# Patient Record
Sex: MALE | Race: WHITE | NOT HISPANIC OR LATINO | URBAN - METROPOLITAN AREA
[De-identification: names, ages, dates, MRNs, and addresses within clinical notes are randomized per-mention and may not be internally consistent; named-entity substitution may affect disease eponyms.]

---

## 2017-05-04 ENCOUNTER — INPATIENT (INPATIENT)
Facility: HOSPITAL | Age: 72
LOS: 6 days | Discharge: HOME | End: 2017-05-11
Attending: INTERNAL MEDICINE | Admitting: INTERNAL MEDICINE
Payer: MEDICARE

## 2017-05-04 VITALS
OXYGEN SATURATION: 91 % | DIASTOLIC BLOOD PRESSURE: 84 MMHG | HEART RATE: 139 BPM | RESPIRATION RATE: 39 BRPM | SYSTOLIC BLOOD PRESSURE: 188 MMHG

## 2017-05-04 DIAGNOSIS — I10 ESSENTIAL (PRIMARY) HYPERTENSION: ICD-10-CM

## 2017-05-04 DIAGNOSIS — J44.9 CHRONIC OBSTRUCTIVE PULMONARY DISEASE, UNSPECIFIED: ICD-10-CM

## 2017-05-04 DIAGNOSIS — Z98.89 OTHER SPECIFIED POSTPROCEDURAL STATES: Chronic | ICD-10-CM

## 2017-05-04 DIAGNOSIS — A41.9 SEPSIS, UNSPECIFIED ORGANISM: ICD-10-CM

## 2017-05-04 DIAGNOSIS — I48.91 UNSPECIFIED ATRIAL FIBRILLATION: ICD-10-CM

## 2017-05-04 DIAGNOSIS — Z29.9 ENCOUNTER FOR PROPHYLACTIC MEASURES, UNSPECIFIED: ICD-10-CM

## 2017-05-04 DIAGNOSIS — J96.01 ACUTE RESPIRATORY FAILURE WITH HYPOXIA: ICD-10-CM

## 2017-05-04 DIAGNOSIS — G40.909 EPILEPSY, UNSPECIFIED, NOT INTRACTABLE, WITHOUT STATUS EPILEPTICUS: ICD-10-CM

## 2017-05-04 DIAGNOSIS — Z95.4 PRESENCE OF OTHER HEART-VALVE REPLACEMENT: Chronic | ICD-10-CM

## 2017-05-04 LAB
ALBUMIN SERPL ELPH-MCNC: 4.6 G/DL — SIGNIFICANT CHANGE UP (ref 3.3–5)
ALP SERPL-CCNC: 92 U/L — SIGNIFICANT CHANGE UP (ref 40–120)
ALT FLD-CCNC: 21 U/L — SIGNIFICANT CHANGE UP (ref 4–41)
APPEARANCE UR: CLEAR — SIGNIFICANT CHANGE UP
APTT BLD: 28.9 SEC — SIGNIFICANT CHANGE UP (ref 27.5–37.4)
AST SERPL-CCNC: 37 U/L — SIGNIFICANT CHANGE UP (ref 4–40)
B PERT DNA SPEC QL NAA+PROBE: SIGNIFICANT CHANGE UP
BASE EXCESS BLDV CALC-SCNC: 0.9 MMOL/L — SIGNIFICANT CHANGE UP
BASE EXCESS BLDV CALC-SCNC: 7 MMOL/L — SIGNIFICANT CHANGE UP
BASOPHILS # BLD AUTO: 0.01 K/UL — SIGNIFICANT CHANGE UP (ref 0–0.2)
BASOPHILS NFR BLD AUTO: 0.1 % — SIGNIFICANT CHANGE UP (ref 0–2)
BILIRUB SERPL-MCNC: 0.7 MG/DL — SIGNIFICANT CHANGE UP (ref 0.2–1.2)
BILIRUB UR-MCNC: NEGATIVE — SIGNIFICANT CHANGE UP
BLOOD GAS VENOUS - CREATININE: 1.18 MG/DL — SIGNIFICANT CHANGE UP (ref 0.5–1.3)
BLOOD GAS VENOUS - CREATININE: 1.19 MG/DL — SIGNIFICANT CHANGE UP (ref 0.5–1.3)
BLOOD UR QL VISUAL: NEGATIVE — SIGNIFICANT CHANGE UP
BUN SERPL-MCNC: 33 MG/DL — HIGH (ref 7–23)
C PNEUM DNA SPEC QL NAA+PROBE: NOT DETECTED — SIGNIFICANT CHANGE UP
CALCIUM SERPL-MCNC: 9.9 MG/DL — SIGNIFICANT CHANGE UP (ref 8.4–10.5)
CHLORIDE BLDV-SCNC: 101 MMOL/L — SIGNIFICANT CHANGE UP (ref 96–108)
CHLORIDE BLDV-SCNC: 106 MMOL/L — SIGNIFICANT CHANGE UP (ref 96–108)
CHLORIDE SERPL-SCNC: 97 MMOL/L — LOW (ref 98–107)
CK MB BLD-MCNC: 1.27 NG/ML — SIGNIFICANT CHANGE UP (ref 1–6.6)
CK MB BLD-MCNC: 1.8 NG/ML — SIGNIFICANT CHANGE UP (ref 1–6.6)
CK SERPL-CCNC: 36 U/L — SIGNIFICANT CHANGE UP (ref 30–200)
CK SERPL-CCNC: 59 U/L — SIGNIFICANT CHANGE UP (ref 30–200)
CO2 SERPL-SCNC: 28 MMOL/L — SIGNIFICANT CHANGE UP (ref 22–31)
COLOR SPEC: YELLOW — SIGNIFICANT CHANGE UP
CREAT SERPL-MCNC: 1.2 MG/DL — SIGNIFICANT CHANGE UP (ref 0.5–1.3)
EOSINOPHIL # BLD AUTO: 0.06 K/UL — SIGNIFICANT CHANGE UP (ref 0–0.5)
EOSINOPHIL NFR BLD AUTO: 0.4 % — SIGNIFICANT CHANGE UP (ref 0–6)
FLUAV H1 2009 PAND RNA SPEC QL NAA+PROBE: NOT DETECTED — SIGNIFICANT CHANGE UP
FLUAV H1 RNA SPEC QL NAA+PROBE: NOT DETECTED — SIGNIFICANT CHANGE UP
FLUAV H3 RNA SPEC QL NAA+PROBE: NOT DETECTED — SIGNIFICANT CHANGE UP
FLUAV SUBTYP SPEC NAA+PROBE: SIGNIFICANT CHANGE UP
FLUBV RNA SPEC QL NAA+PROBE: NOT DETECTED — SIGNIFICANT CHANGE UP
GAS PNL BLDV: 137 MMOL/L — SIGNIFICANT CHANGE UP (ref 136–146)
GAS PNL BLDV: 141 MMOL/L — SIGNIFICANT CHANGE UP (ref 136–146)
GLUCOSE BLDV-MCNC: 106 — HIGH (ref 70–99)
GLUCOSE BLDV-MCNC: 137 — HIGH (ref 70–99)
GLUCOSE SERPL-MCNC: 136 MG/DL — HIGH (ref 70–99)
GLUCOSE UR-MCNC: NEGATIVE — SIGNIFICANT CHANGE UP
HADV DNA SPEC QL NAA+PROBE: NOT DETECTED — SIGNIFICANT CHANGE UP
HCO3 BLDV-SCNC: 25 MMOL/L — SIGNIFICANT CHANGE UP (ref 20–27)
HCO3 BLDV-SCNC: 28 MMOL/L — HIGH (ref 20–27)
HCOV 229E RNA SPEC QL NAA+PROBE: NOT DETECTED — SIGNIFICANT CHANGE UP
HCOV HKU1 RNA SPEC QL NAA+PROBE: NOT DETECTED — SIGNIFICANT CHANGE UP
HCOV NL63 RNA SPEC QL NAA+PROBE: NOT DETECTED — SIGNIFICANT CHANGE UP
HCOV OC43 RNA SPEC QL NAA+PROBE: NOT DETECTED — SIGNIFICANT CHANGE UP
HCT VFR BLD CALC: 43.9 % — SIGNIFICANT CHANGE UP (ref 39–50)
HCT VFR BLDV CALC: 37.1 % — LOW (ref 39–51)
HCT VFR BLDV CALC: 46.7 % — SIGNIFICANT CHANGE UP (ref 39–51)
HGB BLD-MCNC: 15 G/DL — SIGNIFICANT CHANGE UP (ref 13–17)
HGB BLDV-MCNC: 12 G/DL — LOW (ref 13–17)
HGB BLDV-MCNC: 15.2 G/DL — SIGNIFICANT CHANGE UP (ref 13–17)
HMPV RNA SPEC QL NAA+PROBE: NOT DETECTED — SIGNIFICANT CHANGE UP
HPIV1 RNA SPEC QL NAA+PROBE: NOT DETECTED — SIGNIFICANT CHANGE UP
HPIV2 RNA SPEC QL NAA+PROBE: NOT DETECTED — SIGNIFICANT CHANGE UP
HPIV3 RNA SPEC QL NAA+PROBE: NOT DETECTED — SIGNIFICANT CHANGE UP
HPIV4 RNA SPEC QL NAA+PROBE: NOT DETECTED — SIGNIFICANT CHANGE UP
HYALINE CASTS # UR AUTO: SIGNIFICANT CHANGE UP (ref 0–?)
IMM GRANULOCYTES NFR BLD AUTO: 0.4 % — SIGNIFICANT CHANGE UP (ref 0–1.5)
INR BLD: 1.51 — HIGH (ref 0.88–1.17)
KETONES UR-MCNC: NEGATIVE — SIGNIFICANT CHANGE UP
LACTATE BLDV-MCNC: 1.2 MMOL/L — SIGNIFICANT CHANGE UP (ref 0.5–2)
LACTATE BLDV-MCNC: 2 MMOL/L — SIGNIFICANT CHANGE UP (ref 0.5–2)
LEUKOCYTE ESTERASE UR-ACNC: NEGATIVE — SIGNIFICANT CHANGE UP
LYMPHOCYTES # BLD AUTO: 1.86 K/UL — SIGNIFICANT CHANGE UP (ref 1–3.3)
LYMPHOCYTES # BLD AUTO: 13.3 % — SIGNIFICANT CHANGE UP (ref 13–44)
M PNEUMO DNA SPEC QL NAA+PROBE: NOT DETECTED — SIGNIFICANT CHANGE UP
MCHC RBC-ENTMCNC: 32.8 PG — SIGNIFICANT CHANGE UP (ref 27–34)
MCHC RBC-ENTMCNC: 34.2 % — SIGNIFICANT CHANGE UP (ref 32–36)
MCV RBC AUTO: 95.9 FL — SIGNIFICANT CHANGE UP (ref 80–100)
MONOCYTES # BLD AUTO: 0.3 K/UL — SIGNIFICANT CHANGE UP (ref 0–0.9)
MONOCYTES NFR BLD AUTO: 2.2 % — SIGNIFICANT CHANGE UP (ref 2–14)
MUCOUS THREADS # UR AUTO: SIGNIFICANT CHANGE UP
NEUTROPHILS # BLD AUTO: 11.66 K/UL — HIGH (ref 1.8–7.4)
NEUTROPHILS NFR BLD AUTO: 83.6 % — HIGH (ref 43–77)
NITRITE UR-MCNC: NEGATIVE — SIGNIFICANT CHANGE UP
NON-SQ EPI CELLS # UR AUTO: <1 — SIGNIFICANT CHANGE UP
NT-PROBNP SERPL-SCNC: 399.4 PG/ML — SIGNIFICANT CHANGE UP
PCO2 BLDV: 43 MMHG — SIGNIFICANT CHANGE UP (ref 41–51)
PCO2 BLDV: 60 MMHG — HIGH (ref 41–51)
PH BLDV: 7.36 PH — SIGNIFICANT CHANGE UP (ref 7.32–7.43)
PH BLDV: 7.39 PH — SIGNIFICANT CHANGE UP (ref 7.32–7.43)
PH UR: 6 — SIGNIFICANT CHANGE UP (ref 4.6–8)
PLATELET # BLD AUTO: 171 K/UL — SIGNIFICANT CHANGE UP (ref 150–400)
PMV BLD: 10.6 FL — SIGNIFICANT CHANGE UP (ref 7–13)
PO2 BLDV: 25 MMHG — LOW (ref 35–40)
PO2 BLDV: 57 MMHG — HIGH (ref 35–40)
POTASSIUM BLDV-SCNC: 3.4 MMOL/L — SIGNIFICANT CHANGE UP (ref 3.4–4.5)
POTASSIUM BLDV-SCNC: 4.2 MMOL/L — SIGNIFICANT CHANGE UP (ref 3.4–4.5)
POTASSIUM SERPL-MCNC: 4.7 MMOL/L — SIGNIFICANT CHANGE UP (ref 3.5–5.3)
POTASSIUM SERPL-SCNC: 4.7 MMOL/L — SIGNIFICANT CHANGE UP (ref 3.5–5.3)
PROT SERPL-MCNC: 8 G/DL — SIGNIFICANT CHANGE UP (ref 6–8.3)
PROT UR-MCNC: 30 — SIGNIFICANT CHANGE UP
PROTHROM AB SERPL-ACNC: 17.1 SEC — HIGH (ref 9.8–13.1)
RBC # BLD: 4.58 M/UL — SIGNIFICANT CHANGE UP (ref 4.2–5.8)
RBC # FLD: 13.2 % — SIGNIFICANT CHANGE UP (ref 10.3–14.5)
RBC CASTS # UR COMP ASSIST: SIGNIFICANT CHANGE UP (ref 0–?)
RSV RNA SPEC QL NAA+PROBE: NOT DETECTED — SIGNIFICANT CHANGE UP
RV+EV RNA SPEC QL NAA+PROBE: NOT DETECTED — SIGNIFICANT CHANGE UP
SAO2 % BLDV: 35.2 % — LOW (ref 60–85)
SAO2 % BLDV: 88.2 % — HIGH (ref 60–85)
SODIUM SERPL-SCNC: 140 MMOL/L — SIGNIFICANT CHANGE UP (ref 135–145)
SP GR SPEC: 1.02 — SIGNIFICANT CHANGE UP (ref 1–1.03)
SQUAMOUS # UR AUTO: SIGNIFICANT CHANGE UP
TROPONIN T SERPL-MCNC: < 0.06 NG/ML — SIGNIFICANT CHANGE UP (ref 0–0.06)
TROPONIN T SERPL-MCNC: < 0.06 NG/ML — SIGNIFICANT CHANGE UP (ref 0–0.06)
UROBILINOGEN FLD QL: NORMAL E.U. — SIGNIFICANT CHANGE UP (ref 0.1–0.2)
WBC # BLD: 13.95 K/UL — HIGH (ref 3.8–10.5)
WBC # FLD AUTO: 13.95 K/UL — HIGH (ref 3.8–10.5)
WBC UR QL: SIGNIFICANT CHANGE UP (ref 0–?)

## 2017-05-04 PROCEDURE — 99291 CRITICAL CARE FIRST HOUR: CPT

## 2017-05-04 PROCEDURE — 71010: CPT | Mod: 26,76

## 2017-05-04 PROCEDURE — 71250 CT THORAX DX C-: CPT | Mod: 26

## 2017-05-04 RX ORDER — ALBUTEROL 90 UG/1
2.5 AEROSOL, METERED ORAL EVERY 6 HOURS
Qty: 0 | Refills: 0 | Status: DISCONTINUED | OUTPATIENT
Start: 2017-05-04 | End: 2017-05-11

## 2017-05-04 RX ORDER — CARBAMAZEPINE 200 MG
400 TABLET ORAL ONCE
Qty: 0 | Refills: 0 | Status: DISCONTINUED | OUTPATIENT
Start: 2017-05-04 | End: 2017-05-04

## 2017-05-04 RX ORDER — ONDANSETRON 8 MG/1
4 TABLET, FILM COATED ORAL ONCE
Qty: 0 | Refills: 0 | Status: COMPLETED | OUTPATIENT
Start: 2017-05-04 | End: 2017-05-04

## 2017-05-04 RX ORDER — DIGOXIN 250 MCG
0.25 TABLET ORAL ONCE
Qty: 0 | Refills: 0 | Status: COMPLETED | OUTPATIENT
Start: 2017-05-04 | End: 2017-05-04

## 2017-05-04 RX ORDER — IPRATROPIUM/ALBUTEROL SULFATE 18-103MCG
3 AEROSOL WITH ADAPTER (GRAM) INHALATION ONCE
Qty: 0 | Refills: 0 | Status: COMPLETED | OUTPATIENT
Start: 2017-05-04 | End: 2017-05-04

## 2017-05-04 RX ORDER — SODIUM CHLORIDE 9 MG/ML
500 INJECTION INTRAMUSCULAR; INTRAVENOUS; SUBCUTANEOUS ONCE
Qty: 0 | Refills: 0 | Status: COMPLETED | OUTPATIENT
Start: 2017-05-04 | End: 2017-05-04

## 2017-05-04 RX ORDER — ACETAMINOPHEN 500 MG
1000 TABLET ORAL ONCE
Qty: 0 | Refills: 0 | Status: COMPLETED | OUTPATIENT
Start: 2017-05-04 | End: 2017-05-04

## 2017-05-04 RX ORDER — WARFARIN SODIUM 2.5 MG/1
5 TABLET ORAL ONCE
Qty: 0 | Refills: 0 | Status: COMPLETED | OUTPATIENT
Start: 2017-05-04 | End: 2017-05-04

## 2017-05-04 RX ORDER — ACETAMINOPHEN 500 MG
650 TABLET ORAL ONCE
Qty: 0 | Refills: 0 | Status: DISCONTINUED | OUTPATIENT
Start: 2017-05-04 | End: 2017-05-04

## 2017-05-04 RX ORDER — LAMOTRIGINE 25 MG/1
100 TABLET, ORALLY DISINTEGRATING ORAL
Qty: 0 | Refills: 0 | Status: DISCONTINUED | OUTPATIENT
Start: 2017-05-04 | End: 2017-05-11

## 2017-05-04 RX ORDER — MIDODRINE HYDROCHLORIDE 2.5 MG/1
5 TABLET ORAL THREE TIMES A DAY
Qty: 0 | Refills: 0 | Status: DISCONTINUED | OUTPATIENT
Start: 2017-05-04 | End: 2017-05-06

## 2017-05-04 RX ORDER — SODIUM CHLORIDE 9 MG/ML
1000 INJECTION INTRAMUSCULAR; INTRAVENOUS; SUBCUTANEOUS
Qty: 0 | Refills: 0 | Status: DISCONTINUED | OUTPATIENT
Start: 2017-05-04 | End: 2017-05-05

## 2017-05-04 RX ORDER — DIGOXIN 250 MCG
0.4 TABLET ORAL ONCE
Qty: 0 | Refills: 0 | Status: COMPLETED | OUTPATIENT
Start: 2017-05-04 | End: 2017-05-04

## 2017-05-04 RX ORDER — LAMOTRIGINE 25 MG/1
100 TABLET, ORALLY DISINTEGRATING ORAL ONCE
Qty: 0 | Refills: 0 | Status: DISCONTINUED | OUTPATIENT
Start: 2017-05-04 | End: 2017-05-04

## 2017-05-04 RX ORDER — SODIUM CHLORIDE 9 MG/ML
1000 INJECTION INTRAMUSCULAR; INTRAVENOUS; SUBCUTANEOUS
Qty: 0 | Refills: 0 | Status: DISCONTINUED | OUTPATIENT
Start: 2017-05-04 | End: 2017-05-04

## 2017-05-04 RX ORDER — SODIUM CHLORIDE 9 MG/ML
1000 INJECTION INTRAMUSCULAR; INTRAVENOUS; SUBCUTANEOUS ONCE
Qty: 0 | Refills: 0 | Status: COMPLETED | OUTPATIENT
Start: 2017-05-04 | End: 2017-05-04

## 2017-05-04 RX ORDER — LAMOTRIGINE 25 MG/1
100 TABLET, ORALLY DISINTEGRATING ORAL
Qty: 0 | Refills: 0 | Status: DISCONTINUED | OUTPATIENT
Start: 2017-05-04 | End: 2017-05-04

## 2017-05-04 RX ORDER — CARBAMAZEPINE 200 MG
400 TABLET ORAL
Qty: 0 | Refills: 0 | Status: DISCONTINUED | OUTPATIENT
Start: 2017-05-04 | End: 2017-05-11

## 2017-05-04 RX ORDER — DILTIAZEM HCL 120 MG
7.5 CAPSULE, EXT RELEASE 24 HR ORAL
Qty: 125 | Refills: 0 | Status: DISCONTINUED | OUTPATIENT
Start: 2017-05-04 | End: 2017-05-06

## 2017-05-04 RX ORDER — TIOTROPIUM BROMIDE 18 UG/1
1 CAPSULE ORAL; RESPIRATORY (INHALATION) DAILY
Qty: 0 | Refills: 0 | Status: DISCONTINUED | OUTPATIENT
Start: 2017-05-04 | End: 2017-05-11

## 2017-05-04 RX ORDER — ACETAMINOPHEN 500 MG
650 TABLET ORAL EVERY 6 HOURS
Qty: 0 | Refills: 0 | Status: DISCONTINUED | OUTPATIENT
Start: 2017-05-04 | End: 2017-05-11

## 2017-05-04 RX ORDER — IPRATROPIUM/ALBUTEROL SULFATE 18-103MCG
3 AEROSOL WITH ADAPTER (GRAM) INHALATION EVERY 6 HOURS
Qty: 0 | Refills: 0 | Status: DISCONTINUED | OUTPATIENT
Start: 2017-05-04 | End: 2017-05-04

## 2017-05-04 RX ORDER — DIGOXIN 250 MCG
0.25 TABLET ORAL ONCE
Qty: 0 | Refills: 0 | Status: DISCONTINUED | OUTPATIENT
Start: 2017-05-04 | End: 2017-05-04

## 2017-05-04 RX ADMIN — SODIUM CHLORIDE 1000 MILLILITER(S): 9 INJECTION INTRAMUSCULAR; INTRAVENOUS; SUBCUTANEOUS at 08:33

## 2017-05-04 RX ADMIN — SODIUM CHLORIDE 75 MILLILITER(S): 9 INJECTION INTRAMUSCULAR; INTRAVENOUS; SUBCUTANEOUS at 13:54

## 2017-05-04 RX ADMIN — Medication 3 MILLILITER(S): at 01:38

## 2017-05-04 RX ADMIN — SODIUM CHLORIDE 75 MILLILITER(S): 9 INJECTION INTRAMUSCULAR; INTRAVENOUS; SUBCUTANEOUS at 07:28

## 2017-05-04 RX ADMIN — SODIUM CHLORIDE 500 MILLILITER(S): 9 INJECTION INTRAMUSCULAR; INTRAVENOUS; SUBCUTANEOUS at 05:12

## 2017-05-04 RX ADMIN — Medication 7.5 MG/HR: at 21:57

## 2017-05-04 RX ADMIN — Medication 3 MILLILITER(S): at 03:01

## 2017-05-04 RX ADMIN — Medication 400 MILLIGRAM(S): at 13:05

## 2017-05-04 RX ADMIN — LAMOTRIGINE 100 MILLIGRAM(S): 25 TABLET, ORALLY DISINTEGRATING ORAL at 13:06

## 2017-05-04 RX ADMIN — Medication 0.4 MILLIGRAM(S): at 03:19

## 2017-05-04 RX ADMIN — Medication 125 MILLIGRAM(S): at 01:54

## 2017-05-04 RX ADMIN — ONDANSETRON 4 MILLIGRAM(S): 8 TABLET, FILM COATED ORAL at 01:32

## 2017-05-04 RX ADMIN — LAMOTRIGINE 100 MILLIGRAM(S): 25 TABLET, ORALLY DISINTEGRATING ORAL at 18:59

## 2017-05-04 RX ADMIN — Medication 400 MILLIGRAM(S): at 09:36

## 2017-05-04 RX ADMIN — Medication 0.25 MILLIGRAM(S): at 09:36

## 2017-05-04 RX ADMIN — WARFARIN SODIUM 5 MILLIGRAM(S): 2.5 TABLET ORAL at 18:30

## 2017-05-04 RX ADMIN — Medication 400 MILLIGRAM(S): at 18:59

## 2017-05-04 RX ADMIN — Medication 1 MILLIGRAM(S): at 09:12

## 2017-05-04 RX ADMIN — SODIUM CHLORIDE 100 MILLILITER(S): 9 INJECTION INTRAMUSCULAR; INTRAVENOUS; SUBCUTANEOUS at 16:35

## 2017-05-04 NOTE — ED PROVIDER NOTE - MEDICAL DECISION MAKING DETAILS
71M with SOB. Ddx: COPD exacerbation, PNA, aspiration. Do not suspect CHF (no peripheral edema). Plan: ekg, cxr, labs, duoneb, bipap, cardizem for afib with RVR, MICU c/s, reassess.

## 2017-05-04 NOTE — H&P ADULT. - PROBLEM SELECTOR PLAN 5
--on 2L NCO2 at home PRN only   --will place on BiPAP if needed  --continue Spiriva and albuterol nebs

## 2017-05-04 NOTE — ED ADULT NURSE REASSESSMENT NOTE - NS ED NURSE REASSESS COMMENT FT1
Called into room by pts wife as per pt wife pt having seizure. When went in room pt eyes rolled back, tachycardic, tachypnenic, mild shakes to hand noted, breathing erratic. Medicated with 1mg IV, Pt currently post ictal, no vomiting, no biting of tongue, airway maintained. Will continue to monitor.
Pt more awake and alert now. Pt knows name and place. Pt place on NC 2L as per MICU  O2 sat 88% Dr. Ward notified he is aware and ok with 88%. Will continue to monitor.
Pt AAOx3, came to hospital with SOB, currently on venturi mask O2 sat 93%. Pt rapid afib on cardiac monitor . Lung sounds diffuse rhonchi heard over left  lung field. No lung sounds heard over right lung field r/t pneumectomy. Rectal temp of 100.3, will give Tylenol. Pt notable hypotensive pt receiving IV fluids. Skin clean, dry and intact. 20G right AC, 18G left forearm. Pt currently resting, will continue to monitor.

## 2017-05-04 NOTE — ED ADULT TRIAGE NOTE - CHIEF COMPLAINT QUOTE
Pt brought by [facility] as notification for respiratory distress/COPD exacerbation, labored resps in field, unable to tolerate CPap mask, on supplemental O2 NC. RRate 30s in field. PMHx CHF, COPD, Valve Replacement, Lobectomy. Pt brought direct to [facility], unable to get VS in triage d/t acuity of pt.

## 2017-05-04 NOTE — ED PROVIDER NOTE - CRITICAL CARE PROVIDED
documentation/additional history taking/consultation with other physicians/consult w/ pt's family directly relating to pts condition/direct patient care (not related to procedure)/interpretation of diagnostic studies

## 2017-05-04 NOTE — ED ADULT NURSE NOTE - PSH
H/O aortic valve replacement  2013  H/O inguinal hernia repair  1999  S/P pneumonectomy  right sided, ~ 2003

## 2017-05-04 NOTE — H&P ADULT. - PROBLEM SELECTOR PLAN 3
--with partial seizure noted in ER, likely also precipitated by hypoxia   --given Ativan with response  --continue home antiseizure medications Lamictal and Tegretol

## 2017-05-04 NOTE — ED PROVIDER NOTE - CARE PLAN
Principal Discharge DX:	Sepsis  Secondary Diagnosis:	Atrial fibrillation with RVR  Secondary Diagnosis:	Hypoxia

## 2017-05-04 NOTE — ED PROVIDER NOTE - RESPIRATORY, MLM
In resp distress, tachypneic, +suprasternal retractions, pursed lip breathing. No lung sounds on right. Diffuse crackles on L, no wheezing.

## 2017-05-04 NOTE — H&P ADULT. - FAMILY HISTORY
<<-----Click on this checkbox to enter Family History Family history of stroke, Mother     Family history of hypertension, Mother     Aunt  Still living? Unknown  Family history of cancer, Age at diagnosis: Age Unknown

## 2017-05-04 NOTE — ED ADULT NURSE NOTE - OBJECTIVE STATEMENT
Esteban RN: Pt received to [facility] as a notification for suspected COPD exacerbation. Pt is Latvian speaking, refusing  services and requesting wife to translate. Pt BIBA on 4L supplemental O2- EMS attempted initiating bipap in the field however pt was unable to tolerate mask. Per wife, pt has been coughing since after eating dinner. Around 11:30pm, pt started having difficulty breathing. Pt on 2L NC at home, Esteban RN: Pt received to [facility] as a notification for suspected COPD exacerbation. Pt is Albanian speaking, refusing  services and requesting wife to translate. Pt BIBA on 4L supplemental O2- EMS attempted initiating cpap in the field however pt was unable to tolerate mask. Per wife, pt has been coughing since after eating dinner. Around 11:30pm, pt started having difficulty breathing. Pt on 2L NC at home PRN. Pt currently coughing up white frothy sputum, respirations appear labored. Denies chest pain, dizziness, lightheadedness, n/v, fever, chills. PMH afib (on coumadin), COPD, HTN, HLD, lung CA with R sided lobectomy, aortic valve replacement. 20G IV placed in R AC and 18G IV placed in L forearm, BCX2 and labs sent and pt placed on bipap. Report given to THADDEUS Ronquillo in area.

## 2017-05-04 NOTE — H&P ADULT. - ASSESSMENT
70yo M with h/o lung cancer s/p R-sided pneumonectomy, RXT and chemo (last in 2005) in remission,, Afib on coumadin, HTN, bovine AVR, seizure disorder on pharmacotherapy presenting with wife at bedside for acute onset of SOB after apparent choking episode, likely aspiration.

## 2017-05-04 NOTE — H&P ADULT. - PROBLEM SELECTOR PLAN 1
--pt noted to be hypoxic in field requiring NCO2 as did not tolerate BiPAP, improvement in SpO2 only to 91% requiring BiPAP, now having been de-escalated to nasal cannula  --likely precipitated by aspiration event (aspiration pneumonitis vs PNA) less likely COPD exacerbation by acuity of symptom onset in setting of witnessed choking.  --CXR clear lungs, will obtain non-con CT chest to delineate pulmonary process   --s/p Levaquin x 1 in ER, will continue q24h as no concern for HCAP and therefore n indication for vanco  --supplemental O2 as needed, wean as tolerated  --IVF   --MICU re-eval PRN

## 2017-05-04 NOTE — H&P ADULT. - PROBLEM SELECTOR PLAN 2
--likely precipitated by hypoxia, complicated by acute hypotension in response to AV andres agents  --BPs improved, still tachy. S/p dig load so will monitor for now, if uncontrolled will consult cards for ? amiodarone   --continue dosing coumadin daily by INR, goal 2-3   --holding Toprol while attaining acute rate control and given hypotension, continuous maintenance NS with boluses PRN

## 2017-05-04 NOTE — ED PROVIDER NOTE - ATTENDING CONTRIBUTION TO CARE
I was physically present for the E/M service provided. I agree with above history, physical, and plan which I have reviewed and edited where appropriate. I was physically present for the key portions of the service provided.    71M h/o COPD (2L home O2), s/p right pneumonectomy for lung ca, AVR, Afib, on coumadin, HTN, seizure d/o p/w SOB.  -Tachypnea with hypoxia on presentation with fever and + leukocytosis  -CXR w/ left base pleural effusion ?PNA, RVP neg. UA neg.  -IV Abx for sepsis initiated  -Afib w/ RVR  to 170s with transient response to cardizem IV followed by hypotension, digoxin loaded and IVF bolus  -Transient BiPAP, weaned to VenturiMask 45%  -Evaluated by MICU and CCU in ED

## 2017-05-04 NOTE — ED PROVIDER NOTE - OBJECTIVE STATEMENT
71M h/o COPD, R lung excision for lung ca, AVR on coumadin, HTN, seizure d/o on tegretol/lamictal p/w resp distress. Pt is Slovak-speaking, hx translated by wife at bedside at pt's request. Pt 71M h/o COPD (2L home O2), R lung excision for lung ca, AVR, Afib, on coumadin, HTN, seizure d/o on tegretol/lamictal p/w resp distress. Pt is Urdu-speaking, hx translated by wife at bedside at pt's request. Pt with acute onset SOB since yesterday evening, wife thinks he may have choked on something at dinner, has been coughing since then. No fevers or vomiting. 71M h/o COPD (2L home O2), R lung excision for lung ca, AVR, Afib, on coumadin, HTN, seizure d/o on tegretol/lamictal p/w resp distress. Pt is Lithuanian-speaking, hx translated by wife at bedside at pt's request. Pt with acute onset SOB x2 hrs, wife thinks he may have choked on something at dinner, has been coughing since then. Was able to fall asleep but woke up at 11:30 with acute SOB, felt cold. No fevers, no pain, no vomiting.

## 2017-05-04 NOTE — ED PROVIDER NOTE - PROGRESS NOTE DETAILS
Pt on bipap, looks more comfortable, SaO2 100%. Cardizem 15mg given - HR decreased from 160 to 115. Hypotension SBP 70s. BiPAP removed (was 10/5) SBP improved to 88. IVF bolus and 2nd Duoneb ordered. Awaiting MICU evaluation. JESSIE. SBP 60. . Will load with digoxin. MICU updated. JESSIE. MICU resident at bedside. Pt asleep and NAD on venturi mask 45%. MAP 67 and -130 after 2L IVF bolus. Spoke with palliative care: Pt not a palliative care unit candidate due to still receiving active treatment for current Sx. Admitting team should place consult. CCU at bedside evaluating patient. 2nd Digoxin dose due at 9AM + More IVF. KF. Notified by nurse that pt is having partial seizure- per wife at bedside pt had increasing tachypnea, arm shaking c/w seizures at home. Administered ativan 1mg IV which stopped seizure activity. Pt did not receive AM seizure meds- typically takes Lamictal 100 BID, tegretol 400 BID. Pt now lethargic c/w typical post-ictal state. Will give PO medications when pt more alert.  CCU will not accept the patient. MICU notified and will evaluate the patient. NAEL Begum MD PGY1 MICU resident at bedside. Pt's PMD notified as well.

## 2017-05-04 NOTE — H&P ADULT. - HISTORY OF PRESENT ILLNESS
70yo M with h/o lung cancer s/p pneumonectomy, RXT and chemo (last in 2005) in remission,, Afib on coumadin, HTN, bovine AVR, seizure disorder on pharmacotherapy presenting with wife at bedside for acute onset of SOB. HPI obtained from patient's wife who translated as patient is Danish speaking only. Per wife, patient was eating dinner and when drinking water was noted to have the acute onset of a deep cough which made her believe he had choked. After a long coughing spell patient's symptom resolved and he was able to go to bed though approximately two hours into sleep he awoke suddenly with SOB, productive cough of yellow sputum and also reports of being "cold" and having rigors. Patient did not report subjective fever and otherwise has been healthy. Denies any recent illnesses including URI symptoms such as rhinorrhea sore throat, nasal congestion. No abdominal pain, N/V or dysuria. Patient's wife reports at home she checked his blood pressure which was normal and then called EMS as he continued to be short of breath.     En route via EMS, patient noted to have labored respirations in field, unable to tolerate CPAP mask, placed on supplemental O2 NC. RRate 30s in field. In ER SpO2 of 91%, tachypneic to 39, HR of 139, afebrile with normal BPs. Patient placed on bipap with O2 improved to 100%. Cardizem 15mg given as patient noted to be in Afib with RVR with improvement in HR from 160 to 115. MICU consulted and rejected patient. After cardizem patient's SBP 60. HR returned to RVR with rate of 160. Digoxin load given, also 2L normal saline bolus and improvement in HR to 100-113. Patient able to be weaned off BiPAP onto Venturi mask 45%. Not long after patient noted to have partial seizure- per wife at bedside pt had increasing tachypnea and arm shaking c/w seizures at home. Given ativan 1mg IV which stopped seizure activity. 72yo M with h/o lung cancer s/p R-sided pneumonectomy, RXT and chemo (last in 2005) in remission,, Afib on coumadin, HTN, bovine AVR, seizure disorder on pharmacotherapy presenting with wife at bedside for acute onset of SOB. HPI obtained from patient's wife who translated as patient is Lithuanian speaking only. Per wife, patient was eating dinner and when drinking water was noted to have the acute onset of a deep cough which made her believe he had choked. After a long coughing spell patient's symptom resolved and he was able to go to bed though approximately two hours into sleep he awoke suddenly with SOB, productive cough of yellow sputum and also reports of being "cold" and having rigors. Patient did not report subjective fever and otherwise has been healthy. Denies any recent illnesses including URI symptoms such as rhinorrhea sore throat, nasal congestion. No abdominal pain, N/V or dysuria. Patient's wife reports at home she checked his blood pressure which was normal and then called EMS as he continued to be short of breath.     En route via EMS, patient noted to have labored respirations in field, unable to tolerate CPAP mask, placed on supplemental O2 NC. RRate 30s in field. In ER SpO2 of 91%, tachypneic to 39, HR of 139, afebrile with normal BPs. Patient placed on bipap with O2 improved to 100%. Cardizem 15mg given as patient noted to be in Afib with RVR with improvement in HR from 160 to 115. MICU consulted and rejected patient. After cardizem patient's SBP 60. HR returned to RVR with rate of 160. Digoxin load given, also 2L normal saline bolus and improvement in HR to 100-113. Patient able to be weaned off BiPAP onto Venturi mask 45%. Not long after patient noted to have partial seizure- per wife at bedside pt had increasing tachypnea and arm shaking c/w seizures at home. Given ativan 1mg IV which stopped seizure activity. Labs notable only for a mild luekocytosis of 13, BMP wnl. RVP (-), CXR with clear lungs. UA (-).

## 2017-05-05 LAB
ANISOCYTOSIS BLD QL: SLIGHT — SIGNIFICANT CHANGE UP
APTT BLD: 36.5 SEC — SIGNIFICANT CHANGE UP (ref 27.5–37.4)
BACTERIA UR CULT: SIGNIFICANT CHANGE UP
BASOPHILS # BLD AUTO: 0.01 K/UL — SIGNIFICANT CHANGE UP (ref 0–0.2)
BASOPHILS NFR BLD AUTO: 0.1 % — SIGNIFICANT CHANGE UP (ref 0–2)
BASOPHILS NFR SPEC: 0 % — SIGNIFICANT CHANGE UP (ref 0–2)
BLASTS # FLD: 0 % — SIGNIFICANT CHANGE UP (ref 0–0)
BUN SERPL-MCNC: 26 MG/DL — HIGH (ref 7–23)
CALCIUM SERPL-MCNC: 8.1 MG/DL — LOW (ref 8.4–10.5)
CHLORIDE SERPL-SCNC: 106 MMOL/L — SIGNIFICANT CHANGE UP (ref 98–107)
CO2 SERPL-SCNC: 22 MMOL/L — SIGNIFICANT CHANGE UP (ref 22–31)
CREAT SERPL-MCNC: 0.87 MG/DL — SIGNIFICANT CHANGE UP (ref 0.5–1.3)
EOSINOPHIL # BLD AUTO: 0.01 K/UL — SIGNIFICANT CHANGE UP (ref 0–0.5)
EOSINOPHIL NFR BLD AUTO: 0.1 % — SIGNIFICANT CHANGE UP (ref 0–6)
EOSINOPHIL NFR FLD: 0 % — SIGNIFICANT CHANGE UP (ref 0–6)
GLUCOSE SERPL-MCNC: 99 MG/DL — SIGNIFICANT CHANGE UP (ref 70–99)
HCT VFR BLD CALC: 31.4 % — LOW (ref 39–50)
HGB BLD-MCNC: 10.3 G/DL — LOW (ref 13–17)
IMM GRANULOCYTES NFR BLD AUTO: 0.6 % — SIGNIFICANT CHANGE UP (ref 0–1.5)
INR BLD: 2.66 — HIGH (ref 0.88–1.17)
LYMPHOCYTES # BLD AUTO: 0.55 K/UL — LOW (ref 1–3.3)
LYMPHOCYTES # BLD AUTO: 3.7 % — LOW (ref 13–44)
LYMPHOCYTES NFR SPEC AUTO: 4.3 % — LOW (ref 13–44)
MACROCYTES BLD QL: SLIGHT — SIGNIFICANT CHANGE UP
MAGNESIUM SERPL-MCNC: 1.7 MG/DL — SIGNIFICANT CHANGE UP (ref 1.6–2.6)
MCHC RBC-ENTMCNC: 32.1 PG — SIGNIFICANT CHANGE UP (ref 27–34)
MCHC RBC-ENTMCNC: 32.8 % — SIGNIFICANT CHANGE UP (ref 32–36)
MCV RBC AUTO: 97.8 FL — SIGNIFICANT CHANGE UP (ref 80–100)
METAMYELOCYTES # FLD: 0 % — SIGNIFICANT CHANGE UP (ref 0–1)
MONOCYTES # BLD AUTO: 0.68 K/UL — SIGNIFICANT CHANGE UP (ref 0–0.9)
MONOCYTES NFR BLD AUTO: 4.6 % — SIGNIFICANT CHANGE UP (ref 2–14)
MONOCYTES NFR BLD: 3.5 % — SIGNIFICANT CHANGE UP (ref 2–9)
MYELOCYTES NFR BLD: 0 % — SIGNIFICANT CHANGE UP (ref 0–0)
NEUTROPHIL AB SER-ACNC: 87.8 % — HIGH (ref 43–77)
NEUTROPHILS # BLD AUTO: 13.56 K/UL — HIGH (ref 1.8–7.4)
NEUTROPHILS NFR BLD AUTO: 90.9 % — HIGH (ref 43–77)
NEUTS BAND # BLD: 4.3 % — SIGNIFICANT CHANGE UP (ref 0–6)
OTHER - HEMATOLOGY %: 0 — SIGNIFICANT CHANGE UP
OVALOCYTES BLD QL SMEAR: SLIGHT — SIGNIFICANT CHANGE UP
PHOSPHATE SERPL-MCNC: 1.6 MG/DL — LOW (ref 2.5–4.5)
PLATELET # BLD AUTO: 83 K/UL — LOW (ref 150–400)
PLATELET COUNT - ESTIMATE: SIGNIFICANT CHANGE UP
PMV BLD: 10 FL — SIGNIFICANT CHANGE UP (ref 7–13)
POIKILOCYTOSIS BLD QL AUTO: SLIGHT — SIGNIFICANT CHANGE UP
POTASSIUM SERPL-MCNC: 4 MMOL/L — SIGNIFICANT CHANGE UP (ref 3.5–5.3)
POTASSIUM SERPL-SCNC: 4 MMOL/L — SIGNIFICANT CHANGE UP (ref 3.5–5.3)
PROMYELOCYTES # FLD: 0 % — SIGNIFICANT CHANGE UP (ref 0–0)
PROTHROM AB SERPL-ACNC: 30.4 SEC — HIGH (ref 9.8–13.1)
RBC # BLD: 3.21 M/UL — LOW (ref 4.2–5.8)
RBC # FLD: 13.5 % — SIGNIFICANT CHANGE UP (ref 10.3–14.5)
SODIUM SERPL-SCNC: 143 MMOL/L — SIGNIFICANT CHANGE UP (ref 135–145)
SPECIMEN SOURCE: SIGNIFICANT CHANGE UP
VARIANT LYMPHS # BLD: 0 % — SIGNIFICANT CHANGE UP
WBC # BLD: 14.9 K/UL — HIGH (ref 3.8–10.5)
WBC # FLD AUTO: 14.9 K/UL — HIGH (ref 3.8–10.5)

## 2017-05-05 PROCEDURE — 71010: CPT | Mod: 26

## 2017-05-05 PROCEDURE — 99222 1ST HOSP IP/OBS MODERATE 55: CPT | Mod: GC

## 2017-05-05 RX ORDER — AMPICILLIN SODIUM AND SULBACTAM SODIUM 250; 125 MG/ML; MG/ML
3 INJECTION, POWDER, FOR SUSPENSION INTRAMUSCULAR; INTRAVENOUS ONCE
Qty: 0 | Refills: 0 | Status: COMPLETED | OUTPATIENT
Start: 2017-05-05 | End: 2017-05-05

## 2017-05-05 RX ORDER — POTASSIUM PHOSPHATE, MONOBASIC POTASSIUM PHOSPHATE, DIBASIC 236; 224 MG/ML; MG/ML
15 INJECTION, SOLUTION INTRAVENOUS ONCE
Qty: 0 | Refills: 0 | Status: COMPLETED | OUTPATIENT
Start: 2017-05-05 | End: 2017-05-05

## 2017-05-05 RX ORDER — AMPICILLIN SODIUM AND SULBACTAM SODIUM 250; 125 MG/ML; MG/ML
3 INJECTION, POWDER, FOR SUSPENSION INTRAMUSCULAR; INTRAVENOUS EVERY 6 HOURS
Qty: 0 | Refills: 0 | Status: DISCONTINUED | OUTPATIENT
Start: 2017-05-06 | End: 2017-05-11

## 2017-05-05 RX ORDER — SODIUM CHLORIDE 9 MG/ML
1000 INJECTION, SOLUTION INTRAVENOUS
Qty: 0 | Refills: 0 | Status: DISCONTINUED | OUTPATIENT
Start: 2017-05-05 | End: 2017-05-05

## 2017-05-05 RX ORDER — AMPICILLIN SODIUM AND SULBACTAM SODIUM 250; 125 MG/ML; MG/ML
INJECTION, POWDER, FOR SUSPENSION INTRAMUSCULAR; INTRAVENOUS
Qty: 0 | Refills: 0 | Status: DISCONTINUED | OUTPATIENT
Start: 2017-05-05 | End: 2017-05-11

## 2017-05-05 RX ADMIN — TIOTROPIUM BROMIDE 1 CAPSULE(S): 18 CAPSULE ORAL; RESPIRATORY (INHALATION) at 09:48

## 2017-05-05 RX ADMIN — Medication 400 MILLIGRAM(S): at 05:41

## 2017-05-05 RX ADMIN — ALBUTEROL 2.5 MILLIGRAM(S): 90 AEROSOL, METERED ORAL at 04:09

## 2017-05-05 RX ADMIN — LAMOTRIGINE 100 MILLIGRAM(S): 25 TABLET, ORALLY DISINTEGRATING ORAL at 05:40

## 2017-05-05 RX ADMIN — LAMOTRIGINE 100 MILLIGRAM(S): 25 TABLET, ORALLY DISINTEGRATING ORAL at 20:40

## 2017-05-05 RX ADMIN — Medication 400 MILLIGRAM(S): at 20:40

## 2017-05-05 RX ADMIN — SODIUM CHLORIDE 75 MILLILITER(S): 9 INJECTION, SOLUTION INTRAVENOUS at 13:02

## 2017-05-05 RX ADMIN — POTASSIUM PHOSPHATE, MONOBASIC POTASSIUM PHOSPHATE, DIBASIC 62.5 MILLIMOLE(S): 236; 224 INJECTION, SOLUTION INTRAVENOUS at 10:32

## 2017-05-05 RX ADMIN — Medication 0.5 MILLIGRAM(S): at 18:18

## 2017-05-05 RX ADMIN — ALBUTEROL 2.5 MILLIGRAM(S): 90 AEROSOL, METERED ORAL at 21:46

## 2017-05-05 RX ADMIN — AMPICILLIN SODIUM AND SULBACTAM SODIUM 200 GRAM(S): 250; 125 INJECTION, POWDER, FOR SUSPENSION INTRAMUSCULAR; INTRAVENOUS at 19:48

## 2017-05-05 RX ADMIN — Medication 100 MILLIGRAM(S): at 21:09

## 2017-05-05 RX ADMIN — Medication 7.5 MG/HR: at 08:02

## 2017-05-05 NOTE — SWALLOW BEDSIDE ASSESSMENT ADULT - ASR SWALLOW ASPIRATION MONITOR
cough/fever/oral hygiene/change of breathing pattern/position upright (90Y)/gurgly voice/throat clearing/pneumonia/upper respiratory infection

## 2017-05-05 NOTE — SWALLOW BEDSIDE ASSESSMENT ADULT - COMMENTS
The patient was seen this AM for initial assessment of swallow function, at which time the patient was alert and communicative. Patient currently receiving supplemental O2 via NC. The patient is Tanzanian speaking, therefore, interpretation services via  phone were utilized (ID - [id number]). Patient stated that he is exhibiting no difficulty swallowing, however, his breathing is giving him difficulties. Per charting, the patient is a 70 y/o M with h/o lung cancer s/p R-sided pneumonectomy, RXT and chemo (last in 2005) in remission, Afib on coumadin, HTN, bovine AVR, and seizure disorder. He presented to [facility] for acute onset of SOB after apparent choking episode and multifocal PNA on CT chest, likely aspiration. Discussed results and recommendations from this evaluation with the patient, RN, and Team 3.

## 2017-05-05 NOTE — SWALLOW BEDSIDE ASSESSMENT ADULT - SWALLOW EVAL: DIAGNOSIS
1. The patient demonstrated a mild oral dysphagia for puree textures marked by delayed bolus collection, transfer, and posterior transport. 2. The patient demonstrated a moderate oral dysphagia for honey thick, nectar thick, and thin liquid textures marked by delayed bolus collection and transfer with suspected posterior loss over the base of tongue. 3. The patient demonstrated a severe pharyngeal dysphagia for puree, honey thick, nectar thick, and thin liquid textures marked by delayed pharyngeal swallow trigger and reduced hyolaryngeal elevation upon digital palpation resulting in reflexive coughing suggesting airway penetration/aspiration.

## 2017-05-05 NOTE — SWALLOW BEDSIDE ASSESSMENT ADULT - PHARYNGEAL PHASE
Cough post oral intake/Delayed pharyngeal swallow/Decreased laryngeal elevation Delayed pharyngeal swallow/Cough post oral intake/Decreased laryngeal elevation

## 2017-05-05 NOTE — SWALLOW BEDSIDE ASSESSMENT ADULT - SWALLOW EVAL: RECOMMENDED DIET
Oral nutrition/hydration is contraindicated at this time. Consider short-term non-oral means of nutrition/hydration given the patient is an increased nutritional and aspiration risk at this time.

## 2017-05-06 LAB
APTT BLD: 46.7 SEC — HIGH (ref 27.5–37.4)
BASOPHILS # BLD AUTO: 0.01 K/UL — SIGNIFICANT CHANGE UP (ref 0–0.2)
BASOPHILS NFR BLD AUTO: 0.1 % — SIGNIFICANT CHANGE UP (ref 0–2)
BUN SERPL-MCNC: 18 MG/DL — SIGNIFICANT CHANGE UP (ref 7–23)
CALCIUM SERPL-MCNC: 8.9 MG/DL — SIGNIFICANT CHANGE UP (ref 8.4–10.5)
CHLORIDE SERPL-SCNC: 107 MMOL/L — SIGNIFICANT CHANGE UP (ref 98–107)
CO2 SERPL-SCNC: 24 MMOL/L — SIGNIFICANT CHANGE UP (ref 22–31)
CREAT SERPL-MCNC: 0.74 MG/DL — SIGNIFICANT CHANGE UP (ref 0.5–1.3)
EOSINOPHIL # BLD AUTO: 0.02 K/UL — SIGNIFICANT CHANGE UP (ref 0–0.5)
EOSINOPHIL NFR BLD AUTO: 0.1 % — SIGNIFICANT CHANGE UP (ref 0–6)
GLUCOSE SERPL-MCNC: 86 MG/DL — SIGNIFICANT CHANGE UP (ref 70–99)
HBA1C BLD-MCNC: 5.2 % — SIGNIFICANT CHANGE UP (ref 4–5.6)
HCT VFR BLD CALC: 33.9 % — LOW (ref 39–50)
HGB BLD-MCNC: 11.4 G/DL — LOW (ref 13–17)
IMM GRANULOCYTES NFR BLD AUTO: 0.4 % — SIGNIFICANT CHANGE UP (ref 0–1.5)
INR BLD: 3.37 — HIGH (ref 0.88–1.17)
LYMPHOCYTES # BLD AUTO: 0.66 K/UL — LOW (ref 1–3.3)
LYMPHOCYTES # BLD AUTO: 4.9 % — LOW (ref 13–44)
MAGNESIUM SERPL-MCNC: 1.9 MG/DL — SIGNIFICANT CHANGE UP (ref 1.6–2.6)
MCHC RBC-ENTMCNC: 32.5 PG — SIGNIFICANT CHANGE UP (ref 27–34)
MCHC RBC-ENTMCNC: 33.6 % — SIGNIFICANT CHANGE UP (ref 32–36)
MCV RBC AUTO: 96.6 FL — SIGNIFICANT CHANGE UP (ref 80–100)
MONOCYTES # BLD AUTO: 0.68 K/UL — SIGNIFICANT CHANGE UP (ref 0–0.9)
MONOCYTES NFR BLD AUTO: 5 % — SIGNIFICANT CHANGE UP (ref 2–14)
NEUTROPHILS # BLD AUTO: 12.11 K/UL — HIGH (ref 1.8–7.4)
NEUTROPHILS NFR BLD AUTO: 89.5 % — HIGH (ref 43–77)
PHOSPHATE SERPL-MCNC: 1.5 MG/DL — LOW (ref 2.5–4.5)
PLATELET # BLD AUTO: 90 K/UL — LOW (ref 150–400)
PMV BLD: 10.3 FL — SIGNIFICANT CHANGE UP (ref 7–13)
POTASSIUM SERPL-MCNC: 4 MMOL/L — SIGNIFICANT CHANGE UP (ref 3.5–5.3)
POTASSIUM SERPL-SCNC: 4 MMOL/L — SIGNIFICANT CHANGE UP (ref 3.5–5.3)
PROTHROM AB SERPL-ACNC: 38.7 SEC — HIGH (ref 9.8–13.1)
RBC # BLD: 3.51 M/UL — LOW (ref 4.2–5.8)
RBC # FLD: 13.7 % — SIGNIFICANT CHANGE UP (ref 10.3–14.5)
SODIUM SERPL-SCNC: 145 MMOL/L — SIGNIFICANT CHANGE UP (ref 135–145)
WBC # BLD: 13.53 K/UL — HIGH (ref 3.8–10.5)
WBC # FLD AUTO: 13.53 K/UL — HIGH (ref 3.8–10.5)

## 2017-05-06 PROCEDURE — 93306 TTE W/DOPPLER COMPLETE: CPT | Mod: 26

## 2017-05-06 PROCEDURE — 99233 SBSQ HOSP IP/OBS HIGH 50: CPT | Mod: GC

## 2017-05-06 PROCEDURE — 99232 SBSQ HOSP IP/OBS MODERATE 35: CPT

## 2017-05-06 RX ORDER — ONDANSETRON 8 MG/1
4 TABLET, FILM COATED ORAL ONCE
Qty: 0 | Refills: 0 | Status: COMPLETED | OUTPATIENT
Start: 2017-05-06 | End: 2017-05-06

## 2017-05-06 RX ORDER — POTASSIUM PHOSPHATE, MONOBASIC POTASSIUM PHOSPHATE, DIBASIC 236; 224 MG/ML; MG/ML
15 INJECTION, SOLUTION INTRAVENOUS ONCE
Qty: 0 | Refills: 0 | Status: COMPLETED | OUTPATIENT
Start: 2017-05-06 | End: 2017-05-06

## 2017-05-06 RX ORDER — METOPROLOL TARTRATE 50 MG
25 TABLET ORAL THREE TIMES A DAY
Qty: 0 | Refills: 0 | Status: DISCONTINUED | OUTPATIENT
Start: 2017-05-06 | End: 2017-05-11

## 2017-05-06 RX ORDER — AMLODIPINE BESYLATE 2.5 MG/1
10 TABLET ORAL DAILY
Qty: 0 | Refills: 0 | Status: DISCONTINUED | OUTPATIENT
Start: 2017-05-06 | End: 2017-05-11

## 2017-05-06 RX ADMIN — AMPICILLIN SODIUM AND SULBACTAM SODIUM 200 GRAM(S): 250; 125 INJECTION, POWDER, FOR SUSPENSION INTRAMUSCULAR; INTRAVENOUS at 23:39

## 2017-05-06 RX ADMIN — ONDANSETRON 4 MILLIGRAM(S): 8 TABLET, FILM COATED ORAL at 16:01

## 2017-05-06 RX ADMIN — Medication 25 MILLIGRAM(S): at 13:09

## 2017-05-06 RX ADMIN — Medication 25 MILLIGRAM(S): at 21:15

## 2017-05-06 RX ADMIN — AMPICILLIN SODIUM AND SULBACTAM SODIUM 200 GRAM(S): 250; 125 INJECTION, POWDER, FOR SUSPENSION INTRAMUSCULAR; INTRAVENOUS at 00:02

## 2017-05-06 RX ADMIN — AMLODIPINE BESYLATE 10 MILLIGRAM(S): 2.5 TABLET ORAL at 12:28

## 2017-05-06 RX ADMIN — Medication 400 MILLIGRAM(S): at 17:01

## 2017-05-06 RX ADMIN — Medication 7.5 MG/HR: at 00:03

## 2017-05-06 RX ADMIN — POTASSIUM PHOSPHATE, MONOBASIC POTASSIUM PHOSPHATE, DIBASIC 62.5 MILLIMOLE(S): 236; 224 INJECTION, SOLUTION INTRAVENOUS at 08:54

## 2017-05-06 RX ADMIN — ALBUTEROL 2.5 MILLIGRAM(S): 90 AEROSOL, METERED ORAL at 05:53

## 2017-05-06 RX ADMIN — LAMOTRIGINE 100 MILLIGRAM(S): 25 TABLET, ORALLY DISINTEGRATING ORAL at 17:01

## 2017-05-06 RX ADMIN — LAMOTRIGINE 100 MILLIGRAM(S): 25 TABLET, ORALLY DISINTEGRATING ORAL at 05:36

## 2017-05-06 RX ADMIN — TIOTROPIUM BROMIDE 1 CAPSULE(S): 18 CAPSULE ORAL; RESPIRATORY (INHALATION) at 11:08

## 2017-05-06 RX ADMIN — AMPICILLIN SODIUM AND SULBACTAM SODIUM 200 GRAM(S): 250; 125 INJECTION, POWDER, FOR SUSPENSION INTRAMUSCULAR; INTRAVENOUS at 11:08

## 2017-05-06 RX ADMIN — AMPICILLIN SODIUM AND SULBACTAM SODIUM 200 GRAM(S): 250; 125 INJECTION, POWDER, FOR SUSPENSION INTRAMUSCULAR; INTRAVENOUS at 17:01

## 2017-05-06 RX ADMIN — Medication 400 MILLIGRAM(S): at 05:36

## 2017-05-06 RX ADMIN — AMPICILLIN SODIUM AND SULBACTAM SODIUM 200 GRAM(S): 250; 125 INJECTION, POWDER, FOR SUSPENSION INTRAMUSCULAR; INTRAVENOUS at 06:03

## 2017-05-06 NOTE — PHYSICAL THERAPY INITIAL EVALUATION ADULT - GENERAL OBSERVATIONS, REHAB EVAL
pt seen supine in bed, using BIPAP machine, wife and daughter at bed side, pt offer no complaint of.

## 2017-05-07 LAB
APTT BLD: 48.3 SEC — HIGH (ref 27.5–37.4)
BASOPHILS # BLD AUTO: 0.01 K/UL — SIGNIFICANT CHANGE UP (ref 0–0.2)
BASOPHILS NFR BLD AUTO: 0.1 % — SIGNIFICANT CHANGE UP (ref 0–2)
BUN SERPL-MCNC: 14 MG/DL — SIGNIFICANT CHANGE UP (ref 7–23)
CALCIUM SERPL-MCNC: 9.1 MG/DL — SIGNIFICANT CHANGE UP (ref 8.4–10.5)
CHLORIDE SERPL-SCNC: 105 MMOL/L — SIGNIFICANT CHANGE UP (ref 98–107)
CO2 SERPL-SCNC: 26 MMOL/L — SIGNIFICANT CHANGE UP (ref 22–31)
CREAT SERPL-MCNC: 0.77 MG/DL — SIGNIFICANT CHANGE UP (ref 0.5–1.3)
EOSINOPHIL # BLD AUTO: 0.04 K/UL — SIGNIFICANT CHANGE UP (ref 0–0.5)
EOSINOPHIL NFR BLD AUTO: 0.3 % — SIGNIFICANT CHANGE UP (ref 0–6)
GLUCOSE SERPL-MCNC: 90 MG/DL — SIGNIFICANT CHANGE UP (ref 70–99)
HCT VFR BLD CALC: 36.5 % — LOW (ref 39–50)
HGB BLD-MCNC: 12.3 G/DL — LOW (ref 13–17)
IMM GRANULOCYTES NFR BLD AUTO: 0.4 % — SIGNIFICANT CHANGE UP (ref 0–1.5)
INR BLD: 4.9 — HIGH (ref 0.88–1.17)
LYMPHOCYTES # BLD AUTO: 0.63 K/UL — LOW (ref 1–3.3)
LYMPHOCYTES # BLD AUTO: 4.6 % — LOW (ref 13–44)
MAGNESIUM SERPL-MCNC: 2 MG/DL — SIGNIFICANT CHANGE UP (ref 1.6–2.6)
MCHC RBC-ENTMCNC: 32.7 PG — SIGNIFICANT CHANGE UP (ref 27–34)
MCHC RBC-ENTMCNC: 33.7 % — SIGNIFICANT CHANGE UP (ref 32–36)
MCV RBC AUTO: 97.1 FL — SIGNIFICANT CHANGE UP (ref 80–100)
MONOCYTES # BLD AUTO: 0.83 K/UL — SIGNIFICANT CHANGE UP (ref 0–0.9)
MONOCYTES NFR BLD AUTO: 6 % — SIGNIFICANT CHANGE UP (ref 2–14)
NEUTROPHILS # BLD AUTO: 12.26 K/UL — HIGH (ref 1.8–7.4)
NEUTROPHILS NFR BLD AUTO: 88.6 % — HIGH (ref 43–77)
PHOSPHATE SERPL-MCNC: 1.9 MG/DL — LOW (ref 2.5–4.5)
PLATELET # BLD AUTO: 110 K/UL — LOW (ref 150–400)
PMV BLD: 10.1 FL — SIGNIFICANT CHANGE UP (ref 7–13)
POTASSIUM SERPL-MCNC: 4 MMOL/L — SIGNIFICANT CHANGE UP (ref 3.5–5.3)
POTASSIUM SERPL-SCNC: 4 MMOL/L — SIGNIFICANT CHANGE UP (ref 3.5–5.3)
PROTHROM AB SERPL-ACNC: 56.7 SEC — HIGH (ref 9.8–13.1)
RBC # BLD: 3.76 M/UL — LOW (ref 4.2–5.8)
RBC # FLD: 13.6 % — SIGNIFICANT CHANGE UP (ref 10.3–14.5)
SODIUM SERPL-SCNC: 146 MMOL/L — HIGH (ref 135–145)
WBC # BLD: 13.83 K/UL — HIGH (ref 3.8–10.5)
WBC # FLD AUTO: 13.83 K/UL — HIGH (ref 3.8–10.5)

## 2017-05-07 PROCEDURE — 93010 ELECTROCARDIOGRAM REPORT: CPT

## 2017-05-07 PROCEDURE — 99232 SBSQ HOSP IP/OBS MODERATE 35: CPT | Mod: GC

## 2017-05-07 RX ADMIN — AMLODIPINE BESYLATE 10 MILLIGRAM(S): 2.5 TABLET ORAL at 05:13

## 2017-05-07 RX ADMIN — LAMOTRIGINE 100 MILLIGRAM(S): 25 TABLET, ORALLY DISINTEGRATING ORAL at 05:13

## 2017-05-07 RX ADMIN — AMPICILLIN SODIUM AND SULBACTAM SODIUM 200 GRAM(S): 250; 125 INJECTION, POWDER, FOR SUSPENSION INTRAMUSCULAR; INTRAVENOUS at 05:13

## 2017-05-07 RX ADMIN — LAMOTRIGINE 100 MILLIGRAM(S): 25 TABLET, ORALLY DISINTEGRATING ORAL at 17:01

## 2017-05-07 RX ADMIN — Medication 25 MILLIGRAM(S): at 21:37

## 2017-05-07 RX ADMIN — AMPICILLIN SODIUM AND SULBACTAM SODIUM 200 GRAM(S): 250; 125 INJECTION, POWDER, FOR SUSPENSION INTRAMUSCULAR; INTRAVENOUS at 23:47

## 2017-05-07 RX ADMIN — AMPICILLIN SODIUM AND SULBACTAM SODIUM 200 GRAM(S): 250; 125 INJECTION, POWDER, FOR SUSPENSION INTRAMUSCULAR; INTRAVENOUS at 11:41

## 2017-05-07 RX ADMIN — Medication 400 MILLIGRAM(S): at 17:01

## 2017-05-07 RX ADMIN — AMPICILLIN SODIUM AND SULBACTAM SODIUM 200 GRAM(S): 250; 125 INJECTION, POWDER, FOR SUSPENSION INTRAMUSCULAR; INTRAVENOUS at 17:01

## 2017-05-07 RX ADMIN — Medication 25 MILLIGRAM(S): at 13:15

## 2017-05-07 RX ADMIN — TIOTROPIUM BROMIDE 1 CAPSULE(S): 18 CAPSULE ORAL; RESPIRATORY (INHALATION) at 09:13

## 2017-05-07 RX ADMIN — Medication 400 MILLIGRAM(S): at 05:13

## 2017-05-07 RX ADMIN — Medication 25 MILLIGRAM(S): at 05:14

## 2017-05-08 LAB
APTT BLD: 56 SEC — HIGH (ref 27.5–37.4)
BASOPHILS # BLD AUTO: 0.02 K/UL — SIGNIFICANT CHANGE UP (ref 0–0.2)
BASOPHILS NFR BLD AUTO: 0.1 % — SIGNIFICANT CHANGE UP (ref 0–2)
BUN SERPL-MCNC: 17 MG/DL — SIGNIFICANT CHANGE UP (ref 7–23)
CALCIUM SERPL-MCNC: 9.5 MG/DL — SIGNIFICANT CHANGE UP (ref 8.4–10.5)
CHLORIDE SERPL-SCNC: 105 MMOL/L — SIGNIFICANT CHANGE UP (ref 98–107)
CO2 SERPL-SCNC: 25 MMOL/L — SIGNIFICANT CHANGE UP (ref 22–31)
CREAT SERPL-MCNC: 0.78 MG/DL — SIGNIFICANT CHANGE UP (ref 0.5–1.3)
EOSINOPHIL # BLD AUTO: 0 K/UL — SIGNIFICANT CHANGE UP (ref 0–0.5)
EOSINOPHIL NFR BLD AUTO: 0 % — SIGNIFICANT CHANGE UP (ref 0–6)
GLUCOSE SERPL-MCNC: 73 MG/DL — SIGNIFICANT CHANGE UP (ref 70–99)
HCT VFR BLD CALC: 39.5 % — SIGNIFICANT CHANGE UP (ref 39–50)
HGB BLD-MCNC: 13.2 G/DL — SIGNIFICANT CHANGE UP (ref 13–17)
IMM GRANULOCYTES NFR BLD AUTO: 0.6 % — SIGNIFICANT CHANGE UP (ref 0–1.5)
INR BLD: 8.1 — CRITICAL HIGH (ref 0.88–1.17)
LYMPHOCYTES # BLD AUTO: 0.66 K/UL — LOW (ref 1–3.3)
LYMPHOCYTES # BLD AUTO: 4.7 % — LOW (ref 13–44)
MAGNESIUM SERPL-MCNC: 2 MG/DL — SIGNIFICANT CHANGE UP (ref 1.6–2.6)
MCHC RBC-ENTMCNC: 32.4 PG — SIGNIFICANT CHANGE UP (ref 27–34)
MCHC RBC-ENTMCNC: 33.4 % — SIGNIFICANT CHANGE UP (ref 32–36)
MCV RBC AUTO: 96.8 FL — SIGNIFICANT CHANGE UP (ref 80–100)
MONOCYTES # BLD AUTO: 1.06 K/UL — HIGH (ref 0–0.9)
MONOCYTES NFR BLD AUTO: 7.6 % — SIGNIFICANT CHANGE UP (ref 2–14)
NEUTROPHILS # BLD AUTO: 12.19 K/UL — HIGH (ref 1.8–7.4)
NEUTROPHILS NFR BLD AUTO: 87 % — HIGH (ref 43–77)
PHOSPHATE SERPL-MCNC: 2.4 MG/DL — LOW (ref 2.5–4.5)
PLATELET # BLD AUTO: 115 K/UL — LOW (ref 150–400)
PMV BLD: 9.9 FL — SIGNIFICANT CHANGE UP (ref 7–13)
POTASSIUM SERPL-MCNC: 3.8 MMOL/L — SIGNIFICANT CHANGE UP (ref 3.5–5.3)
POTASSIUM SERPL-SCNC: 3.8 MMOL/L — SIGNIFICANT CHANGE UP (ref 3.5–5.3)
PROTHROM AB SERPL-ACNC: 94.7 SEC — HIGH (ref 9.8–13.1)
RBC # BLD: 4.08 M/UL — LOW (ref 4.2–5.8)
RBC # FLD: 13.5 % — SIGNIFICANT CHANGE UP (ref 10.3–14.5)
SODIUM SERPL-SCNC: 149 MMOL/L — HIGH (ref 135–145)
WBC # BLD: 14.02 K/UL — HIGH (ref 3.8–10.5)
WBC # FLD AUTO: 14.02 K/UL — HIGH (ref 3.8–10.5)

## 2017-05-08 PROCEDURE — 99233 SBSQ HOSP IP/OBS HIGH 50: CPT | Mod: GC

## 2017-05-08 PROCEDURE — 99232 SBSQ HOSP IP/OBS MODERATE 35: CPT

## 2017-05-08 PROCEDURE — 74230 X-RAY XM SWLNG FUNCJ C+: CPT | Mod: 26

## 2017-05-08 RX ORDER — PHYTONADIONE (VIT K1) 5 MG
2.5 TABLET ORAL ONCE
Qty: 0 | Refills: 0 | Status: COMPLETED | OUTPATIENT
Start: 2017-05-08 | End: 2017-05-08

## 2017-05-08 RX ORDER — HYDRALAZINE HCL 50 MG
10 TABLET ORAL THREE TIMES A DAY
Qty: 0 | Refills: 0 | Status: DISCONTINUED | OUTPATIENT
Start: 2017-05-08 | End: 2017-05-11

## 2017-05-08 RX ORDER — POTASSIUM PHOSPHATE, MONOBASIC POTASSIUM PHOSPHATE, DIBASIC 236; 224 MG/ML; MG/ML
15 INJECTION, SOLUTION INTRAVENOUS ONCE
Qty: 0 | Refills: 0 | Status: COMPLETED | OUTPATIENT
Start: 2017-05-08 | End: 2017-05-08

## 2017-05-08 RX ADMIN — Medication 400 MILLIGRAM(S): at 06:24

## 2017-05-08 RX ADMIN — Medication 25 MILLIGRAM(S): at 06:24

## 2017-05-08 RX ADMIN — AMPICILLIN SODIUM AND SULBACTAM SODIUM 200 GRAM(S): 250; 125 INJECTION, POWDER, FOR SUSPENSION INTRAMUSCULAR; INTRAVENOUS at 23:59

## 2017-05-08 RX ADMIN — AMLODIPINE BESYLATE 10 MILLIGRAM(S): 2.5 TABLET ORAL at 06:24

## 2017-05-08 RX ADMIN — Medication 2.5 MILLIGRAM(S): at 12:28

## 2017-05-08 RX ADMIN — TIOTROPIUM BROMIDE 1 CAPSULE(S): 18 CAPSULE ORAL; RESPIRATORY (INHALATION) at 12:21

## 2017-05-08 RX ADMIN — AMPICILLIN SODIUM AND SULBACTAM SODIUM 200 GRAM(S): 250; 125 INJECTION, POWDER, FOR SUSPENSION INTRAMUSCULAR; INTRAVENOUS at 17:54

## 2017-05-08 RX ADMIN — AMPICILLIN SODIUM AND SULBACTAM SODIUM 200 GRAM(S): 250; 125 INJECTION, POWDER, FOR SUSPENSION INTRAMUSCULAR; INTRAVENOUS at 12:56

## 2017-05-08 RX ADMIN — LAMOTRIGINE 100 MILLIGRAM(S): 25 TABLET, ORALLY DISINTEGRATING ORAL at 17:43

## 2017-05-08 RX ADMIN — AMPICILLIN SODIUM AND SULBACTAM SODIUM 200 GRAM(S): 250; 125 INJECTION, POWDER, FOR SUSPENSION INTRAMUSCULAR; INTRAVENOUS at 06:24

## 2017-05-08 RX ADMIN — Medication 25 MILLIGRAM(S): at 17:43

## 2017-05-08 RX ADMIN — Medication 25 MILLIGRAM(S): at 21:22

## 2017-05-08 RX ADMIN — Medication 400 MILLIGRAM(S): at 17:42

## 2017-05-08 RX ADMIN — LAMOTRIGINE 100 MILLIGRAM(S): 25 TABLET, ORALLY DISINTEGRATING ORAL at 06:24

## 2017-05-08 RX ADMIN — Medication 10 MILLIGRAM(S): at 17:54

## 2017-05-08 RX ADMIN — POTASSIUM PHOSPHATE, MONOBASIC POTASSIUM PHOSPHATE, DIBASIC 62.5 MILLIMOLE(S): 236; 224 INJECTION, SOLUTION INTRAVENOUS at 12:23

## 2017-05-08 RX ADMIN — Medication 10 MILLIGRAM(S): at 21:22

## 2017-05-08 NOTE — SWALLOW VFSS/MBS ASSESSMENT ADULT - RECOMMENDED FEEDING/EATING TECHNIQUES
small sips/bites/provide rest periods between swallows/check mouth frequently for oral residue/pocketing/position upright (90 degrees)/allow for swallow between intakes/oral hygiene/crush medication (when feasible)/hard swallow w/ each bite or sip/no straws/alternate food with liquid/maintain upright posture during/after eating for 30 mins

## 2017-05-08 NOTE — SWALLOW VFSS/MBS ASSESSMENT ADULT - COMMENTS
The patient was received in the radiology suite this AM, at which time he was alert and responsive to verbal stimulation. Patient currently receiving supplemental O2 via NC. The patient was accompanied by his wife who served as a reliable informant and , as the patient's primary language is Singaporean. The patient is known to this department as he was seen for a clinical bedside swallow evaluation on 5/5/17 (please see full report for details), at which time oral nutrition/hydration was contraindicated pending an MBS.     Per charting, the patient is a 70 y/o M with h/o lung cancer s/p R-sided pneumonectomy, RXT and chemo (last in 2005) in remission, Afib on coumadin, HTN, bovine AVR, and seizure disorder. He presented to [facility] for acute onset of SOB after apparent choking episode and multifocal PNA on CT chest, likely aspiration. Discussed results and recommendations from this evaluation with the patient, patient's wife, and Team 3.

## 2017-05-08 NOTE — SWALLOW VFSS/MBS ASSESSMENT ADULT - NS SWALLOW VFSS REC ASPIR MON
change of breathing pattern/position upright (90Y)/fever/pneumonia/oral hygiene/gurgly voice/upper respiratory infection/throat clearing/cough

## 2017-05-08 NOTE — SWALLOW VFSS/MBS ASSESSMENT ADULT - ROSENBEK'S PENETRATION ASPIRATION SCALE
(1) no aspiration, contrast does not enter airway (5) contrast contacts vocal cords, visible residue remains (penetration) (8) contrast passes glottis, visible subglottic residue remains, absent patient response (aspiration)

## 2017-05-08 NOTE — SWALLOW VFSS/MBS ASSESSMENT ADULT - RECOMMENDED CONSISTENCY
1. Dysphagia 2 (soft solids) with honey thick liquids, as tolerated  2. Small bites and small/single cup sips of honey thick liquids  3. Alternate soft solids with honey thick liquids to assist with pharyngeal clearance   4. Slow rate of consumption  5. Upright positioning during and ~30 min post meals  6. Strict aspiration precautions  7. Strict oral care vestibule resulting in silent aspiration. Absent cough response indicative of reduced laryngo-pharyngeal sensation. Compensatory strategies were unsuccessful in eliminating aspiration.     Recommendations:  1. Dysphagia 2 (soft solids) with honey thick liquids, as tolerated  2. Small bites and small/single cup sips of honey thick liquids  3. Alternate soft solids with honey thick liquids to assist with pharyngeal clearance   4. Slow rate of consumption  5. Upright positioning during and ~30 min post meals  6. Strict aspiration precautions  7. Strict oral care

## 2017-05-08 NOTE — SWALLOW VFSS/MBS ASSESSMENT ADULT - DIAGNOSTIC IMPRESSIONS
1. The patient demonstrated 1. The patient demonstrated a mild oral dysphagia for puree and honey thick liquid textures marked by delayed bolus collection, transfer, and posterior transport. Uncontrolled spillover to the hypopharynx noted for honey thick liquid textures.   2. The patient demonstrated a mild-moderate oral dysphagia for soft solid textures marked by prolonged oral manipulation resulting in delayed bolus collection, transfer, and AP transit time.   3. The patient demonstrated a moderate oral dysphagia for nectar thick and thin liquid textures marked by delayed bolus collection and transfer with uncontrolled spillover to the hypopharynx.   4. The patient demonstrated a mild-moderate pharyngeal dysphagia for puree, soft solid, and honey thick liquid textures marked by delayed swallow trigger with reduced base of tongue retraction, reduced epiglottic deflection, reduced hyolaryngeal elevation, and reduced pharyngeal contractility. Trace stasis noted on the base of tongue, mild stasis noted in the vallecula, and trace stasis noted along the aryepiglottic folds, on the posterior pharyngeal wall, and in the pyriforms. Observed stasis reduced with a liquid wash. No laryngeal penetration/aspiration.   5. The patient demonstrated a moderate-severe pharyngeal dysphagia for nectar thick liquid textures marked by delayed swallow trigger with reduced base of tongue retraction, reduced epiglottic deflection, reduced hyolaryngeal elevation, reduced pharyngeal contractility, and incomplete closure of the laryngeal vestibule resulting in silent laryngeal penetration without full retrieval. Absent patient response indicative of reduced laryngo-pharyngeal sensation. Compensatory strategies unsuccessful in providing airway protection.   6. The patient demonstrated a severe pharyngeal dysphagia for thin liquid textures marked by delayed swallow trigger with reduced base of tongue retraction, reduced epiglottic deflection, reduced hyolaryngeal elevation, and incomplete closure of the laryngeal

## 2017-05-08 NOTE — SWALLOW VFSS/MBS ASSESSMENT ADULT - ADDITIONAL RECOMMENDATIONS
This department to follow for swallowing therapy during this admission as schedule permits. Continued swallowing therapy upon discharge from [facility] is recommended (home care vs outpatient).

## 2017-05-08 NOTE — SWALLOW VFSS/MBS ASSESSMENT ADULT - UNSUCCESSFUL STRATEGIES TRIALED DURING PROCEDURE
productive volitional cough following clinician cue/hard swallow/chin tuck productive volitional cough following clinician cue/chin tuck/hard swallow

## 2017-05-08 NOTE — SWALLOW VFSS/MBS ASSESSMENT ADULT - ORAL PHASE
Residue in oral cavity/Delayed oral transit time/Reduced anterior - posterior transport Reduced anterior - posterior transport/Residue in oral cavity/Delayed oral transit time Delayed oral transit time/Uncontrolled bolus / spillover in hypopharynx/Reduced anterior - posterior transport Reduced anterior - posterior transport/Uncontrolled bolus / spillover in hypopharynx/Delayed oral transit time Uncontrolled bolus / spillover in hypopharynx/Delayed oral transit time/Reduced anterior - posterior transport

## 2017-05-09 LAB
APTT BLD: 29.7 SEC — SIGNIFICANT CHANGE UP (ref 27.5–37.4)
BACTERIA BLD CULT: SIGNIFICANT CHANGE UP
BACTERIA BLD CULT: SIGNIFICANT CHANGE UP
BASOPHILS # BLD AUTO: 0.01 K/UL — SIGNIFICANT CHANGE UP (ref 0–0.2)
BASOPHILS NFR BLD AUTO: 0.1 % — SIGNIFICANT CHANGE UP (ref 0–2)
BUN SERPL-MCNC: 16 MG/DL — SIGNIFICANT CHANGE UP (ref 7–23)
CALCIUM SERPL-MCNC: 9.8 MG/DL — SIGNIFICANT CHANGE UP (ref 8.4–10.5)
CHLORIDE SERPL-SCNC: 111 MMOL/L — HIGH (ref 98–107)
CO2 SERPL-SCNC: 27 MMOL/L — SIGNIFICANT CHANGE UP (ref 22–31)
CREAT SERPL-MCNC: 0.69 MG/DL — SIGNIFICANT CHANGE UP (ref 0.5–1.3)
EOSINOPHIL # BLD AUTO: 0.04 K/UL — SIGNIFICANT CHANGE UP (ref 0–0.5)
EOSINOPHIL NFR BLD AUTO: 0.3 % — SIGNIFICANT CHANGE UP (ref 0–6)
GLUCOSE SERPL-MCNC: 132 MG/DL — HIGH (ref 70–99)
HCT VFR BLD CALC: 37 % — LOW (ref 39–50)
HGB BLD-MCNC: 12.3 G/DL — LOW (ref 13–17)
IMM GRANULOCYTES NFR BLD AUTO: 0.8 % — SIGNIFICANT CHANGE UP (ref 0–1.5)
INR BLD: 1.41 — HIGH (ref 0.88–1.17)
LYMPHOCYTES # BLD AUTO: 0.46 K/UL — LOW (ref 1–3.3)
LYMPHOCYTES # BLD AUTO: 3.7 % — LOW (ref 13–44)
MAGNESIUM SERPL-MCNC: 2 MG/DL — SIGNIFICANT CHANGE UP (ref 1.6–2.6)
MCHC RBC-ENTMCNC: 32.1 PG — SIGNIFICANT CHANGE UP (ref 27–34)
MCHC RBC-ENTMCNC: 33.2 % — SIGNIFICANT CHANGE UP (ref 32–36)
MCV RBC AUTO: 96.6 FL — SIGNIFICANT CHANGE UP (ref 80–100)
MONOCYTES # BLD AUTO: 0.93 K/UL — HIGH (ref 0–0.9)
MONOCYTES NFR BLD AUTO: 7.5 % — SIGNIFICANT CHANGE UP (ref 2–14)
NEUTROPHILS # BLD AUTO: 10.83 K/UL — HIGH (ref 1.8–7.4)
NEUTROPHILS NFR BLD AUTO: 87.6 % — HIGH (ref 43–77)
PHOSPHATE SERPL-MCNC: 1.6 MG/DL — LOW (ref 2.5–4.5)
PLATELET # BLD AUTO: 121 K/UL — LOW (ref 150–400)
PMV BLD: 9.4 FL — SIGNIFICANT CHANGE UP (ref 7–13)
POTASSIUM SERPL-MCNC: 3.8 MMOL/L — SIGNIFICANT CHANGE UP (ref 3.5–5.3)
POTASSIUM SERPL-SCNC: 3.8 MMOL/L — SIGNIFICANT CHANGE UP (ref 3.5–5.3)
PROTHROM AB SERPL-ACNC: 15.9 SEC — HIGH (ref 9.8–13.1)
RBC # BLD: 3.83 M/UL — LOW (ref 4.2–5.8)
RBC # FLD: 13.4 % — SIGNIFICANT CHANGE UP (ref 10.3–14.5)
SODIUM SERPL-SCNC: 152 MMOL/L — HIGH (ref 135–145)
WBC # BLD: 12.37 K/UL — HIGH (ref 3.8–10.5)
WBC # FLD AUTO: 12.37 K/UL — HIGH (ref 3.8–10.5)

## 2017-05-09 PROCEDURE — 99232 SBSQ HOSP IP/OBS MODERATE 35: CPT

## 2017-05-09 PROCEDURE — 99233 SBSQ HOSP IP/OBS HIGH 50: CPT | Mod: GC

## 2017-05-09 RX ORDER — ONDANSETRON 8 MG/1
4 TABLET, FILM COATED ORAL ONCE
Qty: 0 | Refills: 0 | Status: DISCONTINUED | OUTPATIENT
Start: 2017-05-09 | End: 2017-05-11

## 2017-05-09 RX ORDER — POTASSIUM PHOSPHATE, MONOBASIC POTASSIUM PHOSPHATE, DIBASIC 236; 224 MG/ML; MG/ML
15 INJECTION, SOLUTION INTRAVENOUS ONCE
Qty: 0 | Refills: 0 | Status: COMPLETED | OUTPATIENT
Start: 2017-05-09 | End: 2017-05-09

## 2017-05-09 RX ORDER — SODIUM CHLORIDE 9 MG/ML
1000 INJECTION, SOLUTION INTRAVENOUS
Qty: 0 | Refills: 0 | Status: COMPLETED | OUTPATIENT
Start: 2017-05-09 | End: 2017-05-10

## 2017-05-09 RX ORDER — WARFARIN SODIUM 2.5 MG/1
2 TABLET ORAL ONCE
Qty: 0 | Refills: 0 | Status: COMPLETED | OUTPATIENT
Start: 2017-05-09 | End: 2017-05-09

## 2017-05-09 RX ADMIN — AMPICILLIN SODIUM AND SULBACTAM SODIUM 200 GRAM(S): 250; 125 INJECTION, POWDER, FOR SUSPENSION INTRAMUSCULAR; INTRAVENOUS at 12:35

## 2017-05-09 RX ADMIN — Medication 400 MILLIGRAM(S): at 18:21

## 2017-05-09 RX ADMIN — Medication 10 MILLIGRAM(S): at 22:04

## 2017-05-09 RX ADMIN — SODIUM CHLORIDE 75 MILLILITER(S): 9 INJECTION, SOLUTION INTRAVENOUS at 13:42

## 2017-05-09 RX ADMIN — Medication 25 MILLIGRAM(S): at 13:49

## 2017-05-09 RX ADMIN — LAMOTRIGINE 100 MILLIGRAM(S): 25 TABLET, ORALLY DISINTEGRATING ORAL at 05:26

## 2017-05-09 RX ADMIN — AMLODIPINE BESYLATE 10 MILLIGRAM(S): 2.5 TABLET ORAL at 05:25

## 2017-05-09 RX ADMIN — POTASSIUM PHOSPHATE, MONOBASIC POTASSIUM PHOSPHATE, DIBASIC 62.5 MILLIMOLE(S): 236; 224 INJECTION, SOLUTION INTRAVENOUS at 11:49

## 2017-05-09 RX ADMIN — Medication 25 MILLIGRAM(S): at 22:04

## 2017-05-09 RX ADMIN — Medication 10 MILLIGRAM(S): at 13:49

## 2017-05-09 RX ADMIN — Medication 400 MILLIGRAM(S): at 05:25

## 2017-05-09 RX ADMIN — AMPICILLIN SODIUM AND SULBACTAM SODIUM 200 GRAM(S): 250; 125 INJECTION, POWDER, FOR SUSPENSION INTRAMUSCULAR; INTRAVENOUS at 05:25

## 2017-05-09 RX ADMIN — AMPICILLIN SODIUM AND SULBACTAM SODIUM 200 GRAM(S): 250; 125 INJECTION, POWDER, FOR SUSPENSION INTRAMUSCULAR; INTRAVENOUS at 18:20

## 2017-05-09 RX ADMIN — LAMOTRIGINE 100 MILLIGRAM(S): 25 TABLET, ORALLY DISINTEGRATING ORAL at 18:21

## 2017-05-09 RX ADMIN — WARFARIN SODIUM 2 MILLIGRAM(S): 2.5 TABLET ORAL at 18:21

## 2017-05-09 RX ADMIN — TIOTROPIUM BROMIDE 1 CAPSULE(S): 18 CAPSULE ORAL; RESPIRATORY (INHALATION) at 11:49

## 2017-05-09 RX ADMIN — Medication 25 MILLIGRAM(S): at 05:26

## 2017-05-09 RX ADMIN — Medication 10 MILLIGRAM(S): at 05:25

## 2017-05-10 LAB
APTT BLD: 25.9 SEC — LOW (ref 27.5–37.4)
BUN SERPL-MCNC: 12 MG/DL — SIGNIFICANT CHANGE UP (ref 7–23)
CALCIUM SERPL-MCNC: 8.9 MG/DL — SIGNIFICANT CHANGE UP (ref 8.4–10.5)
CHLORIDE SERPL-SCNC: 107 MMOL/L — SIGNIFICANT CHANGE UP (ref 98–107)
CO2 SERPL-SCNC: 28 MMOL/L — SIGNIFICANT CHANGE UP (ref 22–31)
CREAT SERPL-MCNC: 0.6 MG/DL — SIGNIFICANT CHANGE UP (ref 0.5–1.3)
GLUCOSE SERPL-MCNC: 112 MG/DL — HIGH (ref 70–99)
HCT VFR BLD CALC: 34.3 % — LOW (ref 39–50)
HGB BLD-MCNC: 11.6 G/DL — LOW (ref 13–17)
INR BLD: 1.18 — HIGH (ref 0.88–1.17)
MAGNESIUM SERPL-MCNC: 1.8 MG/DL — SIGNIFICANT CHANGE UP (ref 1.6–2.6)
MCHC RBC-ENTMCNC: 32.6 PG — SIGNIFICANT CHANGE UP (ref 27–34)
MCHC RBC-ENTMCNC: 33.8 % — SIGNIFICANT CHANGE UP (ref 32–36)
MCV RBC AUTO: 96.3 FL — SIGNIFICANT CHANGE UP (ref 80–100)
PHOSPHATE SERPL-MCNC: 2.4 MG/DL — LOW (ref 2.5–4.5)
PLATELET # BLD AUTO: 112 K/UL — LOW (ref 150–400)
PMV BLD: 9.5 FL — SIGNIFICANT CHANGE UP (ref 7–13)
POTASSIUM SERPL-MCNC: 3.6 MMOL/L — SIGNIFICANT CHANGE UP (ref 3.5–5.3)
POTASSIUM SERPL-SCNC: 3.6 MMOL/L — SIGNIFICANT CHANGE UP (ref 3.5–5.3)
PROTHROM AB SERPL-ACNC: 13.3 SEC — HIGH (ref 9.8–13.1)
RBC # BLD: 3.56 M/UL — LOW (ref 4.2–5.8)
RBC # FLD: 13.2 % — SIGNIFICANT CHANGE UP (ref 10.3–14.5)
SODIUM SERPL-SCNC: 147 MMOL/L — HIGH (ref 135–145)
WBC # BLD: 9.77 K/UL — SIGNIFICANT CHANGE UP (ref 3.8–10.5)
WBC # FLD AUTO: 9.77 K/UL — SIGNIFICANT CHANGE UP (ref 3.8–10.5)

## 2017-05-10 PROCEDURE — 99232 SBSQ HOSP IP/OBS MODERATE 35: CPT

## 2017-05-10 PROCEDURE — 99233 SBSQ HOSP IP/OBS HIGH 50: CPT | Mod: GC

## 2017-05-10 RX ORDER — WARFARIN SODIUM 2.5 MG/1
5 TABLET ORAL ONCE
Qty: 0 | Refills: 0 | Status: COMPLETED | OUTPATIENT
Start: 2017-05-10 | End: 2017-05-10

## 2017-05-10 RX ORDER — POTASSIUM PHOSPHATE, MONOBASIC POTASSIUM PHOSPHATE, DIBASIC 236; 224 MG/ML; MG/ML
15 INJECTION, SOLUTION INTRAVENOUS ONCE
Qty: 0 | Refills: 0 | Status: COMPLETED | OUTPATIENT
Start: 2017-05-10 | End: 2017-05-10

## 2017-05-10 RX ORDER — ASPIRIN/CALCIUM CARB/MAGNESIUM 324 MG
81 TABLET ORAL DAILY
Qty: 0 | Refills: 0 | Status: DISCONTINUED | OUTPATIENT
Start: 2017-05-10 | End: 2017-05-11

## 2017-05-10 RX ADMIN — AMPICILLIN SODIUM AND SULBACTAM SODIUM 200 GRAM(S): 250; 125 INJECTION, POWDER, FOR SUSPENSION INTRAMUSCULAR; INTRAVENOUS at 01:02

## 2017-05-10 RX ADMIN — Medication 400 MILLIGRAM(S): at 07:17

## 2017-05-10 RX ADMIN — POTASSIUM PHOSPHATE, MONOBASIC POTASSIUM PHOSPHATE, DIBASIC 62.5 MILLIMOLE(S): 236; 224 INJECTION, SOLUTION INTRAVENOUS at 10:30

## 2017-05-10 RX ADMIN — WARFARIN SODIUM 5 MILLIGRAM(S): 2.5 TABLET ORAL at 17:46

## 2017-05-10 RX ADMIN — Medication 25 MILLIGRAM(S): at 14:39

## 2017-05-10 RX ADMIN — Medication 400 MILLIGRAM(S): at 17:46

## 2017-05-10 RX ADMIN — LAMOTRIGINE 100 MILLIGRAM(S): 25 TABLET, ORALLY DISINTEGRATING ORAL at 17:46

## 2017-05-10 RX ADMIN — Medication 25 MILLIGRAM(S): at 21:58

## 2017-05-10 RX ADMIN — Medication 81 MILLIGRAM(S): at 14:44

## 2017-05-10 RX ADMIN — AMPICILLIN SODIUM AND SULBACTAM SODIUM 200 GRAM(S): 250; 125 INJECTION, POWDER, FOR SUSPENSION INTRAMUSCULAR; INTRAVENOUS at 17:47

## 2017-05-10 RX ADMIN — LAMOTRIGINE 100 MILLIGRAM(S): 25 TABLET, ORALLY DISINTEGRATING ORAL at 07:17

## 2017-05-10 RX ADMIN — Medication 10 MILLIGRAM(S): at 14:39

## 2017-05-10 RX ADMIN — AMPICILLIN SODIUM AND SULBACTAM SODIUM 200 GRAM(S): 250; 125 INJECTION, POWDER, FOR SUSPENSION INTRAMUSCULAR; INTRAVENOUS at 07:18

## 2017-05-10 RX ADMIN — AMPICILLIN SODIUM AND SULBACTAM SODIUM 200 GRAM(S): 250; 125 INJECTION, POWDER, FOR SUSPENSION INTRAMUSCULAR; INTRAVENOUS at 23:23

## 2017-05-10 RX ADMIN — AMPICILLIN SODIUM AND SULBACTAM SODIUM 200 GRAM(S): 250; 125 INJECTION, POWDER, FOR SUSPENSION INTRAMUSCULAR; INTRAVENOUS at 11:52

## 2017-05-10 RX ADMIN — AMLODIPINE BESYLATE 10 MILLIGRAM(S): 2.5 TABLET ORAL at 07:17

## 2017-05-10 RX ADMIN — Medication 10 MILLIGRAM(S): at 21:58

## 2017-05-10 RX ADMIN — Medication 25 MILLIGRAM(S): at 07:18

## 2017-05-10 RX ADMIN — TIOTROPIUM BROMIDE 1 CAPSULE(S): 18 CAPSULE ORAL; RESPIRATORY (INHALATION) at 09:50

## 2017-05-10 RX ADMIN — Medication 10 MILLIGRAM(S): at 07:17

## 2017-05-10 RX ADMIN — SODIUM CHLORIDE 75 MILLILITER(S): 9 INJECTION, SOLUTION INTRAVENOUS at 11:36

## 2017-05-11 VITALS — OXYGEN SATURATION: 98 % | HEART RATE: 90 BPM

## 2017-05-11 LAB
APTT BLD: 28.8 SEC — SIGNIFICANT CHANGE UP (ref 27.5–37.4)
BASOPHILS # BLD AUTO: 0.01 K/UL — SIGNIFICANT CHANGE UP (ref 0–0.2)
BASOPHILS NFR BLD AUTO: 0.1 % — SIGNIFICANT CHANGE UP (ref 0–2)
BUN SERPL-MCNC: 12 MG/DL — SIGNIFICANT CHANGE UP (ref 7–23)
CALCIUM SERPL-MCNC: 8.4 MG/DL — SIGNIFICANT CHANGE UP (ref 8.4–10.5)
CHLORIDE SERPL-SCNC: 106 MMOL/L — SIGNIFICANT CHANGE UP (ref 98–107)
CO2 SERPL-SCNC: 28 MMOL/L — SIGNIFICANT CHANGE UP (ref 22–31)
CREAT SERPL-MCNC: 0.54 MG/DL — SIGNIFICANT CHANGE UP (ref 0.5–1.3)
EOSINOPHIL # BLD AUTO: 0.23 K/UL — SIGNIFICANT CHANGE UP (ref 0–0.5)
EOSINOPHIL NFR BLD AUTO: 2.7 % — SIGNIFICANT CHANGE UP (ref 0–6)
GLUCOSE SERPL-MCNC: 116 MG/DL — HIGH (ref 70–99)
HCT VFR BLD CALC: 34.3 % — LOW (ref 39–50)
HGB BLD-MCNC: 11.3 G/DL — LOW (ref 13–17)
IMM GRANULOCYTES NFR BLD AUTO: 0.5 % — SIGNIFICANT CHANGE UP (ref 0–1.5)
INR BLD: 1.43 — HIGH (ref 0.88–1.17)
LYMPHOCYTES # BLD AUTO: 0.84 K/UL — LOW (ref 1–3.3)
LYMPHOCYTES # BLD AUTO: 9.7 % — LOW (ref 13–44)
MAGNESIUM SERPL-MCNC: 1.9 MG/DL — SIGNIFICANT CHANGE UP (ref 1.6–2.6)
MCHC RBC-ENTMCNC: 31.7 PG — SIGNIFICANT CHANGE UP (ref 27–34)
MCHC RBC-ENTMCNC: 32.9 % — SIGNIFICANT CHANGE UP (ref 32–36)
MCV RBC AUTO: 96.1 FL — SIGNIFICANT CHANGE UP (ref 80–100)
MONOCYTES # BLD AUTO: 0.63 K/UL — SIGNIFICANT CHANGE UP (ref 0–0.9)
MONOCYTES NFR BLD AUTO: 7.3 % — SIGNIFICANT CHANGE UP (ref 2–14)
NEUTROPHILS # BLD AUTO: 6.91 K/UL — SIGNIFICANT CHANGE UP (ref 1.8–7.4)
NEUTROPHILS NFR BLD AUTO: 79.7 % — HIGH (ref 43–77)
PHOSPHATE SERPL-MCNC: 2.4 MG/DL — LOW (ref 2.5–4.5)
PLATELET # BLD AUTO: 123 K/UL — LOW (ref 150–400)
PMV BLD: 9.3 FL — SIGNIFICANT CHANGE UP (ref 7–13)
POTASSIUM SERPL-MCNC: 4.1 MMOL/L — SIGNIFICANT CHANGE UP (ref 3.5–5.3)
POTASSIUM SERPL-SCNC: 4.1 MMOL/L — SIGNIFICANT CHANGE UP (ref 3.5–5.3)
PROTHROM AB SERPL-ACNC: 16.1 SEC — HIGH (ref 9.8–13.1)
RBC # BLD: 3.57 M/UL — LOW (ref 4.2–5.8)
RBC # FLD: 13 % — SIGNIFICANT CHANGE UP (ref 10.3–14.5)
SODIUM SERPL-SCNC: 145 MMOL/L — SIGNIFICANT CHANGE UP (ref 135–145)
WBC # BLD: 8.66 K/UL — SIGNIFICANT CHANGE UP (ref 3.8–10.5)
WBC # FLD AUTO: 8.66 K/UL — SIGNIFICANT CHANGE UP (ref 3.8–10.5)

## 2017-05-11 PROCEDURE — 99239 HOSP IP/OBS DSCHRG MGMT >30: CPT

## 2017-05-11 RX ORDER — SENNA PLUS 8.6 MG/1
2 TABLET ORAL
Qty: 60 | Refills: 0 | OUTPATIENT
Start: 2017-05-11 | End: 2017-06-10

## 2017-05-11 RX ORDER — SENNA PLUS 8.6 MG/1
2 TABLET ORAL AT BEDTIME
Qty: 0 | Refills: 0 | Status: DISCONTINUED | OUTPATIENT
Start: 2017-05-11 | End: 2017-05-11

## 2017-05-11 RX ORDER — DOCUSATE SODIUM 100 MG
1 CAPSULE ORAL
Qty: 60 | Refills: 0 | OUTPATIENT
Start: 2017-05-11 | End: 2017-06-10

## 2017-05-11 RX ORDER — WARFARIN SODIUM 2.5 MG/1
4 TABLET ORAL ONCE
Qty: 0 | Refills: 0 | Status: DISCONTINUED | OUTPATIENT
Start: 2017-05-11 | End: 2017-05-11

## 2017-05-11 RX ORDER — HYDRALAZINE HCL 50 MG
1 TABLET ORAL
Qty: 90 | Refills: 0 | OUTPATIENT
Start: 2017-05-11 | End: 2017-06-10

## 2017-05-11 RX ORDER — DOCUSATE SODIUM 100 MG
100 CAPSULE ORAL
Qty: 0 | Refills: 0 | Status: DISCONTINUED | OUTPATIENT
Start: 2017-05-11 | End: 2017-05-11

## 2017-05-11 RX ORDER — POLYETHYLENE GLYCOL 3350 17 G/17G
17 POWDER, FOR SOLUTION ORAL ONCE
Qty: 0 | Refills: 0 | Status: COMPLETED | OUTPATIENT
Start: 2017-05-11 | End: 2017-05-11

## 2017-05-11 RX ORDER — ASPIRIN/CALCIUM CARB/MAGNESIUM 324 MG
1 TABLET ORAL
Qty: 0 | Refills: 0 | COMMUNITY
Start: 2017-05-11

## 2017-05-11 RX ADMIN — Medication 400 MILLIGRAM(S): at 06:20

## 2017-05-11 RX ADMIN — Medication 81 MILLIGRAM(S): at 11:22

## 2017-05-11 RX ADMIN — AMPICILLIN SODIUM AND SULBACTAM SODIUM 200 GRAM(S): 250; 125 INJECTION, POWDER, FOR SUSPENSION INTRAMUSCULAR; INTRAVENOUS at 11:22

## 2017-05-11 RX ADMIN — Medication 25 MILLIGRAM(S): at 06:19

## 2017-05-11 RX ADMIN — Medication 10 MILLIGRAM(S): at 06:19

## 2017-05-11 RX ADMIN — LAMOTRIGINE 100 MILLIGRAM(S): 25 TABLET, ORALLY DISINTEGRATING ORAL at 06:21

## 2017-05-11 RX ADMIN — Medication 10 MILLIGRAM(S): at 13:47

## 2017-05-11 RX ADMIN — AMLODIPINE BESYLATE 10 MILLIGRAM(S): 2.5 TABLET ORAL at 06:18

## 2017-05-11 RX ADMIN — TIOTROPIUM BROMIDE 1 CAPSULE(S): 18 CAPSULE ORAL; RESPIRATORY (INHALATION) at 11:22

## 2017-05-11 RX ADMIN — Medication 25 MILLIGRAM(S): at 13:47

## 2017-05-11 RX ADMIN — AMPICILLIN SODIUM AND SULBACTAM SODIUM 200 GRAM(S): 250; 125 INJECTION, POWDER, FOR SUSPENSION INTRAMUSCULAR; INTRAVENOUS at 06:19

## 2017-05-11 NOTE — DIETITIAN INITIAL EVALUATION ADULT. - ENERGY NEEDS
Current weight 149.9lbs Ht 71" BMI 20.9kg/m2  IBW: 172lbs (+/-10%) %IBW: 87%  No edema or pressure ulcers noted.

## 2017-05-11 NOTE — DISCHARGE NOTE ADULT - PLAN OF CARE
Resolution You presented to the hospital with respiratory failure as a result of aspiration pneumonia. You were treated with IV antibiotics and supplemental oxygen and improved. COPD You noted you have had constipation while hospitalized. Please take your home Miralax as prescribed and additionally take the Senna/Colace as prescribed as needed. No seizures You came to the hospital with a diagnosis of seizure disorder. You were continued on your home Carbamazepine and Lamotrigine, you did well and had no seizures. Please continue to take your medication as prescribed and follow-up with your primary care physician within 1 week of discharge. Prevent complications You came to the hospital with a diagnosis of COPD. You were continued on your home Spiriva and Albuterol, you did well. Please continue to take your home medication (ProAir, Spiriva, Symbicort) as prescribed and follow-up with your primary care physician within 1 week of discharge. Normal blood pressure You came to the hospital with a diagnosis of hypertension. You were continued on your home Norvasc and added Hydralazine, you did well. Additionally, we held your Lasix. Please continue to take Norvasc and Hydralazine as prescribed, stop taking Lasix and follow-up with your primary care physician and Dr. Olson within 1 week of discharge to manage your medication regiment. You came to the hospital with a diagnosis of Atrial fibriallation. Your course was complicated by rapid ventricular response. You were continued on your home Metoprolol and Coumadin while hospitalized. Your INR was supra-therapeutic at one point and your Coumadin was briefly held. Please continue to take your Toprol as prescribed and 4mg Coumadin daily. Please follow-up with Dr. Olson 72 hours after discharge to check your INR and dose your Coumadin accordingly.

## 2017-05-11 NOTE — DISCHARGE NOTE ADULT - CARE PROVIDER_API CALL
Rakesh Olson (MD), Cardiovascular Disease; Internal Medicine  [address] [place] [address]  Phone: [phone]  Fax: [phone]

## 2017-05-11 NOTE — DISCHARGE NOTE ADULT - ADDITIONAL INSTRUCTIONS
Please follow-up with Dr. Olson 72hrs after discharge to check your INR his number is [phone] located at [address] [place] [address].  Please return to the hospital for new/worsening symptoms.

## 2017-05-11 NOTE — DISCHARGE NOTE ADULT - MEDICATION SUMMARY - MEDICATIONS TO TAKE
I will START or STAY ON the medications listed below when I get home from the hospital:    aspirin 81 mg oral delayed release tablet  -- 1 tab(s) by mouth once a day while INR is subtherapeutic   -- Indication: For Need for prophylactic measure    Coumadin 4 mg oral tablet  -- 1 tab(s) by mouth once a day  -- Indication: For Atrial fibrillation    LaMICtal 100 mg oral tablet  -- 1 tab(s) by mouth 2 times a day  -- Indication: For Seizure disorder    TEGretol 200 mg oral tablet  -- 2 tab(s) by mouth 2 times a day  -- Indication: For Seizure disorder    Toprol-XL 50 mg oral tablet, extended release  -- 1 tab(s) by mouth once a day  -- Indication: For Atrial fibrillation    Spiriva 18 mcg inhalation capsule  -- 1 cap(s) inhaled once a day  -- Indication: For COPD    Symbicort 160 mcg-4.5 mcg/inh inhalation aerosol  -- 2 puff(s) inhaled 2 times a day  -- Indication: For COPD    ProAir HFA 90 mcg/inh inhalation aerosol  -- 2 puff(s) inhaled 4 times a day, As Needed  -- Indication: For COPD    Norvasc 10 mg oral tablet  -- 1 tab(s) by mouth once a day  -- Indication: For Hypertension    senna oral tablet  -- 2 tab(s) by mouth once a day (at bedtime), As Needed  -- Indication: For Constipation    docusate sodium 100 mg oral capsule  -- 1 cap(s) by mouth 2 times a day  -- Indication: For Constipation    hydrALAZINE 10 mg oral tablet  -- 1 tab(s) by mouth 3 times a day  -- Indication: For Hypertension

## 2017-05-11 NOTE — DISCHARGE NOTE ADULT - PATIENT PORTAL LINK FT
“You can access the Follow[facility] Patient Portal, offered by [facility], by registering with the following website: [email or web address]”

## 2017-05-11 NOTE — DISCHARGE NOTE ADULT - HOSPITAL COURSE
HPI:  72yo M with h/o lung cancer s/p R-sided pneumonectomy, RXT and chemo (last in 2005) in remission,, Afib on coumadin, HTN, bovine AVR, seizure disorder on pharmacotherapy presenting with wife at bedside for acute onset of SOB. HPI obtained from patient's wife who translated as patient is Welsh speaking only. Per wife, patient was eating dinner and when drinking water was noted to have the acute onset of a deep cough which made her believe he had choked. After a long coughing spell patient's symptom resolved and he was able to go to bed though approximately two hours into sleep he awoke suddenly with SOB, productive cough of yellow sputum and also reports of being "cold" and having rigors. Patient did not report subjective fever and otherwise has been healthy. Denies any recent illnesses including URI symptoms such as rhinorrhea sore throat, nasal congestion. No abdominal pain, N/V or dysuria. Patient's wife reports at home she checked his blood pressure which was normal and then called EMS as he continued to be short of breath.     En route via EMS, patient noted to have labored respirations in field, unable to tolerate CPAP mask, placed on supplemental O2 NC. RRate 30s in field. In ER SpO2 of 91%, tachypneic to 39, HR of 139, afebrile with normal BPs. Patient placed on bipap with O2 improved to 100%. Cardizem 15mg given as patient noted to be in Afib with RVR with improvement in HR from 160 to 115. MICU consulted and rejected patient. After cardizem patient's SBP 60. HR returned to RVR with rate of 160. Digoxin load given, also 2L normal saline bolus and improvement in HR to 100-113. Patient able to be weaned off BiPAP onto Venturi mask 45%. Not long after patient noted to have partial seizure- per wife at bedside pt had increasing tachypnea and arm shaking c/w seizures at home. Given ativan 1mg IV which stopped seizure activity. Labs notable only for a mild luekocytosis of 13, BMP wnl. RVP (-), CXR with clear lungs. UA (-). HPI:  72yo M with h/o lung cancer s/p R-sided pneumonectomy, RXT and chemo (last in 2005) in remission,, Afib on coumadin, HTN, bovine AVR, seizure disorder on pharmacotherapy presenting with wife at bedside for acute onset of SOB. HPI obtained from patient's wife who translated as patient is Kazakh speaking only. Per wife, patient was eating dinner and when drinking water was noted to have the acute onset of a deep cough which made her believe he had choked. After a long coughing spell patient's symptom resolved and he was able to go to bed though approximately two hours into sleep he awoke suddenly with SOB, productive cough of yellow sputum and also reports of being "cold" and having rigors. Patient did not report subjective fever and otherwise has been healthy. Denies any recent illnesses including URI symptoms such as rhinorrhea sore throat, nasal congestion. No abdominal pain, N/V or dysuria. Patient's wife reports at home she checked his blood pressure which was normal and then called EMS as he continued to be short of breath.     En route via EMS, patient noted to have labored respirations in field, unable to tolerate CPAP mask, placed on supplemental O2 NC. RRate 30s in field. In ER SpO2 of 91%, tachypneic to 39, HR of 139, afebrile with normal BPs. Patient placed on bipap with O2 improved to 100%. Cardizem 15mg given as patient noted to be in Afib with RVR with improvement in HR from 160 to 115. MICU consulted and rejected patient. After cardizem patient's SBP 60. HR returned to RVR with rate of 160. Digoxin load given, also 2L normal saline bolus and improvement in HR to 100-113. Patient able to be weaned off BiPAP onto Venturi mask 45%. Not long after patient noted to have partial seizure- per wife at bedside pt had increasing tachypnea and arm shaking c/w seizures at home. Given ativan 1mg IV which stopped seizure activity. Labs notable only for a mild luekocytosis of 13, BMP wnl. RVP (-), CXR with clear lungs. UA (-).    Hospital Course:  Patient was admitted to the hospital for hypoxic respiratory distress. He was continued on Levaquin for presumed aspiration pneumonia in additio to intravenous fluids and CT chest was obtained which showed evidence of multifocal pneumonia. Cardiology was consulted for assistance with control of atrial fibrillation with rapid ventricular rate and started patient on cardizen drip with as needed midodrine for low blood pressures, which he never required. Patient was made nothing by mouth and put on strict aspiration precautions while awaiting a speech/swallow evaluation. Antihypertensive medication were held owing to hypotension.    In regards to his respiratory and overall clinical status, patient improved initially with Levaquin but was ultimately changed to Unasyn per recommendation by infectious disease for better coverage of oral anaerobes. With ongoing antibiotic therapy and intravenous fluids with improvement in leukocytosis and respiratory distress. He was able to be gradually weaned down off supplemental nasal cannula oxygen though did at one point after admission require BiPAP for tachypnea, however chest X ray at that time failed to show any acute pathology including pulmonary edema and his symptoms improved without recurrent episodes thereafter. He wasm however, wontinued on He remained afebrile throughout his course and blood cultures were without growth.    His heart rate and blood pressure remained well controlled on cardizem drip and after several days patient was transitioned to Lopressor per cardiology recommendations. His coumadin was dosed daily based on INR. Coumadin did have to be held a few days into admission as it was found to be supratherapeutic, with a maximum INR of 8.1. Patient did not have any signs or symptoms of overt bleed at that time and hemoglobin wa stable, therefore he was given vitamin K and coumadin dosing was resumed when INR returned to therapeutic range.  5/7: Improved. No interventions done today.  5/8: Passed MBS today, started on dysphagia diet. INR supratherapeutic to 8.1, no overt bleeding, Hb stable, 2.5mg vitamin k given. c/w bipap at night  5/9: 2mg Coumadin given. Started D5W for hypernatremia, will f/u AM BMP. Stable, tolerating diet, Leukocytosis resolving  5/10: 5mg Coumadin dosed. c/w d5w until tomorrow, BMP stable with downtrending hypernatremia as free water deficit is repleted. Tolerating diet without incident HPI:  72yo M with h/o lung cancer s/p R-sided pneumonectomy, RXT and chemo (last in 2005) in remission,, Afib on coumadin, HTN, bovine AVR, seizure disorder on pharmacotherapy presenting with wife at bedside for acute onset of SOB. HPI obtained from patient's wife who translated as patient is Amharic speaking only. Per wife, patient was eating dinner and when drinking water was noted to have the acute onset of a deep cough which made her believe he had choked. After a long coughing spell patient's symptom resolved and he was able to go to bed though approximately two hours into sleep he awoke suddenly with SOB, productive cough of yellow sputum and also reports of being "cold" and having rigors. Patient did not report subjective fever and otherwise has been healthy. Denies any recent illnesses including URI symptoms such as rhinorrhea sore throat, nasal congestion. No abdominal pain, N/V or dysuria. Patient's wife reports at home she checked his blood pressure which was normal and then called EMS as he continued to be short of breath.     En route via EMS, patient noted to have labored respirations in field, unable to tolerate CPAP mask, placed on supplemental O2 NC. RRate 30s in field. In ER SpO2 of 91%, tachypneic to 39, HR of 139, afebrile with normal BPs. Patient placed on bipap with O2 improved to 100%. Cardizem 15mg given as patient noted to be in Afib with RVR with improvement in HR from 160 to 115. MICU consulted and rejected patient. After cardizem patient's SBP 60. HR returned to RVR with rate of 160. Digoxin load given, also 2L normal saline bolus and improvement in HR to 100-113. Patient able to be weaned off BiPAP onto Venturi mask 45%. Not long after patient noted to have partial seizure- per wife at bedside pt had increasing tachypnea and arm shaking c/w seizures at home. Given ativan 1mg IV which stopped seizure activity. Labs notable only for a mild luekocytosis of 13, BMP wnl. RVP (-), CXR with clear lungs. UA (-).    Hospital Course:  Patient was admitted to the hospital for hypoxic respiratory distress. He was continued on Levaquin for presumed aspiration pneumonia in additio to intravenous fluids and CT chest was obtained which showed evidence of multifocal pneumonia. Cardiology was consulted for assistance with control of atrial fibrillation with rapid ventricular rate and started patient on cardizen drip with as needed midodrine for low blood pressures, which he never required. Patient was made nothing by mouth and put on strict aspiration precautions while awaiting a speech/swallow evaluation. Antihypertensive medication were held owing to hypotension.    In regards to his respiratory and overall clinical status, patient improved initially with Levaquin but was ultimately changed to Unasyn per recommendation by infectious disease for better coverage of oral anaerobes. With ongoing antibiotic therapy and intravenous fluids with improvement in leukocytosis and respiratory distress. He was able to be gradually weaned down off supplemental nasal cannula oxygen though did at one point after admission require BiPAP for tachypnea, however chest X ray at that time failed to show any acute pathology including pulmonary edema and his symptoms improved without recurrent episodes thereafter. He wasm however, wontinued on He remained afebrile throughout his course and blood cultures were without growth.    His heart rate and blood pressure remained well controlled on cardizem drip and after several days patient was transitioned to Lopressor per cardiology recommendations. His coumadin was dosed daily based on INR. Coumadin did have to be held a few days into admission as it was found to be supratherapeutic, with a maximum INR of 8.1. Patient did not have any signs or symptoms of overt bleed at that time and hemoglobin was stable, therefore he was given vitamin K and coumadin dosing was resumed when INR returned to therapeutic range.    Patient failed bedside speech/swallow evaluation shortly after admission and so additional assessment as obtained via cinesophagram. Per performance on cinesophagram which did not show overt aspiration into the airway but some hypopharyngeal dysfunction he was started successfully on a dysphagia diet, which he tolerated without issue.     After completion of intravenous antibiotics and when clinically improved patient was discharged to ____ on ____ stable and in good condition. HPI:  72yo M with h/o lung cancer s/p R-sided pneumonectomy, RXT and chemo (last in 2005) in remission,, Afib on coumadin, HTN, bovine AVR, seizure disorder on pharmacotherapy presenting with wife at bedside for acute onset of SOB. HPI obtained from patient's wife who translated as patient is French speaking only. Per wife, patient was eating dinner and when drinking water was noted to have the acute onset of a deep cough which made her believe he had choked. After a long coughing spell patient's symptom resolved and he was able to go to bed though approximately two hours into sleep he awoke suddenly with SOB, productive cough of yellow sputum and also reports of being "cold" and having rigors. Patient did not report subjective fever and otherwise has been healthy. Denies any recent illnesses including URI symptoms such as rhinorrhea sore throat, nasal congestion. No abdominal pain, N/V or dysuria. Patient's wife reports at home she checked his blood pressure which was normal and then called EMS as he continued to be short of breath.     En route via EMS, patient noted to have labored respirations in field, unable to tolerate CPAP mask, placed on supplemental O2 NC. RRate 30s in field. In ER SpO2 of 91%, tachypneic to 39, HR of 139, afebrile with normal BPs. Patient placed on bipap with O2 improved to 100%. Cardizem 15mg given as patient noted to be in Afib with RVR with improvement in HR from 160 to 115. MICU consulted and rejected patient. After cardizem patient's SBP 60. HR returned to RVR with rate of 160. Digoxin load given, also 2L normal saline bolus and improvement in HR to 100-113. Patient able to be weaned off BiPAP onto Venturi mask 45%. Not long after patient noted to have partial seizure- per wife at bedside pt had increasing tachypnea and arm shaking c/w seizures at home. Given ativan 1mg IV which stopped seizure activity. Labs notable only for a mild luekocytosis of 13, BMP wnl. RVP (-), CXR with clear lungs. UA (-).    Hospital Course:  Patient was admitted to the hospital for hypoxic respiratory distress. He was continued on Levaquin for presumed aspiration pneumonia in addition to intravenous fluids and CT chest was obtained which showed evidence of multifocal pneumonia. Cardiology was consulted for assistance with control of atrial fibrillation with rapid ventricular rate and started patient on cardizen drip with as needed midodrine for low blood pressures, which he never required. Patient was made nothing by mouth and put on strict aspiration precautions while awaiting a speech/swallow evaluation. Antihypertensive medication were held owing to hypotension.    In regards to his respiratory and overall clinical status, patient improved initially with Levaquin but was ultimately changed to Unasyn per recommendation by infectious disease for better coverage of oral anaerobes. With ongoing antibiotic therapy and intravenous fluids with improvement in leukocytosis and respiratory distress. He was able to be gradually weaned down off supplemental nasal cannula oxygen though did at one point after admission require BiPAP for tachypnea, however chest x-ray at that time failed to show any acute pathology including pulmonary edema and his symptoms improved without recurrent episodes thereafter. He wasm however, wontinued on He remained afebrile throughout his course and blood cultures were without growth.    His heart rate and blood pressure remained well controlled on cardizem drip and after several days patient was transitioned to Lopressor per cardiology recommendations. His coumadin was dosed daily based on INR. Coumadin did have to be held a few days into admission as it was found to be supratherapeutic, with a maximum INR of 8.1. Patient did not have any signs or symptoms of overt bleed at that time and hemoglobin was stable, therefore he was given vitamin K and coumadin dosing was resumed when INR returned to therapeutic range.    Patient failed bedside speech/swallow evaluation shortly after admission and so additional assessment as obtained via cinesophagram. Per performance on cinesophagram which did not show overt aspiration into the airway but some hypopharyngeal dysfunction he was started successfully on a dysphagia diet, which he tolerated without issue.     After completion of intravenous antibiotics and when clinically improved patient was discharged to home stable and in good condition with instructions to follow-up with Dr. Olson in 72hrs to check his INR.

## 2017-05-11 NOTE — DIETITIAN INITIAL EVALUATION ADULT. - OTHER INFO
Pt seen for LOS day 7. 71y/oM with h/o lung cancer s/p right-sided pneumonectomy, chemotherapy. Met with pt and his wife by bedside. Pt currently on Dysphagia 2-Mechanical soft-Honey consistency fluid as per swallow VFSS/MBS recommendations. Pt tolerating diet well, good appetite and PO intake %. No nausea/vomiting/diarrhea/constipation or difficulty chewing and swallowing reported. Pt's wife also brining home-cooked meals. NKFA. Per wife, pt does not like PO supplement Ensure. Encouraged nutrient and protein dense food intake. Reviewed current diet recommendations. No questions or concerns voiced at this time.

## 2017-05-11 NOTE — DISCHARGE NOTE ADULT - CARE PLAN
Principal Discharge DX:	Acute respiratory failure with hypoxia  Goal:	Resolution  Instructions for follow-up, activity and diet:	You presented to the hospital with respiratory failure as a result of aspiration pneumonia. You were treated with IV antibiotics and supplemental oxygen and improved.  Secondary Diagnosis:	Atrial fibrillation  Goal:	Prevent complications  Instructions for follow-up, activity and diet:	You came to the hospital with a diagnosis of Atrial fibriallation. Your course was complicated by rapid ventricular response. You were continued on your home Metoprolol and Coumadin while hospitalized. Your INR was supra-therapeutic at one point and your Coumadin was briefly held. Please continue to take your Toprol as prescribed and 4mg Coumadin daily. Please follow-up with Dr. Olson 72 hours after discharge to check your INR and dose your Coumadin accordingly.  Secondary Diagnosis:	Hypertension  Goal:	Normal blood pressure  Instructions for follow-up, activity and diet:	You came to the hospital with a diagnosis of hypertension. You were continued on your home Norvasc and added Hydralazine, you did well. Additionally, we held your Lasix. Please continue to take Norvasc and Hydralazine as prescribed, stop taking Lasix and follow-up with your primary care physician and Dr. Olson within 1 week of discharge to manage your medication regiment.  Secondary Diagnosis:	COPD (chronic obstructive pulmonary disease)  Goal:	Prevent complications  Instructions for follow-up, activity and diet:	You came to the hospital with a diagnosis of COPD. You were continued on your home Spiriva and Albuterol, you did well. Please continue to take your home medication (ProAir, Spiriva, Symbicort) as prescribed and follow-up with your primary care physician within 1 week of discharge.  Secondary Diagnosis:	Seizure disorder  Goal:	No seizures  Instructions for follow-up, activity and diet:	You came to the hospital with a diagnosis of seizure disorder. You were continued on your home Carbamazepine and Lamotrigine, you did well and had no seizures. Please continue to take your medication as prescribed and follow-up with your primary care physician within 1 week of discharge.  Secondary Diagnosis:	Constipation  Goal:	COPD  Instructions for follow-up, activity and diet:	You noted you have had constipation while hospitalized. Please take your home Miralax as prescribed and additionally take the Senna/Colace as prescribed as needed.

## 2017-05-11 NOTE — DISCHARGE NOTE ADULT - SECONDARY DIAGNOSIS.
Atrial fibrillation Hypertension COPD (chronic obstructive pulmonary disease) Seizure disorder Constipation

## 2018-02-26 ENCOUNTER — LABORATORY RESULT (OUTPATIENT)
Age: 73
End: 2018-02-26

## 2018-02-26 ENCOUNTER — APPOINTMENT (OUTPATIENT)
Dept: PULMONOLOGY | Facility: CLINIC | Age: 73
End: 2018-02-26
Payer: MEDICARE

## 2018-02-26 VITALS
RESPIRATION RATE: 18 BRPM | OXYGEN SATURATION: 93 % | DIASTOLIC BLOOD PRESSURE: 70 MMHG | SYSTOLIC BLOOD PRESSURE: 110 MMHG | TEMPERATURE: 98.5 F | HEART RATE: 72 BPM

## 2018-02-26 DIAGNOSIS — E87.2 ACIDOSIS: ICD-10-CM

## 2018-02-26 DIAGNOSIS — Z99.81 DEPENDENCE ON SUPPLEMENTAL OXYGEN: ICD-10-CM

## 2018-02-26 DIAGNOSIS — Z85.118 PERSONAL HISTORY OF OTHER MALIGNANT NEOPLASM OF BRONCHUS AND LUNG: ICD-10-CM

## 2018-02-26 DIAGNOSIS — Z99.89 DEPENDENCE ON OTHER ENABLING MACHINES AND DEVICES: ICD-10-CM

## 2018-02-26 DIAGNOSIS — Z87.891 PERSONAL HISTORY OF NICOTINE DEPENDENCE: ICD-10-CM

## 2018-02-26 DIAGNOSIS — R26.81 UNSTEADINESS ON FEET: ICD-10-CM

## 2018-02-26 DIAGNOSIS — J44.9 CHRONIC OBSTRUCTIVE PULMONARY DISEASE, UNSPECIFIED: ICD-10-CM

## 2018-02-26 LAB
APTT BLD: 36.5 SEC
BASOPHILS # BLD AUTO: 0.02 K/UL
BASOPHILS NFR BLD AUTO: 0.2 %
EOSINOPHIL # BLD AUTO: 0.14 K/UL
EOSINOPHIL NFR BLD AUTO: 1.5 %
HCT VFR BLD CALC: 31.4 %
HGB BLD-MCNC: 10.2 G/DL
IMM GRANULOCYTES NFR BLD AUTO: 0.3 %
INR PPP: 1.72 RATIO
LYMPHOCYTES # BLD AUTO: 1.88 K/UL
LYMPHOCYTES NFR BLD AUTO: 19.6 %
MAN DIFF?: NORMAL
MCHC RBC-ENTMCNC: 32.2 PG
MCHC RBC-ENTMCNC: 32.5 GM/DL
MCV RBC AUTO: 99.1 FL
MONOCYTES # BLD AUTO: 0.57 K/UL
MONOCYTES NFR BLD AUTO: 5.9 %
NEUTROPHILS # BLD AUTO: 6.97 K/UL
NEUTROPHILS NFR BLD AUTO: 72.5 %
PLATELET # BLD AUTO: 231 K/UL
PT BLD: 19.6 SEC
RBC # BLD: 3.17 M/UL
RBC # FLD: 14.2 %
WBC # FLD AUTO: 9.61 K/UL

## 2018-02-26 PROCEDURE — 94726 PLETHYSMOGRAPHY LUNG VOLUMES: CPT

## 2018-02-26 PROCEDURE — 36415 COLL VENOUS BLD VENIPUNCTURE: CPT

## 2018-02-26 PROCEDURE — 94729 DIFFUSING CAPACITY: CPT

## 2018-02-26 PROCEDURE — ZZZZZ: CPT

## 2018-02-26 PROCEDURE — 94060 EVALUATION OF WHEEZING: CPT

## 2018-02-26 PROCEDURE — 99215 OFFICE O/P EST HI 40 MIN: CPT | Mod: 25

## 2018-02-27 LAB
25(OH)D3 SERPL-MCNC: 30.5 NG/ML
A1AT SERPL-MCNC: 224 MG/DL
ALBUMIN SERPL ELPH-MCNC: 4 G/DL
ALP BLD-CCNC: 92 U/L
ALT SERPL-CCNC: 10 U/L
ANION GAP SERPL CALC-SCNC: 13 MMOL/L
AST SERPL-CCNC: 18 U/L
B2 MICROGLOB SERPL-MCNC: 4.3 MG/L
BILIRUB SERPL-MCNC: 0.4 MG/DL
BUN SERPL-MCNC: 24 MG/DL
CALCIUM SERPL-MCNC: 10.1 MG/DL
CALCIUM SERPL-MCNC: 10.1 MG/DL
CARDIOLIPIN AB SER IA-ACNC: NEGATIVE
CCP AB SER IA-ACNC: <8 UNITS
CHLORIDE SERPL-SCNC: 101 MMOL/L
CK SERPL-CCNC: 24 U/L
CO2 SERPL-SCNC: 32 MMOL/L
CREAT SERPL-MCNC: 1.39 MG/DL
CRP SERPL-MCNC: 3.3 MG/DL
DEPRECATED KAPPA LC FREE/LAMBDA SER: 1.42 RATIO
ERYTHROCYTE [SEDIMENTATION RATE] IN BLOOD BY WESTERGREN METHOD: 44 MM/HR
FOLATE RBC-MCNC: 1165 NG/ML
GLUCOSE SERPL-MCNC: 99 MG/DL
HBA1C MFR BLD HPLC: 5.1 %
HCT VFR BLD CALC: 31 %
HCYS SERPL-MCNC: 20.9 UMOL/L
IGA SER QL IEP: 332 MG/DL
IGG SER QL IEP: 987 MG/DL
IGM SER QL IEP: 205 MG/DL
KAPPA LC CSF-MCNC: 3.12 MG/DL
KAPPA LC SERPL-MCNC: 4.44 MG/DL
LDH SERPL-CCNC: 167 U/L
MPO AB + PR3 PNL SER: NORMAL
NT-PROBNP SERPL-MCNC: 717 PG/ML
PARATHYROID HORMONE INTACT: 45 PG/ML
PHOSPHATE SERPL-MCNC: 3 MG/DL
POTASSIUM SERPL-SCNC: 4.7 MMOL/L
PROT SERPL-MCNC: 7.8 G/DL
RF+CCP IGG SER-IMP: NEGATIVE
RHEUMATOID FACT SER QL: 18 IU/ML
SODIUM SERPL-SCNC: 146 MMOL/L
T3 SERPL-MCNC: 64 NG/DL
T3FREE SERPL-MCNC: 1.75 PG/ML
T3RU NFR SERPL: 1.07 INDEX
T4 FREE SERPL-MCNC: 0.8 NG/DL
TSH SERPL-ACNC: 1.5 UIU/ML
URATE SERPL-MCNC: 9 MG/DL
VIT B12 SERPL-MCNC: 525 PG/ML

## 2018-02-28 PROBLEM — R76.12 POSITIVE QUANTIFERON-TB GOLD TEST: Status: ACTIVE | Noted: 2018-02-28

## 2018-02-28 LAB
ADJUSTED MITOGEN: >10 IU/ML
ADJUSTED TB AG: 0.42 IU/ML
ANA PAT FLD IF-IMP: ABNORMAL
ANACR T: ABNORMAL
COLLAGEN CTX SERPL-MCNC: 302 PG/ML
ENA SCL70 IGG SER IA-ACNC: <0.2 AL
ENA SS-A AB SER IA-ACNC: <0.2 AL
ENA SS-B AB SER IA-ACNC: <0.2 AL
M TB IFN-G BLD-IMP: POSITIVE
QUANTIFERON GOLD NIL: 0.05 IU/ML

## 2018-03-01 ENCOUNTER — OUTPATIENT (OUTPATIENT)
Dept: OUTPATIENT SERVICES | Facility: HOSPITAL | Age: 73
LOS: 1 days | End: 2018-03-01
Payer: MEDICAID

## 2018-03-01 DIAGNOSIS — Z98.89 OTHER SPECIFIED POSTPROCEDURAL STATES: Chronic | ICD-10-CM

## 2018-03-01 DIAGNOSIS — Z95.4 PRESENCE OF OTHER HEART-VALVE REPLACEMENT: Chronic | ICD-10-CM

## 2018-03-01 LAB — TOTAL IGE SMQN RAST: 6 KU/L

## 2018-03-03 LAB — T4 FREE SERPL DIALY-MCNC: 0.6 NG/DL

## 2018-03-04 ENCOUNTER — APPOINTMENT (OUTPATIENT)
Dept: CT IMAGING | Facility: IMAGING CENTER | Age: 73
End: 2018-03-04
Payer: MEDICARE

## 2018-03-04 ENCOUNTER — OUTPATIENT (OUTPATIENT)
Dept: OUTPATIENT SERVICES | Facility: HOSPITAL | Age: 73
LOS: 1 days | End: 2018-03-04
Payer: MEDICARE

## 2018-03-04 DIAGNOSIS — J44.9 CHRONIC OBSTRUCTIVE PULMONARY DISEASE, UNSPECIFIED: ICD-10-CM

## 2018-03-04 DIAGNOSIS — Z95.4 PRESENCE OF OTHER HEART-VALVE REPLACEMENT: Chronic | ICD-10-CM

## 2018-03-04 DIAGNOSIS — R76.12 NONSPECIFIC REACTION TO CELL MEDIATED IMMUNITY MEASUREMENT OF GAMMA INTERFERON ANTIGEN RESPONSE W/OUT ACTIVE TUBERCULOSIS: ICD-10-CM

## 2018-03-04 DIAGNOSIS — Z98.89 OTHER SPECIFIED POSTPROCEDURAL STATES: Chronic | ICD-10-CM

## 2018-03-04 PROCEDURE — 71250 CT THORAX DX C-: CPT

## 2018-03-04 PROCEDURE — 71250 CT THORAX DX C-: CPT | Mod: 26

## 2018-03-06 DIAGNOSIS — R91.8 OTHER NONSPECIFIC ABNORMAL FINDING OF LUNG FIELD: ICD-10-CM

## 2018-03-14 ENCOUNTER — INPATIENT (INPATIENT)
Facility: HOSPITAL | Age: 73
LOS: 6 days | Discharge: HOME HEALTH | End: 2018-03-21
Attending: INTERNAL MEDICINE | Admitting: HOSPITALIST
Payer: MEDICARE

## 2018-03-14 VITALS
OXYGEN SATURATION: 100 % | RESPIRATION RATE: 20 BRPM | TEMPERATURE: 98 F | SYSTOLIC BLOOD PRESSURE: 102 MMHG | HEART RATE: 93 BPM | DIASTOLIC BLOOD PRESSURE: 65 MMHG

## 2018-03-14 DIAGNOSIS — Z95.4 PRESENCE OF OTHER HEART-VALVE REPLACEMENT: Chronic | ICD-10-CM

## 2018-03-14 DIAGNOSIS — Z98.89 OTHER SPECIFIED POSTPROCEDURAL STATES: Chronic | ICD-10-CM

## 2018-03-14 LAB
ALBUMIN SERPL ELPH-MCNC: 3.8 G/DL — SIGNIFICANT CHANGE UP (ref 3.3–5)
ALP SERPL-CCNC: 89 U/L — SIGNIFICANT CHANGE UP (ref 40–120)
ALT FLD-CCNC: 16 U/L — SIGNIFICANT CHANGE UP (ref 4–41)
ANISOCYTOSIS BLD QL: SLIGHT — SIGNIFICANT CHANGE UP
APTT BLD: 51.4 SEC — HIGH (ref 27.5–37.4)
AST SERPL-CCNC: 16 U/L — SIGNIFICANT CHANGE UP (ref 4–40)
BASE EXCESS BLDV CALC-SCNC: 14.3 MMOL/L — SIGNIFICANT CHANGE UP
BASOPHILS # BLD AUTO: 0.01 K/UL — SIGNIFICANT CHANGE UP (ref 0–0.2)
BASOPHILS NFR BLD AUTO: 0.1 % — SIGNIFICANT CHANGE UP (ref 0–2)
BASOPHILS NFR SPEC: 0 % — SIGNIFICANT CHANGE UP (ref 0–2)
BILIRUB SERPL-MCNC: 0.5 MG/DL — SIGNIFICANT CHANGE UP (ref 0.2–1.2)
BLASTS # FLD: 0 % — SIGNIFICANT CHANGE UP (ref 0–0)
BLOOD GAS VENOUS - CREATININE: 1.23 MG/DL — SIGNIFICANT CHANGE UP (ref 0.5–1.3)
BUN SERPL-MCNC: 37 MG/DL — HIGH (ref 7–23)
CALCIUM SERPL-MCNC: 9 MG/DL — SIGNIFICANT CHANGE UP (ref 8.4–10.5)
CHLORIDE BLDV-SCNC: 103 MMOL/L — SIGNIFICANT CHANGE UP (ref 96–108)
CHLORIDE SERPL-SCNC: 102 MMOL/L — SIGNIFICANT CHANGE UP (ref 98–107)
CK MB BLD-MCNC: 1 NG/ML — SIGNIFICANT CHANGE UP (ref 1–6.6)
CK MB BLD-MCNC: SIGNIFICANT CHANGE UP (ref 0–2.5)
CK SERPL-CCNC: 13 U/L — LOW (ref 30–200)
CO2 SERPL-SCNC: 35 MMOL/L — HIGH (ref 22–31)
CREAT SERPL-MCNC: 1.32 MG/DL — HIGH (ref 0.5–1.3)
EOSINOPHIL # BLD AUTO: 0.03 K/UL — SIGNIFICANT CHANGE UP (ref 0–0.5)
EOSINOPHIL NFR BLD AUTO: 0.4 % — SIGNIFICANT CHANGE UP (ref 0–6)
EOSINOPHIL NFR FLD: 0 % — SIGNIFICANT CHANGE UP (ref 0–6)
GAS PNL BLDV: 146 MMOL/L — SIGNIFICANT CHANGE UP (ref 136–146)
GLUCOSE BLDV-MCNC: 101 — HIGH (ref 70–99)
GLUCOSE SERPL-MCNC: 101 MG/DL — HIGH (ref 70–99)
HCO3 BLDV-SCNC: 36 MMOL/L — HIGH (ref 20–27)
HCT VFR BLD CALC: 31.7 % — LOW (ref 39–50)
HCT VFR BLDV CALC: 31.9 % — LOW (ref 39–51)
HGB BLD-MCNC: 10.4 G/DL — LOW (ref 13–17)
HGB BLDV-MCNC: 10.3 G/DL — LOW (ref 13–17)
HYPOCHROMIA BLD QL: SLIGHT — SIGNIFICANT CHANGE UP
IMM GRANULOCYTES # BLD AUTO: 0.02 # — SIGNIFICANT CHANGE UP
IMM GRANULOCYTES NFR BLD AUTO: 0.3 % — SIGNIFICANT CHANGE UP (ref 0–1.5)
INR BLD: 5.01 — CRITICAL HIGH (ref 0.88–1.17)
LACTATE BLDV-MCNC: 1.1 MMOL/L — SIGNIFICANT CHANGE UP (ref 0.5–2)
LYMPHOCYTES # BLD AUTO: 1.11 K/UL — SIGNIFICANT CHANGE UP (ref 1–3.3)
LYMPHOCYTES # BLD AUTO: 16.1 % — SIGNIFICANT CHANGE UP (ref 13–44)
LYMPHOCYTES NFR SPEC AUTO: 2.8 % — LOW (ref 13–44)
MCHC RBC-ENTMCNC: 32 PG — SIGNIFICANT CHANGE UP (ref 27–34)
MCHC RBC-ENTMCNC: 32.8 % — SIGNIFICANT CHANGE UP (ref 32–36)
MCV RBC AUTO: 97.5 FL — SIGNIFICANT CHANGE UP (ref 80–100)
METAMYELOCYTES # FLD: 0 % — SIGNIFICANT CHANGE UP (ref 0–1)
MONOCYTES # BLD AUTO: 0.52 K/UL — SIGNIFICANT CHANGE UP (ref 0–0.9)
MONOCYTES NFR BLD AUTO: 7.5 % — SIGNIFICANT CHANGE UP (ref 2–14)
MONOCYTES NFR BLD: 2.8 % — SIGNIFICANT CHANGE UP (ref 2–9)
MYELOCYTES NFR BLD: 0 % — SIGNIFICANT CHANGE UP (ref 0–0)
NEUTROPHIL AB SER-ACNC: 87 % — HIGH (ref 43–77)
NEUTROPHILS # BLD AUTO: 5.21 K/UL — SIGNIFICANT CHANGE UP (ref 1.8–7.4)
NEUTROPHILS NFR BLD AUTO: 75.6 % — SIGNIFICANT CHANGE UP (ref 43–77)
NEUTS BAND # BLD: 1.8 % — SIGNIFICANT CHANGE UP (ref 0–6)
NRBC # FLD: 0 — SIGNIFICANT CHANGE UP
OTHER - HEMATOLOGY %: 0 — SIGNIFICANT CHANGE UP
PCO2 BLDV: 64 MMHG — HIGH (ref 41–51)
PH BLDV: 7.41 PH — SIGNIFICANT CHANGE UP (ref 7.32–7.43)
PLATELET # BLD AUTO: 101 K/UL — LOW (ref 150–400)
PLATELET COUNT - ESTIMATE: SIGNIFICANT CHANGE UP
PMV BLD: 10.3 FL — SIGNIFICANT CHANGE UP (ref 7–13)
PO2 BLDV: < 24 MMHG — LOW (ref 35–40)
POTASSIUM BLDV-SCNC: 3.5 MMOL/L — SIGNIFICANT CHANGE UP (ref 3.4–4.5)
POTASSIUM SERPL-MCNC: 3.7 MMOL/L — SIGNIFICANT CHANGE UP (ref 3.5–5.3)
POTASSIUM SERPL-SCNC: 3.7 MMOL/L — SIGNIFICANT CHANGE UP (ref 3.5–5.3)
PROMYELOCYTES # FLD: 0 % — SIGNIFICANT CHANGE UP (ref 0–0)
PROT SERPL-MCNC: 7.3 G/DL — SIGNIFICANT CHANGE UP (ref 6–8.3)
PROTHROM AB SERPL-ACNC: 59.5 SEC — HIGH (ref 9.8–13.1)
RBC # BLD: 3.25 M/UL — LOW (ref 4.2–5.8)
RBC # FLD: 13.3 % — SIGNIFICANT CHANGE UP (ref 10.3–14.5)
SAO2 % BLDV: 29.6 % — LOW (ref 60–85)
SMUDGE CELLS # BLD: PRESENT — SIGNIFICANT CHANGE UP
SODIUM SERPL-SCNC: 148 MMOL/L — HIGH (ref 135–145)
TROPONIN T SERPL-MCNC: < 0.06 NG/ML — SIGNIFICANT CHANGE UP (ref 0–0.06)
VARIANT LYMPHS # BLD: 5.6 % — SIGNIFICANT CHANGE UP
WBC # BLD: 6.9 K/UL — SIGNIFICANT CHANGE UP (ref 3.8–10.5)
WBC # FLD AUTO: 6.9 K/UL — SIGNIFICANT CHANGE UP (ref 3.8–10.5)

## 2018-03-14 PROCEDURE — 74177 CT ABD & PELVIS W/CONTRAST: CPT | Mod: 26

## 2018-03-14 PROCEDURE — 71045 X-RAY EXAM CHEST 1 VIEW: CPT | Mod: 26

## 2018-03-14 RX ORDER — IPRATROPIUM/ALBUTEROL SULFATE 18-103MCG
3 AEROSOL WITH ADAPTER (GRAM) INHALATION EVERY 6 HOURS
Qty: 0 | Refills: 0 | Status: DISCONTINUED | OUTPATIENT
Start: 2018-03-14 | End: 2018-03-15

## 2018-03-14 RX ORDER — SODIUM CHLORIDE 9 MG/ML
1000 INJECTION INTRAMUSCULAR; INTRAVENOUS; SUBCUTANEOUS ONCE
Qty: 0 | Refills: 0 | Status: COMPLETED | OUTPATIENT
Start: 2018-03-14 | End: 2018-03-14

## 2018-03-14 RX ORDER — MIDODRINE HYDROCHLORIDE 2.5 MG/1
10 TABLET ORAL ONCE
Qty: 0 | Refills: 0 | Status: COMPLETED | OUTPATIENT
Start: 2018-03-14 | End: 2018-03-14

## 2018-03-14 RX ORDER — ACETAMINOPHEN 500 MG
1000 TABLET ORAL ONCE
Qty: 0 | Refills: 0 | Status: COMPLETED | OUTPATIENT
Start: 2018-03-14 | End: 2018-03-14

## 2018-03-14 RX ORDER — SODIUM CHLORIDE 9 MG/ML
1000 INJECTION, SOLUTION INTRAVENOUS
Qty: 0 | Refills: 0 | Status: DISCONTINUED | OUTPATIENT
Start: 2018-03-14 | End: 2018-03-15

## 2018-03-14 RX ADMIN — Medication 3 MILLILITER(S): at 23:37

## 2018-03-14 RX ADMIN — Medication 1000 MILLIGRAM(S): at 20:24

## 2018-03-14 RX ADMIN — Medication 50 MILLIGRAM(S): at 17:46

## 2018-03-14 RX ADMIN — SODIUM CHLORIDE 1000 MILLILITER(S): 9 INJECTION INTRAMUSCULAR; INTRAVENOUS; SUBCUTANEOUS at 17:46

## 2018-03-14 RX ADMIN — SODIUM CHLORIDE 2000 MILLILITER(S): 9 INJECTION INTRAMUSCULAR; INTRAVENOUS; SUBCUTANEOUS at 19:35

## 2018-03-14 RX ADMIN — SODIUM CHLORIDE 100 MILLILITER(S): 9 INJECTION, SOLUTION INTRAVENOUS at 20:46

## 2018-03-14 RX ADMIN — SODIUM CHLORIDE 1000 MILLILITER(S): 9 INJECTION INTRAMUSCULAR; INTRAVENOUS; SUBCUTANEOUS at 23:00

## 2018-03-14 RX ADMIN — Medication 400 MILLIGRAM(S): at 17:46

## 2018-03-14 NOTE — ED PROVIDER NOTE - ATTENDING CONTRIBUTION TO CARE
JUNI Attending Note - Dr. William  72 Y M with hx of COPD on 2 L at home, Lung Ca s/p R Lung lobectomy with recent CT scan that was told he would need a PET scan, Afib on coumadin, who presents with decreasing appetite, increasing sputum production and O2 requirement, weakness for the last few days  PE: pt is alert and oriented, thin and pale. perrl, ent normal, membranes are moist, neck supple. no lymphadenopathy or thyroid enlargement, No JVD.  Chest left side fair air movement with expiratory wheezing right side percusses dully with decreased breath sounds, Heart- reg rhythm without murmur, rubs or gallops, radial pulses equal bilaterally.  Abd is soft, non-tender, Bowel sounds are active. no mass or organomegaly. : No CVA tenderness. Neuro:  Pt alert and oriented x 3. Perrl    Distal neurosensory is intact. Motor function is 5/5 strength bilaterally.  No focal deficits. Extremities:  No edema.  Skin: warm and dry.  Plan:   Labs, including CBC, CMP, U/A, renal evaluation with Creatinine and BUN, CXR, EKG, nebulizers with albuterol and ipratropium, steroids and antibiotics    Impression: COPD exacerbation

## 2018-03-14 NOTE — ED ADULT NURSE NOTE - OBJECTIVE STATEMENT
mt rn: pt received to room #23 with c/o failure to thrive and abd pain. pt had r lung removal d/2 cancer, has not had chemo/ radiation in 5 years. pt noted to appear weak and cachetic. currently denies pain, but states this morning he was experience abd pain and nausea, denies active vomiting. wife states pts has been coughing with phlegm production, took at home nebulizer. pt currently on a thickened liquid diet. d/2 aspirational pneumonia last year. pt aox3. Setswana speaking, wife at bedside translating. pt ambulates with cane and one assistance. used 2L O2 NC at home as needed. pt has not had BM since last Saturday. on HM, Afib noted. vitals as noted. pending MD thakur. report given to primary RN.

## 2018-03-14 NOTE — ED PROVIDER NOTE - MEDICAL DECISION MAKING DETAILS
72 Y M with pmhx COPD lung CA presenting with increasing sputum production and abdominal pain. DDx: COPD exacerbation, dehydration, constipation, viral illness, SBO. Will get lab work including lactate and coags. Doubt embolic cause of abdominal pain if INR therapeutic. Will get abdominal CT. Will give tylenol for pain and will tx COPD exacerbation, likely dispo admission.

## 2018-03-14 NOTE — ED PROVIDER NOTE - PMH
Atrial fibrillation    Atrial fibrillation    History of pneumonia  4/2013  Hyperlipidemia    Hypertension    Hypertension    Lung cancer  s/p R. lobectomy  Lung cancer    Seizure disorder    Seizure disorder

## 2018-03-14 NOTE — ED ADULT TRIAGE NOTE - CHIEF COMPLAINT QUOTE
hx of COPD right lung lobectomy lung CA c/o with nausea and lower abd pain unable to eat x 1 week, general weakness.

## 2018-03-14 NOTE — ED PROVIDER NOTE - OBJECTIVE STATEMENT
72 Y M with hx of COPD on 2 L at home, Lung Ca s/p R Lung lobectomy with recent CT scan that was told he would need a PET scan, Afib on coumadin, who presents with decreasing appetite, increasing sputum production and O2 requirement, weakness for the last few days. Patient's wife also notes that he has been constipated since saturday and has been complaining of increasing lower abdominal pain. The note that patient was tx for COPD exacerbation in Jan with 2 wks abx and 3 weeks steroids. He was initially improved after treatment but has been now having more dyspnea with activity and increased sputum production from baseline. Patient admits to nausea, weakness, fatigue and constipation. He denies fever, vomiting, CP, LOC. Spoke with patient and wife and they wish that he remain full code at this time.

## 2018-03-14 NOTE — ED ADULT NURSE REASSESSMENT NOTE - NS ED NURSE REASSESS COMMENT FT1
patient A&Ox3, appears fatigued and pale, hypotensive, MD aware, second L NS bolus initiated, will monitor closely

## 2018-03-14 NOTE — ED PROVIDER NOTE - PROGRESS NOTE DETAILS
Benjie Mann MD PGY4: Pt has now received NS x2L. Responsive to fluid, but once again hypotensive to sbp in the 80s. A third liter is ordered and MICU has been consulted. Awaiting evaluation and recommendations. Benjie Mann MD PGY4: Labs, imaging reviewed. BP stabilized after NS x3. Seen by MICU, recommended midodrine and medicine admission. Case d/w Dr. Hernandez, accepted admission. Verbal signout to mar.

## 2018-03-15 DIAGNOSIS — G40.909 EPILEPSY, UNSPECIFIED, NOT INTRACTABLE, WITHOUT STATUS EPILEPTICUS: ICD-10-CM

## 2018-03-15 DIAGNOSIS — J44.1 CHRONIC OBSTRUCTIVE PULMONARY DISEASE WITH (ACUTE) EXACERBATION: ICD-10-CM

## 2018-03-15 DIAGNOSIS — A41.9 SEPSIS, UNSPECIFIED ORGANISM: ICD-10-CM

## 2018-03-15 DIAGNOSIS — I10 ESSENTIAL (PRIMARY) HYPERTENSION: ICD-10-CM

## 2018-03-15 DIAGNOSIS — I48.91 UNSPECIFIED ATRIAL FIBRILLATION: ICD-10-CM

## 2018-03-15 DIAGNOSIS — J10.1 INFLUENZA DUE TO OTHER IDENTIFIED INFLUENZA VIRUS WITH OTHER RESPIRATORY MANIFESTATIONS: ICD-10-CM

## 2018-03-15 DIAGNOSIS — N17.9 ACUTE KIDNEY FAILURE, UNSPECIFIED: ICD-10-CM

## 2018-03-15 DIAGNOSIS — Z29.9 ENCOUNTER FOR PROPHYLACTIC MEASURES, UNSPECIFIED: ICD-10-CM

## 2018-03-15 LAB
APPEARANCE UR: CLEAR — SIGNIFICANT CHANGE UP
B PERT DNA SPEC QL NAA+PROBE: SIGNIFICANT CHANGE UP
BASOPHILS NFR SPEC: 0 % — SIGNIFICANT CHANGE UP (ref 0–2)
BILIRUB UR-MCNC: NEGATIVE — SIGNIFICANT CHANGE UP
BLASTS # FLD: 0 % — SIGNIFICANT CHANGE UP (ref 0–0)
BLOOD UR QL VISUAL: NEGATIVE — SIGNIFICANT CHANGE UP
BUN SERPL-MCNC: 28 MG/DL — HIGH (ref 7–23)
C PNEUM DNA SPEC QL NAA+PROBE: NOT DETECTED — SIGNIFICANT CHANGE UP
CALCIUM SERPL-MCNC: 7.7 MG/DL — LOW (ref 8.4–10.5)
CHLORIDE SERPL-SCNC: 111 MMOL/L — HIGH (ref 98–107)
CO2 SERPL-SCNC: 29 MMOL/L — SIGNIFICANT CHANGE UP (ref 22–31)
COLOR SPEC: YELLOW — SIGNIFICANT CHANGE UP
CREAT SERPL-MCNC: 0.96 MG/DL — SIGNIFICANT CHANGE UP (ref 0.5–1.3)
EOSINOPHIL NFR FLD: 0.9 % — SIGNIFICANT CHANGE UP (ref 0–6)
FLUAV H1 2009 PAND RNA SPEC QL NAA+PROBE: NOT DETECTED — SIGNIFICANT CHANGE UP
FLUAV H1 RNA SPEC QL NAA+PROBE: NOT DETECTED — SIGNIFICANT CHANGE UP
FLUAV H3 RNA SPEC QL NAA+PROBE: NOT DETECTED — SIGNIFICANT CHANGE UP
FLUAV SUBTYP SPEC NAA+PROBE: SIGNIFICANT CHANGE UP
FLUBV RNA SPEC QL NAA+PROBE: POSITIVE — HIGH
GIANT PLATELETS BLD QL SMEAR: PRESENT — SIGNIFICANT CHANGE UP
GLUCOSE SERPL-MCNC: 139 MG/DL — HIGH (ref 70–99)
GLUCOSE UR-MCNC: NEGATIVE — SIGNIFICANT CHANGE UP
HADV DNA SPEC QL NAA+PROBE: NOT DETECTED — SIGNIFICANT CHANGE UP
HCOV 229E RNA SPEC QL NAA+PROBE: NOT DETECTED — SIGNIFICANT CHANGE UP
HCOV HKU1 RNA SPEC QL NAA+PROBE: NOT DETECTED — SIGNIFICANT CHANGE UP
HCOV NL63 RNA SPEC QL NAA+PROBE: NOT DETECTED — SIGNIFICANT CHANGE UP
HCOV OC43 RNA SPEC QL NAA+PROBE: NOT DETECTED — SIGNIFICANT CHANGE UP
HCT VFR BLD CALC: 27.7 % — LOW (ref 39–50)
HGB BLD-MCNC: 9.1 G/DL — LOW (ref 13–17)
HMPV RNA SPEC QL NAA+PROBE: NOT DETECTED — SIGNIFICANT CHANGE UP
HPIV1 RNA SPEC QL NAA+PROBE: NOT DETECTED — SIGNIFICANT CHANGE UP
HPIV2 RNA SPEC QL NAA+PROBE: NOT DETECTED — SIGNIFICANT CHANGE UP
HPIV3 RNA SPEC QL NAA+PROBE: NOT DETECTED — SIGNIFICANT CHANGE UP
HPIV4 RNA SPEC QL NAA+PROBE: NOT DETECTED — SIGNIFICANT CHANGE UP
HYALINE CASTS # UR AUTO: SIGNIFICANT CHANGE UP (ref 0–?)
INR BLD: 6.02 — CRITICAL HIGH (ref 0.88–1.17)
KETONES UR-MCNC: NEGATIVE — SIGNIFICANT CHANGE UP
LEUKOCYTE ESTERASE UR-ACNC: NEGATIVE — SIGNIFICANT CHANGE UP
LYMPHOCYTES NFR SPEC AUTO: 3.8 % — LOW (ref 13–44)
M PNEUMO DNA SPEC QL NAA+PROBE: NOT DETECTED — SIGNIFICANT CHANGE UP
MACROCYTES BLD QL: SIGNIFICANT CHANGE UP
MAGNESIUM SERPL-MCNC: 2.2 MG/DL — SIGNIFICANT CHANGE UP (ref 1.6–2.6)
MCHC RBC-ENTMCNC: 32.3 PG — SIGNIFICANT CHANGE UP (ref 27–34)
MCHC RBC-ENTMCNC: 32.9 % — SIGNIFICANT CHANGE UP (ref 32–36)
MCV RBC AUTO: 98.2 FL — SIGNIFICANT CHANGE UP (ref 80–100)
METAMYELOCYTES # FLD: 0 % — SIGNIFICANT CHANGE UP (ref 0–1)
MICROCYTES BLD QL: SLIGHT — SIGNIFICANT CHANGE UP
MONOCYTES NFR BLD: 4.8 % — SIGNIFICANT CHANGE UP (ref 2–9)
MUCOUS THREADS # UR AUTO: SIGNIFICANT CHANGE UP
MYELOCYTES NFR BLD: 0.9 % — HIGH (ref 0–0)
NEUTROPHIL AB SER-ACNC: 84.8 % — HIGH (ref 43–77)
NEUTS BAND # BLD: 0.9 % — SIGNIFICANT CHANGE UP (ref 0–6)
NITRITE UR-MCNC: NEGATIVE — SIGNIFICANT CHANGE UP
NRBC # FLD: 0 — SIGNIFICANT CHANGE UP
OTHER - HEMATOLOGY %: 0 — SIGNIFICANT CHANGE UP
OVALOCYTES BLD QL SMEAR: SLIGHT — SIGNIFICANT CHANGE UP
PH UR: 6.5 — SIGNIFICANT CHANGE UP (ref 4.6–8)
PHOSPHATE SERPL-MCNC: 2.7 MG/DL — SIGNIFICANT CHANGE UP (ref 2.5–4.5)
PLATELET # BLD AUTO: 80 K/UL — LOW (ref 150–400)
PLATELET COUNT - ESTIMATE: SIGNIFICANT CHANGE UP
PMV BLD: 10.9 FL — SIGNIFICANT CHANGE UP (ref 7–13)
POTASSIUM SERPL-MCNC: 3.8 MMOL/L — SIGNIFICANT CHANGE UP (ref 3.5–5.3)
POTASSIUM SERPL-SCNC: 3.8 MMOL/L — SIGNIFICANT CHANGE UP (ref 3.5–5.3)
PROMYELOCYTES # FLD: 0 % — SIGNIFICANT CHANGE UP (ref 0–0)
PROT UR-MCNC: 20 MG/DL — SIGNIFICANT CHANGE UP
PROTHROM AB SERPL-ACNC: 71.8 SEC — HIGH (ref 9.8–13.1)
RBC # BLD: 2.82 M/UL — LOW (ref 4.2–5.8)
RBC # FLD: 13.4 % — SIGNIFICANT CHANGE UP (ref 10.3–14.5)
RBC CASTS # UR COMP ASSIST: SIGNIFICANT CHANGE UP (ref 0–?)
RSV RNA SPEC QL NAA+PROBE: NOT DETECTED — SIGNIFICANT CHANGE UP
RV+EV RNA SPEC QL NAA+PROBE: NOT DETECTED — SIGNIFICANT CHANGE UP
SODIUM SERPL-SCNC: 148 MMOL/L — HIGH (ref 135–145)
SP GR SPEC: 1.03 — SIGNIFICANT CHANGE UP (ref 1–1.04)
SPECIMEN SOURCE: SIGNIFICANT CHANGE UP
SPECIMEN SOURCE: SIGNIFICANT CHANGE UP
UROBILINOGEN FLD QL: NORMAL MG/DL — SIGNIFICANT CHANGE UP
VARIANT LYMPHS # BLD: 2.9 % — SIGNIFICANT CHANGE UP
WBC # BLD: 3.12 K/UL — LOW (ref 3.8–10.5)
WBC # FLD AUTO: 3.12 K/UL — LOW (ref 3.8–10.5)
WBC UR QL: SIGNIFICANT CHANGE UP (ref 0–?)

## 2018-03-15 PROCEDURE — 12345: CPT | Mod: GC,NC

## 2018-03-15 PROCEDURE — 99223 1ST HOSP IP/OBS HIGH 75: CPT

## 2018-03-15 RX ORDER — WARFARIN SODIUM 2.5 MG/1
1 TABLET ORAL
Qty: 0 | Refills: 0 | COMMUNITY

## 2018-03-15 RX ORDER — CARBAMAZEPINE 200 MG
400 TABLET ORAL
Qty: 0 | Refills: 0 | Status: DISCONTINUED | OUTPATIENT
Start: 2018-03-15 | End: 2018-03-21

## 2018-03-15 RX ORDER — LAMOTRIGINE 25 MG/1
100 TABLET, ORALLY DISINTEGRATING ORAL
Qty: 0 | Refills: 0 | Status: DISCONTINUED | OUTPATIENT
Start: 2018-03-15 | End: 2018-03-21

## 2018-03-15 RX ORDER — IPRATROPIUM/ALBUTEROL SULFATE 18-103MCG
3 AEROSOL WITH ADAPTER (GRAM) INHALATION EVERY 6 HOURS
Qty: 0 | Refills: 0 | Status: DISCONTINUED | OUTPATIENT
Start: 2018-03-15 | End: 2018-03-21

## 2018-03-15 RX ORDER — SODIUM CHLORIDE 9 MG/ML
1000 INJECTION, SOLUTION INTRAVENOUS
Qty: 0 | Refills: 0 | Status: DISCONTINUED | OUTPATIENT
Start: 2018-03-15 | End: 2018-03-16

## 2018-03-15 RX ADMIN — LAMOTRIGINE 100 MILLIGRAM(S): 25 TABLET, ORALLY DISINTEGRATING ORAL at 06:59

## 2018-03-15 RX ADMIN — Medication 400 MILLIGRAM(S): at 17:44

## 2018-03-15 RX ADMIN — Medication 400 MILLIGRAM(S): at 06:59

## 2018-03-15 RX ADMIN — Medication 40 MILLIGRAM(S): at 06:59

## 2018-03-15 RX ADMIN — SODIUM CHLORIDE 75 MILLILITER(S): 9 INJECTION, SOLUTION INTRAVENOUS at 06:29

## 2018-03-15 RX ADMIN — Medication 30 MILLIGRAM(S): at 06:43

## 2018-03-15 RX ADMIN — Medication 3 MILLILITER(S): at 09:03

## 2018-03-15 RX ADMIN — MIDODRINE HYDROCHLORIDE 10 MILLIGRAM(S): 2.5 TABLET ORAL at 00:13

## 2018-03-15 RX ADMIN — Medication 3 MILLILITER(S): at 16:23

## 2018-03-15 RX ADMIN — Medication 3 MILLILITER(S): at 08:30

## 2018-03-15 RX ADMIN — LAMOTRIGINE 100 MILLIGRAM(S): 25 TABLET, ORALLY DISINTEGRATING ORAL at 17:44

## 2018-03-15 RX ADMIN — Medication 3 MILLILITER(S): at 22:09

## 2018-03-15 RX ADMIN — SODIUM CHLORIDE 75 MILLILITER(S): 9 INJECTION, SOLUTION INTRAVENOUS at 19:52

## 2018-03-15 RX ADMIN — Medication 3 MILLILITER(S): at 04:23

## 2018-03-15 RX ADMIN — Medication 75 MILLIGRAM(S): at 17:44

## 2018-03-15 NOTE — PROGRESS NOTE ADULT - PROBLEM SELECTOR PLAN 1
hypotensive in ER 2/2 sepsis responsive to fluids and midodrine  RVP positive influenza B  saturating well on baseline nasal cannula  no new infiltrate on CT  continue tamiflu and maintenance fluids

## 2018-03-15 NOTE — H&P ADULT - HISTORY OF PRESENT ILLNESS
73 yo M with hx of COPD on 2 L at home, Lung Ca s/p R Lung lobectomy in 2003 s/p chemo/radiation, AVR, Afib on coumadin, seizure d/o, HTN who presents with increased sputum production and cough. Most of history provided by wife as per pt preference. Wife reports over the past week, pt with progressive weakness requiring assistance with all ADLs in the setting of poor appetite, worsening cough and sputum production. Also requiring 3L NC when he is normally on 2L. Wife note pt had similar episode at the end of Jan and was found to have PNA. She states that he was on 3 weeks of prednisone as well a course of abx. She notes he was better for a while but has since experienced worsening SOB and sputum production. Also with lower quadrant pain with nausea but no vomiting. He denies any fevers, chills, CP.   In the ED, Vital Signs Last 24 Hrs  T(C): 36.9 (15 Mar 2018 00:14), Max: 36.9 (15 Mar 2018 00:14)  T(F): 98.4 (15 Mar 2018 00:14), Max: 98.4 (15 Mar 2018 00:14)  HR: 66 (15 Mar 2018 00:14) (66 - 93)  BP: 103/49 (15 Mar 2018 00:14) (79/52 - 132/50)  BP(mean): --  RR: 18 (15 Mar 2018 00:14) (17 - 24)  SpO2: 100% (15 Mar 2018 00:14) (100% - 100%)  Pt was noted to be hypoensive to 70s systolic. He was bolused 3L of NS, treated with midodrine 10 mg and started on maintenance fluids. MICU was consulted but deemed pt not to be a candidate. Pt was started on tamfilu, levaquin, prednisone and duonebs.

## 2018-03-15 NOTE — PHYSICAL THERAPY INITIAL EVALUATION ADULT - PLANNED THERAPY INTERVENTIONS, PT EVAL
strengthening/transfer training/balance training/bed mobility training/stairs training/gait training

## 2018-03-15 NOTE — CONSULT NOTE ADULT - ASSESSMENT
73 yo M with hx of COPD on 2 L at home, Lung Ca s/p R Lung lobectomy with recent CT scan that was told he would need a PET scan, Afib on coumadin, who presents with decreasing appetite, increasing sputum production and O2 requirement, weakness for the last few days likely representing COPD exacerbation    Neuro: AOx3, no issues, mentating well    Resp: Would continue steroids for COPD exacerbation, c/w levaquin, duonebs q4h, supplemental o2 (currently stable on 2-3L NC).    CV: Likely 2/2 dehydrations s/p 3L NS, Currently HD stable with SBPs >100, would give midodrine 10 q8h if needed to maintain SBP >90.     Renal: Dehydration s/p 3L NS, continue to monitor BMP and electrolytes    GI: C/w thickened fluids as per home diet      Bebeto Menard - PGY3  [phone] / [phone] 71 yo M with hx of COPD on 2 L at home, Lung Ca s/p R Lung lobectomy with recent CT scan that was told he would need a PET scan, Afib on coumadin, who presents with decreasing appetite, increasing sputum production and O2 requirement, weakness for the last few days likely representing COPD exacerbation    Neuro: AOx3, no issues, mentating well    Resp: Would continue steroids for COPD exacerbation, c/w levaquin, duonebs q4h, supplemental o2 (currently stable on 2-3L NC).    CV: Likely 2/2 dehydrations s/p 3L NS, Currently HD stable with SBPs >100, would give midodrine 10 q8h if needed to maintain SBP >90.     Renal: Dehydration s/p 3L NS, continue to monitor BMP and electrolytes    GI: C/w thickened fluids as per home diet    Heme: Supratherapeutic INR with no evidence of acute bleed, hold coumadin till INR <3 then restart    	  Bebeto Menard - PGY3  [phone] / [phone]

## 2018-03-15 NOTE — H&P ADULT - NSHPPHYSICALEXAM_GEN_ALL_CORE
GENERAL APPEARANCE: Well developed, cachectic in NAD on NC  HEENT:  PERRL, EOMI. External auditory canals normal, hearing grossly intact.  NECK: Neck supple, non-tender without lymphadenopathy, masses or thyromegaly.  CARDIAC: Normal S1 and S2. No S3, S4 or murmurs. Rhythm is regular.  LUNGS: Rhonchi heard over left upper lobe. Absent breath sounds over right lung field.   ABDOMEN: Positive bowel sounds. Soft, nondistended, nontender. No guarding or rebound.   MUSCULOSKELETAL: ROM intact spine and extremities. No joint erythema or tenderness.   BACK: normal gait and posture, no spinal deformity, symmetry of spinal muscles, without tenderness, decreased range of motion.  EXTREMITIES: No significant deformity or joint abnormality. No edema. Peripheral pulses intact. No varicosities.  NEUROLOGICAL: CN II-XII intact. Strength and sensation symmetric and intact throughout.   SKIN: Skin normal color, texture and turgor with no lesions or eruptions.  PSYCHIATRIC: AOx3.Normal affect and behavior.

## 2018-03-15 NOTE — H&P ADULT - FAMILY HISTORY
Family history of stroke, Mother     Family history of hypertension, Mother     Aunt  Still living? Unknown  Family history of cancer, Age at diagnosis: Age Unknown

## 2018-03-15 NOTE — H&P ADULT - ASSESSMENT
71 yo M with hx of COPD on 2 L at home, Lung Ca s/p R Lung lobectomy in 2003 s/p chemo/radiation, AVR, Afib on coumadin, seizure d/o, HTN who presents with increased sputum production and cough a/w sepsis likely 2/2 to influenza infection c/b COPD exacerbation

## 2018-03-15 NOTE — PROGRESS NOTE ADULT - SUBJECTIVE AND OBJECTIVE BOX
Patient is a 72y old  Male who presents with a chief complaint of increased sputum production, cough (15 Mar 2018 03:51)      SUBJECTIVE / OVERNIGHT EVENTS:  admitted with flu and COPD exacerbation, still in ER this morning, feeling better    MEDICATIONS  (STANDING):  ALBUTerol/ipratropium for Nebulization 3 milliLiter(s) Nebulizer every 6 hours  ALBUTerol/ipratropium for Nebulization 3 milliLiter(s) Nebulizer every 6 hours  carBAMazepine 400 milliGRAM(s) Oral two times a day  lamoTRIgine 100 milliGRAM(s) Oral two times a day  oseltamivir 75 milliGRAM(s) Oral two times a day  predniSONE   Tablet 40 milliGRAM(s) Oral daily  sodium chloride 0.45%. 1000 milliLiter(s) (75 mL/Hr) IV Continuous <Continuous>    MEDICATIONS  (PRN):      T(C): 37.1 (03-15-18 @ 08:30), Max: 37.1 (03-15-18 @ 08:30)  HR: 74 (03-15-18 @ 10:45) (66 - 93)  BP: 132/70 (03-15-18 @ 10:45) (79/52 - 132/70)  RR: 16 (03-15-18 @ 10:45) (16 - 24)  SpO2: 100% (03-15-18 @ 10:45) (100% - 100%)  CAPILLARY BLOOD GLUCOSE        I&O's Summary      PHYSICAL EXAM  GENERAL APPEARANCE: Well developed, cachectic in NAD on NC  HEENT:  PERRL, EOMI. External auditory canals normal, hearing grossly intact.  NECK: Neck supple, non-tender without lymphadenopathy, masses or thyromegaly.  CARDIAC: Normal S1 and S2. No S3, S4. soft systolic murmur. Rhythm is regular.  LUNGS: Rhonchi heard over left upper lobe. Absent breath sounds over right lung field.   ABDOMEN: Positive bowel sounds. Soft, nondistended, nontender. No guarding or rebound.   MUSCULOSKELETAL: ROM intact spine and extremities. No joint erythema or tenderness.   BACK: normal gait and posture, no spinal deformity, symmetry of spinal muscles, without tenderness, decreased range of motion.  EXTREMITIES: No significant deformity or joint abnormality. No edema. Peripheral pulses intact. No varicosities.  NEUROLOGICAL: CN II-XII intact. Strength and sensation symmetric and intact throughout.   SKIN: Skin normal color, texture and turgor with no lesions or eruptions.  PSYCHIATRIC: AOx3.Normal affect and behavior.    LABS:                        9.1    3.12  )-----------( 80       ( 15 Mar 2018 06:15 )             27.7     03-15    148<H>  |  111<H>  |  28<H>  ----------------------------<  139<H>  3.8   |  29  |  0.96    Ca    7.7<L>      15 Mar 2018 06:15  Phos  2.7     03-15  Mg     2.2     03-15    TPro  7.3  /  Alb  3.8  /  TBili  0.5  /  DBili  x   /  AST  16  /  ALT  16  /  AlkPhos  89  03-14    PT/INR - ( 15 Mar 2018 06:15 )   PT: 71.8 SEC;   INR: 6.02          PTT - ( 14 Mar 2018 16:33 )  PTT:51.4 SEC  CARDIAC MARKERS ( 14 Mar 2018 16:33 )  x     / < 0.06 ng/mL / 13 u/L / 1.00 ng/mL / x              RADIOLOGY & ADDITIONAL TESTS:    Imaging Personally Reviewed:  Consultant(s) Notes Reviewed:    Care Discussed with Consultants/Other Providers: Patient is a 72y old  Male who presents with a chief complaint of increased sputum production, cough (15 Mar 2018 03:51)      SUBJECTIVE / OVERNIGHT EVENTS:  admitted with flu and COPD exacerbation, still in ER this morning, feeling better    MEDICATIONS  (STANDING):  ALBUTerol/ipratropium for Nebulization 3 milliLiter(s) Nebulizer every 6 hours  ALBUTerol/ipratropium for Nebulization 3 milliLiter(s) Nebulizer every 6 hours  carBAMazepine 400 milliGRAM(s) Oral two times a day  lamoTRIgine 100 milliGRAM(s) Oral two times a day  oseltamivir 75 milliGRAM(s) Oral two times a day  predniSONE   Tablet 40 milliGRAM(s) Oral daily  sodium chloride 0.45%. 1000 milliLiter(s) (75 mL/Hr) IV Continuous <Continuous>    MEDICATIONS  (PRN):      T(C): 37.1 (03-15-18 @ 08:30), Max: 37.1 (03-15-18 @ 08:30)  HR: 74 (03-15-18 @ 10:45) (66 - 93)  BP: 132/70 (03-15-18 @ 10:45) (79/52 - 132/70)  RR: 16 (03-15-18 @ 10:45) (16 - 24)  SpO2: 100% (03-15-18 @ 10:45) (100% - 100%)  CAPILLARY BLOOD GLUCOSE        I&O's Summary      PHYSICAL EXAM  GENERAL APPEARANCE: Well developed, cachectic in NAD on NC  HEENT:  PERRL, EOMI. External auditory canals normal, hearing grossly intact.  NECK: Neck supple, non-tender without lymphadenopathy, masses or thyromegaly.  CARDIAC: Normal S1 and S2. No S3, S4. soft systolic murmur. Rhythm is regular.  LUNGS: Rhonchi heard over left upper lobe. Absent breath sounds over right lung field.   ABDOMEN: Positive bowel sounds. Soft, nondistended, nontender. No guarding or rebound.   MUSCULOSKELETAL: ROM intact spine and extremities. No joint erythema or tenderness.   BACK: normal gait and posture, no spinal deformity, symmetry of spinal muscles, without tenderness, decreased range of motion.  EXTREMITIES: No significant deformity or joint abnormality. No edema. Peripheral pulses intact. No varicosities.  NEUROLOGICAL: CN II-XII intact. Strength and sensation symmetric and intact throughout.   SKIN: Skin normal color, texture and turgor with no lesions or eruptions.  PSYCHIATRIC: Normal affect and behavior.    LABS:                        9.1    3.12  )-----------( 80       ( 15 Mar 2018 06:15 )             27.7     03-15    148<H>  |  111<H>  |  28<H>  ----------------------------<  139<H>  3.8   |  29  |  0.96    Ca    7.7<L>      15 Mar 2018 06:15  Phos  2.7     03-15  Mg     2.2     03-15    TPro  7.3  /  Alb  3.8  /  TBili  0.5  /  DBili  x   /  AST  16  /  ALT  16  /  AlkPhos  89  03-14    PT/INR - ( 15 Mar 2018 06:15 )   PT: 71.8 SEC;   INR: 6.02          PTT - ( 14 Mar 2018 16:33 )  PTT:51.4 SEC  CARDIAC MARKERS ( 14 Mar 2018 16:33 )  x     / < 0.06 ng/mL / 13 u/L / 1.00 ng/mL / x              RADIOLOGY & ADDITIONAL TESTS:    Imaging Personally Reviewed:  Consultant(s) Notes Reviewed:    Care Discussed with Consultants/Other Providers:

## 2018-03-15 NOTE — H&P ADULT - NSHPSOCIALHISTORY_GEN_ALL_CORE
Former smoker 2ppd for 40 years. Social drinker. Denies drug use. Lives with wife. Before this week, was able to take care of most of his needs. Now requires assitance with walking,   Walks with a cane. On a thickened liquid diet

## 2018-03-15 NOTE — H&P ADULT - PROBLEM SELECTOR PLAN 1
-in the setting of RVP positive influenza c/b septic shock. No reported fevers. CT a/p consistent with nodules unchanged from imaging done 2 weeks ago, neoplastic vs infectious. Continue with tamiflu, Levaquin for concomitant copd exacerbation  -s/p 3 L NS and midodrine with improvement in MAPs. Continue with maintenance fluids  -Follow up blood cultures

## 2018-03-15 NOTE — PROGRESS NOTE ADULT - ATTENDING COMMENTS
Pt was seen and examed at bed side with resident.  73 yo M PMH COPD on 2 L at home, Lung Ca (s/p R Lung lobectomy, s/p chemo/radiation), AVR, Afib on coumadin, seizure d/o, HTN was admitted for productive cough, and failure to thrive. Pt was found to be hypotensive concerned about possible sepsis due to influenza infection. Pt BP improved with IVF, on Tamiflu. Also started Levaquin, oral steroid for COPD exacerbation. Will consult Pulm. Serotherapeutic INR, possible multiple etiologies including malnutrition and sepsis, questionable coagulopathy due to malignancy... no signs of active bleeding, hold coumadin and trending down INR.  Wife was updated above findings and plan of care.

## 2018-03-15 NOTE — H&P ADULT - PROBLEM SELECTOR PLAN 4
-holding nadolol in the setting of sepsis. INR elevated at 5 likely 2/2 to malnutrition. Will hold coumadin tonight. Follow up repeat INR  -monitor VS

## 2018-03-15 NOTE — H&P ADULT - NSHPREVIEWOFSYSTEMS_GEN_ALL_CORE
CONSTITUTIONAL:  +generalized weakness +weight loss No fever, chills  HEENT:  Eyes:  No visual loss, blurred vision, double vision or yellow sclerae. Ears, Nose, Throat:  +runny nose, congestion No hearing loss, sneezing,  sore throat.  SKIN:  No rash or itching.  CARDIOVASCULAR:  No chest pain, chest pressure or chest discomfort. No palpitations or edema.  RESPIRATORY:  +sob, cough productive of yellow sputum.  GASTROINTESTINAL: +anorexia +nausea but no vomiting +abdominal pain No diarrhea. No blood.  GENITOURINARY:  Denies hematuria, dysuria.   NEUROLOGICAL:  No headache, dizziness, syncope, paralysis, ataxia, numbness or tingling in the extremities. No change in bowel or bladder control.  MUSCULOSKELETAL:  No muscle, back pain, joint pain or stiffness.  HEMATOLOGIC:  No anemia, bleeding or bruising.  LYMPHATICS:  No enlarged nodes. No history of splenectomy.  PSYCHIATRIC:  No history of depression or anxiety.  ENDOCRINOLOGIC:  No reports of sweating, cold or heat intolerance. No polyuria or polydipsia.  ALLERGIES:  No history of asthma, hives, eczema or rhinitis.

## 2018-03-15 NOTE — H&P ADULT - PROBLEM SELECTOR PLAN 3
-likely prerenal, hemodynamically mediated in the setting of sepsis and decreased PO intake  -continue with IVF  -continue to monitor electolytes, scr  -avoid nephrotoxins -likely prerenal, hemodynamically mediated in the setting of sepsis and decreased PO intake  -continue with IVF,  given hypernatremia started on 1/2NS@75. Monitor Na, change to NS if sodium wnl  -continue to monitor electolytes, scr  -avoid nephrotoxins

## 2018-03-15 NOTE — H&P ADULT - PMH
Atrial fibrillation    History of pneumonia  4/2013  Hyperlipidemia    Hypertension    Lung cancer  s/p R. lobectomy  Seizure disorder

## 2018-03-15 NOTE — CONSULT NOTE ADULT - SUBJECTIVE AND OBJECTIVE BOX
71 yo M with hx of COPD on 2 L at home, Lung Ca s/p R Lung lobectomy with recent CT scan that was told he would need a PET scan, Afib on coumadin, who presents with decreasing appetite, increasing sputum production and O2 requirement, weakness for the last few days. Wife notes poor PO intake for the past week without fluids or solids. She notes that he uses thickened liquids at home.  Wife note pt had similar episode at the end of Jan and was found to have PNA. She states that he was on 3 weeks of prednisone as well a course of abx. She notes he was better for a while but has since experienced worsening SOB and sputum production. Pt endorses nausea, weakness, fatigue and constipation. He denies any fevers, chills, CP, abd pain.       PAST MEDICAL & SURGICAL HISTORY:  Atrial fibrillation  Hypertension  History of pneumonia: 4/2013  Lung cancer: s/p R. lobectomy  Seizure disorder  Atrial fibrillation  Hypertension  Hyperlipidemia  Seizure disorder  Lung cancer  H/O inguinal hernia repair: 1999  H/O aortic valve replacement: 2013  S/P cataract surgery: bilaterally  Aortic valve replaced: 5/2013   bovine AoValve  S/P pneumonectomy: right sided, ~ 2003      FAMILY HISTORY:  Family history of cancer (Aunt): Maternal aunt  Family history of hypertension: Mother  Family history of stroke: Mother      SOCIAL HISTORY:  Smoking: Former smoker  Substance Use: None  EtOH Use: None  Marital Status:       Allergies  No Known Allergies      HOME MEDICATIONS:  · 	furosemide 20 mg oral tablet: Last Dose Taken:  , 1 tab(s) orally once a day  · 	Coumadin 4 mg oral tablet: Last Dose Taken:  , 1 tab(s) orally once a day  · 	Spiriva 18 mcg inhalation capsule: 1 cap(s) inhaled once a day  · 	Symbicort 160 mcg-4.5 mcg/inh inhalation aerosol: 2 puff(s) inhaled 2 times a day  · 	ProAir HFA 90 mcg/inh inhalation aerosol: Last Dose Taken:  , 2 puff(s) inhaled 4 times a day, As Needed  · 	Toprol-XL 50 mg oral tablet, extended release: 1 tab(s) orally once a day  · 	Norvasc 10 mg oral tablet: Last Dose Taken:  , 1 tab(s) orally once a day  · 	LaMICtal 100 mg oral tablet: Last Dose Taken:  , 1 tab(s) orally 2 times a day          · 	TEGretol 200 mg oral tablet: 2 tab(s) orally 2 times a day      REVIEW OF SYSTEMS:  General:  No fever, fatigue, chills or weight change  Eyes:  No changes in vision  ENT:  No sore throat, rhinorrhea, epistaxis,   CV:  No chest pain or palpitations   Resp:  + SOB, cough, tachypnea and wheezing  GI: No abd pain, no nausea, no vomiting, no diarrhea or constipation  :  No dysuria  Muscle:  No muscle pain  Neuro:  No weakness, no paresthesia,   Heme:  No petechiae, ecchymosis, easy bruisability  Skin:  No rash, no edema      OBJECTIVE:  Vital Signs Last 24 Hrs  T(C): 36.9 (15 Mar 2018 00:14), Max: 36.9 (15 Mar 2018 00:14)  T(F): 98.4 (15 Mar 2018 00:14), Max: 98.4 (15 Mar 2018 00:14)  HR: 66 (15 Mar 2018 00:14) (66 - 93)  BP: 103/49 (15 Mar 2018 00:14) (79/52 - 132/50)  BP(mean): --  RR: 18 (15 Mar 2018 00:14) (17 - 24)  SpO2: 100% (15 Mar 2018 00:14) (100% - 100%)      PHYSICAL EXAM:  Constitutional: NAD  Eyes: PERRL, clear conjunctiva  Respiratory: Coarse breath sounds with diffuse wheezing  Cardiovascular: RRR, no murmurs  Gastrointestinal: Soft NT/ND nl bowel sounds  Extremities: No pedal edema  Vascular: normal radial pulses  Neurological: AOx3  Skin: Warm and dry  Psychiatric: Normal affect    LINES: \[facility]\""    HOSPITAL MEDICATIONS:  MEDICATIONS  (STANDING):  ALBUTerol/ipratropium for Nebulization 3 milliLiter(s) Nebulizer every 6 hours  dextrose 5% + sodium chloride 0.9%. 1000 milliLiter(s) (100 mL/Hr) IV Continuous <Continuous>        LABS:                        10.4   6.90  )-----------( 101      ( 14 Mar 2018 16:33 )             31.7     Hgb Trend: 10.4<--  03-14    148<H>  |  102  |  37<H>  ----------------------------<  101<H>  3.7   |  35<H>  |  1.32<H>    Ca    9.0      14 Mar 2018 16:33    TPro  7.3  /  Alb  3.8  /  TBili  0.5  /  DBili  x   /  AST  16  /  ALT  16  /  AlkPhos  89  03-14    Creatinine Trend: 1.32<--  PT/INR - ( 14 Mar 2018 16:33 )   PT: 59.5 SEC;   INR: 5.01          PTT - ( 14 Mar 2018 16:33 )  PTT:51.4 SEC      Venous Blood Gas:  03-14 @ 16:33  7.41/64/< 24/36/29.6  VBG Lactate: 1.1      MICROBIOLOGY:     RADIOLOGY:  [X] Reviewed and interpreted by me    < from: CT Abdomen and Pelvis w/ Oral Cont and w/ IV Cont (03.14.18 @ 20:35) >  IMPRESSION: No bowel obstruction.    Redemonstration of left lower lobe nodular opacities with a 2 cm   irregular opacity which may be infectious or neoplastic in etiology.   Follow-up examination is recommended in 3-4 weeks to differentiate.    < end of copied text >    < from: Xray Chest 1 View AP/PA (03.14.18 @ 17:21) >  unchanged opacification of the right   hemithorax with pleural calcifications. patchy opacities in left lower   lung.    < end of copied text >

## 2018-03-15 NOTE — H&P ADULT - ATTENDING COMMENTS
73 y/o M with lung ca s/p R partial lung resection, COPD, AF p/w dyspnea and productive cough. Found to be positive for influenza.  Hypotensive in ED improved after fluids and midodrine.  Evaluated by MICU.  Presently has some cough, but dyspnea is improving.  No dyspnea.   On exam: Pt in NAD  Lungs with wheezing and rhonchi diffusely  CXR film reviewed: L lung without discreet opacity, R hemithorax opacification    Severe sepsis 2/2 influenza  - continue Tamiflu  - nebs  - IVF resuscitation

## 2018-03-15 NOTE — H&P ADULT - NSHPLABSRESULTS_GEN_ALL_CORE
(03-14 @ 16:33)                      10.4  6.90 )-----------( 101                 31.7    Neutrophils = 5.21 (75.6%)  Lymphocytes = 1.11 (16.1%)  Eosinophils = 0.03 (0.4%)  Basophils = 0.01 (0.1%)  Monocytes = 0.52 (7.5%)  Bands = 1.8%    03-14    148<H>  |  102  |  37<H>  ----------------------------<  101<H>  3.7   |  35<H>  |  1.32<H>    Ca    9.0      14 Mar 2018 16:33    TPro  7.3  /  Alb  3.8  /  TBili  0.5  /  DBili  x   /  AST  16  /  ALT  16  /  AlkPhos  89  03-14    ( 14 Mar 2018 16:33 )   PT: 59.5 SEC;   INR: 5.01 ;       PTT:51.4 SEC  CARDIAC MARKERS ( 14 Mar 2018 16:33 )  Trop < 0.06 ng/mL / CK 13 u/L<L> / CKMB 1.00 ng/mL       RVP: Rapid Respiratory Viral Panel (03.14.18 @ 23:30)    Influenza A (RapRVP): NOT DETECTED (any subtype)    Influenza AH1 2009 (RapRVP): NOT DETECTED    Influenza AH1 (RapRVP): NOT DETECTED    Influenza AH3 (RapRVP): NOT DETECTED    Influenza B (RapRVP): POSITIVE    Parainfluenza 1 (RapRVP): NOT DETECTED    Parainfluenza 2 (RapRVP): NOT DETECTED    Parainfluenza 3 (RapRVP): NOT DETECTED    Parainfluenza 4 (RapRVP): NOT DETECTED      Venous Blood Gas:  03-14 @ 16:33  7.41/64/< 24/36/29.6  VBG Lactate: 1.1      Labs reviewed. No leukocytosis. Hgb 10.4. Thrombocytopenia.   INR: 5.01  Na 148 BUN 37 Cr 1.32  CE negative  RVP influenza B+    < from: CT Abdomen and Pelvis w/ Oral Cont and w/ IV Cont (03.14.18 @ 20:35) >    No bowel obstruction.    Redemonstration of left lower lobe nodular opacities with a 2 cm   irregular opacity which may be infectious or neoplastic in etiology.   Follow-up examination is recommended in 3-4 weeks to differentiate.    < end of copied text >

## 2018-03-15 NOTE — H&P ADULT - PROBLEM SELECTOR PLAN 2
-increased sputum production, cough and oxygen requirements  -continue with levaquin, steroids and duonebs   -continue with supplemental oxygen  -chest PT, chest vest

## 2018-03-15 NOTE — H&P ADULT - PSH
Aortic valve replaced  5/2013   bovine AoValve  H/O aortic valve replacement  2013  H/O inguinal hernia repair  1999  S/P cataract surgery  bilaterally  S/P pneumonectomy  right sided, ~ 2003

## 2018-03-16 ENCOUNTER — TRANSCRIPTION ENCOUNTER (OUTPATIENT)
Age: 73
End: 2018-03-16

## 2018-03-16 DIAGNOSIS — R69 ILLNESS, UNSPECIFIED: ICD-10-CM

## 2018-03-16 DIAGNOSIS — E46 UNSPECIFIED PROTEIN-CALORIE MALNUTRITION: ICD-10-CM

## 2018-03-16 LAB
BACTERIA UR CULT: SIGNIFICANT CHANGE UP
BUN SERPL-MCNC: 17 MG/DL — SIGNIFICANT CHANGE UP (ref 7–23)
CALCIUM SERPL-MCNC: 7.6 MG/DL — LOW (ref 8.4–10.5)
CHLORIDE SERPL-SCNC: 106 MMOL/L — SIGNIFICANT CHANGE UP (ref 98–107)
CO2 SERPL-SCNC: 29 MMOL/L — SIGNIFICANT CHANGE UP (ref 22–31)
CREAT SERPL-MCNC: 0.9 MG/DL — SIGNIFICANT CHANGE UP (ref 0.5–1.3)
GLUCOSE SERPL-MCNC: 141 MG/DL — HIGH (ref 70–99)
HCT VFR BLD CALC: 25.9 % — LOW (ref 39–50)
HGB BLD-MCNC: 8.6 G/DL — LOW (ref 13–17)
INR BLD: 2.79 — HIGH (ref 0.88–1.17)
MAGNESIUM SERPL-MCNC: 1.9 MG/DL — SIGNIFICANT CHANGE UP (ref 1.6–2.6)
MCHC RBC-ENTMCNC: 32.6 PG — SIGNIFICANT CHANGE UP (ref 27–34)
MCHC RBC-ENTMCNC: 33.2 % — SIGNIFICANT CHANGE UP (ref 32–36)
MCV RBC AUTO: 98.1 FL — SIGNIFICANT CHANGE UP (ref 80–100)
NRBC # FLD: 0 — SIGNIFICANT CHANGE UP
PHOSPHATE SERPL-MCNC: 1.2 MG/DL — LOW (ref 2.5–4.5)
PLATELET # BLD AUTO: 81 K/UL — LOW (ref 150–400)
PMV BLD: 10.5 FL — SIGNIFICANT CHANGE UP (ref 7–13)
POTASSIUM SERPL-MCNC: 3.4 MMOL/L — LOW (ref 3.5–5.3)
POTASSIUM SERPL-SCNC: 3.4 MMOL/L — LOW (ref 3.5–5.3)
PROTHROM AB SERPL-ACNC: 32.8 SEC — HIGH (ref 9.8–13.1)
RBC # BLD: 2.64 M/UL — LOW (ref 4.2–5.8)
RBC # FLD: 13.2 % — SIGNIFICANT CHANGE UP (ref 10.3–14.5)
SODIUM SERPL-SCNC: 144 MMOL/L — SIGNIFICANT CHANGE UP (ref 135–145)
SPECIMEN SOURCE: SIGNIFICANT CHANGE UP
WBC # BLD: 6.47 K/UL — SIGNIFICANT CHANGE UP (ref 3.8–10.5)
WBC # FLD AUTO: 6.47 K/UL — SIGNIFICANT CHANGE UP (ref 3.8–10.5)

## 2018-03-16 PROCEDURE — 99233 SBSQ HOSP IP/OBS HIGH 50: CPT | Mod: GC

## 2018-03-16 RX ORDER — POTASSIUM PHOSPHATE, MONOBASIC POTASSIUM PHOSPHATE, DIBASIC 236; 224 MG/ML; MG/ML
15 INJECTION, SOLUTION INTRAVENOUS ONCE
Qty: 0 | Refills: 0 | Status: COMPLETED | OUTPATIENT
Start: 2018-03-16 | End: 2018-03-16

## 2018-03-16 RX ORDER — WARFARIN SODIUM 2.5 MG/1
2 TABLET ORAL ONCE
Qty: 0 | Refills: 0 | Status: COMPLETED | OUTPATIENT
Start: 2018-03-16 | End: 2018-03-16

## 2018-03-16 RX ORDER — DOCUSATE SODIUM 100 MG
100 CAPSULE ORAL
Qty: 0 | Refills: 0 | Status: DISCONTINUED | OUTPATIENT
Start: 2018-03-16 | End: 2018-03-21

## 2018-03-16 RX ORDER — POTASSIUM CHLORIDE 20 MEQ
40 PACKET (EA) ORAL ONCE
Qty: 0 | Refills: 0 | Status: COMPLETED | OUTPATIENT
Start: 2018-03-16 | End: 2018-03-16

## 2018-03-16 RX ORDER — POLYETHYLENE GLYCOL 3350 17 G/17G
17 POWDER, FOR SOLUTION ORAL DAILY
Qty: 0 | Refills: 0 | Status: DISCONTINUED | OUTPATIENT
Start: 2018-03-16 | End: 2018-03-21

## 2018-03-16 RX ADMIN — POTASSIUM PHOSPHATE, MONOBASIC POTASSIUM PHOSPHATE, DIBASIC 62.5 MILLIMOLE(S): 236; 224 INJECTION, SOLUTION INTRAVENOUS at 13:48

## 2018-03-16 RX ADMIN — Medication 100 MILLIGRAM(S): at 17:29

## 2018-03-16 RX ADMIN — Medication 3 MILLILITER(S): at 10:07

## 2018-03-16 RX ADMIN — Medication 40 MILLIEQUIVALENT(S): at 13:48

## 2018-03-16 RX ADMIN — Medication 40 MILLIGRAM(S): at 06:38

## 2018-03-16 RX ADMIN — Medication 3 MILLILITER(S): at 04:36

## 2018-03-16 RX ADMIN — Medication 400 MILLIGRAM(S): at 17:26

## 2018-03-16 RX ADMIN — POLYETHYLENE GLYCOL 3350 17 GRAM(S): 17 POWDER, FOR SOLUTION ORAL at 09:50

## 2018-03-16 RX ADMIN — LAMOTRIGINE 100 MILLIGRAM(S): 25 TABLET, ORALLY DISINTEGRATING ORAL at 06:38

## 2018-03-16 RX ADMIN — Medication 3 MILLILITER(S): at 15:23

## 2018-03-16 RX ADMIN — SODIUM CHLORIDE 75 MILLILITER(S): 9 INJECTION, SOLUTION INTRAVENOUS at 06:43

## 2018-03-16 RX ADMIN — Medication 400 MILLIGRAM(S): at 06:37

## 2018-03-16 RX ADMIN — LAMOTRIGINE 100 MILLIGRAM(S): 25 TABLET, ORALLY DISINTEGRATING ORAL at 17:26

## 2018-03-16 RX ADMIN — Medication 3 MILLILITER(S): at 22:47

## 2018-03-16 RX ADMIN — WARFARIN SODIUM 2 MILLIGRAM(S): 2.5 TABLET ORAL at 17:26

## 2018-03-16 RX ADMIN — Medication 75 MILLIGRAM(S): at 06:38

## 2018-03-16 RX ADMIN — Medication 75 MILLIGRAM(S): at 17:26

## 2018-03-16 NOTE — DISCHARGE NOTE ADULT - HOSPITAL COURSE
73 yo M with hx of COPD on 2 L at home, Lung Ca s/p R Lung lobectomy in 2003 s/p chemo/radiation, AVR, Afib on coumadin, seizure d/o, HTN who presents with increased sputum production and cough. Most of history provided by wife as per pt preference. Wife reports over the past week, pt with progressive weakness requiring assistance with all ADLs in the setting of poor appetite, worsening cough and sputum production. Also requiring 3L NC when he is normally on 2L. Wife note pt had similar episode at the end of Jan and was found to have PNA. She states that he was on 3 weeks of prednisone as well a course of abx. She notes he was better for a while but has since experienced worsening SOB and sputum production. Also with lower quadrant pain with nausea but no vomiting. He denies any fevers, chills, CP. CT scan excluded acute intraabdominal pathology. Pt was noted to be hypoensive to 70s systolic. He was bolused 3L of NS, treated with midodrine 10 mg and started on maintenance fluids. MICU was consulted but deemed pt not to be a candidate. Pt was started on tamfilu, levaquin, prednisone and duonebs. Pt improved on this regimen. Pulmonolgy was consulted. 71 yo M with hx of COPD on 2 L at home, Lung Ca s/p R Lung lobectomy in 2003 s/p chemo/radiation, AVR, Afib on coumadin, seizure d/o, HTN who presents with increased sputum production and cough. Most of history provided by wife as per pt preference. Wife reports over the past week, pt with progressive weakness requiring assistance with all ADLs in the setting of poor appetite, worsening cough and sputum production. Also requiring 3L NC when he is normally on 2L. Wife note pt had similar episode at the end of Jan and was found to have PNA. She states that he was on 3 weeks of prednisone as well a course of abx. She notes he was better for a while but has since experienced worsening SOB and sputum production. Also with lower quadrant pain with nausea but no vomiting. He denies any fevers, chills, CP. CT scan excluded acute intraabdominal pathology. Pt was noted to be hypoensive to 70s systolic. He was bolused 3L of NS, treated with midodrine 10 mg and started on maintenance fluids. MICU was consulted but deemed pt not to be a candidate. Pt was started on tamfilu, levaquin, prednisone and duonebs. Pt improved on this regimen. Pulmonolgy was consulted.  Pt was then tx to MICU on 3/17 for increased WOB, requiring BIPAP. Pt was able to be weaned off. INR was supratherapeutic during MICU day, coumadin held a couple days.  Weaned off BIPAP to NC, and BIPAP prn.  Pt was tx to [facility] where he continued to do well, tolerated NC. Cine done---------  Cardiology followed.  Optimized for discharge.  PT recs rehab - pt and family refuse at this time, opt to go home with home PT.    dispo: to home with home PT

## 2018-03-16 NOTE — CONSULT NOTE ADULT - SUBJECTIVE AND OBJECTIVE BOX
CHIEF COMPLAINT:    HPI:    PAST MEDICAL & SURGICAL HISTORY:  Atrial fibrillation  History of pneumonia: 2013  Lung cancer: s/p R. lobectomy  Hypertension  Hyperlipidemia  Seizure disorder  H/O inguinal hernia repair:   H/O aortic valve replacement:   S/P cataract surgery: bilaterally  Aortic valve replaced: 2013   bovine AoValve  S/P pneumonectomy: right sided, ~       FAMILY HISTORY:  Family history of cancer (Aunt): Maternal aunt  Family history of hypertension: Mother  Family history of stroke: Mother      SOCIAL HISTORY:  Smoking: [ ] Never Smoked [ ] Former Smoker (__ packs x ___ years) [ ] Current Smoker  (__ packs x ___ years)  Substance Use: [ ] Never Used [ ] Used ____  EtOH Use:  Marital Status: [ ] Single [ ]  [ ]  [ ]   Sexual History:   Occupation:  Recent Travel:  Country of Birth:  Advance Directives:    Allergies    No Known Allergies    Intolerances        HOME MEDICATIONS:    REVIEW OF SYSTEMS:  Constitutional: [ ] negative [ ] fevers [ ] chills [ ] weight loss [ ] weight gain  HEENT: [ ] negative [ ] dry eyes [ ] eye irritation [ ] postnasal drip [ ] nasal congestion  CV: [ ] negative  [ ] chest pain [ ] orthopnea [ ] palpitations [ ] murmur  Resp: [ ] negative [ ] cough [ ] shortness of breath [ ] dyspnea [ ] wheezing [ ] sputum [ ] hemoptysis  GI: [ ] negative [ ] nausea [ ] vomiting [ ] diarrhea [ ] constipation [ ] abd pain [ ] dysphagia   : [ ] negative [ ] dysuria [ ] nocturia [ ] hematuria [ ] increased urinary frequency  Musculoskeletal: [ ] negative [ ] back pain [ ] myalgias [ ] arthralgias [ ] fracture  Skin: [ ] negative [ ] rash [ ] itch  Neurological: [ ] negative [ ] headache [ ] dizziness [ ] syncope [ ] weakness [ ] numbness  Psychiatric: [ ] negative [ ] anxiety [ ] depression  Endocrine: [ ] negative [ ] diabetes [ ] thyroid problem  Hematologic/Lymphatic: [ ] negative [ ] anemia [ ] bleeding problem  Allergic/Immunologic: [ ] negative [ ] itchy eyes [ ] nasal discharge [ ] hives [ ] angioedema  [ ] All other systems negative  [ ] Unable to assess ROS because ________    OBJECTIVE:  ICU Vital Signs Last 24 Hrs  T(C): 36.3 (16 Mar 2018 15:00), Max: 36.4 (16 Mar 2018 06:32)  T(F): 97.3 (16 Mar 2018 15:00), Max: 97.5 (16 Mar 2018 06:32)  HR: 72 (16 Mar 2018 15:24) (67 - 86)  BP: 110/55 (16 Mar 2018 15:00) (105/55 - 110/55)  BP(mean): --  ABP: --  ABP(mean): --  RR: 18 (16 Mar 2018 15:00) (7 - 18)  SpO2: 97% (16 Mar 2018 15:24) (96% - 100%)        03-15 @ 07:01  -  -16 @ 07:00  --------------------------------------------------------  IN: 1150 mL / OUT: 700 mL / NET: 450 mL      CAPILLARY BLOOD GLUCOSE          PHYSICAL EXAM:  General:   HEENT:   Lymph Nodes:  Neck:   Respiratory:   Cardiovascular:   Abdomen:   Extremities:   Skin:   Neurological:  Psychiatry:    HOSPITAL MEDICATIONS:    levoFLOXacin IVPB 750 milliGRAM(s) IV Intermittent every 24 hours  oseltamivir 75 milliGRAM(s) Oral two times a day      predniSONE   Tablet 40 milliGRAM(s) Oral daily    ALBUTerol/ipratropium for Nebulization 3 milliLiter(s) Nebulizer every 6 hours    carBAMazepine 400 milliGRAM(s) Oral two times a day  lamoTRIgine 100 milliGRAM(s) Oral two times a day    docusate sodium 100 milliGRAM(s) Oral two times a day  polyethylene glycol 3350 17 Gram(s) Oral daily                  LABS:                        8.6    6.47  )-----------( 81       ( 16 Mar 2018 11:30 )             25.9     Hgb Trend: 8.6<--, 9.1<--, 10.4<--  16    144  |  106  |  17  ----------------------------<  141<H>  3.4<L>   |  29  |  0.90    Ca    7.6<L>      16 Mar 2018 11:30  Phos  1.2     03-16  Mg     1.9     03-16      Creatinine Trend: 0.90<--, 0.96<--, 1.32<--  PT/INR - ( 16 Mar 2018 12:44 )   PT: 32.8 SEC;   INR: 2.79            Urinalysis Basic - ( 15 Mar 2018 13:33 )    Color: YELLOW / Appearance: CLEAR / S.029 / pH: 6.5  Gluc: NEGATIVE / Ketone: NEGATIVE  / Bili: NEGATIVE / Urobili: NORMAL mg/dL   Blood: NEGATIVE / Protein: 20 mg/dL / Nitrite: NEGATIVE   Leuk Esterase: NEGATIVE / RBC: 0-2 / WBC 0-2   Sq Epi: x / Non Sq Epi: x / Bacteria: x            MICROBIOLOGY:     RADIOLOGY:  [ ] Reviewed and interpreted by me    PULMONARY FUNCTION TESTS:    EKG: CHIEF COMPLAINT:    HPI: 72M with HTN, HLD, bovine AVR, Afib on anticoagulation, Seizure disorder, COPD on 2L oxygen at home,  R pneumonectomy in  for lung cancer and recent PNA in 2018 presented with 1 week of productive cough, malaise, poor appetite, nausea. Patient also reports weight loss due to poor PO intake. His oxygen requirements increased to 3L before patient came to hospital. On arrival he was initially hypotensive but corrected with fluid resuscitation and one dose of Midodrine. He was diagnosed with Influenza B. Patient is now feeling better with less coughing, improved appetite since admission.  He is Tamiflu and Antibiotics.    PAST MEDICAL & SURGICAL HISTORY:  Atrial fibrillation  History of pneumonia: 2013  Lung cancer: s/p R. lobectomy  Hypertension  Hyperlipidemia  Seizure disorder  H/O inguinal hernia repair:   H/O aortic valve replacement:   S/P cataract surgery: bilaterally  Aortic valve replaced: 2013   bovine AoValve  S/P pneumonectomy: right sided, ~       FAMILY HISTORY:  Family history of cancer (Aunt): Maternal aunt  Family history of hypertension: Mother  Family history of stroke: Mother      SOCIAL HISTORY:  Smoking: [ ] Never Smoked [x ] Former Smoker (2 packs x 40years) [ ] Current Smoker  (__ packs x ___ years)  Substance Use: [ ] Never Used [ ] Used ____  EtOH Use:  Marital Status: [ ] Single [ x]  [ ]  [ ]   Sexual History:   Occupation:  Recent Travel:  Country of Birth:  Advance Directives:    Allergies    No Known Allergies    Intolerances        HOME MEDICATIONS:    REVIEW OF SYSTEMS:  Constitutional: [ ] negative [ ] fevers [ ] chills [x ] weight loss [ ] weight gain  HEENT: [ ] negative [ ] dry eyes [ ] eye irritation [ ] postnasal drip [ ] nasal congestion  CV: [ ] negative  [ ] chest pain [ ] orthopnea [ ] palpitations [ ] murmur  Resp: [ ] negative [x ] cough [x ] shortness of breath [ ] dyspnea [ ] wheezing [ ] sputum [ ] hemoptysis  GI: [ ] negative [x ] nausea [ ] vomiting [ ] diarrhea [ ] constipation [ ] abd pain [ ] dysphagia   : [ ] negative [ ] dysuria [ ] nocturia [ ] hematuria [ ] increased urinary frequency  Musculoskeletal: [ ] negative [ ] back pain [ ] myalgias [ ] arthralgias [ ] fracture  Skin: [x ] negative [ ] rash [ ] itch  Neurological: [ ] negative [ ] headache [ ] dizziness [ ] syncope [x ] weakness [ ] numbness  Psychiatric: [ ] negative [ ] anxiety [ ] depression  Endocrine: [ ] negative [ ] diabetes [ ] thyroid problem  Hematologic/Lymphatic: [ ] negative [ ] anemia [ ] bleeding problem  Allergic/Immunologic: [ ] negative [ ] itchy eyes [ ] nasal discharge [ ] hives [ ] angioedema  [ ] All other systems negative  [ ] Unable to assess ROS because ________    OBJECTIVE:  T(C): 36.3 (16 Mar 2018 15:00), Max: 36.4 (16 Mar 2018 06:32)  T(F): 97.3 (16 Mar 2018 15:00), Max: 97.5 (16 Mar 2018 06:32)  HR: 72 (16 Mar 2018 15:24) (67 - 86)  BP: 110/55 (16 Mar 2018 15:00) (105/55 - 110/55)  RR: 18 (16 Mar 2018 15:00) (7 - 18)  SpO2: 97% (16 Mar 2018 15:24) (96% - 100%)        -15 @ 07:01  -  03-16 @ 07:00  --------------------------------------------------------  IN: 1150 mL / OUT: 700 mL / NET: 450 mL      CAPILLARY BLOOD GLUCOSE          PHYSICAL EXAM:  General: NAD. Occasional cough with some rales.  HEENT: KAMAR. Cachectic appearing man.  Lymph Nodes: No palpable cervical or axillary LAD  Neck: No JVD  Respiratory: B/l Ronchi. No wheezing  Cardiovascular: RRR  Abdomen: S/NT/ND  Extremities: -C/C/E  Skin: No rash  Neurological: Non-focal.  Psychiatry:    HOSPITAL MEDICATIONS:    levoFLOXacin IVPB 750 milliGRAM(s) IV Intermittent every 24 hours  oseltamivir 75 milliGRAM(s) Oral two times a day      predniSONE   Tablet 40 milliGRAM(s) Oral daily    ALBUTerol/ipratropium for Nebulization 3 milliLiter(s) Nebulizer every 6 hours    carBAMazepine 400 milliGRAM(s) Oral two times a day  lamoTRIgine 100 milliGRAM(s) Oral two times a day    docusate sodium 100 milliGRAM(s) Oral two times a day  polyethylene glycol 3350 17 Gram(s) Oral daily                  LABS:                        8.6    6.47  )-----------( 81       ( 16 Mar 2018 11:30 )             25.9     Hgb Trend: 8.6<--, 9.1<--, 10.4<--  03-16    144  |  106  |  17  ----------------------------<  141<H>  3.4<L>   |  29  |  0.90    Ca    7.6<L>      16 Mar 2018 11:30  Phos  1.2     -  Mg     1.9     16      Creatinine Trend: 0.90<--, 0.96<--, 1.32<--  PT/INR - ( 16 Mar 2018 12:44 )   PT: 32.8 SEC;   INR: 2.79            Urinalysis Basic - ( 15 Mar 2018 13:33 )    Color: YELLOW / Appearance: CLEAR / S.029 / pH: 6.5  Gluc: NEGATIVE / Ketone: NEGATIVE  / Bili: NEGATIVE / Urobili: NORMAL mg/dL   Blood: NEGATIVE / Protein: 20 mg/dL / Nitrite: NEGATIVE   Leuk Esterase: NEGATIVE / RBC: 0-2 / WBC 0-2   Sq Epi: x / Non Sq Epi: x / Bacteria: x            MICROBIOLOGY:     RADIOLOGY:  [ x] Reviewed and interpreted by me  CT chest 3/4/18: LUNGS AND LARGE AIRWAYS: Patent central airways.  Patient is status post   right pneumonectomy. Mild emphysema of the left lung is again noted.   There are multiple impacted bronchi within the left lower lobe. Scattered   subcentimeter calcified granuloma present. There is an irregular opacity   in the left lower lobe measuring 2.6 x 1.9 cm (series 2 image 78) that   may be infectious or neoplastic in etiology.  PLEURA: Unchanged fluid with peripheral calcification within the right   pneumonectomy space. No left pleural effusion.    CT a/p this admission.    LOWER CHEST: Status post median sternotomy and right pneumonectomy.   Coronary artery calcifications. Status post aortic valve replacement.   Small right pleural effusion. Redemonstration of left lower lobe nodular   opacities with a 2 cm irregular opacity which may be infectious or   neoplastic in etiology.     PULMONARY FUNCTION TESTS:    EKG:

## 2018-03-16 NOTE — DISCHARGE NOTE ADULT - CARE PROVIDERS DIRECT ADDRESSES
,[email or web address],DirectAddress_Unknown,DirectAddress_Unknown

## 2018-03-16 NOTE — CHART NOTE - NSCHARTNOTEFT_GEN_A_CORE
NUTRITION SERVICES     Upon Nutritional Assessment by the Registered Dietitian your patient was determined to meet criteria/ has evidence of the following diagnosis/diagnoses:  [ ] Mild Protein Calorie Malnutrition   [x ] Moderate Protein Calorie Malnutrition   [ ] Severe Protein Calorie Malnutrition   [ ] Unspecified Protein Calorie Malnutrition   [ ] Underweight / BMI <19/ significant weight loss  [ ] Morbid Obesity / BMI >40    Findings as based on:  •  Comprehensive nutrition assessment and consultation   •  Food acceptance and intake status from observations by staff and RD.    Treatment:  The following diet has been recommended: Recommend 1 can po 3 x daily with current diet.

## 2018-03-16 NOTE — DISCHARGE NOTE ADULT - PATIENT PORTAL LINK FT
You can access the [facility] Patient Portal, offered by [facility], by registering with the following website: [email or web address]

## 2018-03-16 NOTE — PROGRESS NOTE ADULT - PROBLEM SELECTOR PLAN 2
-increased sputum production, cough and oxygen requirements  -continue with levaquin, steroids and duonebs   -continue with supplemental oxygen  -breathing improved since admission

## 2018-03-16 NOTE — CONSULT NOTE ADULT - ASSESSMENT
72M with COPD s/p R Pneumonectomy for Lung CA in 2003 here with COPD exacerbation 2/2 Influenza B with finding of LLL lung nodule in the setting of weight loss.    - Duoneb could be lengthened to q 8 ATC for the next 24 hours and assess for prn use.  - Patient should finish out course of Tamiflu and Prednisone   - Consider switching Levaquin to PO by tomorrow for total 5 day course.  - Once patient no longer requiring Duonebs ATC, may continue Spiriva inpatient in place of Incruse Ellipta (his home med)  - Could start Symbicort 160mg inpatient in place of his Breo Ellipta  - No lung nodule biopsy planned while patient recovering from Influenza.  - Supplemental oxygen as needed.  - He will need a repeat CT chest w/o contrast in 4 weeks to assess whether LLL nodule changes. Due to his weight loss and prior cancer history and his pneumonectomy status, he will need to be assessed for Bronch and lung nodule biopsy in the future.  - Outpatient follow up at [address] [place] [address]. [phone]. Will send email to office for an appointment.

## 2018-03-16 NOTE — PROGRESS NOTE ADULT - ASSESSMENT
73 yo M with hx of COPD on 2 L at home, Lung Ca s/p R Lung lobectomy in 2003 s/p chemo/radiation, AVR, Afib on coumadin, seizure d/o, HTN who presents with increased sputum production and cough a/w sepsis likely 2/2 to influenza infection c/b COPD exacerbation

## 2018-03-16 NOTE — DIETITIAN INITIAL EVALUATION ADULT. - PROBLEM SELECTOR PLAN 3
-likely prerenal, hemodynamically mediated in the setting of sepsis and decreased PO intake  -continue with IVF,  given hypernatremia started on 1/2NS@75. Monitor Na, change to NS if sodium wnl  -continue to monitor electolytes, scr  -avoid nephrotoxins

## 2018-03-16 NOTE — PROGRESS NOTE ADULT - SUBJECTIVE AND OBJECTIVE BOX
Patient is a 72y old  Male who presents with a chief complaint of increased sputum production, cough (15 Mar 2018 03:51)      SUBJECTIVE / OVERNIGHT EVENTS:  stable overnight  some SOB this morning  also constipated - bowel regimen ordered    MEDICATIONS  (STANDING):  ALBUTerol/ipratropium for Nebulization 3 milliLiter(s) Nebulizer every 6 hours  carBAMazepine 400 milliGRAM(s) Oral two times a day  docusate sodium 100 milliGRAM(s) Oral two times a day  lamoTRIgine 100 milliGRAM(s) Oral two times a day  levoFLOXacin IVPB 750 milliGRAM(s) IV Intermittent every 24 hours  oseltamivir 75 milliGRAM(s) Oral two times a day  polyethylene glycol 3350 17 Gram(s) Oral daily  predniSONE   Tablet 40 milliGRAM(s) Oral daily  sodium chloride 0.45%. 1000 milliLiter(s) (75 mL/Hr) IV Continuous <Continuous>    MEDICATIONS  (PRN):      T(C): 36.4 (18 @ 06:32), Max: 37.1 (03-15-18 @ 12:06)  HR: 67 (18 @ 06:32) (60 - 86)  BP: 105/55 (18 @ 06:32) (105/55 - 132/70)  RR: 7 (18 @ 06:32) (7 - 18)  SpO2: 100% (18 @ 06:32) (92% - 100%)  CAPILLARY BLOOD GLUCOSE        I&O's Summary    15 Mar 2018 07:01  -  16 Mar 2018 07:00  --------------------------------------------------------  IN: 1150 mL / OUT: 700 mL / NET: 450 mL        PHYSICAL EXAM  GENERAL APPEARANCE: Well developed, cachectic in NAD on NC  HEENT:  PERRL, EOMI. External auditory canals normal, hearing grossly intact.  NECK: Neck supple, non-tender without lymphadenopathy, masses or thyromegaly.  CARDIAC: Normal S1 and S2. No S3, S4. soft systolic murmur. Rhythm is regular.  LUNGS: Rhonchi heard over left upper lobe. Absent breath sounds over right lung field.   ABDOMEN: Positive bowel sounds. Soft, nondistended, nontender. No guarding or rebound.   MUSCULOSKELETAL: ROM intact spine and extremities. No joint erythema or tenderness.   BACK: normal gait and posture, no spinal deformity, symmetry of spinal muscles, without tenderness, decreased range of motion.  EXTREMITIES: No significant deformity or joint abnormality. No edema. Peripheral pulses intact. No varicosities.  NEUROLOGICAL: CN II-XII intact. Strength and sensation symmetric and intact throughout.   SKIN: Skin normal color, texture and turgor with no lesions or eruptions.  PSYCHIATRIC: Normal affect and behavior.    LABS:                        9.1    3.12  )-----------( 80       ( 15 Mar 2018 06:15 )             27.7     03-15    148<H>  |  111<H>  |  28<H>  ----------------------------<  139<H>  3.8   |  29  |  0.96    Ca    7.7<L>      15 Mar 2018 06:15  Phos  2.7     03-15  Mg     2.2     03-15    TPro  7.3  /  Alb  3.8  /  TBili  0.5  /  DBili  x   /  AST  16  /  ALT  16  /  AlkPhos  89  03-14    PT/INR - ( 15 Mar 2018 06:15 )   PT: 71.8 SEC;   INR: 6.02          PTT - ( 14 Mar 2018 16:33 )  PTT:51.4 SEC  CARDIAC MARKERS ( 14 Mar 2018 16:33 )  x     / < 0.06 ng/mL / 13 u/L / 1.00 ng/mL / x          Urinalysis Basic - ( 15 Mar 2018 13:33 )    Color: YELLOW / Appearance: CLEAR / S.029 / pH: 6.5  Gluc: NEGATIVE / Ketone: NEGATIVE  / Bili: NEGATIVE / Urobili: NORMAL mg/dL   Blood: NEGATIVE / Protein: 20 mg/dL / Nitrite: NEGATIVE   Leuk Esterase: NEGATIVE / RBC: 0-2 / WBC 0-2   Sq Epi: x / Non Sq Epi: x / Bacteria: x        RADIOLOGY & ADDITIONAL TESTS:    Imaging Personally Reviewed:  Consultant(s) Notes Reviewed:    Care Discussed with Consultants/Other Providers:

## 2018-03-16 NOTE — DISCHARGE NOTE ADULT - MEDICATION SUMMARY - MEDICATIONS TO TAKE
I will START or STAY ON the medications listed below when I get home from the hospital:    Coumadin 2 mg oral tablet  -- 1 tab(s) by mouth once a day  -- Indication: For Atrial fibrillation    LaMICtal 100 mg oral tablet  -- 1 tab(s) by mouth 2 times a day  -- Indication: For Seizure disorder    TEGretol 200 mg oral tablet  -- 2 tab(s) by mouth 2 times a day  -- Indication: For Seizure disorder    Breo Ellipta 100 mcg-25 mcg/inh inhalation powder  -- 1 puff(s) inhaled once a day  -- Indication: For Chronic obstructive pulmonary disease with acute exacerbation    Incruse Ellipta 62.5 mcg/inh inhalation powder  -- 1 puff(s) inhaled once a day  -- Indication: For Chronic obstructive pulmonary disease with acute exacerbation    ProAir HFA 90 mcg/inh inhalation aerosol  -- 2 puff(s) inhaled 4 times a day, As Needed  -- Indication: For Chronic obstructive pulmonary disease with acute exacerbation    Lasix 40 mg oral tablet  -- 1 tab(s) by mouth once a day  -- Indication: For Hypertension    senna oral tablet  -- 2 tab(s) by mouth once a day (at bedtime)  -- Indication: For Constipation    docusate sodium 100 mg oral capsule  -- 1 cap(s) by mouth 2 times a day  -- Indication: For Constipation

## 2018-03-16 NOTE — PROGRESS NOTE ADULT - PROBLEM SELECTOR PLAN 1
hypotensive in ER 2/2 sepsis responsive to fluids and midodrine  RVP positive influenza B  saturating well on baseline nasal cannula  no new infiltrate on CT  continue tamiflu

## 2018-03-16 NOTE — PROGRESS NOTE ADULT - ATTENDING COMMENTS
Pt was seen and examed at bed side with resident.  73 yo M PMH COPD on 2 L at home, Lung Ca (s/p R Lung lobectomy, s/p chemo/radiation), AVR, Afib on coumadin, seizure d/o, HTN was admitted for productive cough, and failure to thrive. Pt was found to be hypotensive concerned about possible sepsis due to influenza infection. Pt BP improved with IVF, on Tamiflu. Also started Levaquin to complete 7 days total, oral steroid for COPD exacerbation. Pulm consult appreciated, will optimizing COPD regimen. Serotherapeutic INR, possible multiple etiologies including malnutrition and sepsis, questionable coagulopathy due to malignancy... no signs of active bleeding, hold coumadin and trending down INR improving today, will restart low dose.   Wife was updated above findings and plan of care. Out patient PET pending as per Pulm.

## 2018-03-16 NOTE — DISCHARGE NOTE ADULT - CARE PLAN
Principal Discharge DX:	Influenza B  Goal:	fully recover  Assessment and plan of treatment:	you were treated with tamiflu and supportive care. you will continue to recover as your immune system combats the flu.  Secondary Diagnosis:	COPD exacerbation  Goal:	treat and follow up  Assessment and plan of treatment:	take your medications as prescribed and follow up with your pulmonologist  Secondary Diagnosis:	Lung cancer  Goal:	follow up  Assessment and plan of treatment:	there is a nodule in your left lung that could be lung cancer, you have existing follow up and plans for a PET scan with your Pulmonologist Principal Discharge DX:	Acute respiratory failure with hypoxia and hypercapnia  Goal:	management  Assessment and plan of treatment:	Due to the flu and pneumonia.  Completed antibiotics.  Now improved.  Continue with supplemental oxygen and BIPAP as needed.  Secondary Diagnosis:	COPD exacerbation  Goal:	management  Assessment and plan of treatment:	Please continue all medications as directed.  Supplemental oxygen during the day as needed.  BIPAP at night and as needed.  Please follow up with your pulmonologist (Dr. Lubin) within 2 weeks of discharge  Secondary Diagnosis:	Lung cancer  Goal:	follow up  Assessment and plan of treatment:	There is a nodule in your left lung that could be lung cancer, you have existing follow up and plans for a PET scan with your Pulmonologist.  Your PET scan is scheduled for Saturday.  Secondary Diagnosis:	Atrial fibrillation  Assessment and plan of treatment:	Your INR on 3/21 was 2.20  Continue with 2mg coumadin as directed.  Please follow up with Dr. Olson within 1-2 weeks of discharge.  Secondary Diagnosis:	Influenza B  Assessment and plan of treatment:	You were treated with tamiflu and completed.  Symptomatic care.  Secondary Diagnosis:	Seizure disorder  Assessment and plan of treatment:	Continue all medications.  Secondary Diagnosis:	Hypertension  Assessment and plan of treatment:	Continue lasix.  Norvasc on hold due to low-normal blood pressure.  Please follow up with Dr. Olson within 1-2 weeks for blood pressure check. Principal Discharge DX:	Acute respiratory failure with hypoxia and hypercapnia  Goal:	management  Assessment and plan of treatment:	Due to the flu and pneumonia.  Completed antibiotics.  Now improved.  Continue with supplemental oxygen and BIPAP as needed.  You will need a repeat CT scan of the chest in about 4-6 weeks to reevaluate and show resolving pneumonia.  Secondary Diagnosis:	COPD exacerbation  Goal:	management  Assessment and plan of treatment:	Please continue all medications as directed.  Supplemental oxygen during the day as needed.  BIPAP at night and as needed.  Please follow up with your pulmonologist (Dr. Lubin) within 2 weeks of discharge  Secondary Diagnosis:	Lung cancer  Goal:	follow up  Assessment and plan of treatment:	There is a nodule in your left lung that could be lung cancer, you have existing follow up and plans for a PET scan with your Pulmonologist.  Your PET scan is scheduled for Saturday.  Secondary Diagnosis:	Atrial fibrillation  Assessment and plan of treatment:	Your INR on 3/21 was 2.20  Continue with 2mg coumadin as directed.  Please follow up with Dr. Oslon within 1-2 weeks of discharge.  Secondary Diagnosis:	Influenza B  Assessment and plan of treatment:	You were treated with tamiflu and completed.  Symptomatic care.  Secondary Diagnosis:	Seizure disorder  Assessment and plan of treatment:	Continue all medications.  Secondary Diagnosis:	Hypertension  Assessment and plan of treatment:	Continue lasix.  Norvasc on hold due to low-normal blood pressure.  Please follow up with Dr. Olson within 1-2 weeks for blood pressure check.

## 2018-03-16 NOTE — DISCHARGE NOTE ADULT - MEDICATION SUMMARY - MEDICATIONS TO STOP TAKING
I will STOP taking the medications listed below when I get home from the hospital:    Norvasc 10 mg oral tablet  -- 1 tab(s) by mouth once a day    nadolol 40 mg oral tablet  -- 1 tab(s) by mouth once a day

## 2018-03-16 NOTE — DIETITIAN INITIAL EVALUATION ADULT. - OTHER INFO
Nutrition consult ordered for Nutrition assessment, 71 y/o male admitted with the DX of COPD, HTN, HLD, reports  fair po, with weight loss of 10 # in 1 year, Labs reviewed,  Low phosphorous  level noted , Recommend Glucerna 1 can po 2 x daily with current diet. Food options discussed, Food preferences obtained RD to implement. Based on Nutrition assessment ,pt meets the criteria for moderate mal- nutrition.

## 2018-03-16 NOTE — DISCHARGE NOTE ADULT - PLAN OF CARE
fully recover you were treated with tamiflu and supportive care. you will continue to recover as your immune system combats the flu. treat and follow up take your medications as prescribed and follow up with your pulmonologist follow up there is a nodule in your left lung that could be lung cancer, you have existing follow up and plans for a PET scan with your Pulmonologist Continue lasix.  Norvasc on hold due to low-normal blood pressure.  Please follow up with Dr. Olson within 1-2 weeks for blood pressure check. management Due to the flu and pneumonia.  Completed antibiotics.  Now improved.  Continue with supplemental oxygen and BIPAP as needed. Please continue all medications as directed.  Supplemental oxygen during the day as needed.  BIPAP at night and as needed.  Please follow up with your pulmonologist (Dr. Lubin) within 2 weeks of discharge There is a nodule in your left lung that could be lung cancer, you have existing follow up and plans for a PET scan with your Pulmonologist.  Your PET scan is scheduled for Saturday. Your INR on 3/21 was 2.20  Continue with 2mg coumadin as directed.  Please follow up with Dr. Olson within 1-2 weeks of discharge. You were treated with tamiflu and completed.  Symptomatic care. Continue all medications. Due to the flu and pneumonia.  Completed antibiotics.  Now improved.  Continue with supplemental oxygen and BIPAP as needed.  You will need a repeat CT scan of the chest in about 4-6 weeks to reevaluate and show resolving pneumonia.

## 2018-03-16 NOTE — DISCHARGE NOTE ADULT - ADDITIONAL INSTRUCTIONS
Follow up with your Pulmonologist Dr Brasher Address: [address] [place] [address] [address], [place] [address]  Phone: [phone] Follow up with Dr Brasher for pulmonary rehab  Address: [address] [place] [address] [address], [place] [address]  Phone: [phone]

## 2018-03-16 NOTE — DISCHARGE NOTE ADULT - CARE PROVIDER_API CALL
Raffy Brasher), Critical Care Medicine; Internal Medicine; Pulmonary Disease  [address] [facility] [address]  [address]  [place] [address]  Phone: [phone]  Fax: [phone] Raffy Brasher), Critical Care Medicine; Internal Medicine; Pulmonary Disease  [address] [facility]  [address]  [place] [address]  Phone: [phone]  Fax: [phone]    Rakesh Olson), Cardiovascular Disease; Internal Medicine  [address] [facility] [address]  [place] [address]  Phone: [phone]  Fax: [phone]    River Lubin), Critical Care Medicine; Internal Medicine; Pulmonary Disease  [address] [place] [id number]  Phone: [phone]  Fax: [phone]

## 2018-03-17 DIAGNOSIS — J96.01 ACUTE RESPIRATORY FAILURE WITH HYPOXIA: ICD-10-CM

## 2018-03-17 LAB
APTT BLD: 41.1 SEC — HIGH (ref 27.5–37.4)
BASE EXCESS BLDA CALC-SCNC: 4 MMOL/L — SIGNIFICANT CHANGE UP
BASE EXCESS BLDA CALC-SCNC: 7.6 MMOL/L — SIGNIFICANT CHANGE UP
BUN SERPL-MCNC: 14 MG/DL — SIGNIFICANT CHANGE UP (ref 7–23)
CALCIUM SERPL-MCNC: 8 MG/DL — LOW (ref 8.4–10.5)
CHLORIDE SERPL-SCNC: 110 MMOL/L — HIGH (ref 98–107)
CO2 SERPL-SCNC: 29 MMOL/L — SIGNIFICANT CHANGE UP (ref 22–31)
CREAT SERPL-MCNC: 0.89 MG/DL — SIGNIFICANT CHANGE UP (ref 0.5–1.3)
GLUCOSE BLDA-MCNC: 113 MG/DL — HIGH (ref 70–99)
GLUCOSE BLDA-MCNC: 141 MG/DL — HIGH (ref 70–99)
GLUCOSE SERPL-MCNC: 101 MG/DL — HIGH (ref 70–99)
HCO3 BLDA-SCNC: 26 MMOL/L — SIGNIFICANT CHANGE UP (ref 22–26)
HCO3 BLDA-SCNC: 30 MMOL/L — HIGH (ref 22–26)
HCT VFR BLD CALC: 27.2 % — LOW (ref 39–50)
HCT VFR BLDA CALC: 30.2 % — LOW (ref 39–51)
HCT VFR BLDA CALC: 33.8 % — LOW (ref 39–51)
HGB BLD-MCNC: 9.4 G/DL — LOW (ref 13–17)
HGB BLDA-MCNC: 11 G/DL — LOW (ref 13–17)
HGB BLDA-MCNC: 9.7 G/DL — LOW (ref 13–17)
INR BLD: 2.76 — HIGH (ref 0.88–1.17)
MAGNESIUM SERPL-MCNC: 2 MG/DL — SIGNIFICANT CHANGE UP (ref 1.6–2.6)
MCHC RBC-ENTMCNC: 33.3 PG — SIGNIFICANT CHANGE UP (ref 27–34)
MCHC RBC-ENTMCNC: 34.6 % — SIGNIFICANT CHANGE UP (ref 32–36)
MCV RBC AUTO: 96.5 FL — SIGNIFICANT CHANGE UP (ref 80–100)
NRBC # FLD: 0 — SIGNIFICANT CHANGE UP
NT-PROBNP SERPL-SCNC: 2056 PG/ML — SIGNIFICANT CHANGE UP
PCO2 BLDA: 56 MMHG — HIGH (ref 35–48)
PCO2 BLDA: 75 MMHG — CRITICAL HIGH (ref 35–48)
PH BLDA: 7.24 PH — LOW (ref 7.35–7.45)
PH BLDA: 7.39 PH — SIGNIFICANT CHANGE UP (ref 7.35–7.45)
PHOSPHATE SERPL-MCNC: 1.8 MG/DL — LOW (ref 2.5–4.5)
PLATELET # BLD AUTO: 90 K/UL — LOW (ref 150–400)
PMV BLD: 10.7 FL — SIGNIFICANT CHANGE UP (ref 7–13)
PO2 BLDA: 33 MMHG — CRITICAL LOW (ref 83–108)
PO2 BLDA: 44 MMHG — CRITICAL LOW (ref 83–108)
POTASSIUM BLDA-SCNC: 3.5 MMOL/L — SIGNIFICANT CHANGE UP (ref 3.4–4.5)
POTASSIUM BLDA-SCNC: 3.6 MMOL/L — SIGNIFICANT CHANGE UP (ref 3.4–4.5)
POTASSIUM SERPL-MCNC: 3.5 MMOL/L — SIGNIFICANT CHANGE UP (ref 3.5–5.3)
POTASSIUM SERPL-SCNC: 3.5 MMOL/L — SIGNIFICANT CHANGE UP (ref 3.5–5.3)
PROTHROM AB SERPL-ACNC: 32.4 SEC — HIGH (ref 9.8–13.1)
RBC # BLD: 2.82 M/UL — LOW (ref 4.2–5.8)
RBC # FLD: 13.2 % — SIGNIFICANT CHANGE UP (ref 10.3–14.5)
SAO2 % BLDA: 57.3 % — LOW (ref 95–99)
SAO2 % BLDA: 68.8 % — LOW (ref 95–99)
SODIUM BLDA-SCNC: 140 MMOL/L — SIGNIFICANT CHANGE UP (ref 136–146)
SODIUM BLDA-SCNC: 143 MMOL/L — SIGNIFICANT CHANGE UP (ref 136–146)
SODIUM SERPL-SCNC: 149 MMOL/L — HIGH (ref 135–145)
WBC # BLD: 5.21 K/UL — SIGNIFICANT CHANGE UP (ref 3.8–10.5)
WBC # FLD AUTO: 5.21 K/UL — SIGNIFICANT CHANGE UP (ref 3.8–10.5)

## 2018-03-17 PROCEDURE — 71045 X-RAY EXAM CHEST 1 VIEW: CPT | Mod: 26

## 2018-03-17 RX ORDER — BUDESONIDE AND FORMOTEROL FUMARATE DIHYDRATE 160; 4.5 UG/1; UG/1
2 AEROSOL RESPIRATORY (INHALATION)
Qty: 0 | Refills: 0 | Status: DISCONTINUED | OUTPATIENT
Start: 2018-03-17 | End: 2018-03-21

## 2018-03-17 RX ORDER — IPRATROPIUM/ALBUTEROL SULFATE 18-103MCG
3 AEROSOL WITH ADAPTER (GRAM) INHALATION ONCE
Qty: 0 | Refills: 0 | Status: COMPLETED | OUTPATIENT
Start: 2018-03-17 | End: 2018-03-17

## 2018-03-17 RX ORDER — CHLORHEXIDINE GLUCONATE 213 G/1000ML
1 SOLUTION TOPICAL
Qty: 0 | Refills: 0 | Status: DISCONTINUED | OUTPATIENT
Start: 2018-03-17 | End: 2018-03-21

## 2018-03-17 RX ORDER — WARFARIN SODIUM 2.5 MG/1
2 TABLET ORAL ONCE
Qty: 0 | Refills: 0 | Status: COMPLETED | OUTPATIENT
Start: 2018-03-17 | End: 2018-03-17

## 2018-03-17 RX ADMIN — WARFARIN SODIUM 2 MILLIGRAM(S): 2.5 TABLET ORAL at 17:52

## 2018-03-17 RX ADMIN — Medication 3 MILLILITER(S): at 07:44

## 2018-03-17 RX ADMIN — CHLORHEXIDINE GLUCONATE 1 APPLICATION(S): 213 SOLUTION TOPICAL at 12:32

## 2018-03-17 RX ADMIN — Medication 400 MILLIGRAM(S): at 06:32

## 2018-03-17 RX ADMIN — Medication 40 MILLIGRAM(S): at 06:32

## 2018-03-17 RX ADMIN — BUDESONIDE AND FORMOTEROL FUMARATE DIHYDRATE 2 PUFF(S): 160; 4.5 AEROSOL RESPIRATORY (INHALATION) at 22:18

## 2018-03-17 RX ADMIN — Medication 3 MILLILITER(S): at 22:07

## 2018-03-17 RX ADMIN — Medication 75 MILLIGRAM(S): at 06:32

## 2018-03-17 RX ADMIN — LAMOTRIGINE 100 MILLIGRAM(S): 25 TABLET, ORALLY DISINTEGRATING ORAL at 17:47

## 2018-03-17 RX ADMIN — Medication 3 MILLILITER(S): at 04:24

## 2018-03-17 RX ADMIN — Medication 3 MILLILITER(S): at 09:06

## 2018-03-17 RX ADMIN — Medication 100 MILLIGRAM(S): at 06:32

## 2018-03-17 RX ADMIN — Medication 75 MILLIGRAM(S): at 17:48

## 2018-03-17 RX ADMIN — Medication 400 MILLIGRAM(S): at 17:47

## 2018-03-17 RX ADMIN — Medication 3 MILLILITER(S): at 15:47

## 2018-03-17 RX ADMIN — Medication 100 MILLIGRAM(S): at 17:48

## 2018-03-17 RX ADMIN — Medication 63.75 MILLIMOLE(S): at 16:31

## 2018-03-17 RX ADMIN — LAMOTRIGINE 100 MILLIGRAM(S): 25 TABLET, ORALLY DISINTEGRATING ORAL at 06:32

## 2018-03-17 NOTE — CHART NOTE - NSCHARTNOTEFT_GEN_A_CORE
MICU Transfer Note    Transfer from: MICU    Transfer to: ( x ) Medicine    (  ) Telemetry     (   ) RCU        (    ) Palliative         (   ) Stroke Unit          (   ) __________________    Accepting Physician:   Signout given to:     72M with HTN, HLD, bovine AVR, Afib on Coumadin, Seizure disorder, JOJO (noncomplaint with CPAP), COPD on 2L oxygen at home s/p intubation 2013, R pneumonectomy in 2003 for lung cancer and recent PNA in 1/2018 who presented on 3/15/18 with COPD exacerbation x1 week 2/2 influenza B.  Patient on arrival was hypotensive to SBP 70s requiring aggressive fluid resuscitation and midodrine 10 x1 with improvement and so was admitted to the floor.  Patient was started on abx and Tamiflu and was seen yesterday by Pulmonology with patient demonstrating progressive improvement.  However, overnight, patient with hypoxia into mid-90s that improved with uptitration of supplemental O2.  This morning, patient was found to be hypoxic to the 60s, tachypneic, and lethargic.  Patient was initiated on BiPAP at 14/5/70% with progressive improvement.    MICU COURSE:  Patient accepted to the MICU for close monitoring and a few hours after transfer, patient was able to be de-escalated to NC, satting 100%.  Patient was also initiated on Symbicort in lieu of home Breo.  As such, patient was stable to be transferred back to the floors.    Prior to transfer, patient's [employer] company ([phone]) was called for home CPAP settings with message left for on call RT.    ASSESSMENT & PLAN:   #. Neuro  - no longer with acute metabolic encephalopathy 2/2 hypercarbia and hypoxia  - seizure d/o: c/w home carbamezapine    #. CV  - Afib: initially supratherapeutic INR in setting of poor PO intake but now at goal and will continue to monitor INR daily and dose Coumadin  - HTN: hold home amlodipine in setting of severe sepsis    #. Pulm  COPD exacerbation 2/2 influenza c/b acute hypoxic respiratory failure requiring BiPAP  - c/w BiPAP QHS and PRN  - c/w Levaquin d3/7 and Tamiflu d3/5  - c/w prednisone 40 QD  - start Symbicort in place of home Breo  - monitor SpO2 closely    #. GI  - can restart diet now that pt no longer on BiPAP    #. Renal  - CELINE likely 2/2 prerenal 2/2 severe sepsis and poor PO intake in setting of influenza: baseline Cr ~0.6, monitor BUN and Cr, avoid nephrotoxins, and renally dose medications  - VBG initially acidotic with pH 7.24 and pCO2 75; repeat after 1h BiPAP improved to pH 7.38 and pCO2 56.  However, pO2 decreased from 44 to 33.    #. ID  - COPD exacerbation: c/w Levaquin d3/7, Tamiflu d3/5    #. Heme  - acute thrombocytopenia on this admission in setting of severe sepsis and influenza; c/w monitoring and consider further w/u if no improvement    #. Endo  - no acute issues    #. DVT ppx  - therapeutic INR on Coumadin    FOR FOLLOW UP:  COPD exacerbation  - BiPAP QHS and PRN  - per Pulmonology recs: complete Levaquin (>7d course), Tamiflu, and prednisone course  - f/u with DME company ([phone]) regarding home CPAP settings  - outpatient f/u with Dr Sameera BERGER  - d/t severe sepsis  - monitor BUN/Cr  Thrombocytopenia  - monitor plts in setting of new, acute thrombocytopenia  LLL nodule  - repeat CT chest in 4-6 weeks and f/u with Dr Sameera Ruvalcaba MD  Internal Medicine, PGY-3  (P) [id number] MICU Transfer Note    Transfer from: MICU    Transfer to: ( x ) Medicine    (  ) Telemetry     (   ) RCU        (    ) Palliative         (   ) Stroke Unit          (   ) __________________    Accepting Physician:   Signout given to:     72M with HTN, HLD, bovine AVR, Afib on Coumadin, Seizure disorder, JOJO (noncomplaint with CPAP), COPD on 2L oxygen at home s/p intubation 2013, R pneumonectomy in 2003 for lung cancer and recent PNA in 1/2018 who presented on 3/15/18 with COPD exacerbation x1 week 2/2 influenza B.  Patient on arrival was hypotensive to SBP 70s requiring aggressive fluid resuscitation and midodrine 10 x1 with improvement and so was admitted to the floor.  Patient was started on abx and Tamiflu and was seen yesterday by Pulmonology with patient demonstrating progressive improvement.  However, overnight, patient with hypoxia into mid-90s that improved with uptitration of supplemental O2.  This morning, patient was found to be hypoxic to the 60s, tachypneic, and lethargic.  Patient was initiated on BiPAP at 14/5/70% with progressive improvement.    MICU COURSE:  Patient accepted to the MICU for close monitoring and a few hours after transfer, patient was able to be de-escalated to NC, satting 100%.  Patient was also initiated on Symbicort in lieu of home Breo.  As such, patient was stable to be transferred back to the floors.    Prior to transfer, patient's [employer] company ([phone]) was called for home CPAP settings with message left for on call RT.    ASSESSMENT & PLAN:   #. Neuro  - no longer with acute metabolic encephalopathy 2/2 hypercarbia and hypoxia  - seizure d/o: c/w home carbamezapine    #. CV  - Afib: initially supratherapeutic INR in setting of poor PO intake but now at goal and will continue to monitor INR daily and dose Coumadin  - HTN: hold home amlodipine in setting of severe sepsis    #. Pulm  COPD exacerbation 2/2 influenza c/b acute hypoxic respiratory failure requiring BiPAP  - c/w BiPAP QHS and PRN  - c/w Levaquin d3/7 and Tamiflu d3/5  - c/w prednisone 40 QD  - start Symbicort in place of home Breo  - monitor SpO2 closely    #. GI  - can restart diet now that pt no longer on BiPAP    #. Renal  - CELINE likely 2/2 prerenal 2/2 severe sepsis and poor PO intake in setting of influenza: baseline Cr ~0.6, monitor BUN and Cr, avoid nephrotoxins, and renally dose medications  - VBG initially acidotic with pH 7.24 and pCO2 75; repeat after 1h BiPAP improved to pH 7.38 and pCO2 56.  However, pO2 decreased from 44 to 33.    #. ID  - COPD exacerbation: c/w Levaquin d3/7, Tamiflu d3/5    #. Heme  - acute thrombocytopenia on this admission in setting of severe sepsis and influenza; c/w monitoring and consider further w/u if no improvement    #. Endo  - no acute issues    #. DVT ppx  - therapeutic INR on Coumadin    FOR FOLLOW UP:  COPD exacerbation  - BiPAP QHS and PRN  - per Pulmonology recs: complete Levaquin (>7d course), Tamiflu, and prednisone course  - f/u with DME company ([phone]) regarding home CPAP settings  - outpatient f/u with Dr Brasher  CELINE  - d/t severe sepsis  - monitor BUN/Cr  Thrombocytopenia  - monitor plts in setting of new, acute thrombocytopenia  LLL nodule  - repeat CT chest in 4-6 weeks and f/u with Dr Brasher MICU Transfer Note    Transfer from: MICU    Date: 3/19/18    Transfer to: (  ) Medicine    (  ) Telemetry     (  x ) RCU        (    ) Palliative         (   ) Stroke Unit          (   ) __________________    Accepting Physician:   Signout given to:     72M with HTN, HLD, bovine AVR, Afib on Coumadin, Seizure disorder, JOJO (noncompliant with CPAP), COPD on 2L oxygen at home s/p intubation 2013, R pneumonectomy in 2003 for lung cancer and recent PNA in 1/2018 who presented on 3/15/18 with COPD exacerbation x1 week 2/2 influenza B.  Patient on arrival was hypotensive to SBP 70s requiring aggressive fluid resuscitation and midodrine 10 x1 with improvement and so was admitted to the floor.  Patient was started on abx and Tamiflu and was seen yesterday by Pulmonology with patient demonstrating progressive improvement.  However, overnight, patient with hypoxia into mid-90s that improved with uptitration of supplemental O2.  This morning, patient was found to be hypoxic to the 60s, tachypneic, and lethargic.  Patient was initiated on BiPAP at 14/5/70% with progressive improvement.    MICU COURSE:  Patient accepted to the MICU for close monitoring and a few hours after transfer, patient was able to be de-escalated to NC, satting 100%.  Patient was also initiated on Symbicort in lieu of home Breo.  As such, patient was stable to be transferred back to the floors.    Prior to transfer, patient's [employer] company ([phone]) was called for home CPAP settings with message left for on call RT.    ASSESSMENT & PLAN:   #. Neuro  - no longer with acute metabolic encephalopathy 2/2 hypercarbia and hypoxia  - seizure d/o: c/w home carbamezapine    #. CV  - Afib: initially supratherapeutic INR in setting of poor PO intake but now at goal and will continue to monitor INR daily and dose Coumadin  - HTN: hold home amlodipine in setting of severe sepsis    #. Pulm  COPD exacerbation 2/2 influenza c/b acute hypoxic respiratory failure requiring BiPAP  - c/w BiPAP QHS and PRN  - c/w Levaquin d3/7 and Tamiflu d3/5  - c/w prednisone 40 QD  - start Symbicort in place of home Breo  - monitor SpO2 closely    #. GI  - can restart diet now that pt no longer on BiPAP    #. Renal  - CELINE likely 2/2 prerenal 2/2 severe sepsis and poor PO intake in setting of influenza: baseline Cr ~0.6, monitor BUN and Cr, avoid nephrotoxins, and renally dose medications  - VBG initially acidotic with pH 7.24 and pCO2 75; repeat after 1h BiPAP improved to pH 7.38 and pCO2 56.  However, pO2 decreased from 44 to 33.    #. ID  - COPD exacerbation: c/w Levaquin d3/7, Tamiflu d3/5    #. Heme  - acute thrombocytopenia on this admission in setting of severe sepsis and influenza; c/w monitoring and consider further w/u if no improvement    #. Endo  - no acute issues    #. DVT ppx  - therapeutic INR on Coumadin    FOR FOLLOW UP:  COPD exacerbation  - BiPAP QHS and PRN  - per Pulmonology recs: complete Levaquin (>7d course), Tamiflu, and prednisone course  - f/u with DME company ([phone]) regarding home CPAP settings  - outpatient f/u with Dr Sameera BERGER  - d/t severe sepsis  - monitor BUN/Cr  Thrombocytopenia  - monitor plts in setting of new, acute thrombocytopenia  LLL nodule  - repeat CT chest in 4-6 weeks and f/u with Dr Brasher MICU Transfer Note    Transfer from: MICU    Date: 3/19/18    Transfer to: (  ) Medicine    (  ) Telemetry     (  x ) RCU        (    ) Palliative         (   ) Stroke Unit          (   ) __________________    Accepting Physician: Dr. Lisker  Signout given to: Dr. Frazier    72M with HTN, HLD, bovine AVR, Afib on Coumadin, Seizure disorder, JOJO (noncompliant with CPAP), COPD on 2L oxygen at home s/p intubation 2013, R pneumonectomy in 2003 for lung cancer and recent PNA in 1/2018 who presented on 3/15/18 with COPD exacerbation x1 week 2/2 influenza B.  Patient on arrival was hypotensive to SBP 70s requiring aggressive fluid resuscitation and midodrine 10 x1 with improvement and so was admitted to the floor.  Patient was started on abx and Tamiflu and was seen yesterday by Pulmonology with patient demonstrating progressive improvement.  Patient with intermittent hypoxia with coughing, intermittently required bipap. Has responded well, improved to needing intermittent uptitration of supplemental O2 via nasal cannula.      MICU COURSE:  Patient accepted to the MICU for close monitoring and a few hours after transfer, patient was able to be de-escalated to NC, satting 100%, intermittently requiring BIPAP after desatting on NC s/p coughing fits.  Patient was also initiated on Symbicort in lieu of home Breo, home lasix dose started in the icu as well. Patient was stable to be transferred back to the floors.    Prior to transfer, patient's [employer] company ([phone]) was called for home CPAP settings with message left for on call RT. Dr. Olson consulted, pt on nadolol at home but will hold this in the setting of non-tachy and supratherapeutic INR.     ASSESSMENT & PLAN:   #. Neuro  - no longer with acute metabolic encephalopathy 2/2 hypercarbia and hypoxia  - seizure d/o: c/w home carbamezapine    #. CV  - Afib: initially supratherapeutic INR in setting of poor PO intake but now at goal and will continue to monitor INR daily and dose Coumadin. Hold nadolol  - HTN: Hold amlodipine for pressures <100 systolic  - Lasix 40mg ivp daily, at home dose is 40 mg po qd    #. Pulm  COPD exacerbation 2/2 influenza c/b acute hypoxic respiratory failure requiring BiPAP  - c/w BiPAP PRN; o/w 2-3L O2 NC  - c/w Levaquin d5/7 and Tamiflu d5/5  - start Symbicort in place of home Breo  - monitor SpO2 closely    #. GI  - Regular diet / dysphagia diet w/ thickened liquids  - Bowel regimen, PRN suppositories as needed    #. Renal  - CELINE likely 2/2 prerenal 2/2 severe sepsis and poor PO intake in setting of influenza: baseline Cr ~0.6, monitor BUN and Cr, avoid nephrotoxins, and renally dose medications  - VBG initially acidotic with pH 7.24 and pCO2 75; repeat after 1h BiPAP improved to pH 7.38 and pCO2 56.  However, pO2 decreased from 44 to 33.    #. ID  - COPD exacerbation: c/w Levaquin d3/7, Tamiflu d3/5    #. Heme  - acute thrombocytopenia on this admission in setting of severe sepsis and influenza; c/w monitoring and consider further w/u if no improvement    #. Endo  - no acute issues    #. DVT ppx  - therapeutic INR on Coumadin    FOR FOLLOW UP:  COPD exacerbation  - BiPAP QHS and PRN  - per Pulmonology recs: complete Levaquin (>7d course), Tamiflu, and prednisone course  - f/u with DME company ([phone]) regarding home CPAP settings  - outpatient f/u with Dr Sameera BERGER  - d/t severe sepsis  - monitor BUN/Cr  Thrombocytopenia  - monitor plts in setting of new, acute thrombocytopenia  LLL nodule  - repeat CT chest in 4-6 weeks and f/u with Dr Brasher MICU Transfer Note    Transfer from: MICU    Date: 3/19/18    Transfer to: (  ) Medicine    (  ) Telemetry     (  x ) RCU        (    ) Palliative         (   ) Stroke Unit          (   ) __________________    Accepting Physician: Dr. Lisker  Signout given to: Dr. Capone    72M with HTN, HLD, bovine AVR, Afib on Coumadin, Seizure disorder, OJJO (noncompliant with CPAP), COPD on 2L oxygen at home s/p intubation 2013, R pneumonectomy in 2003 for lung cancer and recent PNA in 1/2018 who presented on 3/15/18 with COPD exacerbation x1 week 2/2 influenza B.  Patient on arrival was hypotensive to SBP 70s requiring aggressive fluid resuscitation and midodrine 10 x1 with improvement and so was admitted to the floor.  Patient was started on abx and Tamiflu and was seen yesterday by Pulmonology with patient demonstrating progressive improvement.  Patient with intermittent hypoxia with coughing, intermittently required bipap. Has responded well, improved to needing intermittent uptitration of supplemental O2 via nasal cannula.      MICU COURSE:  Patient accepted to the MICU for close monitoring and a few hours after transfer, patient was able to be de-escalated to NC, satting 100%, intermittently requiring BIPAP after desatting on NC s/p coughing fits.  Patient was also initiated on Symbicort in lieu of home Breo, home lasix dose started in the icu as well. Patient was stable to be transferred back to the floors.    Prior to transfer, patient's [employer] company ([phone]) was called for home CPAP settings with message left for on call RT. Dr. Olson consulted, pt on nadolol at home but will hold this in the setting of non-tachy and supratherapeutic INR.     ASSESSMENT & PLAN:   #. Neuro  - no longer with acute metabolic encephalopathy 2/2 hypercarbia and hypoxia  - seizure d/o: c/w home carbamezapine    #. CV  - Afib: initially supratherapeutic INR in setting of poor PO intake but now at goal and will continue to monitor INR daily and dose Coumadin. Hold nadolol  - HTN: Hold amlodipine for pressures <100 systolic  - Lasix 40mg ivp daily, at home dose is 40 mg po qd    #. Pulm  COPD exacerbation 2/2 influenza c/b acute hypoxic respiratory failure requiring BiPAP  - c/w BiPAP PRN; o/w 2-3L O2 NC  - c/w Levaquin d5/7 and Tamiflu d5/5  - start Symbicort in place of home Breo  - monitor SpO2 closely    #. GI  - Regular diet / dysphagia diet w/ thickened liquids  - Bowel regimen, PRN suppositories as needed    #. Renal  - CELINE likely 2/2 prerenal 2/2 severe sepsis and poor PO intake in setting of influenza: baseline Cr ~0.6, monitor BUN and Cr, avoid nephrotoxins, and renally dose medications  - VBG initially acidotic with pH 7.24 and pCO2 75; repeat after 1h BiPAP improved to pH 7.38 and pCO2 56.  However, pO2 decreased from 44 to 33.    #. ID  - COPD exacerbation: c/w Levaquin d3/7, Tamiflu d3/5    #. Heme  - acute thrombocytopenia on this admission in setting of severe sepsis and influenza; c/w monitoring and consider further w/u if no improvement    #. Endo  - no acute issues    #. DVT ppx  - therapeutic INR on Coumadin    FOR FOLLOW UP:  COPD exacerbation  - BiPAP QHS and PRN  - per Pulmonology recs: complete Levaquin (>7d course), Tamiflu, and prednisone course  - f/u with DME company ([phone]) regarding home CPAP settings  - outpatient f/u with Dr Sameera BERGER  - d/t severe sepsis  - monitor BUN/Cr  Thrombocytopenia  - monitor plts in setting of new, acute thrombocytopenia  LLL nodule  - repeat CT chest in 4-6 weeks and f/u with Dr Brasher MICU Transfer Note    Transfer from: MICU    Date: 3/19/18    Transfer to: (  ) Medicine    (  ) Telemetry     (  x ) RCU        (    ) Palliative         (   ) Stroke Unit          (   ) __________________    Accepting Physician: Dr. Lisker  Signout given to: Dr. Capone    72M with HTN, HLD, bovine AVR, Afib on Coumadin, Seizure disorder, JOJO (noncompliant with CPAP), COPD on 2L oxygen at home s/p intubation 2013, R pneumonectomy in 2003 for lung cancer and recent PNA in 1/2018 who presented on 3/15/18 with COPD exacerbation x1 week 2/2 influenza B.  Patient on arrival was hypotensive to SBP 70s requiring aggressive fluid resuscitation and midodrine 10 x1 with improvement and so was admitted to the floor.  Patient was started on abx and Tamiflu and was seen yesterday by Pulmonology with patient demonstrating progressive improvement.  Patient with intermittent hypoxia with coughing, intermittently required bipap. Has responded well, improved to needing intermittent uptitration of supplemental O2 via nasal cannula.      MICU COURSE:  Patient accepted to the MICU for close monitoring and a few hours after transfer, patient was able to be de-escalated to NC, satting 100%, intermittently requiring BIPAP after desatting on NC s/p coughing fits.  Patient was also initiated on Symbicort in lieu of home Breo, home lasix dose started in the icu as well. Patient was stable to be transferred back to the floors.    Prior to transfer, patient's "[employer] company ([phone]) was called for home CPAP settings with message left for on call RT. Dr. Olson consulted, pt on nadolol at home but will hold this in the setting of non-tachy and supratherapeutic INR.     ASSESSMENT & PLAN:   #. Neuro  - no longer with acute metabolic encephalopathy 2/2 hypercarbia and hypoxia  - seizure d/o: c/w home carbamezapine    #. CV  - Afib: initially supratherapeutic INR in setting of poor PO intake but now at goal and will continue to monitor INR daily and dose Coumadin. Hold nadolol  - HTN: Hold amlodipine for pressures <100 systolic  - Lasix 40mg ivp daily, at home dose is 40 mg po qd    #. Pulm  COPD exacerbation 2/2 influenza c/b acute hypoxic respiratory failure requiring BiPAP  - c/w BiPAP PRN; o/w 2-3L O2 NC  - c/w Levaquin d5/7 and Tamiflu d5/5  - start Symbicort in place of home Breo  - monitor SpO2 closely    #. GI  - Regular diet / dysphagia diet w/ thickened liquids  - Bowel regimen, PRN suppositories as needed    #. Renal  - CELINE likely 2/2 prerenal 2/2 severe sepsis and poor PO intake in setting of influenza: baseline Cr ~0.6, monitor BUN and Cr, avoid nephrotoxins, and renally dose medications  - VBG initially acidotic with pH 7.24 and pCO2 75; repeat after 1h BiPAP improved to pH 7.38 and pCO2 56.  However, pO2 decreased from 44 to 33.    #. ID  - COPD exacerbation: c/w Levaquin d5/7, Tamiflu d5/5    #. Heme  - acute thrombocytopenia on this admission in setting of severe sepsis and influenza; c/w monitoring and consider further w/u if no improvement    #. Endo  - no acute issues    #. DVT ppx  - therapeutic INR on Coumadin    FOR FOLLOW UP:  COPD exacerbation  - BiPAP QHS and PRN  - per Pulmonology recs: complete Levaquin (>7d course), Tamiflu, and prednisone course  - f/u with DME company ([phone]) regarding home CPAP settings  - outpatient f/u with Dr Sameera BERGER  - d/t severe sepsis  - monitor BUN/Cr  Thrombocytopenia  - monitor plts in setting of new, acute thrombocytopenia  LLL nodule  - repeat CT chest in 4-6 weeks and f/u with Dr Brasher

## 2018-03-17 NOTE — CONSULT NOTE ADULT - ASSESSMENT
72M with HTN, HLD, bovine AVR, Afib on Coumadin, Seizure disorder, COPD on 2L oxygen at home, R pneumonectomy in 2003 for lung cancer and recent PNA in 1/2018 who presented on 3/15/18 with COPD exacerbation x1 week 2/2 influenza B c/b acute hypoxic respiratory failure.  Will accept to the MICU.    #. Neuro    #. CV  - Afib: initially supratherapeutic INR in setting of poor PO intake but now at goal and will continue to monitor INR daily and dose Coumadin    #. Pulm  - COPD exacerbation 2/2 influenza: c/w BiPAP and deescalate supplemental O2 as tolerated with serial ABGs    #. GI  - NPO for now while on BiPAP    #. Renal  - CELINE likely 2/2 prerenal 2/2 severe sepsis and poor PO intake in setting of influenza: baseline Cr ~0.6, monitor BUN and Cr, avoid nephrotoxins, and renally dose medications  - VBG initially acidotic with pH 7.24 and pCO2 75; repeat after 1h BiPAP improved to pH 7.38 and pCO2 56.  However, pO2 decreased from 44 to 33.    #. ID  - COPD exacerbation: c/w Levaquin d3/7, Tamiflu d3/5    #. Endo  -     #. DVT ppx  - therapeutic INR on Coumadin    #. Disposition  - will accept to MICU 72M with HTN, HLD, bovine AVR, Afib on Coumadin, Seizure disorder, COPD on 2L oxygen at home, R pneumonectomy in 2003 for lung cancer and recent PNA in 1/2018 who presented on 3/15/18 with COPD exacerbation x1 week 2/2 influenza B c/b acute hypoxic respiratory failure.  Will accept to the MICU.    #. Neuro  - responding appropriately to questions with nodding/shaking of head but unable to vocalize at this time d/t severe SOB  - seizure d/o: c/w home carbamezapine    #. CV  - Afib: initially supratherapeutic INR in setting of poor PO intake but now at goal and will continue to monitor INR daily and dose Coumadin  - HTN: hold home amlodipine in setting of severe sepsis    #. Pulm  COPD exacerbation 2/2 influenza c/b acute hypoxic respiratory failure requiring BiPAP  - c/w BiPAP and deescalate supplemental O2 as tolerated with serial ABGs  - c/w Levaquin d3/7 and Tamiflu d3/5  - c/w prednisone 40 QD  - monitor SpO2 closely    #. GI  - NPO for now while on BiPAP    #. Renal  - CELINE likely 2/2 prerenal 2/2 severe sepsis and poor PO intake in setting of influenza: baseline Cr ~0.6, monitor BUN and Cr, avoid nephrotoxins, and renally dose medications  - VBG initially acidotic with pH 7.24 and pCO2 75; repeat after 1h BiPAP improved to pH 7.38 and pCO2 56.  However, pO2 decreased from 44 to 33.    #. ID  - COPD exacerbation: c/w Levaquin d3/7, Tamiflu d3/5    #. Endo  - no acute issues    #. DVT ppx  - therapeutic INR on Coumadin    #. Disposition  - will accept to MICU 72M with HTN, HLD, bovine AVR, Afib on Coumadin, Seizure disorder, COPD on 2L oxygen at home, R pneumonectomy in 2003 for lung cancer and recent PNA in 1/2018 who presented on 3/15/18 with COPD exacerbation x1 week 2/2 influenza B c/b acute hypoxic respiratory failure.  Will accept to the MICU.    #. Neuro  - responding appropriately to questions with nodding/shaking of head but unable to vocalize at this time d/t severe SOB  - seizure d/o: c/w home carbamezapine    #. CV  - Afib: initially supratherapeutic INR in setting of poor PO intake but now at goal and will continue to monitor INR daily and dose Coumadin  - HTN: hold home amlodipine in setting of severe sepsis    #. Pulm  COPD exacerbation 2/2 influenza c/b acute hypoxic respiratory failure requiring BiPAP  - c/w BiPAP and deescalate supplemental O2 as tolerated with serial ABGs  - c/w Levaquin d3/7 and Tamiflu d3/5  - c/w prednisone 40 QD  - start Symbicort in place of home Breo  - monitor SpO2 closely    #. GI  - NPO for now while on BiPAP    #. Renal  - CELINE likely 2/2 prerenal 2/2 severe sepsis and poor PO intake in setting of influenza: baseline Cr ~0.6, monitor BUN and Cr, avoid nephrotoxins, and renally dose medications  - VBG initially acidotic with pH 7.24 and pCO2 75; repeat after 1h BiPAP improved to pH 7.38 and pCO2 56.  However, pO2 decreased from 44 to 33.    #. ID  - COPD exacerbation: c/w Levaquin d3/7, Tamiflu d3/5    #. Endo  - no acute issues    #. DVT ppx  - therapeutic INR on Coumadin    #. Disposition  - will accept to MICU

## 2018-03-17 NOTE — PROGRESS NOTE ADULT - PROBLEM SELECTOR PLAN 1
Patient with acute respiratory distress this morning with hypoxia and hypercapnia in the setting of COPD exacerbation and Influenza.    - Started on BiPAP.  - CXR unchanged from prior.  - ABG Pending.   - Appreciate MICU eval, transfer to MICU for closer monitoring.

## 2018-03-17 NOTE — PROGRESS NOTE ADULT - SUBJECTIVE AND OBJECTIVE BOX
Patient is a 72y old  Male who presents with a chief complaint of increased sputum production, cough (16 Mar 2018 16:43)      OVERNIGHT EVENTS: Patient tachypneic, hypoxic and in acute respiratory distress this morning. Placed on BiPAP with stat CXR, ABG and MICU Consult. Being transferred to MICU for closer monitoring.   SUBJECTIVE: Patient seen and examined at bedside. Improved on BiPAP. Complains of SOB. Denies CP, abdominal pain/N/V. Full Code. Family at bedside      MEDICATIONS  (STANDING):  ALBUTerol/ipratropium for Nebulization 3 milliLiter(s) Nebulizer every 6 hours  carBAMazepine 400 milliGRAM(s) Oral two times a day  docusate sodium 100 milliGRAM(s) Oral two times a day  lamoTRIgine 100 milliGRAM(s) Oral two times a day  levoFLOXacin IVPB 750 milliGRAM(s) IV Intermittent every 24 hours  oseltamivir 75 milliGRAM(s) Oral two times a day  polyethylene glycol 3350 17 Gram(s) Oral daily  predniSONE   Tablet 40 milliGRAM(s) Oral daily    MEDICATIONS  (PRN):      T(C): 36.7 (18 @ 08:10), Max: 36.7 (18 @ 06:27)  HR: 90 (18 @ 09:12) (68 - 136)  BP: 126/70 (18 @ 08:10) (110/55 - 138/71)  RR: 28 (18 @ 09:12) (18 - 28)  SpO2: 100% (18 @ 09:12) (93% - 100%)  CAPILLARY BLOOD GLUCOSE        I&O's Summary    16 Mar 2018 07:01  -  17 Mar 2018 07:00  --------------------------------------------------------  IN: 250 mL / OUT: 400 mL / NET: -150 mL        PHYSICAL EXAM  GENERAL: NAD, well-developed  HEAD:  Atraumatic, Normocephalic  EYES: EOMI, PERRLA, conjunctiva and sclera clear  CHEST/LUNG: Clear to auscultation bilaterally; No wheeze  HEART: +S1 +S2, Regular rate and rhythm  ABDOMEN: Soft, Nontender, Nondistended; Bowel sounds present  NEURO: No focal neurologic deficits, sensation and motor function grossly intact.   EXTREMITIES:  Pink and warm, Peripheral Pulses intact      LABS:                        9.4    5.21  )-----------( 90       ( 17 Mar 2018 05:30 )             27.2     03-17    149<H>  |  110<H>  |  14  ----------------------------<  101<H>  3.5   |  29  |  0.89    Ca    8.0<L>      17 Mar 2018 05:30  Phos  1.8     -  Mg     2.0     03-17      PT/INR - ( 17 Mar 2018 05:30 )   PT: 32.4 SEC;   INR: 2.76          PTT - ( 17 Mar 2018 05:30 )  PTT:41.1 SEC      Urinalysis Basic - ( 15 Mar 2018 13:33 )    Color: YELLOW / Appearance: CLEAR / S.029 / pH: 6.5  Gluc: NEGATIVE / Ketone: NEGATIVE  / Bili: NEGATIVE / Urobili: NORMAL mg/dL   Blood: NEGATIVE / Protein: 20 mg/dL / Nitrite: NEGATIVE   Leuk Esterase: NEGATIVE / RBC: 0-2 / WBC 0-2   Sq Epi: x / Non Sq Epi: x / Bacteria: x        RADIOLOGY & ADDITIONAL TESTS:    Imaging Personally Reviewed:  Consultant(s) Notes Reviewed:    Care Discussed with Consultants/Other Providers:      Marguerite Houser D.O.  Medicine Intern  pager [phone] Patient is a 72y old  Male who presents with a chief complaint of increased sputum production, cough (16 Mar 2018 16:43)      OVERNIGHT EVENTS: Patient tachypneic, hypoxic and in acute respiratory distress this morning. Placed on BiPAP with stat CXR, ABG and MICU Consult. Being transferred to MICU for closer monitoring.   SUBJECTIVE: Patient seen and examined at bedside. Improved on BiPAP. Complains of SOB. Denies CP, abdominal pain/N/V. Full Code. Family at bedside      MEDICATIONS  (STANDING):  ALBUTerol/ipratropium for Nebulization 3 milliLiter(s) Nebulizer every 6 hours  carBAMazepine 400 milliGRAM(s) Oral two times a day  docusate sodium 100 milliGRAM(s) Oral two times a day  lamoTRIgine 100 milliGRAM(s) Oral two times a day  levoFLOXacin IVPB 750 milliGRAM(s) IV Intermittent every 24 hours  oseltamivir 75 milliGRAM(s) Oral two times a day  polyethylene glycol 3350 17 Gram(s) Oral daily  predniSONE   Tablet 40 milliGRAM(s) Oral daily    MEDICATIONS  (PRN):      T(C): 36.7 (18 @ 08:10), Max: 36.7 (18 @ 06:27)  HR: 90 (18 @ 09:12) (68 - 136)  BP: 126/70 (18 @ 08:10) (110/55 - 138/71)  RR: 28 (18 @ 09:12) (18 - 28)  SpO2: 100% (18 @ 09:12) (93% - 100%)  CAPILLARY BLOOD GLUCOSE        I&O's Summary    16 Mar 2018 07:01  -  17 Mar 2018 07:00  --------------------------------------------------------  IN: 250 mL / OUT: 400 mL / NET: -150 mL        PHYSICAL EXAM  GENERAL: NAD, well-developed  HEAD:  Atraumatic, Normocephalic  EYES: EOMI, PERRLA, conjunctiva and sclera clear  CHEST/LUNG: L expiratory rhonchi, coarse breath sounds. Absent breath sounds on R 2/2 pneumonectomy.  HEART: +S1 +S2, Tachycardic, regular rhythm  ABDOMEN: Soft, Nontender, Nondistended; Bowel sounds present  NEURO: Awake, follows simple commands.   EXTREMITIES:  Pink and warm, Peripheral Pulses intact      LABS:                        9.4    5.21  )-----------( 90       ( 17 Mar 2018 05:30 )             27.2     03-17    149<H>  |  110<H>  |  14  ----------------------------<  101<H>  3.5   |  29  |  0.89    Ca    8.0<L>      17 Mar 2018 05:30  Phos  1.8     03-17  Mg     2.0     03-17      PT/INR - ( 17 Mar 2018 05:30 )   PT: 32.4 SEC;   INR: 2.76          PTT - ( 17 Mar 2018 05:30 )  PTT:41.1 SEC      Urinalysis Basic - ( 15 Mar 2018 13:33 )    Color: YELLOW / Appearance: CLEAR / S.029 / pH: 6.5  Gluc: NEGATIVE / Ketone: NEGATIVE  / Bili: NEGATIVE / Urobili: NORMAL mg/dL   Blood: NEGATIVE / Protein: 20 mg/dL / Nitrite: NEGATIVE   Leuk Esterase: NEGATIVE / RBC: 0-2 / WBC 0-2   Sq Epi: x / Non Sq Epi: x / Bacteria: x        RADIOLOGY & ADDITIONAL TESTS:    Imaging Personally Reviewed:  Consultant(s) Notes Reviewed:    Care Discussed with Consultants/Other Providers:

## 2018-03-17 NOTE — PROGRESS NOTE ADULT - PROBLEM SELECTOR PLAN 2
hypotensive in ER 2/2 sepsis responsive to fluids and midodrine  RVP positive influenza B  no new infiltrate on CT  continue tamiflu

## 2018-03-17 NOTE — CONSULT NOTE ADULT - SUBJECTIVE AND OBJECTIVE BOX
CHIEF COMPLAINT:    HPI:    PAST MEDICAL & SURGICAL HISTORY:  Atrial fibrillation  History of pneumonia: 2013  Lung cancer: s/p R. lobectomy  Hypertension  Hyperlipidemia  Seizure disorder  H/O inguinal hernia repair:   H/O aortic valve replacement:   S/P cataract surgery: bilaterally  Aortic valve replaced: 2013   bovine AoValve  S/P pneumonectomy: right sided, ~       FAMILY HISTORY:  Family history of cancer (Aunt): Maternal aunt  Family history of hypertension: Mother  Family history of stroke: Mother      SOCIAL HISTORY:  Smoking: [ ] Never Smoked [x] Former Smoker (__ packs x ___ years) [ ] Current Smoker  (__ packs x ___ years)  Substance Use: [x] Never Used [ ] Used ____  EtOH Use: Social  Marital Status: [ ] Single [x]  [ ]  [ ]   Sexual History:   Occupation:   Recent Travel:  Country of Birth: [facility]  Advance Directives: full code    Allergies  No Known Allergies    Intolerances    HOME MEDICATIONS:  · 	aspirin 81 mg oral delayed release tablet: 1 tab(s) orally once a day while INR is subtherapeutic   · 	senna oral tablet: 2 tab(s) orally once a day (at bedtime), As Needed  · 	docusate sodium 100 mg oral capsule: 1 cap(s) orally 2 times a day  · 	hydrALAZINE 10 mg oral tablet: 1 tab(s) orally 3 times a day  · 	Coumadin 4 mg oral tablet: 1 tab(s) orally once a day  · 	Spiriva 18 mcg inhalation capsule: 1 cap(s) inhaled once a day  · 	Symbicort 160 mcg-4.5 mcg/inh inhalation aerosol: 2 puff(s) inhaled 2 times a day  · 	ProAir HFA 90 mcg/inh inhalation aerosol: 2 puff(s) inhaled 4 times a day, As Needed  · 	Toprol-XL 50 mg oral tablet, extended release: 1 tab(s) orally once a day  · 	Norvasc 10 mg oral tablet: 1 tab(s) orally once a day  · 	LaMICtal 100 mg oral tablet: 1 tab(s) orally 2 times a day  · 	TEGretol 200 mg oral tablet: 2 tab(s) orally 2 times a day    REVIEW OF SYSTEMS:  [x] Unable to assess ROS because severely SOB    OBJECTIVE:  ICU Vital Signs Last 24 Hrs  T(C): 36.7 (17 Mar 2018 06:27), Max: 36.7 (17 Mar 2018 06:27)  T(F): 98.1 (17 Mar 2018 06:27), Max: 98.1 (17 Mar 2018 06:27)  HR: 136 (17 Mar 2018 07:48) (68 - 136)  BP: 138/71 (17 Mar 2018 06:27) (110/55 - 138/71)  BP(mean): --  ABP: --  ABP(mean): --  RR: 18 (17 Mar 2018 06:27) (18 - 18)  SpO2: 97% (17 Mar 2018 07:48) (93% - 99%)    03-16 @ 07:01  -  03-17 @ 07:00  --------------------------------------------------------  IN: 250 mL / OUT: 400 mL / NET: -150 mL    CAPILLARY BLOOD GLUCOSE      PHYSICAL EXAM:  General: moderate to severe respiratory distress, cachectic  HEENT: PERRL, limited by BiPAP  Lymph Nodes: no LAD  Neck: supple  Respiratory: unable to speak 2/2 SOB, tachypneic into high 30s, significant accessory muscle use, L expiratory rhonchi, absent breath sounds on R  Cardiovascular: tachycardic 129, NL S1 and S2, no m/r/g  Abdomen: soft, NT/ND, hyperactive BS  Extremities: no c/c/e  Skin: no rashes/lesions  Neurological: unable to assess  Psychiatry: unable to assess    LINES:   [facility] MEDICATIONS:  levoFLOXacin IVPB 750 milliGRAM(s) IV Intermittent every 24 hours  oseltamivir 75 milliGRAM(s) Oral two times a day  predniSONE   Tablet 40 milliGRAM(s) Oral daily  ALBUTerol/ipratropium for Nebulization 3 milliLiter(s) Nebulizer every 6 hours  carBAMazepine 400 milliGRAM(s) Oral two times a day  lamoTRIgine 100 milliGRAM(s) Oral two times a day  docusate sodium 100 milliGRAM(s) Oral two times a day  polyethylene glycol 3350 17 Gram(s) Oral daily    LABS:                        9.4    5.21  )-----------( 90       ( 17 Mar 2018 05:30 )             27.2     Hgb Trend: 9.4<--, 8.6<--, 9.1<--, 10.4<--  0317    149<H>  |  110<H>  |  14  ----------------------------<  101<H>  3.5   |  29  |  0.89    Ca    8.0<L>      17 Mar 2018 05:30  Phos  1.8       Mg     2.0           Creatinine Trend: 0.89<--, 0.90<--, 0.96<--, 1.32<--  PT/INR - ( 17 Mar 2018 05:30 )   PT: 32.4 SEC;   INR: 2.76     PTT - ( 17 Mar 2018 05:30 )  PTT:41.1 SEC  Urinalysis Basic - ( 15 Mar 2018 13:33 )    Color: YELLOW / Appearance: CLEAR / S.029 / pH: 6.5  Gluc: NEGATIVE / Ketone: NEGATIVE  / Bili: NEGATIVE / Urobili: NORMAL mg/dL   Blood: NEGATIVE / Protein: 20 mg/dL / Nitrite: NEGATIVE   Leuk Esterase: NEGATIVE / RBC: 0-2 / WBC 0-2   Sq Epi: x / Non Sq Epi: x / Bacteria: x    Arterial Blood Gas:   @ 07:45  7.24/75/44/26/68.8/4.0  ABG lactate: --    MICROBIOLOGY:   Rapid Respiratory Viral Panel (18 @ 23:30)    Adenovirus (RapRVP): NOT DETECTED    Influenza A (RapRVP): NOT DETECTED (any subtype)    Influenza AH1 2009 (RapRVP): NOT DETECTED    Influenza AH1 (RapRVP): NOT DETECTED    Influenza AH3 (RapRVP): NOT DETECTED    Influenza B (RapRVP): POSITIVE    Parainfluenza 1 (RapRVP): NOT DETECTED    Parainfluenza 2 (RapRVP): NOT DETECTED    Parainfluenza 3 (RapRVP): NOT DETECTED    Parainfluenza 4 (RapRVP): NOT DETECTED    Resp Syncytial Virus (RapRVP): NOT DETECTED    Bordetella pertussis (RapRVP): NOT DETECTED    Chlamydia pneumoniae (RapRVP): NOT DETECTED    Mycoplasma pneumoniae (RapRVP): NOT DETECTED This nucleic acid amplification assay was performed using  the [employer] FilmArray. The following pathogens are tested  for: Adenovirus, Coronavirus 229E, Coronavirus HKU1,  Coronavirus NL63, Coronavirus OC43, Human Metapneumovirus  (HMPV), Rhinovirus/Enterovirus, Influenza A H1, Influenza A  H1 2009 (Pandemic H1 2009), Influenza A H3, Influenza A (Flu  A) subtype not identified, Influenza B, Parainfluenza Virus  (types 1, 2, 3, 4), Respiratory Syncytial Virus (RSV),  Bordetella pertussis, Chlamydophila pneumoniae, and  Mycoplasma pneumoniae. A negative FilmArray result does not  always exclude the possibility of viral or bacterial  infection. Laboratory results should always be interpreted  in the context of clinical findings.    Entero/Rhinovirus (RapRVP): NOT DETECTED    HKU1 Coronavirus (RapRVP): NOT DETECTED    NL63 Coronavirus (RapRVP): NOT DETECTED    229E Coronavirus (RapRVP): NOT DETECTED    OC43 Coronavirus (RapRVP): NOT DETECTED    hMPV (RapRVP): NOT DETECTED    RADIOLOGY:  [x] Reviewed and interpreted by me: 3/17 CXR L increased pulmonary vascular congestion with possible lower opacity, s/p R pneumonectomy     EKG: CHIEF COMPLAINT: SOB    HPI:  72M with HTN, HLD, bovine AVR, Afib on Coumadin, Seizure disorder, COPD on 2L oxygen at home, R pneumonectomy in  for lung cancer and recent PNA in 2018 who presented on 3/15/18 with COPD exacerbation x1 week 2/ influenza B.  Patient on arrival was hypotensive to SBP 70s requiring aggressive fluid resuscitation and midodrine 10 x1 with improvement and so was admitted to the floor.  Patient was started on abx and Tamiflu and was seen yesterday by Pulmonology with patient demonstrating progressive improvement.  However, overnight, patient with hypoxia into mid-90s that improved with uptitration of supplemental O2.  This morning, patient was found to be hypoxic to the 60s, tachypneic, and lethargic.  Patient was initiated on BiPAP at 14/5/70% with progressive improvement.    PAST MEDICAL & SURGICAL HISTORY:  Atrial fibrillation  History of pneumonia: 2013  Lung cancer: s/p R. pneumonectomy  Hypertension  Hyperlipidemia  Seizure disorder  H/O inguinal hernia repair:   H/O aortic valve replacement:   S/P cataract surgery: bilaterally  Aortic valve replaced: 2013   bovine AoValve  S/P pneumonectomy: right sided, ~     FAMILY HISTORY:  Family history of cancer (Aunt): Maternal aunt  Family history of hypertension: Mother  Family history of stroke: Mother    SOCIAL HISTORY:  Smoking: [ ] Never Smoked [x] Former Smoker (2 packs x 40 years) [ ] Current Smoker  (__ packs x ___ years)  Substance Use: [x] Never Used [ ] Used ____  EtOH Use: Social  Marital Status: [ ] Single [x]  [ ]  [ ]   Sexual History:   Occupation:   Recent Travel:  Country of Birth: [facility] [place]  Advance Directives: full code    Allergies  No Known Allergies    Intolerances    HOME MEDICATIONS:  · 	aspirin 81 mg oral delayed release tablet: 1 tab(s) orally once a day while INR is subtherapeutic   · 	senna oral tablet: 2 tab(s) orally once a day (at bedtime), As Needed  · 	docusate sodium 100 mg oral capsule: 1 cap(s) orally 2 times a day  · 	hydrALAZINE 10 mg oral tablet: 1 tab(s) orally 3 times a day  · 	Coumadin 4 mg oral tablet: 1 tab(s) orally once a day  · 	Spiriva 18 mcg inhalation capsule: 1 cap(s) inhaled once a day  · 	Symbicort 160 mcg-4.5 mcg/inh inhalation aerosol: 2 puff(s) inhaled 2 times a day  · 	ProAir HFA 90 mcg/inh inhalation aerosol: 2 puff(s) inhaled 4 times a day, As Needed  · 	Toprol-XL 50 mg oral tablet, extended release: 1 tab(s) orally once a day  · 	Norvasc 10 mg oral tablet: 1 tab(s) orally once a day  · 	LaMICtal 100 mg oral tablet: 1 tab(s) orally 2 times a day  · 	TEGretol 200 mg oral tablet: 2 tab(s) orally 2 times a day    REVIEW OF SYSTEMS:  [x] Unable to assess ROS because severely SOB    OBJECTIVE:  ICU Vital Signs Last 24 Hrs  T(C): 36.7 (17 Mar 2018 06:27), Max: 36.7 (17 Mar 2018 06:27)  T(F): 98.1 (17 Mar 2018 06:27), Max: 98.1 (17 Mar 2018 06:27)  HR: 136 (17 Mar 2018 07:48) (68 - 136)  BP: 138/71 (17 Mar 2018 06:27) (110/55 - 138/71)  BP(mean): --  ABP: --  ABP(mean): --  RR: 18 (17 Mar 2018 06:27) (18 - 18)  SpO2: 97% (17 Mar 2018 07:48) (93% - 99%)    03-16 @ 07:01  -  03-17 @ 07:00  --------------------------------------------------------  IN: 250 mL / OUT: 400 mL / NET: -150 mL    CAPILLARY BLOOD GLUCOSE      PHYSICAL EXAM:  General: moderate to severe respiratory distress, cyanotic, cachectic  HEENT: PERRL, limited by BiPAP  Lymph Nodes: no LAD  Neck: supple  Respiratory: unable to speak 2/2 SOB, tachypneic into high 30s, significant accessory muscle use, L expiratory rhonchi, absent breath sounds on R  Cardiovascular: tachycardic 129, NL S1 and S2, no m/r/g  Abdomen: soft, NT/ND, hyperactive BS  Extremities: peripheral cyanosis, no clubbing or edema  Skin: no rashes/lesions  Neurological: unable to assess  Psychiatry: unable to assess    LINES:   [facility] MEDICATIONS:  levoFLOXacin IVPB 750 milliGRAM(s) IV Intermittent every 24 hours  oseltamivir 75 milliGRAM(s) Oral two times a day  predniSONE   Tablet 40 milliGRAM(s) Oral daily  ALBUTerol/ipratropium for Nebulization 3 milliLiter(s) Nebulizer every 6 hours  carBAMazepine 400 milliGRAM(s) Oral two times a day  lamoTRIgine 100 milliGRAM(s) Oral two times a day  docusate sodium 100 milliGRAM(s) Oral two times a day  polyethylene glycol 3350 17 Gram(s) Oral daily    LABS:                        9.4    5.21  )-----------( 90       ( 17 Mar 2018 05:30 )             27.2     Hgb Trend: 9.4<--, 8.6<--, 9.1<--, 10.4<--  17    149<H>  |  110<H>  |  14  ----------------------------<  101<H>  3.5   |  29  |  0.89    Ca    8.0<L>      17 Mar 2018 05:30  Phos  1.8       Mg     2.0     17      Creatinine Trend: 0.89<--, 0.90<--, 0.96<--, 1.32<--  PT/INR - ( 17 Mar 2018 05:30 )   PT: 32.4 SEC;   INR: 2.76     PTT - ( 17 Mar 2018 05:30 )  PTT:41.1 SEC  Urinalysis Basic - ( 15 Mar 2018 13:33 )    Color: YELLOW / Appearance: CLEAR / S.029 / pH: 6.5  Gluc: NEGATIVE / Ketone: NEGATIVE  / Bili: NEGATIVE / Urobili: NORMAL mg/dL   Blood: NEGATIVE / Protein: 20 mg/dL / Nitrite: NEGATIVE   Leuk Esterase: NEGATIVE / RBC: 0-2 / WBC 0-2   Sq Epi: x / Non Sq Epi: x / Bacteria: x    Arterial Blood Gas:   @ 07:45  7.24/75/44/26/68.8/4.0  ABG lactate: --    MICROBIOLOGY:   Rapid Respiratory Viral Panel (18 @ 23:30)    Adenovirus (RapRVP): NOT DETECTED    Influenza A (RapRVP): NOT DETECTED (any subtype)    Influenza AH1 2009 (RapRVP): NOT DETECTED    Influenza AH1 (RapRVP): NOT DETECTED    Influenza AH3 (RapRVP): NOT DETECTED    Influenza B (RapRVP): POSITIVE    Parainfluenza 1 (RapRVP): NOT DETECTED    Parainfluenza 2 (RapRVP): NOT DETECTED    Parainfluenza 3 (RapRVP): NOT DETECTED    Parainfluenza 4 (RapRVP): NOT DETECTED    Resp Syncytial Virus (RapRVP): NOT DETECTED    Bordetella pertussis (RapRVP): NOT DETECTED    Chlamydia pneumoniae (RapRVP): NOT DETECTED    Mycoplasma pneumoniae (RapRVP): NOT DETECTED This nucleic acid amplification assay was performed using  the [employer] FilmArray. The following pathogens are tested  for: Adenovirus, Coronavirus 229E, Coronavirus HKU1,  Coronavirus NL63, Coronavirus OC43, Human Metapneumovirus  (HMPV), Rhinovirus/Enterovirus, Influenza A H1, Influenza A  H1 2009 (Pandemic H1 2009), Influenza A H3, Influenza A (Flu  A) subtype not identified, Influenza B, Parainfluenza Virus  (types 1, 2, 3, 4), Respiratory Syncytial Virus (RSV),  Bordetella pertussis, Chlamydophila pneumoniae, and  Mycoplasma pneumoniae. A negative FilmArray result does not  always exclude the possibility of viral or bacterial  infection. Laboratory results should always be interpreted  in the context of clinical findings.    Entero/Rhinovirus (RapRVP): NOT DETECTED    HKU1 Coronavirus (RapRVP): NOT DETECTED    NL63 Coronavirus (RapRVP): NOT DETECTED    229E Coronavirus (RapRVP): NOT DETECTED    OC43 Coronavirus (RapRVP): NOT DETECTED    hMPV (RapRVP): NOT DETECTED    RADIOLOGY:  [x] Reviewed and interpreted by me: 3/17 CXR L increased pulmonary vascular congestion with possible lower opacity, s/p R pneumonectomy     EKG: CHIEF COMPLAINT: SOB    HPI:  72M with HTN, HLD, bovine AVR, Afib on Coumadin, Seizure disorder, COPD on 2L oxygen at home s/p intubation , R pneumonectomy in  for lung cancer and recent PNA in 2018 who presented on 3/15/18 with COPD exacerbation x1 week 2/ influenza B.  Patient on arrival was hypotensive to SBP 70s requiring aggressive fluid resuscitation and midodrine 10 x1 with improvement and so was admitted to the floor.  Patient was started on abx and Tamiflu and was seen yesterday by Pulmonology with patient demonstrating progressive improvement.  However, overnight, patient with hypoxia into mid-90s that improved with uptitration of supplemental O2.  This morning, patient was found to be hypoxic to the 60s, tachypneic, and lethargic.  Patient was initiated on BiPAP at 14/5/70% with progressive improvement.    PAST MEDICAL & SURGICAL HISTORY:  Atrial fibrillation  History of pneumonia: 2013  Lung cancer: s/p R. pneumonectomy  Hypertension  Hyperlipidemia  Seizure disorder  H/O inguinal hernia repair:   H/O aortic valve replacement:   S/P cataract surgery: bilaterally  Aortic valve replaced: 2013   bovine AoValve  S/P pneumonectomy: right sided, ~     FAMILY HISTORY:  Family history of cancer (Aunt): Maternal aunt  Family history of hypertension: Mother  Family history of stroke: Mother    SOCIAL HISTORY:  Smoking: [ ] Never Smoked [x] Former Smoker (2 packs x 40 years) [ ] Current Smoker  (__ packs x ___ years)  Substance Use: [x] Never Used [ ] Used ____  EtOH Use: Social  Marital Status: [ ] Single [x]  [ ]  [ ]   Sexual History:   Occupation:   Recent Travel:  Country of Birth: [facility]  Advance Directives: full code    Allergies  No Known Allergies    Intolerances    HOME MEDICATIONS:  · 	aspirin 81 mg oral delayed release tablet: 1 tab(s) orally once a day while INR is subtherapeutic   · 	senna oral tablet: 2 tab(s) orally once a day (at bedtime), As Needed  · 	docusate sodium 100 mg oral capsule: 1 cap(s) orally 2 times a day  · 	hydrALAZINE 10 mg oral tablet: 1 tab(s) orally 3 times a day  · 	Coumadin 4 mg oral tablet: 1 tab(s) orally once a day  · 	Spiriva 18 mcg inhalation capsule: 1 cap(s) inhaled once a day  · 	Symbicort 160 mcg-4.5 mcg/inh inhalation aerosol: 2 puff(s) inhaled 2 times a day  · 	ProAir HFA 90 mcg/inh inhalation aerosol: 2 puff(s) inhaled 4 times a day, As Needed  · 	Toprol-XL 50 mg oral tablet, extended release: 1 tab(s) orally once a day  · 	Norvasc 10 mg oral tablet: 1 tab(s) orally once a day  · 	LaMICtal 100 mg oral tablet: 1 tab(s) orally 2 times a day  · 	TEGretol 200 mg oral tablet: 2 tab(s) orally 2 times a day    REVIEW OF SYSTEMS:  [x] Unable to assess ROS because severely SOB    OBJECTIVE:  ICU Vital Signs Last 24 Hrs  T(C): 36.7 (17 Mar 2018 06:27), Max: 36.7 (17 Mar 2018 06:27)  T(F): 98.1 (17 Mar 2018 06:27), Max: 98.1 (17 Mar 2018 06:27)  HR: 136 (17 Mar 2018 07:48) (68 - 136)  BP: 138/71 (17 Mar 2018 06:27) (110/55 - 138/71)  BP(mean): --  ABP: --  ABP(mean): --  RR: 18 (17 Mar 2018 06:27) (18 - 18)  SpO2: 97% (17 Mar 2018 07:48) (93% - 99%)    03-16 @ 07:01  -  03-17 @ 07:00  --------------------------------------------------------  IN: 250 mL / OUT: 400 mL / NET: -150 mL    CAPILLARY BLOOD GLUCOSE      PHYSICAL EXAM:  General: moderate to severe respiratory distress, cyanotic, cachectic  HEENT: PERRL, limited by BiPAP  Lymph Nodes: no LAD  Neck: supple  Respiratory: unable to speak 2/2 SOB, tachypneic into high 30s, significant accessory muscle use, L expiratory rhonchi, absent breath sounds on R  Cardiovascular: tachycardic 129, NL S1 and S2, no m/r/g  Abdomen: soft, NT/ND, hyperactive BS  Extremities: peripheral cyanosis, no clubbing or edema  Skin: no rashes/lesions  Neurological: unable to assess  Psychiatry: unable to assess    LINES:   [facility]    HOSPITAL MEDICATIONS:  levoFLOXacin IVPB 750 milliGRAM(s) IV Intermittent every 24 hours  oseltamivir 75 milliGRAM(s) Oral two times a day  predniSONE   Tablet 40 milliGRAM(s) Oral daily  ALBUTerol/ipratropium for Nebulization 3 milliLiter(s) Nebulizer every 6 hours  carBAMazepine 400 milliGRAM(s) Oral two times a day  lamoTRIgine 100 milliGRAM(s) Oral two times a day  docusate sodium 100 milliGRAM(s) Oral two times a day  polyethylene glycol 3350 17 Gram(s) Oral daily    LABS:                        9.4    5.21  )-----------( 90       ( 17 Mar 2018 05:30 )             27.2     Hgb Trend: 9.4<--, 8.6<--, 9.1<--, 10.4<--  17    149<H>  |  110<H>  |  14  ----------------------------<  101<H>  3.5   |  29  |  0.89    Ca    8.0<L>      17 Mar 2018 05:30  Phos  1.8       Mg     2.0         Creatinine Trend: 0.89<--, 0.90<--, 0.96<--, 1.32<--  PT/INR - ( 17 Mar 2018 05:30 )   PT: 32.4 SEC;   INR: 2.76     PTT - ( 17 Mar 2018 05:30 )  PTT:41.1 SEC  Urinalysis Basic - ( 15 Mar 2018 13:33 )    Color: YELLOW / Appearance: CLEAR / S.029 / pH: 6.5  Gluc: NEGATIVE / Ketone: NEGATIVE  / Bili: NEGATIVE / Urobili: NORMAL mg/dL   Blood: NEGATIVE / Protein: 20 mg/dL / Nitrite: NEGATIVE   Leuk Esterase: NEGATIVE / RBC: 0-2 / WBC 0-2   Sq Epi: x / Non Sq Epi: x / Bacteria: x    Arterial Blood Gas:   @ 07:45  7.24/75/44/26/68.8/4.0  ABG lactate: --    MICROBIOLOGY:   Rapid Respiratory Viral Panel (18 @ 23:30)    Adenovirus (RapRVP): NOT DETECTED    Influenza A (RapRVP): NOT DETECTED (any subtype)    Influenza AH1 2009 (RapRVP): NOT DETECTED    Influenza AH1 (RapRVP): NOT DETECTED    Influenza AH3 (RapRVP): NOT DETECTED    Influenza B (RapRVP): POSITIVE    Parainfluenza 1 (RapRVP): NOT DETECTED    Parainfluenza 2 (RapRVP): NOT DETECTED    Parainfluenza 3 (RapRVP): NOT DETECTED    Parainfluenza 4 (RapRVP): NOT DETECTED    Resp Syncytial Virus (RapRVP): NOT DETECTED    Bordetella pertussis (RapRVP): NOT DETECTED    Chlamydia pneumoniae (RapRVP): NOT DETECTED    Mycoplasma pneumoniae (RapRVP): NOT DETECTED This nucleic acid amplification assay was performed using  the [employer] FilmArray. The following pathogens are tested  for: Adenovirus, Coronavirus 229E, Coronavirus HKU1,  Coronavirus NL63, Coronavirus OC43, Human Metapneumovirus  (HMPV), Rhinovirus/Enterovirus, Influenza A H1, Influenza A  H1 2009 (Pandemic H1 2009), Influenza A H3, Influenza A (Flu  A) subtype not identified, Influenza B, Parainfluenza Virus  (types 1, 2, 3, 4), Respiratory Syncytial Virus (RSV),  Bordetella pertussis, Chlamydophila pneumoniae, and  Mycoplasma pneumoniae. A negative FilmArray result does not  always exclude the possibility of viral or bacterial  infection. Laboratory results should always be interpreted  in the context of clinical findings.    Entero/Rhinovirus (RapRVP): NOT DETECTED    HKU1 Coronavirus (RapRVP): NOT DETECTED    NL63 Coronavirus (RapRVP): NOT DETECTED    229E Coronavirus (RapRVP): NOT DETECTED    OC43 Coronavirus (RapRVP): NOT DETECTED    hMPV (RapRVP): NOT DETECTED    RADIOLOGY:  [x] Reviewed and interpreted by me: 3/17 CXR L increased pulmonary vascular congestion with possible lower opacity, s/p R pneumonectomy     EKG: CHIEF COMPLAINT: SOB    HPI:  72M with HTN, HLD, bovine AVR, Afib on Coumadin, Seizure disorder, COPD on 2L oxygen at home s/p intubation , R pneumonectomy in  for lung cancer and recent PNA in 2018 who presented on 3/15/18 with COPD exacerbation x1 week 2/ influenza B.  Patient on arrival was hypotensive to SBP 70s requiring aggressive fluid resuscitation and midodrine 10 x1 with improvement and so was admitted to the floor.  Patient was started on abx and Tamiflu and was seen yesterday by Pulmonology with patient demonstrating progressive improvement.  However, overnight, patient with hypoxia into mid-90s that improved with uptitration of supplemental O2.  This morning, patient was found to be hypoxic to the 60s, tachypneic, and lethargic.  Patient was initiated on BiPAP at 14/5/70% with progressive improvement.    PAST MEDICAL & SURGICAL HISTORY:  Atrial fibrillation  History of pneumonia: 2013  Lung cancer: s/p R. pneumonectomy  Hypertension  Hyperlipidemia  Seizure disorder  H/O inguinal hernia repair:   H/O aortic valve replacement:   S/P cataract surgery: bilaterally  Aortic valve replaced: 2013   bovine AoValve  S/P pneumonectomy: right sided, ~     FAMILY HISTORY:  Family history of cancer (Aunt): Maternal aunt  Family history of hypertension: Mother  Family history of stroke: Mother    SOCIAL HISTORY:  Smoking: [ ] Never Smoked [x] Former Smoker (2 packs x 40 years) [ ] Current Smoker  (__ packs x ___ years)  Substance Use: [x] Never Used [ ] Used ____  EtOH Use: Social  Marital Status: [ ] Single [x]  [ ]  [ ]   Sexual History:   Occupation:   Recent Travel:  Country of Birth: [facility]  Advance Directives: full code    Allergies  No Known Allergies    Intolerances    HOME MEDICATIONS:  · 	aspirin 81 mg oral delayed release tablet: 1 tab(s) orally once a day while INR is subtherapeutic   · 	senna oral tablet: 2 tab(s) orally once a day (at bedtime), As Needed  · 	docusate sodium 100 mg oral capsule: 1 cap(s) orally 2 times a day  · 	hydrALAZINE 10 mg oral tablet: 1 tab(s) orally 3 times a day  · 	Coumadin 4 mg oral tablet: 1 tab(s) orally once a day  · 	Spiriva 18 mcg inhalation capsule: 1 cap(s) inhaled once a day  · 	Symbicort 160 mcg-4.5 mcg/inh inhalation aerosol: 2 puff(s) inhaled 2 times a day  · 	ProAir HFA 90 mcg/inh inhalation aerosol: 2 puff(s) inhaled 4 times a day, As Needed  · 	Toprol-XL 50 mg oral tablet, extended release: 1 tab(s) orally once a day  · 	Norvasc 10 mg oral tablet: 1 tab(s) orally once a day  · 	LaMICtal 100 mg oral tablet: 1 tab(s) orally 2 times a day  · 	TEGretol 200 mg oral tablet: 2 tab(s) orally 2 times a day    REVIEW OF SYSTEMS:  [x] Unable to assess ROS because severely SOB    OBJECTIVE:  ICU Vital Signs Last 24 Hrs  T(C): 36.7 (17 Mar 2018 06:27), Max: 36.7 (17 Mar 2018 06:27)  T(F): 98.1 (17 Mar 2018 06:27), Max: 98.1 (17 Mar 2018 06:27)  HR: 136 (17 Mar 2018 07:48) (68 - 136)  BP: 138/71 (17 Mar 2018 06:27) (110/55 - 138/71)  BP(mean): --  ABP: --  ABP(mean): --  RR: 18 (17 Mar 2018 06:27) (18 - 18)  SpO2: 97% (17 Mar 2018 07:48) (93% - 99%)    03-16 @ 07:01  -  03-17 @ 07:00  --------------------------------------------------------  IN: 250 mL / OUT: 400 mL / NET: -150 mL    CAPILLARY BLOOD GLUCOSE      PHYSICAL EXAM:  General: moderate to severe respiratory distress, cyanotic, cachectic  HEENT: PERRL, limited by BiPAP  Lymph Nodes: no LAD  Neck: supple  Respiratory: unable to speak 2/2 SOB, tachypneic into high 30s, significant accessory muscle use, L expiratory rhonchi, absent breath sounds on R  Cardiovascular: tachycardic 129, NL S1 and S2, no m/r/g  Abdomen: soft, NT/ND, hyperactive BS  Extremities: peripheral cyanosis, no clubbing or edema  Skin: no rashes/lesions  Neurological: unable to assess  Psychiatry: unable to assess    LINES:   [facility]    HOSPITAL MEDICATIONS:  levoFLOXacin IVPB 750 milliGRAM(s) IV Intermittent every 24 hours  oseltamivir 75 milliGRAM(s) Oral two times a day  predniSONE   Tablet 40 milliGRAM(s) Oral daily  ALBUTerol/ipratropium for Nebulization 3 milliLiter(s) Nebulizer every 6 hours  carBAMazepine 400 milliGRAM(s) Oral two times a day  lamoTRIgine 100 milliGRAM(s) Oral two times a day  docusate sodium 100 milliGRAM(s) Oral two times a day  polyethylene glycol 3350 17 Gram(s) Oral daily    LABS:                        9.4    5.21  )-----------( 90       ( 17 Mar 2018 05:30 )             27.2     Hgb Trend: 9.4<--, 8.6<--, 9.1<--, 10.4<--  17    149<H>  |  110<H>  |  14  ----------------------------<  101<H>  3.5   |  29  |  0.89    Ca    8.0<L>      17 Mar 2018 05:30  Phos  1.8       Mg     2.0         Creatinine Trend: 0.89<--, 0.90<--, 0.96<--, 1.32<--  PT/INR - ( 17 Mar 2018 05:30 )   PT: 32.4 SEC;   INR: 2.76     PTT - ( 17 Mar 2018 05:30 )  PTT:41.1 SEC  Urinalysis Basic - ( 15 Mar 2018 13:33 )    Color: YELLOW / Appearance: CLEAR / S.029 / pH: 6.5  Gluc: NEGATIVE / Ketone: NEGATIVE  / Bili: NEGATIVE / Urobili: NORMAL mg/dL   Blood: NEGATIVE / Protein: 20 mg/dL / Nitrite: NEGATIVE   Leuk Esterase: NEGATIVE / RBC: 0-2 / WBC 0-2   Sq Epi: x / Non Sq Epi: x / Bacteria: x    Arterial Blood Gas:   @ 07:45  7.24/75/44/26/68.8/4.0  ABG lactate: --    MICROBIOLOGY:   Rapid Respiratory Viral Panel (18 @ 23:30)    Adenovirus (RapRVP): NOT DETECTED    Influenza A (RapRVP): NOT DETECTED (any subtype)    Influenza AH1 2009 (RapRVP): NOT DETECTED    Influenza AH1 (RapRVP): NOT DETECTED    Influenza AH3 (RapRVP): NOT DETECTED    Influenza B (RapRVP): POSITIVE    Parainfluenza 1 (RapRVP): NOT DETECTED    Parainfluenza 2 (RapRVP): NOT DETECTED    Parainfluenza 3 (RapRVP): NOT DETECTED    Parainfluenza 4 (RapRVP): NOT DETECTED    Resp Syncytial Virus (RapRVP): NOT DETECTED    Bordetella pertussis (RapRVP): NOT DETECTED    Chlamydia pneumoniae (RapRVP): NOT DETECTED    Mycoplasma pneumoniae (RapRVP): NOT DETECTED This nucleic acid amplification assay was performed using  the [employer] FilmArray. The following pathogens are tested  for: Adenovirus, Coronavirus 229E, Coronavirus HKU1,  Coronavirus NL63, Coronavirus OC43, Human Metapneumovirus  (HMPV), Rhinovirus/Enterovirus, Influenza A H1, Influenza A  H1 2009 (Pandemic H1 2009), Influenza A H3, Influenza A (Flu  A) subtype not identified, Influenza B, Parainfluenza Virus  (types 1, 2, 3, 4), Respiratory Syncytial Virus (RSV),  Bordetella pertussis, Chlamydophila pneumoniae, and  Mycoplasma pneumoniae. A negative FilmArray result does not  always exclude the possibility of viral or bacterial  infection. Laboratory results should always be interpreted  in the context of clinical findings.    Entero/Rhinovirus (RapRVP): NOT DETECTED    HKU1 Coronavirus (RapRVP): NOT DETECTED    NL63 Coronavirus (RapRVP): NOT DETECTED    229E Coronavirus (RapRVP): NOT DETECTED    OC43 Coronavirus (RapRVP): NOT DETECTED    hMPV (RapRVP): NOT DETECTED    RADIOLOGY:  [x] Reviewed and interpreted by me: 3/17 CXR L increased pulmonary vascular congestion, s/p R pneumonectomy     EKG:

## 2018-03-17 NOTE — PROGRESS NOTE ADULT - PROBLEM SELECTOR PLAN 9
nutrition consult appreciate, encourage oral intake.      Marguerite Houser D.O.  Medicine Intern  Pager [phone]

## 2018-03-17 NOTE — PROGRESS NOTE ADULT - ATTENDING COMMENTS
Informed by resident that Pt developed acute respiratory distress in am, BIPAP started and transfered to MICU for close monitor, and possible intubation if  indicated. MICU consult appreciate. Management as per MICU team. Unable to exam Pt before MICU transfer.

## 2018-03-17 NOTE — CHART NOTE - NSCHARTNOTEFT_GEN_A_CORE
called earlier about pt feeling SOB; pt saturation > 94% on 2 L NC; duonebs given earlier at 9 PM; pt improved afterwards    Around 12:40 AM, got a call about a PT Profile; pt is set to get PT/INR at 6 AM; instructed nurse that it was not necessary to draw the lab at this time as I was also not signed out any reason for a PT profile at this time.  Likely something related to the EMR, but no clinical indication.  Will hold off until 6 AM. called earlier about pt feeling SOB; pt saturation > 94% on 2 L NC; duonebs given earlier at 9 PM; pt improved afterwards    Around 12:40 AM, got a call about a PT Profile; pt is set to get PT/INR at 6 AM; instructed nurse that it was not necessary to draw the lab at this time as I was also not signed out any reason for a PT profile at this time.  Likely something related to the EMR, but no clinical indication.  Will hold off until 6 AM.    Around 1:10 AM, did a bed check on this pt to ensure that he was breathing well and was no longer dyspneic.  Pt sleeping comfortably.  Per my discussion with the night nurse who used a  phone prior to my arrival, he was no longer complaining of dyspnea.  Will continue to monitor - no need for further intervention at this time.

## 2018-03-17 NOTE — CONSULT NOTE ADULT - ATTENDING COMMENTS
Influenza B infection in patient with right pneumonectomy 15 years ago for lung ca.  Continue tamiflu, abx and nebs.  Would treat at least for 7 days with abx and then repeat CT chest in 4-6 weeks. If LLL lesion is non-resolved may need bx to evaluate for new primary ca, recurrence would be unlikely.
73 yo man with COPD on home O2, JOJO (unclear CPAP settings), right pneumonectomy 2/2 lung cancer admitted with respiratory distress 2/2 influenza. Had an acute episode of hypoxemia today, and found to have acute on chronic hypercarbia. Transferred to the MICU for NIV. Doing much better on BPAP 14/5 30% FiO2 with improved saturations and mentation. Continue with antibiotics, nebs, steroids, and initiate symbicort (uses Breo at home). Contact DME for CPAP settings (family does not know settings). Acute thrombocytopenia (normal plts on outpt labs at end of February 2018), with etiology unclear, continue to monitor; low suspicion for HIT. DVT ppx.    40 minutes critical time spent.

## 2018-03-17 NOTE — PROGRESS NOTE ADULT - PROBLEM SELECTOR PLAN 4
-likely prerenal, hemodynamically mediated in the setting of sepsis and decreased PO intake  -continue with IVF,  given hypernatremia started on 1/2NS@75. Monitor Na, change to NS if sodium wnl  -continue to monitor electolytes, scr  -avoid nephrotoxins  -improving

## 2018-03-18 LAB
ALBUMIN SERPL ELPH-MCNC: 3.3 G/DL — SIGNIFICANT CHANGE UP (ref 3.3–5)
ALP SERPL-CCNC: 70 U/L — SIGNIFICANT CHANGE UP (ref 40–120)
ALT FLD-CCNC: 12 U/L — SIGNIFICANT CHANGE UP (ref 4–41)
AST SERPL-CCNC: 14 U/L — SIGNIFICANT CHANGE UP (ref 4–40)
BILIRUB SERPL-MCNC: 0.6 MG/DL — SIGNIFICANT CHANGE UP (ref 0.2–1.2)
BUN SERPL-MCNC: 16 MG/DL — SIGNIFICANT CHANGE UP (ref 7–23)
CALCIUM SERPL-MCNC: 8.5 MG/DL — SIGNIFICANT CHANGE UP (ref 8.4–10.5)
CHLORIDE SERPL-SCNC: 106 MMOL/L — SIGNIFICANT CHANGE UP (ref 98–107)
CO2 SERPL-SCNC: 30 MMOL/L — SIGNIFICANT CHANGE UP (ref 22–31)
CREAT SERPL-MCNC: 0.9 MG/DL — SIGNIFICANT CHANGE UP (ref 0.5–1.3)
GLUCOSE SERPL-MCNC: 109 MG/DL — HIGH (ref 70–99)
HCT VFR BLD CALC: 29.9 % — LOW (ref 39–50)
HGB BLD-MCNC: 9.8 G/DL — LOW (ref 13–17)
INR BLD: 4.35 — HIGH (ref 0.88–1.17)
MAGNESIUM SERPL-MCNC: 2 MG/DL — SIGNIFICANT CHANGE UP (ref 1.6–2.6)
MCHC RBC-ENTMCNC: 31.2 PG — SIGNIFICANT CHANGE UP (ref 27–34)
MCHC RBC-ENTMCNC: 32.8 % — SIGNIFICANT CHANGE UP (ref 32–36)
MCV RBC AUTO: 95.2 FL — SIGNIFICANT CHANGE UP (ref 80–100)
NRBC # FLD: 0 — SIGNIFICANT CHANGE UP
PHOSPHATE SERPL-MCNC: 2.4 MG/DL — LOW (ref 2.5–4.5)
PLATELET # BLD AUTO: 119 K/UL — LOW (ref 150–400)
PMV BLD: 10.4 FL — SIGNIFICANT CHANGE UP (ref 7–13)
POTASSIUM SERPL-MCNC: 3.9 MMOL/L — SIGNIFICANT CHANGE UP (ref 3.5–5.3)
POTASSIUM SERPL-SCNC: 3.9 MMOL/L — SIGNIFICANT CHANGE UP (ref 3.5–5.3)
PROT SERPL-MCNC: 6.2 G/DL — SIGNIFICANT CHANGE UP (ref 6–8.3)
PROTHROM AB SERPL-ACNC: 49.8 SEC — HIGH (ref 9.8–13.1)
RBC # BLD: 3.14 M/UL — LOW (ref 4.2–5.8)
RBC # FLD: 13.2 % — SIGNIFICANT CHANGE UP (ref 10.3–14.5)
SODIUM SERPL-SCNC: 146 MMOL/L — HIGH (ref 135–145)
WBC # BLD: 8.57 K/UL — SIGNIFICANT CHANGE UP (ref 3.8–10.5)
WBC # FLD AUTO: 8.57 K/UL — SIGNIFICANT CHANGE UP (ref 3.8–10.5)

## 2018-03-18 RX ORDER — POTASSIUM PHOSPHATE, MONOBASIC POTASSIUM PHOSPHATE, DIBASIC 236; 224 MG/ML; MG/ML
15 INJECTION, SOLUTION INTRAVENOUS ONCE
Qty: 0 | Refills: 0 | Status: COMPLETED | OUTPATIENT
Start: 2018-03-18 | End: 2018-03-18

## 2018-03-18 RX ORDER — IPRATROPIUM/ALBUTEROL SULFATE 18-103MCG
3 AEROSOL WITH ADAPTER (GRAM) INHALATION ONCE
Qty: 0 | Refills: 0 | Status: COMPLETED | OUTPATIENT
Start: 2018-03-18 | End: 2018-03-18

## 2018-03-18 RX ORDER — SENNA PLUS 8.6 MG/1
2 TABLET ORAL AT BEDTIME
Qty: 0 | Refills: 0 | Status: DISCONTINUED | OUTPATIENT
Start: 2018-03-18 | End: 2018-03-21

## 2018-03-18 RX ORDER — LACTULOSE 10 G/15ML
20 SOLUTION ORAL ONCE
Qty: 0 | Refills: 0 | Status: COMPLETED | OUTPATIENT
Start: 2018-03-18 | End: 2018-03-18

## 2018-03-18 RX ADMIN — Medication 75 MILLIGRAM(S): at 17:48

## 2018-03-18 RX ADMIN — Medication 75 MILLIGRAM(S): at 05:07

## 2018-03-18 RX ADMIN — BUDESONIDE AND FORMOTEROL FUMARATE DIHYDRATE 2 PUFF(S): 160; 4.5 AEROSOL RESPIRATORY (INHALATION) at 10:22

## 2018-03-18 RX ADMIN — Medication 400 MILLIGRAM(S): at 17:48

## 2018-03-18 RX ADMIN — BUDESONIDE AND FORMOTEROL FUMARATE DIHYDRATE 2 PUFF(S): 160; 4.5 AEROSOL RESPIRATORY (INHALATION) at 22:21

## 2018-03-18 RX ADMIN — Medication 3 MILLILITER(S): at 10:16

## 2018-03-18 RX ADMIN — POTASSIUM PHOSPHATE, MONOBASIC POTASSIUM PHOSPHATE, DIBASIC 62.5 MILLIMOLE(S): 236; 224 INJECTION, SOLUTION INTRAVENOUS at 05:06

## 2018-03-18 RX ADMIN — Medication 100 MILLIGRAM(S): at 05:07

## 2018-03-18 RX ADMIN — Medication 3 MILLILITER(S): at 15:09

## 2018-03-18 RX ADMIN — SENNA PLUS 2 TABLET(S): 8.6 TABLET ORAL at 22:02

## 2018-03-18 RX ADMIN — CHLORHEXIDINE GLUCONATE 1 APPLICATION(S): 213 SOLUTION TOPICAL at 12:13

## 2018-03-18 RX ADMIN — Medication 3 MILLILITER(S): at 04:25

## 2018-03-18 RX ADMIN — LAMOTRIGINE 100 MILLIGRAM(S): 25 TABLET, ORALLY DISINTEGRATING ORAL at 05:07

## 2018-03-18 RX ADMIN — Medication 3 MILLILITER(S): at 22:15

## 2018-03-18 RX ADMIN — Medication 400 MILLIGRAM(S): at 05:07

## 2018-03-18 RX ADMIN — Medication 100 MILLIGRAM(S): at 17:48

## 2018-03-18 RX ADMIN — Medication 3 MILLILITER(S): at 12:10

## 2018-03-18 RX ADMIN — LAMOTRIGINE 100 MILLIGRAM(S): 25 TABLET, ORALLY DISINTEGRATING ORAL at 17:48

## 2018-03-18 RX ADMIN — POLYETHYLENE GLYCOL 3350 17 GRAM(S): 17 POWDER, FOR SOLUTION ORAL at 12:13

## 2018-03-18 RX ADMIN — Medication 40 MILLIGRAM(S): at 05:07

## 2018-03-18 RX ADMIN — LACTULOSE 20 GRAM(S): 10 SOLUTION ORAL at 12:13

## 2018-03-18 NOTE — PROGRESS NOTE ADULT - PROBLEM SELECTOR PLAN 6
-continue with Tegretol and Lamictal  -seizure precautions Plan: -continue with Tegretol and Lamictal  - Seizure precautions

## 2018-03-18 NOTE — PROGRESS NOTE ADULT - PROBLEM SELECTOR PLAN 9
nutrition consult appreciate, encourage oral intake.      Marguerite Houser D.O.  Medicine Intern  Pager [phone] nutrition consult appreciate, encourage oral intake.      [place] Grijalva  [id number]

## 2018-03-18 NOTE — PROGRESS NOTE ADULT - ASSESSMENT
73 yo M with hx of COPD on 2 L at home, Lung Ca s/p R Lung lobectomy in 2003 s/p chemo/radiation, AVR, Afib on coumadin, seizure d/o, HTN who presents with increased sputum production and cough a/w sepsis likely 2/2 to influenza infection c/b COPD exacerbation 71 yo M with hx of COPD on 2 L at home, Lung Ca s/p R Lung lobectomy in 2003 s/p chemo/radiation, AVR, Afib on coumadin, seizure d/o, HTN who presents with increased sputum production and cough a/w sepsis likely 2/2 to influenza infection c/b COPD exacerbation    Problem/Plan - 1:  ·  Problem: Acute respiratory failure with hypoxia and hypercapnia.  Plan: Patient with acute respiratory distress this morning with hypoxia and hypercapnia in the setting of COPD exacerbation and Influenza.    - Transitioned off BIPAP today to 5L O2 NC     Problem/Plan - 2:  ·  Problem: Influenza B.  Plan: hypotensive in ER 2/2 sepsis responsive to fluids and midodrine  RVP positive influenza B  no new infiltrate on CT  continue tamiflu.      Problem/Plan - 3:  ·  Problem: COPD exacerbation.  Plan: -increased sputum production, cough and oxygen requirements  -continue with levaquin, steroids and duonebs   -continue with supplemental oxygen.      Problem/Plan - 4:  ·  Problem: CELINE (acute kidney injury).  Plan: -likely prerenal, hemodynamically mediated in the setting of sepsis and decreased PO intake  -continue with IVF,  given hypernatremia started on 1/2NS@75. Monitor Na, change to NS if sodium wnl  -continue to monitor electolytes, scr  -avoid nephrotoxins  -improving.      Problem/Plan - 5:  ·  Problem: Atrial fibrillation.  Plan: -holding nadolol in the setting of sepsis. INR elevated at 5 likely 2/2 to malnutrition. Will hold coumadin tonight. Follow up repeat INR  -monitor VS.      Problem/Plan - 6:  Problem: Seizure disorder. Plan: -continue with Tegretol and Lamictal  -seizure precautions.     Problem/Plan - 7:  ·  Problem: Hypertension.  Plan: -holding nadolol and lasix in the setting of septic shock  -monitor blood pressure.      Problem/Plan - 8:  ·  Problem: Need for prophylactic measure.  Plan: -therapeutically anticoagulated on coumadin  PT eval.      Problem/Plan - 9:  ·  Problem: Protein calorie malnutrition.  Plan: nutrition consult appreciate, encourage oral intake. 73 yo M with hx of COPD on 2 L at home, Lung Ca s/p R Lung lobectomy in 2003 s/p chemo/radiation, AVR, Afib on coumadin, seizure d/o, HTN who presents with increased sputum production and cough a/w sepsis likely 2/2 to influenza infection c/b COPD exacerbation    Problem/Plan - 1:  ·  Problem: Acute respiratory failure with hypoxia and hypercapnia.  Plan: Patient with acute respiratory distress this morning with hypoxia and hypercapnia in the setting of COPD exacerbation and Influenza.   - Transitioned off BIPAP today to 5L O2 NC  - Continue use of duonebs for wheezing     Problem/Plan - 2:  ·  Problem: Influenza B.  Plan: hypotensive in ER 2/2 sepsis responsive to fluids and midodrine  RVP positive influenza B  no new infiltrate on CT  - Continue tamiflu.      Problem/Plan - 3:  ·  Problem: COPD exacerbation.  Plan: -increased sputum production, cough and oxygen requirements  -continue with levaquin, steroids  -continue with supplemental oxygen.      Problem/Plan - 4:  ·  Problem: CELINE (acute kidney injury).  Plan: -likely prerenal, hemodynamically mediated in the setting of sepsis and decreased PO intake  -continue with IVF,  given hypernatremia started on 1/2NS@75. Monitor Na, change to NS if sodium wnl  -continue to monitor electrolytes  -avoid nephrotoxins  -improving.      Problem/Plan - 5:  ·  Problem: Atrial fibrillation.  Plan: -holding nadolol in the setting of sepsis. INR elevated at 5 likely 2/2 to malnutrition. Will hold coumadin tonight. Follow up repeat INR  - Monitor VS     Problem/Plan - 6:  Problem: Seizure disorder. Plan: -continue with Tegretol and Lamictal  - Seizure precautions     Problem/Plan - 7:  ·  Problem: Hypertension  - Monitor blood pressure     Problem/Plan - 8:  ·  Problem: Need for prophylactic measure.  Plan: -therapeutically anticoagulated on coumadin   - PT eval.      Problem/Plan - 9:  ·  Problem: Protein calorie malnutrition.  Plan: nutrition consult appreciate, encourage oral intake.     Problem/Plan - 10:  ·  Problem: Dysphagia: Plan: Speech and swallow study ordered 3/18    Chaparro Grijalva  [id number] 73 yo M with hx of COPD on 2 L at home, Lung Ca s/p R Lung lobectomy in 2003 s/p chemo/radiation, AVR, Afib on coumadin, seizure d/o, HTN who presents with increased sputum production and cough a/w sepsis likely 2/2 to influenza infection c/b COPD exacerbation       #. Neuro  - no longer with acute metabolic encephalopathy 2/2 hypercarbia and hypoxia  - seizure d/o: c/w home carbamazepine and lamictal    #. CV  - Afib: initially supratherapeutic INR in setting of poor PO intake but now at goal and will continue to monitor INR daily and dose Coumadin  - HTN: hold home amlodipine d/t sepsis    #. Pulm  COPD exacerbation 2/2 influenza c/b acute hypoxic respiratory failure requiring BiPAP  - c/w BiPAP QHS and PRN  - c/w Levaquin d4/7 and Tamiflu d4/5  - c/w prednisone 40 QD  - Symbicort in place of home Breo  - monitor SpO2 closely    #. GI  - can restart diet based on speech and swallow study now that pt no longer on BiPAP    #. Renal  - CELINE likely 2/2 prerenal 2/2 severe sepsis and poor PO intake in setting of influenza: baseline Cr ~0.6, monitor BUN and Cr, avoid nephrotoxins, and renally dose medications    #. ID  - COPD exacerbation: c/w Levaquin d4/7, Tamiflu d4/5    #. Heme  - acute thrombocytopenia on this admission in setting of severe sepsis and influenza; c/w monitoring and consider further w/u if no improvement    #. Endo  - no acute issues    #. DVT ppx  - supratherapeutic INR on Coumadin, hold today    FOR FOLLOW UP:  COPD exacerbation  - BiPAP QHS and PRN  - per Pulmonology recs: complete Levaquin (>7d course), Tamiflu, and prednisone course  - f/u with DME company ([phone]) regarding home CPAP settings  - outpatient f/u with Dr Sameera BERGER  - d/t severe sepsis  - monitor BUN/Cr  Thrombocytopenia  - monitor plts in setting of new, acute thrombocytopenia  LLL nodule  - repeat CT chest in 4-6 weeks and f/u with Dr Brasher 73 yo M with hx of COPD on 2 L at home, Lung Ca s/p R Lung lobectomy in 2003 s/p chemo/radiation, AVR, Afib on coumadin, seizure d/o, HTN who presents with increased sputum production and cough a/w sepsis likely 2/2 to influenza infection c/b COPD exacerbation     #. Neuro  - no longer with acute metabolic encephalopathy 2/2 hypercarbia and hypoxia  - seizure d/o: c/w home carbamazepine and lamictal    #. CV  - Afib: initially supratherapeutic INR in setting of poor PO intake but now at goal and will continue to monitor INR daily and dose Coumadin  - HTN: hold home amlodipine d/t sepsis    #. Pulm  COPD exacerbation 2/2 influenza c/b acute hypoxic respiratory failure requiring BiPAP  - c/w BiPAP QHS and PRN  - c/w Levaquin d4/7 and Tamiflu d4/5  - c/w prednisone 40 QD  - Symbicort in place of home Breo  - monitor SpO2 closely    #. GI  - can restart diet based on speech and swallow study now that pt no longer on BiPAP    #. Renal  - CELINE likely 2/2 prerenal 2/2 severe sepsis and poor PO intake in setting of influenza: baseline Cr ~0.6, monitor BUN and Cr, avoid nephrotoxins, and renally dose medications    #. ID  - COPD exacerbation: c/w Levaquin d4/7, Tamiflu d4/5    #. Heme  - acute thrombocytopenia on this admission in setting of severe sepsis and influenza; c/w monitoring and consider further w/u if no improvement    #. Endo  - no acute issues    #. DVT ppx  - supratherapeutic INR on Coumadin, hold today    FOR FOLLOW UP:  COPD exacerbation  - BiPAP QHS and PRN  - per Pulmonology recs: complete Levaquin (>7d course), Tamiflu, and prednisone course  - f/u with DME company ([phone]) regarding home CPAP settings  - outpatient f/u with Dr Sameera BERGER  - d/t severe sepsis  - monitor BUN/Cr  Thrombocytopenia  - monitor plts in setting of new, acute thrombocytopenia  LLL nodule  - repeat CT chest in 4-6 weeks and f/u with Dr Brasher 71 yo M with hx of COPD on 2 L at home, Lung Ca s/p R Lung lobectomy in 2003 s/p chemo/radiation, AVR, Afib on coumadin, seizure d/o, HTN who presents with increased sputum production and cough a/w sepsis likely 2/2 to influenza infection c/b COPD exacerbation     #. Neuro  - no longer with acute metabolic encephalopathy 2/2 hypercarbia and hypoxia  - seizure d/o: c/w home carbamazepine and lamictal    #. CV  - Afib: initially supratherapeutic INR in setting of poor PO intake but now at goal and will continue to monitor INR daily and dose Coumadin  - HTN: hold home amlodipine d/t sepsis    #. Pulm  COPD exacerbation 2/2 influenza c/b acute hypoxic respiratory failure requiring BiPAP  - c/w BiPAP QHS and PRN  - c/w Levaquin d4/7 and Tamiflu d4/5  - c/w prednisone 40 QD  - Symbicort in place of home Breo  - monitor SpO2 closely    #. GI  - Can restart diet based on speech and swallow study now that pt no longer on BiPAP  - Bowel regimen; dulcolax    #. Renal  - CELINE likely 2/2 prerenal 2/2 severe sepsis and poor PO intake in setting of influenza: baseline Cr ~0.6, monitor BUN and Cr, avoid nephrotoxins, and renally dose medications    #. ID  - COPD exacerbation: c/w Levaquin d4/7, Tamiflu d4/5    #. Heme  - acute thrombocytopenia on this admission in setting of severe sepsis and influenza; c/w monitoring and consider further w/u if no improvement    #. Endo  - no acute issues    #. DVT ppx  - supratherapeutic INR on Coumadin, hold today    FOR FOLLOW UP:  COPD exacerbation  - BiPAP QHS and PRN  - per Pulmonology recs: complete Levaquin (>7d course), Tamiflu, and prednisone course  - f/u with DME company ([phone]) regarding home CPAP settings  - outpatient f/u with Dr Sameera BERGER  - d/t severe sepsis  - monitor BUN/Cr  Thrombocytopenia  - monitor plts in setting of new, acute thrombocytopenia  LLL nodule  - repeat CT chest in 4-6 weeks and f/u with Dr Brasher

## 2018-03-18 NOTE — PROGRESS NOTE ADULT - ATTENDING COMMENTS
73 yo man with COPD on home O2, JOJO (unclear CPAP settings), right pneumonectomy 2/2 lung cancer admitted with respiratory distress 2/2 influenza. Had an acute episode of hypoxemia today, and found to have acute on chronic hypercarbia. Transferred to the MICU for NIV. Off BPAP and back on baseline home O2.  Continue with antibiotics, nebs, steroids, and initiate symbicort (uses Breo at home). Contact DME for CPAP settings (family does not know settings). Acute thrombocytopenia improving (normal plts on outpt labs at end of February 2018), continue to monitor; low suspicion for HIT/angiopathy/TTP/ITP. DVT ppx.    30 minutes critical time spent. 73 yo man with COPD on home O2, JOJO (unclear CPAP settings), right pneumonectomy 2/2 lung cancer admitted with respiratory distress 2/2 influenza. Had an acute episode of hypoxemia today, and found to have acute on chronic hypercarbia. Transferred to the MICU for NIV. Off BPAP and back on baseline home O2.  Continue with antibiotics, nebs, steroids, and initiate symbicort (uses Breo at home). Contact DME for CPAP settings (family does not know settings). Acute thrombocytopenia improving (normal plts on outpt labs at end of February 2018), continue to monitor; low suspicion for HIT/angiopathy/TTP/ITP. DVT ppx. Floor eligible.     30 minutes critical time spent.

## 2018-03-18 NOTE — PROGRESS NOTE ADULT - SUBJECTIVE AND OBJECTIVE BOX
CHIEF COMPLAINT:    Interval Events:    REVIEW OF SYSTEMS:  Constitutional: [ ] negative [ ] fevers [ ] chills [ ] weight loss [ ] weight gain  HEENT: [ ] negative [ ] dry eyes [ ] eye irritation [ ] postnasal drip [ ] nasal congestion  CV: [ ] negative  [ ] chest pain [ ] orthopnea [ ] palpitations [ ] murmur  Resp: [ ] negative [ ] cough [ ] shortness of breath [ ] dyspnea [ ] wheezing [ ] sputum [ ] hemoptysis  GI: [ ] negative [ ] nausea [ ] vomiting [ ] diarrhea [ ] constipation [ ] abd pain [ ] dysphagia   : [ ] negative [ ] dysuria [ ] nocturia [ ] hematuria [ ] increased urinary frequency  Musculoskeletal: [ ] negative [ ] back pain [ ] myalgias [ ] arthralgias [ ] fracture  Skin: [ ] negative [ ] rash [ ] itch  Neurological: [ ] negative [ ] headache [ ] dizziness [ ] syncope [ ] weakness [ ] numbness  Psychiatric: [ ] negative [ ] anxiety [ ] depression  Endocrine: [ ] negative [ ] diabetes [ ] thyroid problem  Hematologic/Lymphatic: [ ] negative [ ] anemia [ ] bleeding problem  Allergic/Immunologic: [ ] negative [ ] itchy eyes [ ] nasal discharge [ ] hives [ ] angioedema  [ ] All other systems negative  [ ] Unable to assess ROS because ________    OBJECTIVE:  ICU Vital Signs Last 24 Hrs  T(C): 36.3 (18 Mar 2018 04:00), Max: 36.8 (17 Mar 2018 20:00)  T(F): 97.4 (18 Mar 2018 04:00), Max: 98.3 (17 Mar 2018 20:00)  HR: 68 (18 Mar 2018 07:04) (66 - 136)  BP: 154/71 (18 Mar 2018 06:00) (89/48 - 172/79)  BP(mean): 88 (18 Mar 2018 06:00) (57 - 137)  ABP: --  ABP(mean): --  RR: 29 (18 Mar 2018 06:00) (19 - 33)  SpO2: 100% (18 Mar 2018 07:04) (90% - 100%)        03-17 @ 07:01  -  03-18 @ 07:00  --------------------------------------------------------  IN: 400 mL / OUT: 700 mL / NET: -300 mL      CAPILLARY BLOOD GLUCOSE          PHYSICAL EXAM:  General:   HEENT:   Lymph Nodes:  Neck:   Respiratory:   Cardiovascular:   Abdomen:   Extremities:   Skin:   Neurological:  Psychiatry:    LINES:    HOSPITAL MEDICATIONS:  Standing Meds:  ALBUTerol/ipratropium for Nebulization 3 milliLiter(s) Nebulizer every 6 hours  buDESOnide 160 MICROgram(s)/formoterol 4.5 MICROgram(s) Inhaler 2 Puff(s) Inhalation two times a day  carBAMazepine 400 milliGRAM(s) Oral two times a day  chlorhexidine 4% Liquid 1 Application(s) Topical <User Schedule>  docusate sodium 100 milliGRAM(s) Oral two times a day  lamoTRIgine 100 milliGRAM(s) Oral two times a day  levoFLOXacin IVPB 750 milliGRAM(s) IV Intermittent every 24 hours  oseltamivir 75 milliGRAM(s) Oral two times a day  polyethylene glycol 3350 17 Gram(s) Oral daily  predniSONE   Tablet 40 milliGRAM(s) Oral daily      PRN Meds:      LABS:                        9.8    8.57  )-----------( 119      ( 18 Mar 2018 02:41 )             29.9     Hgb Trend: 9.8<--, 9.4<--, 8.6<--, 9.1<--, 10.4<--  03-18    146<H>  |  106  |  16  ----------------------------<  109<H>  3.9   |  30  |  0.90    Ca    8.5      18 Mar 2018 02:41  Phos  2.4     03-18  Mg     2.0     03-18    TPro  6.2  /  Alb  3.3  /  TBili  0.6  /  DBili  x   /  AST  14  /  ALT  12  /  AlkPhos  70  03-18    Creatinine Trend: 0.90<--, 0.89<--, 0.90<--, 0.96<--, 1.32<--  PT/INR - ( 18 Mar 2018 02:41 )   PT: 49.8 SEC;   INR: 4.35          PTT - ( 17 Mar 2018 05:30 )  PTT:41.1 SEC    Arterial Blood Gas:  03-17 @ 09:39  7.39/56/33/30/57.3/7.6  ABG lactate: --  Arterial Blood Gas:  03-17 @ 07:45  7.24/75/44/26/68.8/4.0  ABG lactate: --        MICROBIOLOGY:     Culture - Urine (collected 15 Mar 2018 13:54)  Source: URINE MIDSTREAM  Final Report (16 Mar 2018 16:20):    NO GROWTH AT 24 HOURS CHIEF COMPLAINT:    Interval Events: Ongoing cough with some phlegm, feels improved in SOB overnight. Able to tolerate O2NC today improved from BIPAP. Has not been able to pass a bowel movement.     REVIEW OF SYSTEMS:  Constitutional: [x] negative [ ] fevers [ ] chills [ ] weight loss [ ] weight gain  HEENT: [x] negative [ ] dry eyes [ ] eye irritation [ ] postnasal drip [ ] nasal congestion  CV: [x] negative  [ ] chest pain [ ] orthopnea [ ] palpitations [ ] murmur  Resp: [ ] negative [x] cough [x] shortness of breath [ ] dyspnea [ ] wheezing [ ] sputum [ ] hemoptysis  GI: [ ] negative [ ] nausea [ ] vomiting [ ] diarrhea [x] constipation [ ] abd pain [ ] dysphagia   : [ ] negative [ ] dysuria [ ] nocturia [ ] hematuria [ ] increased urinary frequency  Musculoskeletal: [ ] negative [ ] back pain [ ] myalgias [ ] arthralgias [ ] fracture  Skin: [ ] negative [ ] rash [ ] itch  Neurological: [ ] negative [ ] headache [ ] dizziness [ ] syncope [ ] weakness [ ] numbness  Psychiatric: [ ] negative [ ] anxiety [ ] depression  Endocrine: [ ] negative [ ] diabetes [ ] thyroid problem  Hematologic/Lymphatic: [ ] negative [ ] anemia [ ] bleeding problem  Allergic/Immunologic: [ ] negative [ ] itchy eyes [ ] nasal discharge [ ] hives [ ] angioedema  [x] All other systems negative  [ ] Unable to assess ROS because ________    OBJECTIVE:  ICU Vital Signs Last 24 Hrs  T(C): 36.3 (18 Mar 2018 04:00), Max: 36.8 (17 Mar 2018 20:00)  T(F): 97.4 (18 Mar 2018 04:00), Max: 98.3 (17 Mar 2018 20:00)  HR: 68 (18 Mar 2018 07:04) (66 - 136)  BP: 154/71 (18 Mar 2018 06:00) (89/48 - 172/79)  BP(mean): 88 (18 Mar 2018 06:00) (57 - 137)  ABP: --  ABP(mean): --  RR: 29 (18 Mar 2018 06:00) (19 - 33)  SpO2: 100% (18 Mar 2018 07:04) (90% - 100%)        03-17 @ 07:01  -  03-18 @ 07:00  --------------------------------------------------------  IN: 400 mL / OUT: 700 mL / NET: -300 mL      CAPILLARY BLOOD GLUCOSE    PHYSICAL EXAM:  GENERAL: NAD, well-developed, speaking in full sentences (Azeri with wife)  HEAD:  Atraumatic, Normocephalic  EYES: EOMI, PERRLA, conjunctiva and sclera clear  CHEST/LUNG: L breath sounds with mild rhonchi. Absent breath sounds on R 2/2 pneumonectomy.  HEART: Regular rate and rhythm  ABDOMEN: Soft, Nontender, Nondistended; Bowel sounds present  NEURO: Awake, follows simple commands, speaking some words of English in response occasionally, typically wife translating  EXTREMITIES:  Pink and warm, Peripheral Pulses intact    LINES:    HOSPITAL MEDICATIONS:  Standing Meds:  ALBUTerol/ipratropium for Nebulization 3 milliLiter(s) Nebulizer every 6 hours  buDESOnide 160 MICROgram(s)/formoterol 4.5 MICROgram(s) Inhaler 2 Puff(s) Inhalation two times a day  carBAMazepine 400 milliGRAM(s) Oral two times a day  chlorhexidine 4% Liquid 1 Application(s) Topical <User Schedule>  docusate sodium 100 milliGRAM(s) Oral two times a day  lamoTRIgine 100 milliGRAM(s) Oral two times a day  levoFLOXacin IVPB 750 milliGRAM(s) IV Intermittent every 24 hours  oseltamivir 75 milliGRAM(s) Oral two times a day  polyethylene glycol 3350 17 Gram(s) Oral daily  predniSONE   Tablet 40 milliGRAM(s) Oral daily    LABS:                        9.8    8.57  )-----------( 119      ( 18 Mar 2018 02:41 )             29.9     Hgb Trend: 9.8<--, 9.4<--, 8.6<--, 9.1<--, 10.4<--  03-18    146<H>  |  106  |  16  ----------------------------<  109<H>  3.9   |  30  |  0.90    Ca    8.5      18 Mar 2018 02:41  Phos  2.4     03-18  Mg     2.0     03-18    TPro  6.2  /  Alb  3.3  /  TBili  0.6  /  DBili  x   /  AST  14  /  ALT  12  /  AlkPhos  70  03-18    Creatinine Trend: 0.90<--, 0.89<--, 0.90<--, 0.96<--, 1.32<--  PT/INR - ( 18 Mar 2018 02:41 )   PT: 49.8 SEC;   INR: 4.35          PTT - ( 17 Mar 2018 05:30 )  PTT:41.1 SEC    Arterial Blood Gas:  03-17 @ 09:39  7.39/56/33/30/57.3/7.6  ABG lactate: --  Arterial Blood Gas:  03-17 @ 07:45  7.24/75/44/26/68.8/4.0  ABG lactate: --        MICROBIOLOGY:     Culture - Urine (collected 15 Mar 2018 13:54)  Source: URINE MIDSTREAM  Final Report (16 Mar 2018 16:20):    NO GROWTH AT 24 HOURS CHIEF COMPLAINT: SOB    Interval Events: Ongoing cough with some phlegm, feels improved in SOB overnight. Able to tolerate O2NC today improved from BIPAP. Has not been able to pass a bowel movement.     REVIEW OF SYSTEMS:  Constitutional: [x] negative [ ] fevers [ ] chills [ ] weight loss [ ] weight gain  HEENT: [x] negative [ ] dry eyes [ ] eye irritation [ ] postnasal drip [ ] nasal congestion  CV: [x] negative  [ ] chest pain [ ] orthopnea [ ] palpitations [ ] murmur  Resp: [ ] negative [x] cough [x] shortness of breath [ ] dyspnea [ ] wheezing [ ] sputum [ ] hemoptysis  GI: [ ] negative [ ] nausea [ ] vomiting [ ] diarrhea [x] constipation [ ] abd pain [ ] dysphagia   : [ ] negative [ ] dysuria [ ] nocturia [ ] hematuria [ ] increased urinary frequency  Musculoskeletal: [ ] negative [ ] back pain [ ] myalgias [ ] arthralgias [ ] fracture  Skin: [ ] negative [ ] rash [ ] itch  Neurological: [ ] negative [ ] headache [ ] dizziness [ ] syncope [ ] weakness [ ] numbness  Psychiatric: [ ] negative [ ] anxiety [ ] depression  Endocrine: [ ] negative [ ] diabetes [ ] thyroid problem  Hematologic/Lymphatic: [ ] negative [ ] anemia [ ] bleeding problem  Allergic/Immunologic: [ ] negative [ ] itchy eyes [ ] nasal discharge [ ] hives [ ] angioedema  [x] All other systems negative  [ ] Unable to assess ROS because ________    OBJECTIVE:  ICU Vital Signs Last 24 Hrs  T(C): 36.3 (18 Mar 2018 04:00), Max: 36.8 (17 Mar 2018 20:00)  T(F): 97.4 (18 Mar 2018 04:00), Max: 98.3 (17 Mar 2018 20:00)  HR: 68 (18 Mar 2018 07:04) (66 - 136)  BP: 154/71 (18 Mar 2018 06:00) (89/48 - 172/79)  BP(mean): 88 (18 Mar 2018 06:00) (57 - 137)  ABP: --  ABP(mean): --  RR: 29 (18 Mar 2018 06:00) (19 - 33)  SpO2: 100% (18 Mar 2018 07:04) (90% - 100%)        03-17 @ 07:01  -  03-18 @ 07:00  --------------------------------------------------------  IN: 400 mL / OUT: 700 mL / NET: -300 mL      CAPILLARY BLOOD GLUCOSE    PHYSICAL EXAM:  GENERAL: NAD, well-developed, speaking in full sentences (Faroese with wife)  HEAD:  Atraumatic, Normocephalic  EYES: EOMI, PERRLA, conjunctiva and sclera clear  CHEST/LUNG: L breath sounds with mild rhonchi. Absent breath sounds on R 2/2 pneumonectomy.  HEART: Regular rate and rhythm  ABDOMEN: Soft, Nontender, Nondistended; Bowel sounds present  NEURO: Awake, follows simple commands, speaking some words of English in response occasionally, typically wife translating  EXTREMITIES:  Pink and warm, Peripheral Pulses intact    LINES:    HOSPITAL MEDICATIONS:  Standing Meds:  ALBUTerol/ipratropium for Nebulization 3 milliLiter(s) Nebulizer every 6 hours  buDESOnide 160 MICROgram(s)/formoterol 4.5 MICROgram(s) Inhaler 2 Puff(s) Inhalation two times a day  carBAMazepine 400 milliGRAM(s) Oral two times a day  chlorhexidine 4% Liquid 1 Application(s) Topical <User Schedule>  docusate sodium 100 milliGRAM(s) Oral two times a day  lamoTRIgine 100 milliGRAM(s) Oral two times a day  levoFLOXacin IVPB 750 milliGRAM(s) IV Intermittent every 24 hours  oseltamivir 75 milliGRAM(s) Oral two times a day  polyethylene glycol 3350 17 Gram(s) Oral daily  predniSONE   Tablet 40 milliGRAM(s) Oral daily    LABS:                        9.8    8.57  )-----------( 119      ( 18 Mar 2018 02:41 )             29.9     Hgb Trend: 9.8<--, 9.4<--, 8.6<--, 9.1<--, 10.4<--  03-18    146<H>  |  106  |  16  ----------------------------<  109<H>  3.9   |  30  |  0.90    Ca    8.5      18 Mar 2018 02:41  Phos  2.4     03-18  Mg     2.0     03-18    TPro  6.2  /  Alb  3.3  /  TBili  0.6  /  DBili  x   /  AST  14  /  ALT  12  /  AlkPhos  70  03-18    Creatinine Trend: 0.90<--, 0.89<--, 0.90<--, 0.96<--, 1.32<--  PT/INR - ( 18 Mar 2018 02:41 )   PT: 49.8 SEC;   INR: 4.35          PTT - ( 17 Mar 2018 05:30 )  PTT:41.1 SEC    Arterial Blood Gas:  03-17 @ 09:39  7.39/56/33/30/57.3/7.6  ABG lactate: --  Arterial Blood Gas:  03-17 @ 07:45  7.24/75/44/26/68.8/4.0  ABG lactate: --        MICROBIOLOGY:     Culture - Urine (collected 15 Mar 2018 13:54)  Source: URINE MIDSTREAM  Final Report (16 Mar 2018 16:20):    NO GROWTH AT 24 HOURS

## 2018-03-18 NOTE — PROGRESS NOTE ADULT - PROBLEM SELECTOR PLAN 7
-holding nadolol and lasix in the setting of septic shock  -monitor blood pressure - Monitor blood pressure

## 2018-03-18 NOTE — PROGRESS NOTE ADULT - PROBLEM SELECTOR PLAN 4
-likely prerenal, hemodynamically mediated in the setting of sepsis and decreased PO intake  -continue with IVF,  given hypernatremia started on 1/2NS@75. Monitor Na, change to NS if sodium wnl  -continue to monitor electolytes, scr  -avoid nephrotoxins  -improving Plan: -increased sputum production, cough and oxygen requirements  -continue with levaquin, steroids  -continue with supplemental oxygen.

## 2018-03-18 NOTE — PROGRESS NOTE ADULT - PROBLEM SELECTOR PLAN 8
-therapeutically anticoagulated on coumadin  PT eval Plan: -therapeutically anticoagulated on coumadin  -INR high today, holding one dose recheck tomorrow

## 2018-03-18 NOTE — PROGRESS NOTE ADULT - PROBLEM SELECTOR PLAN 2
hypotensive in ER 2/2 sepsis responsive to fluids and midodrine  RVP positive influenza B  no new infiltrate on CT  continue tamiflu Plan: hypotensive in ER 2/2 sepsis responsive to fluids and midodrine  RVP positive influenza B  no new infiltrate on CT  - Continue tamiflu.

## 2018-03-18 NOTE — PROGRESS NOTE ADULT - PROBLEM SELECTOR PLAN 5
-holding nadolol in the setting of sepsis. INR elevated at 5 likely 2/2 to malnutrition. Will hold coumadin tonight. Follow up repeat INR  -monitor VS Plan: -holding nadolol in the setting of sepsis. INR elevated at 5 likely 2/2 to malnutrition. Will hold coumadin tonight. Follow up repeat INR  - Monitor VS

## 2018-03-18 NOTE — PROGRESS NOTE ADULT - PROBLEM SELECTOR PLAN 1
Patient with acute respiratory distress this morning with hypoxia and hypercapnia in the setting of COPD exacerbation and Influenza.    - Started on BiPAP.  - CXR unchanged from prior.  - ABG Pending.   - Appreciate MICU eval, transfer to MICU for closer monitoring. Plan: Patient with acute respiratory distress this morning with hypoxia and hypercapnia in the setting of COPD exacerbation and Influenza.   - Transitioned off BIPAP today to 5L O2 NC  - Continue use of duonebs for wheezing

## 2018-03-19 LAB
ALBUMIN SERPL ELPH-MCNC: 3.1 G/DL — LOW (ref 3.3–5)
ALP SERPL-CCNC: 75 U/L — SIGNIFICANT CHANGE UP (ref 40–120)
ALT FLD-CCNC: 25 U/L — SIGNIFICANT CHANGE UP (ref 4–41)
AST SERPL-CCNC: 23 U/L — SIGNIFICANT CHANGE UP (ref 4–40)
BACTERIA BLD CULT: SIGNIFICANT CHANGE UP
BACTERIA BLD CULT: SIGNIFICANT CHANGE UP
BASOPHILS # BLD AUTO: 0.02 K/UL — SIGNIFICANT CHANGE UP (ref 0–0.2)
BASOPHILS NFR BLD AUTO: 0.3 % — SIGNIFICANT CHANGE UP (ref 0–2)
BILIRUB SERPL-MCNC: 0.5 MG/DL — SIGNIFICANT CHANGE UP (ref 0.2–1.2)
BUN SERPL-MCNC: 16 MG/DL — SIGNIFICANT CHANGE UP (ref 7–23)
CALCIUM SERPL-MCNC: 8.4 MG/DL — SIGNIFICANT CHANGE UP (ref 8.4–10.5)
CHLORIDE SERPL-SCNC: 106 MMOL/L — SIGNIFICANT CHANGE UP (ref 98–107)
CO2 SERPL-SCNC: 29 MMOL/L — SIGNIFICANT CHANGE UP (ref 22–31)
CREAT SERPL-MCNC: 0.86 MG/DL — SIGNIFICANT CHANGE UP (ref 0.5–1.3)
EOSINOPHIL # BLD AUTO: 0.01 K/UL — SIGNIFICANT CHANGE UP (ref 0–0.5)
EOSINOPHIL NFR BLD AUTO: 0.1 % — SIGNIFICANT CHANGE UP (ref 0–6)
GLUCOSE SERPL-MCNC: 115 MG/DL — HIGH (ref 70–99)
HCT VFR BLD CALC: 31.1 % — LOW (ref 39–50)
HGB BLD-MCNC: 10.5 G/DL — LOW (ref 13–17)
IMM GRANULOCYTES # BLD AUTO: 0.11 # — SIGNIFICANT CHANGE UP
IMM GRANULOCYTES NFR BLD AUTO: 1.6 % — HIGH (ref 0–1.5)
LYMPHOCYTES # BLD AUTO: 0.93 K/UL — LOW (ref 1–3.3)
LYMPHOCYTES # BLD AUTO: 13.2 % — SIGNIFICANT CHANGE UP (ref 13–44)
MAGNESIUM SERPL-MCNC: 2.2 MG/DL — SIGNIFICANT CHANGE UP (ref 1.6–2.6)
MCHC RBC-ENTMCNC: 31.6 PG — SIGNIFICANT CHANGE UP (ref 27–34)
MCHC RBC-ENTMCNC: 33.8 % — SIGNIFICANT CHANGE UP (ref 32–36)
MCV RBC AUTO: 93.7 FL — SIGNIFICANT CHANGE UP (ref 80–100)
MONOCYTES # BLD AUTO: 0.58 K/UL — SIGNIFICANT CHANGE UP (ref 0–0.9)
MONOCYTES NFR BLD AUTO: 8.2 % — SIGNIFICANT CHANGE UP (ref 2–14)
NEUTROPHILS # BLD AUTO: 5.39 K/UL — SIGNIFICANT CHANGE UP (ref 1.8–7.4)
NEUTROPHILS NFR BLD AUTO: 76.6 % — SIGNIFICANT CHANGE UP (ref 43–77)
NRBC # FLD: 0 — SIGNIFICANT CHANGE UP
PHOSPHATE SERPL-MCNC: 2.4 MG/DL — LOW (ref 2.5–4.5)
PLATELET # BLD AUTO: 126 K/UL — LOW (ref 150–400)
PMV BLD: 10.3 FL — SIGNIFICANT CHANGE UP (ref 7–13)
POTASSIUM SERPL-MCNC: 4 MMOL/L — SIGNIFICANT CHANGE UP (ref 3.5–5.3)
POTASSIUM SERPL-SCNC: 4 MMOL/L — SIGNIFICANT CHANGE UP (ref 3.5–5.3)
PREALB SERPL-MCNC: 18 MG/DL — LOW (ref 20–40)
PROT SERPL-MCNC: 6 G/DL — SIGNIFICANT CHANGE UP (ref 6–8.3)
RBC # BLD: 3.32 M/UL — LOW (ref 4.2–5.8)
RBC # FLD: 13.2 % — SIGNIFICANT CHANGE UP (ref 10.3–14.5)
SODIUM SERPL-SCNC: 145 MMOL/L — SIGNIFICANT CHANGE UP (ref 135–145)
WBC # BLD: 7.04 K/UL — SIGNIFICANT CHANGE UP (ref 3.8–10.5)
WBC # FLD AUTO: 7.04 K/UL — SIGNIFICANT CHANGE UP (ref 3.8–10.5)

## 2018-03-19 PROCEDURE — 99291 CRITICAL CARE FIRST HOUR: CPT

## 2018-03-19 RX ORDER — AMLODIPINE BESYLATE 2.5 MG/1
10 TABLET ORAL DAILY
Qty: 0 | Refills: 0 | Status: DISCONTINUED | OUTPATIENT
Start: 2018-03-19 | End: 2018-03-21

## 2018-03-19 RX ORDER — POTASSIUM PHOSPHATE, MONOBASIC POTASSIUM PHOSPHATE, DIBASIC 236; 224 MG/ML; MG/ML
15 INJECTION, SOLUTION INTRAVENOUS ONCE
Qty: 0 | Refills: 0 | Status: COMPLETED | OUTPATIENT
Start: 2018-03-19 | End: 2018-03-19

## 2018-03-19 RX ORDER — FUROSEMIDE 40 MG
40 TABLET ORAL EVERY 12 HOURS
Qty: 0 | Refills: 0 | Status: DISCONTINUED | OUTPATIENT
Start: 2018-03-19 | End: 2018-03-21

## 2018-03-19 RX ADMIN — BUDESONIDE AND FORMOTEROL FUMARATE DIHYDRATE 2 PUFF(S): 160; 4.5 AEROSOL RESPIRATORY (INHALATION) at 09:53

## 2018-03-19 RX ADMIN — Medication 3 MILLILITER(S): at 15:59

## 2018-03-19 RX ADMIN — LAMOTRIGINE 100 MILLIGRAM(S): 25 TABLET, ORALLY DISINTEGRATING ORAL at 17:45

## 2018-03-19 RX ADMIN — Medication 100 MILLIGRAM(S): at 17:45

## 2018-03-19 RX ADMIN — Medication 400 MILLIGRAM(S): at 05:52

## 2018-03-19 RX ADMIN — CHLORHEXIDINE GLUCONATE 1 APPLICATION(S): 213 SOLUTION TOPICAL at 11:29

## 2018-03-19 RX ADMIN — POLYETHYLENE GLYCOL 3350 17 GRAM(S): 17 POWDER, FOR SOLUTION ORAL at 11:29

## 2018-03-19 RX ADMIN — Medication 10 MILLIGRAM(S): at 14:50

## 2018-03-19 RX ADMIN — Medication 40 MILLIGRAM(S): at 23:08

## 2018-03-19 RX ADMIN — LAMOTRIGINE 100 MILLIGRAM(S): 25 TABLET, ORALLY DISINTEGRATING ORAL at 05:52

## 2018-03-19 RX ADMIN — Medication 75 MILLIGRAM(S): at 17:45

## 2018-03-19 RX ADMIN — Medication 3 MILLILITER(S): at 04:22

## 2018-03-19 RX ADMIN — Medication 100 MILLIGRAM(S): at 05:52

## 2018-03-19 RX ADMIN — Medication 400 MILLIGRAM(S): at 17:45

## 2018-03-19 RX ADMIN — Medication 3 MILLILITER(S): at 09:53

## 2018-03-19 RX ADMIN — Medication 40 MILLIGRAM(S): at 05:51

## 2018-03-19 RX ADMIN — POTASSIUM PHOSPHATE, MONOBASIC POTASSIUM PHOSPHATE, DIBASIC 62.5 MILLIMOLE(S): 236; 224 INJECTION, SOLUTION INTRAVENOUS at 04:59

## 2018-03-19 RX ADMIN — Medication 40 MILLIGRAM(S): at 11:29

## 2018-03-19 RX ADMIN — BUDESONIDE AND FORMOTEROL FUMARATE DIHYDRATE 2 PUFF(S): 160; 4.5 AEROSOL RESPIRATORY (INHALATION) at 21:31

## 2018-03-19 RX ADMIN — AMLODIPINE BESYLATE 10 MILLIGRAM(S): 2.5 TABLET ORAL at 11:29

## 2018-03-19 RX ADMIN — Medication 3 MILLILITER(S): at 21:21

## 2018-03-19 RX ADMIN — SENNA PLUS 2 TABLET(S): 8.6 TABLET ORAL at 23:08

## 2018-03-19 RX ADMIN — Medication 75 MILLIGRAM(S): at 05:52

## 2018-03-19 NOTE — PROGRESS NOTE ADULT - ASSESSMENT
73 yo M with hx of COPD on 2 L at home, Lung Ca s/p R Lung lobectomy in 2003 s/p chemo/radiation, AVR, Afib on coumadin, seizure d/o, HTN who presents with increased sputum production and cough a/w sepsis likely 2/2 to influenza infection c/b COPD exacerbation     #. Neuro  - no longer with acute metabolic encephalopathy 2/2 hypercarbia and hypoxia  - seizure d/o: c/w home carbamazepine and lamictal    #. CV  - Afib: initially supratherapeutic INR in setting of poor PO intake but now at goal and will continue to monitor INR daily and dose Coumadin  - HTN: hold home amlodipine d/t sepsis    #. Pulm  COPD exacerbation 2/2 influenza c/b acute hypoxic respiratory failure requiring BiPAP  - c/w BiPAP QHS and PRN  - c/w Levaquin d4/7 and Tamiflu d4/5  - c/w prednisone 40 QD  - Symbicort in place of home Breo  - monitor SpO2 closely    #. GI  - Can restart diet based on speech and swallow study now that pt no longer on BiPAP  - Bowel regimen; dulcolax    #. Renal  - CELINE likely 2/2 prerenal 2/2 severe sepsis and poor PO intake in setting of influenza: baseline Cr ~0.6, monitor BUN and Cr, avoid nephrotoxins, and renally dose medications    #. ID  - COPD exacerbation: c/w Levaquin d4/7, Tamiflu d4/5    #. Heme  - acute thrombocytopenia on this admission in setting of severe sepsis and influenza; c/w monitoring and consider further w/u if no improvement    #. Endo  - no acute issues    #. DVT ppx  - supratherapeutic INR on Coumadin, hold today    FOR FOLLOW UP:  COPD exacerbation  - BiPAP QHS and PRN  - per Pulmonology recs: complete Levaquin (>7d course), Tamiflu, and prednisone course  - f/u with DME company ([phone]) regarding home CPAP settings  - outpatient f/u with Dr Sameera BERGER  - d/t severe sepsis  - monitor BUN/Cr  Thrombocytopenia  - monitor plts in setting of new, acute thrombocytopenia  LLL nodule  - repeat CT chest in 4-6 weeks and f/u with Dr Brasher 73 yo M with hx of COPD on 2 L at home, Lung Ca s/p R Lung lobectomy in 2003 s/p chemo/radiation, AVR, Afib on coumadin, seizure d/o, HTN who presents with increased sputum production and cough a/w sepsis likely 2/2 to influenza infection c/b COPD exacerbation     #. Neuro  - no longer with acute metabolic encephalopathy 2/2 hypercarbia and hypoxia  - seizure d/o: c/w home carbamazepine and lamictal    #. CV  - Afib: initially supratherapeutic INR in setting of poor PO intake but now at goal and will continue to monitor INR daily and dose Coumadin  - Continues to have elevated INR despite holding dose of coumadin, likely 2/2 new medications for Flu / antibiosis, will continue to trend and hold accordingly    #. Pulm  COPD exacerbation 2/2 influenza c/b acute hypoxic respiratory failure requiring BiPAP  - BiPAP prn for desaturations  - c/w Levaquin d5/7 and Tamiflu d5/5  - Symbicort and albuterol inhalers  - monitor SpO2 closely    #. GI  - Can restart diet based on speech and swallow study now that pt no longer on BiPAP  - Bowel regimen; dulcolax  - Consider addition of prn enema for severe constipation    #. Renal  - CELINE likely 2/2 prerenal 2/2 severe sepsis and poor PO intake in setting of influenza: baseline Cr ~0.6, monitor BUN and Cr, avoid nephrotoxins, and renally dose medications    #. ID  - COPD exacerbation: c/w Levaquin d5/7, Tamiflu d5/5    #. Heme  - acute thrombocytopenia on this admission in setting of severe sepsis and influenza; c/w monitoring and consider further w/u if no improvement    #. Endo  - no acute issues    #. DVT ppx  - supratherapeutic INR on Coumadin, hold today    FOR FOLLOW UP:  COPD exacerbation  - BiPAP QHS and PRN  - per Pulmonology recs: complete Levaquin (>7d course), Tamiflu, and prednisone course  - f/u with DME company ([phone]) regarding home CPAP settings  - outpatient f/u with Dr Sameera BERGER  - d/t severe sepsis  - monitor BUN/Cr  Thrombocytopenia  - monitor plts in setting of new, acute thrombocytopenia  LLL nodule  - repeat CT chest in 4-6 weeks and f/u with Dr Brasher

## 2018-03-19 NOTE — CONSULT NOTE ADULT - SUBJECTIVE AND OBJECTIVE BOX
CHIEF COMPLAINT:Patient is a 72y old  Male who presents with a chief complaint of increased sputum production, cough (16 Mar 2018 16:43)      HISTORY OF PRESENT ILLNESS:  This is a pleasant gentleman known to me with history as below presented with increased sputum production and cough a/w sepsis likely 2/2 to influenza infection c/b COPD exacerbation, now improving  no cp   no dizziness or syncope    PAST MEDICAL & SURGICAL HISTORY:  Atrial fibrillation  History of pneumonia: 4/2013  Lung cancer: s/p R. lobectomy  Hypertension  Hyperlipidemia  Seizure disorder  H/O inguinal hernia repair: 1999  H/O aortic valve replacement: 2013  S/P cataract surgery: bilaterally  Aortic valve replaced: 5/2013   bovine AoValve  S/P pneumonectomy: right sided, ~ 2003          MEDICATIONS:    levoFLOXacin IVPB 750 milliGRAM(s) IV Intermittent every 24 hours  oseltamivir 75 milliGRAM(s) Oral two times a day    ALBUTerol/ipratropium for Nebulization 3 milliLiter(s) Nebulizer every 6 hours  buDESOnide 160 MICROgram(s)/formoterol 4.5 MICROgram(s) Inhaler 2 Puff(s) Inhalation two times a day    carBAMazepine 400 milliGRAM(s) Oral two times a day  lamoTRIgine 100 milliGRAM(s) Oral two times a day    docusate sodium 100 milliGRAM(s) Oral two times a day  polyethylene glycol 3350 17 Gram(s) Oral daily  senna 2 Tablet(s) Oral at bedtime      chlorhexidine 4% Liquid 1 Application(s) Topical <User Schedule>      FAMILY HISTORY:  Family history of cancer (Aunt): Maternal aunt  Family history of hypertension: Mother  Family history of stroke: Mother      Non-contributory    SOCIAL HISTORY:    No tobacco, drugs or etoh    Allergies    No Known Allergies    Intolerances    	    REVIEW OF SYSTEMS:  as above  The rest of the 14 points ROS reviewed and except above they are unremarkable.        PHYSICAL EXAM:  T(C): 36.1 (03-19-18 @ 08:00), Max: 37.1 (03-18-18 @ 12:12)  HR: 70 (03-19-18 @ 08:17) (65 - 90)  BP: 137/61 (03-19-18 @ 08:00) (94/54 - 175/81)  RR: 21 (03-19-18 @ 08:00) (18 - 33)  SpO2: 99% (03-19-18 @ 08:17) (92% - 100%)  Wt(kg): --  I&O's Summary    18 Mar 2018 07:01  -  19 Mar 2018 07:00  --------------------------------------------------------  IN: 680 mL / OUT: 1400 mL / NET: -720 mL        JVP: Normal  Neck: supple  Lung: few crackles   CV: S1 S2 , Murmur:  Abd: soft  Ext: No edema  neuro: Awake / alert  Psych: flat affect  Skin: normal     LABS/DATA:    TELEMETRY: 	 afib   ECG:  	   	  CARDIAC MARKERS:                                  10.5   7.04  )-----------( 126      ( 19 Mar 2018 02:40 )             31.1     03-19    145  |  106  |  16  ----------------------------<  115<H>  4.0   |  29  |  0.86    Ca    8.4      19 Mar 2018 02:40  Phos  2.4     03-19  Mg     2.2     03-19    TPro  6.0  /  Alb  3.1<L>  /  TBili  0.5  /  DBili  x   /  AST  23  /  ALT  25  /  AlkPhos  75  03-19    proBNP: Serum Pro-Brain Natriuretic Peptide: 2056 pg/mL (03-17 @ 05:30)    Lipid Profile:   HgA1c:   TSH:

## 2018-03-19 NOTE — PROGRESS NOTE ADULT - SUBJECTIVE AND OBJECTIVE BOX
CHIEF COMPLAINT:    Interval Events:    REVIEW OF SYSTEMS:  Constitutional: [ ] negative [ ] fevers [ ] chills [ ] weight loss [ ] weight gain  HEENT: [ ] negative [ ] dry eyes [ ] eye irritation [ ] postnasal drip [ ] nasal congestion  CV: [ ] negative  [ ] chest pain [ ] orthopnea [ ] palpitations [ ] murmur  Resp: [ ] negative [ ] cough [ ] shortness of breath [ ] dyspnea [ ] wheezing [ ] sputum [ ] hemoptysis  GI: [ ] negative [ ] nausea [ ] vomiting [ ] diarrhea [ ] constipation [ ] abd pain [ ] dysphagia   : [ ] negative [ ] dysuria [ ] nocturia [ ] hematuria [ ] increased urinary frequency  Musculoskeletal: [ ] negative [ ] back pain [ ] myalgias [ ] arthralgias [ ] fracture  Skin: [ ] negative [ ] rash [ ] itch  Neurological: [ ] negative [ ] headache [ ] dizziness [ ] syncope [ ] weakness [ ] numbness  Psychiatric: [ ] negative [ ] anxiety [ ] depression  Endocrine: [ ] negative [ ] diabetes [ ] thyroid problem  Hematologic/Lymphatic: [ ] negative [ ] anemia [ ] bleeding problem  Allergic/Immunologic: [ ] negative [ ] itchy eyes [ ] nasal discharge [ ] hives [ ] angioedema  [ ] All other systems negative  [ ] Unable to assess ROS because ________    OBJECTIVE:  ICU Vital Signs Last 24 Hrs  T(C): 36.2 (19 Mar 2018 04:00), Max: 37.1 (18 Mar 2018 12:12)  T(F): 97.1 (19 Mar 2018 04:00), Max: 98.8 (18 Mar 2018 12:12)  HR: 75 (19 Mar 2018 06:00) (65 - 90)  BP: 152/78 (19 Mar 2018 06:00) (94/54 - 175/81)  BP(mean): 96 (19 Mar 2018 06:00) (64 - 106)  ABP: --  ABP(mean): --  RR: 27 (19 Mar 2018 06:00) (18 - 33)  SpO2: 98% (19 Mar 2018 06:00) (92% - 100%)        03-17 @ 07:01  -  03-18 @ 07:00  --------------------------------------------------------  IN: 400 mL / OUT: 700 mL / NET: -300 mL    03-18 @ 07:01  - 03-19 @ 06:46  --------------------------------------------------------  IN: 680 mL / OUT: 1400 mL / NET: -720 mL      CAPILLARY BLOOD GLUCOSE          PHYSICAL EXAM:  General:   HEENT:   Lymph Nodes:  Neck:   Respiratory:   Cardiovascular:   Abdomen:   Extremities:   Skin:   Neurological:  Psychiatry:    LINES:    HOSPITAL MEDICATIONS:  Standing Meds:  ALBUTerol/ipratropium for Nebulization 3 milliLiter(s) Nebulizer every 6 hours  buDESOnide 160 MICROgram(s)/formoterol 4.5 MICROgram(s) Inhaler 2 Puff(s) Inhalation two times a day  carBAMazepine 400 milliGRAM(s) Oral two times a day  chlorhexidine 4% Liquid 1 Application(s) Topical <User Schedule>  docusate sodium 100 milliGRAM(s) Oral two times a day  lamoTRIgine 100 milliGRAM(s) Oral two times a day  levoFLOXacin IVPB 750 milliGRAM(s) IV Intermittent every 24 hours  oseltamivir 75 milliGRAM(s) Oral two times a day  polyethylene glycol 3350 17 Gram(s) Oral daily  senna 2 Tablet(s) Oral at bedtime      PRN Meds:      LABS:                        10.5   7.04  )-----------( 126      ( 19 Mar 2018 02:40 )             31.1     Hgb Trend: 10.5<--, 9.8<--, 9.4<--, 8.6<--, 9.1<--  03-19    145  |  106  |  16  ----------------------------<  115<H>  4.0   |  29  |  0.86    Ca    8.4      19 Mar 2018 02:40  Phos  2.4     03-19  Mg     2.2     03-19    TPro  6.0  /  Alb  3.1<L>  /  TBili  0.5  /  DBili  x   /  AST  23  /  ALT  25  /  AlkPhos  75  03-19    Creatinine Trend: 0.86<--, 0.90<--, 0.89<--, 0.90<--, 0.96<--, 1.32<--  PT/INR - ( 18 Mar 2018 02:41 )   PT: 49.8 SEC;   INR: 4.35              Arterial Blood Gas:  03-17 @ 09:39  7.39/56/33/30/57.3/7.6  ABG lactate: --  Arterial Blood Gas:  03-17 @ 07:45  7.24/75/44/26/68.8/4.0  ABG lactate: --        MICROBIOLOGY:     RADIOLOGY:  [ ] Reviewed and interpreted by me    EKG: CHIEF COMPLAINT:    Interval Events:    REVIEW OF SYSTEMS:  Constitutional: [ ] negative [ ] fevers [ ] chills [ ] weight loss [ ] weight gain  HEENT: [ ] negative [ ] dry eyes [ ] eye irritation [ ] postnasal drip [ ] nasal congestion  CV: [ ] negative  [ ] chest pain [ ] orthopnea [ ] palpitations [ ] murmur  Resp: [ ] negative [ ] cough [ ] shortness of breath [ ] dyspnea [ ] wheezing [ ] sputum [ ] hemoptysis  GI: [ ] negative [ ] nausea [ ] vomiting [ ] diarrhea [ ] constipation [ ] abd pain [ ] dysphagia   : [ ] negative [ ] dysuria [ ] nocturia [ ] hematuria [ ] increased urinary frequency  Musculoskeletal: [ ] negative [ ] back pain [ ] myalgias [ ] arthralgias [ ] fracture  Skin: [ ] negative [ ] rash [ ] itch  Neurological: [ ] negative [ ] headache [ ] dizziness [ ] syncope [ ] weakness [ ] numbness  Psychiatric: [ ] negative [ ] anxiety [ ] depression  Endocrine: [ ] negative [ ] diabetes [ ] thyroid problem  Hematologic/Lymphatic: [ ] negative [ ] anemia [ ] bleeding problem  Allergic/Immunologic: [ ] negative [ ] itchy eyes [ ] nasal discharge [ ] hives [ ] angioedema  [ ] All other systems negative  [ ] Unable to assess ROS because ________    OBJECTIVE:  ICU Vital Signs Last 24 Hrs  T(C): 36.2 (19 Mar 2018 04:00), Max: 37.1 (18 Mar 2018 12:12)  T(F): 97.1 (19 Mar 2018 04:00), Max: 98.8 (18 Mar 2018 12:12)  HR: 75 (19 Mar 2018 06:00) (65 - 90)  BP: 152/78 (19 Mar 2018 06:00) (94/54 - 175/81)  BP(mean): 96 (19 Mar 2018 06:00) (64 - 106)  ABP: --  ABP(mean): --  RR: 27 (19 Mar 2018 06:00) (18 - 33)  SpO2: 98% (19 Mar 2018 06:00) (92% - 100%)        03-17 @ 07:01  -  03-18 @ 07:00  --------------------------------------------------------  IN: 400 mL / OUT: 700 mL / NET: -300 mL    03-18 @ 07:01  -  03-19 @ 06:46  --------------------------------------------------------  IN: 680 mL / OUT: 1400 mL / NET: -720 mL      CAPILLARY BLOOD GLUCOSE          PHYSICAL EXAM:  GENERAL: NAD, well-developed, speaking in full sentences ([place] with wife)  HEAD:  Atraumatic, Normocephalic  EYES: EOMI, PERRLA, conjunctiva and sclera clear  CHEST/LUNG: L breath sounds with mild rhonchi. Absent breath sounds on R 2/2 pneumonectomy.  HEART: Regular rate and rhythm  ABDOMEN: Soft, Nontender, Nondistended; Bowel sounds present  NEURO: Awake, follows simple commands, speaking some words of English in response occasionally, typically wife translating  EXTREMITIES:  Pink and warm, Peripheral Pulses intact    LINES:    HOSPITAL MEDICATIONS:  Standing Meds:  ALBUTerol/ipratropium for Nebulization 3 milliLiter(s) Nebulizer every 6 hours  buDESOnide 160 MICROgram(s)/formoterol 4.5 MICROgram(s) Inhaler 2 Puff(s) Inhalation two times a day  carBAMazepine 400 milliGRAM(s) Oral two times a day  chlorhexidine 4% Liquid 1 Application(s) Topical <User Schedule>  docusate sodium 100 milliGRAM(s) Oral two times a day  lamoTRIgine 100 milliGRAM(s) Oral two times a day  levoFLOXacin IVPB 750 milliGRAM(s) IV Intermittent every 24 hours  oseltamivir 75 milliGRAM(s) Oral two times a day  polyethylene glycol 3350 17 Gram(s) Oral daily  senna 2 Tablet(s) Oral at bedtime      PRN Meds:      LABS:                        10.5   7.04  )-----------( 126      ( 19 Mar 2018 02:40 )             31.1     Hgb Trend: 10.5<--, 9.8<--, 9.4<--, 8.6<--, 9.1<--  03-19    145  |  106  |  16  ----------------------------<  115<H>  4.0   |  29  |  0.86    Ca    8.4      19 Mar 2018 02:40  Phos  2.4     03-19  Mg     2.2     03-19    TPro  6.0  /  Alb  3.1<L>  /  TBili  0.5  /  DBili  x   /  AST  23  /  ALT  25  /  AlkPhos  75  03-19    Creatinine Trend: 0.86<--, 0.90<--, 0.89<--, 0.90<--, 0.96<--, 1.32<--  PT/INR - ( 18 Mar 2018 02:41 )   PT: 49.8 SEC;   INR: 4.35              Arterial Blood Gas:  03-17 @ 09:39  7.39/56/33/30/57.3/7.6  ABG lactate: --  Arterial Blood Gas:  03-17 @ 07:45  7.24/75/44/26/68.8/4.0  ABG lactate: --        MICROBIOLOGY:     RADIOLOGY:  [ ] Reviewed and interpreted by me    EKG: CHIEF COMPLAINT: SOB, cough    Interval Events: NAEO, this morning pt continued off BiPAP, on 3L O2 NC. However, ~10AM pt had a desaturation to mid 80s on the monitor and was re-started on BiPAP for ~10 minutes then wanted to eat, reverted to NC with O2 sat of 100%, no additional interventions were required.     REVIEW OF SYSTEMS:  Constitutional: [ ] negative [ ] fevers [ ] chills [x] weight loss [ ] weight gain  HEENT: [x] negative [ ] dry eyes [ ] eye irritation [ ] postnasal drip [ ] nasal congestion  CV: [x] negative  [ ] chest pain [ ] orthopnea [ ] palpitations [ ] murmur  Resp: [ ] negative [x] cough [x] shortness of breath [ ] dyspnea [ ] wheezing [ ] sputum [ ] hemoptysis  GI: [ ] negative [ ] nausea [ ] vomiting [ ] diarrhea [x] constipation [ ] abd pain [ ] dysphagia   : [x] negative [ ] dysuria [ ] nocturia [ ] hematuria [ ] increased urinary frequency  Musculoskeletal: [x] negative [ ] back pain [ ] myalgias [ ] arthralgias [ ] fracture  Skin: [ ] negative [ ] rash [ ] itch  Neurological: [ ] negative [ ] headache [ ] dizziness [ ] syncope [ ] weakness [ ] numbness  Psychiatric: [ ] negative [ ] anxiety [ ] depression  Endocrine: [ ] negative [ ] diabetes [ ] thyroid problem  Hematologic/Lymphatic: [ ] negative [ ] anemia [ ] bleeding problem  Allergic/Immunologic: [ ] negative [ ] itchy eyes [ ] nasal discharge [ ] hives [ ] angioedema  [x] All other systems negative  [ ] Unable to assess ROS because ________    OBJECTIVE:  ICU Vital Signs Last 24 Hrs  T(C): 36.2 (19 Mar 2018 04:00), Max: 37.1 (18 Mar 2018 12:12)  T(F): 97.1 (19 Mar 2018 04:00), Max: 98.8 (18 Mar 2018 12:12)  HR: 75 (19 Mar 2018 06:00) (65 - 90)  BP: 152/78 (19 Mar 2018 06:00) (94/54 - 175/81)  BP(mean): 96 (19 Mar 2018 06:00) (64 - 106)  ABP: --  ABP(mean): --  RR: 27 (19 Mar 2018 06:00) (18 - 33)  SpO2: 98% (19 Mar 2018 06:00) (92% - 100%)    03-17 @ 07:01 - 03-18 @ 07:00  --------------------------------------------------------  IN: 400 mL / OUT: 700 mL / NET: -300 mL    03-18 @ 07:01 - 03-19 @ 06:46  --------------------------------------------------------  IN: 680 mL / OUT: 1400 mL / NET: -720 mL    CAPILLARY BLOOD GLUCOSE    PHYSICAL EXAM:  GENERAL: NAD, well-developed, speaking in full sentences ([place], speaking with wife)  HEAD:  Atraumatic, Normocephalic  EYES: EOMI, PERRLA, conjunctiva and sclera clear  CHEST/LUNG: L breath sounds are CTA. Right breath sounds on R 2/2 pneumonectomy.  HEART: Regular rate and rhythm  ABDOMEN: Soft, nontender, nondistended; bowel sounds present  NEURO: Awake, speaking some words of English in response occasionally, typically wife translating. Cooperative to exam  EXTREMITIES: Pink and warm, Peripheral Pulses intact    LINES: N/A    HOSPITAL MEDICATIONS:  Standing Meds:  ALBUTerol/ipratropium for Nebulization 3 milliLiter(s) Nebulizer every 6 hours  buDESOnide 160 MICROgram(s)/formoterol 4.5 MICROgram(s) Inhaler 2 Puff(s) Inhalation two times a day  carBAMazepine 400 milliGRAM(s) Oral two times a day  chlorhexidine 4% Liquid 1 Application(s) Topical <User Schedule>  docusate sodium 100 milliGRAM(s) Oral two times a day  lamoTRIgine 100 milliGRAM(s) Oral two times a day  levoFLOXacin IVPB 750 milliGRAM(s) IV Intermittent every 24 hours  oseltamivir 75 milliGRAM(s) Oral two times a day  polyethylene glycol 3350 17 Gram(s) Oral daily  senna 2 Tablet(s) Oral at bedtime  lasix 40mg qd    PRN Meds:     LABS:                         10.5   7.04  )-----------( 126      ( 19 Mar 2018 02:40 )             31.1     Hgb Trend: 10.5<--, 9.8<--, 9.4<--, 8.6<--, 9.1<--  03-19    145  |  106  |  16  ----------------------------<  115<H>  4.0   |  29  |  0.86    Ca    8.4      19 Mar 2018 02:40  Phos  2.4     03-19  Mg     2.2     03-19    TPro  6.0  /  Alb  3.1<L>  /  TBili  0.5  /  DBili  x   /  AST  23  /  ALT  25  /  AlkPhos  75  03-19    Creatinine Trend: 0.86<--, 0.90<--, 0.89<--, 0.90<--, 0.96<--, 1.32<--  PT/INR - ( 18 Mar 2018 02:41 )   PT: 49.8 SEC;   INR: 4.35       Arterial Blood Gas:  03-17 @ 09:39  7.39/56/33/30/57.3/7.6  ABG lactate: --  Arterial Blood Gas:  03-17 @ 07:45  7.24/75/44/26/68.8/4.0  ABG lactate: --

## 2018-03-19 NOTE — CHART NOTE - NSCHARTNOTEFT_GEN_A_CORE
: Kiarra Chong, PGY3    INDICATION:  Cardiac/lung US exams performed for evaluation of acute hypoxic respiratory failure.    PROCEDURE:  [x] LIMITED ECHO  [x] LIMITED CHEST  [ ] LIMITED RETROPERITONEAL  [ ] LIMITED ABDOMINAL  [ ] LIMITED DVT  [ ] NEEDLE GUIDANCE VASCULAR  [ ] NEEDLE GUIDANCE THORACENTESIS  [ ] NEEDLE GUIDANCE PARACENTESIS  [ ] NEEDLE GUIDANCE PERICARDIOCENTESIS  [ ] OTHER    FINDINGS:  Cardiac: Unable to adequately visualize cardiac structure/functioning due to difficult cardiac windows.  Lungs/chest: A-line predominant pattern, bilaterally. Large, complex L-sided pleural effusion. Large, left lower lobe alveolar consolidation.       INTERPRETATION:  Large, complex L sided pleural effusion and associated alveolar consolidation likely chronic in etiology 2/2 ongoing, underlying disease processes.

## 2018-03-19 NOTE — SWALLOW BEDSIDE ASSESSMENT ADULT - COMMENTS
SLP attempted to see patient for swallowing evaluation.  SLP spoke with wife, who reported honey thickened liquids and soft solids at home are provided.  Wife reported patient had MBS last May 2017 in which it was recommended to give honey thickened liquids.  The patient is requesting thin liquids.  As per MBS 5/2017: There was silent penetration on nectar thickened liquids and silent aspiration for thin liquids.  While the SLP was reviewing the previous MBS outside fo the room, the patient requested to have BIPAP placed as he was having difficulty breathing on nasal cannula.  Nursing placed BIPAP on patient.  This department to reattempt as schedule permits once patient is off of BIPAP.

## 2018-03-19 NOTE — CONSULT NOTE ADULT - ASSESSMENT
afib  HR stable  cont to monitor  keep INR 2.5    copd exacerbation  on anbx  nebs  o2  fu with ICU / pulm    PNA  on abx  fu with pulm

## 2018-03-20 DIAGNOSIS — R13.10 DYSPHAGIA, UNSPECIFIED: ICD-10-CM

## 2018-03-20 DIAGNOSIS — K59.00 CONSTIPATION, UNSPECIFIED: ICD-10-CM

## 2018-03-20 LAB
INR BLD: 2.86 — HIGH (ref 0.88–1.17)
PROTHROM AB SERPL-ACNC: 32.4 SEC — HIGH (ref 9.8–13.1)

## 2018-03-20 PROCEDURE — 99233 SBSQ HOSP IP/OBS HIGH 50: CPT | Mod: GC

## 2018-03-20 RX ORDER — LACTULOSE 10 G/15ML
20 SOLUTION ORAL THREE TIMES A DAY
Qty: 0 | Refills: 0 | Status: DISCONTINUED | OUTPATIENT
Start: 2018-03-20 | End: 2018-03-21

## 2018-03-20 RX ORDER — WARFARIN SODIUM 2.5 MG/1
2 TABLET ORAL ONCE
Qty: 0 | Refills: 0 | Status: COMPLETED | OUTPATIENT
Start: 2018-03-20 | End: 2018-03-20

## 2018-03-20 RX ADMIN — Medication 100 MILLIGRAM(S): at 05:00

## 2018-03-20 RX ADMIN — LACTULOSE 20 GRAM(S): 10 SOLUTION ORAL at 11:39

## 2018-03-20 RX ADMIN — Medication 40 MILLIGRAM(S): at 05:00

## 2018-03-20 RX ADMIN — LAMOTRIGINE 100 MILLIGRAM(S): 25 TABLET, ORALLY DISINTEGRATING ORAL at 05:00

## 2018-03-20 RX ADMIN — SENNA PLUS 2 TABLET(S): 8.6 TABLET ORAL at 21:55

## 2018-03-20 RX ADMIN — Medication 400 MILLIGRAM(S): at 05:01

## 2018-03-20 RX ADMIN — Medication 3 MILLILITER(S): at 20:56

## 2018-03-20 RX ADMIN — Medication 1 ENEMA: at 14:57

## 2018-03-20 RX ADMIN — AMLODIPINE BESYLATE 10 MILLIGRAM(S): 2.5 TABLET ORAL at 05:00

## 2018-03-20 RX ADMIN — Medication 3 MILLILITER(S): at 15:22

## 2018-03-20 RX ADMIN — BUDESONIDE AND FORMOTEROL FUMARATE DIHYDRATE 2 PUFF(S): 160; 4.5 AEROSOL RESPIRATORY (INHALATION) at 10:59

## 2018-03-20 RX ADMIN — CHLORHEXIDINE GLUCONATE 1 APPLICATION(S): 213 SOLUTION TOPICAL at 11:39

## 2018-03-20 RX ADMIN — WARFARIN SODIUM 2 MILLIGRAM(S): 2.5 TABLET ORAL at 17:14

## 2018-03-20 RX ADMIN — Medication 40 MILLIGRAM(S): at 17:11

## 2018-03-20 RX ADMIN — LACTULOSE 20 GRAM(S): 10 SOLUTION ORAL at 21:55

## 2018-03-20 RX ADMIN — BUDESONIDE AND FORMOTEROL FUMARATE DIHYDRATE 2 PUFF(S): 160; 4.5 AEROSOL RESPIRATORY (INHALATION) at 20:55

## 2018-03-20 RX ADMIN — Medication 100 MILLIGRAM(S): at 17:12

## 2018-03-20 RX ADMIN — Medication 75 MILLIGRAM(S): at 05:00

## 2018-03-20 RX ADMIN — LAMOTRIGINE 100 MILLIGRAM(S): 25 TABLET, ORALLY DISINTEGRATING ORAL at 17:11

## 2018-03-20 RX ADMIN — Medication 400 MILLIGRAM(S): at 17:11

## 2018-03-20 RX ADMIN — POLYETHYLENE GLYCOL 3350 17 GRAM(S): 17 POWDER, FOR SOLUTION ORAL at 11:39

## 2018-03-20 RX ADMIN — Medication 3 MILLILITER(S): at 10:59

## 2018-03-20 NOTE — SWALLOW BEDSIDE ASSESSMENT ADULT - ADDITIONAL RECOMMENDATIONS
1. Patient would benefit from additional speech and swallowing therapy upon discharge.  Patient can call this department at [phone] for outpatient services.

## 2018-03-20 NOTE — PROGRESS NOTE ADULT - ASSESSMENT
afib  HR stable  cont to monitor  keep INR 2.5    copd exacerbation  on anbx  nebs  o2  fu with RCU / pulm    PNA  on abx  fu with pulm

## 2018-03-20 NOTE — PROVIDER CONTACT NOTE (OTHER) - BACKGROUND
Patient admitting diagnosis is COPD with acute exacerbation. History of afib, PNA, Lung Ca, HTN, etc.

## 2018-03-20 NOTE — PROGRESS NOTE ADULT - ATTENDING COMMENTS
Patient with severe COPD, pneumonectomy, admitted to ICU with hypercapnic respiratory failure requiring BIPAP. Now improved, will try to ambulate today, perhaps with supplemental Breathe vent.

## 2018-03-20 NOTE — PROGRESS NOTE ADULT - ASSESSMENT
71 yo M with hx of COPD on 2 L at home, Lung Ca s/p R Lung lobectomy in 2003 s/p chemo/radiation, AVR, Afib on coumadin, seizure d/o, HTN who presents with increased sputum production and cough a/w sepsis likely 2/2 to influenza infection c/b COPD exacerbation     #. Neuro  - no longer with acute metabolic encephalopathy 2/2 hypercarbia and hypoxia  - seizure d/o: c/w home carbamazepine and lamictal    #. CV  - Afib: initially supratherapeutic INR in setting of poor PO intake but now at goal and will continue to monitor INR daily and dose Coumadin  - Continues to have elevated INR despite holding dose of coumadin, likely 2/2 new medications for Flu / antibiosis, will continue to trend and hold accordingly    #. Pulm  COPD exacerbation 2/2 influenza c/b acute hypoxic respiratory failure requiring BiPAP  - BiPAP prn for desaturations  - c/w Levaquin d5/7 and Tamiflu d5/5  - Symbicort and albuterol inhalers  - monitor SpO2 closely    #. GI  - Can restart diet based on speech and swallow study now that pt no longer on BiPAP  - Bowel regimen; dulcolax  - Consider addition of prn enema for severe constipation    #. Renal  - CELINE likely 2/2 prerenal 2/2 severe sepsis and poor PO intake in setting of influenza: baseline Cr ~0.6, monitor BUN and Cr, avoid nephrotoxins, and renally dose medications    #. ID  - COPD exacerbation: c/w Levaquin d5/7, Tamiflu d5/5    #. Heme  - acute thrombocytopenia on this admission in setting of severe sepsis and influenza; c/w monitoring and consider further w/u if no improvement    #. Endo  - no acute issues    #. DVT ppx  - supratherapeutic INR on Coumadin, hold today    FOR FOLLOW UP:  COPD exacerbation  - BiPAP QHS and PRN  - per Pulmonology recs: complete Levaquin (>7d course), Tamiflu, and prednisone course  - f/u with DME company ([phone]) regarding home CPAP settings  - outpatient f/u with Dr Sameera BERGER  - d/t severe sepsis  - monitor BUN/Cr  Thrombocytopenia  - monitor plts in setting of new, acute thrombocytopenia  LLL nodule  - repeat CT chest in 4-6 weeks and f/u with Dr Brasher 71 yo M with hx of COPD on 2 L at home, Lung Ca s/p R Lung lobectomy in 2003 s/p chemo/radiation, AVR, Afib on coumadin, seizure d/o, HTN who presents with increased sputum production and cough a/w sepsis likely 2/2 to influenza infection c/b COPD exacerbation

## 2018-03-20 NOTE — PROGRESS NOTE ADULT - SUBJECTIVE AND OBJECTIVE BOX
CHIEF COMPLAINT: Patient is a 72y old  Male who presents with a chief complaint of increased sputum production, cough (16 Mar 2018 16:43)    Interval Events:      REVIEW OF SYSTEMS:  Constitutional:   Eyes:  ENT:  CV:  Resp:  GI:  :  MSK:  Integumentary:  Neurological:  Psychiatric:  Endocrine:  Hematologic/Lymphatic:  Allergic/Immunologic:  [ ] All other systems negative  [ ] Unable to assess ROS because ________      OBJECTIVE:  ICU Vital Signs Last 24 Hrs  T(C): 36.4 (20 Mar 2018 04:51), Max: 36.4 (20 Mar 2018 04:51)  T(F): 97.5 (20 Mar 2018 04:51), Max: 97.5 (20 Mar 2018 04:51)  HR: 76 (20 Mar 2018 07:12) (66 - 107)  BP: 134/67 (20 Mar 2018 04:51) (104/62 - 154/72)  BP(mean): 87 (19 Mar 2018 20:00) (73 - 91)  ABP: --  ABP(mean): --  RR: 16 (20 Mar 2018 04:51) (16 - 30)  SpO2: 97% (20 Mar 2018 07:12) (90% - 100%)      03-19 @ 07:01  -  03-20 @ 07:00  --------------------------------------------------------  IN: 390 mL / OUT: 2150 mL / NET: -1760 mL    HOSPITAL MEDICATIONS:  MEDICATIONS  (STANDING):  ALBUTerol/ipratropium for Nebulization 3 milliLiter(s) Nebulizer every 6 hours  amLODIPine   Tablet 10 milliGRAM(s) Oral daily  buDESOnide 160 MICROgram(s)/formoterol 4.5 MICROgram(s) Inhaler 2 Puff(s) Inhalation two times a day  carBAMazepine 400 milliGRAM(s) Oral two times a day  chlorhexidine 4% Liquid 1 Application(s) Topical <User Schedule>  docusate sodium 100 milliGRAM(s) Oral two times a day  furosemide    Tablet 40 milliGRAM(s) Oral every 12 hours  lamoTRIgine 100 milliGRAM(s) Oral two times a day  polyethylene glycol 3350 17 Gram(s) Oral daily  senna 2 Tablet(s) Oral at bedtime  warfarin 2 milliGRAM(s) Oral once    MEDICATIONS  (PRN):      LABS:                        10.5   7.04  )-----------( 126      ( 19 Mar 2018 02:40 )             31.1     03-19    145  |  106  |  16  ----------------------------<  115<H>  4.0   |  29  |  0.86    Ca    8.4      19 Mar 2018 02:40  Phos  2.4     03-19  Mg     2.2     03-19    TPro  6.0  /  Alb  3.1<L>  /  TBili  0.5  /  DBili  x   /  AST  23  /  ALT  25  /  AlkPhos  75  03-19    PT/INR - ( 20 Mar 2018 05:00 )   PT: 32.4 SEC;   INR: 2.86                    MICROBIOLOGY:     RADIOLOGY:  [ ] Reviewed and interpreted by me    PULMONARY FUNCTION TESTS:    EKG: CHIEF COMPLAINT: Patient is a 72y old  Male who presents with a chief complaint of increased sputum production, cough (16 Mar 2018 16:43)    Interval Events: none overnight      REVIEW OF SYSTEMS:  Constitutional: no complaints  CV: denies  Resp: denies  GI: denies  [x] All other systems negative  [ ] Unable to assess ROS because ________      OBJECTIVE:  ICU Vital Signs Last 24 Hrs  T(C): 36.4 (20 Mar 2018 04:51), Max: 36.4 (20 Mar 2018 04:51)  T(F): 97.5 (20 Mar 2018 04:51), Max: 97.5 (20 Mar 2018 04:51)  HR: 76 (20 Mar 2018 07:12) (66 - 107)  BP: 134/67 (20 Mar 2018 04:51) (104/62 - 154/72)  BP(mean): 87 (19 Mar 2018 20:00) (73 - 91)  ABP: --  ABP(mean): --  RR: 16 (20 Mar 2018 04:51) (16 - 30)  SpO2: 97% (20 Mar 2018 07:12) (90% - 100%)      03-19 @ 07:01  -  03-20 @ 07:00  --------------------------------------------------------  IN: 390 mL / OUT: 2150 mL / NET: -1760 mL    HOSPITAL MEDICATIONS:  MEDICATIONS  (STANDING):  ALBUTerol/ipratropium for Nebulization 3 milliLiter(s) Nebulizer every 6 hours  amLODIPine   Tablet 10 milliGRAM(s) Oral daily  buDESOnide 160 MICROgram(s)/formoterol 4.5 MICROgram(s) Inhaler 2 Puff(s) Inhalation two times a day  carBAMazepine 400 milliGRAM(s) Oral two times a day  chlorhexidine 4% Liquid 1 Application(s) Topical <User Schedule>  docusate sodium 100 milliGRAM(s) Oral two times a day  furosemide    Tablet 40 milliGRAM(s) Oral every 12 hours  lamoTRIgine 100 milliGRAM(s) Oral two times a day  polyethylene glycol 3350 17 Gram(s) Oral daily  senna 2 Tablet(s) Oral at bedtime  warfarin 2 milliGRAM(s) Oral once    MEDICATIONS  (PRN):      LABS:             PT/INR - ( 20 Mar 2018 05:00 )   PT: 32.4 SEC;   INR: 2.86

## 2018-03-20 NOTE — SWALLOW BEDSIDE ASSESSMENT ADULT - ASR SWALLOW ASPIRATION MONITOR
cough/change of breathing pattern/pneumonia/throat clearing/upper respiratory infection/fever/gurgly voice

## 2018-03-20 NOTE — PROGRESS NOTE ADULT - PROBLEM SELECTOR PLAN 6
- per pt and family, without BM x7 days  - s/p enema this am without improvement  - will start lactulose until BM  - monitor

## 2018-03-20 NOTE — SWALLOW BEDSIDE ASSESSMENT ADULT - SWALLOW EVAL: DIAGNOSIS
1.) Patient presents with adequate oral stage skills for solids and honey thickened liquids marked by adequate bolus acceptance, formation, transfer and clearance.  2.) Pharyngeal stage of swallow for solids, and honey thickened liquids marked by timely swallow trigger, adequate hyolaryngeal elevation and no overt s/s of penetration/aspiration.

## 2018-03-20 NOTE — PROVIDER CONTACT NOTE (OTHER) - SITUATION
Patient have not had a bowel movement in 7 days. Patient requested an enema. Patient is already on a bowel regimen with no results. Patient stated feels discomfort in abdomen area.

## 2018-03-20 NOTE — SWALLOW BEDSIDE ASSESSMENT ADULT - ASR SWALLOW RECOMMEND DIAG
MBS to rule out silent penetration/aspiration due history of aspiration pneumonia and silent penetration/aspiration/VFSS/MBS

## 2018-03-20 NOTE — PROGRESS NOTE ADULT - SUBJECTIVE AND OBJECTIVE BOX
Subjective: Patient seen and examined. No new events except as noted.     SUBJECTIVE/ROS:  No chest pain, dyspnea, palpitation, or dizziness.       MEDICATIONS:  MEDICATIONS  (STANDING):  ALBUTerol/ipratropium for Nebulization 3 milliLiter(s) Nebulizer every 6 hours  amLODIPine   Tablet 10 milliGRAM(s) Oral daily  buDESOnide 160 MICROgram(s)/formoterol 4.5 MICROgram(s) Inhaler 2 Puff(s) Inhalation two times a day  carBAMazepine 400 milliGRAM(s) Oral two times a day  chlorhexidine 4% Liquid 1 Application(s) Topical <User Schedule>  docusate sodium 100 milliGRAM(s) Oral two times a day  furosemide    Tablet 40 milliGRAM(s) Oral every 12 hours  lactulose Syrup 20 Gram(s) Oral three times a day  lamoTRIgine 100 milliGRAM(s) Oral two times a day  polyethylene glycol 3350 17 Gram(s) Oral daily  senna 2 Tablet(s) Oral at bedtime  warfarin 2 milliGRAM(s) Oral once      PHYSICAL EXAM:  T(C): 36.5 (03-20-18 @ 14:57), Max: 36.5 (03-20-18 @ 14:57)  HR: 70 (03-20-18 @ 15:22) (66 - 91)  BP: 98/57 (03-20-18 @ 14:57) (98/57 - 150/76)  RR: 20 (03-20-18 @ 14:57) (16 - 28)  SpO2: 95% (03-20-18 @ 15:22) (95% - 100%)  Wt(kg): --  I&O's Summary    19 Mar 2018 07:01  -  20 Mar 2018 07:00  --------------------------------------------------------  IN: 390 mL / OUT: 2150 mL / NET: -1760 mL          Appearance: Normal	  HEENT:   Normal oral mucosa, PERRL, EOMI	  Cardiovascular: Normal S1 S2,    Murmur:   Neck: JVP normal  Respiratory: Lungs clear to auscultation  Gastrointestinal:  Soft, Non-tender, + BS	  Skin: normal   Neuro: No gross deficits.   Psychiatry:  Mood & affect appropriate  Ext: No edema      LABS/DATA:    CARDIAC MARKERS:                                10.5   7.04  )-----------( 126      ( 19 Mar 2018 02:40 )             31.1     03-19    145  |  106  |  16  ----------------------------<  115<H>  4.0   |  29  |  0.86    Ca    8.4      19 Mar 2018 02:40  Phos  2.4     03-19  Mg     2.2     03-19    TPro  6.0  /  Alb  3.1<L>  /  TBili  0.5  /  DBili  x   /  AST  23  /  ALT  25  /  AlkPhos  75  03-19    proBNP:   Lipid Profile:   HgA1c:   TSH:     TELE:  EKG:

## 2018-03-20 NOTE — SWALLOW BEDSIDE ASSESSMENT ADULT - COMMENTS
As per MBS on 5/8/2017:  "The patient demonstrated a moderate-severe pharyngeal dysphagia for nectar thick liquid textures marked by delayed swallow trigger with reduced base of tongue retraction, reduced epiglottic deflection, reduced hyolaryngeal elevation, reduced pharyngeal contractility, and incomplete closure of the laryngeal vestibule resulting in silent laryngeal penetration without full retrieval. Absent patient response indicative of reduced laryngo-pharyngeal sensation. Compensatory strategies unsuccessful in providing airway protection.   The patient demonstrated a severe pharyngeal dysphagia for thin liquid textures marked by delayed swallow trigger with reduced base of tongue retraction, reduced epiglottic deflection, reduced hyolaryngeal elevation, and incomplete closure of the laryngeal vestibule resulting in silent aspiration. Absent cough response indicative of reduced laryngo-pharyngeal sensation. Compensatory strategies were unsuccessful in eliminating aspiration." As per MBS on 5/8/2017:  "The patient demonstrated a moderate-severe pharyngeal dysphagia for nectar thick liquid textures marked by delayed swallow trigger with reduced base of tongue retraction, reduced epiglottic deflection, reduced hyolaryngeal elevation, reduced pharyngeal contractility, and incomplete closure of the laryngeal vestibule resulting in silent laryngeal penetration without full retrieval. Absent patient response indicative of reduced laryngo-pharyngeal sensation. Compensatory strategies unsuccessful in providing airway protection.   The patient demonstrated a severe pharyngeal dysphagia for thin liquid textures marked by delayed swallow trigger with reduced base of tongue retraction, reduced epiglottic deflection, reduced hyolaryngeal elevation, and incomplete closure of the laryngeal vestibule resulting in silent aspiration. Absent cough response indicative of reduced laryngo-pharyngeal sensation. Compensatory strategies were unsuccessful in eliminating aspiration."    Patient seen upright in bed, droplet precautions maintained due to influenza.  The patient with a clear vocal quality and breathing was nonlabored.

## 2018-03-20 NOTE — PROGRESS NOTE ADULT - PROBLEM SELECTOR PLAN 2
- 2/2 flu and superimposed pneumonia  - antibiotics completed  - supplemental oxygen as needed  - monitor respiratory status - 2/2 flu and superimposed pneumonia  - antibiotics completed  - supplemental oxygen as needed  - monitor respiratory status  - will need repeat CT chest in 4-6 weeks

## 2018-03-21 VITALS
OXYGEN SATURATION: 97 % | RESPIRATION RATE: 18 BRPM | HEART RATE: 80 BPM | TEMPERATURE: 98 F | SYSTOLIC BLOOD PRESSURE: 92 MMHG | DIASTOLIC BLOOD PRESSURE: 55 MMHG

## 2018-03-21 LAB
INR BLD: 2.2 — HIGH (ref 0.88–1.17)
PROTHROM AB SERPL-ACNC: 25.7 SEC — HIGH (ref 9.8–13.1)

## 2018-03-21 PROCEDURE — 99233 SBSQ HOSP IP/OBS HIGH 50: CPT | Mod: GC

## 2018-03-21 PROCEDURE — 74230 X-RAY XM SWLNG FUNCJ C+: CPT | Mod: 26

## 2018-03-21 RX ORDER — FUROSEMIDE 40 MG
40 TABLET ORAL DAILY
Qty: 0 | Refills: 0 | Status: DISCONTINUED | OUTPATIENT
Start: 2018-03-22 | End: 2018-03-21

## 2018-03-21 RX ORDER — SENNA PLUS 8.6 MG/1
2 TABLET ORAL
Qty: 60 | Refills: 0 | OUTPATIENT
Start: 2018-03-21 | End: 2018-04-19

## 2018-03-21 RX ORDER — MIDODRINE HYDROCHLORIDE 2.5 MG/1
5 TABLET ORAL ONCE
Qty: 0 | Refills: 0 | Status: COMPLETED | OUTPATIENT
Start: 2018-03-21 | End: 2018-03-21

## 2018-03-21 RX ORDER — NADOLOL 80 MG/1
1 TABLET ORAL
Qty: 0 | Refills: 0 | COMMUNITY

## 2018-03-21 RX ORDER — WARFARIN SODIUM 2.5 MG/1
1 TABLET ORAL
Qty: 0 | Refills: 0 | COMMUNITY

## 2018-03-21 RX ORDER — DOCUSATE SODIUM 100 MG
1 CAPSULE ORAL
Qty: 60 | Refills: 0 | OUTPATIENT
Start: 2018-03-21 | End: 2018-04-19

## 2018-03-21 RX ORDER — WARFARIN SODIUM 2.5 MG/1
3 TABLET ORAL ONCE
Qty: 0 | Refills: 0 | Status: DISCONTINUED | OUTPATIENT
Start: 2018-03-21 | End: 2018-03-21

## 2018-03-21 RX ADMIN — Medication 400 MILLIGRAM(S): at 05:16

## 2018-03-21 RX ADMIN — BUDESONIDE AND FORMOTEROL FUMARATE DIHYDRATE 2 PUFF(S): 160; 4.5 AEROSOL RESPIRATORY (INHALATION) at 09:27

## 2018-03-21 RX ADMIN — Medication 3 MILLILITER(S): at 09:27

## 2018-03-21 RX ADMIN — AMLODIPINE BESYLATE 10 MILLIGRAM(S): 2.5 TABLET ORAL at 05:16

## 2018-03-21 RX ADMIN — LACTULOSE 20 GRAM(S): 10 SOLUTION ORAL at 05:15

## 2018-03-21 RX ADMIN — Medication 100 MILLIGRAM(S): at 05:16

## 2018-03-21 RX ADMIN — LAMOTRIGINE 100 MILLIGRAM(S): 25 TABLET, ORALLY DISINTEGRATING ORAL at 05:16

## 2018-03-21 RX ADMIN — MIDODRINE HYDROCHLORIDE 5 MILLIGRAM(S): 2.5 TABLET ORAL at 00:36

## 2018-03-21 RX ADMIN — Medication 40 MILLIGRAM(S): at 05:16

## 2018-03-21 RX ADMIN — CHLORHEXIDINE GLUCONATE 1 APPLICATION(S): 213 SOLUTION TOPICAL at 11:34

## 2018-03-21 RX ADMIN — Medication 3 MILLILITER(S): at 03:35

## 2018-03-21 NOTE — PROGRESS NOTE ADULT - PROBLEM SELECTOR PROBLEM 2
Influenza B
COPD exacerbation
COPD exacerbation
Influenza B
Acute respiratory failure with hypoxia and hypercapnia
Acute respiratory failure with hypoxia and hypercapnia

## 2018-03-21 NOTE — PROGRESS NOTE ADULT - PROBLEM SELECTOR PROBLEM 3
COPD exacerbation
CELINE (acute kidney injury)
CELINE (acute kidney injury)
COPD exacerbation

## 2018-03-21 NOTE — SWALLOW VFSS/MBS ASSESSMENT ADULT - RECOMMENDED CONSISTENCY
1.) Regular Consistency with Thin Liquids  2.) Feeding/Swallowing Guidelines: Sit upright, small bites, chew solids well, single cup sips of thin liquids; two swallows per bite; alternate with a liquid wash after every 2 bites to assist with clearance.   3.) Aspiration Precautions  4.) Maintain Good Oral Hygiene Care

## 2018-03-21 NOTE — PROGRESS NOTE ADULT - PROBLEM SELECTOR PROBLEM 5
Atrial fibrillation
Atrial fibrillation
Seizure disorder
Seizure disorder
Atrial fibrillation
Atrial fibrillation

## 2018-03-21 NOTE — PROGRESS NOTE ADULT - PROBLEM SELECTOR PROBLEM 8
Need for prophylactic measure
Need for prophylactic measure
Protein calorie malnutrition
Need for prophylactic measure
Need for prophylactic measure

## 2018-03-21 NOTE — PROGRESS NOTE ADULT - PROBLEM SELECTOR PROBLEM 4
CELINE (acute kidney injury)
Atrial fibrillation
Atrial fibrillation
CELINE (acute kidney injury)
Hypertension
Hypertension

## 2018-03-21 NOTE — PROGRESS NOTE ADULT - PROBLEM SELECTOR PLAN 2
- 2/2 flu and superimposed pneumonia  - antibiotics completed  - supplemental oxygen as needed  - monitor respiratory status  - will need repeat CT chest in 4-6 weeks

## 2018-03-21 NOTE — PROGRESS NOTE ADULT - NEUROLOGICAL DETAILS
alert and oriented x 3/responds to pain/responds to verbal commands
alert and oriented x 3/responds to pain/responds to verbal commands

## 2018-03-21 NOTE — CHART NOTE - NSCHARTNOTEFT_GEN_A_CORE
BP 83/56 Pt asymptomatic. Stat dose of midodrine 5mg po given.     - will continue to monitor bp overnight

## 2018-03-21 NOTE — PROGRESS NOTE ADULT - SUBJECTIVE AND OBJECTIVE BOX
CHIEF COMPLAINT: Patient is a 72y old  Male who presents with a chief complaint of increased sputum production, cough (16 Mar 2018 16:43)    Interval Events:      REVIEW OF SYSTEMS:  Constitutional:   Eyes:  ENT:  CV:  Resp:  GI:  :  MSK:  Integumentary:  Neurological:  Psychiatric:  Endocrine:  Hematologic/Lymphatic:  Allergic/Immunologic:  [ ] All other systems negative  [ ] Unable to assess ROS because ________      OBJECTIVE:  ICU Vital Signs Last 24 Hrs  T(C): 36.7 (21 Mar 2018 05:13), Max: 36.7 (21 Mar 2018 05:13)  T(F): 98.1 (21 Mar 2018 05:13), Max: 98.1 (21 Mar 2018 05:13)  HR: 66 (21 Mar 2018 07:16) (61 - 84)  BP: 103/54 (21 Mar 2018 05:13) (83/56 - 120/64)  BP(mean): --  ABP: --  ABP(mean): --  RR: 18 (21 Mar 2018 05:13) (18 - 20)  SpO2: 98% (21 Mar 2018 07:16) (95% - 100%)    HOSPITAL MEDICATIONS:  MEDICATIONS  (STANDING):  ALBUTerol/ipratropium for Nebulization 3 milliLiter(s) Nebulizer every 6 hours  buDESOnide 160 MICROgram(s)/formoterol 4.5 MICROgram(s) Inhaler 2 Puff(s) Inhalation two times a day  carBAMazepine 400 milliGRAM(s) Oral two times a day  chlorhexidine 4% Liquid 1 Application(s) Topical <User Schedule>  docusate sodium 100 milliGRAM(s) Oral two times a day  lamoTRIgine 100 milliGRAM(s) Oral two times a day  polyethylene glycol 3350 17 Gram(s) Oral daily  senna 2 Tablet(s) Oral at bedtime  warfarin 3 milliGRAM(s) Oral once    MEDICATIONS  (PRN):      LABS:          PT/INR - ( 21 Mar 2018 06:00 )   PT: 25.7 SEC;   INR: 2.20                    MICROBIOLOGY:     RADIOLOGY:  [ ] Reviewed and interpreted by me    PULMONARY FUNCTION TESTS:    EKG: CHIEF COMPLAINT: Patient is a 72y old  Male who presents with a chief complaint of increased sputum production, cough (16 Mar 2018 16:43)    Interval Events: hypotensive overnight w/o other symptoms - held/reduced BP meds      REVIEW OF SYSTEMS:  Constitutional: no complaints, denies dizziness  CV: denies  Resp: denies  GI: denies  [x] All other systems negative  [ ] Unable to assess ROS because ________      OBJECTIVE:  ICU Vital Signs Last 24 Hrs  T(C): 36.7 (21 Mar 2018 05:13), Max: 36.7 (21 Mar 2018 05:13)  T(F): 98.1 (21 Mar 2018 05:13), Max: 98.1 (21 Mar 2018 05:13)  HR: 66 (21 Mar 2018 07:16) (61 - 84)  BP: 103/54 (21 Mar 2018 05:13) (83/56 - 120/64)  BP(mean): --  ABP: --  ABP(mean): --  RR: 18 (21 Mar 2018 05:13) (18 - 20)  SpO2: 98% (21 Mar 2018 07:16) (95% - 100%)    HOSPITAL MEDICATIONS:  MEDICATIONS  (STANDING):  ALBUTerol/ipratropium for Nebulization 3 milliLiter(s) Nebulizer every 6 hours  buDESOnide 160 MICROgram(s)/formoterol 4.5 MICROgram(s) Inhaler 2 Puff(s) Inhalation two times a day  carBAMazepine 400 milliGRAM(s) Oral two times a day  chlorhexidine 4% Liquid 1 Application(s) Topical <User Schedule>  docusate sodium 100 milliGRAM(s) Oral two times a day  lamoTRIgine 100 milliGRAM(s) Oral two times a day  polyethylene glycol 3350 17 Gram(s) Oral daily  senna 2 Tablet(s) Oral at bedtime  warfarin 3 milliGRAM(s) Oral once    MEDICATIONS  (PRN):      LABS:    PT/INR - ( 21 Mar 2018 06:00 )   PT: 25.7 SEC;   INR: 2.20

## 2018-03-21 NOTE — PROGRESS NOTE ADULT - SUBJECTIVE AND OBJECTIVE BOX
Subjective: Patient seen and examined. No new events except as noted.     SUBJECTIVE/ROS:  had hypotension overnight  it improved       MEDICATIONS:  MEDICATIONS  (STANDING):  ALBUTerol/ipratropium for Nebulization 3 milliLiter(s) Nebulizer every 6 hours  buDESOnide 160 MICROgram(s)/formoterol 4.5 MICROgram(s) Inhaler 2 Puff(s) Inhalation two times a day  carBAMazepine 400 milliGRAM(s) Oral two times a day  chlorhexidine 4% Liquid 1 Application(s) Topical <User Schedule>  docusate sodium 100 milliGRAM(s) Oral two times a day  lamoTRIgine 100 milliGRAM(s) Oral two times a day  polyethylene glycol 3350 17 Gram(s) Oral daily  senna 2 Tablet(s) Oral at bedtime  warfarin 3 milliGRAM(s) Oral once      PHYSICAL EXAM:  T(C): 36.7 (03-21-18 @ 05:13), Max: 36.7 (03-21-18 @ 05:13)  HR: 70 (03-21-18 @ 09:32) (61 - 84)  BP: 103/54 (03-21-18 @ 05:13) (83/56 - 120/64)  RR: 18 (03-21-18 @ 05:13) (18 - 20)  SpO2: 98% (03-21-18 @ 09:32) (95% - 100%)  Wt(kg): --  I&O's Summary      JVP: Normal  Neck: supple  Lung: clear   CV: S1 S2 , Murmur:  Abd: soft  Ext: No edema  neuro: Awake / alert  Psych: flat affect  Skin: normal       LABS/DATA:    CARDIAC MARKERS:                  proBNP:   Lipid Profile:   HgA1c:   TSH:     TELE:  EKG:

## 2018-03-21 NOTE — PROGRESS NOTE ADULT - ASSESSMENT
afib  HR stable  cont to monitor  keep INR 2.5    copd exacerbation - improving   on anbx  nebs  fu with RCU / pulm    PNA  on abx  fu with pulm

## 2018-03-21 NOTE — PROGRESS NOTE ADULT - PROBLEM SELECTOR PROBLEM 7
Hypertension
Hypertension
Need for prophylactic measure
Need for prophylactic measure
Dysphagia
Dysphagia

## 2018-03-21 NOTE — PROGRESS NOTE ADULT - PROVIDER SPECIALTY LIST ADULT
Cardiology
Internal Medicine
Internal Medicine
MICU
Pulmonology
Pulmonology
Internal Medicine
Cardiology
MICU

## 2018-03-21 NOTE — PROGRESS NOTE ADULT - PROBLEM SELECTOR PLAN 3
-increased sputum production, cough and oxygen requirements  -continue with levaquin, steroids and duonebs   -continue with supplemental oxygen
-likely prerenal, hemodynamically mediated in the setting of sepsis and decreased PO intake  -continue with IVF,  given hypernatremia started on 1/2NS@75. Monitor Na, change to NS if sodium wnl  -continue to monitor electolytes, scr  -avoid nephrotoxins  -improving
-likely prerenal, hemodynamically mediated in the setting of sepsis and decreased PO intake  -continue with IVF,  given hypernatremia started on 1/2NS@75. Monitor Na, change to NS if sodium wnl  -continue to monitor electolytes, scr  -avoid nephrotoxins
-increased sputum production, cough and oxygen requirements  -continue with levaquin, steroids and duonebs   -continue with supplemental oxygen
- cont meds  - supplemental oxygen as needed  - BIPAP HS/PRN
- cont meds  - supplemental oxygen as needed  - BIPAP HS/PRN

## 2018-03-21 NOTE — SWALLOW VFSS/MBS ASSESSMENT ADULT - COMMENTS
71 yo M with hx of COPD on 2 L at home, Lung Ca s/p R Lung lobectomy in 2003 s/p chemo/radiation, AVR, Afib on coumadin, seizure d/o, HTN who presents with increased sputum production and cough a/w sepsis likely 2/2 to influenza infection c/b COPD exacerbation.    Patient was seen for a Clinical Swallow Eval on 3/20/2018 (See Consult).

## 2018-03-21 NOTE — PROVIDER CONTACT NOTE (OTHER) - ACTION/TREATMENT ORDERED:
monitor
Administer tap water enema. Continue to monitor.
Give 5mg of Midodrine. Reassess BP in one hour
Reassess BP at 00:00

## 2018-03-21 NOTE — SWALLOW VFSS/MBS ASSESSMENT ADULT - PHARYNGEAL PHASE COMMENTS
adequate initiation of the pharyngeal swallow, adequate laryngeal elevation, reduced tongue base retraction, reduced pharyngeal constriction adequate initiation of the pharyngeal swallow, adequate laryngeal elevation, adequate tongue base retraction, adequate pharyngeal constriction

## 2018-03-21 NOTE — PROGRESS NOTE ADULT - PROBLEM SELECTOR PROBLEM 1
Influenza B
Influenza B
Acute respiratory failure with hypoxia and hypercapnia
Influenza B
Influenza B
Acute respiratory failure with hypoxia and hypercapnia

## 2018-03-24 ENCOUNTER — APPOINTMENT (OUTPATIENT)
Dept: NUCLEAR MEDICINE | Facility: IMAGING CENTER | Age: 73
End: 2018-03-24

## 2018-04-01 PROCEDURE — G9001: CPT

## 2018-04-10 ENCOUNTER — INPATIENT (INPATIENT)
Facility: HOSPITAL | Age: 73
LOS: 6 days | Discharge: HOME HEALTH | End: 2018-04-17
Attending: HOSPITALIST | Admitting: HOSPITALIST
Payer: MEDICARE

## 2018-04-10 VITALS
TEMPERATURE: 98 F | SYSTOLIC BLOOD PRESSURE: 86 MMHG | DIASTOLIC BLOOD PRESSURE: 66 MMHG | OXYGEN SATURATION: 100 % | HEART RATE: 148 BPM | RESPIRATION RATE: 18 BRPM

## 2018-04-10 DIAGNOSIS — K56.7 ILEUS, UNSPECIFIED: ICD-10-CM

## 2018-04-10 DIAGNOSIS — I48.91 UNSPECIFIED ATRIAL FIBRILLATION: ICD-10-CM

## 2018-04-10 DIAGNOSIS — G40.909 EPILEPSY, UNSPECIFIED, NOT INTRACTABLE, WITHOUT STATUS EPILEPTICUS: ICD-10-CM

## 2018-04-10 DIAGNOSIS — I10 ESSENTIAL (PRIMARY) HYPERTENSION: ICD-10-CM

## 2018-04-10 DIAGNOSIS — C34.90 MALIGNANT NEOPLASM OF UNSPECIFIED PART OF UNSPECIFIED BRONCHUS OR LUNG: ICD-10-CM

## 2018-04-10 DIAGNOSIS — Z95.4 PRESENCE OF OTHER HEART-VALVE REPLACEMENT: Chronic | ICD-10-CM

## 2018-04-10 DIAGNOSIS — N17.9 ACUTE KIDNEY FAILURE, UNSPECIFIED: ICD-10-CM

## 2018-04-10 DIAGNOSIS — Z98.89 OTHER SPECIFIED POSTPROCEDURAL STATES: Chronic | ICD-10-CM

## 2018-04-10 DIAGNOSIS — A41.9 SEPSIS, UNSPECIFIED ORGANISM: ICD-10-CM

## 2018-04-10 DIAGNOSIS — R19.7 DIARRHEA, UNSPECIFIED: ICD-10-CM

## 2018-04-10 DIAGNOSIS — Z29.9 ENCOUNTER FOR PROPHYLACTIC MEASURES, UNSPECIFIED: ICD-10-CM

## 2018-04-10 DIAGNOSIS — Z95.2 PRESENCE OF PROSTHETIC HEART VALVE: ICD-10-CM

## 2018-04-10 LAB
ALBUMIN SERPL ELPH-MCNC: 4.4 G/DL — SIGNIFICANT CHANGE UP (ref 3.3–5)
ALP SERPL-CCNC: 97 U/L — SIGNIFICANT CHANGE UP (ref 40–120)
ALT FLD-CCNC: 21 U/L — SIGNIFICANT CHANGE UP (ref 4–41)
APPEARANCE UR: SIGNIFICANT CHANGE UP
APTT BLD: 34.2 SEC — SIGNIFICANT CHANGE UP (ref 27.5–37.4)
AST SERPL-CCNC: 22 U/L — SIGNIFICANT CHANGE UP (ref 4–40)
B PERT DNA SPEC QL NAA+PROBE: SIGNIFICANT CHANGE UP
BASE EXCESS BLDV CALC-SCNC: 6.9 MMOL/L — SIGNIFICANT CHANGE UP
BASOPHILS # BLD AUTO: 0.02 K/UL — SIGNIFICANT CHANGE UP (ref 0–0.2)
BASOPHILS # BLD AUTO: 0.03 K/UL — SIGNIFICANT CHANGE UP (ref 0–0.2)
BASOPHILS NFR BLD AUTO: 0.3 % — SIGNIFICANT CHANGE UP (ref 0–2)
BASOPHILS NFR BLD AUTO: 0.3 % — SIGNIFICANT CHANGE UP (ref 0–2)
BILIRUB SERPL-MCNC: 0.5 MG/DL — SIGNIFICANT CHANGE UP (ref 0.2–1.2)
BILIRUB UR-MCNC: NEGATIVE — SIGNIFICANT CHANGE UP
BLD GP AB SCN SERPL QL: NEGATIVE — SIGNIFICANT CHANGE UP
BLOOD GAS VENOUS - CREATININE: 1.28 MG/DL — SIGNIFICANT CHANGE UP (ref 0.5–1.3)
BLOOD UR QL VISUAL: NEGATIVE — SIGNIFICANT CHANGE UP
BUN SERPL-MCNC: 25 MG/DL — HIGH (ref 7–23)
BUN SERPL-MCNC: 26 MG/DL — HIGH (ref 7–23)
C DIFF TOX GENS STL QL NAA+PROBE: SIGNIFICANT CHANGE UP
C PNEUM DNA SPEC QL NAA+PROBE: NOT DETECTED — SIGNIFICANT CHANGE UP
CALCIUM SERPL-MCNC: 10.4 MG/DL — SIGNIFICANT CHANGE UP (ref 8.4–10.5)
CALCIUM SERPL-MCNC: 9.3 MG/DL — SIGNIFICANT CHANGE UP (ref 8.4–10.5)
CHLORIDE BLDV-SCNC: 97 MMOL/L — SIGNIFICANT CHANGE UP (ref 96–108)
CHLORIDE SERPL-SCNC: 100 MMOL/L — SIGNIFICANT CHANGE UP (ref 98–107)
CHLORIDE SERPL-SCNC: 93 MMOL/L — LOW (ref 98–107)
CO2 SERPL-SCNC: 29 MMOL/L — SIGNIFICANT CHANGE UP (ref 22–31)
CO2 SERPL-SCNC: 31 MMOL/L — SIGNIFICANT CHANGE UP (ref 22–31)
COLOR SPEC: YELLOW — SIGNIFICANT CHANGE UP
CREAT SERPL-MCNC: 1.16 MG/DL — SIGNIFICANT CHANGE UP (ref 0.5–1.3)
CREAT SERPL-MCNC: 1.42 MG/DL — HIGH (ref 0.5–1.3)
EOSINOPHIL # BLD AUTO: 0.02 K/UL — SIGNIFICANT CHANGE UP (ref 0–0.5)
EOSINOPHIL # BLD AUTO: 0.07 K/UL — SIGNIFICANT CHANGE UP (ref 0–0.5)
EOSINOPHIL NFR BLD AUTO: 0.2 % — SIGNIFICANT CHANGE UP (ref 0–6)
EOSINOPHIL NFR BLD AUTO: 1 % — SIGNIFICANT CHANGE UP (ref 0–6)
FLUAV H1 2009 PAND RNA SPEC QL NAA+PROBE: NOT DETECTED — SIGNIFICANT CHANGE UP
FLUAV H1 RNA SPEC QL NAA+PROBE: NOT DETECTED — SIGNIFICANT CHANGE UP
FLUAV H3 RNA SPEC QL NAA+PROBE: NOT DETECTED — SIGNIFICANT CHANGE UP
FLUAV SUBTYP SPEC NAA+PROBE: SIGNIFICANT CHANGE UP
FLUBV RNA SPEC QL NAA+PROBE: NOT DETECTED — SIGNIFICANT CHANGE UP
GAS PNL BLDV: 138 MMOL/L — SIGNIFICANT CHANGE UP (ref 136–146)
GLUCOSE BLDV-MCNC: 139 — HIGH (ref 70–99)
GLUCOSE SERPL-MCNC: 139 MG/DL — HIGH (ref 70–99)
GLUCOSE SERPL-MCNC: 97 MG/DL — SIGNIFICANT CHANGE UP (ref 70–99)
GLUCOSE UR-MCNC: NEGATIVE — SIGNIFICANT CHANGE UP
HADV DNA SPEC QL NAA+PROBE: NOT DETECTED — SIGNIFICANT CHANGE UP
HCO3 BLDV-SCNC: 29 MMOL/L — HIGH (ref 20–27)
HCOV 229E RNA SPEC QL NAA+PROBE: NOT DETECTED — SIGNIFICANT CHANGE UP
HCOV HKU1 RNA SPEC QL NAA+PROBE: NOT DETECTED — SIGNIFICANT CHANGE UP
HCOV NL63 RNA SPEC QL NAA+PROBE: NOT DETECTED — SIGNIFICANT CHANGE UP
HCOV OC43 RNA SPEC QL NAA+PROBE: NOT DETECTED — SIGNIFICANT CHANGE UP
HCT VFR BLD CALC: 29.8 % — LOW (ref 39–50)
HCT VFR BLD CALC: 34.8 % — LOW (ref 39–50)
HCT VFR BLDV CALC: 36.9 % — LOW (ref 39–51)
HGB BLD-MCNC: 11.4 G/DL — LOW (ref 13–17)
HGB BLD-MCNC: 9.8 G/DL — LOW (ref 13–17)
HGB BLDV-MCNC: 12 G/DL — LOW (ref 13–17)
HMPV RNA SPEC QL NAA+PROBE: NOT DETECTED — SIGNIFICANT CHANGE UP
HPIV1 RNA SPEC QL NAA+PROBE: NOT DETECTED — SIGNIFICANT CHANGE UP
HPIV2 RNA SPEC QL NAA+PROBE: NOT DETECTED — SIGNIFICANT CHANGE UP
HPIV3 RNA SPEC QL NAA+PROBE: NOT DETECTED — SIGNIFICANT CHANGE UP
HPIV4 RNA SPEC QL NAA+PROBE: NOT DETECTED — SIGNIFICANT CHANGE UP
IMM GRANULOCYTES # BLD AUTO: 0.03 # — SIGNIFICANT CHANGE UP
IMM GRANULOCYTES # BLD AUTO: 0.05 # — SIGNIFICANT CHANGE UP
IMM GRANULOCYTES NFR BLD AUTO: 0.4 % — SIGNIFICANT CHANGE UP (ref 0–1.5)
IMM GRANULOCYTES NFR BLD AUTO: 0.4 % — SIGNIFICANT CHANGE UP (ref 0–1.5)
INR BLD: 1.3 — HIGH (ref 0.88–1.17)
KETONES UR-MCNC: NEGATIVE — SIGNIFICANT CHANGE UP
LACTATE BLDV-MCNC: 1.7 MMOL/L — SIGNIFICANT CHANGE UP (ref 0.5–2)
LEUKOCYTE ESTERASE UR-ACNC: NEGATIVE — SIGNIFICANT CHANGE UP
LIDOCAIN IGE QN: 33.4 U/L — SIGNIFICANT CHANGE UP (ref 7–60)
LYMPHOCYTES # BLD AUTO: 0.7 K/UL — LOW (ref 1–3.3)
LYMPHOCYTES # BLD AUTO: 0.95 K/UL — LOW (ref 1–3.3)
LYMPHOCYTES # BLD AUTO: 8.3 % — LOW (ref 13–44)
LYMPHOCYTES # BLD AUTO: 9.9 % — LOW (ref 13–44)
M PNEUMO DNA SPEC QL NAA+PROBE: NOT DETECTED — SIGNIFICANT CHANGE UP
MAGNESIUM SERPL-MCNC: 1.9 MG/DL — SIGNIFICANT CHANGE UP (ref 1.6–2.6)
MAGNESIUM SERPL-MCNC: 2.3 MG/DL — SIGNIFICANT CHANGE UP (ref 1.6–2.6)
MCHC RBC-ENTMCNC: 32.3 PG — SIGNIFICANT CHANGE UP (ref 27–34)
MCHC RBC-ENTMCNC: 32.3 PG — SIGNIFICANT CHANGE UP (ref 27–34)
MCHC RBC-ENTMCNC: 32.8 % — SIGNIFICANT CHANGE UP (ref 32–36)
MCHC RBC-ENTMCNC: 32.9 % — SIGNIFICANT CHANGE UP (ref 32–36)
MCV RBC AUTO: 98.3 FL — SIGNIFICANT CHANGE UP (ref 80–100)
MCV RBC AUTO: 98.6 FL — SIGNIFICANT CHANGE UP (ref 80–100)
MONOCYTES # BLD AUTO: 0.54 K/UL — SIGNIFICANT CHANGE UP (ref 0–0.9)
MONOCYTES # BLD AUTO: 0.56 K/UL — SIGNIFICANT CHANGE UP (ref 0–0.9)
MONOCYTES NFR BLD AUTO: 4.9 % — SIGNIFICANT CHANGE UP (ref 2–14)
MONOCYTES NFR BLD AUTO: 7.6 % — SIGNIFICANT CHANGE UP (ref 2–14)
MUCOUS THREADS # UR AUTO: SIGNIFICANT CHANGE UP
NEUTROPHILS # BLD AUTO: 5.7 K/UL — SIGNIFICANT CHANGE UP (ref 1.8–7.4)
NEUTROPHILS # BLD AUTO: 9.84 K/UL — HIGH (ref 1.8–7.4)
NEUTROPHILS NFR BLD AUTO: 80.8 % — HIGH (ref 43–77)
NEUTROPHILS NFR BLD AUTO: 85.9 % — HIGH (ref 43–77)
NITRITE UR-MCNC: NEGATIVE — SIGNIFICANT CHANGE UP
NRBC # FLD: 0 — SIGNIFICANT CHANGE UP
NRBC # FLD: 0 — SIGNIFICANT CHANGE UP
OB PNL STL: NEGATIVE — SIGNIFICANT CHANGE UP
PCO2 BLDV: 56 MMHG — HIGH (ref 41–51)
PH BLDV: 7.38 PH — SIGNIFICANT CHANGE UP (ref 7.32–7.43)
PH UR: 6 — SIGNIFICANT CHANGE UP (ref 4.6–8)
PHOSPHATE SERPL-MCNC: 2.5 MG/DL — SIGNIFICANT CHANGE UP (ref 2.5–4.5)
PHOSPHATE SERPL-MCNC: 2.6 MG/DL — SIGNIFICANT CHANGE UP (ref 2.5–4.5)
PLATELET # BLD AUTO: 182 K/UL — SIGNIFICANT CHANGE UP (ref 150–400)
PLATELET # BLD AUTO: 227 K/UL — SIGNIFICANT CHANGE UP (ref 150–400)
PMV BLD: 10 FL — SIGNIFICANT CHANGE UP (ref 7–13)
PMV BLD: 10.2 FL — SIGNIFICANT CHANGE UP (ref 7–13)
PO2 BLDV: 27 MMHG — LOW (ref 35–40)
POTASSIUM BLDV-SCNC: 4 MMOL/L — SIGNIFICANT CHANGE UP (ref 3.4–4.5)
POTASSIUM SERPL-MCNC: 4.1 MMOL/L — SIGNIFICANT CHANGE UP (ref 3.5–5.3)
POTASSIUM SERPL-MCNC: 4.4 MMOL/L — SIGNIFICANT CHANGE UP (ref 3.5–5.3)
POTASSIUM SERPL-SCNC: 4.1 MMOL/L — SIGNIFICANT CHANGE UP (ref 3.5–5.3)
POTASSIUM SERPL-SCNC: 4.4 MMOL/L — SIGNIFICANT CHANGE UP (ref 3.5–5.3)
PROT SERPL-MCNC: 8.4 G/DL — HIGH (ref 6–8.3)
PROT UR-MCNC: 20 MG/DL — SIGNIFICANT CHANGE UP
PROTHROM AB SERPL-ACNC: 14.5 SEC — HIGH (ref 9.8–13.1)
RBC # BLD: 3.03 M/UL — LOW (ref 4.2–5.8)
RBC # BLD: 3.53 M/UL — LOW (ref 4.2–5.8)
RBC # FLD: 13.8 % — SIGNIFICANT CHANGE UP (ref 10.3–14.5)
RBC # FLD: 14.1 % — SIGNIFICANT CHANGE UP (ref 10.3–14.5)
RBC CASTS # UR COMP ASSIST: SIGNIFICANT CHANGE UP (ref 0–?)
RH IG SCN BLD-IMP: NEGATIVE — SIGNIFICANT CHANGE UP
RSV RNA SPEC QL NAA+PROBE: NOT DETECTED — SIGNIFICANT CHANGE UP
RV+EV RNA SPEC QL NAA+PROBE: NOT DETECTED — SIGNIFICANT CHANGE UP
SAO2 % BLDV: 41.9 % — LOW (ref 60–85)
SODIUM SERPL-SCNC: 139 MMOL/L — SIGNIFICANT CHANGE UP (ref 135–145)
SODIUM SERPL-SCNC: 140 MMOL/L — SIGNIFICANT CHANGE UP (ref 135–145)
SP GR SPEC: 1.02 — SIGNIFICANT CHANGE UP (ref 1–1.04)
SQUAMOUS # UR AUTO: SIGNIFICANT CHANGE UP
TROPONIN T SERPL-MCNC: < 0.06 NG/ML — SIGNIFICANT CHANGE UP (ref 0–0.06)
UROBILINOGEN FLD QL: NORMAL MG/DL — SIGNIFICANT CHANGE UP
WBC # BLD: 11.45 K/UL — HIGH (ref 3.8–10.5)
WBC # BLD: 7.06 K/UL — SIGNIFICANT CHANGE UP (ref 3.8–10.5)
WBC # FLD AUTO: 11.45 K/UL — HIGH (ref 3.8–10.5)
WBC # FLD AUTO: 7.06 K/UL — SIGNIFICANT CHANGE UP (ref 3.8–10.5)
WBC UR QL: SIGNIFICANT CHANGE UP (ref 0–?)

## 2018-04-10 PROCEDURE — 99223 1ST HOSP IP/OBS HIGH 75: CPT | Mod: AI,GC

## 2018-04-10 PROCEDURE — 71045 X-RAY EXAM CHEST 1 VIEW: CPT | Mod: 26

## 2018-04-10 PROCEDURE — 74177 CT ABD & PELVIS W/CONTRAST: CPT | Mod: 26

## 2018-04-10 RX ORDER — PANTOPRAZOLE SODIUM 20 MG/1
8 TABLET, DELAYED RELEASE ORAL
Qty: 80 | Refills: 0 | Status: DISCONTINUED | OUTPATIENT
Start: 2018-04-10 | End: 2018-04-12

## 2018-04-10 RX ORDER — SODIUM CHLORIDE 9 MG/ML
500 INJECTION INTRAMUSCULAR; INTRAVENOUS; SUBCUTANEOUS ONCE
Qty: 0 | Refills: 0 | Status: COMPLETED | OUTPATIENT
Start: 2018-04-10 | End: 2018-04-10

## 2018-04-10 RX ORDER — CARBAMAZEPINE 200 MG
200 TABLET ORAL
Qty: 0 | Refills: 0 | Status: DISCONTINUED | OUTPATIENT
Start: 2018-04-10 | End: 2018-04-10

## 2018-04-10 RX ORDER — LACTOBACILLUS ACIDOPHILUS 100MM CELL
1 CAPSULE ORAL DAILY
Qty: 0 | Refills: 0 | Status: DISCONTINUED | OUTPATIENT
Start: 2018-04-10 | End: 2018-04-17

## 2018-04-10 RX ORDER — PANTOPRAZOLE SODIUM 20 MG/1
80 TABLET, DELAYED RELEASE ORAL ONCE
Qty: 0 | Refills: 0 | Status: DISCONTINUED | OUTPATIENT
Start: 2018-04-10 | End: 2018-04-10

## 2018-04-10 RX ORDER — CARBAMAZEPINE 200 MG
400 TABLET ORAL EVERY 12 HOURS
Qty: 0 | Refills: 0 | Status: DISCONTINUED | OUTPATIENT
Start: 2018-04-10 | End: 2018-04-17

## 2018-04-10 RX ORDER — MAGNESIUM SULFATE 500 MG/ML
2 VIAL (ML) INJECTION ONCE
Qty: 0 | Refills: 0 | Status: COMPLETED | OUTPATIENT
Start: 2018-04-10 | End: 2018-04-10

## 2018-04-10 RX ORDER — PANTOPRAZOLE SODIUM 20 MG/1
80 TABLET, DELAYED RELEASE ORAL ONCE
Qty: 0 | Refills: 0 | Status: COMPLETED | OUTPATIENT
Start: 2018-04-10 | End: 2018-04-10

## 2018-04-10 RX ORDER — SODIUM CHLORIDE 9 MG/ML
1000 INJECTION INTRAMUSCULAR; INTRAVENOUS; SUBCUTANEOUS
Qty: 0 | Refills: 0 | Status: DISCONTINUED | OUTPATIENT
Start: 2018-04-10 | End: 2018-04-11

## 2018-04-10 RX ORDER — ONDANSETRON 8 MG/1
4 TABLET, FILM COATED ORAL ONCE
Qty: 0 | Refills: 0 | Status: COMPLETED | OUTPATIENT
Start: 2018-04-10 | End: 2018-04-10

## 2018-04-10 RX ORDER — HEPARIN SODIUM 5000 [USP'U]/ML
5000 INJECTION INTRAVENOUS; SUBCUTANEOUS EVERY 8 HOURS
Qty: 0 | Refills: 0 | Status: DISCONTINUED | OUTPATIENT
Start: 2018-04-10 | End: 2018-04-10

## 2018-04-10 RX ORDER — LAMOTRIGINE 25 MG/1
100 TABLET, ORALLY DISINTEGRATING ORAL EVERY 12 HOURS
Qty: 0 | Refills: 0 | Status: DISCONTINUED | OUTPATIENT
Start: 2018-04-10 | End: 2018-04-17

## 2018-04-10 RX ADMIN — Medication 400 MILLIGRAM(S): at 18:45

## 2018-04-10 RX ADMIN — Medication 1 TABLET(S): at 21:46

## 2018-04-10 RX ADMIN — LAMOTRIGINE 100 MILLIGRAM(S): 25 TABLET, ORALLY DISINTEGRATING ORAL at 18:45

## 2018-04-10 RX ADMIN — ONDANSETRON 4 MILLIGRAM(S): 8 TABLET, FILM COATED ORAL at 09:46

## 2018-04-10 RX ADMIN — PANTOPRAZOLE SODIUM 80 MILLIGRAM(S): 20 TABLET, DELAYED RELEASE ORAL at 10:16

## 2018-04-10 RX ADMIN — PANTOPRAZOLE SODIUM 10 MG/HR: 20 TABLET, DELAYED RELEASE ORAL at 23:03

## 2018-04-10 RX ADMIN — SODIUM CHLORIDE 75 MILLILITER(S): 9 INJECTION INTRAMUSCULAR; INTRAVENOUS; SUBCUTANEOUS at 17:20

## 2018-04-10 RX ADMIN — SODIUM CHLORIDE 500 MILLILITER(S): 9 INJECTION INTRAMUSCULAR; INTRAVENOUS; SUBCUTANEOUS at 10:16

## 2018-04-10 RX ADMIN — Medication 50 GRAM(S): at 10:45

## 2018-04-10 RX ADMIN — PANTOPRAZOLE SODIUM 10 MG/HR: 20 TABLET, DELAYED RELEASE ORAL at 11:54

## 2018-04-10 RX ADMIN — SODIUM CHLORIDE 500 MILLILITER(S): 9 INJECTION INTRAMUSCULAR; INTRAVENOUS; SUBCUTANEOUS at 09:46

## 2018-04-10 NOTE — ED PROVIDER NOTE - PHYSICAL EXAMINATION
Kristin Crawford DO.:   GENERAL: Patient awake alert NAD.  HEENT: Moist mucous membranes, EOMI  LUNGS: CTAB, no wheezes or crackles.   CARDIAC: Tachycardic, irregular. no m/r/g.    ABDOMEN: Soft, NT, ND, No rebound, guarding. No CVA tenderness.   EXT: No edema. No calf tenderness.  SKIN: Warm and dry. No rash.

## 2018-04-10 NOTE — H&P ADULT - NSHPOUTPATIENTPROVIDERS_GEN_ALL_CORE
PCP and pulmonologist: River Lubin  Cardiologist Dr. Olson  Neurologist Dr. Yonny Garcia  Oncologist Dr Filiberto Vaughn

## 2018-04-10 NOTE — H&P ADULT - PROBLEM SELECTOR PLAN 4
-2 wk hx of watery diarrhea in setting of recent abx use  -r/o cdiff, avoid anti-motility agents, IV hydration as needed  -will observe off abx, daily probiotic

## 2018-04-10 NOTE — H&P ADULT - ATTENDING COMMENTS
72 y.o. Male w/ Hx Lung CA s/p Right lung resection in 2003 s/p chemo, COPD on 2LNC, HTN, bovine AVR, afib on eliquis, seizure, recently admitted to [facility] MICU for influenza/ LLL PNA in 3/18 requiring bipap and course of levaquin now returns with 2 weeks of watery diarrhea , abdominal distention worse with loperamide. Patient clinically dehydrated with rapid afib found on CT A/P to have a ileus. Will check stool for C diff. Hydrate with NS IVF and HR control with cardizem 30mg q12. Hold eliquis since with CELINE from diarrhea. Consult cardiology Dr gonzalez.

## 2018-04-10 NOTE — CONSULT NOTE ADULT - ASSESSMENT
abd   discomfort  rule out cdiff  ? se from eliquis  hold for now  consider GI eval     afib  HR stable  cont to monitor  change a/c back to warfarin    copd   nebs

## 2018-04-10 NOTE — H&P ADULT - NSHPLABSRESULTS_GEN_ALL_CORE
9.8    7.06  )-----------( 182      ( 10 Apr 2018 15:35 )             29.8     04-10    140  |  100  |  25<H>  ----------------------------<  97  4.4   |  29  |  1.16    Ca    9.3      10 Apr 2018 15:35  Phos  2.5     04-10  Mg     2.3     04-10    PT/INR - ( 10 Apr 2018 09:30 )   PT: 14.5 SEC;   INR: 1.30          PTT - ( 10 Apr 2018 09:30 )  PTT:34.2 SEC    CARDIAC MARKERS ( 10 Apr 2018 09:30 )  x     / < 0.06 ng/mL / x     / x     / x        09:30 - VBG - pH: 7.38  | pCO2: 56    | pO2: 27    | Lactate: 1.7      Urinalysis Basic - ( 10 Apr 2018 11:10 )    Color: YELLOW / Appearance: HAZY / S.017 / pH: 6.0  Gluc: NEGATIVE / Ketone: NEGATIVE  / Bili: NEGATIVE / Urobili: NORMAL mg/dL   Blood: NEGATIVE / Protein: 20 mg/dL / Nitrite: NEGATIVE   Leuk Esterase: NEGATIVE / RBC: 0-2 / WBC 0-2   Sq Epi: OCC / Non Sq Epi: x / Bacteria: x    RVP NEGATIVE    CDIFF NEGATIVE     EKG REVIEWED BY ME AFIB WITH RVR NO EVIDENCE OF ISCHEMIA    CHEST XRAY REVIEWED BY ME. RIGHT LOBECTOMY, L LUNG CLEAR    CT ABDOMEN  EXAM:  CT ABDOMEN AND PELVIS IC        PROCEDURE DATE:  Apr 10 2018         INTERPRETATION:  CLINICAL INFORMATION: Abdominal distention. Vomiting.    COMPARISON: 2018.    PROCEDURE:   CT of the Abdomen and Pelvis was performed with intravenous contrast.   Intravenous contrast: 90 mL Omnipaque 350. 10 mL discarded.  Oral contrast: None.  Sagittal and coronal reformats were performed.    FINDINGS:    LOWER CHEST: Aortic valve replacement. Small left pleural effusion with   basilar atelectasis. Partially imaged opacity in the left lower lobe   appears decreased in size.    LIVER: Dome of liver is not fully imaged. Cyst along the inferior right   hepatic lobe.  BILE DUCTS: Normal caliber.   GALLBLADDER: Within normal limits.  SPLEEN: Within normal limits.  PANCREAS: Within normal limits.  ADRENALS: Within normal limits.  KIDNEYS/URETERS: Left lower pole cyst. No hydronephrosis.     BLADDER: Within normal limits.  REPRODUCTIVE ORGANS: The prostate is within normal limits.    BOWEL: Diffuse fluid-filled dilatation of the small and large bowel   without transition point, probably ileus. Periampullary duodenal   diverticulum. Normal appendix.  Colonic diverticulosis.  PERITONEUM: No ascites.  VESSELS:  Atherosclerotic change of the abdominal aorta and its branches.  RETROPERITONEUM: No lymphadenopathy.    ABDOMINAL WALL: Within normal limits.  BONES: Degenerative changes of the spine. Unchanged sclerotic lesion in   the T10 vertebral body. Sternotomy.    IMPRESSION:   Probable ileus.    Previously seen consolidation in the left lower lobe appears decreased in   size, although only partially imaged.

## 2018-04-10 NOTE — ED PROVIDER NOTE - NS ED ROS FT
CONST: no fevers, no chills  EYES: no pain, no vision changes  ENT: no sore throat, no ear pain, no change in hearing  CV: no chest pain, no leg swelling  RESP: no shortness of breath, no cough  ABD: +abdominal pain, +nausea, +vomiting, +diarrhea  : no dysuria, no flank pain, no hematuria  MSK: no back pain, no extremity pain  NEURO: no headache or additional neurologic complaints  HEME: no easy bleeding  SKIN:  no rash

## 2018-04-10 NOTE — H&P ADULT - HISTORY OF PRESENT ILLNESS
71 yo M with hx of COPD on 2 L at home, Lung Ca s/p R Lung lobectomy in 2003 s/p chemo/radiation, Bovine AVR, Afib recently started on apixaban, not on rate control), tonic seizure d/o (on carbemazepine and lamotrigine , HTN 71 yo M with hx of COPD on 2 L at home, Lung Ca s/p R Lung lobectomy in 2003 (s/p chemo/radiation) Bovine AVR, Afib (recently started on apixaban, not on rate control), seizure d/o (tonic seizures, on carbamazepine and lamotrigine , HTN 73 yo M with hx of COPD on 2 L at home, Lung Ca s/p R Lung lobectomy in 2003 (s/p chemo/radiation) Bovine AVR, Afib (recently started on apixaban, not on rate control), seizure d/o (tonic seizures, on carbamazepine and lamotrigine , HTN     2 week hx of diarrhea starting a few days after discharge from hospital. 2-3 BMs per day.     after loperamide developed abdominal pain and vomited yesterday. described as dark, coffee ground emesis. pt started feeling better after vomiting. found to be in AFIB with RVR in ED. Given fluids which resolved AFIB with RVR. CT abdomen showed evidence of ileus. 71 yo M with hx of COPD on 2 L at home, Lung Ca s/p R Lung lobectomy in 2003 (s/p chemo/radiation) Bovine AVR, Afib (recently started on apixaban, not on rate control), seizure d/o (tonic seizures, on carbamazepine and lamotrigine , HTN who was recently admitted to [facility] MICU on 3/18 for influenza + LLL PNA (required bilevel, treated with levofloxacin) who presents with a 2 week hx of diarrhea starting a few days after discharge from hospital. pt reports 2-3 BMs per day which are watery. Pt took loperamide yesterday and developed abdominal pain and vomited yesterday. The vomit was described as dark, coffee ground emesis. pt reported that he started feeling better after vomiting however came in because he was concerned about the coffee ground nature of the emesis.     In the ED found to be in AFIB with RVR with evidence of dehydration (skin tenting, dry mucous membranes). The patient was given fluids which resolved AFIB with RVR. CT abdomen showed evidence of ileus. 73 yo M with hx of COPD on 2 L at home, Lung Ca s/p R Lung lobectomy in 2003 (s/p chemo/radiation) Bovine AVR, Afib (recently started on apixaban, not on rate control), seizure d/o (tonic seizures, on carbamazepine and lamotrigine , HTN who was recently admitted to [facility] MICU on 3/18 for influenza + LLL PNA (required bilevel, treated with levofloxacin) who presents with a 2 week hx of diarrhea starting a few days after discharge from hospital. pt reports 2-3 BMs per day which are watery. Pt took loperamide yesterday and developed abdominal pain and vomited yesterday. The vomit was described as dark, coffee ground emesis. pt reported that he started feeling better after vomiting however came in because he was concerned about the coffee ground nature of the emesis.     In the ED found to be in AFIB with RVR with evidence of dehydration (skin tenting, dry mucous membranes). The patient was given fluids which resolved AFIB with RVR. CT abdomen showed evidence of ileus.     ED Vitals Signs: Temp 97.6  BP 88/66 RR 18 O2% 100 73 yo M with hx of COPD on 2 L at home, Lung Ca s/p R Lung lobectomy in 2003 (s/p chemo/radiation) Bovine AVR, Afib (recently started on apixaban, not on rate control), seizure d/o (tonic seizures, on carbamazepine and lamotrigine , HTN who was recently admitted to [facility] MICU on 3/18 for influenza + LLL PNA (required bilevel, treated with levofloxacin) who presents with a 2 week hx of diarrhea starting a few days after discharge from hospital. pt reports 2-3 BMs per day which are watery. Pt took loperamide yesterday and developed abdominal pain and vomited yesterday. The vomit was described as dark, coffee ground emesis. pt reported that he started feeling better after vomiting however came in because he was concerned about the coffee ground nature of the emesis.     In the ED found to be in AFIB with RVR with evidence of dehydration (skin tenting, dry mucous membranes). The patient was given 1L .9NS IVF which resolved AFIB with RVR. CT abdomen showed evidence of ileus.     ED Vitals Signs: Temp 97.6  BP 88/66 RR 18 O2% 100

## 2018-04-10 NOTE — ED PROVIDER NOTE - OBJECTIVE STATEMENT
72M pmhx of COPD on 2 L at home, Lung Ca s/p R Lung lobectomy in 2003 s/p chemo/radiation, AVR, Afib on coumadin, seizure d/o, HTN presents with 72M pmhx of COPD on 2 L at home, Lung Ca s/p R Lung lobectomy in 2003 s/p chemo/radiation, AVR, Afib on Eliquis (recently switched from coumadin), seizure d/o, HTN presents with acute onset mid-abdominal pain and bloating and vomiting last night. Initially the vomit was NBNB but then became dark "like coffee." Admits to several episodes of nonbloody diarrhea as well since for last few days. Was recently discharged after hospital stay for pneumonia with MICU stay for BiPAP. Currently has no respiratory complaints, no cough, fevers, headache, dizziness. Admits to weakness since discharge.

## 2018-04-10 NOTE — H&P ADULT - ASSESSMENT
73 yo M with hx of COPD on 2 L at home, Lung Ca s/p R Lung lobectomy in 2003 (s/p chemo/radiation) Bovine AVR, Afib (recently started on apixaban, not on rate control), seizure d/o (tonic seizures, on carbamazepine and lamotrigine , HTN who was recently admitted to [facility] MICU on 3/18 for influenza + LLL PNA (required bilevel, treated with levofloxacin) who presents with a 2 week hx of diarrhea starting a few days after discharge from hospital, found to be in AFIB with RVR 2.2 to volume down state + probably ileus on CT abdomen. Concern for CDIFF colitis vs antibiotic induced diarrhea 2/2 to abnormal gut michaela. 73 yo M with hx of COPD on 2 L at home, Lung Ca s/p R Lung lobectomy in 2003 (s/p chemo/radiation) Bovine AVR, Afib (recently started on apixaban, not on rate control), seizure d/o (tonic seizures, on carbamazepine and lamotrigine , HTN who was recently admitted to [facility] MICU on 3/18 for influenza + LLL PNA (required bilevel, treated with levofloxacin) who presents with a 2 week hx of diarrhea starting a few days after discharge from hospital. Pt found to be in AFIB with RVR 2.2 to volume down state + probably ileus on CT abdomen. Pt meets criteria for severe sepsis with acute organ dysfunction (, BP 88/66, WBC 11.45 + diarrhea, CELINE, ileus). Concern for CDIFF colitis vs antibiotic induced diarrhea vs acute gastroenteritis. Ileus possibly 2/2 to loperamide

## 2018-04-10 NOTE — H&P ADULT - PROBLEM SELECTOR PLAN 1
Diltiazem 30 mg Q 8 hours for rate control p/w WBC 11.4  BP 88/66 CELINE + Ileus + diarrhea  -IVF resuscitation. Will observe off abx for now  -possible enteritis

## 2018-04-10 NOTE — H&P ADULT - NSHPREVIEWOFSYSTEMS_GEN_ALL_CORE
Constitutional: denies fevers, chills, night sweats, weight loss  HEENT: denies visual changes, hearing changes, rhinitis, odynophagia, or dysphagia  Cardiovascular: denies palpitations, chest pain, edema  Respiratory: denies SOB, wheezing  Gastrointestinal: denies abdominal pain, hematochezia, melena  : denies dysuria, hematuria  MSK: denies weakness, joint pain  Skin: denies new rashes or masses  Heme: denies bleeding  Neuro: denies headache

## 2018-04-10 NOTE — ED PROVIDER NOTE - CRITICAL CARE PROVIDED
consult w/ pt's family directly relating to pts condition/direct patient care (not related to procedure)/additional history taking/documentation/interpretation of diagnostic studies/consultation with other physicians

## 2018-04-10 NOTE — H&P ADULT - PROBLEM SELECTOR PLAN 5
-Rate control w Diltiazem 30 mg Q 12 hours  -Will hold off on AC for now in setting of acute diarrhea  -Cardiology Dr. Olson following

## 2018-04-10 NOTE — H&P ADULT - NSHPPHYSICALEXAM_GEN_ALL_CORE
PHYSICAL EXAM:   GEN: Age appropriate male, resting comfortably in bed, no acute distress,  toxic appearing, speaking in complete sentences.   HEENT: Conjunctiva and sclera normal. Dry oral mucous membranes. Subconjunctival pallor.   PULM: Lungs CTAB, no wheezes, rales, rhonchi  CV: RRR, S1S2, no MRG  Abdominal: Soft, nontender to palpation, non-distended, +BS  Extremities: No edema or cyanosis.+ skin tenting  NEURO: AAOx3  Skin: No rashes, lesions. Cold shins and forearms. PHYSICAL EXAM:   GEN: Age appropriate male, resting comfortably in bed, no acute distress,  toxic appearing, speaking in complete sentences.   HEENT: Conjunctiva and sclera normal. Dry oral mucous membranes. Subconjunctival pallor.   PULM: Absence of lung sounds on right side. Lungs CTAB on left side no wheezes, rales, rhonchi  CV: RRR, S1S2, no MRG  Abdominal: Soft, nontender to palpation, non-distended, +BS  Extremities: No edema or cyanosis.+ skin tenting  NEURO: AAOx3  Skin: No rashes, lesions. Cold shins and forearms.

## 2018-04-10 NOTE — ED ADULT NURSE REASSESSMENT NOTE - NS ED NURSE REASSESS COMMENT FT1
Pt in no acute distress; ; pt care provided, diarrhea in diaper; sample of feces sent to lab for r/o CDiff; pt placed on contact iso until lab results posted; areas of blancheable redness noted to sacrum and buttocks surrounding rectum, skin intact, Wife at bedside advised about contact precautions.

## 2018-04-10 NOTE — CONSULT NOTE ADULT - SUBJECTIVE AND OBJECTIVE BOX
CHIEF COMPLAINT:Patient is a 72y old  Male who presents with a chief complaint of abd pain     HISTORY OF PRESENT ILLNESS:  This is a pleasant gentleman with history as below presented with abd pain , diarrhea, n/v, coffee ground emesis , fatigue   no cp   no sob     PAST MEDICAL & SURGICAL HISTORY:  Atrial fibrillation  History of pneumonia: 4/2013  Lung cancer: s/p R. lobectomy  Hypertension  Hyperlipidemia  Seizure disorder  H/O inguinal hernia repair: 1999  H/O aortic valve replacement: 2013  S/P cataract surgery: bilaterally  Aortic valve replaced: 5/2013   bovine AoValve  S/P pneumonectomy: right sided, ~ 2003          MEDICATIONS:  heparin  Injectable 5000 Unit(s) SubCutaneous every 8 hours        carBAMazepine ER (12-Hour) Oral Tab/Cap - Peds 200 milliGRAM(s) Oral two times a day  lamoTRIgine 100 milliGRAM(s) Oral every 12 hours    pantoprazole Infusion 8 mG/Hr IV Continuous <Continuous>          FAMILY HISTORY:  Family history of cancer (Aunt): Maternal aunt  Family history of hypertension: Mother  Family history of stroke: Mother      Non-contributory    SOCIAL HISTORY:    No tobacco, drugs or etoh    Allergies    No Known Allergies    Intolerances    	    REVIEW OF SYSTEMS:  as above  The rest of the 14 points ROS reviewed and except above they are unremarkable.        PHYSICAL EXAM:  T(C): 36.9 (04-10-18 @ 15:04), Max: 36.9 (04-10-18 @ 15:04)  HR: 103 (04-10-18 @ 15:04) (103 - 148)  BP: 128/68 (04-10-18 @ 15:04) (86/66 - 132/95)  RR: 20 (04-10-18 @ 15:04) (18 - 33)  SpO2: 98% (04-10-18 @ 15:04) (98% - 100%)  Wt(kg): --  I&O's Summary      JVP: Normal  Neck: supple  Lung: clear   CV: S1 S2 , Murmur:  Abd: soft  Ext: No edema  neuro: Awake / alert  Psych: flat affect  Skin: normal   LABS/DATA:    TELEMETRY: 	    ECG:  	   	  CARDIAC MARKERS:  Troponin T, Serum: < 0.06 ng/mL (04-10 @ 09:30)                                  11.4   11.45 )-----------( 227      ( 10 Apr 2018 09:30 )             34.8     04-10    139  |  93<L>  |  26<H>  ----------------------------<  139<H>  4.1   |  31  |  1.42<H>    Ca    10.4      10 Apr 2018 09:30  Phos  2.6     04-10  Mg     1.9     04-10    TPro  8.4<H>  /  Alb  4.4  /  TBili  0.5  /  DBili  x   /  AST  22  /  ALT  21  /  AlkPhos  97  04-10    proBNP:   Lipid Profile:   HgA1c:   TSH:     < from: 12 Lead ECG (04.10.18 @ 09:39) >  Diagnosis Line Atrial fibrillation with rapid ventricular response  Abnormal ECG    < end of copied text >    < from: CT Abdomen and Pelvis w/ IV Cont (04.10.18 @ 11:44) >  IMPRESSION:   Probableileus.    Previously seen consolidation in the left lower lobe appears decreased in   size, although only partially imaged.                  RUBEN HINDS M.D., ATTENDING RADIOLOGIST    < end of copied text >

## 2018-04-10 NOTE — ED ADULT NURSE REASSESSMENT NOTE - NS ED NURSE REASSESS COMMENT FT1
Report received from THADDEUS Riley. No acute distress at present. Pt. received on 3L O2 via nasal cannula with O2 saturation of 100%, 20 gauge IV in left ac, normal saline and protonix infusing. Will continue to monitor. Report received from THADDEUS Riley. No acute distress at present. Pt. received on 3L O2 via nasal cannula with O2 saturation of 100%, 20 gauge IV in left ac and 18 gauge in right ac, normal saline and protonix infusing. Will continue to monitor.

## 2018-04-10 NOTE — ED ADULT TRIAGE NOTE - CHIEF COMPLAINT QUOTE
Patient has c/o vomiting dark colored vomitus yesterday and patient is on Eliquis.  Pt has Afib and fluctuating from 80 to 140. Pt is on home O2 2 liters. Pt has low blood pressure since he was hospitalized last month with pneumonia.

## 2018-04-10 NOTE — H&P ADULT - PROBLEM SELECTOR PLAN 2
CELINE + Ileus  r / o cdiff -In setting of recent loperamide use  -clear liquids diet (no evidence of intractable nausea or vomiting).  IV hydration

## 2018-04-10 NOTE — ED PROVIDER NOTE - ATTENDING CONTRIBUTION TO CARE
72M on eliquis, afib, lung cancer in remission, copd, avR, seizures presents from home with family for abd discomfort, nausea, vomiting, diarrhea. States vomiting was dark colored. Patient also in afib RVR in 140s with soft blood pressure. Concern for GI bleed. Patient seen immediately, evaluated, and treated. IV protonix, IVF, magnesium for rate control, type and screen, guaiac, reassess. No signs of obvious infection or sepsis.

## 2018-04-10 NOTE — H&P ADULT - PROBLEM SELECTOR PLAN 7
clear liquids. no evidence of intractable nausea or vomiting.    IV hydration -will hold daily furosemide 40 mg PO in setting of diarrhea/vomiting

## 2018-04-11 LAB
ALBUMIN SERPL ELPH-MCNC: 3.1 G/DL — LOW (ref 3.3–5)
ALP SERPL-CCNC: 68 U/L — SIGNIFICANT CHANGE UP (ref 40–120)
ALT FLD-CCNC: 15 U/L — SIGNIFICANT CHANGE UP (ref 4–41)
AST SERPL-CCNC: 14 U/L — SIGNIFICANT CHANGE UP (ref 4–40)
BASOPHILS # BLD AUTO: 0.01 K/UL — SIGNIFICANT CHANGE UP (ref 0–0.2)
BASOPHILS # BLD AUTO: 0.02 K/UL — SIGNIFICANT CHANGE UP (ref 0–0.2)
BASOPHILS NFR BLD AUTO: 0.2 % — SIGNIFICANT CHANGE UP (ref 0–2)
BASOPHILS NFR BLD AUTO: 0.4 % — SIGNIFICANT CHANGE UP (ref 0–2)
BILIRUB SERPL-MCNC: 0.4 MG/DL — SIGNIFICANT CHANGE UP (ref 0.2–1.2)
BUN SERPL-MCNC: 18 MG/DL — SIGNIFICANT CHANGE UP (ref 7–23)
CALCIUM SERPL-MCNC: 8.4 MG/DL — SIGNIFICANT CHANGE UP (ref 8.4–10.5)
CHLORIDE SERPL-SCNC: 106 MMOL/L — SIGNIFICANT CHANGE UP (ref 98–107)
CO2 SERPL-SCNC: 29 MMOL/L — SIGNIFICANT CHANGE UP (ref 22–31)
CREAT SERPL-MCNC: 0.99 MG/DL — SIGNIFICANT CHANGE UP (ref 0.5–1.3)
EOSINOPHIL # BLD AUTO: 0.16 K/UL — SIGNIFICANT CHANGE UP (ref 0–0.5)
EOSINOPHIL # BLD AUTO: 0.16 K/UL — SIGNIFICANT CHANGE UP (ref 0–0.5)
EOSINOPHIL NFR BLD AUTO: 3.1 % — SIGNIFICANT CHANGE UP (ref 0–6)
EOSINOPHIL NFR BLD AUTO: 3.3 % — SIGNIFICANT CHANGE UP (ref 0–6)
GLUCOSE BLDC GLUCOMTR-MCNC: 103 MG/DL — HIGH (ref 70–99)
GLUCOSE BLDC GLUCOMTR-MCNC: 103 MG/DL — HIGH (ref 70–99)
GLUCOSE BLDC GLUCOMTR-MCNC: 110 MG/DL — HIGH (ref 70–99)
GLUCOSE SERPL-MCNC: 89 MG/DL — SIGNIFICANT CHANGE UP (ref 70–99)
HCT VFR BLD CALC: 23 % — LOW (ref 39–50)
HCT VFR BLD CALC: 25.8 % — LOW (ref 39–50)
HGB BLD-MCNC: 7.6 G/DL — LOW (ref 13–17)
HGB BLD-MCNC: 8.2 G/DL — LOW (ref 13–17)
IMM GRANULOCYTES # BLD AUTO: 0.02 # — SIGNIFICANT CHANGE UP
IMM GRANULOCYTES # BLD AUTO: 0.02 # — SIGNIFICANT CHANGE UP
IMM GRANULOCYTES NFR BLD AUTO: 0.4 % — SIGNIFICANT CHANGE UP (ref 0–1.5)
IMM GRANULOCYTES NFR BLD AUTO: 0.4 % — SIGNIFICANT CHANGE UP (ref 0–1.5)
LYMPHOCYTES # BLD AUTO: 0.66 K/UL — LOW (ref 1–3.3)
LYMPHOCYTES # BLD AUTO: 0.71 K/UL — LOW (ref 1–3.3)
LYMPHOCYTES # BLD AUTO: 12.8 % — LOW (ref 13–44)
LYMPHOCYTES # BLD AUTO: 14.5 % — SIGNIFICANT CHANGE UP (ref 13–44)
MAGNESIUM SERPL-MCNC: 2 MG/DL — SIGNIFICANT CHANGE UP (ref 1.6–2.6)
MCHC RBC-ENTMCNC: 31.8 % — LOW (ref 32–36)
MCHC RBC-ENTMCNC: 32.3 PG — SIGNIFICANT CHANGE UP (ref 27–34)
MCHC RBC-ENTMCNC: 32.8 PG — SIGNIFICANT CHANGE UP (ref 27–34)
MCHC RBC-ENTMCNC: 33 % — SIGNIFICANT CHANGE UP (ref 32–36)
MCV RBC AUTO: 101.6 FL — HIGH (ref 80–100)
MCV RBC AUTO: 99.1 FL — SIGNIFICANT CHANGE UP (ref 80–100)
MONOCYTES # BLD AUTO: 0.38 K/UL — SIGNIFICANT CHANGE UP (ref 0–0.9)
MONOCYTES # BLD AUTO: 0.39 K/UL — SIGNIFICANT CHANGE UP (ref 0–0.9)
MONOCYTES NFR BLD AUTO: 7.4 % — SIGNIFICANT CHANGE UP (ref 2–14)
MONOCYTES NFR BLD AUTO: 8 % — SIGNIFICANT CHANGE UP (ref 2–14)
NEUTROPHILS # BLD AUTO: 3.59 K/UL — SIGNIFICANT CHANGE UP (ref 1.8–7.4)
NEUTROPHILS # BLD AUTO: 3.91 K/UL — SIGNIFICANT CHANGE UP (ref 1.8–7.4)
NEUTROPHILS NFR BLD AUTO: 73.6 % — SIGNIFICANT CHANGE UP (ref 43–77)
NEUTROPHILS NFR BLD AUTO: 75.9 % — SIGNIFICANT CHANGE UP (ref 43–77)
NRBC # FLD: 0 — SIGNIFICANT CHANGE UP
NRBC # FLD: 0 — SIGNIFICANT CHANGE UP
PHOSPHATE SERPL-MCNC: 2.3 MG/DL — LOW (ref 2.5–4.5)
PLATELET # BLD AUTO: 143 K/UL — LOW (ref 150–400)
PLATELET # BLD AUTO: 148 K/UL — LOW (ref 150–400)
PMV BLD: 10 FL — SIGNIFICANT CHANGE UP (ref 7–13)
PMV BLD: 9.8 FL — SIGNIFICANT CHANGE UP (ref 7–13)
POTASSIUM SERPL-MCNC: 3.6 MMOL/L — SIGNIFICANT CHANGE UP (ref 3.5–5.3)
POTASSIUM SERPL-SCNC: 3.6 MMOL/L — SIGNIFICANT CHANGE UP (ref 3.5–5.3)
PROT SERPL-MCNC: 5.7 G/DL — LOW (ref 6–8.3)
RBC # BLD: 2.32 M/UL — LOW (ref 4.2–5.8)
RBC # BLD: 2.54 M/UL — LOW (ref 4.2–5.8)
RBC # FLD: 13.9 % — SIGNIFICANT CHANGE UP (ref 10.3–14.5)
RBC # FLD: 14 % — SIGNIFICANT CHANGE UP (ref 10.3–14.5)
SODIUM SERPL-SCNC: 143 MMOL/L — SIGNIFICANT CHANGE UP (ref 135–145)
WBC # BLD: 4.88 K/UL — SIGNIFICANT CHANGE UP (ref 3.8–10.5)
WBC # BLD: 5.15 K/UL — SIGNIFICANT CHANGE UP (ref 3.8–10.5)
WBC # FLD AUTO: 4.88 K/UL — SIGNIFICANT CHANGE UP (ref 3.8–10.5)
WBC # FLD AUTO: 5.15 K/UL — SIGNIFICANT CHANGE UP (ref 3.8–10.5)

## 2018-04-11 PROCEDURE — 99222 1ST HOSP IP/OBS MODERATE 55: CPT | Mod: GC

## 2018-04-11 PROCEDURE — 99233 SBSQ HOSP IP/OBS HIGH 50: CPT | Mod: GC

## 2018-04-11 RX ORDER — SODIUM,POTASSIUM PHOSPHATES 278-250MG
1 POWDER IN PACKET (EA) ORAL
Qty: 0 | Refills: 0 | Status: COMPLETED | OUTPATIENT
Start: 2018-04-11 | End: 2018-04-12

## 2018-04-11 RX ORDER — SODIUM CHLORIDE 9 MG/ML
1000 INJECTION INTRAMUSCULAR; INTRAVENOUS; SUBCUTANEOUS
Qty: 0 | Refills: 0 | Status: DISCONTINUED | OUTPATIENT
Start: 2018-04-11 | End: 2018-04-12

## 2018-04-11 RX ORDER — SODIUM CHLORIDE 9 MG/ML
500 INJECTION INTRAMUSCULAR; INTRAVENOUS; SUBCUTANEOUS ONCE
Qty: 0 | Refills: 0 | Status: DISCONTINUED | OUTPATIENT
Start: 2018-04-11 | End: 2018-04-11

## 2018-04-11 RX ORDER — PANTOPRAZOLE SODIUM 20 MG/1
40 TABLET, DELAYED RELEASE ORAL EVERY 12 HOURS
Qty: 0 | Refills: 0 | Status: DISCONTINUED | OUTPATIENT
Start: 2018-04-11 | End: 2018-04-11

## 2018-04-11 RX ADMIN — SODIUM CHLORIDE 75 MILLILITER(S): 9 INJECTION INTRAMUSCULAR; INTRAVENOUS; SUBCUTANEOUS at 18:32

## 2018-04-11 RX ADMIN — PANTOPRAZOLE SODIUM 10 MG/HR: 20 TABLET, DELAYED RELEASE ORAL at 14:16

## 2018-04-11 RX ADMIN — Medication 400 MILLIGRAM(S): at 18:30

## 2018-04-11 RX ADMIN — LAMOTRIGINE 100 MILLIGRAM(S): 25 TABLET, ORALLY DISINTEGRATING ORAL at 07:17

## 2018-04-11 RX ADMIN — Medication 1 TABLET(S): at 18:32

## 2018-04-11 RX ADMIN — LAMOTRIGINE 100 MILLIGRAM(S): 25 TABLET, ORALLY DISINTEGRATING ORAL at 18:29

## 2018-04-11 RX ADMIN — Medication 1 TABLET(S): at 12:10

## 2018-04-11 RX ADMIN — PANTOPRAZOLE SODIUM 10 MG/HR: 20 TABLET, DELAYED RELEASE ORAL at 08:57

## 2018-04-11 RX ADMIN — Medication 1 TABLET(S): at 21:32

## 2018-04-11 RX ADMIN — Medication 400 MILLIGRAM(S): at 07:17

## 2018-04-11 RX ADMIN — PANTOPRAZOLE SODIUM 10 MG/HR: 20 TABLET, DELAYED RELEASE ORAL at 14:08

## 2018-04-11 NOTE — PROGRESS NOTE ADULT - ASSESSMENT
abd   discomfort  Cdiff ruled out  drop in H/H , rule out GIB  GI consult   PPI    afib  HR stable  cont to monitor  change a/c back to warfarin once deemed safe , for now a/c on hold     copd   nebs

## 2018-04-11 NOTE — PROGRESS NOTE ADULT - PROBLEM SELECTOR PLAN 1
p/w WBC 11.4  BP 88/66 CELINE + Ileus + diarrhea  -IVF resuscitation. Will observe off abx for now.   -possible enteritis

## 2018-04-11 NOTE — PROGRESS NOTE ADULT - SUBJECTIVE AND OBJECTIVE BOX
Subjective: Patient seen and examined. No new events except as noted.     SUBJECTIVE/ROS:    feels better today     MEDICATIONS:  MEDICATIONS  (STANDING):  carBAMazepine 400 milliGRAM(s) Oral every 12 hours  diltiazem    Tablet 30 milliGRAM(s) Oral every 12 hours  lactobacillus acidophilus 1 Tablet(s) Oral daily  lamoTRIgine 100 milliGRAM(s) Oral every 12 hours  pantoprazole Infusion 8 mG/Hr (10 mL/Hr) IV Continuous <Continuous>  sodium chloride 0.9%. 1000 milliLiter(s) (75 mL/Hr) IV Continuous <Continuous>      PHYSICAL EXAM:  T(C): 36.7 (04-11-18 @ 06:55), Max: 36.9 (04-10-18 @ 15:04)  HR: 90 (04-11-18 @ 07:30) (88 - 148)  BP: 120/58 (04-11-18 @ 07:30) (86/66 - 132/95)  RR: 22 (04-11-18 @ 07:30) (18 - 33)  SpO2: 100% (04-11-18 @ 07:30) (98% - 100%)  Wt(kg): --  I&O's Summary        JVP: Normal  Neck: supple  Lung: clear   CV: S1 S2 , Murmur:  Abd: soft  Ext: No edema  neuro: Awake / alert  Psych: flat affect  Skin: normal     LABS/DATA:    CARDIAC MARKERS:  CARDIAC MARKERS ( 10 Apr 2018 09:30 )  x     / < 0.06 ng/mL / x     / x     / x                                    8.2    5.15  )-----------( 143      ( 11 Apr 2018 06:31 )             25.8     04-11    143  |  106  |  18  ----------------------------<  89  3.6   |  29  |  0.99    Ca    8.4      11 Apr 2018 06:31  Phos  2.3     04-11  Mg     2.0     04-11    TPro  5.7<L>  /  Alb  3.1<L>  /  TBili  0.4  /  DBili  x   /  AST  14  /  ALT  15  /  AlkPhos  68  04-11    proBNP:   Lipid Profile:   HgA1c:   TSH:     TELE:  EKG:

## 2018-04-11 NOTE — PROGRESS NOTE ADULT - PROBLEM SELECTOR PLAN 2
-In setting of recent loperamide use  -clear liquids diet (no evidence of intractable nausea or vomiting).  IV hydration

## 2018-04-11 NOTE — CONSULT NOTE ADULT - ASSESSMENT
Impression:    1. Upper GI bleed: likely due to MW tear vs. peptic ulcer vs. esophagitis. not actively bleeding at this time.    2. Ileus: appears to be resolving, likely due to immodium use.    3. Persistent diarrhea: cdiff negative, rule out bacterial and parasitic etiologies, microscopic colitis.    Recommendation Impression:    1. possible upper GI bleed: likely due to MW tear vs. peptic ulcer vs. esophagitis. not actively bleeding at this time.    2. Ileus: appears to be resolving, likely due to immodium use.    3. Persistent diarrhea: cdiff negative, rule out bacterial and parasitic etiologies, microscopic colitis.    Recommendation:  -continue high dose PPI  -plan for EGD tomorrow with general anesthesia given possible ileus

## 2018-04-11 NOTE — CONSULT NOTE ADULT - ATTENDING COMMENTS
72 year old male with multiple comorbidities with an ileus and nausea/vomiting. Hb is trending down. Unclear if a GI bleed.  Given Hb is dropping, will plan for an EGD. Cont PPI drip.

## 2018-04-11 NOTE — PROGRESS NOTE ADULT - ASSESSMENT
71 yo M with hx of COPD on 2 L at home, Lung Ca s/p R Lung lobectomy in 2003 (s/p chemo/radiation) Bovine AVR, Afib (recently started on apixaban, not on rate control), seizure d/o (tonic seizures, on carbamazepine and lamotrigine , HTN who was recently admitted to [facility] MICU on 3/18 for influenza + LLL PNA (required bilevel, treated with levofloxacin) who presents with a 2 week hx of diarrhea starting a few days after discharge from hospital. Pt found to be in AFIB with RVR 2.2 to volume down state + probably ileus on CT abdomen. Pt meets criteria for severe sepsis with acute organ dysfunction (, BP 88/66, WBC 11.45 + diarrhea, CELINE, ileus). Concern for CDIFF colitis vs antibiotic induced diarrhea vs acute gastroenteritis. Ileus possibly 2/2 to loperamide

## 2018-04-11 NOTE — ED ADULT NURSE REASSESSMENT NOTE - NS ED NURSE REASSESS COMMENT FT1
Report received from break coverage THADDEUS Whitlock. VS as noted. MD Yusuf made aware of pt. vital signs. Awaiting new orders. Will continue to monitor.

## 2018-04-11 NOTE — CONSULT NOTE ADULT - SUBJECTIVE AND OBJECTIVE BOX
Chief Complaint:  Patient is a 72y old  Male who presents with a chief complaint of diarrhea (10 Apr 2018 14:18)    Translation per wife.  HPI:  This is a 73 yo M w COPD on home O2, afib on Eliquis, lung Ca s/p resection/RT, bovine AVR who presents with 2 episodes of coffee ground emesis. It started on Monday evening after multiple NBNB episodes of emesis. The patient had abdominal bloating after taking immodium intermittently for several days for loose nonbloody stools. Patient was recently hospitalized for influenza and completed abx in the end of March. He has been having intermittent diarrhea since then using immodium. He has been relatively immobile. No melena or abdominal pain or dysphagia. last emesis on monday, now passing gas and lest distended per wife. No ASA or NSAIDs. No new meds or recent travel.   Allergies:  No Known Allergies      Home Medications:    Hospital Medications:  carBAMazepine 400 milliGRAM(s) Oral every 12 hours  diltiazem    Tablet 30 milliGRAM(s) Oral every 12 hours  lactobacillus acidophilus 1 Tablet(s) Oral daily  lamoTRIgine 100 milliGRAM(s) Oral every 12 hours  pantoprazole Infusion 8 mG/Hr IV Continuous <Continuous>  potassium acid phosphate/sodium acid phosphate tablet (K-PHOS No. 2) 1 Tablet(s) Oral four times a day with meals  sodium chloride 0.9%. 1000 milliLiter(s) IV Continuous <Continuous>      PMHX/PSHX:  Atrial fibrillation  Arrhythmia  Hypertension  History of pneumonia  Lung cancer  Seizure disorder  Atrial fibrillation  Hypertension  Hyperlipidemia  Seizure disorder  Lung cancer  H/O inguinal hernia repair  H/O aortic valve replacement  S/P cataract surgery  Aortic valve replaced  S/P pneumonectomy      Family history:  Family history of cancer (Aunt)  Family history of hypertension  Family history of stroke      Social History:   former smoker  ROS:     General:  No wt loss, fevers, chills, night sweats, fatigue,   Eyes:  Good vision, no reported pain  ENT:  No sore throat, pain, runny nose, dysphagia  CV:  No pain, palpitations, hypo/hypertension  Resp:  No dyspnea, cough, tachypnea, wheezing  GI:  See HPI  :  No pain, bleeding, incontinence, nocturia  Muscle:  No pain, weakness  Neuro:  No weakness, tingling, memory problems  Psych:  No fatigue, insomnia, mood problems, depression  Endocrine:  No polyuria, polydipsia, cold/heat intolerance  Heme:  No petechiae, ecchymosis, easy bruisability  Skin:  No rash, edema      PHYSICAL EXAM:     GENERAL:  Appears stated age, well-groomed, well-nourished, no distress  HEENT:  NC/AT,  conjunctivae clear and pink,  no JVD  CHEST:  Full & symmetric excursion, no increased effort, breath sounds clear  HEART:  Regular rhythm, S1, S2, no murmur/rub/S3/S4, no abdominal bruit, no edema  ABDOMEN:  Soft, non-tender, non-distended, normoactive bowel sounds,  no masses , no hepatosplenomegaly  EXTREMITIES:  no cyanosis,clubbing or edema  SKIN:  No rash/erythema/ecchymoses/petechiae/wounds/abscess/warm/dry  NEURO:  Alert, oriented    Vital Signs:  Vital Signs Last 24 Hrs  T(C): 36.6 (2018 17:02), Max: 36.9 (10 Apr 2018 19:46)  T(F): 97.8 (2018 17:02), Max: 98.4 (10 Apr 2018 19:46)  HR: 77 (2018 17:02) (70 - 99)  BP: 103/48 (2018 17:02) (97/48 - 120/58)  BP(mean): --  RR: 18 (2018 17:02) (18 - 25)  SpO2: 100% (2018 17:02) (99% - 100%)  Daily Height in cm: 177.8 (2018 17:02)    Daily     LABS:                        7.6    4.88  )-----------( 148      ( 2018 16:12 )             23.0     04-11    143  |  106  |  18  ----------------------------<  89  3.6   |  29  |  0.99    Ca    8.4      2018 06:31  Phos  2.3     -11  Mg     2.0     -11    TPro  5.7<L>  /  Alb  3.1<L>  /  TBili  0.4  /  DBili  x   /  AST  14  /  ALT  15  /  AlkPhos  68  04-11    LIVER FUNCTIONS - ( 2018 06:31 )  Alb: 3.1 g/dL / Pro: 5.7 g/dL / ALK PHOS: 68 u/L / ALT: 15 u/L / AST: 14 u/L / GGT: x           PT/INR - ( 10 Apr 2018 09:30 )   PT: 14.5 SEC;   INR: 1.30          PTT - ( 10 Apr 2018 09:30 )  PTT:34.2 SEC  Urinalysis Basic - ( 10 Apr 2018 11:10 )    Color: YELLOW / Appearance: HAZY / S.017 / pH: 6.0  Gluc: NEGATIVE / Ketone: NEGATIVE  / Bili: NEGATIVE / Urobili: NORMAL mg/dL   Blood: NEGATIVE / Protein: 20 mg/dL / Nitrite: NEGATIVE   Leuk Esterase: NEGATIVE / RBC: 0-2 / WBC 0-2   Sq Epi: OCC / Non Sq Epi: x / Bacteria: x          Imaging:

## 2018-04-11 NOTE — PROGRESS NOTE ADULT - ATTENDING COMMENTS
Patient seen with HS4 on 4/10/18  72 y.o. Male w/ hx COPD 2LNC, LUNG CA 2003 s/p right Lung lobectomy s/p chemo, bovine AVR, afib on eliquis, Seizure, HTN recently admitted 3/18 [facility] for influenza/ LLL PNA p/w 2 weeks of diarrhea  found to have abdominal ileus on CT A/P w/ CELINE meeting sepsis criteria. Check for C diff. Hydrate with IVF. 72 y.o. Male w/ hx COPD 2LNC, LUNG CA 2003 s/p right Lung lobectomy s/p chemo, bovine AVR, afib on eliquis, Seizure, HTN recently admitted 3/18 [facility] for influenza/ LLL PNA p/w 2 weeks of diarrhea  found to have abdominal ileus on CT A/P w/ CELINE meeting sepsis criteria. stool for C diff. negative Continue to Hydrate with IVF. advance diet as tolerated today.

## 2018-04-11 NOTE — PROGRESS NOTE ADULT - SUBJECTIVE AND OBJECTIVE BOX
Patient is a 72y old  Male who presents with a chief complaint of diarrhea (10 Apr 2018 14:18)      SUBJECTIVE / OVERNIGHT EVENTS:    Patient seen and examined at bedside. No acute events overnight. Pt denies vomiting. Able to tolerate PO. Pt reports 5 BMs overnight. Appears better today and reports he feels improved.     Review of systems: No chest pain, no shortness of breath, no abdominal pain, no nausea, no vomiting, no diarrhea, no fevers, and no chills overnight.     MEDICATIONS  (STANDING):  carBAMazepine 400 milliGRAM(s) Oral every 12 hours  diltiazem    Tablet 30 milliGRAM(s) Oral every 12 hours  lactobacillus acidophilus 1 Tablet(s) Oral daily  lamoTRIgine 100 milliGRAM(s) Oral every 12 hours  pantoprazole Infusion 8 mG/Hr (10 mL/Hr) IV Continuous <Continuous>  potassium acid phosphate/sodium acid phosphate tablet (K-PHOS No. 2) 1 Tablet(s) Oral four times a day with meals  sodium chloride 0.9%. 1000 milliLiter(s) (75 mL/Hr) IV Continuous <Continuous>    MEDICATIONS  (PRN):      T(F): 98 ( @ 06:55), Max: 98.4 (04-10 @ 15:04)  HR: 90 ( @ 07:30) (88 - 148)  BP: 120/58 ( @ 07:30) (86/66 - 132/95)  RR: 22 ( @ 07:30) (18 - 33)  SpO2: 100% ( @ 07:30) (98% - 100%)  CAPILLARY BLOOD GLUCOSE        I&O's Summary      PHYSICAL EXAM:  GEN: Age appropriate male, resting comfortably in bed, no acute distress, non toxic appearing, speaking in complete sentences.   HEENT: Conjunctiva and sclera normal  PULM: Absence of lung sounds on right side. Lungs CTAB on left side. , no wheezes, rales, rhonchi  CV: RRR, S1S2, no MRG  Abdominal: Soft, nontender to palpation, non-distended, normoactive bowel sounds  Extremities: No edema or cyanosis  NEURO: AAOx3  Skin: No rashes, lesions      LABS:  Labs personally reviewed.                        8.2    5.15  )-----------( 143      ( 2018 06:31 )             25.8     Hgb Trend: 8.2<--, 9.8<--, 11.4<--  04-11    143  |  106  |  18  ----------------------------<  89  3.6   |  29  |  0.99    Ca    8.4      2018 06:31  Phos  2.3     04-11  Mg     2.0     04-11    TPro  5.7<L>  /  Alb  3.1<L>  /  TBili  0.4  /  DBili  x   /  AST  14  /  ALT  15  /  AlkPhos  68  04-11    Creatinine Trend: 0.99<--, 1.16<--, 1.42<--, 0.86<--, 0.90<--, 0.89<--  PT/INR - ( 10 Apr 2018 09:30 )   PT: 14.5 SEC;   INR: 1.30          PTT - ( 10 Apr 2018 09:30 )  PTT:34.2 SEC  Urinalysis Basic - ( 10 Apr 2018 11:10 )    Color: YELLOW / Appearance: HAZY / S.017 / pH: 6.0  Gluc: NEGATIVE / Ketone: NEGATIVE  / Bili: NEGATIVE / Urobili: NORMAL mg/dL   Blood: NEGATIVE / Protein: 20 mg/dL / Nitrite: NEGATIVE   Leuk Esterase: NEGATIVE / RBC: 0-2 / WBC 0-2   Sq Epi: OCC / Non Sq Epi: x / Bacteria: x        Kofi Ruvalcaba-Malevita  PGY-1 Pager: [facility]-[phone]  [facility]-[phone]  Night Float:

## 2018-04-12 DIAGNOSIS — D62 ACUTE POSTHEMORRHAGIC ANEMIA: ICD-10-CM

## 2018-04-12 LAB
ALBUMIN SERPL ELPH-MCNC: 3 G/DL — LOW (ref 3.3–5)
ALP SERPL-CCNC: 65 U/L — SIGNIFICANT CHANGE UP (ref 40–120)
ALT FLD-CCNC: 14 U/L — SIGNIFICANT CHANGE UP (ref 4–41)
AST SERPL-CCNC: 12 U/L — SIGNIFICANT CHANGE UP (ref 4–40)
BASOPHILS # BLD AUTO: 0.02 K/UL — SIGNIFICANT CHANGE UP (ref 0–0.2)
BASOPHILS # BLD AUTO: 0.02 K/UL — SIGNIFICANT CHANGE UP (ref 0–0.2)
BASOPHILS NFR BLD AUTO: 0.4 % — SIGNIFICANT CHANGE UP (ref 0–2)
BASOPHILS NFR BLD AUTO: 0.4 % — SIGNIFICANT CHANGE UP (ref 0–2)
BILIRUB SERPL-MCNC: 0.4 MG/DL — SIGNIFICANT CHANGE UP (ref 0.2–1.2)
BLD GP AB SCN SERPL QL: NEGATIVE — SIGNIFICANT CHANGE UP
BUN SERPL-MCNC: 10 MG/DL — SIGNIFICANT CHANGE UP (ref 7–23)
CALCIUM SERPL-MCNC: 8.5 MG/DL — SIGNIFICANT CHANGE UP (ref 8.4–10.5)
CHLORIDE SERPL-SCNC: 100 MMOL/L — SIGNIFICANT CHANGE UP (ref 98–107)
CO2 SERPL-SCNC: 27 MMOL/L — SIGNIFICANT CHANGE UP (ref 22–31)
CREAT SERPL-MCNC: 0.86 MG/DL — SIGNIFICANT CHANGE UP (ref 0.5–1.3)
EOSINOPHIL # BLD AUTO: 0.17 K/UL — SIGNIFICANT CHANGE UP (ref 0–0.5)
EOSINOPHIL # BLD AUTO: 0.18 K/UL — SIGNIFICANT CHANGE UP (ref 0–0.5)
EOSINOPHIL NFR BLD AUTO: 3.7 % — SIGNIFICANT CHANGE UP (ref 0–6)
EOSINOPHIL NFR BLD AUTO: 3.8 % — SIGNIFICANT CHANGE UP (ref 0–6)
GLUCOSE SERPL-MCNC: 93 MG/DL — SIGNIFICANT CHANGE UP (ref 70–99)
HCT VFR BLD CALC: 24.2 % — LOW (ref 39–50)
HCT VFR BLD CALC: 25.5 % — LOW (ref 39–50)
HGB BLD-MCNC: 8 G/DL — LOW (ref 13–17)
HGB BLD-MCNC: 8.2 G/DL — LOW (ref 13–17)
IMM GRANULOCYTES # BLD AUTO: 0.03 # — SIGNIFICANT CHANGE UP
IMM GRANULOCYTES # BLD AUTO: 0.03 # — SIGNIFICANT CHANGE UP
IMM GRANULOCYTES NFR BLD AUTO: 0.6 % — SIGNIFICANT CHANGE UP (ref 0–1.5)
IMM GRANULOCYTES NFR BLD AUTO: 0.7 % — SIGNIFICANT CHANGE UP (ref 0–1.5)
LYMPHOCYTES # BLD AUTO: 0.67 K/UL — LOW (ref 1–3.3)
LYMPHOCYTES # BLD AUTO: 0.74 K/UL — LOW (ref 1–3.3)
LYMPHOCYTES # BLD AUTO: 14.9 % — SIGNIFICANT CHANGE UP (ref 13–44)
LYMPHOCYTES # BLD AUTO: 15.1 % — SIGNIFICANT CHANGE UP (ref 13–44)
MAGNESIUM SERPL-MCNC: 2 MG/DL — SIGNIFICANT CHANGE UP (ref 1.6–2.6)
MCHC RBC-ENTMCNC: 32 PG — SIGNIFICANT CHANGE UP (ref 27–34)
MCHC RBC-ENTMCNC: 32.2 % — SIGNIFICANT CHANGE UP (ref 32–36)
MCHC RBC-ENTMCNC: 32.4 PG — SIGNIFICANT CHANGE UP (ref 27–34)
MCHC RBC-ENTMCNC: 33.1 % — SIGNIFICANT CHANGE UP (ref 32–36)
MCV RBC AUTO: 98 FL — SIGNIFICANT CHANGE UP (ref 80–100)
MCV RBC AUTO: 99.6 FL — SIGNIFICANT CHANGE UP (ref 80–100)
MONOCYTES # BLD AUTO: 0.31 K/UL — SIGNIFICANT CHANGE UP (ref 0–0.9)
MONOCYTES # BLD AUTO: 0.33 K/UL — SIGNIFICANT CHANGE UP (ref 0–0.9)
MONOCYTES NFR BLD AUTO: 6.7 % — SIGNIFICANT CHANGE UP (ref 2–14)
MONOCYTES NFR BLD AUTO: 6.9 % — SIGNIFICANT CHANGE UP (ref 2–14)
NEUTROPHILS # BLD AUTO: 3.3 K/UL — SIGNIFICANT CHANGE UP (ref 1.8–7.4)
NEUTROPHILS # BLD AUTO: 3.59 K/UL — SIGNIFICANT CHANGE UP (ref 1.8–7.4)
NEUTROPHILS NFR BLD AUTO: 73.3 % — SIGNIFICANT CHANGE UP (ref 43–77)
NEUTROPHILS NFR BLD AUTO: 73.5 % — SIGNIFICANT CHANGE UP (ref 43–77)
NRBC # FLD: 0 — SIGNIFICANT CHANGE UP
NRBC # FLD: 0 — SIGNIFICANT CHANGE UP
PHOSPHATE SERPL-MCNC: 3 MG/DL — SIGNIFICANT CHANGE UP (ref 2.5–4.5)
PLATELET # BLD AUTO: 141 K/UL — LOW (ref 150–400)
PLATELET # BLD AUTO: 145 K/UL — LOW (ref 150–400)
PMV BLD: 10.1 FL — SIGNIFICANT CHANGE UP (ref 7–13)
PMV BLD: 9.8 FL — SIGNIFICANT CHANGE UP (ref 7–13)
POTASSIUM SERPL-MCNC: 3.4 MMOL/L — LOW (ref 3.5–5.3)
POTASSIUM SERPL-SCNC: 3.4 MMOL/L — LOW (ref 3.5–5.3)
PROT SERPL-MCNC: 5.7 G/DL — LOW (ref 6–8.3)
RBC # BLD: 2.47 M/UL — LOW (ref 4.2–5.8)
RBC # BLD: 2.56 M/UL — LOW (ref 4.2–5.8)
RBC # FLD: 13.7 % — SIGNIFICANT CHANGE UP (ref 10.3–14.5)
RBC # FLD: 13.7 % — SIGNIFICANT CHANGE UP (ref 10.3–14.5)
RH IG SCN BLD-IMP: NEGATIVE — SIGNIFICANT CHANGE UP
SODIUM SERPL-SCNC: 136 MMOL/L — SIGNIFICANT CHANGE UP (ref 135–145)
WBC # BLD: 4.5 K/UL — SIGNIFICANT CHANGE UP (ref 3.8–10.5)
WBC # BLD: 4.89 K/UL — SIGNIFICANT CHANGE UP (ref 3.8–10.5)
WBC # FLD AUTO: 4.5 K/UL — SIGNIFICANT CHANGE UP (ref 3.8–10.5)
WBC # FLD AUTO: 4.89 K/UL — SIGNIFICANT CHANGE UP (ref 3.8–10.5)

## 2018-04-12 PROCEDURE — 74018 RADEX ABDOMEN 1 VIEW: CPT | Mod: 26

## 2018-04-12 PROCEDURE — 99233 SBSQ HOSP IP/OBS HIGH 50: CPT | Mod: GC

## 2018-04-12 PROCEDURE — 43239 EGD BIOPSY SINGLE/MULTIPLE: CPT | Mod: GC

## 2018-04-12 RX ORDER — ROBINUL 0.2 MG/ML
1 INJECTION INTRAMUSCULAR; INTRAVENOUS ONCE
Qty: 0 | Refills: 0 | Status: DISCONTINUED | OUTPATIENT
Start: 2018-04-12 | End: 2018-04-12

## 2018-04-12 RX ORDER — IPRATROPIUM/ALBUTEROL SULFATE 18-103MCG
3 AEROSOL WITH ADAPTER (GRAM) INHALATION ONCE
Qty: 0 | Refills: 0 | Status: COMPLETED | OUTPATIENT
Start: 2018-04-12 | End: 2018-04-12

## 2018-04-12 RX ADMIN — Medication 400 MILLIGRAM(S): at 21:59

## 2018-04-12 RX ADMIN — LAMOTRIGINE 100 MILLIGRAM(S): 25 TABLET, ORALLY DISINTEGRATING ORAL at 22:00

## 2018-04-12 RX ADMIN — Medication 3 MILLILITER(S): at 21:30

## 2018-04-12 RX ADMIN — Medication 400 MILLIGRAM(S): at 06:21

## 2018-04-12 RX ADMIN — LAMOTRIGINE 100 MILLIGRAM(S): 25 TABLET, ORALLY DISINTEGRATING ORAL at 06:21

## 2018-04-12 NOTE — PROGRESS NOTE ADULT - ASSESSMENT
73 yo M with hx of COPD on 2 L at home, Lung Ca s/p R Lung lobectomy in 2003 (s/p chemo/radiation) Bovine AVR, Afib (recently started on apixaban, not on rate control), seizure d/o (tonic seizures, on carbamazepine and lamotrigine , HTN who was recently admitted to [facility] MICU on 3/18 for influenza + LLL PNA (required bilevel, treated with levofloxacin) who presents with a 2 week hx of diarrhea starting a few days after discharge from hospital. Pt found to be in AFIB with RVR 2.2 to volume down state + probably ileus on CT abdomen. Pt meets criteria for severe sepsis with acute organ dysfunction (, BP 88/66, WBC 11.45 + diarrhea, CELINE, ileus). Concern for CDIFF colitis vs antibiotic induced diarrhea vs acute gastroenteritis. Ileus possibly 2/2 to loperamide

## 2018-04-12 NOTE — PROGRESS NOTE ADULT - SUBJECTIVE AND OBJECTIVE BOX
Patient is a 72y old  Male who presents with a chief complaint of diarrhea (10 Apr 2018 14:18)      INTERVAL HPI/OVERNIGHT EVENTS:    MEDICATIONS  (STANDING):  carBAMazepine 400 milliGRAM(s) Oral every 12 hours  diltiazem    Tablet 30 milliGRAM(s) Oral every 12 hours  lactobacillus acidophilus 1 Tablet(s) Oral daily  lamoTRIgine 100 milliGRAM(s) Oral every 12 hours  pantoprazole Infusion 8 mG/Hr (10 mL/Hr) IV Continuous <Continuous>  potassium acid phosphate/sodium acid phosphate tablet (K-PHOS No. 2) 1 Tablet(s) Oral four times a day with meals  sodium chloride 0.9%. 1000 milliLiter(s) (75 mL/Hr) IV Continuous <Continuous>    MEDICATIONS  (PRN):      Allergies    No Known Allergies    Intolerances        REVIEW OF SYSTEMS:  Please see interval HPI:    Vital Signs Last 24 Hrs  T(C): 36.3 (12 Apr 2018 12:04), Max: 36.7 (12 Apr 2018 06:18)  T(F): 97.3 (12 Apr 2018 12:04), Max: 98.1 (12 Apr 2018 06:18)  HR: 78 (12 Apr 2018 12:04) (71 - 79)  BP: 123/64 (12 Apr 2018 12:04) (103/48 - 136/59)  BP(mean): --  RR: 20 (12 Apr 2018 12:04) (18 - 20)  SpO2: 93% (12 Apr 2018 12:04) (93% - 100%)  I&O's Detail    11 Apr 2018 07:01  -  12 Apr 2018 07:00  --------------------------------------------------------  IN:  Total IN: 0 mL    OUT:    Voided: 375 mL  Total OUT: 375 mL    Total NET: -375 mL            PHYSICAL EXAM:  GENERAL:   HEAD:    EYES:   ENMT:   NECK:   NERVOUS SYSTEM:    CHEST/LUNG:   HEART:   ABDOMEN:   EXTREMITIES:    LYMPH:   SKIN:     LABS:                        8.2    4.50  )-----------( 141      ( 12 Apr 2018 06:00 )             25.5     12 Apr 2018 06:00    136    |  100    |  10     ----------------------------<  93     3.4     |  27     |  0.86     Ca    8.5        12 Apr 2018 06:00  Phos  3.0       12 Apr 2018 06:00  Mg     2.0       12 Apr 2018 06:00    TPro  5.7    /  Alb  3.0    /  TBili  0.4    /  DBili  x      /  AST  12     /  ALT  14     /  AlkPhos  65     12 Apr 2018 06:00      CAPILLARY BLOOD GLUCOSE      POCT Blood Glucose.: 103 mg/dL (11 Apr 2018 22:01)  POCT Blood Glucose.: 103 mg/dL (11 Apr 2018 18:06)    BLOOD CULTURE    RADIOLOGY & ADDITIONAL TESTS:    Imaging Personally Reviewed:  [ ] YES     Consultant(s) Notes Reviewed:      Care Discussed with Consultants/Other Providers:    TAVON CHAVEZ MD  INTERNAL MEDICINE PGY-1   [id number] Patient is a 72y old  Male who presents with a chief complaint of diarrhea (10 Apr 2018 14:18)      INTERVAL HPI/OVERNIGHT EVENTS:  hgb stable overnight.   No events.   Denies Nausea, vomiting, odynophagia, chest pain, cough, dyspena, abdomen pain, constipation, diarrhea, rash, fatigue, headache, depression       MEDICATIONS  (STANDING):  carBAMazepine 400 milliGRAM(s) Oral every 12 hours  diltiazem    Tablet 30 milliGRAM(s) Oral every 12 hours  lactobacillus acidophilus 1 Tablet(s) Oral daily  lamoTRIgine 100 milliGRAM(s) Oral every 12 hours  pantoprazole Infusion 8 mG/Hr (10 mL/Hr) IV Continuous <Continuous>  potassium acid phosphate/sodium acid phosphate tablet (K-PHOS No. 2) 1 Tablet(s) Oral four times a day with meals  sodium chloride 0.9%. 1000 milliLiter(s) (75 mL/Hr) IV Continuous <Continuous>    MEDICATIONS  (PRN):      Allergies    No Known Allergies    Intolerances        REVIEW OF SYSTEMS:  Please see interval HPI:    Vital Signs Last 24 Hrs  T(C): 36.3 (12 Apr 2018 12:04), Max: 36.7 (12 Apr 2018 06:18)  T(F): 97.3 (12 Apr 2018 12:04), Max: 98.1 (12 Apr 2018 06:18)  HR: 78 (12 Apr 2018 12:04) (71 - 79)  BP: 123/64 (12 Apr 2018 12:04) (103/48 - 136/59)  BP(mean): --  RR: 20 (12 Apr 2018 12:04) (18 - 20)  SpO2: 93% (12 Apr 2018 12:04) (93% - 100%)  I&O's Detail    11 Apr 2018 07:01  -  12 Apr 2018 07:00  --------------------------------------------------------  IN:  Total IN: 0 mL    OUT:    Voided: 375 mL  Total OUT: 375 mL    Total NET: -375 mL          Gen: awake, alert  HENT: neck soft / supple  Lymph: no LAD noted in neck  Eye: PERRL, sclerae anicteric  CV: normal rate, regular rhythm  Pulm: Left lung clear,  no w/r/r auscultated  Abd: +BS, soft, NT, ND  Skin: warm, dry  Ext: no LE edema  Neuro: answering questions appropriately, following commands appropriately, recent and remote memory intact  Psych: normal mood / affect    LABS:                        8.2    4.50  )-----------( 141      ( 12 Apr 2018 06:00 )             25.5     12 Apr 2018 06:00    136    |  100    |  10     ----------------------------<  93     3.4     |  27     |  0.86     Ca    8.5        12 Apr 2018 06:00  Phos  3.0       12 Apr 2018 06:00  Mg     2.0       12 Apr 2018 06:00    TPro  5.7    /  Alb  3.0    /  TBili  0.4    /  DBili  x      /  AST  12     /  ALT  14     /  AlkPhos  65     12 Apr 2018 06:00      CAPILLARY BLOOD GLUCOSE      POCT Blood Glucose.: 103 mg/dL (11 Apr 2018 22:01)  POCT Blood Glucose.: 103 mg/dL (11 Apr 2018 18:06)    BLOOD CULTURE    RADIOLOGY & ADDITIONAL TESTS:    Imaging Personally Reviewed:  [ ] YES     Consultant(s) Notes Reviewed:    GI    Care Discussed with Consultants/Other Providers:    TAVON CHAVEZ MD  INTERNAL MEDICINE PGY-1   [id number]

## 2018-04-12 NOTE — PROGRESS NOTE ADULT - SUBJECTIVE AND OBJECTIVE BOX
Subjective: Patient seen and examined. No new events except as noted.     SUBJECTIVE/ROS:  feels better today   family at bedside       MEDICATIONS:  MEDICATIONS  (STANDING):  carBAMazepine 400 milliGRAM(s) Oral every 12 hours  diltiazem    Tablet 30 milliGRAM(s) Oral every 12 hours  lactobacillus acidophilus 1 Tablet(s) Oral daily  lamoTRIgine 100 milliGRAM(s) Oral every 12 hours  pantoprazole Infusion 8 mG/Hr (10 mL/Hr) IV Continuous <Continuous>  potassium acid phosphate/sodium acid phosphate tablet (K-PHOS No. 2) 1 Tablet(s) Oral four times a day with meals  sodium chloride 0.9%. 1000 milliLiter(s) (75 mL/Hr) IV Continuous <Continuous>      PHYSICAL EXAM:  T(C): 36.7 (04-12-18 @ 06:18), Max: 36.7 (04-12-18 @ 06:18)  HR: 71 (04-12-18 @ 06:18) (70 - 80)  BP: 124/70 (04-12-18 @ 06:18) (100/52 - 136/59)  RR: 18 (04-12-18 @ 06:18) (18 - 18)  SpO2: 100% (04-12-18 @ 06:18) (100% - 100%)  Wt(kg): --  I&O's Summary    11 Apr 2018 07:01  -  12 Apr 2018 07:00  --------------------------------------------------------  IN: 0 mL / OUT: 375 mL / NET: -375 mL      Height (cm): 177.8 (04-11 @ 17:02)  Weight (kg): 62.7 (04-11 @ 17:02)  BMI (kg/m2): 19.8 (04-11 @ 17:02)  BSA (m2): 1.78 (04-11 @ 17:02)    JVP: Normal  Neck: supple  Lung: clear   CV: S1 S2 , Murmur:  Abd: soft  Ext: No edema  neuro: Awake / alert  Psych: flat affect  Skin: normal       LABS/DATA:    CARDIAC MARKERS:  CARDIAC MARKERS ( 10 Apr 2018 09:30 )  x     / < 0.06 ng/mL / x     / x     / x                                    8.2    4.50  )-----------( 141      ( 12 Apr 2018 06:00 )             25.5     04-12    136  |  100  |  10  ----------------------------<  93  3.4<L>   |  27  |  0.86    Ca    8.5      12 Apr 2018 06:00  Phos  3.0     04-12  Mg     2.0     04-12    TPro  5.7<L>  /  Alb  3.0<L>  /  TBili  0.4  /  DBili  x   /  AST  12  /  ALT  14  /  AlkPhos  65  04-12    proBNP:   Lipid Profile:   HgA1c:   TSH:     TELE:  EKG:

## 2018-04-12 NOTE — PROGRESS NOTE ADULT - ASSESSMENT
abd   discomfort  Cdiff ruled out  mild drop in H/H , rule out GIB  GI consult   PPI    afib  HR stable  cont to monitor  change a/c back to warfarin once deemed safe , for now a/c on hold     copd   nebs

## 2018-04-12 NOTE — CHART NOTE - NSCHARTNOTEFT_GEN_A_CORE
- Normal esophagus.   - Food (residue) in the stomach.   - Non-bleeding duodenal diverticulum.   - No specimens collected.   - No high risk upper GI lesions    Recc:  - [facility] to [facility].   - Advance diet as tolerated.   - No GI contraindication to resuming blood thinners  - GI to sign off, please reconsult if needed

## 2018-04-12 NOTE — PROGRESS NOTE ADULT - PROBLEM SELECTOR PLAN 1
As per wife, 1 episode of hematemesis Hgb currently stable, however noted decrease from baseline of 11 and on A/C elaquis at home. ?GI bleed, however FOBT negative.    -GI to preform EGD today  -c/w PPI ggt  -type and screen active   -blood consent obtained and in chart

## 2018-04-12 NOTE — PROGRESS NOTE ADULT - ATTENDING COMMENTS
72 y.o. Male w/ hx COPD 2LNC, LUNG CA 2003 s/p right Lung lobectomy s/p chemo, bovine AVR, afib on eliquis, Seizure, HTN recently admitted 3/18 [facility] for influenza/ LLL PNA p/w 2 weeks of diarrhea  found to have abdominal ileus on CT A/P w/ CELINE meeting sepsis criteria. stool for C diff. negative Continue to Hydrate with IVF. Patient now with acute blood loss anemia due to GIB. Patient for EGD today. NPO.

## 2018-04-13 ENCOUNTER — TRANSCRIPTION ENCOUNTER (OUTPATIENT)
Age: 73
End: 2018-04-13

## 2018-04-13 DIAGNOSIS — J90 PLEURAL EFFUSION, NOT ELSEWHERE CLASSIFIED: ICD-10-CM

## 2018-04-13 LAB
APTT BLD: 29.8 SEC — SIGNIFICANT CHANGE UP (ref 27.5–37.4)
BUN SERPL-MCNC: 9 MG/DL — SIGNIFICANT CHANGE UP (ref 7–23)
CALCIUM SERPL-MCNC: 8.6 MG/DL — SIGNIFICANT CHANGE UP (ref 8.4–10.5)
CHLORIDE SERPL-SCNC: 101 MMOL/L — SIGNIFICANT CHANGE UP (ref 98–107)
CO2 SERPL-SCNC: 27 MMOL/L — SIGNIFICANT CHANGE UP (ref 22–31)
CREAT SERPL-MCNC: 0.89 MG/DL — SIGNIFICANT CHANGE UP (ref 0.5–1.3)
GLUCOSE SERPL-MCNC: 93 MG/DL — SIGNIFICANT CHANGE UP (ref 70–99)
HCT VFR BLD CALC: 25.9 % — LOW (ref 39–50)
HGB BLD-MCNC: 8.7 G/DL — LOW (ref 13–17)
INR BLD: 1.27 — HIGH (ref 0.88–1.17)
MAGNESIUM SERPL-MCNC: 1.9 MG/DL — SIGNIFICANT CHANGE UP (ref 1.6–2.6)
MCHC RBC-ENTMCNC: 32.5 PG — SIGNIFICANT CHANGE UP (ref 27–34)
MCHC RBC-ENTMCNC: 33.6 % — SIGNIFICANT CHANGE UP (ref 32–36)
MCV RBC AUTO: 96.6 FL — SIGNIFICANT CHANGE UP (ref 80–100)
NRBC # FLD: 0 — SIGNIFICANT CHANGE UP
NT-PROBNP SERPL-SCNC: 1731 PG/ML — SIGNIFICANT CHANGE UP
PHOSPHATE SERPL-MCNC: 2.8 MG/DL — SIGNIFICANT CHANGE UP (ref 2.5–4.5)
PLATELET # BLD AUTO: 138 K/UL — LOW (ref 150–400)
PMV BLD: 9.9 FL — SIGNIFICANT CHANGE UP (ref 7–13)
POTASSIUM SERPL-MCNC: 3.6 MMOL/L — SIGNIFICANT CHANGE UP (ref 3.5–5.3)
POTASSIUM SERPL-SCNC: 3.6 MMOL/L — SIGNIFICANT CHANGE UP (ref 3.5–5.3)
PROTHROM AB SERPL-ACNC: 14.7 SEC — HIGH (ref 9.8–13.1)
RBC # BLD: 2.68 M/UL — LOW (ref 4.2–5.8)
RBC # FLD: 13.4 % — SIGNIFICANT CHANGE UP (ref 10.3–14.5)
SODIUM SERPL-SCNC: 137 MMOL/L — SIGNIFICANT CHANGE UP (ref 135–145)
WBC # BLD: 6.25 K/UL — SIGNIFICANT CHANGE UP (ref 3.8–10.5)
WBC # FLD AUTO: 6.25 K/UL — SIGNIFICANT CHANGE UP (ref 3.8–10.5)

## 2018-04-13 PROCEDURE — 99233 SBSQ HOSP IP/OBS HIGH 50: CPT | Mod: GC

## 2018-04-13 PROCEDURE — 71045 X-RAY EXAM CHEST 1 VIEW: CPT | Mod: 26

## 2018-04-13 RX ORDER — FUROSEMIDE 40 MG
40 TABLET ORAL ONCE
Qty: 0 | Refills: 0 | Status: COMPLETED | OUTPATIENT
Start: 2018-04-13 | End: 2018-04-13

## 2018-04-13 RX ORDER — WARFARIN SODIUM 2.5 MG/1
2 TABLET ORAL ONCE
Qty: 0 | Refills: 0 | Status: COMPLETED | OUTPATIENT
Start: 2018-04-13 | End: 2018-04-13

## 2018-04-13 RX ADMIN — Medication 400 MILLIGRAM(S): at 17:36

## 2018-04-13 RX ADMIN — Medication 1 TABLET(S): at 11:17

## 2018-04-13 RX ADMIN — WARFARIN SODIUM 2 MILLIGRAM(S): 2.5 TABLET ORAL at 19:06

## 2018-04-13 RX ADMIN — Medication 400 MILLIGRAM(S): at 06:14

## 2018-04-13 RX ADMIN — LAMOTRIGINE 100 MILLIGRAM(S): 25 TABLET, ORALLY DISINTEGRATING ORAL at 17:36

## 2018-04-13 RX ADMIN — Medication 40 MILLIGRAM(S): at 16:09

## 2018-04-13 RX ADMIN — LAMOTRIGINE 100 MILLIGRAM(S): 25 TABLET, ORALLY DISINTEGRATING ORAL at 06:14

## 2018-04-13 NOTE — PROGRESS NOTE ADULT - PROBLEM SELECTOR PLAN 2
- Hb stable in the 8s. May be dilutional.   - EGD unremarkable. Per GI, no GI contraindications to resuming AC.  - No evidence of any acute bleeding.

## 2018-04-13 NOTE — DISCHARGE NOTE ADULT - CARE PROVIDER_API CALL
River Lubin), Critical Care Medicine; Internal Medicine; Pulmonary Disease  [address] [place] [address]  Phone: [phone]  Fax: [phone]    Rakesh Olson), Cardiovascular Disease; Internal Medicine  [address] [address]  Phone: [phone]  Fax: [phone]

## 2018-04-13 NOTE — DISCHARGE NOTE ADULT - PLAN OF CARE
to follow up with Dr. Olson within one week of discharge You came to the hospital with diarrhea and it was discovered you were in AFIB with a rapid heart rate. You received IV fluids and medications to treat the AFIB with rapid rate. Your eloquis apixaban was not given while you were in the hospital. You heart was well controlled during the hospital stay and you were restarted on warfarin which is what Dr. Olson wanted. Please follow up with Dr. Olson within one week of discharge to get an INR check. Please continue to take your medication as prescribed. to follow up with Dr. Lubin within one week of discharge You came to the hospital with 2 week history of diarrhea. You were treated with IVF and monitored. You diarrhea resolved. Please  follow up with Dr. Lubin within one week of discharge for further management of this. You also need to get an INR check within one week of discharge now that you are back on coumadin. You were found to have ileus on CT imaging of the abdomen this admission. You were able to tolerate eating food and had bowel movements. You did not have abdominal pain.  Please  follow up with Dr. Lubin within one week of discharge for further management of this. You also need to get an INR check within one week of discharge now that you are back on coumadin. You came to the hospital with diarrhea and it was discovered you were in AFIB with a rapid heart rate. You received IV fluids and medications to treat the AFIB with rapid rate. Your apixaban was not given while you were in the hospital. You heart was well controlled during the hospital stay and you were restarted on warfarin which is what Dr. Olson wanted. Please follow up with Dr. Olson on Thursday 4/19 to get an INR check. Please continue to take your medication as prescribed.

## 2018-04-13 NOTE — PROGRESS NOTE ADULT - ASSESSMENT
abd   discomfort  improved  EGD unremarkable    afib  HR stable  cont to monitor  resume a/c with warfarin     copd   nebs

## 2018-04-13 NOTE — DISCHARGE NOTE ADULT - PATIENT PORTAL LINK FT
You can access the [facility] Patient Portal, offered by [facility], by registering with the following website: [email or web address]

## 2018-04-13 NOTE — PROGRESS NOTE ADULT - ASSESSMENT
73 yo M with hx of COPD on 2 L at home, Lung Ca s/p R Lung lobectomy in 2003 (s/p chemo/radiation) Bovine AVR, Afib (recently started on apixaban, not on rate control), seizure d/o (tonic seizures, on carbamazepine and lamotrigine , HTN who was recently admitted to [facility] MICU on 3/18 for influenza + LLL PNA (required bilevel, treated with levofloxacin) who presents with a 2 week hx of diarrhea starting a few days after discharge from hospital. Pt found to be in AFIB with RVR 2.2 to volume down state + probably ileus on CT abdomen. Pt meets criteria for severe sepsis with acute organ dysfunction (, BP 88/66, WBC 11.45 + diarrhea, CELINE, ileus). Concern for CDIFF colitis vs antibiotic induced diarrhea vs acute gastroenteritis. Ileus possibly 2/2 to loperamide

## 2018-04-13 NOTE — PROGRESS NOTE ADULT - PROBLEM SELECTOR PLAN 1
-prod cough, l. sided rhonchi, new left sided pleural effusion on xray  -r/o parapneumonic effusion, will consider starting abx for HCAP today  -WBC wnl, no SIRS criteria however pt on rate control for AFIB.

## 2018-04-13 NOTE — PROGRESS NOTE ADULT - SUBJECTIVE AND OBJECTIVE BOX
Patient is a 72y old  Male who presents with a chief complaint of diarrhea (10 Apr 2018 14:18)      SUBJECTIVE / OVERNIGHT EVENTS:    Patient seen and examined at bedside. No acute events overnight. Pt endorses a productive cough overnight that is worsening.     Review of systems: No chest pain, no shortness of breath, no abdominal pain, no nausea, no vomiting, no diarrhea, no fevers, and no chills overnight.     MEDICATIONS  (STANDING):  carBAMazepine 400 milliGRAM(s) Oral every 12 hours  diltiazem    Tablet 30 milliGRAM(s) Oral every 12 hours  lactobacillus acidophilus 1 Tablet(s) Oral daily  lamoTRIgine 100 milliGRAM(s) Oral every 12 hours    MEDICATIONS  (PRN):      T(F): 97.9 (04-13 @ 06:10), Max: 97.9 (04-13 @ 06:10)  HR: 95 (04-13 @ 06:10) (78 - 95)  BP: 106/65 (04-13 @ 06:10) (106/65 - 156/79)  RR: 18 (04-13 @ 06:10) (18 - 20)  SpO2: 100% (04-13 @ 06:10) (92% - 100%)  CAPILLARY BLOOD GLUCOSE        I&O's Summary      PHYSICAL EXAM:  GEN: Age appropriate male, resting comfortably in bed, no acute distress, non toxic appearing, speaking in complete sentences.   HEENT: Conjunctiva and sclera normal  PULM: Absence of lung sounds on right side. left sided rhonchi at the base  CV: RRR, S1S2, no MRG  Abdominal: Soft, nontender to palpation, non-distended, normoactive bowel sounds  Extremities: No edema or cyanosis  NEURO: AAOx3  Skin: No rashes, lesions      LABS:  Labs personally reviewed.                        8.7    6.25  )-----------( 138      ( 13 Apr 2018 05:35 )             25.9     Hgb Trend: 8.7<--, 8.2<--, 8.0<--, 7.6<--, 8.2<--  04-13    137  |  101  |  9   ----------------------------<  93  3.6   |  27  |  0.89    Ca    8.6      13 Apr 2018 05:35  Phos  2.8     04-13  Mg     1.9     04-13    TPro  5.7<L>  /  Alb  3.0<L>  /  TBili  0.4  /  DBili  x   /  AST  12  /  ALT  14  /  AlkPhos  65  04-12    Creatinine Trend: 0.89<--, 0.86<--, 0.99<--, 1.16<--, 1.42<--, 0.86<--      [facility]  PGY-1 Pager: [phone]  [facility]-[phone]  Night Float:

## 2018-04-13 NOTE — DISCHARGE NOTE ADULT - CARE PLAN
Principal Discharge DX:	Atrial fibrillation with RVR  Secondary Diagnosis:	Acute diarrhea  Secondary Diagnosis:	Ileus Principal Discharge DX:	Atrial fibrillation with RVR  Goal:	to follow up with Dr. Olson within one week of discharge  Assessment and plan of treatment:	You came to the hospital with diarrhea and it was discovered you were in AFIB with a rapid heart rate. You received IV fluids and medications to treat the AFIB with rapid rate. Your eloquis apixaban was not given while you were in the hospital. You heart was well controlled during the hospital stay and you were restarted on warfarin which is what Dr. Olson wanted. Please follow up with Dr. Olson within one week of discharge to get an INR check. Please continue to take your medication as prescribed.  Secondary Diagnosis:	Acute diarrhea  Goal:	to follow up with Dr. Lubin within one week of discharge  Assessment and plan of treatment:	You came to the hospital with 2 week history of diarrhea. You were treated with IVF and monitored. You diarrhea resolved. Please  follow up with Dr. Lubin within one week of discharge for further management of this. You also need to get an INR check within one week of discharge now that you are back on coumadin.  Secondary Diagnosis:	Ileus  Goal:	to follow up with Dr. Lubin within one week of discharge  Assessment and plan of treatment:	You were found to have ileus on CT imaging of the abdomen this admission. You were able to tolerate eating food and had bowel movements. You did not have abdominal pain.  Please  follow up with Dr. Lubin within one week of discharge for further management of this. You also need to get an INR check within one week of discharge now that you are back on coumadin. Principal Discharge DX:	Atrial fibrillation with RVR  Goal:	to follow up with Dr. Olson within one week of discharge  Assessment and plan of treatment:	You came to the hospital with diarrhea and it was discovered you were in AFIB with a rapid heart rate. You received IV fluids and medications to treat the AFIB with rapid rate. Your apixaban was not given while you were in the hospital. You heart was well controlled during the hospital stay and you were restarted on warfarin which is what Dr. Olson wanted. Please follow up with Dr. Olson on Thursday 4/19 to get an INR check. Please continue to take your medication as prescribed.  Secondary Diagnosis:	Acute diarrhea  Goal:	to follow up with Dr. Lubin within one week of discharge  Assessment and plan of treatment:	You came to the hospital with 2 week history of diarrhea. You were treated with IVF and monitored. You diarrhea resolved. Please  follow up with Dr. Lubin within one week of discharge for further management of this. You also need to get an INR check within one week of discharge now that you are back on coumadin.  Secondary Diagnosis:	Ileus  Goal:	to follow up with Dr. Lubin within one week of discharge  Assessment and plan of treatment:	You were found to have ileus on CT imaging of the abdomen this admission. You were able to tolerate eating food and had bowel movements. You did not have abdominal pain.  Please  follow up with Dr. Lubin within one week of discharge for further management of this. You also need to get an INR check within one week of discharge now that you are back on coumadin.

## 2018-04-13 NOTE — PROGRESS NOTE ADULT - ATTENDING COMMENTS
72 y.o. Male w/ hx COPD 2LNC, LUNG CA 2003 s/p right Lung lobectomy s/p chemo, bovine AVR, afib on eliquis, Seizure, HTN recently admitted 3/18 [facility] for influenza/ LLL PNA p/w 2 weeks of diarrhea  found to have abdominal ileus on CT A/P w/ CELINE meeting sepsis criteria. stool for C diff. negative Ileus resolved with IVF hydration. Patient also had acute blood loss anemia but  EGD 3/12 unremarkable. Patient now with upper airway gurgling and increased Left lung congestion but no fever, increased WBC count. He has a cough that wife says is chronic. BNP elevated 2000. Suspect fluid overload from IVFs and since his home Lasix of 40mg qd wasn't resumed during hospitalization.  Will give Lasix 40mg IVP x 1. Will monitor for improvement.  Cardiologist wanted him d/c'ed on coumadin and not eliquis for afib so will start coumadin today.

## 2018-04-13 NOTE — PROGRESS NOTE ADULT - SUBJECTIVE AND OBJECTIVE BOX
Subjective: Patient seen and examined. No new events except as noted.     SUBJECTIVE/ROS:  No chest pain, dyspnea, palpitation, or dizziness.       MEDICATIONS:  MEDICATIONS  (STANDING):  carBAMazepine 400 milliGRAM(s) Oral every 12 hours  diltiazem    Tablet 30 milliGRAM(s) Oral every 12 hours  lactobacillus acidophilus 1 Tablet(s) Oral daily  lamoTRIgine 100 milliGRAM(s) Oral every 12 hours      PHYSICAL EXAM:  T(C): 36.6 (04-13-18 @ 06:10), Max: 36.6 (04-13-18 @ 06:10)  HR: 95 (04-13-18 @ 06:10) (78 - 95)  BP: 106/65 (04-13-18 @ 06:10) (106/65 - 156/79)  RR: 18 (04-13-18 @ 06:10) (18 - 20)  SpO2: 100% (04-13-18 @ 06:10) (92% - 100%)  Wt(kg): --  I&O's Summary      JVP: Normal  Neck: supple  Lung: clear   CV: S1 S2 , Murmur:  Abd: soft  Ext: No edema  neuro: Awake / alert  Psych: flat affect  Skin: normal       LABS/DATA:    CARDIAC MARKERS:  CARDIAC MARKERS ( 10 Apr 2018 09:30 )  x     / < 0.06 ng/mL / x     / x     / x                                    8.7    6.25  )-----------( 138      ( 13 Apr 2018 05:35 )             25.9     04-13    137  |  101  |  9   ----------------------------<  93  3.6   |  27  |  0.89    Ca    8.6      13 Apr 2018 05:35  Phos  2.8     04-13  Mg     1.9     04-13    TPro  5.7<L>  /  Alb  3.0<L>  /  TBili  0.4  /  DBili  x   /  AST  12  /  ALT  14  /  AlkPhos  65  04-12    proBNP:   Lipid Profile:   HgA1c:   TSH:     TELE:  EKG:

## 2018-04-13 NOTE — DISCHARGE NOTE ADULT - MEDICATION SUMMARY - MEDICATIONS TO TAKE
I will START or STAY ON the medications listed below when I get home from the hospital:    dilTIAZem 30 mg oral tablet  -- 1 tab(s) by mouth 4 times a day  -- Indication: For Atrial fibrillation with RVR    Coumadin 4 mg oral tablet  -- 1 tab(s) by mouth once a day (at bedtime)   -- Indication: For Atrial fibrillation with RVR    LaMICtal 100 mg oral tablet  -- 1 tab(s) by mouth 2 times a day  -- Indication: For Seizure disorder    TEGretol 200 mg oral tablet  -- 2 tab(s) by mouth 2 times a day  -- Indication: For Seizure disorder    Breo Ellipta 100 mcg-25 mcg/inh inhalation powder  -- 1 puff(s) inhaled once a day  -- Indication: For COPD    Incruse Ellipta 62.5 mcg/inh inhalation powder  -- 1 puff(s) inhaled once a day  -- Indication: For COPD    ProAir HFA 90 mcg/inh inhalation aerosol  -- 2 puff(s) inhaled 4 times a day, As Needed  -- Indication: For COPD    senna oral tablet  -- 2 tab(s) by mouth once a day (at bedtime)  -- Indication: For Conspitation    docusate sodium 100 mg oral capsule  -- 1 cap(s) by mouth 2 times a day  -- Indication: For Constipation    Gas-X 80 mg oral tablet, chewable  -- 1 tab(s) by mouth once a day  -- Indication: For Gas     lactobacillus acidophilus oral capsule  -- 1 tab(s) by mouth once a day   -- Indication: For Diarrhea

## 2018-04-13 NOTE — DISCHARGE NOTE ADULT - HOSPITAL COURSE
73 yo M with hx of COPD on 2 L at home, Lung Ca s/p R Lung lobectomy in 2003 (s/p chemo/radiation) Bovine AVR, Afib (recently started on apixaban, not on rate control), seizure d/o (tonic seizures, on carbamazepine and lamotrigine , HTN who was recently admitted to [facility] MICU on 3/18 for influenza + LLL PNA (required bilevel, treated with levofloxacin) who presents with a 2 week hx of diarrhea starting a few days after discharge from hospital. Pt found to be in AFIB with RVR 2.2 to volume down state + probably ileus on CT abdomen. Pt meets criteria for severe sepsis with acute organ dysfunction (, BP 88/66, WBC 11.45 + diarrhea, CELINE, ileus). Concern for CDIFF colitis vs antibiotic induced diarrhea vs acute gastroenteritis. Ileus possibly 2/2 to loperamide. Pt developed SOB and crackles on left lung during admission thought to be due to too much IVF. Received IV furosemide which improved the SOB. Pt did not have a bowel movement for several days and then had 3 BMs overnight. Pt was 73 yo M with hx of COPD on 2 L at home, Lung Ca s/p R Lung lobectomy in 2003 (s/p chemo/radiation) Bovine AVR, Afib (recently started on apixaban, not on rate control), seizure d/o (tonic seizures, on carbamazepine and lamotrigine , HTN who was recently admitted to [facility] MICU on 3/18 for influenza + LLL PNA (required bilevel, treated with levofloxacin) who presents with a 2 week hx of diarrhea starting a few days after discharge from hospital. Pt found to be in AFIB with RVR /.2 to volume down state + probably ileus on CT abdomen. Pt met criteria for severe sepsis with acute organ dysfunction (, BP 88/66, WBC 11.45 + diarrhea, CELINE, ileus). Pt. was found to be negative for c.diff and diarrhea resolved. Ileus possibly 2/2 to loperamide. Abdominal xray was done as pt. was c/o gas, this revealed a non-obstructive bowel gas pattern. Pt. was seen by surgery who recommended no intervention.  Pt. also c/o coffee ground emesis prior to admission, hemoglobin remained stable (around baseline 8, anemia of chronic disease). He was evaluated by GI who did an endoscopy that was unremarkable. Pt developed SOB and crackles on left lung during admission thought to be due to too much IVF. Received IV furosemide which improved the SOB. In regards to his atrial fibrillation, he was seen by cardiology who restarted him on coumadin and adjusted Cardizem dose to 30mg [id number].    Pt. is stable and safe for discharge home with follow up with GI and cardiology. He has been advised to take 4mg coumadin daily at bedtime. He has an appointment with Dr. Olson (cardiology) on Thursday 4/18 @10AM for INR check.     He will be set up for home physical therapy. 73 yo M with hx of COPD on 2 L at home, Lung Ca s/p R Lung lobectomy in 2003 (s/p chemo/radiation) Bovine AVR, Afib (recently started on apixaban, not on rate control), seizure d/o (tonic seizures, on carbamazepine and lamotrigine , HTN who was recently admitted to [facility] MICU on 3/18 for influenza + LLL PNA (required bilevel, treated with levofloxacin) who presents with a 2 week hx of diarrhea starting a few days after discharge from hospital. Pt found to be in AFIB with RVR /.2 to volume down state + probably ileus on CT abdomen. Pt met criteria for severe sepsis with acute organ dysfunction (, BP 88/66, WBC 11.45 + diarrhea, CELINE, ileus). Pt. was found to be negative for c.diff and diarrhea resolved. Ileus possibly 2/2 to loperamide. Abdominal xray was done as pt. was c/o gas, this revealed a non-obstructive bowel gas pattern. Pt. was seen by surgery who recommended no intervention.  Pt. also c/o coffee ground emesis prior to admission, hemoglobin remained stable (around baseline 8, anemia of chronic disease). He was evaluated by GI who did an endoscopy that was unremarkable. Pt developed SOB and crackles on left lung during admission thought to be due to too much IVF. Received IV furosemide which improved the SOB. In regards to his atrial fibrillation, he was seen by cardiology who restarted him on coumadin and adjusted Cardizem dose to 30mg [id number].    Pt. is stable and safe for discharge home with follow up with GI and cardiology. He has been advised to take 4mg coumadin daily at bedtime. Patient is discharged off lasix given he was admitted dehydrated and is currently euvolemic and will follow up with cardiology.  He has an appointment with Dr. Olson (cardiology) on Thursday 4/18 @10AM for INR check.      He will be set up for home physical therapy.

## 2018-04-14 LAB
APTT BLD: 34.7 SEC — SIGNIFICANT CHANGE UP (ref 27.5–37.4)
BASOPHILS # BLD AUTO: 0.02 K/UL — SIGNIFICANT CHANGE UP (ref 0–0.2)
BASOPHILS NFR BLD AUTO: 0.4 % — SIGNIFICANT CHANGE UP (ref 0–2)
EOSINOPHIL # BLD AUTO: 0.12 K/UL — SIGNIFICANT CHANGE UP (ref 0–0.5)
EOSINOPHIL NFR BLD AUTO: 2.2 % — SIGNIFICANT CHANGE UP (ref 0–6)
HCT VFR BLD CALC: 26.3 % — LOW (ref 39–50)
HGB BLD-MCNC: 8.6 G/DL — LOW (ref 13–17)
IMM GRANULOCYTES # BLD AUTO: 0.03 # — SIGNIFICANT CHANGE UP
IMM GRANULOCYTES NFR BLD AUTO: 0.6 % — SIGNIFICANT CHANGE UP (ref 0–1.5)
INR BLD: 1.26 — HIGH (ref 0.88–1.17)
LYMPHOCYTES # BLD AUTO: 0.69 K/UL — LOW (ref 1–3.3)
LYMPHOCYTES # BLD AUTO: 12.8 % — LOW (ref 13–44)
MCHC RBC-ENTMCNC: 31.7 PG — SIGNIFICANT CHANGE UP (ref 27–34)
MCHC RBC-ENTMCNC: 32.7 % — SIGNIFICANT CHANGE UP (ref 32–36)
MCV RBC AUTO: 97 FL — SIGNIFICANT CHANGE UP (ref 80–100)
MONOCYTES # BLD AUTO: 0.45 K/UL — SIGNIFICANT CHANGE UP (ref 0–0.9)
MONOCYTES NFR BLD AUTO: 8.3 % — SIGNIFICANT CHANGE UP (ref 2–14)
NEUTROPHILS # BLD AUTO: 4.1 K/UL — SIGNIFICANT CHANGE UP (ref 1.8–7.4)
NEUTROPHILS NFR BLD AUTO: 75.7 % — SIGNIFICANT CHANGE UP (ref 43–77)
NRBC # FLD: 0 — SIGNIFICANT CHANGE UP
PLATELET # BLD AUTO: 143 K/UL — LOW (ref 150–400)
PMV BLD: 9.9 FL — SIGNIFICANT CHANGE UP (ref 7–13)
PROTHROM AB SERPL-ACNC: 14.1 SEC — HIGH (ref 9.8–13.1)
RBC # BLD: 2.71 M/UL — LOW (ref 4.2–5.8)
RBC # FLD: 13.5 % — SIGNIFICANT CHANGE UP (ref 10.3–14.5)
WBC # BLD: 5.41 K/UL — SIGNIFICANT CHANGE UP (ref 3.8–10.5)
WBC # FLD AUTO: 5.41 K/UL — SIGNIFICANT CHANGE UP (ref 3.8–10.5)

## 2018-04-14 PROCEDURE — 99233 SBSQ HOSP IP/OBS HIGH 50: CPT | Mod: GC

## 2018-04-14 RX ADMIN — Medication 1 TABLET(S): at 11:01

## 2018-04-14 RX ADMIN — LAMOTRIGINE 100 MILLIGRAM(S): 25 TABLET, ORALLY DISINTEGRATING ORAL at 05:42

## 2018-04-14 RX ADMIN — Medication 400 MILLIGRAM(S): at 05:42

## 2018-04-14 RX ADMIN — Medication 400 MILLIGRAM(S): at 17:02

## 2018-04-14 RX ADMIN — LAMOTRIGINE 100 MILLIGRAM(S): 25 TABLET, ORALLY DISINTEGRATING ORAL at 17:02

## 2018-04-14 NOTE — PROGRESS NOTE ADULT - ASSESSMENT
71 yo M with hx of COPD on 2 L at home, Lung Ca s/p R Lung lobectomy in 2003 (s/p chemo/radiation) Bovine AVR, Afib (recently started on apixaban, not on rate control), seizure d/o (tonic seizures, on carbamazepine and lamotrigine , HTN who was recently admitted to [facility] MICU on 3/18 for influenza + LLL PNA (required bilevel, treated with levofloxacin) who presents with a 2 week hx of diarrhea starting a few days after discharge from hospital. Pt found to be in AFIB with RVR 2.2 to volume down state + probably ileus on CT abdomen. Pt meets criteria for severe sepsis with acute organ dysfunction (, BP 88/66, WBC 11.45 + diarrhea, CELINE, ileus). Concern for CDIFF colitis vs antibiotic induced diarrhea vs acute gastroenteritis. Ileus possibly 2/2 to loperamide

## 2018-04-14 NOTE — PROGRESS NOTE ADULT - PROBLEM SELECTOR PLAN 1
-prod cough, l. sided rhonchi, new left sided pleural effusion on xray  -WBC wnl, no SIRS criteria  -improved s/p furosemide on 4/13. Lung CTA. Low suspicion for PNA.

## 2018-04-14 NOTE — PROGRESS NOTE ADULT - SUBJECTIVE AND OBJECTIVE BOX
Patient is a 72y old  Male who presents with a chief complaint of diarrhea (13 Apr 2018 14:35)      SUBJECTIVE / OVERNIGHT EVENTS:    Patient seen and examined at bedside. No acute events overnight. Pt endorsed increased urination after furosemide dose yesterday.     Review of systems: No chest pain, no shortness of breath, no abdominal pain, no nausea, no vomiting, no diarrhea, no fevers, and no chills overnight.     MEDICATIONS  (STANDING):  carBAMazepine 400 milliGRAM(s) Oral every 12 hours  diltiazem    Tablet 30 milliGRAM(s) Oral every 12 hours  lactobacillus acidophilus 1 Tablet(s) Oral daily  lamoTRIgine 100 milliGRAM(s) Oral every 12 hours    MEDICATIONS  (PRN):      T(F): 97.6 (04-14 @ 05:36), Max: 98.2 (04-13 @ 22:21)  HR: 81 (04-14 @ 05:36) (81 - 96)  BP: 122/66 (04-14 @ 05:36) (101/52 - 125/70)  RR: 18 (04-14 @ 05:36) (18 - 20)  SpO2: 100% (04-14 @ 05:36) (100% - 100%)  CAPILLARY BLOOD GLUCOSE        I&O's Summary    13 Apr 2018 07:01  -  14 Apr 2018 07:00  --------------------------------------------------------  IN: 0 mL / OUT: 1737 mL / NET: -1737 mL        PHYSICAL EXAM:  GEN: Age appropriate male, resting comfortably in bed, no acute distress, non toxic appearing, speaking in complete sentences.   HEENT: Conjunctiva and sclera normal  PULM: Absence of lung sounds on right side. left lung CTAB no wheezes rhonchi or crackles  CV: RRR, S1S2, no MRG  Abdominal: Soft, nontender to palpation, non-distended, normoactive bowel sounds  Extremities: No edema or cyanosis  NEURO: AAOx3  Skin: No rashes, lesions      LABS:  Labs personally reviewed.                        8.6    5.41  )-----------( 143      ( 14 Apr 2018 06:30 )             26.3     Hgb Trend: 8.6<--, 8.7<--, 8.2<--, 8.0<--, 7.6<--  04-13    137  |  101  |  9   ----------------------------<  93  3.6   |  27  |  0.89    Ca    8.6      13 Apr 2018 05:35  Phos  2.8     04-13  Mg     1.9     04-13      Creatinine Trend: 0.89<--, 0.86<--, 0.99<--, 1.16<--, 1.42<--, 0.86<--  PT/INR - ( 13 Apr 2018 13:17 )   PT: 14.7 SEC;   INR: 1.27          PTT - ( 13 Apr 2018 13:17 )  PTT:29.8 SEC        Kofi Navarro  PGY-1 Pager: [phone]  [phone]  Night Float:

## 2018-04-14 NOTE — PROGRESS NOTE ADULT - SUBJECTIVE AND OBJECTIVE BOX
Subjective: Patient seen and examined. No new events except as noted.     SUBJECTIVE/ROS:  feels well       MEDICATIONS:  MEDICATIONS  (STANDING):  carBAMazepine 400 milliGRAM(s) Oral every 12 hours  diltiazem    Tablet 30 milliGRAM(s) Oral every 12 hours  lactobacillus acidophilus 1 Tablet(s) Oral daily  lamoTRIgine 100 milliGRAM(s) Oral every 12 hours      PHYSICAL EXAM:  T(C): 36.4 (04-14-18 @ 05:36), Max: 36.8 (04-13-18 @ 22:21)  HR: 81 (04-14-18 @ 05:36) (81 - 96)  BP: 122/66 (04-14-18 @ 05:36) (101/52 - 125/70)  RR: 18 (04-14-18 @ 05:36) (18 - 20)  SpO2: 100% (04-14-18 @ 05:36) (100% - 100%)  Wt(kg): --  I&O's Summary    13 Apr 2018 07:01  -  14 Apr 2018 07:00  --------------------------------------------------------  IN: 0 mL / OUT: 1737 mL / NET: -1737 mL        JVP: Normal  Neck: supple  Lung: clear   CV: S1 S2 , Murmur:  Abd: soft  Ext: No edema  neuro: Awake / alert  Psych: flat affect  Skin: normal       LABS/DATA:    CARDIAC MARKERS:                                8.6    5.41  )-----------( 143      ( 14 Apr 2018 06:30 )             26.3     04-13    137  |  101  |  9   ----------------------------<  93  3.6   |  27  |  0.89    Ca    8.6      13 Apr 2018 05:35  Phos  2.8     04-13  Mg     1.9     04-13      proBNP:   Lipid Profile:   HgA1c:   TSH:     TELE:  EKG:

## 2018-04-14 NOTE — PROGRESS NOTE ADULT - ASSESSMENT
abd   discomfort  improved  EGD unremarkable    afib  HR stable  change cardizem to cd 120  cont to monitor  resume a/c with warfarin     copd   nebs

## 2018-04-14 NOTE — PROGRESS NOTE ADULT - ATTENDING COMMENTS
patient seen and examine at bed side  agree with above plan and management    71 yo M with hx of COPD on 2 L at home, Lung Ca s/p R Lung lobectomy in 2003 (s/p chemo/radiation) Bovine AVR, Afib (recently started on apixaban, not on rate control), seizure d/o (tonic seizures, on carbamazepine and lamotrigine , HTN who was recently admitted to [facility] MICU on 3/18 for influenza + LLL PNA (required bilevel, treated with levofloxacin) who presents with a 2 week hx of diarrhea starting a few days after discharge from hospital. Pt found to be in AFIB with RVR 2.2 to volume down state + probably ileus on CT abdomen. Pt meets criteria for severe sepsis with acute organ dysfunction (, BP 88/66, WBC 11.45 + diarrhea, CELINE, ileus). Concern for CDIFF colitis vs antibiotic induced diarrhea vs acute gastroenteritis. Ileus possibly 2/2 to loperamide    symptoms improved   tolerating diet  hemodynamically stable  dc home

## 2018-04-15 LAB
APTT BLD: 31 SEC — SIGNIFICANT CHANGE UP (ref 27.5–37.4)
INR BLD: 1.09 — SIGNIFICANT CHANGE UP (ref 0.88–1.17)
PROTHROM AB SERPL-ACNC: 12.1 SEC — SIGNIFICANT CHANGE UP (ref 9.8–13.1)

## 2018-04-15 PROCEDURE — 74019 RADEX ABDOMEN 2 VIEWS: CPT | Mod: 26

## 2018-04-15 RX ORDER — METOPROLOL TARTRATE 50 MG
5 TABLET ORAL ONCE
Qty: 0 | Refills: 0 | Status: COMPLETED | OUTPATIENT
Start: 2018-04-15 | End: 2018-04-15

## 2018-04-15 RX ORDER — WARFARIN SODIUM 2.5 MG/1
5 TABLET ORAL ONCE
Qty: 0 | Refills: 0 | Status: COMPLETED | OUTPATIENT
Start: 2018-04-15 | End: 2018-04-15

## 2018-04-15 RX ORDER — SIMETHICONE 80 MG/1
80 TABLET, CHEWABLE ORAL ONCE
Qty: 0 | Refills: 0 | Status: COMPLETED | OUTPATIENT
Start: 2018-04-15 | End: 2018-04-15

## 2018-04-15 RX ORDER — WARFARIN SODIUM 2.5 MG/1
2 TABLET ORAL ONCE
Qty: 0 | Refills: 0 | Status: DISCONTINUED | OUTPATIENT
Start: 2018-04-15 | End: 2018-04-15

## 2018-04-15 RX ORDER — WARFARIN SODIUM 2.5 MG/1
5 TABLET ORAL AT BEDTIME
Qty: 0 | Refills: 0 | Status: DISCONTINUED | OUTPATIENT
Start: 2018-04-15 | End: 2018-04-15

## 2018-04-15 RX ADMIN — Medication 400 MILLIGRAM(S): at 05:41

## 2018-04-15 RX ADMIN — Medication 400 MILLIGRAM(S): at 17:15

## 2018-04-15 RX ADMIN — Medication 5 MILLIGRAM(S): at 20:16

## 2018-04-15 RX ADMIN — SIMETHICONE 80 MILLIGRAM(S): 80 TABLET, CHEWABLE ORAL at 13:09

## 2018-04-15 RX ADMIN — Medication 1 TABLET(S): at 10:53

## 2018-04-15 RX ADMIN — LAMOTRIGINE 100 MILLIGRAM(S): 25 TABLET, ORALLY DISINTEGRATING ORAL at 17:15

## 2018-04-15 RX ADMIN — LAMOTRIGINE 100 MILLIGRAM(S): 25 TABLET, ORALLY DISINTEGRATING ORAL at 05:41

## 2018-04-15 RX ADMIN — WARFARIN SODIUM 5 MILLIGRAM(S): 2.5 TABLET ORAL at 10:53

## 2018-04-15 NOTE — PROGRESS NOTE ADULT - SUBJECTIVE AND OBJECTIVE BOX
Patient is a 72y old  Male who presents with a chief complaint of diarrhea (13 Apr 2018 14:35)      SUBJECTIVE / OVERNIGHT EVENTS:    Patient seen and examined at bedside. No acute events overnight. Pt reports have 3 BMs overnight.     Review of systems: No chest pain, no shortness of breath, no abdominal pain, no nausea, no vomiting, no diarrhea, no fevers, and no chills overnight.     MEDICATIONS  (STANDING):  carBAMazepine 400 milliGRAM(s) Oral every 12 hours  diltiazem    Tablet 30 milliGRAM(s) Oral every 12 hours  lactobacillus acidophilus 1 Tablet(s) Oral daily  lamoTRIgine 100 milliGRAM(s) Oral every 12 hours    MEDICATIONS  (PRN):      T(F): 98 (04-15 @ 05:19), Max: 98 (04-14 @ 21:19)  HR: 69 (04-15 @ 05:19) (69 - 89)  BP: 107/57 (04-15 @ 05:19) (107/57 - 124/73)  RR: 18 (04-15 @ 05:19) (18 - 18)  SpO2: 100% (04-15 @ 05:19) (95% - 100%)  CAPILLARY BLOOD GLUCOSE        I&O's Summary      PHYSICAL EXAM:  GEN: Age appropriate maleresting comfortably in bed, no acute distress, non toxic appearing, speaking in complete sentences.   HEENT: Conjunctiva and sclera normal  PULM: absent right sided lung sounds, left lung CTAB without wheezes crackles or rhonchi  CV: RRR, S1S2, no MRG  Abdominal: Soft, nontender to palpation, non-distended, normoactive bowel sounds  Extremities: No edema or cyanosis  NEURO: AAOx3  Skin: No rashes, lesions      LABS:  Labs personally reviewed.                        8.6    5.41  )-----------( 143      ( 14 Apr 2018 06:30 )             26.3     Hgb Trend: 8.6<--, 8.7<--, 8.2<--, 8.0<--, 7.6<--        Creatinine Trend: 0.89<--, 0.86<--, 0.99<--, 1.16<--, 1.42<--, 0.86<--  PT/INR - ( 14 Apr 2018 13:00 )   PT: 14.1 SEC;   INR: 1.26          PTT - ( 14 Apr 2018 13:00 )  PTT:34.7 SEC      Kofi [facility]  PGY-1 Pager: [phone]  [phone]  Night Float:

## 2018-04-15 NOTE — PROGRESS NOTE ADULT - SUBJECTIVE AND OBJECTIVE BOX
Subjective: Patient seen and examined. No new events except as noted.     SUBJECTIVE/ROS:  had diarrhea       MEDICATIONS:  MEDICATIONS  (STANDING):  carBAMazepine 400 milliGRAM(s) Oral every 12 hours  diltiazem    Tablet 30 milliGRAM(s) Oral every 12 hours  lactobacillus acidophilus 1 Tablet(s) Oral daily  lamoTRIgine 100 milliGRAM(s) Oral every 12 hours  warfarin 2 milliGRAM(s) Oral once      PHYSICAL EXAM:  T(C): 36.7 (04-15-18 @ 05:19), Max: 36.7 (04-14-18 @ 21:19)  HR: 69 (04-15-18 @ 05:19) (69 - 89)  BP: 107/57 (04-15-18 @ 05:19) (107/57 - 124/73)  RR: 18 (04-15-18 @ 05:19) (18 - 18)  SpO2: 100% (04-15-18 @ 05:19) (95% - 100%)  Wt(kg): --  I&O's Summary      JVP: Normal  Neck: supple  Lung: clear   CV: S1 S2 , Murmur:  Abd: soft  Ext: No edema  neuro: Awake / alert  Psych: flat affect  Skin: normal     LABS/DATA:    CARDIAC MARKERS:                                8.6    5.41  )-----------( 143      ( 14 Apr 2018 06:30 )             26.3           proBNP:   Lipid Profile:   HgA1c:   TSH:     TELE:  EKG:

## 2018-04-15 NOTE — PROGRESS NOTE ADULT - ATTENDING COMMENTS
73 yo M with hx of COPD on 2 L at home, Lung Ca s/p R Lung lobectomy in 2003 (s/p chemo/radiation) Bovine AVR, Afib (recently started on apixaban, not on rate control), seizure d/o (tonic seizures, on carbamazepine and lamotrigine , HTN who was recently admitted to [facility] MICU on 3/18 for influenza + LLL PNA (required bilevel, treated with levofloxacin) who presents with a 2 week hx of diarrhea starting a few days after discharge from hospital. Pt found to be in AFIB with RVR 2.2 to volume down state + probably ileus on CT abdomen. Pt meets criteria for severe sepsis with acute organ dysfunction (, BP 88/66, WBC 11.45 + diarrhea, CELINE, ileus). Concern for CDIFF colitis vs antibiotic induced diarrhea vs acute gastroenteritis. Ileus possibly 2/2 to loperamide    patient have again episode of diarrhea  monitor electrolytes   hemodynamically stable   possible dc home tomorrow if no more diarrhea   monitor no of stools and consistency 71 yo M with hx of COPD on 2 L at home, Lung Ca s/p R Lung lobectomy in 2003 (s/p chemo/radiation) Bovine AVR, Afib (recently started on apixaban, not on rate control), seizure d/o (tonic seizures, on carbamazepine and lamotrigine , HTN who was recently admitted to [facility] MICU on 3/18 for influenza + LLL PNA (required bilevel, treated with levofloxacin) who presents with a 2 week hx of diarrhea starting a few days after discharge from hospital. Pt found to be in AFIB with RVR 2.2 to volume down state + probably ileus on CT abdomen. Pt meets criteria for severe sepsis with acute organ dysfunction (, BP 88/66, WBC 11.45 + diarrhea, CELINE, ileus). Concern for CDIFF colitis vs antibiotic induced diarrhea vs acute gastroenteritis. Ileus possibly 2/2 to loperamide    patient have again episode of diarrhea  monitor electrolytes   hemodynamically stable   possible dc home tomorrow if no more diarrhea   monitor no of stools and consistency  dose coumadin   monitor INR

## 2018-04-15 NOTE — CHART NOTE - NSCHARTNOTEFT_GEN_A_CORE
Patient seen and examined. Full consultation to follow. Patient with persistent ileus based on plain film. Patient is not clinically obstructed. No current surgical management warranted at this juncture. Would consider CT abdomen and pelvis with PO contrast if continued concern for persistent distension. Discussed with Attending Surgeon on Call.     JONNA Sargent MD  Senior Surgery Resident On Call

## 2018-04-15 NOTE — PROGRESS NOTE ADULT - ASSESSMENT
71 yo M with hx of COPD on 2 L at home, Lung Ca s/p R Lung lobectomy in 2003 (s/p chemo/radiation) Bovine AVR, Afib (recently started on apixaban, not on rate control), seizure d/o (tonic seizures, on carbamazepine and lamotrigine , HTN who was recently admitted to [facility] MICU on 3/18 for influenza + LLL PNA (required bilevel, treated with levofloxacin) who presents with a 2 week hx of diarrhea starting a few days after discharge from hospital. Pt found to be in AFIB with RVR 2.2 to volume down state + probably ileus on CT abdomen. Pt meets criteria for severe sepsis with acute organ dysfunction (, BP 88/66, WBC 11.45 + diarrhea, CELINE, ileus). Concern for CDIFF colitis vs antibiotic induced diarrhea vs acute gastroenteritis. Ileus possibly 2/2 to loperamide

## 2018-04-15 NOTE — PROGRESS NOTE ADULT - ASSESSMENT
Diarrhea   fu with GI    afib  HR stable  change cardizem to cd 120  cont to monitor  a/c with warfarin     copd   nebs

## 2018-04-15 NOTE — CONSULT NOTE ADULT - SUBJECTIVE AND OBJECTIVE BOX
General Surgery Consult  Consulting surgical team: B Surgery  Consulting attending: Deja  Patient seen and examined: 04-15-18 @ 23:58 with [employer] phone.    HPI:  Patient is a 72y Male who has been admitted since April 10th with diarrhea. In this time he has developed and ileu, potentially secondary to loperamide. He received an abdominal xray this AM which showed bowel dilation consistent with ileus. Surgery consulted to rule out small and large bowel obstruction.    Patient has been tolerating diet. He currently denies abdominal pain. He notes that he has been having episode of constipation and episodes of diarrhea.       PAST MEDICAL HISTORY:  Atrial fibrillation  Arrhythmia  Hypertension  History of pneumonia  Lung cancer  Seizure disorder  Atrial fibrillation  Hypertension  Hyperlipidemia  Seizure disorder  Lung cancer      PAST SURGICAL HISTORY:  H/O inguinal hernia repair  H/O aortic valve replacement  S/P cataract surgery  Aortic valve replaced  S/P pneumonectomy      ALLERGIES:  No Known Allergies      MEDICATIONS:  carBAMazepine 400 milliGRAM(s) Oral every 12 hours  diltiazem    Tablet 30 milliGRAM(s) Oral every 12 hours  lactobacillus acidophilus 1 Tablet(s) Oral daily  lamoTRIgine 100 milliGRAM(s) Oral every 12 hours      VITALS & I/Os:  Vital Signs Last 24 Hrs  T(C): 36.8 (15 Apr 2018 20:09), Max: 37.1 (15 Apr 2018 17:13)  T(F): 98.2 (15 Apr 2018 20:09), Max: 98.7 (15 Apr 2018 17:13)  HR: 78 (15 Apr 2018 23:14) (69 - 109)  BP: 96/47 (15 Apr 2018 23:14) (96/47 - 150/84)  BP(mean): --  RR: 17 (15 Apr 2018 23:14) (17 - 18)  SpO2: 100% (15 Apr 2018 23:14) (100% - 100%)    I&O's Summary      PHYSICAL EXAM:  General: No acute distress  Respiratory: Nonlabored  Cardiovascular: RRR  Abdominal: Soft, nondistended, nontender. No rebound or guarding. No organomegaly, no palpable mass.  Extremities: Warm    LABS:                        8.6    5.41  )-----------( 143      ( 14 Apr 2018 06:30 )             26.3           Lactate:    PT/INR - ( 15 Apr 2018 09:06 )   PT: 12.1 SEC;   INR: 1.09          PTT - ( 15 Apr 2018 09:06 )  PTT:31.0 SEC              IMAGING: < from: Xray Abdomen 2 Views (04.15.18 @ 17:47) >      INTERPRETATION:  there is increased gaseous distension of the large and   small bowel consistent with ileus    < end of copied text >        ASSESSMENT: 72M with ileus, without clinical signs of obstruction  - No surgical intervention at this time  - If still clinically concerned for obstruction may get CT scan of abdomen and pelvis with oral contrast  - D/w Dr. Deja Lama MD PGY 2   [id number]

## 2018-04-15 NOTE — CHART NOTE - NSCHARTNOTEFT_GEN_A_CORE
Pt was seen and examined. He had nonlocalized abd pain but no nausea, vomiting, CP, SOB and is still passing gas and had a BM earlier in the day. His abd x-ray at 6pm showed SB and LB dilation and ileus that was also previously noted on the CT. On exam: NAD, Cardio: Normal S1/S2, RRR, Pulm: CTA and Abd: BS+ in 4 quads, soft and nontender and extremities: no leg edema.   Placed the patient on NPO for now and consulted surgery which there is no urgent surgical need for now. Would monitor closely and at bedside prepared to place NG tube if needed. Pt was seen and examined. He had nonlocalized abd pain but no nausea, vomiting, CP, SOB and is still passing gas and had a BM earlier in the day. His abd x-ray at 6pm showed SB and LB dilation and ileus that was also previously noted on the CT. On exam: NAD, Cardio: Normal S1/S2, RRR, Pulm: CTA and Abd: BS+ in 4 quads, soft and nontender and extremities: no leg edema.   Placed the patient on NPO for now and consulted surgery which there is no urgent surgical need for now. Would monitor closely and at bedside prepared to place NG tube if needed.  Pt was also in Afib w/ RVR at 130s for 10 mins but asymptomatic and pushed IV 5 Lopressor and it resolved afterwards.

## 2018-04-16 LAB
APTT BLD: 30.3 SEC — SIGNIFICANT CHANGE UP (ref 27.5–37.4)
BASOPHILS # BLD AUTO: 0.02 K/UL — SIGNIFICANT CHANGE UP (ref 0–0.2)
BASOPHILS NFR BLD AUTO: 0.3 % — SIGNIFICANT CHANGE UP (ref 0–2)
BLD GP AB SCN SERPL QL: NEGATIVE — SIGNIFICANT CHANGE UP
BUN SERPL-MCNC: 6 MG/DL — LOW (ref 7–23)
CALCIUM SERPL-MCNC: 8.7 MG/DL — SIGNIFICANT CHANGE UP (ref 8.4–10.5)
CHLORIDE SERPL-SCNC: 100 MMOL/L — SIGNIFICANT CHANGE UP (ref 98–107)
CO2 SERPL-SCNC: 28 MMOL/L — SIGNIFICANT CHANGE UP (ref 22–31)
CREAT SERPL-MCNC: 0.81 MG/DL — SIGNIFICANT CHANGE UP (ref 0.5–1.3)
EOSINOPHIL # BLD AUTO: 0.13 K/UL — SIGNIFICANT CHANGE UP (ref 0–0.5)
EOSINOPHIL NFR BLD AUTO: 1.9 % — SIGNIFICANT CHANGE UP (ref 0–6)
FERRITIN SERPL-MCNC: 379.1 NG/ML — SIGNIFICANT CHANGE UP (ref 30–400)
GLUCOSE SERPL-MCNC: 108 MG/DL — HIGH (ref 70–99)
HCT VFR BLD CALC: 25.8 % — LOW (ref 39–50)
HGB BLD-MCNC: 8.6 G/DL — LOW (ref 13–17)
IMM GRANULOCYTES # BLD AUTO: 0.02 # — SIGNIFICANT CHANGE UP
IMM GRANULOCYTES NFR BLD AUTO: 0.3 % — SIGNIFICANT CHANGE UP (ref 0–1.5)
INR BLD: 1.16 — SIGNIFICANT CHANGE UP (ref 0.88–1.17)
IRON SATN MFR SERPL: 150 UG/DL — LOW (ref 155–535)
IRON SATN MFR SERPL: 32 UG/DL — LOW (ref 45–165)
LDH SERPL L TO P-CCNC: 133 U/L — LOW (ref 135–225)
LYMPHOCYTES # BLD AUTO: 0.85 K/UL — LOW (ref 1–3.3)
LYMPHOCYTES # BLD AUTO: 12.7 % — LOW (ref 13–44)
MAGNESIUM SERPL-MCNC: 1.9 MG/DL — SIGNIFICANT CHANGE UP (ref 1.6–2.6)
MCHC RBC-ENTMCNC: 32 PG — SIGNIFICANT CHANGE UP (ref 27–34)
MCHC RBC-ENTMCNC: 33.3 % — SIGNIFICANT CHANGE UP (ref 32–36)
MCV RBC AUTO: 95.9 FL — SIGNIFICANT CHANGE UP (ref 80–100)
MONOCYTES # BLD AUTO: 0.51 K/UL — SIGNIFICANT CHANGE UP (ref 0–0.9)
MONOCYTES NFR BLD AUTO: 7.6 % — SIGNIFICANT CHANGE UP (ref 2–14)
NEUTROPHILS # BLD AUTO: 5.14 K/UL — SIGNIFICANT CHANGE UP (ref 1.8–7.4)
NEUTROPHILS NFR BLD AUTO: 77.2 % — HIGH (ref 43–77)
NRBC # FLD: 0 — SIGNIFICANT CHANGE UP
PHOSPHATE SERPL-MCNC: 2.1 MG/DL — LOW (ref 2.5–4.5)
PLATELET # BLD AUTO: 159 K/UL — SIGNIFICANT CHANGE UP (ref 150–400)
PMV BLD: 10.1 FL — SIGNIFICANT CHANGE UP (ref 7–13)
POTASSIUM SERPL-MCNC: 4.2 MMOL/L — SIGNIFICANT CHANGE UP (ref 3.5–5.3)
POTASSIUM SERPL-SCNC: 4.2 MMOL/L — SIGNIFICANT CHANGE UP (ref 3.5–5.3)
PROTHROM AB SERPL-ACNC: 13.4 SEC — HIGH (ref 9.8–13.1)
RBC # BLD: 2.69 M/UL — LOW (ref 4.2–5.8)
RBC # FLD: 13.5 % — SIGNIFICANT CHANGE UP (ref 10.3–14.5)
RETICS #: 52 K/UL — SIGNIFICANT CHANGE UP (ref 25–125)
RETICS/RBC NFR: 1.9 % — SIGNIFICANT CHANGE UP (ref 0.5–2.5)
RH IG SCN BLD-IMP: NEGATIVE — SIGNIFICANT CHANGE UP
SODIUM SERPL-SCNC: 138 MMOL/L — SIGNIFICANT CHANGE UP (ref 135–145)
UIBC SERPL-MCNC: 117.5 UG/DL — SIGNIFICANT CHANGE UP (ref 110–370)
WBC # BLD: 6.67 K/UL — SIGNIFICANT CHANGE UP (ref 3.8–10.5)
WBC # FLD AUTO: 6.67 K/UL — SIGNIFICANT CHANGE UP (ref 3.8–10.5)

## 2018-04-16 PROCEDURE — 99233 SBSQ HOSP IP/OBS HIGH 50: CPT | Mod: GC

## 2018-04-16 RX ORDER — SIMETHICONE 80 MG/1
80 TABLET, CHEWABLE ORAL DAILY
Qty: 0 | Refills: 0 | Status: DISCONTINUED | OUTPATIENT
Start: 2018-04-16 | End: 2018-04-17

## 2018-04-16 RX ORDER — TIOTROPIUM BROMIDE 18 UG/1
1 CAPSULE ORAL; RESPIRATORY (INHALATION) DAILY
Qty: 0 | Refills: 0 | Status: DISCONTINUED | OUTPATIENT
Start: 2018-04-16 | End: 2018-04-17

## 2018-04-16 RX ORDER — WARFARIN SODIUM 2.5 MG/1
5 TABLET ORAL ONCE
Qty: 0 | Refills: 0 | Status: COMPLETED | OUTPATIENT
Start: 2018-04-16 | End: 2018-04-16

## 2018-04-16 RX ORDER — IPRATROPIUM/ALBUTEROL SULFATE 18-103MCG
3 AEROSOL WITH ADAPTER (GRAM) INHALATION EVERY 6 HOURS
Qty: 0 | Refills: 0 | Status: DISCONTINUED | OUTPATIENT
Start: 2018-04-16 | End: 2018-04-17

## 2018-04-16 RX ORDER — ALBUTEROL 90 UG/1
1 AEROSOL, METERED ORAL EVERY 4 HOURS
Qty: 0 | Refills: 0 | Status: DISCONTINUED | OUTPATIENT
Start: 2018-04-16 | End: 2018-04-17

## 2018-04-16 RX ADMIN — Medication 400 MILLIGRAM(S): at 05:29

## 2018-04-16 RX ADMIN — Medication 62.5 MILLIMOLE(S): at 10:38

## 2018-04-16 RX ADMIN — LAMOTRIGINE 100 MILLIGRAM(S): 25 TABLET, ORALLY DISINTEGRATING ORAL at 05:29

## 2018-04-16 RX ADMIN — WARFARIN SODIUM 5 MILLIGRAM(S): 2.5 TABLET ORAL at 17:58

## 2018-04-16 RX ADMIN — SIMETHICONE 80 MILLIGRAM(S): 80 TABLET, CHEWABLE ORAL at 17:53

## 2018-04-16 RX ADMIN — Medication 1 TABLET(S): at 14:36

## 2018-04-16 RX ADMIN — Medication 3 MILLILITER(S): at 11:30

## 2018-04-16 RX ADMIN — LAMOTRIGINE 100 MILLIGRAM(S): 25 TABLET, ORALLY DISINTEGRATING ORAL at 17:53

## 2018-04-16 RX ADMIN — Medication 3 MILLILITER(S): at 16:33

## 2018-04-16 RX ADMIN — Medication 3 MILLILITER(S): at 22:10

## 2018-04-16 RX ADMIN — Medication 400 MILLIGRAM(S): at 17:52

## 2018-04-16 NOTE — PROGRESS NOTE ADULT - ATTENDING COMMENTS
Patient seen and examined.  Case discussed with house staff.  Agree with above as edited.   Patient is a 72yoM with h/o COPD on 2 L at home, Lung Ca s/p R Lung lobectomy in 2003 (s/p chemo/radiation), h/o Bovine AVR, Afib (recently started on apixaban), seizure d/o, HTN with a recent admission to the MICU on 3/18 for influenza + LLL PNA (required bilevel ventilation, treated with levofloxacin) now presents with 2 weeks of diarrhea and afib with RVR with hypovolemia and ileus.  Ileus - restarted diet. No current abdominal pain or diarrhea.  Atrial fibrillation - d/w cardiology attending. changed AC to coumadin - diet d/w wife bedside. rate control with cardizem. c/w tele  If tolerates diet and HR is controlled, d/c planning for tomorrow.  Plan discussed with patient and family.

## 2018-04-16 NOTE — PROGRESS NOTE ADULT - PROBLEM SELECTOR PLAN 4
Hb stable in the 8s, c/o coffee ground emesis on admission  - possibly dilutional   - EGD unremarkable. Per GI, no GI contraindications to resuming AC.  - monitor daily CBCs Hb stable in the 8s, c/o coffee ground emesis on admission  -possibly dilutional   -EGD unremarkable. Per GI, no GI contraindications to resuming AC.  -monitor daily CBCs

## 2018-04-16 NOTE — PROGRESS NOTE ADULT - SUBJECTIVE AND OBJECTIVE BOX
Patient is a 72y old  Male who presents with a chief complaint of diarrhea (13 Apr 2018 14:35)    SUBJECTIVE / OVERNIGHT EVENTS: Pt. feels better this morning, passing gas, had a formed BM, denies fevers, nausea, and vomiting. Abdominal x-ray obtained yesterday at 6PM concerned for small and large bowel dilation and ileus, surgery consulted who recommended no intervention at this time, pt. made NPO. On tele, pt. in afib in 130s for 10 minutes overnight, asymptomatic, resolved with Lopressor 5mG IV, now in 70s-80s.     MEDICATIONS  (STANDING):  carBAMazepine 400 milliGRAM(s) Oral every 12 hours  diltiazem    Tablet 30 milliGRAM(s) Oral every 12 hours  lactobacillus acidophilus 1 Tablet(s) Oral daily  lamoTRIgine 100 milliGRAM(s) Oral every 12 hours    MEDICATIONS  (PRN):    Vital Signs Last 24 Hrs  T(C): 36.8 (16 Apr 2018 04:45), Max: 37.1 (15 Apr 2018 17:13)  T(F): 98.2 (16 Apr 2018 04:45), Max: 98.7 (15 Apr 2018 17:13)  HR: 77 (16 Apr 2018 04:45) (76 - 109)  BP: 103/53 (16 Apr 2018 04:45) (96/47 - 150/84)  RR: 17 (16 Apr 2018 04:45) (17 - 18)  SpO2: 100% (16 Apr 2018 04:45) (100% - 100%)  CAPILLARY BLOOD GLUCOSE    I&O's Summary    PHYSICAL EXAM:  GENERAL: NAD, well-developed  HEAD:  Atraumatic, Normocephalic  EYES: EOMI, PERRLA, conjunctiva and sclera clear  NECK: Supple, No JVD  CHEST/LUNG: Clear to auscultation bilaterally; No wheeze  HEART: Regular rate and rhythm; No murmurs, rubs, or gallops  ABDOMEN: Soft, Nontender, Nondistended; Bowel sounds present  EXTREMITIES:  2+ Peripheral Pulses, No clubbing, cyanosis, or edema  PSYCH: AAOx3  NEUROLOGY: non-focal  SKIN: No rashes or lesions    LABS:          PT/INR - ( 15 Apr 2018 09:06 )   PT: 12.1 SEC;   INR: 1.09          PTT - ( 15 Apr 2018 09:06 )  PTT:31.0 SEC          RADIOLOGY & ADDITIONAL TESTS:    Imaging Personally Reviewed:    Consultant(s) Notes Reviewed:      Care Discussed with Consultants/Other Providers: Patient is a 72y old  Male who presents with a chief complaint of diarrhea (13 Apr 2018 14:35)    [facility]  Internal Medicine PGY-1  7AM - 7PM: Pager [phone]  7PM - 7AM: NF coverage pager [phone]    SUBJECTIVE / OVERNIGHT EVENTS: Pt. feels better this morning, passing gas, had a formed BM, denies fevers, nausea, and vomiting. Abdominal x-ray obtained yesterday at 6PM concerned for small and large bowel dilation and ileus, surgery consulted who recommended no intervention at this time, pt. made NPO. On tele, pt. in afib in 130s for 10 minutes overnight, asymptomatic, resolved with Lopressor 5mG IV, now in 70s-80s.     MEDICATIONS  (STANDING):  carBAMazepine 400 milliGRAM(s) Oral every 12 hours  diltiazem    Tablet 30 milliGRAM(s) Oral every 12 hours  lactobacillus acidophilus 1 Tablet(s) Oral daily  lamoTRIgine 100 milliGRAM(s) Oral every 12 hours    MEDICATIONS  (PRN):    Vital Signs Last 24 Hrs  T(C): 36.8 (16 Apr 2018 04:45), Max: 37.1 (15 Apr 2018 17:13)  T(F): 98.2 (16 Apr 2018 04:45), Max: 98.7 (15 Apr 2018 17:13)  HR: 77 (16 Apr 2018 04:45) (76 - 109)  BP: 103/53 (16 Apr 2018 04:45) (96/47 - 150/84)  RR: 17 (16 Apr 2018 04:45) (17 - 18)  SpO2: 100% (16 Apr 2018 04:45) (100% - 100%)  CAPILLARY BLOOD GLUCOSE    I&O's Summary    PHYSICAL EXAM:  GENERAL: NAD, well-developed  HEAD:  Atraumatic, Normocephalic  EYES: EOMI, PERRLA, conjunctiva and sclera clear  NECK: Supple, No JVD  CHEST/LUNG: absent lung sounds on right, left lung CTA  HEART: irregularly irregular; No murmurs, rubs, or gallops  ABDOMEN: Soft, Nontender, Nondistended; Bowel sounds present  EXTREMITIES:  2+ Peripheral Pulses, No clubbing, cyanosis, or edema  PSYCH: AAOx3  NEUROLOGY: non-focal  SKIN: No rashes or lesions    LABS:        PT/INR - ( 15 Apr 2018 09:06 )   PT: 12.1 SEC;   INR: 1.09     PTT - ( 15 Apr 2018 09:06 )  PTT:31.0 SEC      RADIOLOGY & ADDITIONAL TESTS:    Imaging Personally Reviewed: AXR 4/15 - increased  gaseous distension of the large and   small bowel consistent with ileus    Consultant(s) Notes Reviewed: yes (surgery, no intervention)    Care Discussed with Consultants/Other Providers: Patient is a 72y old  Male who presents with a chief complaint of diarrhea (13 Apr 2018 14:35)    [facility]  [facility] PGY-1  7AM - 7PM: Pager [phone]  7PM - 7AM: NF coverage pager [phone]    SUBJECTIVE / OVERNIGHT EVENTS: Pt. feels better this morning, passing gas, had a formed BM, denies fevers, nausea, and vomiting. Abdominal x-ray obtained yesterday at 6PM concerned for small and large bowel dilation and ileus, surgery consulted who recommended no intervention at this time, pt. made NPO. On tele, pt. in afib in 130s for 10 minutes overnight, asymptomatic, resolved with Lopressor 5mG IV, now in 70s-80s.     MEDICATIONS  (STANDING):  carBAMazepine 400 milliGRAM(s) Oral every 12 hours  diltiazem    Tablet 30 milliGRAM(s) Oral every 12 hours  lactobacillus acidophilus 1 Tablet(s) Oral daily  lamoTRIgine 100 milliGRAM(s) Oral every 12 hours    MEDICATIONS  (PRN):    Vital Signs Last 24 Hrs  T(C): 36.8 (16 Apr 2018 04:45), Max: 37.1 (15 Apr 2018 17:13)  T(F): 98.2 (16 Apr 2018 04:45), Max: 98.7 (15 Apr 2018 17:13)  HR: 77 (16 Apr 2018 04:45) (76 - 109)  BP: 103/53 (16 Apr 2018 04:45) (96/47 - 150/84)  RR: 17 (16 Apr 2018 04:45) (17 - 18)  SpO2: 100% (16 Apr 2018 04:45) (100% - 100%)  CAPILLARY BLOOD GLUCOSE    I&O's Summary    PHYSICAL EXAM:  GENERAL: NAD, well-developed  HEAD:  Atraumatic, Normocephalic  EYES: EOMI, PERRLA, conjunctiva and sclera clear  NECK: Supple, No JVD  CHEST/LUNG: absent lung sounds on right, left lung CTA  HEART: irregularly irregular; No murmurs, rubs, or gallops  ABDOMEN: Soft, Nontender, Nondistended; Bowel sounds present  EXTREMITIES:  2+ Peripheral Pulses, No clubbing, cyanosis, or edema  PSYCH: AAOx3  NEUROLOGY: non-focal  SKIN: No rashes or lesions    LABS:                      8.6    6.67  )-----------( 159      ( 16 Apr 2018 07:15 )             25.8     04-16    138  |  100  |  6<L>  ----------------------------<  108<H>  4.2   |  28  |  0.81    Ca    8.7      16 Apr 2018 07:15  Phos  2.1     04-16  Mg     1.9     04-16  PT/INR - ( 15 Apr 2018 09:06 )   PT: 12.1 SEC;   INR: 1.09     PTT - ( 15 Apr 2018 09:06 )  PTT:31.0 SEC      RADIOLOGY & ADDITIONAL TESTS:    Imaging Personally Reviewed: LEX 4/15 -non-obstructive bowel gas pattern     Consultant(s) Notes Reviewed: yes (surgery, no intervention)    Care Discussed with Consultants/Other Providers: Patient is a 72y old  Male who presents with a chief complaint of diarrhea (13 Apr 2018 14:35)    [facility]  [facility] PGY-1  7AM - 7PM: Pager [phone]  7PM - 7AM: NF coverage pager [phone]    SUBJECTIVE / OVERNIGHT EVENTS: Pt. feels better this morning, passing gas, had a formed BM, denies fevers, nausea, and vomiting. Abdominal x-ray obtained yesterday at 6PM concerned for small and large bowel dilation and ileus, surgery consulted who recommended no intervention at this time, pt. made NPO. On tele, pt. in afib in 130s for 10 minutes overnight, asymptomatic, resolved with Lopressor 5mG IV, now in 70s-80s.     MEDICATIONS  (STANDING):  carBAMazepine 400 milliGRAM(s) Oral every 12 hours  diltiazem    Tablet 30 milliGRAM(s) Oral every 12 hours  lactobacillus acidophilus 1 Tablet(s) Oral daily  lamoTRIgine 100 milliGRAM(s) Oral every 12 hours    MEDICATIONS  (PRN):    Vital Signs Last 24 Hrs  T(C): 36.8 (16 Apr 2018 04:45), Max: 37.1 (15 Apr 2018 17:13)  T(F): 98.2 (16 Apr 2018 04:45), Max: 98.7 (15 Apr 2018 17:13)  HR: 77 (16 Apr 2018 04:45) (76 - 109)  BP: 103/53 (16 Apr 2018 04:45) (96/47 - 150/84)  RR: 17 (16 Apr 2018 04:45) (17 - 18)  SpO2: 100% (16 Apr 2018 04:45) (100% - 100%)    PHYSICAL EXAM:  GENERAL: NAD, well-developed  HEAD:  Atraumatic, Normocephalic  EYES: EOMI, PERRLA, conjunctiva and sclera clear  NECK: Supple, No JVD  CHEST/LUNG: absent lung sounds on right, left lung CTA  HEART: irregularly irregular; No murmurs, rubs, or gallops  ABDOMEN: Soft, Nontender, Nondistended; Bowel sounds present  EXTREMITIES:  2+ Peripheral Pulses, No clubbing, cyanosis, or edema  PSYCH: AAOx3  NEUROLOGY: non-focal  SKIN: No rashes or lesions    LABS:                      8.6    6.67  )-----------( 159      ( 16 Apr 2018 07:15 )             25.8     04-16    138  |  100  |  6<L>  ----------------------------<  108<H>  4.2   |  28  |  0.81    Ca    8.7      16 Apr 2018 07:15  Phos  2.1     04-16  Mg     1.9     04-16  PT/INR - ( 15 Apr 2018 09:06 )   PT: 12.1 SEC;   INR: 1.09     PTT - ( 15 Apr 2018 09:06 )  PTT:31.0 SEC      RADIOLOGY & ADDITIONAL TESTS:    Imaging Personally Reviewed: AXR 4/15 -non-obstructive bowel gas pattern     Consultant(s) Notes Reviewed: yes (surgery, no intervention)    Care Discussed with Consultants/Other Providers:

## 2018-04-16 NOTE — PROGRESS NOTE ADULT - SUBJECTIVE AND OBJECTIVE BOX
Subjective: Patient seen and examined. No new events except as noted.     SUBJECTIVE/ROS:  feels ok       MEDICATIONS:  MEDICATIONS  (STANDING):  ALBUTerol    90 MICROgram(s) HFA Inhaler 1 Puff(s) Inhalation every 4 hours  ALBUTerol/ipratropium for Nebulization 3 milliLiter(s) Nebulizer every 6 hours  carBAMazepine 400 milliGRAM(s) Oral every 12 hours  diltiazem    Tablet 60 milliGRAM(s) Oral two times a day  lactobacillus acidophilus 1 Tablet(s) Oral daily  lamoTRIgine 100 milliGRAM(s) Oral every 12 hours  tiotropium 18 MICROgram(s) Capsule 1 Capsule(s) Inhalation daily      PHYSICAL EXAM:  T(C): 36.8 (04-16-18 @ 04:45), Max: 37.1 (04-15-18 @ 17:13)  HR: 82 (04-16-18 @ 08:30) (76 - 109)  BP: 110/66 (04-16-18 @ 08:30) (96/47 - 150/84)  RR: 17 (04-16-18 @ 08:30) (17 - 18)  SpO2: 100% (04-16-18 @ 08:30) (100% - 100%)  Wt(kg): --  I&O's Summary        JVP: Normal  Neck: supple  Lung: clear   CV: S1 S2 , Murmur:  Abd: soft  Ext: No edema  neuro: Awake / alert  Psych: flat affect  Skin: normal       LABS/DATA:    CARDIAC MARKERS:                                8.6    6.67  )-----------( 159      ( 16 Apr 2018 07:15 )             25.8     04-16    138  |  100  |  6<L>  ----------------------------<  108<H>  4.2   |  28  |  0.81    Ca    8.7      16 Apr 2018 07:15  Phos  2.1     04-16  Mg     1.9     04-16      proBNP:   Lipid Profile:   HgA1c:   TSH:     TELE:  EKG:

## 2018-04-16 NOTE — PROGRESS NOTE ADULT - PROBLEM SELECTOR PLAN 2
afib with RVR to 130s overnight resolved with 5mg IV lopressor, currently rates 80s  -[id number] score 3  -will increase to cardizem 60mg BID  -c/w warfarin dosing based on INR  -cardiology recommendations appreciated afib with RVR to 130s overnight resolved with 5mg IV lopressor, currently rates 80s  -[id number] score 3  -will increase to cardizem 30mg [id number]  -daily warfarin dosing - today 5mg at bedtime, f/u AM INR  -cardiology recommendations appreciated

## 2018-04-16 NOTE — PROGRESS NOTE ADULT - ASSESSMENT
71 yo M with hx of COPD on 2L at home, Lung Ca s/p R Lung lobectomy in 2003 (s/p chemo/radiation) Bovine AVR, Afib (recently started on apixaban, not on rate control), seizure d/o (tonic seizures, on carbamazepine and lamotrigine , HTN who was recently admitted to [facility] MICU on 3/18 for influenza + LLL PNA (required bilevel, treated with levofloxacin) who presents with a 2 week hx of diarrhea starting a few days after discharge from hospital likely antibiotic induced, c.diff negative. Pt. also to be in afib with RVR likely 2/2 to volume down state and concern for ileus on CT abdomen. 73 yo M with hx of COPD on 2L at home, Lung Ca s/p R Lung lobectomy in 2003 (s/p chemo/radiation) Bovine AVR, Afib (recently started on apixaban, not on rate control), seizure d/o (tonic seizures, on carbamazepine and lamotrigine , HTN who was recently admitted to [facility] MICU on 3/18 for influenza + LLL PNA (required bilevel, treated with levofloxacin) who presents with a 2 week hx of diarrhea starting a few days after discharge from hospital likely antibiotic induced, c.diff negative. Pt. also to be in afib with RVR likely 2/2 to volume down state currently on cardizem, and concern for ileus on CT abdomen.

## 2018-04-16 NOTE — PROGRESS NOTE ADULT - PROBLEM SELECTOR PLAN 1
pt. with gas, no nausea/vomiting, last BM yesterday  -AXR with small and large bowel dilatation consistent with ileus seen on admission CT  -surgery recommending no intervention at this time  -pt. currently NPO, will advance diet as tolerated pt. with gas, no nausea/vomiting, last BM yesterday  -possibly 2/2 loperamide use prior to admission    -AXR with non-obstructive bowel gas pattern, surgery recommending no intervention at this time  -c/w simethicone, NPO overnight, will advance diet as tolerated

## 2018-04-16 NOTE — PROGRESS NOTE ADULT - PROBLEM SELECTOR PLAN 3
-2 wk hx of watery diarrhea in setting of recent abx use  -c. diff negative avoid anti-motility agents, IV hydration as needed  -will observe off abx, daily probiotic 2 wk hx of watery diarrhea in setting of recent abx use now resolved   -c. diff negative avoid anti-motility agents, IV hydration as needed  -will observe off abx, daily probiotic

## 2018-04-16 NOTE — PROGRESS NOTE ADULT - ASSESSMENT
Diarrhea   fu with GI    afib  HR stable  change cardizem to 30 q6  cont to monitor   a/c with warfarin     copd   nebs

## 2018-04-17 VITALS
TEMPERATURE: 97 F | HEART RATE: 88 BPM | RESPIRATION RATE: 18 BRPM | SYSTOLIC BLOOD PRESSURE: 99 MMHG | OXYGEN SATURATION: 100 % | DIASTOLIC BLOOD PRESSURE: 36 MMHG

## 2018-04-17 LAB
HCT VFR BLD CALC: 25.1 % — LOW (ref 39–50)
HGB BLD-MCNC: 8.3 G/DL — LOW (ref 13–17)
INR BLD: 1.42 — HIGH (ref 0.88–1.17)
MCHC RBC-ENTMCNC: 32.2 PG — SIGNIFICANT CHANGE UP (ref 27–34)
MCHC RBC-ENTMCNC: 33.1 % — SIGNIFICANT CHANGE UP (ref 32–36)
MCV RBC AUTO: 97.3 FL — SIGNIFICANT CHANGE UP (ref 80–100)
NRBC # FLD: 0 — SIGNIFICANT CHANGE UP
PLATELET # BLD AUTO: 145 K/UL — LOW (ref 150–400)
PMV BLD: 9.9 FL — SIGNIFICANT CHANGE UP (ref 7–13)
PROTHROM AB SERPL-ACNC: 15.9 SEC — HIGH (ref 9.8–13.1)
RBC # BLD: 2.58 M/UL — LOW (ref 4.2–5.8)
RBC # FLD: 13.5 % — SIGNIFICANT CHANGE UP (ref 10.3–14.5)
WBC # BLD: 5.87 K/UL — SIGNIFICANT CHANGE UP (ref 3.8–10.5)
WBC # FLD AUTO: 5.87 K/UL — SIGNIFICANT CHANGE UP (ref 3.8–10.5)

## 2018-04-17 PROCEDURE — 99239 HOSP IP/OBS DSCHRG MGMT >30: CPT

## 2018-04-17 RX ORDER — WARFARIN SODIUM 2.5 MG/1
1 TABLET ORAL
Qty: 0 | Refills: 0 | COMMUNITY

## 2018-04-17 RX ORDER — DILTIAZEM HCL 120 MG
1 CAPSULE, EXT RELEASE 24 HR ORAL
Qty: 120 | Refills: 0 | OUTPATIENT
Start: 2018-04-17 | End: 2018-05-16

## 2018-04-17 RX ORDER — WARFARIN SODIUM 2.5 MG/1
4 TABLET ORAL ONCE
Qty: 0 | Refills: 0 | Status: DISCONTINUED | OUTPATIENT
Start: 2018-04-17 | End: 2018-04-17

## 2018-04-17 RX ORDER — FUROSEMIDE 40 MG
1 TABLET ORAL
Qty: 0 | Refills: 0 | COMMUNITY

## 2018-04-17 RX ORDER — LACTOBACILLUS ACIDOPHILUS 100MM CELL
1 CAPSULE ORAL
Qty: 14 | Refills: 0
Start: 2018-04-17 | End: 2018-04-30

## 2018-04-17 RX ORDER — SIMETHICONE 80 MG/1
1 TABLET, CHEWABLE ORAL
Qty: 2 | Refills: 0 | OUTPATIENT
Start: 2018-04-17 | End: 2018-04-18

## 2018-04-17 RX ORDER — WARFARIN SODIUM 2.5 MG/1
1 TABLET ORAL
Qty: 30 | Refills: 0 | OUTPATIENT
Start: 2018-04-17 | End: 2018-05-16

## 2018-04-17 RX ADMIN — Medication 3 MILLILITER(S): at 03:42

## 2018-04-17 RX ADMIN — Medication 400 MILLIGRAM(S): at 05:48

## 2018-04-17 RX ADMIN — Medication 3 MILLILITER(S): at 09:27

## 2018-04-17 RX ADMIN — LAMOTRIGINE 100 MILLIGRAM(S): 25 TABLET, ORALLY DISINTEGRATING ORAL at 05:48

## 2018-04-17 NOTE — PROGRESS NOTE ADULT - PROBLEM SELECTOR PROBLEM 8
Seizure disorder
Aortic valve replaced
Seizure disorder

## 2018-04-17 NOTE — PROGRESS NOTE ADULT - ATTENDING COMMENTS
Patient seen and examined.  Case discussed with house staff.  Agree with above as edited.   Patient is a 72yoM with h/o COPD on 2 L at home, Lung Ca s/p R Lung lobectomy in 2003 (s/p chemo/radiation), h/o Bovine AVR, Afib (recently started on apixaban), seizure d/o, HTN with a recent admission to the MICU on 3/18 for influenza + LLL PNA (required bilevel ventilation, treated with levofloxacin) now presents with 2 weeks of diarrhea and afib with RVR with hypovolemia and ileus.  Ileus - restarted diet. No current abdominal pain or diarrhea.  Atrial fibrillation - d/w cardiology attending. c/w coumadin - diet d/w wife bedside. rate control with cardizem. Will f/u with Dr. Olson for INR check on 4/19 at 10am - appt told to wife/pt.  d/c today. D/C time: 45 minutes   Plan discussed with patient and family.

## 2018-04-17 NOTE — PROGRESS NOTE ADULT - PROBLEM SELECTOR PROBLEM 3
Acute diarrhea
CELINE (acute kidney injury)

## 2018-04-17 NOTE — PROGRESS NOTE ADULT - ASSESSMENT
71 yo M with hx of COPD on 2L at home, Lung Ca s/p R Lung lobectomy in 2003 (s/p chemo/radiation) Bovine AVR, Afib (recently started on apixaban, not on rate control), seizure d/o (tonic seizures, on carbamazepine and lamotrigine , HTN who was recently admitted to [facility] MICU on 3/18 for influenza + LLL PNA (required bilevel, treated with levofloxacin) who presents with a 2 week hx of diarrhea starting a few days after discharge from hospital likely antibiotic induced, c.diff negative. Pt. also to be in afib with RVR likely 2/2 to volume down state currently on cardizem, and concern for ileus on CT abdomen.

## 2018-04-17 NOTE — PROGRESS NOTE ADULT - PROBLEM SELECTOR PLAN 4
Hb stable in the 8s, c/o coffee ground emesis on admission  -iron studies consistent with anemia of chronic disease   -EGD unremarkable. Per GI, no GI contraindications to resuming AC.  -monitor daily CBCs

## 2018-04-17 NOTE — PROGRESS NOTE ADULT - PROBLEM SELECTOR PLAN 3
2 wk hx of watery diarrhea in setting of recent abx use now resolved   -c. diff negative  -avoid anti-motility agents, IV hydration as needed  -will observe off abx, daily probiotic

## 2018-04-17 NOTE — PROGRESS NOTE ADULT - PROBLEM SELECTOR PROBLEM 4
Acute diarrhea
Acute blood loss anemia
Acute blood loss anemia
Acute diarrhea
Atrial fibrillation with RVR

## 2018-04-17 NOTE — PROGRESS NOTE ADULT - SUBJECTIVE AND OBJECTIVE BOX
[facility]  [facility]1  7AM - 7PM: Pager [phone]  7PM - 7AM: NF coverage pager [phone]    Patient is a 72y old  Male who presents with a chief complaint of diarrhea (13 Apr 2018 14:35)    SUBJECTIVE / OVERNIGHT EVENTS: No acute events overnight. Pt. tolerating regular diet. Denies vomiting. He is passing gas, however has last BM two day ago.     MEDICATIONS  (STANDING):  ALBUTerol    90 MICROgram(s) HFA Inhaler 1 Puff(s) Inhalation every 4 hours  ALBUTerol/ipratropium for Nebulization 3 milliLiter(s) Nebulizer every 6 hours  carBAMazepine 400 milliGRAM(s) Oral every 12 hours  diltiazem    Tablet 30 milliGRAM(s) Oral four times a day  lactobacillus acidophilus 1 Tablet(s) Oral daily  lamoTRIgine 100 milliGRAM(s) Oral every 12 hours  simethicone 80 milliGRAM(s) Chew daily  tiotropium 18 MICROgram(s) Capsule 1 Capsule(s) Inhalation daily    MEDICATIONS  (PRN):    Vital Signs Last 24 Hrs  T(F): 97.3 (17 Apr 2018 04:51), Max: 98.4 (16 Apr 2018 21:36)  HR: 88 (17 Apr 2018 04:51) (62 - 88)  BP: 99/36 (17 Apr 2018 04:51) (99/36 - 116/61)  RR: 18 (17 Apr 2018 04:51) (17 - 18)  SpO2: 100% (17 Apr 2018 04:51) (98% - 100%)  CAPILLARY BLOOD GLUCOSE    I&O's Summary    PHYSICAL EXAM:  GENERAL: NAD, well-developed  HEAD:  Atraumatic, Normocephalic  EYES: EOMI, PERRLA, conjunctiva and sclera clear  NECK: Supple, No JVD  CHEST/LUNG: absent lung sounds on right, left lung CTA  HEART: irregularly irregular; No murmurs, rubs, or gallops  ABDOMEN: Soft, Nontender, Nondistended; Bowel sounds present  EXTREMITIES:  2+ Peripheral Pulses, No clubbing, cyanosis, or edema  PSYCH: AAOx3  NEUROLOGY: non-focal    LABS:                        8.3    5.87  )-----------( 145      ( 17 Apr 2018 06:30 )             25.1       PT/INR - ( 17 Apr 2018 06:30 )   PT: 15.9 SEC;   INR: 1.42          PTT - ( 16 Apr 2018 07:15 )  PTT:30.3 SEC      RADIOLOGY & ADDITIONAL TESTS:    Imaging Personally Reviewed: Abdominal XRAY 4/15 - nonobstructive bowel gas pattern     Consultant(s) Notes Reviewed:  Cardiology     Care Discussed with Consultants/Other Providers:

## 2018-04-17 NOTE — PROGRESS NOTE ADULT - PROBLEM SELECTOR PLAN 6
-Stable  -continue to monitor
-will hold daily furosemide 40 mg PO in setting of diarrhea/vomiting

## 2018-04-17 NOTE — PROGRESS NOTE ADULT - PROBLEM SELECTOR PROBLEM 2
Acute blood loss anemia
Acute blood loss anemia
Atrial fibrillation with RVR
Atrial fibrillation with RVR
CELINE (acute kidney injury)
Ileus
Acute blood loss anemia

## 2018-04-17 NOTE — PROGRESS NOTE ADULT - PROBLEM SELECTOR PLAN 9
-bovine valve placed in 2013 c/b development of AFIB  -hold AC in setting of GI illness.
-bovine valve placed in 2013 c/b development of AFIB  -c/w warfarin therapy as above
-bovine valve placed in 2013 c/b development of AFIB  -hold AC in setting of GI illness
DVT prophylaxis with SCDs  Diet clear liquid

## 2018-04-17 NOTE — PROGRESS NOTE ADULT - PROBLEM SELECTOR PLAN 7
-will hold daily furosemide 40 mg PO in setting of diarrhea/vomiting
-c/w home meds (carbamazepine and lamotrigine)
-will hold daily furosemide 40 mg PO as BP WNL
-will hold daily furosemide 40 mg PO in setting of diarrhea/vomiting

## 2018-04-17 NOTE — PROGRESS NOTE ADULT - PROBLEM SELECTOR PLAN 8
-c/w home meds (carbamazepine and lamotrigine)
-bovine valve placed in 2013 c/b development of AFIB  -hold AC in setting of GI illness
-c/w home meds (carbamazepine and lamotrigine)

## 2018-04-17 NOTE — PROGRESS NOTE ADULT - SUBJECTIVE AND OBJECTIVE BOX
Subjective: Patient seen and examined. No new events except as noted.     SUBJECTIVE/ROS:    No chest pain, dyspnea, palpitation, or dizziness.     MEDICATIONS:  MEDICATIONS  (STANDING):  ALBUTerol    90 MICROgram(s) HFA Inhaler 1 Puff(s) Inhalation every 4 hours  ALBUTerol/ipratropium for Nebulization 3 milliLiter(s) Nebulizer every 6 hours  carBAMazepine 400 milliGRAM(s) Oral every 12 hours  diltiazem    Tablet 30 milliGRAM(s) Oral four times a day  lactobacillus acidophilus 1 Tablet(s) Oral daily  lamoTRIgine 100 milliGRAM(s) Oral every 12 hours  simethicone 80 milliGRAM(s) Chew daily  tiotropium 18 MICROgram(s) Capsule 1 Capsule(s) Inhalation daily  warfarin 4 milliGRAM(s) Oral once      PHYSICAL EXAM:  T(C): 36.3 (04-17-18 @ 04:51), Max: 36.9 (04-16-18 @ 21:36)  HR: 64 (04-17-18 @ 09:27) (62 - 88)  BP: 99/36 (04-17-18 @ 04:51) (99/36 - 116/61)  RR: 18 (04-17-18 @ 04:51) (18 - 18)  SpO2: 100% (04-17-18 @ 04:51) (98% - 100%)  Wt(kg): --  I&O's Summary        Appearance: Normal	  HEENT:   Normal oral mucosa, PERRL, EOMI	  Cardiovascular: Normal S1 S2,    Murmur:   Neck: JVP normal  Respiratory: Lungs clear to auscultation  Gastrointestinal:  Soft, Non-tender, + BS	  Skin: normal   Neuro: No gross deficits.   Psychiatry:  Mood & affect appropriate  Ext: No edema      LABS/DATA:    CARDIAC MARKERS:                                8.3    5.87  )-----------( 145      ( 17 Apr 2018 06:30 )             25.1     04-16    138  |  100  |  6<L>  ----------------------------<  108<H>  4.2   |  28  |  0.81    Ca    8.7      16 Apr 2018 07:15  Phos  2.1     04-16  Mg     1.9     04-16      proBNP:   Lipid Profile:   HgA1c:   TSH:     TELE:  EKG:

## 2018-04-17 NOTE — PROGRESS NOTE ADULT - PROBLEM SELECTOR PROBLEM 1
Acute blood loss anemia
Ileus
Ileus
Pleural effusion on left
Pleural effusion on left
Severe sepsis with acute organ dysfunction
Pleural effusion on left

## 2018-04-17 NOTE — PROGRESS NOTE ADULT - PROBLEM SELECTOR PLAN 1
pt. passing gas, tolerating diet, no nausea/vomiting, last BM 2 days ago  -possibly 2/2 loperamide use prior to admission    -AXR with non-obstructive bowel gas pattern, surgery recommending no intervention at this time  -c/w simethicone, regular diet

## 2018-04-17 NOTE — PROGRESS NOTE ADULT - PROBLEM SELECTOR PROBLEM 5
Atrial fibrillation with RVR
CELINE (acute kidney injury)
CELINE (acute kidney injury)
Lung cancer

## 2018-04-17 NOTE — PROGRESS NOTE ADULT - PROBLEM SELECTOR PROBLEM 9
Aortic valve replaced
Need for prophylactic measure

## 2018-04-17 NOTE — PROGRESS NOTE ADULT - PROBLEM SELECTOR PLAN 5
-Rate control w Diltiazem 30 mg Q 12 hours  -Will eval restarting AC  -Cardiology Dr. Olson following
-Rate control w Diltiazem 30 mg Q 12 hours  -[id number] score 3. C/w warfarin 2mg + daily INRs  -Cardiology Dr. Olson following
-Rate control w Diltiazem 30 mg Q 12 hours  -[id number] score 3. C/w warfarin 2mg + daily INRs  -Cardiology Dr. Olson following
-Rate control w Diltiazem 30 mg Q 12 hours  -Will hold off on AC for now in setting of acute diarrhea  -Cardiology Dr. Olson following
-Resolved. Likely prerenal in setting of diarrhea and vomiting  -improved with IVF
-Resolved. Likely prerenal in setting of diarrhea and vomiting  -improved with IVF  -daily BMP
-Stable  -continue to monitor

## 2018-04-17 NOTE — PROGRESS NOTE ADULT - PROBLEM SELECTOR PLAN 10
DVT prophylaxis with SCDs  Diet clear liquid
DVT prophylaxis with SCDs  Diet NPO
DVT prophylaxis with SCDs  Diet Regular
DVT prophylaxis with SCDs  Diet clear liquid

## 2018-04-17 NOTE — PROGRESS NOTE ADULT - PROBLEM SELECTOR PLAN 2
rates 70s-90s overnight (afib)  -[id number] score 3  -c/w cardizem 30mg [id number]  -daily warfarin dosing - will dose 5mg tonight and f/u INR  -cardiology recommendations appreciated rates 70s-90s overnight (afib)  -[id number] score 2 (age and HTN)  -c/w cardizem 30mg [id number]  -daily warfarin dosing - will dose 5mg tonight and f/u INR  -cardiology recommendations appreciated

## 2018-04-19 ENCOUNTER — INPATIENT (INPATIENT)
Facility: HOSPITAL | Age: 73
LOS: 5 days | Discharge: HOME | End: 2018-04-25
Attending: HOSPITALIST | Admitting: HOSPITALIST
Payer: MEDICARE

## 2018-04-19 VITALS — OXYGEN SATURATION: 100 % | HEART RATE: 155 BPM

## 2018-04-19 DIAGNOSIS — Z95.4 PRESENCE OF OTHER HEART-VALVE REPLACEMENT: Chronic | ICD-10-CM

## 2018-04-19 DIAGNOSIS — Z98.89 OTHER SPECIFIED POSTPROCEDURAL STATES: Chronic | ICD-10-CM

## 2018-04-19 LAB
ALBUMIN SERPL ELPH-MCNC: 3.9 G/DL — SIGNIFICANT CHANGE UP (ref 3.3–5)
ALP SERPL-CCNC: 92 U/L — SIGNIFICANT CHANGE UP (ref 40–120)
ALT FLD-CCNC: 22 U/L — SIGNIFICANT CHANGE UP (ref 4–41)
APTT BLD: 43 SEC — HIGH (ref 27.5–37.4)
AST SERPL-CCNC: 36 U/L — SIGNIFICANT CHANGE UP (ref 4–40)
BASE EXCESS BLDV CALC-SCNC: 6 MMOL/L — SIGNIFICANT CHANGE UP
BASOPHILS # BLD AUTO: 0.03 K/UL — SIGNIFICANT CHANGE UP (ref 0–0.2)
BASOPHILS NFR BLD AUTO: 0.4 % — SIGNIFICANT CHANGE UP (ref 0–2)
BILIRUB SERPL-MCNC: 0.4 MG/DL — SIGNIFICANT CHANGE UP (ref 0.2–1.2)
BLD GP AB SCN SERPL QL: NEGATIVE — SIGNIFICANT CHANGE UP
BLOOD GAS VENOUS - CREATININE: 0.93 MG/DL — SIGNIFICANT CHANGE UP (ref 0.5–1.3)
BUN SERPL-MCNC: 11 MG/DL — SIGNIFICANT CHANGE UP (ref 7–23)
CALCIUM SERPL-MCNC: 9.6 MG/DL — SIGNIFICANT CHANGE UP (ref 8.4–10.5)
CHLORIDE BLDV-SCNC: 101 MMOL/L — SIGNIFICANT CHANGE UP (ref 96–108)
CHLORIDE SERPL-SCNC: 96 MMOL/L — LOW (ref 98–107)
CK MB BLD-MCNC: 1.18 NG/ML — SIGNIFICANT CHANGE UP (ref 1–6.6)
CK SERPL-CCNC: 59 U/L — SIGNIFICANT CHANGE UP (ref 30–200)
CO2 SERPL-SCNC: 25 MMOL/L — SIGNIFICANT CHANGE UP (ref 22–31)
CREAT SERPL-MCNC: 0.95 MG/DL — SIGNIFICANT CHANGE UP (ref 0.5–1.3)
EOSINOPHIL # BLD AUTO: 0.08 K/UL — SIGNIFICANT CHANGE UP (ref 0–0.5)
EOSINOPHIL NFR BLD AUTO: 1.1 % — SIGNIFICANT CHANGE UP (ref 0–6)
GAS PNL BLDV: 132 MMOL/L — LOW (ref 136–146)
GLUCOSE BLDV-MCNC: 101 — HIGH (ref 70–99)
GLUCOSE SERPL-MCNC: 100 MG/DL — HIGH (ref 70–99)
HCO3 BLDV-SCNC: 28 MMOL/L — HIGH (ref 20–27)
HCT VFR BLD CALC: 31.1 % — LOW (ref 39–50)
HCT VFR BLDV CALC: 33.5 % — LOW (ref 39–51)
HGB BLD-MCNC: 10.2 G/DL — LOW (ref 13–17)
HGB BLDV-MCNC: 10.9 G/DL — LOW (ref 13–17)
IMM GRANULOCYTES # BLD AUTO: 0.02 # — SIGNIFICANT CHANGE UP
IMM GRANULOCYTES NFR BLD AUTO: 0.3 % — SIGNIFICANT CHANGE UP (ref 0–1.5)
INR BLD: 2.26 — HIGH (ref 0.88–1.17)
LACTATE BLDV-MCNC: 1.7 MMOL/L — SIGNIFICANT CHANGE UP (ref 0.5–2)
LYMPHOCYTES # BLD AUTO: 1.18 K/UL — SIGNIFICANT CHANGE UP (ref 1–3.3)
LYMPHOCYTES # BLD AUTO: 16.7 % — SIGNIFICANT CHANGE UP (ref 13–44)
MCHC RBC-ENTMCNC: 31.9 PG — SIGNIFICANT CHANGE UP (ref 27–34)
MCHC RBC-ENTMCNC: 32.8 % — SIGNIFICANT CHANGE UP (ref 32–36)
MCV RBC AUTO: 97.2 FL — SIGNIFICANT CHANGE UP (ref 80–100)
MONOCYTES # BLD AUTO: 0.63 K/UL — SIGNIFICANT CHANGE UP (ref 0–0.9)
MONOCYTES NFR BLD AUTO: 8.9 % — SIGNIFICANT CHANGE UP (ref 2–14)
NEUTROPHILS # BLD AUTO: 5.11 K/UL — SIGNIFICANT CHANGE UP (ref 1.8–7.4)
NEUTROPHILS NFR BLD AUTO: 72.6 % — SIGNIFICANT CHANGE UP (ref 43–77)
NRBC # FLD: 0 — SIGNIFICANT CHANGE UP
PCO2 BLDV: 55 MMHG — HIGH (ref 41–51)
PH BLDV: 7.37 PH — SIGNIFICANT CHANGE UP (ref 7.32–7.43)
PLATELET # BLD AUTO: 171 K/UL — SIGNIFICANT CHANGE UP (ref 150–400)
PMV BLD: 9.6 FL — SIGNIFICANT CHANGE UP (ref 7–13)
PO2 BLDV: 27 MMHG — LOW (ref 35–40)
POTASSIUM BLDV-SCNC: 5.3 MMOL/L — HIGH (ref 3.4–4.5)
POTASSIUM SERPL-MCNC: 5.9 MMOL/L — HIGH (ref 3.5–5.3)
POTASSIUM SERPL-SCNC: 5.9 MMOL/L — HIGH (ref 3.5–5.3)
PROT SERPL-MCNC: 7.7 G/DL — SIGNIFICANT CHANGE UP (ref 6–8.3)
PROTHROM AB SERPL-ACNC: 25.5 SEC — HIGH (ref 9.8–13.1)
RBC # BLD: 3.2 M/UL — LOW (ref 4.2–5.8)
RBC # FLD: 13.3 % — SIGNIFICANT CHANGE UP (ref 10.3–14.5)
RH IG SCN BLD-IMP: NEGATIVE — SIGNIFICANT CHANGE UP
SAO2 % BLDV: 43.8 % — LOW (ref 60–85)
SODIUM SERPL-SCNC: 134 MMOL/L — LOW (ref 135–145)
TROPONIN T SERPL-MCNC: < 0.06 NG/ML — SIGNIFICANT CHANGE UP (ref 0–0.06)
TSH SERPL-MCNC: 4.16 UIU/ML — SIGNIFICANT CHANGE UP (ref 0.27–4.2)
WBC # BLD: 7.05 K/UL — SIGNIFICANT CHANGE UP (ref 3.8–10.5)
WBC # FLD AUTO: 7.05 K/UL — SIGNIFICANT CHANGE UP (ref 3.8–10.5)

## 2018-04-19 PROCEDURE — 71045 X-RAY EXAM CHEST 1 VIEW: CPT | Mod: 26

## 2018-04-19 RX ORDER — NITROGLYCERIN 6.5 MG
0.4 CAPSULE, EXTENDED RELEASE ORAL ONCE
Qty: 0 | Refills: 0 | Status: DISCONTINUED | OUTPATIENT
Start: 2018-04-19 | End: 2018-04-19

## 2018-04-19 NOTE — ED PROVIDER NOTE - ATTENDING CONTRIBUTION TO CARE
I performed a face to face bedside interview with patient regarding history of present illness, review of symptoms and past medical history. I completed an independent physical exam.  I have discussed patient's plan of care.   I agree with note as stated above, having amended the EMR as needed to reflect my findings. I have discussed the assessment and plan of care.  This includes during the time I functioned as the attending physician for this patient.  Attending Contribution to Care: agree with plan of resident. pt p/w APE. initially hypertensive, tachypnic with no cyanosis. pt symptomatically improved here by bipap with ems. bp improved. initially rapid afib to . symptomatically improved with iv dilt, po dilt. stablef ro admission to rcu.

## 2018-04-19 NOTE — ED PROVIDER NOTE - PROGRESS NOTE DETAILS
Rosy PGY-3: Admitted patient to hospitalist, text paged MAR. (RCU does not do tele, pt has been on bipap before)

## 2018-04-19 NOTE — ED PROVIDER NOTE - OBJECTIVE STATEMENT
Offered  services, patient prefers family to translate.   72M h/o COPD on home O2, Lung ca s/p R lung lobectomy, Bovine AVR, afib on coumadin and cardizem, seizures recently admitted for diarrhea and afib w/RVR (discharged 4/17) BIBEMS for resp distress. Endorses worsened SOB since this morning, worsening throughout the day.  at 9am. Has chronic cough, unchanged. By EMS had rhochi and labored breathing, started on bipap and given one sublingual nitro. Denies CP, palpitations, fever.

## 2018-04-19 NOTE — ED ADULT NURSE NOTE - OBJECTIVE STATEMENT
71 yo M received in spot C as notification from EMS for respiratory distress.  Pt arrives on CPAP peep 5.  Found to be in AFIB with RVR.  Bipap initiated.  intubated in past.  Tolerating well.  Cardizem PO&IV given.  Given Nitro SL by EMS.  PMHX: COPD on 2 L at home, Lung Ca s/p R Lung lobectomy in 2003, Bovine AVR, Afib, seizure d/o (tonic seizures, on carbamazepine and lamotrigine), HTN.  20G R arm/20G R wrist.

## 2018-04-19 NOTE — ED PROVIDER NOTE - CARE PLAN
Principal Discharge DX:	Flash pulmonary edema Principal Discharge DX:	Flash pulmonary edema  Secondary Diagnosis:	Atrial fibrillation

## 2018-04-20 ENCOUNTER — TRANSCRIPTION ENCOUNTER (OUTPATIENT)
Age: 73
End: 2018-04-20

## 2018-04-20 DIAGNOSIS — I10 ESSENTIAL (PRIMARY) HYPERTENSION: ICD-10-CM

## 2018-04-20 DIAGNOSIS — Z29.9 ENCOUNTER FOR PROPHYLACTIC MEASURES, UNSPECIFIED: ICD-10-CM

## 2018-04-20 DIAGNOSIS — E78.5 HYPERLIPIDEMIA, UNSPECIFIED: ICD-10-CM

## 2018-04-20 DIAGNOSIS — J81.0 ACUTE PULMONARY EDEMA: ICD-10-CM

## 2018-04-20 DIAGNOSIS — C34.90 MALIGNANT NEOPLASM OF UNSPECIFIED PART OF UNSPECIFIED BRONCHUS OR LUNG: ICD-10-CM

## 2018-04-20 DIAGNOSIS — G40.909 EPILEPSY, UNSPECIFIED, NOT INTRACTABLE, WITHOUT STATUS EPILEPTICUS: ICD-10-CM

## 2018-04-20 DIAGNOSIS — I48.91 UNSPECIFIED ATRIAL FIBRILLATION: ICD-10-CM

## 2018-04-20 DIAGNOSIS — Z95.2 PRESENCE OF PROSTHETIC HEART VALVE: ICD-10-CM

## 2018-04-20 DIAGNOSIS — J44.9 CHRONIC OBSTRUCTIVE PULMONARY DISEASE, UNSPECIFIED: ICD-10-CM

## 2018-04-20 DIAGNOSIS — E87.1 HYPO-OSMOLALITY AND HYPONATREMIA: ICD-10-CM

## 2018-04-20 LAB
ALBUMIN SERPL ELPH-MCNC: 3.4 G/DL — SIGNIFICANT CHANGE UP (ref 3.3–5)
ALP SERPL-CCNC: 81 U/L — SIGNIFICANT CHANGE UP (ref 40–120)
ALT FLD-CCNC: 19 U/L — SIGNIFICANT CHANGE UP (ref 4–41)
AST SERPL-CCNC: 16 U/L — SIGNIFICANT CHANGE UP (ref 4–40)
B PERT DNA SPEC QL NAA+PROBE: SIGNIFICANT CHANGE UP
BILIRUB SERPL-MCNC: 0.3 MG/DL — SIGNIFICANT CHANGE UP (ref 0.2–1.2)
BUN SERPL-MCNC: 10 MG/DL — SIGNIFICANT CHANGE UP (ref 7–23)
BUN SERPL-MCNC: 10 MG/DL — SIGNIFICANT CHANGE UP (ref 7–23)
C PNEUM DNA SPEC QL NAA+PROBE: NOT DETECTED — SIGNIFICANT CHANGE UP
CALCIUM SERPL-MCNC: 8.5 MG/DL — SIGNIFICANT CHANGE UP (ref 8.4–10.5)
CALCIUM SERPL-MCNC: 9 MG/DL — SIGNIFICANT CHANGE UP (ref 8.4–10.5)
CHLORIDE SERPL-SCNC: 94 MMOL/L — LOW (ref 98–107)
CHLORIDE SERPL-SCNC: 99 MMOL/L — SIGNIFICANT CHANGE UP (ref 98–107)
CO2 SERPL-SCNC: 27 MMOL/L — SIGNIFICANT CHANGE UP (ref 22–31)
CO2 SERPL-SCNC: 30 MMOL/L — SIGNIFICANT CHANGE UP (ref 22–31)
CREAT SERPL-MCNC: 0.94 MG/DL — SIGNIFICANT CHANGE UP (ref 0.5–1.3)
CREAT SERPL-MCNC: 0.99 MG/DL — SIGNIFICANT CHANGE UP (ref 0.5–1.3)
FLUAV H1 2009 PAND RNA SPEC QL NAA+PROBE: NOT DETECTED — SIGNIFICANT CHANGE UP
FLUAV H1 RNA SPEC QL NAA+PROBE: NOT DETECTED — SIGNIFICANT CHANGE UP
FLUAV H3 RNA SPEC QL NAA+PROBE: NOT DETECTED — SIGNIFICANT CHANGE UP
FLUAV SUBTYP SPEC NAA+PROBE: SIGNIFICANT CHANGE UP
FLUBV RNA SPEC QL NAA+PROBE: NOT DETECTED — SIGNIFICANT CHANGE UP
GLUCOSE SERPL-MCNC: 100 MG/DL — HIGH (ref 70–99)
GLUCOSE SERPL-MCNC: 101 MG/DL — HIGH (ref 70–99)
HADV DNA SPEC QL NAA+PROBE: NOT DETECTED — SIGNIFICANT CHANGE UP
HCOV 229E RNA SPEC QL NAA+PROBE: NOT DETECTED — SIGNIFICANT CHANGE UP
HCOV HKU1 RNA SPEC QL NAA+PROBE: NOT DETECTED — SIGNIFICANT CHANGE UP
HCOV NL63 RNA SPEC QL NAA+PROBE: NOT DETECTED — SIGNIFICANT CHANGE UP
HCOV OC43 RNA SPEC QL NAA+PROBE: NOT DETECTED — SIGNIFICANT CHANGE UP
HCT VFR BLD CALC: 27.4 % — LOW (ref 39–50)
HGB BLD-MCNC: 8.9 G/DL — LOW (ref 13–17)
HMPV RNA SPEC QL NAA+PROBE: NOT DETECTED — SIGNIFICANT CHANGE UP
HPIV1 RNA SPEC QL NAA+PROBE: NOT DETECTED — SIGNIFICANT CHANGE UP
HPIV2 RNA SPEC QL NAA+PROBE: NOT DETECTED — SIGNIFICANT CHANGE UP
HPIV3 RNA SPEC QL NAA+PROBE: NOT DETECTED — SIGNIFICANT CHANGE UP
HPIV4 RNA SPEC QL NAA+PROBE: NOT DETECTED — SIGNIFICANT CHANGE UP
INR BLD: 2.68 — HIGH (ref 0.88–1.17)
M PNEUMO DNA SPEC QL NAA+PROBE: NOT DETECTED — SIGNIFICANT CHANGE UP
MAGNESIUM SERPL-MCNC: 1.9 MG/DL — SIGNIFICANT CHANGE UP (ref 1.6–2.6)
MCHC RBC-ENTMCNC: 31.9 PG — SIGNIFICANT CHANGE UP (ref 27–34)
MCHC RBC-ENTMCNC: 32.5 % — SIGNIFICANT CHANGE UP (ref 32–36)
MCV RBC AUTO: 98.2 FL — SIGNIFICANT CHANGE UP (ref 80–100)
NRBC # FLD: 0 — SIGNIFICANT CHANGE UP
PHOSPHATE SERPL-MCNC: 2.6 MG/DL — SIGNIFICANT CHANGE UP (ref 2.5–4.5)
PLATELET # BLD AUTO: 147 K/UL — LOW (ref 150–400)
PMV BLD: 9.8 FL — SIGNIFICANT CHANGE UP (ref 7–13)
POTASSIUM SERPL-MCNC: 4.2 MMOL/L — SIGNIFICANT CHANGE UP (ref 3.5–5.3)
POTASSIUM SERPL-MCNC: 4.5 MMOL/L — SIGNIFICANT CHANGE UP (ref 3.5–5.3)
POTASSIUM SERPL-SCNC: 4.2 MMOL/L — SIGNIFICANT CHANGE UP (ref 3.5–5.3)
POTASSIUM SERPL-SCNC: 4.5 MMOL/L — SIGNIFICANT CHANGE UP (ref 3.5–5.3)
PROT SERPL-MCNC: 6.2 G/DL — SIGNIFICANT CHANGE UP (ref 6–8.3)
PROTHROM AB SERPL-ACNC: 31.5 SEC — HIGH (ref 9.8–13.1)
RBC # BLD: 2.79 M/UL — LOW (ref 4.2–5.8)
RBC # FLD: 13.3 % — SIGNIFICANT CHANGE UP (ref 10.3–14.5)
RSV RNA SPEC QL NAA+PROBE: NOT DETECTED — SIGNIFICANT CHANGE UP
RV+EV RNA SPEC QL NAA+PROBE: NOT DETECTED — SIGNIFICANT CHANGE UP
SODIUM SERPL-SCNC: 132 MMOL/L — LOW (ref 135–145)
SODIUM SERPL-SCNC: 137 MMOL/L — SIGNIFICANT CHANGE UP (ref 135–145)
WBC # BLD: 6.25 K/UL — SIGNIFICANT CHANGE UP (ref 3.8–10.5)
WBC # FLD AUTO: 6.25 K/UL — SIGNIFICANT CHANGE UP (ref 3.8–10.5)

## 2018-04-20 PROCEDURE — 12345: CPT | Mod: GC,NC

## 2018-04-20 PROCEDURE — 99223 1ST HOSP IP/OBS HIGH 75: CPT | Mod: GC

## 2018-04-20 RX ORDER — FUROSEMIDE 40 MG
40 TABLET ORAL DAILY
Qty: 0 | Refills: 0 | Status: DISCONTINUED | OUTPATIENT
Start: 2018-04-20 | End: 2018-04-25

## 2018-04-20 RX ORDER — ALBUTEROL 90 UG/1
2 AEROSOL, METERED ORAL EVERY 6 HOURS
Qty: 0 | Refills: 0 | Status: DISCONTINUED | OUTPATIENT
Start: 2018-04-20 | End: 2018-04-25

## 2018-04-20 RX ORDER — CARBAMAZEPINE 200 MG
400 TABLET ORAL
Qty: 0 | Refills: 0 | Status: DISCONTINUED | OUTPATIENT
Start: 2018-04-20 | End: 2018-04-22

## 2018-04-20 RX ORDER — LAMOTRIGINE 25 MG/1
100 TABLET, ORALLY DISINTEGRATING ORAL
Qty: 0 | Refills: 0 | Status: DISCONTINUED | OUTPATIENT
Start: 2018-04-20 | End: 2018-04-25

## 2018-04-20 RX ORDER — DOCUSATE SODIUM 100 MG
100 CAPSULE ORAL
Qty: 0 | Refills: 0 | Status: DISCONTINUED | OUTPATIENT
Start: 2018-04-20 | End: 2018-04-24

## 2018-04-20 RX ORDER — DILTIAZEM HCL 120 MG
120 CAPSULE, EXT RELEASE 24 HR ORAL DAILY
Qty: 0 | Refills: 0 | Status: DISCONTINUED | OUTPATIENT
Start: 2018-04-20 | End: 2018-04-20

## 2018-04-20 RX ORDER — LACTOBACILLUS ACIDOPHILUS 100MM CELL
1 CAPSULE ORAL DAILY
Qty: 0 | Refills: 0 | Status: DISCONTINUED | OUTPATIENT
Start: 2018-04-20 | End: 2018-04-25

## 2018-04-20 RX ORDER — FUROSEMIDE 40 MG
40 TABLET ORAL ONCE
Qty: 0 | Refills: 0 | Status: COMPLETED | OUTPATIENT
Start: 2018-04-20 | End: 2018-04-20

## 2018-04-20 RX ORDER — WARFARIN SODIUM 2.5 MG/1
4 TABLET ORAL ONCE
Qty: 0 | Refills: 0 | Status: COMPLETED | OUTPATIENT
Start: 2018-04-20 | End: 2018-04-20

## 2018-04-20 RX ORDER — SENNA PLUS 8.6 MG/1
2 TABLET ORAL AT BEDTIME
Qty: 0 | Refills: 0 | Status: DISCONTINUED | OUTPATIENT
Start: 2018-04-20 | End: 2018-04-24

## 2018-04-20 RX ORDER — BUDESONIDE AND FORMOTEROL FUMARATE DIHYDRATE 160; 4.5 UG/1; UG/1
2 AEROSOL RESPIRATORY (INHALATION)
Qty: 0 | Refills: 0 | Status: DISCONTINUED | OUTPATIENT
Start: 2018-04-20 | End: 2018-04-25

## 2018-04-20 RX ORDER — SIMETHICONE 80 MG/1
80 TABLET, CHEWABLE ORAL DAILY
Qty: 0 | Refills: 0 | Status: DISCONTINUED | OUTPATIENT
Start: 2018-04-20 | End: 2018-04-25

## 2018-04-20 RX ADMIN — WARFARIN SODIUM 4 MILLIGRAM(S): 2.5 TABLET ORAL at 19:04

## 2018-04-20 RX ADMIN — Medication 120 MILLIGRAM(S): at 05:22

## 2018-04-20 RX ADMIN — Medication 400 MILLIGRAM(S): at 05:22

## 2018-04-20 RX ADMIN — Medication 100 MILLIGRAM(S): at 05:22

## 2018-04-20 RX ADMIN — Medication 100 MILLIGRAM(S): at 19:03

## 2018-04-20 RX ADMIN — LAMOTRIGINE 100 MILLIGRAM(S): 25 TABLET, ORALLY DISINTEGRATING ORAL at 05:22

## 2018-04-20 RX ADMIN — BUDESONIDE AND FORMOTEROL FUMARATE DIHYDRATE 2 PUFF(S): 160; 4.5 AEROSOL RESPIRATORY (INHALATION) at 22:38

## 2018-04-20 RX ADMIN — Medication 40 MILLIGRAM(S): at 07:56

## 2018-04-20 RX ADMIN — SIMETHICONE 80 MILLIGRAM(S): 80 TABLET, CHEWABLE ORAL at 12:40

## 2018-04-20 RX ADMIN — LAMOTRIGINE 100 MILLIGRAM(S): 25 TABLET, ORALLY DISINTEGRATING ORAL at 19:03

## 2018-04-20 RX ADMIN — Medication 40 MILLIGRAM(S): at 05:22

## 2018-04-20 RX ADMIN — SENNA PLUS 2 TABLET(S): 8.6 TABLET ORAL at 22:11

## 2018-04-20 RX ADMIN — BUDESONIDE AND FORMOTEROL FUMARATE DIHYDRATE 2 PUFF(S): 160; 4.5 AEROSOL RESPIRATORY (INHALATION) at 19:04

## 2018-04-20 RX ADMIN — Medication 400 MILLIGRAM(S): at 19:03

## 2018-04-20 RX ADMIN — Medication 1 TABLET(S): at 12:40

## 2018-04-20 NOTE — H&P ADULT - PROBLEM SELECTOR PLAN 1
- Patient with flash pulm edema likely in setting of AFIB RVR  - S/p Bipap and rate control with diltiazem  - Will deescalate bipap to NC  - C/w Afib rate control with PO diltiazem titrate as needed, admit to tele, check TTE - Patient with flash pulm edema likely in setting of AFIB RVR 2/2 incorrect Diltiazem use  - S/p Bipap and rate control with diltiazem  - Will deescalate bipap to NC  - C/w Afib rate control with PO diltiazem titrate as needed, admit to tele, check TTE  - Restart PO lasix - Patient with flash pulm edema likely in setting of AFIB with RVR 2/2 incorrect Diltiazem use  - S/p Bipap and rate control with diltiazem  - Will deescalate bipap to NC  - C/w Afib rate control with PO diltiazem, titrate as needed, admit to tele, check TTE  - Restart PO lasix 40mg daily

## 2018-04-20 NOTE — DISCHARGE NOTE ADULT - PATIENT PORTAL LINK FT
You can access the [facility] Patient Portal, offered by [facility], by registering with the following website: [email or web address]

## 2018-04-20 NOTE — DISCHARGE NOTE ADULT - HOSPITAL COURSE
72y M with pmhx of COPD on 2 L at home, Lung Ca s/p R pneumonectomy in 2003 (s/p chemo/radiation) Bovine AVR, Afib on coumadin, seizure d/o (tonic seizures, on carbamazepine and lamotrigine , HTN presents with SOB likely 2/2 flash pulmonary edema 2/2 afib. Pt. was found to be in afib with RVR to 150s on admission, and treated with diltiazem and lasix. He was seen by cardiology (Dr. Olson) who recommended cardizem 60mg TID and resumption of lasix 40mg PO daily. Pt. was transitioned to 180mg PO daily cardizem however became bradycardic to 39 so dose was decreased to 120mg PO daily. Hospital course c/b n/v/dizziness likely secondary to elevated levels of carbamazepine (19), so per pts. outpatient neurologist's recommendations dose was decreased to 400mg in the morning and 200mg in the evening. Oxygen saturation on ambulation on 2L home oxygen levels were adequate at 98%.     Pt. is stable and safe for discharge with follow up with cardiology and neurology. 72y M with pmhx of COPD on 2 L at home, Lung Ca s/p R pneumonectomy in 2003 (s/p chemo/radiation) Bovine AVR, Afib on coumadin, seizure d/o (tonic seizures, on carbamazepine and lamotrigine , HTN presents with SOB likely 2/2 flash pulmonary edema 2/2 afib. Pt. was found to be in afib with RVR to 150s on admission, and treated with diltiazem and lasix. He was seen by cardiology (Dr. Olson) who recommended cardizem 60mg TID and resumption of lasix 40mg PO daily. Pt. was transitioned to 180mg PO daily cardizem however became bradycardic to 39 so dose was decreased to 120mg PO daily. Hospital course c/b n/v/dizziness likely secondary to elevated levels of carbamazepine (19), so per pts. outpatient neurologist's recommendations dose was decreased to 400mg in the morning and 200mg in the evening. Oxygen saturation on ambulation on 2L home oxygen levels were adequate at 98%. His PCP Dr. Simmons was called and updated regarding his hospital stay.     Pt. is stable and safe for discharge with follow up with his PCP, cardiology and neurology.

## 2018-04-20 NOTE — PROGRESS NOTE ADULT - PROBLEM SELECTOR PLAN 1
SOB, with diffuse crackles b/l on lung exam, CXR with L. pleural effusion  - likely 2/2 AFIB with RVR 2/2 incorrect Diltiazem use  - S/p Bipap and 60mg PO diltiazem + 10mg IV diltiazem + 40mg IV lasix   - C/w afib rate control PO diltiazem 60mg TID (long acting 180mg PO upon d/c), titrate as needed, admit to tele  - Restart PO lasix 40mg daily per cards recs

## 2018-04-20 NOTE — DISCHARGE NOTE ADULT - OTHER SIGNIFICANT FINDINGS
72y M with pmhx of COPD on 2 L at home, Lung Ca s/p R pneumonectomy in 2003 (s/p chemo/radiation) Bovine AVR, Afib on coumadin, seizure d/o (tonic seizures, on carbamazepine and lamotrigine , HTN presents with SOB likely 2/2 flash pulmonary edema 2/2 afib. Pt. was found to be in afib with RVR to 150s on admission, and treated with diltiazem and lasix. He was seen by cardiology (Dr. Olson) who recommended cardizem ______ and resumption of lasix 40mg PO daily.     Pt. is stable and safe for discharge with follow up with cardiology.

## 2018-04-20 NOTE — CONSULT NOTE ADULT - ASSESSMENT
afib  HR better now  cont cardizem  cont a/c with warfarin    acute diastolic chf  lasix    COPD  nebs

## 2018-04-20 NOTE — H&P ADULT - PROBLEM SELECTOR PLAN 2
- Patient with afib in RVR on admission, s/p diltiazem 10mg IV and 60mg PO with improvement in HR  - Will c/w 30mg PO q 6hr, admit to tele, check TTE  - C/w coumadin 4mg QD, INR currently therapeutic, goal INR 2-3 - Patient with afib in RVR on admission, s/p diltiazem 10mg IV and 60mg PO with improvement in HR  - Will transition short acting Cardizem to long acting, admit to tele, check TTE  - C/w coumadin 4mg QD, INR currently therapeutic, goal INR 2-3 - Patient with afib in RVR on admission, s/p diltiazem 10mg IV and 60mg PO with improvement in HR  - Will transition short acting Cardizem to long acting (cardizem CD 120mg daily) to help improve compliance with medication, admit to tele, check TTE  - C/w coumadin 4mg QD, INR currently therapeutic, goal INR 2-3

## 2018-04-20 NOTE — PROGRESS NOTE ADULT - PROBLEM SELECTOR PLAN 3
-c/w coumadin as above with daily INR checks Na 132  -likely low osmolality, hypervolemic hyponatremia 2/2 flash pulmonary edema 2/2 afib  -diuresis with 40mg PO lasix daily  -monitor BMP

## 2018-04-20 NOTE — DISCHARGE NOTE ADULT - CARE PLAN
Principal Discharge DX:	Atrial fibrillation  Goal:	follow up  Assessment and plan of treatment:	Please continue to take your medications as prescribed and follow up with Dr. Olson.  Secondary Diagnosis:	Flash pulmonary edema  Goal:	follow up  Assessment and plan of treatment:	Please take your lasix as prescribed and follow up with Dr. Olson. Principal Discharge DX:	Atrial fibrillation  Goal:	follow up  Assessment and plan of treatment:	Please continue to take your medications as prescribed and follow up with Dr. Olson. Please have your INR checked at his office and he will adjust your warfarin dose accordingly.  Secondary Diagnosis:	Flash pulmonary edema  Goal:	follow up  Assessment and plan of treatment:	Please take your lasix as prescribed and follow up with Dr. Olson.  Secondary Diagnosis:	Seizure disorder  Goal:	follow up  Assessment and plan of treatment:	You had elevated levels of carbamazepine (19) which resulted in nausea, vomiting, and dizziness. Your dose was decreased with recommendations from Dr. Amezcua. Please follow up with him within one week of discharge and recheck your carbamazepine levels.

## 2018-04-20 NOTE — ED PEDIATRIC NURSE REASSESSMENT NOTE - NS ED NURSE REASSESS COMMENT FT2
Pt admitted to tele.  Bipap discontinued by provider.  Pt maintaining oxygen saturation.  awaiting bed.  VSS.  pt denies pain.  awaiting further orders.  will continue to monitor.

## 2018-04-20 NOTE — PROGRESS NOTE ADULT - PROBLEM SELECTOR PLAN 8
DVT ppx: HSQ  Diet: DASH/TLC    Nimo Campbell  Internal Medicine, PGY-1  pager [phone] DVT ppx: therapeutic INR with warfarin   Diet: DASH/TLC    Nimo Campbell  Internal Medicine, PGY-1  pager [phone] controlled   -c/w cardizem 60mg PO TID and lasix 40mg PO daily as above DVT ppx: therapeutic INR with warfarin   Diet: DASH/TLC    Nimo Campbell  Internal Medicine, PGY-1  pager [phone]

## 2018-04-20 NOTE — H&P ADULT - PMH
Atrial fibrillation    History of pneumonia  4/2013  Hyperlipidemia    Hypertension    Lung cancer  s/p R. lobectomy  Seizure disorder Atrial fibrillation    History of pneumonia  4/2013  Hyperlipidemia    Hypertension    Lung cancer  s/p R. pneumonectomy  Seizure disorder

## 2018-04-20 NOTE — DISCHARGE NOTE ADULT - CARE PROVIDER_API CALL
Rakesh Olson), Cardiovascular Disease; Internal Medicine  [address] [address]  Phone: [phone]  Fax: [phone]

## 2018-04-20 NOTE — H&P ADULT - NSHPREVIEWOFSYSTEMS_GEN_ALL_CORE
Constitutional: denies fevers, chills, night sweats, weight loss  HEENT: denies visual changes, hearing changes, rhinitis, odynophagia, or dysphagia  Cardiovascular: denies palpitations, chest pain, edema  Respiratory: Endorses SOB. Denies wheezing  Gastrointestinal: denies N/V/D, abdominal pain, hematochezia, melena  : denies dysuria, hematuria  MSK: denies weakness, joint pain  Neuro: Denies Paresthesia/ weakness  Heme: Denies bleeding or hemoptysis   Skin: denies new rashes or masses

## 2018-04-20 NOTE — PROGRESS NOTE ADULT - PROBLEM SELECTOR PLAN 2
rates to 150s on admission, s/p diltiazem 10mg IV and 60mg PO with improvement in HR  - c/w diltiazem 60mg PO TID daily, transition to long acting at discharge per cards, titrate as tolerated   - c/w coumadin 4mg QD, INR currently therapeutic, goal INR 2-3

## 2018-04-20 NOTE — CONSULT NOTE ADULT - SUBJECTIVE AND OBJECTIVE BOX
CHIEF COMPLAINT:Patient is a 72y old  Male who presents with a chief complaint of Respiratory Distress (20 Apr 2018 03:15)      HISTORY OF PRESENT ILLNESS:  This is a pleasant gentleman with history as below presented with sob and afib with rvr  pt recently discharged home could not use his cpap and was not discharged with home dose of lasix  in ed given lasix, BiPAP and cardizem and HR is better now  no cp   pos sob     PAST MEDICAL & SURGICAL HISTORY:  Atrial fibrillation  History of pneumonia: 4/2013  Lung cancer: s/p R. pneumonectomy  Hypertension  Hyperlipidemia  Seizure disorder  H/O inguinal hernia repair: 1999  H/O aortic valve replacement: 2013  S/P cataract surgery: bilaterally  Aortic valve replaced: 5/2013   bovine AoValve  S/P pneumonectomy: right sided, ~ 2003          MEDICATIONS:  diltiazem    Tablet 60 milliGRAM(s) Oral three times a day  furosemide    Tablet 40 milliGRAM(s) Oral daily      ALBUTerol    90 MICROgram(s) HFA Inhaler 2 Puff(s) Inhalation every 6 hours PRN  buDESOnide 160 MICROgram(s)/formoterol 4.5 MICROgram(s) Inhaler 2 Puff(s) Inhalation two times a day    carBAMazepine 400 milliGRAM(s) Oral two times a day  lamoTRIgine 100 milliGRAM(s) Oral two times a day    docusate sodium 100 milliGRAM(s) Oral two times a day  senna 2 Tablet(s) Oral at bedtime  simethicone 80 milliGRAM(s) Chew daily          FAMILY HISTORY:  Family history of cancer (Aunt): Maternal aunt  Family history of hypertension: Mother  Family history of stroke: Mother      Non-contributory    SOCIAL HISTORY:    No tobacco, drugs or etoh    Allergies    No Known Allergies    Intolerances    	    REVIEW OF SYSTEMS:  as above  The rest of the 14 points ROS reviewed and except above they are unremarkable.        PHYSICAL EXAM:  T(C): 36.9 (04-20-18 @ 05:25), Max: 36.9 (04-20-18 @ 04:05)  HR: 81 (04-20-18 @ 07:56) (81 - 155)  BP: 110/62 (04-20-18 @ 07:56) (106/56 - 149/76)  RR: 23 (04-20-18 @ 07:56) (18 - 35)  SpO2: 100% (04-20-18 @ 07:56) (100% - 100%)  Wt(kg): --  I&O's Summary      JVP: Normal  Neck: supple  Lung: crackles   CV: S1 S2 , Murmur:  Abd: soft  Ext: No edema  neuro: Awake / alert  Psych: flat affect  Skin: normal     LABS/DATA:    TELEMETRY: 	    ECG:  	   	  CARDIAC MARKERS:  Troponin T, Serum: < 0.06 ng/mL (04-19 @ 22:01)      CKMB: 1.18 ng/mL (04-19 @ 22:01)                              8.9    6.25  )-----------( 147      ( 20 Apr 2018 05:30 )             27.4     04-20    132<L>  |  94<L>  |  10  ----------------------------<  100<H>  4.2   |  30  |  0.99    Ca    9.0      20 Apr 2018 05:30  Phos  2.6     04-20  Mg     1.9     04-20    TPro  6.2  /  Alb  3.4  /  TBili  0.3  /  DBili  x   /  AST  16  /  ALT  19  /  AlkPhos  81  04-19    proBNP:   Lipid Profile:   HgA1c:   TSH: Thyroid Stimulating Hormone, Serum: 4.16 uIU/mL (04-19 @ 22:01)

## 2018-04-20 NOTE — PROGRESS NOTE ADULT - PROBLEM SELECTOR PLAN 7
controlled   -c/w cardizem 60mg PO TID and lasix 40mg PO daily as above - C/w  home meds (carbamazepine and lamotrigine).

## 2018-04-20 NOTE — H&P ADULT - ATTENDING COMMENTS
Patient seen and examined on 4/20/18, case discussed with Dr. Altman, agree with assessment and plan as above.

## 2018-04-20 NOTE — PROGRESS NOTE ADULT - ASSESSMENT
72y M with pmhx of COPD on 2 L at home, Lung Ca s/p R pneumonectomy in 2003 (s/p chemo/radiation) Bovine AVR, Afib on coumadin, seizure d/o (tonic seizures, on carbamazepine and lamotrigine , HTN presents with SOB since yesterday morning.

## 2018-04-20 NOTE — H&P ADULT - PROBLEM SELECTOR PLAN 3
- Patient with advanced COPD, on Home o2  - C/w NC 2L to maintain Spo2>90  - Will start Symbicort for now as patient's breo and incruse not available. Attempt to have patients family bring home meds.   - BIPAP qhs and PRN ( clarify settings from family- patient denies use)

## 2018-04-20 NOTE — PROGRESS NOTE ADULT - PROBLEM SELECTOR PLAN 6
- C/w  home meds (carbamazepine and lamotrigine). - Patient with advanced COPD, on Home o2  - C/w NC 2L to maintain Spo2>90  - Will start Symbicort for now as patient's breo and incruse not available. Attempt to have patients family bring home meds.   - BIPAP qhs and PRN ( clarify settings from family)

## 2018-04-20 NOTE — H&P ADULT - ASSESSMENT
72y M with pmhx of COPD on 2 L at home, Lung Ca s/p R pneumonectomy in 2003 (s/p chemo/radiation) Bovine AVR, Afib on coumadin, seizure d/o (tonic seizures, on carbamazepine and lamotrigine , HTN presents with SOB since yesterday morning 72y M with pmhx of COPD on 2 L at home, Lung Ca s/p R pneumonectomy in 2003 (s/p chemo/radiation) Bovine AVR, Afib on coumadin, seizure d/o (tonic seizures, on carbamazepine and lamotrigine , HTN presents with SOB since yesterday morning.

## 2018-04-20 NOTE — ED ADULT NURSE REASSESSMENT NOTE - NS ED NURSE REASSESS COMMENT FT1
Patient received AA&Ox3, Tajik speaking with wife at bedside translating. VSS on 4L NC. Patient denies chest pain, N/V, SOB, fever, chills, dyspnea at this time. NAD noted at this time - will continue to monitor.

## 2018-04-20 NOTE — PATIENT PROFILE ADULT. - LANGUAGE ASSISTANCE NEEDED
wife wants to interpret due to different dialect/No-Patient/Caregiver offered and refused free interpretation services.

## 2018-04-20 NOTE — H&P ADULT - HISTORY OF PRESENT ILLNESS
72y M with pmhx of COPD on 2 L at home, Lung Ca s/p R pneumonectomy in 2003 (s/p chemo/radiation) Bovine AVR, Afib on coumadin, seizure d/o (tonic seizures, on carbamazepine and lamotrigine , HTN presents with SOB since yesterday morning. Of note patient was recently admitted to [facility] MICU on 3/18 for influenza + LLL PNA (required bilevel, treated with levofloxacin) and then presented April 10th with a 2 week hx of diarrhea likely 2/2 recent abx. Hospitalization on April 10th was c/b by afib in rvr 2/2 hypovolemia from diarrhea with subsequent ileus possibly attributed to loperamide use. Patient is pre-dominantly Occitan only speaking,  services offered and declined, patients wife at bedside provides translation. Since discharge from the hospital on April 17th patient has noted elevated HR to 130's, worse with ambulation. On 4/19 he was getting ready to see Dr. Olson when he developed sudden worsening SOB and HR in the 150's. Patient denies fever, chills, URI sx's, vision changes, cough, chest pain, abdominal pain, n/v/d/c, dysuria, hematuria, rash, new joint aches, sick contacts. Denies missing any medications or changes in regimen since hospital discharge.      In the ED patient was afebrile, HR: , BP: 117//76, RR: 20-35 100% on 60% Bipap. He received diltiazem 10mg IV, diltiazem 60mg PO, Sublingual nitro .4mg and BIPAP with improvement in his oxygenation & HR. 72y M with pmhx of COPD on 2 L at home, Lung Ca s/p R pneumonectomy in 2003 (s/p chemo/radiation) Bovine AVR, Afib on coumadin, seizure d/o (tonic seizures, on carbamazepine and lamotrigine), HTN presents with SOB since yesterday morning. Of note patient was recently admitted to [facility] MICU on 3/18 for influenza + LLL PNA (required bilevel, treated with levofloxacin) and then presented April 10th with a 2 week hx of diarrhea likely 2/2 recent abx. Hospitalization on April 10th was c/b by afib in rvr 2/2 hypovolemia from diarrhea with subsequent ileus possibly attributed to loperamide use. Patient is pre-dominantly Faroese only speaking,  services offered and declined, patients wife at bedside provides translation. Since discharge from the hospital on April 17th patient has noted elevated HR to 130's, worse with ambulation. On 4/19 he was getting ready to see Dr. Olson when he developed sudden worsening SOB and HR in the 150's. Patient denies fever, chills, URI sx's, vision changes, cough, chest pain, abdominal pain, n/v/d/c, dysuria, hematuria, rash, new joint aches, sick contacts. Denies missing any medications or changes in regimen since hospital discharge but his wife did state that she administers the patient's cardizem during his waking hours only (cardizem is prescribed for 30mg q6h). She also said that the patient was taken off his lasix on the last admission (2/2 diarrhea/vomiting/CELINE) and she has not administered the medication to the patient since his discharge.    In the ED patient was afebrile, HR: , BP: 117//76, RR: 20-35 100% on 60% Bipap. He received diltiazem 10mg IV, diltiazem 60mg PO, Sublingual nitro .4mg and BIPAP with improvement in his oxygenation & HR. His lab work did not show any significant abnormalities and his CXR showed a L pleural effusion. Currently the patient feels better after being on the BiPAP.

## 2018-04-20 NOTE — DISCHARGE NOTE ADULT - MEDICATION SUMMARY - MEDICATIONS TO CHANGE
I will SWITCH the dose or number of times a day I take the medications listed below when I get home from the hospital:    TEGretol 200 mg oral tablet  -- 2 tab(s) by mouth 2 times a day    Coumadin 4 mg oral tablet  -- 1 tab(s) by mouth once a day (at bedtime)    dilTIAZem 30 mg oral tablet  -- 1 tab(s) by mouth 4 times a day

## 2018-04-20 NOTE — H&P ADULT - NSHPLABSRESULTS_GEN_ALL_CORE
04-19    137  |  99  |  10  ----------------------------<  101<H>  4.5   |  27  |  0.94  04-19    134<L>  |  96<L>  |  11  ----------------------------<  100<H>  5.9<H>   |  25  |  0.95    Ca    8.5      19 Apr 2018 23:38  Ca    9.6      19 Apr 2018 22:01    TPro  6.2  /  Alb  3.4  /  TBili  0.3  /  DBili  x   /  AST  16  /  ALT  19  /  AlkPhos  81  04-19  TPro  7.7  /  Alb  3.9  /  TBili  0.4  /  DBili  x   /  AST  36  /  ALT  22  /  AlkPhos  92  04-19                          10.2   7.05  )-----------( 171      ( 19 Apr 2018 22:01 )             31.1                         8.3    5.87  )-----------( 145      ( 17 Apr 2018 06:30 )             25.1     PT/INR - ( 19 Apr 2018 22:01 )   PT: 25.5 SEC;   INR: 2.26          PTT - ( 19 Apr 2018 22:01 )  PTT:43.0 SEC                  CARDIAC MARKERS ( 19 Apr 2018 22:01 )  x     / < 0.06 ng/mL / 59 u/L / 1.18 ng/mL / x          < from: CT Chest No Cont (03.04.18 @ 11:22) >    Status post right pneumonectomy. Left lung emphysema.    Irregular 2.6 x 1.9 cmopacity in the left lower lobe may be infectious   or neoplastic in etiology. Follow-up CT scan is recommended in one month   to differentiate between the 2 possibilities.    Multiple impacted bronchi within the left lower lobe may represent   impaction of the distal airways with either mucus and/or infection.      < end of copied text >      Labs personally reviewed: CBC with normocytic anemia, hgb of 10.2. INR of 2.26. CMP WNL, Cardiac enzymes negative.    CXR- S/p R pneumonectomy, small L pleural effusion     EKG- afib, normal axis, rate: 130's, no ST changes 04-19    137  |  99  |  10  ----------------------------<  101<H>  4.5   |  27  |  0.94  04-19    134<L>  |  96<L>  |  11  ----------------------------<  100<H>  5.9<H>   |  25  |  0.95    Ca    8.5      19 Apr 2018 23:38  Ca    9.6      19 Apr 2018 22:01    TPro  6.2  /  Alb  3.4  /  TBili  0.3  /  DBili  x   /  AST  16  /  ALT  19  /  AlkPhos  81  04-19  TPro  7.7  /  Alb  3.9  /  TBili  0.4  /  DBili  x   /  AST  36  /  ALT  22  /  AlkPhos  92  04-19                          10.2   7.05  )-----------( 171      ( 19 Apr 2018 22:01 )             31.1                         8.3    5.87  )-----------( 145      ( 17 Apr 2018 06:30 )             25.1     PT/INR - ( 19 Apr 2018 22:01 )   PT: 25.5 SEC;   INR: 2.26     PTT - ( 19 Apr 2018 22:01 )  PTT:43.0 SEC                  CARDIAC MARKERS ( 19 Apr 2018 22:01 )  x     / < 0.06 ng/mL / 59 u/L / 1.18 ng/mL / x        Lactate, VBG - 1.7    RVP negative      < from: CT Chest No Cont (03.04.18 @ 11:22) >    Status post right pneumonectomy. Left lung emphysema.    Irregular 2.6 x 1.9 cm opacity in the left lower lobe may be infectious   or neoplastic in etiology. Follow-up CT scan is recommended in one month   to differentiate between the 2 possibilities.    Multiple impacted bronchi within the left lower lobe may represent   impaction of the distal airways with either mucus and/or infection.      < end of copied text >      Labs personally reviewed: CBC with normocytic anemia, hgb of 10.2. INR of 2.26. CMP WNL, Cardiac enzymes negative.    CXR- S/p R pneumonectomy, small L pleural effusion     EKG- afib, normal axis, rate: 130's, no ST changes

## 2018-04-20 NOTE — DISCHARGE NOTE ADULT - ADDITIONAL INSTRUCTIONS
Please follow up with Dr. Olson on Monday 4/30 at 3PM for INR check.   Please follow up with Dr. Amezcua to recheck your carbamazepine level.

## 2018-04-20 NOTE — DISCHARGE NOTE ADULT - PLAN OF CARE
follow up Please continue to take your medications as prescribed and follow up with Dr. Olson. Please take your lasix as prescribed and follow up with Dr. Olson. Please continue to take your medications as prescribed and follow up with Dr. Olson. Please have your INR checked at his office and he will adjust your warfarin dose accordingly. You had elevated levels of carbamazepine (19) which resulted in nausea, vomiting, and dizziness. Your dose was decreased with recommendations from Dr. Amezcua. Please follow up with him within one week of discharge and recheck your carbamazepine levels.

## 2018-04-20 NOTE — DISCHARGE NOTE ADULT - MEDICATION SUMMARY - MEDICATIONS TO TAKE
I will START or STAY ON the medications listed below when I get home from the hospital:    physical therapy  -- 2-3 times a week for 6 weeks for post hospitalization deconditioning   -- Indication: For Deconditioning     dilTIAZem 120 mg/24 hours oral capsule, extended release  -- 1 cap(s) by mouth once a day in the morning  -- Indication: For Atrial fibrillation    warfarin 3 mg oral tablet  -- 1 tab(s) by mouth once a day (at bedtime)  -- Indication: For Atrial fibrillation    TEGretol 200 mg oral tablet  -- 2 tabs in the morning (total 400mg)  1 tab in the evening (total 200mg)  Todal Daily dose of 3 tabs (600mg)  -- Indication: For Seizure disorder    LaMICtal 100 mg oral tablet  -- 1 tab(s) by mouth 2 times a day  -- Indication: For Seizure disorder    Breo Ellipta 100 mcg-25 mcg/inh inhalation powder  -- 1 puff(s) inhaled once a day  -- Indication: For COPD (chronic obstructive pulmonary disease)    Incruse Ellipta 62.5 mcg/inh inhalation powder  -- 1 puff(s) inhaled once a day  -- Indication: For COPD (chronic obstructive pulmonary disease)    ProAir HFA 90 mcg/inh inhalation aerosol  -- 2 puff(s) inhaled 4 times a day, As Needed  -- Indication: For COPD (chronic obstructive pulmonary disease)    furosemide 40 mg oral tablet  -- 1 tab(s) by mouth once a day  -- Indication: For Atrial fibrillation with pulmonary edema    lactobacillus acidophilus oral capsule  -- 1 tab(s) by mouth once a day   -- Indication: For Probiotic

## 2018-04-20 NOTE — PROGRESS NOTE ADULT - PROBLEM SELECTOR PLAN 5
- Patient with advanced COPD, on Home o2  - C/w NC 2L to maintain Spo2>90  - Will start Symbicort for now as patient's breo and incruse not available. Attempt to have patients family bring home meds.   - BIPAP qhs and PRN ( clarify settings from family) -Status post right pneumonectomy.   -C/w monitoring

## 2018-04-20 NOTE — PROGRESS NOTE ADULT - SUBJECTIVE AND OBJECTIVE BOX
Patient is a 72y old  Male who presents with a chief complaint of Respiratory Distress (20 Apr 2018 03:15)    SUBJECTIVE / OVERNIGHT EVENTS: Pt. admitted with afib with RVR in the 150s, currently feeling much better since admission. SOB/palpitations have improved.     MEDICATIONS  (STANDING):  buDESOnide 160 MICROgram(s)/formoterol 4.5 MICROgram(s) Inhaler 2 Puff(s) Inhalation two times a day  carBAMazepine 400 milliGRAM(s) Oral two times a day  diltiazem    Tablet 60 milliGRAM(s) Oral three times a day  docusate sodium 100 milliGRAM(s) Oral two times a day  furosemide    Tablet 40 milliGRAM(s) Oral daily  lactobacillus acidophilus 1 Tablet(s) Oral daily  lamoTRIgine 100 milliGRAM(s) Oral two times a day  senna 2 Tablet(s) Oral at bedtime  simethicone 80 milliGRAM(s) Chew daily    MEDICATIONS  (PRN):  ALBUTerol    90 MICROgram(s) HFA Inhaler 2 Puff(s) Inhalation every 6 hours PRN Shortness of Breath and/or Wheezing    Vital Signs Last 24 Hrs  T(F): 98.2 (20 Apr 2018 08:31), Max: 98.4 (20 Apr 2018 04:05)  HR: 84 (20 Apr 2018 08:31) (81 - 155)  BP: 110/73 (20 Apr 2018 08:31) (106/56 - 149/76)  BP(mean): 94 (19 Apr 2018 22:15) (94 - 94)  RR: 22 (20 Apr 2018 08:31) (18 - 35)  SpO2: 100% (20 Apr 2018 07:56) (100% - 100%)  CAPILLARY BLOOD GLUCOS    I&O's Summary    PHYSICAL EXAM:  GENERAL: NAD, well-developed  HEAD:  Atraumatic, Normocephalic  EYES: EOMI, PERRLA, conjunctiva and sclera clear  NECK: Supple, No JVD  CHEST/LUNG: diffuse b/l crackles  HEART: irregularly irregular; No murmurs, rubs, or gallops  ABDOMEN: Soft, Nontender, Nondistended; Bowel sounds present  EXTREMITIES:  2+ Peripheral Pulses, No clubbing, cyanosis, or edema  PSYCH: AAOx3  NEUROLOGY: non-focal  SKIN: No rashes or lesions    LABS:                        8.9    6.25  )-----------( 147      ( 20 Apr 2018 05:30 )             27.4     04-20    132<L>  |  94<L>  |  10  ----------------------------<  100<H>  4.2   |  30  |  0.99    Ca    9.0      20 Apr 2018 05:30  Phos  2.6     04-20  Mg     1.9     04-20    TPro  6.2  /  Alb  3.4  /  TBili  0.3  /  DBili  x   /  AST  16  /  ALT  19  /  AlkPhos  81  04-19    PT/INR - ( 20 Apr 2018 05:30 )   PT: 31.5 SEC;   INR: 2.68          PTT - ( 19 Apr 2018 22:01 )  PTT:43.0 SEC  CARDIAC MARKERS ( 19 Apr 2018 22:01 )  x     / < 0.06 ng/mL / 59 u/L / 1.18 ng/mL / x        RADIOLOGY & ADDITIONAL TESTS:    Imaging Personally Reviewed: CXR with left pleural effusion.     Consultant(s) Notes Reviewed:      Care Discussed with Consultants/Other Providers: Cards Dr. Olson recommending long acting diltiazem 180mg PO (60mg TID while inpatient) and resuming lasix

## 2018-04-20 NOTE — H&P ADULT - NSHPPHYSICALEXAM_GEN_ALL_CORE
PHYSICAL EXAM:  Vital Signs Last 24 Hrs  T(C): 36.7 (04-19-18 @ 22:08)  T(F): 98.1 (04-19-18 @ 22:08), Max: 98.1 (04-19-18 @ 22:08)  HR: 85 (04-20-18 @ 00:23) (81 - 155)  BP: 130/56 (04-20-18 @ 00:23)  BP(mean): 94 (04-19-18 @ 22:15) (94 - 94)  RR: 22 (04-20-18 @ 00:23) (20 - 35)  SpO2: 100% (04-20-18 @ 00:23) (100% - 100%)  Wt(kg): --    Constitutional: NAD, awake and alert  EYES: EOMI  ENT:  Normal Hearing, no tonsillar exudates   Neck: Soft and supple, No JVD  Respiratory: Crackles in the bases, L>R, diminished BS on R  Cardiovascular: S1 and S2, irregular rate and rhythm, no Murmurs, gallops or rubs  Gastrointestinal: Bowel Sounds present, soft, nontender, nondistended, no guarding, no rebound  Extremities: No cyanosis or clubbing; warm to touch  Vascular: 2+ peripheral pulses lower ex  Neurological: A/O x 3, no focal deficits  Musculoskeletal: 5/5 strength b/l upper and lower extremities  Skin: No rashes, warm & dry  Psych: no depression or anhedonia  HEME: no bruises, no nose bleeds PHYSICAL EXAM:  Vital Signs Last 24 Hrs  T(C): 36.7 (04-19-18 @ 22:08)  T(F): 98.1 (04-19-18 @ 22:08), Max: 98.1 (04-19-18 @ 22:08)  HR: 85 (04-20-18 @ 00:23) (81 - 155)  BP: 130/56 (04-20-18 @ 00:23)  BP(mean): 94 (04-19-18 @ 22:15) (94 - 94)  RR: 22 (04-20-18 @ 00:23) (20 - 35)  SpO2: 100% (04-20-18 @ 00:23) (100% - 100%)  Wt(kg): --    Constitutional: NAD, awake and alert  EYES: EOMI  ENT:  Normal Hearing, no tonsillar exudates   Neck: Soft and supple, No JVD  Respiratory: Crackles in the base of L lung, breath sounds heard over R lower lung likely transmitted breath sounds from L lung  Cardiovascular: S1 and S2, irregular rate and rhythm, no Murmurs, gallops or rubs  Gastrointestinal: Bowel Sounds present, soft, nontender, nondistended, no guarding, no rebound  Extremities: No cyanosis or clubbing; warm to touch  Vascular: 2+ peripheral pulses lower ex  Neurological: A/O x 3, no focal deficits  Musculoskeletal: 5/5 strength b/l upper and lower extremities  Skin: No rashes, warm & dry  Psych: no depression or anhedonia  HEME: no bruises, no nose bleeds

## 2018-04-21 LAB
BUN SERPL-MCNC: 13 MG/DL — SIGNIFICANT CHANGE UP (ref 7–23)
CALCIUM SERPL-MCNC: 8.9 MG/DL — SIGNIFICANT CHANGE UP (ref 8.4–10.5)
CHLORIDE SERPL-SCNC: 92 MMOL/L — LOW (ref 98–107)
CK MB BLD-MCNC: 1 NG/ML — SIGNIFICANT CHANGE UP (ref 1–6.6)
CK SERPL-CCNC: 24 U/L — LOW (ref 30–200)
CO2 SERPL-SCNC: 31 MMOL/L — SIGNIFICANT CHANGE UP (ref 22–31)
CREAT SERPL-MCNC: 1.13 MG/DL — SIGNIFICANT CHANGE UP (ref 0.5–1.3)
GLUCOSE SERPL-MCNC: 100 MG/DL — HIGH (ref 70–99)
INR BLD: 3.25 — HIGH (ref 0.88–1.17)
MAGNESIUM SERPL-MCNC: 1.8 MG/DL — SIGNIFICANT CHANGE UP (ref 1.6–2.6)
PHOSPHATE SERPL-MCNC: 3.1 MG/DL — SIGNIFICANT CHANGE UP (ref 2.5–4.5)
POTASSIUM SERPL-MCNC: 4 MMOL/L — SIGNIFICANT CHANGE UP (ref 3.5–5.3)
POTASSIUM SERPL-SCNC: 4 MMOL/L — SIGNIFICANT CHANGE UP (ref 3.5–5.3)
PROTHROM AB SERPL-ACNC: 36.9 SEC — HIGH (ref 9.8–13.1)
SODIUM SERPL-SCNC: 133 MMOL/L — LOW (ref 135–145)
TROPONIN T SERPL-MCNC: < 0.06 NG/ML — SIGNIFICANT CHANGE UP (ref 0–0.06)

## 2018-04-21 PROCEDURE — 99233 SBSQ HOSP IP/OBS HIGH 50: CPT | Mod: GC

## 2018-04-21 PROCEDURE — 93010 ELECTROCARDIOGRAM REPORT: CPT

## 2018-04-21 RX ORDER — WARFARIN SODIUM 2.5 MG/1
2 TABLET ORAL ONCE
Qty: 0 | Refills: 0 | Status: COMPLETED | OUTPATIENT
Start: 2018-04-21 | End: 2018-04-21

## 2018-04-21 RX ADMIN — Medication 400 MILLIGRAM(S): at 05:53

## 2018-04-21 RX ADMIN — Medication 1 TABLET(S): at 11:36

## 2018-04-21 RX ADMIN — SENNA PLUS 2 TABLET(S): 8.6 TABLET ORAL at 21:17

## 2018-04-21 RX ADMIN — Medication 40 MILLIGRAM(S): at 11:36

## 2018-04-21 RX ADMIN — Medication 400 MILLIGRAM(S): at 17:24

## 2018-04-21 RX ADMIN — LAMOTRIGINE 100 MILLIGRAM(S): 25 TABLET, ORALLY DISINTEGRATING ORAL at 05:53

## 2018-04-21 RX ADMIN — BUDESONIDE AND FORMOTEROL FUMARATE DIHYDRATE 2 PUFF(S): 160; 4.5 AEROSOL RESPIRATORY (INHALATION) at 21:18

## 2018-04-21 RX ADMIN — Medication 100 MILLIGRAM(S): at 05:53

## 2018-04-21 RX ADMIN — WARFARIN SODIUM 2 MILLIGRAM(S): 2.5 TABLET ORAL at 17:24

## 2018-04-21 RX ADMIN — LAMOTRIGINE 100 MILLIGRAM(S): 25 TABLET, ORALLY DISINTEGRATING ORAL at 17:24

## 2018-04-21 RX ADMIN — BUDESONIDE AND FORMOTEROL FUMARATE DIHYDRATE 2 PUFF(S): 160; 4.5 AEROSOL RESPIRATORY (INHALATION) at 09:21

## 2018-04-21 RX ADMIN — SIMETHICONE 80 MILLIGRAM(S): 80 TABLET, CHEWABLE ORAL at 11:36

## 2018-04-21 NOTE — PROGRESS NOTE ADULT - PROBLEM SELECTOR PLAN 5
Patient with advanced COPD, on Home o2  - C/w NC 2L to maintain Spo2>90  - Will start Symbicort for now as patient's breo and incruse not available. Attempt to have patients family bring home meds.   - BIPAP qhs and PRN ( clarify settings from family)

## 2018-04-21 NOTE — PROGRESS NOTE ADULT - SUBJECTIVE AND OBJECTIVE BOX
Subjective: Patient seen and examined. No new events except as noted.     SUBJECTIVE/ROS:    feels better     MEDICATIONS:  MEDICATIONS  (STANDING):  buDESOnide 160 MICROgram(s)/formoterol 4.5 MICROgram(s) Inhaler 2 Puff(s) Inhalation two times a day  carBAMazepine 400 milliGRAM(s) Oral two times a day  diltiazem    Tablet 60 milliGRAM(s) Oral three times a day  docusate sodium 100 milliGRAM(s) Oral two times a day  furosemide    Tablet 40 milliGRAM(s) Oral daily  lactobacillus acidophilus 1 Tablet(s) Oral daily  lamoTRIgine 100 milliGRAM(s) Oral two times a day  senna 2 Tablet(s) Oral at bedtime  simethicone 80 milliGRAM(s) Chew daily      PHYSICAL EXAM:  T(C): 36.4 (04-21-18 @ 05:12), Max: 36.9 (04-20-18 @ 12:38)  HR: 85 (04-21-18 @ 07:37) (81 - 101)  BP: 98/58 (04-21-18 @ 05:12) (97/54 - 115/72)  RR: 17 (04-21-18 @ 05:12) (17 - 25)  SpO2: 100% (04-21-18 @ 07:37) (96% - 100%)  Wt(kg): --  I&O's Summary    20 Apr 2018 07:01  -  21 Apr 2018 07:00  --------------------------------------------------------  IN: 0 mL / OUT: 400 mL / NET: -400 mL          JVP: Normal  Neck: supple  Lung: clear   CV: S1 S2 , Murmur:  Abd: soft  Ext: No edema  neuro: Awake / alert  Psych: flat affect  Skin: normal       LABS/DATA:    CARDIAC MARKERS:  CARDIAC MARKERS ( 19 Apr 2018 22:01 )  x     / < 0.06 ng/mL / 59 u/L / 1.18 ng/mL / x                                    8.9    6.25  )-----------( 147      ( 20 Apr 2018 05:30 )             27.4     04-21    133<L>  |  92<L>  |  13  ----------------------------<  100<H>  4.0   |  31  |  1.13    Ca    8.9      21 Apr 2018 06:07  Phos  3.1     04-21  Mg     1.8     04-21    TPro  6.2  /  Alb  3.4  /  TBili  0.3  /  DBili  x   /  AST  16  /  ALT  19  /  AlkPhos  81  04-19    proBNP:   Lipid Profile:   HgA1c:   TSH:     TELE:  EKG:

## 2018-04-21 NOTE — PROGRESS NOTE ADULT - SUBJECTIVE AND OBJECTIVE BOX
Patient is a 72y old  Male who presents with a chief complaint of Respiratory Distress (20 Apr 2018 16:00)    SUBJECTIVE / OVERNIGHT EVENTS: Pt in afib overnight on tele. HR mostly 80s-90s, with brief tachycardia to 130s for seconds, no alarms. Pt used BIPAP overnight and is now comfortable on home 2L. Has a chronic dry cough and occasional palpitations, but denies dyspnea and chest pain. No fever or chills.     MEDICATIONS  (STANDING):  buDESOnide 160 MICROgram(s)/formoterol 4.5 MICROgram(s) Inhaler 2 Puff(s) Inhalation two times a day  carBAMazepine 400 milliGRAM(s) Oral two times a day  diltiazem    Tablet 60 milliGRAM(s) Oral three times a day  docusate sodium 100 milliGRAM(s) Oral two times a day  furosemide    Tablet 40 milliGRAM(s) Oral daily  lactobacillus acidophilus 1 Tablet(s) Oral daily  lamoTRIgine 100 milliGRAM(s) Oral two times a day  senna 2 Tablet(s) Oral at bedtime  simethicone 80 milliGRAM(s) Chew daily    MEDICATIONS  (PRN):  ALBUTerol    90 MICROgram(s) HFA Inhaler 2 Puff(s) Inhalation every 6 hours PRN Shortness of Breath and/or Wheezing      Vital Signs Last 24 Hrs  T(C): 36.4 (21 Apr 2018 11:00), Max: 36.9 (20 Apr 2018 12:38)  T(F): 97.6 (21 Apr 2018 11:00), Max: 98.5 (20 Apr 2018 12:38)  HR: 90 (21 Apr 2018 11:11) (81 - 101)  BP: 105/56 (21 Apr 2018 11:00) (97/54 - 115/72)  BP(mean): --  RR: 18 (21 Apr 2018 11:00) (17 - 25)  SpO2: 97% (21 Apr 2018 11:11) (96% - 100%)  CAPILLARY BLOOD GLUCOSE        I&O's Summary    20 Apr 2018 07:01  -  21 Apr 2018 07:00  --------------------------------------------------------  IN: 0 mL / OUT: 400 mL / NET: -400 mL        PHYSICAL EXAM:  GENERAL: NAD  HEAD:  Atraumatic, Normocephalic  EYES: EOMI, PERRLA, conjunctiva and sclera clear  NECK: Supple, No JVD  CHEST/LUNG: CTA b/l, no wheezes, rales, or rhonchi  HEART: irregularly irregular; No murmurs, rubs, or gallops  ABDOMEN: Soft, Nontender, Nondistended; Bowel sounds present  EXTREMITIES:  2+ Peripheral Pulses, No clubbing, cyanosis, or edema  PSYCH: AAOx3, normal mood and affect  NEUROLOGY: non-focal  SKIN: No rashes or lesions      LABS:                        8.9    6.25  )-----------( 147      ( 20 Apr 2018 05:30 )             27.4     04-21    133<L>  |  92<L>  |  13  ----------------------------<  100<H>  4.0   |  31  |  1.13    Ca    8.9      21 Apr 2018 06:07  Phos  3.1     04-21  Mg     1.8     04-21    TPro  6.2  /  Alb  3.4  /  TBili  0.3  /  DBili  x   /  AST  16  /  ALT  19  /  AlkPhos  81  04-19    PT/INR - ( 21 Apr 2018 06:07 )   PT: 36.9 SEC;   INR: 3.25          PTT - ( 19 Apr 2018 22:01 )  PTT:43.0 SEC  CARDIAC MARKERS ( 19 Apr 2018 22:01 )  x     / < 0.06 ng/mL / 59 u/L / 1.18 ng/mL / x              RADIOLOGY & ADDITIONAL TESTS:    Consultant(s) Notes Reviewed:  Cardiology

## 2018-04-21 NOTE — PROGRESS NOTE ADULT - PROBLEM SELECTOR PLAN 2
HR controlled, currently 80s-80s.  - c/w diltiazem 60mg PO TID daily, transition to long acting at discharge per cardiology, titrate as tolerated   - takes coumadin 4mg QD, INR currently supratherapeutic to 3.26 so will hold coumadin HR controlled, currently 80s-80s.  - c/w diltiazem 60mg PO TID daily, transition to long acting at discharge per cardiology, titrate as tolerated   - takes coumadin 4mg QD, INR currently supratherapeutic to 3.26 so will dose coumadin at decreased dose, 2 mg today

## 2018-04-21 NOTE — PHYSICAL THERAPY INITIAL EVALUATION ADULT - GAIT DISTANCE, PT EVAL
with 4 lit / min oxygen via nasal cannula, HR at 136 from 90 and oxygen saturation from 90% to 93%. pt was immediately brought to bed/10 feet

## 2018-04-21 NOTE — PROGRESS NOTE ADULT - PROBLEM SELECTOR PLAN 8
DVT ppx: on warfarin, INR supratherapeutic  Diet: DASH/TLC    Reid Sutton MD  pager: [phone] DVT ppx: on warfarin  Diet: DASH/TLC    Reid Sutton MD  pager: [phone]

## 2018-04-21 NOTE — PROGRESS NOTE ADULT - PROBLEM SELECTOR PLAN 1
Now resolved, clear lungs on exam. Likely 2/2 AFIB with RVR due to incorrect diltiazem use.   - C/w afib rate control PO diltiazem 60mg TID (long acting 180mg PO upon d/c), titrate as needed, continue tele monitoring  - C/w lasix 40mg po daily per cardiology recs

## 2018-04-21 NOTE — PHYSICAL THERAPY INITIAL EVALUATION ADULT - PLANNED THERAPY INTERVENTIONS, PT EVAL
balance training/bed mobility training/stairs training/transfer training/gait training/strengthening

## 2018-04-21 NOTE — CHART NOTE - NSCHARTNOTEFT_GEN_A_CORE
Called by RN to come see pt at request of pt's wife. Pt was wife was concerned that pt appeared drousy, closed his eyes, and was speaking slowly in [place] and she was concerned given his history that he was having a seizure. He also had lightheadedness but no chest pain, dyspnea, or diachoresis. Prior seizures involved hand clenching, pt had no such activity other seizure-like activity. He is AAOx3 and his speech is now at baseline. No numbness or weakness. Lightheadedness resolved. HR 78, and /65 on my exam. Neurologic exam intact, AAOx3, CN III-XII intact, stength 5/5 in all extremities, no pronator drift, reflexes 2+ b/l. Heart irregular rhythm, lungs CTA. EKG unremarkable. Will check set of CE.     Reid Sutton MD  Pager: [phone], [phone] Called by RN to come see pt at request of pt's wife. Pt was wife was concerned that pt appeared drousy, closed his eyes, and was speaking slowly in [place]o and she was concerned given his history that he was having a seizure. He also had lightheadedness but no chest pain, dyspnea, or diachoresis. Prior seizures involved hand clenching, pt had no such activity other seizure-like activity. He is AAOx3 and his speech is now at baseline. No numbness or weakness. Lightheadedness resolved. HR 78, and /65 on my exam. Neurologic exam intact, AAOx3, CN III-XII intact, stength 5/5 in all extremities, no pronator drift, reflexes 2+ b/l. Heart irregular rhythm, lungs CTA. EKG unremarkable, afib. Will check set of CE.     Reid Sutton MD  Pager: [phone], [phone] Called by RN to come see pt at request of pt's wife. Pt was wife was concerned that pt appeared drousy, closed his eyes, and was speaking slowly in Dutch and she was concerned given his history that he was having a seizure. He also had lightheadedness but no chest pain, dyspnea, or diachoresis. Prior seizures involved hand clenching, pt had no such activity other seizure-like activity. He is AAOx3 and his speech is now at baseline. No numbness or weakness. Lightheadedness resolved. No corresponding events on tele. HR 78, and /65 on my exam. Neurologic exam intact, AAOx3, CN III-XII intact, stength 5/5 in all extremities, no pronator drift, reflexes 2+ b/l. Heart irregular rhythm, lungs CTA. EKG unremarkable, afib. Will check set of CE.     Reid Sutton MD  Pager: [phone], [phone]

## 2018-04-21 NOTE — PROGRESS NOTE ADULT - ASSESSMENT
afib  HR better now  cont cardizem  cont a/c with warfarin    acute diastolic chf  lasix    COPD  nebs     decondition state  PT  appears to benefit from inpt rehab

## 2018-04-21 NOTE — PROGRESS NOTE ADULT - PROBLEM SELECTOR PLAN 3
Na 133  -likely low osmolality, hypervolemic hyponatremia 2/2 flash pulmonary edema 2/2 afib  -diuresis with 40mg PO lasix daily  -monitor BMP

## 2018-04-21 NOTE — PROGRESS NOTE ADULT - ASSESSMENT
72y M with pmhx of COPD on 2 L at home, Lung Ca s/p R pneumonectomy in 2003 (s/p chemo/radiation) Bovine AVR, Afib on coumadin, seizure d/o (tonic seizures, on carbamazepine and lamotrigine, HTN presents with SOB since yesterday morning due to flash pulmonary edema in setting of Afib with RVR, now resolved.

## 2018-04-22 DIAGNOSIS — R42 DIZZINESS AND GIDDINESS: ICD-10-CM

## 2018-04-22 DIAGNOSIS — K56.7 ILEUS, UNSPECIFIED: ICD-10-CM

## 2018-04-22 LAB
BUN SERPL-MCNC: 17 MG/DL — SIGNIFICANT CHANGE UP (ref 7–23)
CALCIUM SERPL-MCNC: 9 MG/DL — SIGNIFICANT CHANGE UP (ref 8.4–10.5)
CARBAMAZEPINE SERPL-MCNC: 19.6 UG/ML — CRITICAL HIGH (ref 4–12)
CHLORIDE SERPL-SCNC: 93 MMOL/L — LOW (ref 98–107)
CK MB BLD-MCNC: 1.07 NG/ML — SIGNIFICANT CHANGE UP (ref 1–6.6)
CK SERPL-CCNC: 24 U/L — LOW (ref 30–200)
CO2 SERPL-SCNC: 29 MMOL/L — SIGNIFICANT CHANGE UP (ref 22–31)
CREAT SERPL-MCNC: 1.09 MG/DL — SIGNIFICANT CHANGE UP (ref 0.5–1.3)
GLUCOSE SERPL-MCNC: 98 MG/DL — SIGNIFICANT CHANGE UP (ref 70–99)
INR BLD: 3.18 — HIGH (ref 0.88–1.17)
MAGNESIUM SERPL-MCNC: 1.9 MG/DL — SIGNIFICANT CHANGE UP (ref 1.6–2.6)
PHOSPHATE SERPL-MCNC: 3 MG/DL — SIGNIFICANT CHANGE UP (ref 2.5–4.5)
POTASSIUM SERPL-MCNC: 3.8 MMOL/L — SIGNIFICANT CHANGE UP (ref 3.5–5.3)
POTASSIUM SERPL-SCNC: 3.8 MMOL/L — SIGNIFICANT CHANGE UP (ref 3.5–5.3)
PROLACTIN SERPL-MCNC: 70.6 NG/ML — HIGH (ref 4.1–18.4)
PROTHROM AB SERPL-ACNC: 37.4 SEC — HIGH (ref 9.8–13.1)
SODIUM SERPL-SCNC: 135 MMOL/L — SIGNIFICANT CHANGE UP (ref 135–145)
TROPONIN T SERPL-MCNC: < 0.06 NG/ML — SIGNIFICANT CHANGE UP (ref 0–0.06)

## 2018-04-22 PROCEDURE — 74019 RADEX ABDOMEN 2 VIEWS: CPT | Mod: 26

## 2018-04-22 PROCEDURE — 70450 CT HEAD/BRAIN W/O DYE: CPT | Mod: 26

## 2018-04-22 PROCEDURE — 71045 X-RAY EXAM CHEST 1 VIEW: CPT | Mod: 26

## 2018-04-22 PROCEDURE — 99233 SBSQ HOSP IP/OBS HIGH 50: CPT | Mod: GC

## 2018-04-22 RX ORDER — ONDANSETRON 8 MG/1
4 TABLET, FILM COATED ORAL ONCE
Qty: 0 | Refills: 0 | Status: DISCONTINUED | OUTPATIENT
Start: 2018-04-22 | End: 2018-04-22

## 2018-04-22 RX ORDER — WARFARIN SODIUM 2.5 MG/1
2 TABLET ORAL ONCE
Qty: 0 | Refills: 0 | Status: COMPLETED | OUTPATIENT
Start: 2018-04-22 | End: 2018-04-22

## 2018-04-22 RX ORDER — ONDANSETRON 8 MG/1
4 TABLET, FILM COATED ORAL ONCE
Qty: 0 | Refills: 0 | Status: COMPLETED | OUTPATIENT
Start: 2018-04-22 | End: 2018-04-22

## 2018-04-22 RX ADMIN — WARFARIN SODIUM 2 MILLIGRAM(S): 2.5 TABLET ORAL at 17:40

## 2018-04-22 RX ADMIN — LAMOTRIGINE 100 MILLIGRAM(S): 25 TABLET, ORALLY DISINTEGRATING ORAL at 17:39

## 2018-04-22 RX ADMIN — ONDANSETRON 4 MILLIGRAM(S): 8 TABLET, FILM COATED ORAL at 10:23

## 2018-04-22 RX ADMIN — SIMETHICONE 80 MILLIGRAM(S): 80 TABLET, CHEWABLE ORAL at 17:39

## 2018-04-22 RX ADMIN — Medication 1 TABLET(S): at 17:39

## 2018-04-22 RX ADMIN — Medication 100 MILLIGRAM(S): at 17:39

## 2018-04-22 RX ADMIN — BUDESONIDE AND FORMOTEROL FUMARATE DIHYDRATE 2 PUFF(S): 160; 4.5 AEROSOL RESPIRATORY (INHALATION) at 22:06

## 2018-04-22 RX ADMIN — Medication 100 MILLIGRAM(S): at 05:08

## 2018-04-22 RX ADMIN — Medication 400 MILLIGRAM(S): at 05:08

## 2018-04-22 RX ADMIN — LAMOTRIGINE 100 MILLIGRAM(S): 25 TABLET, ORALLY DISINTEGRATING ORAL at 05:08

## 2018-04-22 RX ADMIN — SENNA PLUS 2 TABLET(S): 8.6 TABLET ORAL at 22:06

## 2018-04-22 RX ADMIN — Medication 40 MILLIGRAM(S): at 05:08

## 2018-04-22 RX ADMIN — BUDESONIDE AND FORMOTEROL FUMARATE DIHYDRATE 2 PUFF(S): 160; 4.5 AEROSOL RESPIRATORY (INHALATION) at 17:38

## 2018-04-22 NOTE — PROGRESS NOTE ADULT - SUBJECTIVE AND OBJECTIVE BOX
Subjective: Patient seen and examined. No new events except as noted.     SUBJECTIVE/ROS:  dizzy  what he describes consistent with vertical nystagmus   nausea and vomiting       MEDICATIONS:  MEDICATIONS  (STANDING):  buDESOnide 160 MICROgram(s)/formoterol 4.5 MICROgram(s) Inhaler 2 Puff(s) Inhalation two times a day  carBAMazepine 400 milliGRAM(s) Oral two times a day  diltiazem    Tablet 60 milliGRAM(s) Oral three times a day  docusate sodium 100 milliGRAM(s) Oral two times a day  furosemide    Tablet 40 milliGRAM(s) Oral daily  lactobacillus acidophilus 1 Tablet(s) Oral daily  lamoTRIgine 100 milliGRAM(s) Oral two times a day  senna 2 Tablet(s) Oral at bedtime  simethicone 80 milliGRAM(s) Chew daily  warfarin 2 milliGRAM(s) Oral once      PHYSICAL EXAM:  T(C): 36.4 (04-22-18 @ 05:07), Max: 36.7 (04-21-18 @ 13:07)  HR: 84 (04-22-18 @ 07:42) (78 - 92)  BP: 146/74 (04-22-18 @ 09:57) (101/54 - 146/74)  RR: 17 (04-22-18 @ 05:07) (17 - 18)  SpO2: 99% (04-22-18 @ 07:42) (97% - 100%)  Wt(kg): --  I&O's Summary    21 Apr 2018 07:01  -  22 Apr 2018 07:00  --------------------------------------------------------  IN: 0 mL / OUT: 620 mL / NET: -620 mL      JVP: Normal  Neck: supple  Lung: clear   CV: S1 S2 , Murmur:  Abd: soft  Ext: No edema  neuro: Awake / alert dizzy   Psych: flat affect  Skin: normal       LABS/DATA:    CARDIAC MARKERS:  CARDIAC MARKERS ( 21 Apr 2018 17:34 )  x     / < 0.06 ng/mL / 24 u/L / 1.00 ng/mL / x      CARDIAC MARKERS ( 19 Apr 2018 22:01 )  x     / < 0.06 ng/mL / 59 u/L / 1.18 ng/mL / x                04-22    135  |  93<L>  |  17  ----------------------------<  98  3.8   |  29  |  1.09    Ca    9.0      22 Apr 2018 05:25  Phos  3.0     04-22  Mg     1.9     04-22      proBNP:   Lipid Profile:   HgA1c:   TSH:     TELE:  EKG:

## 2018-04-22 NOTE — PROGRESS NOTE ADULT - PROBLEM SELECTOR PLAN 5
Patient with advanced COPD, on Home o2  - C/w NC 2L to maintain Spo2>90  - Will start Symbicort for now as patient's breo and incruse not available. Attempt to have patients family bring home meds.   - BIPAP qhs and PRN Na 133  -likely low osmolality, hypervolemic hyponatremia 2/2 flash pulmonary edema 2/2 afib  -diuresis with 40mg PO lasix daily  -monitor BMP Na 133, likely low osmolality  - likely 2/2 hypervolemic hyponatremia 2/2 flash pulmonary edema 2/2 afib  -diuresis with 40mg PO lasix daily  -monitor BMP

## 2018-04-22 NOTE — PROGRESS NOTE ADULT - PROBLEM SELECTOR PLAN 3
Na 133  -likely low osmolality, hypervolemic hyponatremia 2/2 flash pulmonary edema 2/2 afib  -diuresis with 40mg PO lasix daily  -monitor BMP HR controlled, currently 80s-80s.  - c/w diltiazem 60mg PO TID daily, transition to long acting at discharge per cardiology, titrate as tolerated   - takes coumadin 4mg QD, INR supratherapeutic 3.18, dose coumadin 2mg at bedtime HR controlled, currently 80s-80s.  - c/w diltiazem 60mg PO TID daily  - home coumadin 4mg QD, INR supratherapeutic 3.18, coumadin 2mg at bedtime

## 2018-04-22 NOTE — PROGRESS NOTE ADULT - PROBLEM SELECTOR PLAN 1
Now resolved, clear lungs on exam. Likely 2/2 AFIB with RVR due to incorrect diltiazem use.   - C/w afib rate control PO diltiazem 60mg TID (long acting 180mg PO upon d/c), titrate as needed, continue tele monitoring  - C/w lasix 40mg po daily per cardiology recs with visual disturbances, non-focal neuro exam   -f/u seizure medication levels, recheck Domingo  -will get head CT with visual disturbances, non-focal neuro exam   -low suspicion for seizure, carbamazepine levels elevated, holding PM dose  -getting CT head to r/o bleed in the setting of supratherapeutic INR with visual disturbances, non-focal neuro exam, n/v  -low suspicion for seizure, possibly 2/2 AE of carbamazepine levels elevated, holding PM dose  -call pts. neurologist in AM (Dr. Amezcua)  -getting CT head to r/o bleed in the setting of supratherapeutic INR

## 2018-04-22 NOTE — PROGRESS NOTE ADULT - PROBLEM SELECTOR PLAN 7
Pt with soft BP in the low 100s, C/w cardiazem 60mg PO TID and lasix 40mg PO daily as above. Patient with advanced COPD, on Home o2  - increased phlegm production, will check repeat CXR, low suspicion for COPD exacerbation at this time will not start steroids or abx  - C/w NC 2L to maintain Spo2>90  - Will start Symbicort for now as patient's breo and incruse not available. Attempt to have patients family bring home meds.   - BIPAP qhs and PRN

## 2018-04-22 NOTE — PROGRESS NOTE ADULT - PROBLEM SELECTOR PLAN 2
HR controlled, currently 80s-80s.  - c/w diltiazem 60mg PO TID daily, transition to long acting at discharge per cardiology, titrate as tolerated   - takes coumadin 4mg QD, INR currently supratherapeutic to 3.26 yesterday, dosed 2mg; will f/u this mornings INR and dose accordingly HR controlled, currently 80s-80s.  - c/w diltiazem 60mg PO TID daily, transition to long acting at discharge per cardiology, titrate as tolerated   - takes coumadin 4mg QD, INR supratherapeutic 3.18, dose coumadin 2mg at bedtime Now resolved, clear lungs on exam. Likely 2/2 AFIB with RVR due to incorrect diltiazem use.   - C/w afib rate control PO diltiazem 60mg TID (long acting 180mg PO upon d/c), titrate as needed, continue tele monitoring  - C/w lasix 40mg po daily per cardiology recs Resolved, Likely 2/2 AFIB with RVR due to incorrect diltiazem use.   - C/w afib rate control PO diltiazem 60mg TID, titrate as needed, continue tele   - C/w lasix 40mg po daily per cardiology recs

## 2018-04-22 NOTE — PROGRESS NOTE ADULT - PROBLEM SELECTOR PLAN 8
DVT ppx: on warfarin  Diet: DASH/TLC    Nimo Campbell, PGY-1  Internal Medicine  Pager [phone] C/w home carbamazepine and lamotrigine C/w home lamotrigine 100mg BID  -carbamazepine level elevated at 19, will hold PM dose, recheck level in AM and restart accordingly (home dose 400mg BID)

## 2018-04-22 NOTE — PROGRESS NOTE ADULT - SUBJECTIVE AND OBJECTIVE BOX
Patient is a 72y old  Male who presents with a chief complaint of Respiratory Distress (20 Apr 2018 16:00)    SUBJECTIVE / OVERNIGHT EVENTS: No acute events overnight. Yesterday during the day pts. wife was concerned about seizure activity as pt. appeared drowsy, episode has resolved and workup negative. This morning he feels wells denies chest pain an sob.    MEDICATIONS  (STANDING):  buDESOnide 160 MICROgram(s)/formoterol 4.5 MICROgram(s) Inhaler 2 Puff(s) Inhalation two times a day  carBAMazepine 400 milliGRAM(s) Oral two times a day  diltiazem    Tablet 60 milliGRAM(s) Oral three times a day  docusate sodium 100 milliGRAM(s) Oral two times a day  furosemide    Tablet 40 milliGRAM(s) Oral daily  lactobacillus acidophilus 1 Tablet(s) Oral daily  lamoTRIgine 100 milliGRAM(s) Oral two times a day  senna 2 Tablet(s) Oral at bedtime  simethicone 80 milliGRAM(s) Chew daily    MEDICATIONS  (PRN):  ALBUTerol    90 MICROgram(s) HFA Inhaler 2 Puff(s) Inhalation every 6 hours PRN Shortness of Breath and/or Wheezing    Vital Signs Last 24 Hrs  T(F): 97.5 (22 Apr 2018 05:07), Max: 98 (21 Apr 2018 13:07)  HR: 81 (22 Apr 2018 05:07) (78 - 92)  BP: 101/54 (22 Apr 2018 05:07) (101/54 - 112/52)  RR: 17 (22 Apr 2018 05:07) (17 - 18)  SpO2: 100% (22 Apr 2018 05:07) (97% - 100%)  CAPILLARY BLOOD GLUCOSE      I&O's Summary    21 Apr 2018 07:01  -  22 Apr 2018 07:00  --------------------------------------------------------  IN: 0 mL / OUT: 620 mL / NET: -620 mL      PHYSICAL EXAM:  GENERAL: NAD  HEAD:  Atraumatic, Normocephalic  EYES: EOMI, PERRLA, conjunctiva and sclera clear  NECK: Supple, No JVD  CHEST/LUNG: CTA on left, absent breath sounds on right (s/p right pnemonectomy), no wheezes, rales, or rhonchi  HEART: irregularly irregular; No murmurs, rubs, or gallops  ABDOMEN: Soft, Nontender, Nondistended; Bowel sounds present  EXTREMITIES:  2+ Peripheral Pulses, No clubbing, cyanosis, or edema  PSYCH: AAOx3, normal mood and affect  NEUROLOGY: non-focal  SKIN: No rashes or lesions      LABS:    04-21    133<L>  |  92<L>  |  13  ----------------------------<  100<H>  4.0   |  31  |  1.13    Ca    8.9      21 Apr 2018 06:07  Phos  3.1     04-21  Mg     1.8     04-21      PT/INR - ( 21 Apr 2018 06:07 )   PT: 36.9 SEC;   INR: 3.25          CARDIAC MARKERS ( 21 Apr 2018 17:34 )  x     / < 0.06 ng/mL / 24 u/L / 1.00 ng/mL / x          RADIOLOGY & ADDITIONAL TESTS:    Imaging Personally Reviewed:    Consultant(s) Notes Reviewed:      Care Discussed with Consultants/Other Providers:

## 2018-04-22 NOTE — PROGRESS NOTE ADULT - PROBLEM SELECTOR PLAN 4
Status post right pneumonectomy. Monitor clinically. demonstrated on CTAP 4/10 in the setting of immodium use  -pt. with nausea and vomiting, normoactive BS  -zofran 4mg PRN nausea, will check AXR demonstrated on CTAP 4/10 in the setting of immodium use  -pt. with nausea and vomiting, normoactive BS  -zofran 4mg PRN nausea, f/u AXR

## 2018-04-22 NOTE — PROGRESS NOTE ADULT - ASSESSMENT
72y M with pmhx of COPD on 2 L at home, Lung Ca s/p R pneumonectomy in 2003 (s/p chemo/radiation) Bovine AVR, Afib on coumadin, seizure d/o (tonic seizures, on carbamazepine and lamotrigine, HTN presents with SOB since yesterday morning due to flash pulmonary edema in setting of Afib with RVR, now resolved. 72y M with pmhx of COPD on 2 L at home, Lung Ca s/p R pneumonectomy in 2003 (s/p chemo/radiation) Bovine AVR, Afib on coumadin, seizure d/o (tonic seizures, on carbamazepine and lamotrigine, HTN presents with SOB since yesterday morning due to flash pulmonary edema in setting of Afib with RVR, now resolved. Course c/b dizziness/visual disturbances with non-focal neuro exam, awaiting CT head evaluation.

## 2018-04-23 LAB
BUN SERPL-MCNC: 16 MG/DL — SIGNIFICANT CHANGE UP (ref 7–23)
CALCIUM SERPL-MCNC: 8.8 MG/DL — SIGNIFICANT CHANGE UP (ref 8.4–10.5)
CARBAMAZEPINE SERPL-MCNC: 13.6 UG/ML — HIGH (ref 4–12)
CHLORIDE SERPL-SCNC: 94 MMOL/L — LOW (ref 98–107)
CO2 SERPL-SCNC: 32 MMOL/L — HIGH (ref 22–31)
CREAT SERPL-MCNC: 1.23 MG/DL — SIGNIFICANT CHANGE UP (ref 0.5–1.3)
GLUCOSE SERPL-MCNC: 99 MG/DL — SIGNIFICANT CHANGE UP (ref 70–99)
INR BLD: 3.34 — HIGH (ref 0.88–1.17)
LAMOTRIGINE SERPL-MCNC: 4.3 MCG/ML — SIGNIFICANT CHANGE UP (ref 2.5–15)
MAGNESIUM SERPL-MCNC: 1.9 MG/DL — SIGNIFICANT CHANGE UP (ref 1.6–2.6)
PHOSPHATE SERPL-MCNC: 3.2 MG/DL — SIGNIFICANT CHANGE UP (ref 2.5–4.5)
POTASSIUM SERPL-MCNC: 3.9 MMOL/L — SIGNIFICANT CHANGE UP (ref 3.5–5.3)
POTASSIUM SERPL-SCNC: 3.9 MMOL/L — SIGNIFICANT CHANGE UP (ref 3.5–5.3)
PROTHROM AB SERPL-ACNC: 38 SEC — HIGH (ref 9.8–13.1)
SODIUM SERPL-SCNC: 137 MMOL/L — SIGNIFICANT CHANGE UP (ref 135–145)

## 2018-04-23 PROCEDURE — 99233 SBSQ HOSP IP/OBS HIGH 50: CPT | Mod: GC

## 2018-04-23 RX ORDER — DILTIAZEM HCL 120 MG
120 CAPSULE, EXT RELEASE 24 HR ORAL DAILY
Qty: 0 | Refills: 0 | Status: DISCONTINUED | OUTPATIENT
Start: 2018-04-23 | End: 2018-04-23

## 2018-04-23 RX ORDER — DILTIAZEM HCL 120 MG
120 CAPSULE, EXT RELEASE 24 HR ORAL ONCE
Qty: 0 | Refills: 0 | Status: COMPLETED | OUTPATIENT
Start: 2018-04-23 | End: 2018-04-23

## 2018-04-23 RX ORDER — DILTIAZEM HCL 120 MG
180 CAPSULE, EXT RELEASE 24 HR ORAL DAILY
Qty: 0 | Refills: 0 | Status: DISCONTINUED | OUTPATIENT
Start: 2018-04-24 | End: 2018-04-24

## 2018-04-23 RX ORDER — CARBAMAZEPINE 200 MG
200 TABLET ORAL AT BEDTIME
Qty: 0 | Refills: 0 | Status: DISCONTINUED | OUTPATIENT
Start: 2018-04-23 | End: 2018-04-25

## 2018-04-23 RX ORDER — WARFARIN SODIUM 2.5 MG/1
2 TABLET ORAL ONCE
Qty: 0 | Refills: 0 | Status: COMPLETED | OUTPATIENT
Start: 2018-04-23 | End: 2018-04-23

## 2018-04-23 RX ORDER — CARBAMAZEPINE 200 MG
400 TABLET ORAL DAILY
Qty: 0 | Refills: 0 | Status: DISCONTINUED | OUTPATIENT
Start: 2018-04-24 | End: 2018-04-25

## 2018-04-23 RX ADMIN — LAMOTRIGINE 100 MILLIGRAM(S): 25 TABLET, ORALLY DISINTEGRATING ORAL at 17:08

## 2018-04-23 RX ADMIN — BUDESONIDE AND FORMOTEROL FUMARATE DIHYDRATE 2 PUFF(S): 160; 4.5 AEROSOL RESPIRATORY (INHALATION) at 09:11

## 2018-04-23 RX ADMIN — WARFARIN SODIUM 2 MILLIGRAM(S): 2.5 TABLET ORAL at 17:08

## 2018-04-23 RX ADMIN — Medication 1 TABLET(S): at 12:49

## 2018-04-23 RX ADMIN — SIMETHICONE 80 MILLIGRAM(S): 80 TABLET, CHEWABLE ORAL at 12:49

## 2018-04-23 RX ADMIN — Medication 40 MILLIGRAM(S): at 05:49

## 2018-04-23 RX ADMIN — Medication 100 MILLIGRAM(S): at 05:49

## 2018-04-23 RX ADMIN — BUDESONIDE AND FORMOTEROL FUMARATE DIHYDRATE 2 PUFF(S): 160; 4.5 AEROSOL RESPIRATORY (INHALATION) at 21:09

## 2018-04-23 RX ADMIN — LAMOTRIGINE 100 MILLIGRAM(S): 25 TABLET, ORALLY DISINTEGRATING ORAL at 05:49

## 2018-04-23 RX ADMIN — Medication 120 MILLIGRAM(S): at 14:31

## 2018-04-23 RX ADMIN — Medication 200 MILLIGRAM(S): at 21:09

## 2018-04-23 NOTE — PROGRESS NOTE ADULT - ASSESSMENT
Dizzy  vertical nystagmus   n/v  Head CT neg  likely from anti sz med   improving  fu with primary team     afib  HR stable  on a/c

## 2018-04-23 NOTE — PROGRESS NOTE ADULT - PROBLEM SELECTOR PLAN 3
HR controlled, currently 80s-80s.  - c/w diltiazem 60mg PO TID daily  - home coumadin 4mg QD, INR supratherapeutic 3.34, coumadin 2mg at bedtime HR controlled, currently 80s-80s.  - diltiazem 60mg PO this morning, 120mg at 2PM, will do long acting 180mg 4/24  - home coumadin 4mg QD, INR supratherapeutic 3.34, coumadin 2mg at bedtime

## 2018-04-23 NOTE — PROGRESS NOTE ADULT - SUBJECTIVE AND OBJECTIVE BOX
Subjective: Patient seen and examined. No new events except as noted.     SUBJECTIVE/ROS:  feels better  no nausea or vomiting       MEDICATIONS:  MEDICATIONS  (STANDING):  buDESOnide 160 MICROgram(s)/formoterol 4.5 MICROgram(s) Inhaler 2 Puff(s) Inhalation two times a day  diltiazem    Tablet 60 milliGRAM(s) Oral three times a day  docusate sodium 100 milliGRAM(s) Oral two times a day  furosemide    Tablet 40 milliGRAM(s) Oral daily  lactobacillus acidophilus 1 Tablet(s) Oral daily  lamoTRIgine 100 milliGRAM(s) Oral two times a day  senna 2 Tablet(s) Oral at bedtime  simethicone 80 milliGRAM(s) Chew daily      PHYSICAL EXAM:  T(C): 36.7 (04-23-18 @ 05:40), Max: 36.8 (04-22-18 @ 21:10)  HR: 72 (04-23-18 @ 06:51) (66 - 95)  BP: 99/50 (04-23-18 @ 05:40) (99/50 - 146/74)  RR: 18 (04-23-18 @ 05:40) (17 - 18)  SpO2: 97% (04-23-18 @ 06:51) (97% - 100%)  Wt(kg): --  I&O's Summary    22 Apr 2018 07:01  -  23 Apr 2018 07:00  --------------------------------------------------------  IN: 120 mL / OUT: 300 mL / NET: -180 mL        JVP: Normal  Neck: supple  Lung: clear   CV: S1 S2 , Murmur:  Abd: soft  Ext: No edema  neuro: Awake / alert  Psych: flat affect  Skin: normal       LABS/DATA:    CARDIAC MARKERS:  CARDIAC MARKERS ( 22 Apr 2018 10:47 )  x     / < 0.06 ng/mL / 24 u/L / 1.07 ng/mL / x      CARDIAC MARKERS ( 21 Apr 2018 17:34 )  x     / < 0.06 ng/mL / 24 u/L / 1.00 ng/mL / x                04-22    135  |  93<L>  |  17  ----------------------------<  98  3.8   |  29  |  1.09    Ca    9.0      22 Apr 2018 05:25  Phos  3.0     04-22  Mg     1.9     04-22      proBNP:   Lipid Profile:   HgA1c:   TSH:     TELE:  EKG:

## 2018-04-23 NOTE — PROGRESS NOTE ADULT - PROBLEM SELECTOR PLAN 2
Resolved, Likely 2/2 AFIB with RVR due to incorrect diltiazem use.   - C/w afib rate control PO diltiazem 60mg TID, titrate as needed, continue tele   - C/w lasix 40mg po daily per cardiology recs Resolved, Likely 2/2 AFIB with RVR due to incorrect diltiazem use  - rate control for afib as below, lasix 40mg po daily   - cardiology recs appreciated

## 2018-04-23 NOTE — PROGRESS NOTE ADULT - SUBJECTIVE AND OBJECTIVE BOX
Patient is a 72y old  Male who presents with a chief complaint of Respiratory Distress (20 Apr 2018 16:00)    SUBJECTIVE / OVERNIGHT EVENTS: No acute events overnight. Pt. endorses that his nausea and vomiting have improved since yesterday. Afib on tele overnight rates 80s.     MEDICATIONS  (STANDING):  buDESOnide 160 MICROgram(s)/formoterol 4.5 MICROgram(s) Inhaler 2 Puff(s) Inhalation two times a day  diltiazem    Tablet 60 milliGRAM(s) Oral three times a day  docusate sodium 100 milliGRAM(s) Oral two times a day  furosemide    Tablet 40 milliGRAM(s) Oral daily  lactobacillus acidophilus 1 Tablet(s) Oral daily  lamoTRIgine 100 milliGRAM(s) Oral two times a day  senna 2 Tablet(s) Oral at bedtime  simethicone 80 milliGRAM(s) Chew daily    MEDICATIONS  (PRN):  ALBUTerol    90 MICROgram(s) HFA Inhaler 2 Puff(s) Inhalation every 6 hours PRN Shortness of Breath and/or Wheezing  guaiFENesin    Syrup 100 milliGRAM(s) Oral every 6 hours PRN Cough      Vital Signs Last 24 Hrs  T(C): 36.7 (23 Apr 2018 05:40), Max: 36.8 (22 Apr 2018 21:10)  T(F): 98 (23 Apr 2018 05:40), Max: 98.2 (22 Apr 2018 21:10)  HR: 72 (23 Apr 2018 06:51) (66 - 95)  BP: 99/50 (23 Apr 2018 05:40) (99/50 - 146/74)  RR: 18 (23 Apr 2018 05:40) (17 - 18)  SpO2: 97% (23 Apr 2018 06:51) (97% - 100%)  CAPILLARY BLOOD GLUCOSE    I&O's Summary    22 Apr 2018 07:01  -  23 Apr 2018 07:00  --------------------------------------------------------  IN: 120 mL / OUT: 300 mL / NET: -180 mL    PHYSICAL EXAM:  GENERAL: NAD  HEAD:  Atraumatic, Normocephalic  EYES: EOMI, PERRLA, conjunctiva and sclera clear  NECK: Supple, No JVD  CHEST/LUNG: CTA on left, absent breath sounds on right (s/p right pnemonectomy), no wheezes, rales, or rhonchi  HEART: irregularly irregular; No murmurs, rubs, or gallops  ABDOMEN: Soft, Nontender, Nondistended; Bowel sounds present  EXTREMITIES:  2+ Peripheral Pulses, No clubbing, cyanosis, or edema  PSYCH: AAOx3, normal mood and affect  NEUROLOGY: non-focal  SKIN: No rashes or lesions      LABS:    Pending        RADIOLOGY & ADDITIONAL TESTS:    Imaging Personally Reviewed: CT head 4/22 negative.     Consultant(s) Notes Reviewed:      Care Discussed with Consultants/Other Providers:

## 2018-04-23 NOTE — PROGRESS NOTE ADULT - PROBLEM SELECTOR PLAN 1
with visual disturbances, non-focal neuro exam, n/v, now improved   -low suspicion for seizure, possibly 2/2 AE of carbamazepine levels elevated, held PM dose, will discuss with outpatient neurologist prior to resuming   -CT head 4/22 negative with visual disturbances, non-focal neuro exam, n/v, resolved with negative CTH  -likely 2/2 elevated carbamazepine levels, reducing dose 400mg AM / 200mg PM

## 2018-04-23 NOTE — PROGRESS NOTE ADULT - ASSESSMENT
72y M with pmhx of COPD on 2 L at home, Lung Ca s/p R pneumonectomy in 2003 (s/p chemo/radiation) Bovine AVR, Afib on coumadin, seizure d/o (tonic seizures, on carbamazepine and lamotrigine, HTN presents with SOB since yesterday morning due to flash pulmonary edema in setting of Afib with RVR, now resolved. Course c/b dizziness/visual disturbances likely 2/2 elevated levels of carbamazepine 72y M with PMHx of COPD on 2L home oxygen, Lung Ca s/p R pneumonectomy in 2003 (s/p chemo/radiation) Bovine AVR, Afib on coumadin, seizure d/o (tonic seizures, on carbamazepine and lamotrigine), HTN presents with SOB since yesterday morning due to flash pulmonary edema in setting of Afib with RVR, now resolved. Course c/b dizziness/visual disturbances likely 2/2 elevated levels of carbamazepine.

## 2018-04-23 NOTE — PROGRESS NOTE ADULT - PROBLEM SELECTOR PLAN 8
Pt with soft BP in the low 100s, C/w cardiazem 60mg PO TID and lasix 40mg PO daily as above. Pt with soft BP in the low 100s, c/w cardiazem as above

## 2018-04-23 NOTE — PROGRESS NOTE ADULT - PROBLEM SELECTOR PLAN 4
Na 133, likely low osmolality  - likely 2/2 hypervolemic hyponatremia 2/2 flash pulmonary edema 2/2 afib  -diuresis with 40mg PO lasix daily  -monitor BMP Na 133, likely low osmolality  - likely 2/2 hypervolemic hyponatremia 2/2 flash pulmonary edema 2/2 afib  - diuresis with 40mg PO lasix daily  - monitor BMP

## 2018-04-23 NOTE — PROGRESS NOTE ADULT - PROBLEM SELECTOR PLAN 6
Patient with advanced COPD, on Home oxygen  - increased phlegm production, will check repeat CXR, low suspicion for COPD exacerbation at this time will not start steroids or abx  - C/w NC 2L to maintain Spo2>90  - Will start Symbicort for now as patient's breo and incruse not available. Attempt to have patients family bring home meds.   - BIPAP qhs and PRN Patient with advanced COPD, on Home oxygen 2L  - C/w NC 2L to maintain Spo2>90  - Will start Symbicort for now as patient's breo and incruse not available. Attempt to have patients family bring home meds.   - BIPAP qhs and PRN

## 2018-04-23 NOTE — PROGRESS NOTE ADULT - PROBLEM SELECTOR PLAN 7
C/w home lamotrigine 100mg BID  -carbamazepine level elevated at 19, held 4/22 PM dose, f/u AM level and restart based on neurologist recommendations -c/w home lamotrigine 100mg BID  -carbamazepine level 19 --> 13, reducing dose to 400mg AM and 200mg PM per outpatient neurologist

## 2018-04-24 LAB
CARBAMAZEPINE SERPL-MCNC: 9.1 UG/ML — SIGNIFICANT CHANGE UP (ref 4–12)
INR BLD: 2.78 — HIGH (ref 0.88–1.17)
PROTHROM AB SERPL-ACNC: 32.7 SEC — HIGH (ref 9.8–13.1)

## 2018-04-24 PROCEDURE — 99233 SBSQ HOSP IP/OBS HIGH 50: CPT | Mod: GC

## 2018-04-24 RX ORDER — WARFARIN SODIUM 2.5 MG/1
3 TABLET ORAL ONCE
Qty: 0 | Refills: 0 | Status: COMPLETED | OUTPATIENT
Start: 2018-04-24 | End: 2018-04-24

## 2018-04-24 RX ORDER — DILTIAZEM HCL 120 MG
120 CAPSULE, EXT RELEASE 24 HR ORAL DAILY
Qty: 0 | Refills: 0 | Status: DISCONTINUED | OUTPATIENT
Start: 2018-04-25 | End: 2018-04-25

## 2018-04-24 RX ADMIN — Medication 180 MILLIGRAM(S): at 05:56

## 2018-04-24 RX ADMIN — Medication 400 MILLIGRAM(S): at 12:07

## 2018-04-24 RX ADMIN — Medication 40 MILLIGRAM(S): at 05:39

## 2018-04-24 RX ADMIN — SIMETHICONE 80 MILLIGRAM(S): 80 TABLET, CHEWABLE ORAL at 12:07

## 2018-04-24 RX ADMIN — Medication 100 MILLIGRAM(S): at 05:39

## 2018-04-24 RX ADMIN — BUDESONIDE AND FORMOTEROL FUMARATE DIHYDRATE 2 PUFF(S): 160; 4.5 AEROSOL RESPIRATORY (INHALATION) at 12:06

## 2018-04-24 RX ADMIN — LAMOTRIGINE 100 MILLIGRAM(S): 25 TABLET, ORALLY DISINTEGRATING ORAL at 17:07

## 2018-04-24 RX ADMIN — Medication 200 MILLIGRAM(S): at 22:13

## 2018-04-24 RX ADMIN — Medication 1 TABLET(S): at 12:07

## 2018-04-24 RX ADMIN — LAMOTRIGINE 100 MILLIGRAM(S): 25 TABLET, ORALLY DISINTEGRATING ORAL at 05:39

## 2018-04-24 RX ADMIN — WARFARIN SODIUM 3 MILLIGRAM(S): 2.5 TABLET ORAL at 17:06

## 2018-04-24 RX ADMIN — BUDESONIDE AND FORMOTEROL FUMARATE DIHYDRATE 2 PUFF(S): 160; 4.5 AEROSOL RESPIRATORY (INHALATION) at 22:12

## 2018-04-24 NOTE — PROGRESS NOTE ADULT - ASSESSMENT
72y M with PMHx of COPD on 2L home oxygen, Lung Ca s/p R pneumonectomy in 2003 (s/p chemo/radiation) Bovine AVR, Afib on coumadin, seizure d/o (tonic seizures, on carbamazepine and lamotrigine), HTN presents with SOB since yesterday morning due to flash pulmonary edema in setting of Afib with RVR, now resolved. Course c/b dizziness/visual disturbances likely 2/2 elevated levels of carbamazepine.

## 2018-04-24 NOTE — PROGRESS NOTE ADULT - PROBLEM SELECTOR PLAN 7
-c/w home lamotrigine 100mg BID  -carbamazepine level 19 --> 13, reducing dose to 400mg AM and 200mg PM per outpatient neurologist -c/w home lamotrigine 100mg BID  -carbamazepine level 19 --> 13-->9 reducing dose to 400mg AM and 200mg PM per outpatient neurologist

## 2018-04-24 NOTE — PROGRESS NOTE ADULT - PROBLEM SELECTOR PLAN 3
HR controlled, currently 80s-80s.  - starting long acting diltiazem 180mg PO today   - coumadin 2mg given last night, will dose today based on INR HR controlled, currently 80s-80s.  - starting long acting diltiazem 180mg PO today; pt with bradycardia.  will monitor HR, lower dose of AV andres blocker  - coumadin 2mg given last night, will dose 3 mg and check INR daily

## 2018-04-24 NOTE — PROGRESS NOTE ADULT - PROBLEM SELECTOR PLAN 4
Na 133, likely low osmolality  - likely 2/2 hypervolemic hyponatremia 2/2 flash pulmonary edema 2/2 afib  - diuresis with 40mg PO lasix daily  - monitor BMP   - diuresis with 40mg PO lasix daily  - monitor BMP

## 2018-04-24 NOTE — PROGRESS NOTE ADULT - SUBJECTIVE AND OBJECTIVE BOX
Patient is a 72y old  Male who presents with a chief complaint of Respiratory Distress (20 Apr 2018 16:00)    SUBJECTIVE / OVERNIGHT EVENTS: No acute events overnight. On tele - afib with rates 80s.    MEDICATIONS  (STANDING):  buDESOnide 160 MICROgram(s)/formoterol 4.5 MICROgram(s) Inhaler 2 Puff(s) Inhalation two times a day  carBAMazepine XR Tablet 200 milliGRAM(s) Oral at bedtime  carBAMazepine XR Tablet 400 milliGRAM(s) Oral daily  diltiazem    milliGRAM(s) Oral daily  docusate sodium 100 milliGRAM(s) Oral two times a day  furosemide    Tablet 40 milliGRAM(s) Oral daily  lactobacillus acidophilus 1 Tablet(s) Oral daily  lamoTRIgine 100 milliGRAM(s) Oral two times a day  senna 2 Tablet(s) Oral at bedtime  simethicone 80 milliGRAM(s) Chew daily    MEDICATIONS  (PRN):  ALBUTerol    90 MICROgram(s) HFA Inhaler 2 Puff(s) Inhalation every 6 hours PRN Shortness of Breath and/or Wheezing  guaiFENesin    Syrup 100 milliGRAM(s) Oral every 6 hours PRN Cough    Vital Signs Last 24 Hrs  T(F): 97.7 (24 Apr 2018 05:16), Max: 98 (24 Apr 2018 01:25)  HR: 71 (24 Apr 2018 06:38) (67 - 86)  BP: 102/63 (24 Apr 2018 05:16) (94/52 - 113/63)  RR: 18 (24 Apr 2018 05:16) (18 - 19)  SpO2: 98% (24 Apr 2018 06:38) (97% - 100%)  CAPILLARY BLOOD GLUCOSE    I&O's Summary    PHYSICAL EXAM:  GENERAL: NAD  HEAD:  Atraumatic, Normocephalic  EYES: EOMI, PERRLA, conjunctiva and sclera clear  NECK: Supple, No JVD  CHEST/LUNG: CTA on left, absent breath sounds on right (s/p right pneumonectomy no wheezes, rales, or rhonchi  HEART: irregularly irregular; No murmurs, rubs, or gallops  ABDOMEN: Soft, Nontender, Nondistended; Bowel sounds present  EXTREMITIES:  2+ Peripheral Pulses, No clubbing, cyanosis, or edema  PSYCH: AAOx3, normal mood and affect  NEUROLOGY: non-focal  SKIN: No rashes or lesions    LABS:    04-23    137  |  94<L>  |  16  ----------------------------<  99  3.9   |  32<H>  |  1.23    Ca    8.8      23 Apr 2018 06:30  Phos  3.2     04-23  Mg     1.9     04-23      PT/INR - ( 23 Apr 2018 06:30 )   PT: 38.0 SEC;   INR: 3.34            CARDIAC MARKERS ( 22 Apr 2018 10:47 )  x     / < 0.06 ng/mL / 24 u/L / 1.07 ng/mL / x          RADIOLOGY & ADDITIONAL TESTS:    Imaging Personally Reviewed:    Consultant(s) Notes Reviewed:      Care Discussed with Consultants/Other Providers: Patient is a 72y old  Male who presents with a chief complaint of Respiratory Distress (20 Apr 2018 16:00)    SUBJECTIVE / OVERNIGHT EVENTS: No acute events overnight. On tele - afib with rates 80s.    MEDICATIONS  (STANDING):  buDESOnide 160 MICROgram(s)/formoterol 4.5 MICROgram(s) Inhaler 2 Puff(s) Inhalation two times a day  carBAMazepine XR Tablet 200 milliGRAM(s) Oral at bedtime  carBAMazepine XR Tablet 400 milliGRAM(s) Oral daily  diltiazem    milliGRAM(s) Oral daily  docusate sodium 100 milliGRAM(s) Oral two times a day  furosemide    Tablet 40 milliGRAM(s) Oral daily  lactobacillus acidophilus 1 Tablet(s) Oral daily  lamoTRIgine 100 milliGRAM(s) Oral two times a day  senna 2 Tablet(s) Oral at bedtime  simethicone 80 milliGRAM(s) Chew daily    MEDICATIONS  (PRN):  ALBUTerol    90 MICROgram(s) HFA Inhaler 2 Puff(s) Inhalation every 6 hours PRN Shortness of Breath and/or Wheezing  guaiFENesin    Syrup 100 milliGRAM(s) Oral every 6 hours PRN Cough    Vital Signs Last 24 Hrs  T(F): 97.7 (24 Apr 2018 05:16), Max: 98 (24 Apr 2018 01:25)  HR: 71 (24 Apr 2018 06:38) (67 - 86)  BP: 102/63 (24 Apr 2018 05:16) (94/52 - 113/63)  RR: 18 (24 Apr 2018 05:16) (18 - 19)  SpO2: 98% (24 Apr 2018 06:38) (97% - 100%)  CAPILLARY BLOOD GLUCOSE    I&O's Summary    PHYSICAL EXAM:  GENERAL: NAD  HEAD:  Atraumatic, Normocephalic  EYES: EOMI, PERRLA, conjunctiva and sclera clear  NECK: Supple, No JVD  CHEST/LUNG: CTA on left, absent breath sounds on right (s/p right pneumonectomy no wheezes, rales, or rhonchi  HEART: irregularly irregular; No murmurs, rubs, or gallops  ABDOMEN: Soft, Nontender, Nondistended; Bowel sounds present  EXTREMITIES:  2+ Peripheral Pulses, No clubbing, cyanosis, or edema  PSYCH: AAOx3, normal mood and affect  NEUROLOGY: non-focal  SKIN: No rashes or lesions    LABS:    04-23    137  |  94<L>  |  16  ----------------------------<  99  3.9   |  32<H>  |  1.23    Ca    8.8      23 Apr 2018 06:30  Phos  3.2     04-23  Mg     1.9     04-23      PT/INR - ( 23 Apr 2018 06:30 )   PT: 38.0 SEC;   INR: 3.34            CARDIAC MARKERS ( 22 Apr 2018 10:47 )  x     / < 0.06 ng/mL / 24 u/L / 1.07 ng/mL / x          RADIOLOGY & ADDITIONAL TESTS:    Imaging Personally Reviewed:    Consultant(s) Notes Reviewed:  cards    Care Discussed with Consultants/Other Providers:

## 2018-04-24 NOTE — PROGRESS NOTE ADULT - SUBJECTIVE AND OBJECTIVE BOX
Subjective: Patient seen and examined. No new events except as noted.     SUBJECTIVE/ROS:  no cp or sob       MEDICATIONS:  MEDICATIONS  (STANDING):  buDESOnide 160 MICROgram(s)/formoterol 4.5 MICROgram(s) Inhaler 2 Puff(s) Inhalation two times a day  carBAMazepine XR Tablet 200 milliGRAM(s) Oral at bedtime  carBAMazepine XR Tablet 400 milliGRAM(s) Oral daily  diltiazem    milliGRAM(s) Oral daily  docusate sodium 100 milliGRAM(s) Oral two times a day  furosemide    Tablet 40 milliGRAM(s) Oral daily  lactobacillus acidophilus 1 Tablet(s) Oral daily  lamoTRIgine 100 milliGRAM(s) Oral two times a day  senna 2 Tablet(s) Oral at bedtime  simethicone 80 milliGRAM(s) Chew daily  warfarin 3 milliGRAM(s) Oral once      PHYSICAL EXAM:  T(C): 36.3 (04-24-18 @ 11:24), Max: 36.7 (04-24-18 @ 01:25)  HR: 63 (04-24-18 @ 11:24) (63 - 86)  BP: 99/53 (04-24-18 @ 11:24) (94/52 - 113/63)  RR: 18 (04-24-18 @ 11:24) (18 - 19)  SpO2: 98% (04-24-18 @ 11:24) (97% - 100%)  Wt(kg): --  I&O's Summary    Height (cm): 177.8 (04-23 @ 14:29)  Weight (kg): 66 (04-23 @ 14:29)  BMI (kg/m2): 20.9 (04-23 @ 14:29)  BSA (m2): 1.82 (04-23 @ 14:29)    JVP: Normal  Neck: supple  Lung: clear   CV: S1 S2 , Murmur:  Abd: soft  Ext: No edema  neuro: Awake / alert  Psych: flat affect  Skin: normal       LABS/DATA:    CARDIAC MARKERS:  CARDIAC MARKERS ( 22 Apr 2018 10:47 )  x     / < 0.06 ng/mL / 24 u/L / 1.07 ng/mL / x      CARDIAC MARKERS ( 21 Apr 2018 17:34 )  x     / < 0.06 ng/mL / 24 u/L / 1.00 ng/mL / x                04-23    137  |  94<L>  |  16  ----------------------------<  99  3.9   |  32<H>  |  1.23    Ca    8.8      23 Apr 2018 06:30  Phos  3.2     04-23  Mg     1.9     04-23      proBNP:   Lipid Profile:   HgA1c:   TSH:     TELE:  EKG:

## 2018-04-24 NOTE — PROGRESS NOTE ADULT - PROBLEM SELECTOR PLAN 2
Resolved, Likely 2/2 AFIB with RVR due to incorrect diltiazem use  - rate control for afib as below, lasix 40mg po daily   - cardiology recs appreciated

## 2018-04-24 NOTE — PROGRESS NOTE ADULT - PROBLEM SELECTOR PLAN 1
with visual disturbances, non-focal neuro exam, n/v, resolved with negative CTH  -likely 2/2 elevated carbamazepine levels, reducing dose 400mg AM / 200mg PM per outpatient neurology recs

## 2018-04-24 NOTE — PROGRESS NOTE ADULT - PROBLEM SELECTOR PLAN 6
Patient with advanced COPD, on Home oxygen 2L  - C/w NC 2L to maintain Spo2>90  - Will start Symbicort for now as patient's breo and incruse not available. Attempt to have patients family bring home meds.   - BIPAP qhs and PRN

## 2018-04-25 VITALS — HEART RATE: 66 BPM

## 2018-04-25 LAB
CARBAMAZEPINE SERPL-MCNC: 11.9 UG/ML — SIGNIFICANT CHANGE UP (ref 4–12)
INR BLD: 2.53 — HIGH (ref 0.88–1.17)
PROTHROM AB SERPL-ACNC: 28.6 SEC — HIGH (ref 9.8–13.1)

## 2018-04-25 PROCEDURE — 99239 HOSP IP/OBS DSCHRG MGMT >30: CPT

## 2018-04-25 RX ORDER — DILTIAZEM HCL 120 MG
1 CAPSULE, EXT RELEASE 24 HR ORAL
Qty: 30 | Refills: 0
Start: 2018-04-25 | End: 2018-05-24

## 2018-04-25 RX ORDER — FUROSEMIDE 40 MG
1 TABLET ORAL
Qty: 30 | Refills: 0
Start: 2018-04-25 | End: 2018-05-24

## 2018-04-25 RX ORDER — WARFARIN SODIUM 2.5 MG/1
3 TABLET ORAL ONCE
Qty: 0 | Refills: 0 | Status: DISCONTINUED | OUTPATIENT
Start: 2018-04-25 | End: 2018-04-25

## 2018-04-25 RX ORDER — WARFARIN SODIUM 2.5 MG/1
1 TABLET ORAL
Qty: 15 | Refills: 0
Start: 2018-04-25 | End: 2018-05-09

## 2018-04-25 RX ADMIN — SIMETHICONE 80 MILLIGRAM(S): 80 TABLET, CHEWABLE ORAL at 12:36

## 2018-04-25 RX ADMIN — Medication 120 MILLIGRAM(S): at 05:51

## 2018-04-25 RX ADMIN — Medication 400 MILLIGRAM(S): at 12:36

## 2018-04-25 RX ADMIN — LAMOTRIGINE 100 MILLIGRAM(S): 25 TABLET, ORALLY DISINTEGRATING ORAL at 05:51

## 2018-04-25 RX ADMIN — BUDESONIDE AND FORMOTEROL FUMARATE DIHYDRATE 2 PUFF(S): 160; 4.5 AEROSOL RESPIRATORY (INHALATION) at 08:26

## 2018-04-25 RX ADMIN — Medication 40 MILLIGRAM(S): at 05:51

## 2018-04-25 RX ADMIN — Medication 1 TABLET(S): at 12:36

## 2018-04-25 NOTE — PROGRESS NOTE ADULT - PROBLEM SELECTOR PLAN 7
-c/w home lamotrigine 100mg BID  -carbamazepine level 19 --> 13-->9 reducing dose to 400mg AM and 200mg PM per outpatient neurologist

## 2018-04-25 NOTE — PROGRESS NOTE ADULT - PROBLEM SELECTOR PLAN 9
DVT ppx: on warfarin  Diet: DASH/TLC    Nimo Campbell, PGY-1  Internal Medicine  Pager [phone]
DVT ppx: on warfarin  Diet: DASH/TLC    Nimo Campbell, PGY-1  Internal Medicine  Pager [phone]
DVT ppx: on warfarin  Diet: DASH/TLC    Nimo Campbell, PGY-1  Internal Medicine  Pager [phone]
DVT ppx: therapeutic INR with warfarin   Diet: DASH/TLC    Nimo Campbell  Internal Medicine, PGY-1  pager [phone]
Pt with soft BP in the low 100s, C/w cardiazem 60mg PO TID and lasix 40mg PO daily as above.

## 2018-04-25 NOTE — PROGRESS NOTE ADULT - SUBJECTIVE AND OBJECTIVE BOX
Patient is a 72y old  Male who presents with a chief complaint of Respiratory Distress (20 Apr 2018 16:00)    SUBJECTIVE / OVERNIGHT EVENTS: No acute events overnight. On tele - afib with rates 70s.    MEDICATIONS  (STANDING):  buDESOnide 160 MICROgram(s)/formoterol 4.5 MICROgram(s) Inhaler 2 Puff(s) Inhalation two times a day  carBAMazepine XR Tablet 200 milliGRAM(s) Oral at bedtime  carBAMazepine XR Tablet 400 milliGRAM(s) Oral daily  diltiazem    milliGRAM(s) Oral daily  furosemide    Tablet 40 milliGRAM(s) Oral daily  lactobacillus acidophilus 1 Tablet(s) Oral daily  lamoTRIgine 100 milliGRAM(s) Oral two times a day  simethicone 80 milliGRAM(s) Chew daily    Vital Signs Last 24 Hrs  T(F): 97.5 (25 Apr 2018 05:49), Max: 98.1 (24 Apr 2018 09:22)  HR: 75 (25 Apr 2018 06:42) (63 - 79)  BP: 121/69 (25 Apr 2018 05:49) (99/52 - 121/69)  RR: 18 (25 Apr 2018 05:49) (18 - 18)  SpO2: 97% (25 Apr 2018 06:42) (97% - 100%)    I&O's Summary    PHYSICAL EXAM:  GENERAL: NAD  HEAD:  Atraumatic, Normocephalic  EYES: EOMI, PERRLA, conjunctiva and sclera clear  NECK: Supple, No JVD  CHEST/LUNG: CTA on left, absent breath sounds on right (s/p right pneumonectomy no wheezes, rales, or rhonchi  HEART: irregularly irregular; No murmurs, rubs, or gallops  ABDOMEN: Soft, Nontender, Nondistended; Bowel sounds present  EXTREMITIES:  2+ Peripheral Pulses, No clubbing, cyanosis, or edema  PSYCH: AAOx3, normal mood and affect  NEUROLOGY: non-focal  SKIN: No rashes or lesions    LABS:    INR pending       CARDIAC MARKERS ( 22 Apr 2018 10:47 )  x     / < 0.06 ng/mL / 24 u/L / 1.07 ng/mL / x          RADIOLOGY & ADDITIONAL TESTS:    Imaging Personally Reviewed:    Consultant(s) Notes Reviewed:  cards    Care Discussed with Consultants/Other Providers:

## 2018-04-25 NOTE — PROGRESS NOTE ADULT - PROBLEM SELECTOR PLAN 4
resolved, likely 2/2 fluid overload state on admission  - diuresis with 40mg PO lasix daily  - monitor BMP

## 2018-04-25 NOTE — PROGRESS NOTE ADULT - ATTENDING COMMENTS
Patient seen and examined.  Case discussed with house staff.  Agree with above as edited.   72yoM with COPD on 2L NC at home, h/o lung ca s/p right lobectomy 2013, s/p Bovine AVR, Afib, seizure disorder, HTN with recent admission for diarrhea and ileus c/b afib with RVR 2/2 hypovolemia, readmitted with Afib with RVR and flash pulmonary edema. Patient s/p Cardizem and lasix with improvement.   Changed cardizem from 30 q 6 to 60 q8 and eventually hope to change to long acting.    Monitor INR and dose coumadin. Given supratherapeutic INR today, will dose reduce (2mg).  Monitor HR. Apprec cards filemon d/familia rn. Plan discussed with patient and family.
Pt seen and examined with HS on above date.  Agree with above HS note.  Plan discussed with House staff.    Pt feels well.  HR stable.  INR good.  stable for DC home.  continue with meds as is.  Discussed at length with pt and wife.  DC time 45 mins
Pt seen and examined with HS on above date.  Agree with above HS note.  Plan discussed with House staff.    with SOB and bradycardia today, will monitor.  dose coumadin and check INR daily.
Pt seen and examined with HS on above date.  Agree with above HS note.  Plan discussed with House staff.    72 M with COPD on home o2, afib on coumadin (check INR and dose coumadin), metabolic encephalopathy in setting of carbamazepine, resolving.  dc planning
Patient seen and examined.  Case discussed with house staff.  Agree with above as edited.   72yoM with COPD on 2L NC at home, h/o lung ca s/p right lobectomy 2013, s/p Bovine AVR, Afib, seizure disorder, HTN with recent admission for diarrhea and ileus c/b afib with RVR 2/2 hypovolemia, readmitted with Afib with RVR and flash pulmonary edema. Patient s/p Cardizem and lasix with improvement. Change cardizem from 30 q 6 to 60 q8 and eventually to long acting.  Monitor INR and dose coumadin. Monitor HR. Apprec cards filemon sharif/familia rn. Plan discussed with patient and family.
Patient seen and examined.  Case discussed with house staff.  Agree with above as edited.   72yoM with COPD on 2L NC at home, h/o lung ca s/p right lobectomy 2013, s/p Bovine AVR, Afib, seizure disorder, HTN with recent admission for diarrhea and ileus c/b afib with RVR 2/2 hypovolemia, readmitted with Afib with RVR and flash pulmonary edema. Patient s/p Cardizem and lasix with improvement.   Nausea, vomiting, drowsiness - Carbamazepine level is elevated which may be contributing. Hold pm dose and check level in am. Check AXR and f/u CT head.  Changed cardizem from 30 q 6 to 60 q8 and eventually hope to change to long acting.    Monitor INR and dose coumadin. Given supratherapeutic INR today, will dose reduce (2mg).  Monitor HR. Apprec cards filemon sharif/familia rn. Plan discussed with patient and wife bedside.

## 2018-04-25 NOTE — PROGRESS NOTE ADULT - SUBJECTIVE AND OBJECTIVE BOX
Subjective: Patient seen and examined. No new events except as noted.     SUBJECTIVE/ROS:  No chest pain, dyspnea, palpitation, or dizziness.       MEDICATIONS:  MEDICATIONS  (STANDING):  buDESOnide 160 MICROgram(s)/formoterol 4.5 MICROgram(s) Inhaler 2 Puff(s) Inhalation two times a day  carBAMazepine XR Tablet 200 milliGRAM(s) Oral at bedtime  carBAMazepine XR Tablet 400 milliGRAM(s) Oral daily  diltiazem    milliGRAM(s) Oral daily  furosemide    Tablet 40 milliGRAM(s) Oral daily  lactobacillus acidophilus 1 Tablet(s) Oral daily  lamoTRIgine 100 milliGRAM(s) Oral two times a day  simethicone 80 milliGRAM(s) Chew daily      PHYSICAL EXAM:  T(C): 36.4 (04-25-18 @ 05:49), Max: 36.7 (04-24-18 @ 09:22)  HR: 75 (04-25-18 @ 06:42) (63 - 79)  BP: 121/69 (04-25-18 @ 05:49) (99/52 - 121/69)  RR: 18 (04-25-18 @ 05:49) (18 - 18)  SpO2: 97% (04-25-18 @ 06:42) (97% - 100%)  Wt(kg): --  I&O's Summary    24 Apr 2018 07:01  -  25 Apr 2018 07:00  --------------------------------------------------------  IN: 0 mL / OUT: 400 mL / NET: -400 mL        JVP: Normal  Neck: supple  Lung: clear   CV: S1 S2 , Murmur:  Abd: soft  Ext: No edema  neuro: Awake / alert  Psych: flat affect  Skin: normal       LABS/DATA:    CARDIAC MARKERS:  CARDIAC MARKERS ( 22 Apr 2018 10:47 )  x     / < 0.06 ng/mL / 24 u/L / 1.07 ng/mL / x                      proBNP:   Lipid Profile:   HgA1c:   TSH:     TELE:  EKG:

## 2018-04-25 NOTE — PROGRESS NOTE ADULT - PROBLEM SELECTOR PLAN 6
Patient with advanced COPD, on Home oxygen 2L  - C/w NC 2L to maintain Spo2>90, O2 sat on ambulation is 98%  - Will start Symbicort for now as patient's breo and incruse not available. Attempt to have patients family bring home meds.   - BIPAP qhs and PRN

## 2018-04-25 NOTE — PROGRESS NOTE ADULT - PROVIDER SPECIALTY LIST ADULT
Burn
Cardiology
Internal Medicine
Cardiology
Internal Medicine
Internal Medicine

## 2018-04-25 NOTE — PROGRESS NOTE ADULT - PROBLEM SELECTOR PLAN 1
HR controlled, currently 80s-80s.  - reduced dose to diltiazem 120mg PO today as pt with bradycardia yesterday on 180mg PO.    - coumadin 3mg given last night, will dose according to INR today

## 2018-04-25 NOTE — PROGRESS NOTE ADULT - PROBLEM SELECTOR PLAN 2
Resolved, Likely 2/2 AFIB with RVR due to incorrect diltiazem use  - rate control for afib as above, lasix 40mg po daily   - cardiology recs appreciated

## 2018-07-03 ENCOUNTER — APPOINTMENT (OUTPATIENT)
Dept: PULMONOLOGY | Facility: CLINIC | Age: 73
End: 2018-07-03
Payer: MEDICARE

## 2018-07-03 VITALS
BODY MASS INDEX: 20.04 KG/M2 | DIASTOLIC BLOOD PRESSURE: 70 MMHG | SYSTOLIC BLOOD PRESSURE: 100 MMHG | RESPIRATION RATE: 17 BRPM | WEIGHT: 140 LBS | HEIGHT: 70 IN | OXYGEN SATURATION: 94 % | HEART RATE: 100 BPM | TEMPERATURE: 98.3 F

## 2018-07-03 PROCEDURE — 99215 OFFICE O/P EST HI 40 MIN: CPT

## 2018-07-06 NOTE — SWALLOW VFSS/MBS ASSESSMENT ADULT - DIAGNOSTIC IMPRESSIONS
Repeat K 6.5 and Cr elevated some. Discussed with Dr. Anant Montaño. Will have patient come to the emergency room to be admitted by the hospitalist service. I called his wife and she is going to bring him to the emergency room now. Patient presents with Functional Oral and Mild Pharyngeal Stage Dysphagia. The Oral Stage is characterized by adequate oral containment, adequate chewing for solid, adequate bolus manipulation, adequate tongue motion with adequate anterior to posterior transfer of the bolus; piecemeal deglutition for puree/solids; with adequate oral clearance post swallow.    The Pharyngeal Stage is characterized by adequate initiation of the pharyngeal swallow, adequate laryngeal elevation, reduced tongue base retraction resulting in mild to moderate vallecular residue post primary swallow, reduced pharyngeal constriction resulting in mild pyriform/pharyngeal wall residue post primary swallow.  There is mild to moderate pharyngeal clearance deficits located primary in the vallecular/pyriforms/pharyngeal wall post swallow. A liquid wash benefits to assist with partial pharyngeal clearance.   There was Trace Laryngeal Penetration during the swallow for Thin Liquids with retrieval and airway protection maintained. There was No Aspiration observed before, during or after the swallow. Patient presents with Functional Oral and Mild Pharyngeal Stage Dysphagia. The Oral Stage is characterized by adequate oral containment, adequate chewing for solid, adequate bolus manipulation, adequate tongue motion with adequate anterior to posterior transfer of the bolus; piecemeal deglutition for puree/solids; with adequate oral clearance post swallow.    The Pharyngeal Stage is characterized by adequate initiation of the pharyngeal swallow, adequate laryngeal elevation, reduced tongue base retraction resulting in mild to moderate vallecular residue post primary swallow, reduced pharyngeal constriction resulting in mild pyriform/pharyngeal wall residue post primary swallow.  There is mild to moderate pharyngeal clearance deficits located primary in the vallecular/pyriforms/pharyngeal wall post swallow. A liquid wash benefits to assist with partial pharyngeal clearance.   There was Trace Laryngeal Penetration during the swallow for Thin Liquids with retrieval with airway protection maintained. There was No Aspiration observed before, during or after the swallow.

## 2018-10-01 ENCOUNTER — APPOINTMENT (OUTPATIENT)
Dept: PULMONOLOGY | Facility: CLINIC | Age: 73
End: 2018-10-01

## 2018-11-06 NOTE — DISCHARGE NOTE ADULT - FUNCTIONAL STATUS DATE
20-Apr-2018 24-Apr-2018 25-Apr-2018 Complex Repair And Melolabial Flap Text: The defect edges were debeveled with a #15 scalpel blade.  The primary defect was closed partially with a complex linear closure.  Given the location of the remaining defect, shape of the defect and the proximity to free margins a melolabial flap was deemed most appropriate for complete closure of the defect.  Using a sterile surgical marker, an appropriate advancement flap was drawn incorporating the defect and placing the expected incisions within the relaxed skin tension lines where possible.    The area thus outlined was incised deep to adipose tissue with a #15 scalpel blade.  The skin margins were undermined to an appropriate distance in all directions utilizing iris scissors.

## 2018-11-06 NOTE — PATIENT PROFILE ADULT. - SPIRITUAL CULTURAL, RELIGIOUS PRACTICES/VALUES, PROFILE
Swazi Mu-ism Mauc Instructions: By selecting yes to the question below the MAUC number will be added into the note.  This will be calculated automatically based on the diagnosis chosen, the size entered, the body zone selected (H,M,L) and the specific indications you chose. You will also have the option to override the Mohs AUC if you disagree with the automatically calculated number and this option is found in the Case Summary tab.

## 2019-06-05 NOTE — DIETITIAN INITIAL EVALUATION ADULT. - NS AS NUTRI INTERV MEALS SNACK
Fluid - modified diet/Texture-modified diet/Other (specify)/1. Continue current diet order, which remains appropriate at this time. 2. Monitor weights, labs, BM's, skin integrity, p.o. intake. 3. Please assist with meals and provide alternative as needed. admission

## 2020-05-02 ENCOUNTER — INPATIENT (INPATIENT)
Facility: HOSPITAL | Age: 75
LOS: 2 days | Discharge: HOME | End: 2020-05-05
Attending: INTERNAL MEDICINE | Admitting: INTERNAL MEDICINE
Payer: MEDICARE

## 2020-05-02 VITALS
SYSTOLIC BLOOD PRESSURE: 110 MMHG | RESPIRATION RATE: 42 BRPM | OXYGEN SATURATION: 98 % | HEART RATE: 130 BPM | TEMPERATURE: 100 F | DIASTOLIC BLOOD PRESSURE: 60 MMHG

## 2020-05-02 DIAGNOSIS — I10 ESSENTIAL (PRIMARY) HYPERTENSION: ICD-10-CM

## 2020-05-02 DIAGNOSIS — Z29.9 ENCOUNTER FOR PROPHYLACTIC MEASURES, UNSPECIFIED: ICD-10-CM

## 2020-05-02 DIAGNOSIS — R09.02 HYPOXEMIA: ICD-10-CM

## 2020-05-02 DIAGNOSIS — G40.909 EPILEPSY, UNSPECIFIED, NOT INTRACTABLE, WITHOUT STATUS EPILEPTICUS: ICD-10-CM

## 2020-05-02 DIAGNOSIS — I48.91 UNSPECIFIED ATRIAL FIBRILLATION: ICD-10-CM

## 2020-05-02 DIAGNOSIS — J44.1 CHRONIC OBSTRUCTIVE PULMONARY DISEASE WITH (ACUTE) EXACERBATION: ICD-10-CM

## 2020-05-02 DIAGNOSIS — Z02.9 ENCOUNTER FOR ADMINISTRATIVE EXAMINATIONS, UNSPECIFIED: ICD-10-CM

## 2020-05-02 DIAGNOSIS — Z95.4 PRESENCE OF OTHER HEART-VALVE REPLACEMENT: Chronic | ICD-10-CM

## 2020-05-02 DIAGNOSIS — Z98.89 OTHER SPECIFIED POSTPROCEDURAL STATES: Chronic | ICD-10-CM

## 2020-05-02 LAB
ALBUMIN SERPL ELPH-MCNC: 4 G/DL — SIGNIFICANT CHANGE UP (ref 3.3–5)
ALP SERPL-CCNC: 88 U/L — SIGNIFICANT CHANGE UP (ref 40–120)
ALT FLD-CCNC: 8 U/L — SIGNIFICANT CHANGE UP (ref 4–41)
ANION GAP SERPL CALC-SCNC: 12 MMO/L — SIGNIFICANT CHANGE UP (ref 7–14)
AST SERPL-CCNC: 12 U/L — SIGNIFICANT CHANGE UP (ref 4–40)
BASE EXCESS BLDV CALC-SCNC: 8.8 MMOL/L — SIGNIFICANT CHANGE UP
BASOPHILS # BLD AUTO: 0.02 K/UL — SIGNIFICANT CHANGE UP (ref 0–0.2)
BASOPHILS NFR BLD AUTO: 0.1 % — SIGNIFICANT CHANGE UP (ref 0–2)
BASOPHILS NFR SPEC: 0 % — SIGNIFICANT CHANGE UP (ref 0–2)
BILIRUB SERPL-MCNC: 0.7 MG/DL — SIGNIFICANT CHANGE UP (ref 0.2–1.2)
BLASTS # FLD: 0 % — SIGNIFICANT CHANGE UP (ref 0–0)
BLOOD GAS VENOUS - CREATININE: 1.33 MG/DL — HIGH (ref 0.5–1.3)
BUN SERPL-MCNC: 31 MG/DL — HIGH (ref 7–23)
CALCIUM SERPL-MCNC: 9.9 MG/DL — SIGNIFICANT CHANGE UP (ref 8.4–10.5)
CHLORIDE BLDV-SCNC: 102 MMOL/L — SIGNIFICANT CHANGE UP (ref 96–108)
CHLORIDE SERPL-SCNC: 98 MMOL/L — SIGNIFICANT CHANGE UP (ref 98–107)
CO2 SERPL-SCNC: 29 MMOL/L — SIGNIFICANT CHANGE UP (ref 22–31)
CREAT SERPL-MCNC: 1.32 MG/DL — HIGH (ref 0.5–1.3)
CRP SERPL-MCNC: 66.9 MG/L — HIGH
D DIMER BLD IA.RAPID-MCNC: 470 NG/ML — SIGNIFICANT CHANGE UP
EOSINOPHIL # BLD AUTO: 0 K/UL — SIGNIFICANT CHANGE UP (ref 0–0.5)
EOSINOPHIL NFR BLD AUTO: 0 % — SIGNIFICANT CHANGE UP (ref 0–6)
EOSINOPHIL NFR FLD: 0 % — SIGNIFICANT CHANGE UP (ref 0–6)
FERRITIN SERPL-MCNC: 263.7 NG/ML — SIGNIFICANT CHANGE UP (ref 30–400)
GAS PNL BLDV: 137 MMOL/L — SIGNIFICANT CHANGE UP (ref 136–146)
GLUCOSE BLDV-MCNC: 121 MG/DL — HIGH (ref 70–99)
GLUCOSE SERPL-MCNC: 128 MG/DL — HIGH (ref 70–99)
HCO3 BLDV-SCNC: 31 MMOL/L — HIGH (ref 20–27)
HCT VFR BLD CALC: 32.2 % — LOW (ref 39–50)
HCT VFR BLDV CALC: 34.3 % — LOW (ref 39–51)
HGB BLD-MCNC: 10.8 G/DL — LOW (ref 13–17)
HGB BLDV-MCNC: 11.1 G/DL — LOW (ref 13–17)
IMM GRANULOCYTES NFR BLD AUTO: 0.6 % — SIGNIFICANT CHANGE UP (ref 0–1.5)
LACTATE BLDV-MCNC: 1.1 MMOL/L — SIGNIFICANT CHANGE UP (ref 0.5–2)
LYMPHOCYTES # BLD AUTO: 0.3 K/UL — LOW (ref 1–3.3)
LYMPHOCYTES # BLD AUTO: 2 % — LOW (ref 13–44)
LYMPHOCYTES NFR SPEC AUTO: 0.9 % — LOW (ref 13–44)
MAGNESIUM SERPL-MCNC: 1.9 MG/DL — SIGNIFICANT CHANGE UP (ref 1.6–2.6)
MCHC RBC-ENTMCNC: 32.1 PG — SIGNIFICANT CHANGE UP (ref 27–34)
MCHC RBC-ENTMCNC: 33.5 % — SIGNIFICANT CHANGE UP (ref 32–36)
MCV RBC AUTO: 95.8 FL — SIGNIFICANT CHANGE UP (ref 80–100)
METAMYELOCYTES # FLD: 0 % — SIGNIFICANT CHANGE UP (ref 0–1)
MONOCYTES # BLD AUTO: 0.89 K/UL — SIGNIFICANT CHANGE UP (ref 0–0.9)
MONOCYTES NFR BLD AUTO: 6 % — SIGNIFICANT CHANGE UP (ref 2–14)
MONOCYTES NFR BLD: 6.1 % — SIGNIFICANT CHANGE UP (ref 2–9)
MYELOCYTES NFR BLD: 0 % — SIGNIFICANT CHANGE UP (ref 0–0)
NEUTROPHIL AB SER-ACNC: 91.3 % — HIGH (ref 43–77)
NEUTROPHILS # BLD AUTO: 13.47 K/UL — HIGH (ref 1.8–7.4)
NEUTROPHILS NFR BLD AUTO: 91.3 % — HIGH (ref 43–77)
NEUTS BAND # BLD: 0 % — SIGNIFICANT CHANGE UP (ref 0–6)
NRBC # FLD: 0 K/UL — SIGNIFICANT CHANGE UP (ref 0–0)
OTHER - HEMATOLOGY %: 0 — SIGNIFICANT CHANGE UP
PCO2 BLDV: 51 MMHG — SIGNIFICANT CHANGE UP (ref 41–51)
PH BLDV: 7.43 PH — SIGNIFICANT CHANGE UP (ref 7.32–7.43)
PHOSPHATE SERPL-MCNC: 2.3 MG/DL — LOW (ref 2.5–4.5)
PLATELET # BLD AUTO: 112 K/UL — LOW (ref 150–400)
PLATELET COUNT - ESTIMATE: SIGNIFICANT CHANGE UP
PMV BLD: 10.1 FL — SIGNIFICANT CHANGE UP (ref 7–13)
PO2 BLDV: 33 MMHG — LOW (ref 35–40)
POTASSIUM BLDV-SCNC: 3.9 MMOL/L — SIGNIFICANT CHANGE UP (ref 3.4–4.5)
POTASSIUM SERPL-MCNC: 4.2 MMOL/L — SIGNIFICANT CHANGE UP (ref 3.5–5.3)
POTASSIUM SERPL-SCNC: 4.2 MMOL/L — SIGNIFICANT CHANGE UP (ref 3.5–5.3)
PROCALCITONIN SERPL-MCNC: 0.21 NG/ML — HIGH (ref 0.02–0.1)
PROMYELOCYTES # FLD: 0 % — SIGNIFICANT CHANGE UP (ref 0–0)
PROT SERPL-MCNC: 7.4 G/DL — SIGNIFICANT CHANGE UP (ref 6–8.3)
RBC # BLD: 3.36 M/UL — LOW (ref 4.2–5.8)
RBC # FLD: 12.8 % — SIGNIFICANT CHANGE UP (ref 10.3–14.5)
SAO2 % BLDV: 61.8 % — SIGNIFICANT CHANGE UP (ref 60–85)
SODIUM SERPL-SCNC: 139 MMOL/L — SIGNIFICANT CHANGE UP (ref 135–145)
TROPONIN T, HIGH SENSITIVITY: 18 NG/L — SIGNIFICANT CHANGE UP (ref ?–14)
VARIANT LYMPHS # BLD: 1.7 % — SIGNIFICANT CHANGE UP
WBC # BLD: 14.77 K/UL — HIGH (ref 3.8–10.5)
WBC # FLD AUTO: 14.77 K/UL — HIGH (ref 3.8–10.5)

## 2020-05-02 PROCEDURE — 71045 X-RAY EXAM CHEST 1 VIEW: CPT | Mod: 26

## 2020-05-02 RX ORDER — AZITHROMYCIN 500 MG/1
500 TABLET, FILM COATED ORAL ONCE
Refills: 0 | Status: DISCONTINUED | OUTPATIENT
Start: 2020-05-02 | End: 2020-05-02

## 2020-05-02 RX ORDER — LAMOTRIGINE 25 MG/1
100 TABLET, ORALLY DISINTEGRATING ORAL
Refills: 0 | Status: DISCONTINUED | OUTPATIENT
Start: 2020-05-02 | End: 2020-05-05

## 2020-05-02 RX ORDER — AZITHROMYCIN 500 MG/1
500 TABLET, FILM COATED ORAL EVERY 24 HOURS
Refills: 0 | Status: DISCONTINUED | OUTPATIENT
Start: 2020-05-03 | End: 2020-05-03

## 2020-05-02 RX ORDER — CARBAMAZEPINE 200 MG
400 TABLET ORAL DAILY
Refills: 0 | Status: DISCONTINUED | OUTPATIENT
Start: 2020-05-03 | End: 2020-05-05

## 2020-05-02 RX ORDER — ALBUTEROL 90 UG/1
2 AEROSOL, METERED ORAL EVERY 4 HOURS
Refills: 0 | Status: DISCONTINUED | OUTPATIENT
Start: 2020-05-02 | End: 2020-05-05

## 2020-05-02 RX ORDER — METOPROLOL TARTRATE 50 MG
25 TABLET ORAL
Refills: 0 | Status: DISCONTINUED | OUTPATIENT
Start: 2020-05-02 | End: 2020-05-02

## 2020-05-02 RX ORDER — LAMOTRIGINE 25 MG/1
100 TABLET, ORALLY DISINTEGRATING ORAL
Refills: 0 | Status: DISCONTINUED | OUTPATIENT
Start: 2020-05-02 | End: 2020-05-02

## 2020-05-02 RX ORDER — ALBUTEROL 90 UG/1
1 AEROSOL, METERED ORAL ONCE
Refills: 0 | Status: COMPLETED | OUTPATIENT
Start: 2020-05-02 | End: 2020-05-02

## 2020-05-02 RX ORDER — ACETAMINOPHEN 500 MG
975 TABLET ORAL ONCE
Refills: 0 | Status: COMPLETED | OUTPATIENT
Start: 2020-05-02 | End: 2020-05-02

## 2020-05-02 RX ORDER — LAMOTRIGINE 25 MG/1
100 TABLET, ORALLY DISINTEGRATING ORAL DAILY
Refills: 0 | Status: DISCONTINUED | OUTPATIENT
Start: 2020-05-02 | End: 2020-05-02

## 2020-05-02 RX ORDER — UMECLIDINIUM 62.5 UG/1
1 AEROSOL, POWDER ORAL
Qty: 0 | Refills: 0 | DISCHARGE

## 2020-05-02 RX ORDER — CARBAMAZEPINE 200 MG
200 TABLET ORAL AT BEDTIME
Refills: 0 | Status: DISCONTINUED | OUTPATIENT
Start: 2020-05-02 | End: 2020-05-05

## 2020-05-02 RX ORDER — AZITHROMYCIN 500 MG/1
500 TABLET, FILM COATED ORAL ONCE
Refills: 0 | Status: COMPLETED | OUTPATIENT
Start: 2020-05-02 | End: 2020-05-02

## 2020-05-02 RX ORDER — FUROSEMIDE 40 MG
40 TABLET ORAL DAILY
Refills: 0 | Status: DISCONTINUED | OUTPATIENT
Start: 2020-05-02 | End: 2020-05-02

## 2020-05-02 RX ORDER — APIXABAN 2.5 MG/1
5 TABLET, FILM COATED ORAL
Refills: 0 | Status: DISCONTINUED | OUTPATIENT
Start: 2020-05-03 | End: 2020-05-05

## 2020-05-02 RX ORDER — CEFTRIAXONE 500 MG/1
1000 INJECTION, POWDER, FOR SOLUTION INTRAMUSCULAR; INTRAVENOUS ONCE
Refills: 0 | Status: COMPLETED | OUTPATIENT
Start: 2020-05-02 | End: 2020-05-02

## 2020-05-02 RX ORDER — ACETAMINOPHEN 500 MG
650 TABLET ORAL EVERY 6 HOURS
Refills: 0 | Status: DISCONTINUED | OUTPATIENT
Start: 2020-05-02 | End: 2020-05-05

## 2020-05-02 RX ORDER — IPRATROPIUM BROMIDE 0.2 MG/ML
1 SOLUTION, NON-ORAL INHALATION ONCE
Refills: 0 | Status: COMPLETED | OUTPATIENT
Start: 2020-05-02 | End: 2020-05-02

## 2020-05-02 RX ORDER — DEXAMETHASONE 0.5 MG/5ML
125 ELIXIR ORAL ONCE
Refills: 0 | Status: DISCONTINUED | OUTPATIENT
Start: 2020-05-02 | End: 2020-05-02

## 2020-05-02 RX ORDER — METOPROLOL TARTRATE 50 MG
25 TABLET ORAL
Refills: 0 | Status: DISCONTINUED | OUTPATIENT
Start: 2020-05-02 | End: 2020-05-05

## 2020-05-02 RX ORDER — CARBAMAZEPINE 200 MG
2 TABLET ORAL
Qty: 0 | Refills: 0 | DISCHARGE

## 2020-05-02 RX ADMIN — Medication 975 MILLIGRAM(S): at 17:35

## 2020-05-02 RX ADMIN — Medication 1 PUFF(S): at 17:36

## 2020-05-02 RX ADMIN — Medication 200 MILLIGRAM(S): at 23:12

## 2020-05-02 RX ADMIN — Medication 40 MILLIGRAM(S): at 23:12

## 2020-05-02 RX ADMIN — Medication 125 MILLIGRAM(S): at 17:35

## 2020-05-02 RX ADMIN — Medication 25 MILLIGRAM(S): at 23:12

## 2020-05-02 RX ADMIN — AZITHROMYCIN 500 MILLIGRAM(S): 500 TABLET, FILM COATED ORAL at 20:06

## 2020-05-02 RX ADMIN — CEFTRIAXONE 100 MILLIGRAM(S): 500 INJECTION, POWDER, FOR SOLUTION INTRAMUSCULAR; INTRAVENOUS at 18:57

## 2020-05-02 RX ADMIN — ALBUTEROL 1 PUFF(S): 90 AEROSOL, METERED ORAL at 17:35

## 2020-05-02 NOTE — ED ADULT NURSE NOTE - NSIMPLEMENTINTERV_GEN_ALL_ED
Implemented All Fall Risk Interventions:  [place] to call system. Call bell, personal items and telephone within reach. Instruct patient to call for assistance. Room bathroom lighting operational. Non-slip footwear when patient is off stretcher. Physically safe environment: no spills, clutter or unnecessary equipment. Stretcher in lowest position, wheels locked, appropriate side rails in place. Provide visual cue, wrist band, yellow gown, etc. Monitor gait and stability. Monitor for mental status changes and reorient to person, place, and time. Review medications for side effects contributing to fall risk. Reinforce activity limits and safety measures with patient and family.

## 2020-05-02 NOTE — ED PROVIDER NOTE - CLINICAL SUMMARY MEDICAL DECISION MAKING FREE TEXT BOX
74M hx multiple cardiopulmonary medical problems p/w sob. Pt has increased O2 requirement, appears dypneic, has very coarse and crackly lung sounds diffusely and will require admission. Labs, covid swab, cxr, ekg, tba.

## 2020-05-02 NOTE — H&P ADULT - NSHPREVIEWOFSYSTEMS_GEN_ALL_CORE
REVIEW OF SYSTEMS:    CONSTITUTIONAL: No weakness,+  fevers, + chills  EYES/ENT: No visual changes  NECK: No pain or stiffness   RESPIRATORY: + productive cough, + SOB   CARDIOVASCULAR: No chest pain or palpitations  GASTROINTESTINAL: No abdominal pain; nausea, vomiting;  diarrhea or constipation. No hemetemesis, melena or hematochezia.  GENITOURITARY: No dysuria, frequency or hematuria  NEUROLOGICAL: No numbness or weakness  MUSCULOSKELETAL: no back pain, no spine deformity, no joint pain or deformity, no muscle ache   SKIN: No itching, burning, rashes, or lesions REVIEW OF SYSTEMS:    CONSTITUTIONAL: No weakness,+  fevers, + chills  EYES/ENT: No visual changes  NECK: No pain or stiffness   RESPIRATORY: + productive cough, + SOB   CARDIOVASCULAR: No chest pain or palpitations  GASTROINTESTINAL: No abdominal pain; nausea, vomiting;  diarrhea or constipation. No hematemesis, melena or hematochezia.  GENITOURINARY: No dysuria, frequency or hematuria  NEUROLOGICAL: No numbness or weakness  MUSCULOSKELETAL: no back pain, no spine deformity, no joint pain or deformity, no muscle ache   SKIN: No itching, burning, rashes, or lesions

## 2020-05-02 NOTE — H&P ADULT - NSICDXFAMILYHX_GEN_ALL_CORE_FT
FAMILY HISTORY:  Family history of hypertension, Mother  Family history of stroke, Mother    Aunt  Still living? Unknown  Family history of cancer, Age at diagnosis: Age Unknown

## 2020-05-02 NOTE — ED PROVIDER NOTE - PMH
Atrial fibrillation    History of pneumonia  4/2013  Hyperlipidemia    Hypertension    Lung cancer  s/p R. pneumonectomy  Seizure disorder

## 2020-05-02 NOTE — ED PROVIDER NOTE - ATTENDING CONTRIBUTION TO CARE
Patient is a 75 yo M with history of HTN, hyperlipidemia, afib on coumadin, hx of COPD on 2.5 L O2 NC at home, lung CA s/p right pneumonectomy in 2003, bovine ARV, HTN here for 2-3 days of fevers, cough and shortness of breath. He reports he was sick in March but got better. Patient unable to provide a detailed history. I spoke to him via [employer]  [id number]. He c/o mild chest pain with coughing. No nausea, vomiting or diarrhea.    VS noted  Gen. no acute distress, elderly, coughing  HEENT: EOMI  Lungs: rhonchi  CVS: RRR   Abd; Soft non tender, non distended   Ext: no edema  Skin: no rash  Neuro AAOx3 non focal clear speech  a/p: COPD/ fever - concern for pneumonia/ COVID19/COPD exacerbation. Patient high risk, oxygen dependent at baseline, now requiring increased O2 via NC. Plan for labs, cultures, CXR, COVID19 work up and admit. Will limit fluids as patient has had pulm edema in the past. EKG with afib.   - Yuliet HOOK

## 2020-05-02 NOTE — ED PROVIDER NOTE - OBJECTIVE STATEMENT
74M hx COPD on home O2, AFib on AC, lung ca s/p     pacific :  [id number] 74M hx COPD on home O2, AFib on AC, lung Ca s/p R pneumonectomy in 2003 (s/p chemo/radiation) Bovine AVR, seizure, HTN, p/f sob and fevers x 2-3 days in setting of generally feeling "off" since the middle of March. Pt ultimately decided to come to the ED today because he felt so short of breath that he might pass out. Pt lives at home, has no known sick contacts and has been strictly quarantined in his house since March. Pt is a former smoker, reports not smoking since his pneumonectomy in 2003. Pt denies associated chest pain, abdominal pain, n/v/d,    pacific :  [id number]

## 2020-05-02 NOTE — H&P ADULT - NSHPLABSRESULTS_GEN_ALL_CORE
personally reviewed lab, imaging and EKG      CBC Full  -  ( 02 May 2020 17:36 )  WBC Count : 14.77 K/uL  Hemoglobin : 10.8 g/dL  Hematocrit : 32.2 %  Platelet Count - Automated : 112 K/uL  Mean Cell Volume : 95.8 fL  Mean Cell Hemoglobin : 32.1 pg  Mean Cell Hemoglobin Concentration : 33.5 %  Auto Neutrophil # : 13.47 K/uL  Auto Lymphocyte # : 0.30 K/uL  Auto Monocyte # : 0.89 K/uL  Auto Eosinophil # : 0.00 K/uL  Auto Basophil # : 0.02 K/uL  Auto Neutrophil % : 91.3 %  Auto Lymphocyte % : 2.0 %  Auto Monocyte % : 6.0 %  Auto Eosinophil % : 0.0 %  Auto Basophil % : 0.1 %    05-02    139  |  98  |  31<H>  ----------------------------<  128<H>  4.2   |  29  |  1.32<H>    Ca    9.9      02 May 2020 17:36  Phos  2.3     05-02  Mg     1.9     05-02    TPro  7.4  /  Alb  4.0  /  TBili  0.7  /  DBili  x   /  AST  12  /  ALT  8   /  AlkPhos  88  05-02    LIVER FUNCTIONS - ( 02 May 2020 17:36 )  Alb: 4.0 g/dL / Pro: 7.4 g/dL / ALK PHOS: 88 u/L / ALT: 8 u/L / AST: 12 u/L / GGT: x  33 CBC Full  -  ( 02 May 2020 17:36 )  WBC Count : 14.77 K/uL  Hemoglobin : 10.8 g/dL  Hematocrit : 32.2 %  Platelet Count - Automated : 112 K/uL  Mean Cell Volume : 95.8 fL  Mean Cell Hemoglobin : 32.1 pg  Mean Cell Hemoglobin Concentration : 33.5 %  Auto Neutrophil # : 13.47 K/uL  Auto Lymphocyte # : 0.30 K/uL  Auto Monocyte # : 0.89 K/uL  Auto Eosinophil # : 0.00 K/uL  Auto Basophil # : 0.02 K/uL  Auto Neutrophil % : 91.3 %  Auto Lymphocyte % : 2.0 %  Auto Monocyte % : 6.0 %  Auto Eosinophil % : 0.0 %  Auto Basophil % : 0.1 %    05-02    139  |  98  |  31<H>  ----------------------------<  128<H>  4.2   |  29  |  1.32<H>    Ca    9.9      02 May 2020 17:36  Phos  2.3     05-02  Mg     1.9     05-02    TPro  7.4  /  Alb  4.0  /  TBili  0.7  /  DBili  x   /  AST  12  /  ALT  8   /  AlkPhos  88  05-02    LIVER FUNCTIONS - ( 02 May 2020 17:36 )  Alb: 4.0 g/dL / Pro: 7.4 g/dL / ALK PHOS: 88 u/L / ALT: 8 u/L / AST: 12 u/L / GGT: x  33    CXR  No interval change when compared to chest radiograph 4/22/2018.  persistent right sided opacity consistent with known pneumonectomy with pleural calcifications. Left costophrenic angle   excluded from film. Small left pleural effusion with adjacent opacity is likely atelectasis. No pneumothorax.

## 2020-05-02 NOTE — ED PROVIDER NOTE - PROGRESS NOTE DETAILS
Lane Magaña DO, PGY-3: Spoke to pt using pacific  [id number] about advanced directives. Unclear if pt fully understood what I was attempting to communicate - that in the event he worsened we may need to place a breathing tube or even do CPR. Extensive back in forth with  and patient was had trying to make pt understand that our ventilators are different and more serious interventions than his home o2 machine. In the end, pt said he would like to leave what needs to be done "up to the doctors". That he would like "the doctors to decide what would be best". Explained to him that team upstairs would continue to try and help him work out what he would want if he were to deteriorate.

## 2020-05-02 NOTE — ED ADULT NURSE REASSESSMENT NOTE - NS ED NURSE REASSESS COMMENT FT1
Received report from RN. Pt resting comfortably in bed at this time. Breathing even and unlabored, o2 at 100% on 3L NC. Pt denies any complaints at this time. VS as noted. AFIB on the monitor. NAD. Will continue to monitor.

## 2020-05-02 NOTE — H&P ADULT - HISTORY OF PRESENT ILLNESS
72y M with PMHx of COPD on 2L home oxygen, Lung Ca s/p R pneumonectomy in 2003 (s/p chemo/radiation) Bovine AVR, Afib on coumadin, seizure d/o (tonic seizures, on carbamazepine and lamotrigine history and physical exam adapted from ED provider note per pandemic protocol.    was not able to reach family members for collateral information   Pt is Togolese speaking only      72y M with PMHx of COPD on 2L home oxygen, Lung Ca s/p R pneumonectomy in 2003 (s/p chemo/radiation) Bovine AVR, Afib on Eliquis, seizure d/o (tonic seizures, on carbamazepine and lamotrigine  productive cough,   called doctor and prescribed abx on Thursday  CPAP at night (?)   100.5 at home, chills history and physical exam adapted from ED provider note per pandemic protocol.    Pt is Kinyarwanda speaking only, collateral info obtained from pt's daughter Alannah  [phone]     72y man former smoker, COPD (on 2L home oxygen PRN, CPAP overnight), Lung Ca s/p R pneumonectomy in 2003 (s/p chemo/radiation), Bovine AVR, Afib on Eliquis, seizure presents from home to ED with fever, increased phlegm production and increased WOB in the past 2-3 days.  Per daughter, pt is on 2L of NC PRN, usually does not need continuous O2, but has been using O2 continuous for the past 1-2 weeks. Pt also uses nebulizer at home PRN every 6 hr. Family called doctor earlier this week and was prescribed abx on Thursday. Pt continue to spike fever, T max was 100.5 at home. No hx of HF since AVR surgery.  no recent travel hx, no sick contact at home. has been at home since Feb.     ROS: No CP, palpitation, LE swelling, n/v, diarrhea/constipation. history and physical exam adapted from ED provider note per pandemic protocol.    Pt is Persian speaking only, collateral info obtained from pt's daughter Alannah  [phone]     72y man former smoker, COPD (on 2L home oxygen PRN, CPAP overnight), Lung Ca s/p R pneumonectomy in 2003 (s/p chemo/radiation), Bovine AVR, Afib on Eliquis, seizure presents from home to ED with fever, increased phlegm production and increased WOB in the past 2-3 days.  Per daughter, pt is on 2L of NC PRN, usually does not need continuous O2, but has been using O2 continuous for the past 1-2 weeks. Pt also uses nebulizer at home PRN every 6 hr. Family called doctor earlier this week and was prescribed abx on Thursday. Pt continue to spike fever, T max was 100.5 at home. No hx of HF since AVR surgery.  no recent travel hx, no sick contact at home. has been at home since Feb.     ROS: No CP, palpitation, LE swelling, n/v, diarrhea/constipation.     ED course:   vitals: Tmax 102.5, tachy 30,  BP /60-78, tachypneic 30, sat 98% on 3L of NC    Tylenol x1, azithromycin x1, ceftriaxone x1, Solumedrol 125mg x1,  Albuterol x1, Atreovenia x1 history and physical exam adapted from ED provider note per pandemic protocol.    Pt is Setswana speaking only, collateral info obtained from pt's daughter Alannah  [phone]     72y man former smoker, COPD (on 2L home oxygen PRN, CPAP overnight), Lung Ca s/p R pneumonectomy in 2003 (s/p chemo/radiation), Bovine AVR, Afib on Eliquis, seizure presents from home to ED with fever, increased phlegm production and increased WOB in the past 2-3 days.  Per daughter, pt is on 2L of NC PRN, usually does not need continuous O2, but has been using O2 continuous for the past 1-2 weeks. Pt also uses nebulizer at home PRN every 6 hr. Family called doctor earlier this week and was prescribed abx on Thursday. Pt continue to spike fever, T max was 100.5 at home. No hx of HF since AVR surgery.  no recent travel hx, no sick contact at home. has been at home since Feb.     ROS: No CP, palpitation, LE swelling, n/v, diarrhea/constipation.     ED course:   vitals: Tmax 102.5, tachy 30,  BP /60-78, tachypneic 30, sat 98% on 3L of NC    Tylenol x1, azithromycin x1, ceftriaxone x1, Solumedrol 125mg x1,  Albuterol x1, Atreovent x1 history and physical exam adapted from ED provider note per pandemic protocol.    Pt is Lao speaking only, collateral info obtained from pt's daughter Alannah  [phone]     72y man former smoker, COPD (on 2L home oxygen PRN, CPAP overnight), Lung Ca s/p R pneumonectomy in 2003 (s/p chemo/radiation), Bovine AVR, Afib on Eliquis, seizure presents from home to ED with fever, increased phlegm production and increased WOB in the past 2-3 days.  Per daughter, pt is on 2L of NC PRN, usually does not need continuous O2, but has been using O2 continuous for the past 1-2 weeks. Pt also uses nebulizer at home PRN every 6 hr. Family called doctor earlier this week and was prescribed abx (Z-pack?) on Thursday. Pt continue to spike fever, T max was 100.5 at home. No hx of HF since AVR surgery.  no recent travel hx, no sick contact at home. has been at home since Feb.     ROS: No CP, palpitation, LE swelling, n/v, diarrhea/constipation.     ED course:   vitals: Tmax 102.5, tachy 30,  BP /60-78, tachypneic 30, sat 98% on 3L of NC    Tylenol x1, azithromycin x1, ceftriaxone x1, Solumedrol 125mg x1,  Albuterol x1, Atreovent x1 history and physical exam adapted from ED provider note per pandemic protocol.    Pt is Occitan speaking only, collateral info obtained from pt's daughter Alannah  [phone]     74  man former smoker, COPD (on 2L home oxygen PRN, CPAP overnight), Lung Ca s/p R pneumonectomy in 2003 (s/p chemo/radiation), Bovine AVR, Afib on Eliquis, seizure presents from home to ED with fever, increased phlegm production and increased SOB in the past 2-3 days.  Per daughter, pt is on 2L of NC PRN, usually does not need continuous O2, but has been using O2 continuous for the past 1-2 weeks. Pt also uses nebulizer at home PRN every 6 hr. Family called doctor earlier this week and was prescribed abx (Z-pack?) on Thursday. Pt continue to spike fever, T max was 100.5 at home. No hx of HF since AVR surgery.  no recent travel hx, no sick contact at home. has been at home since Feb.     ROS: No CP, palpitation, LE swelling, n/v, diarrhea/constipation.     ED course:   vitals: Tmax 102.5, tachy 130,  BP /60-78, tachypneic 30, sat 98% on 3L of NC    Tylenol x1, azithromycin x1, ceftriaxone x1, Solumedrol 125mg x1,  Albuterol x1, Atreovent x1 history and physical exam adapted from ED provider note per pandemic protocol.    Pt is Romansh speaking only, collateral info obtained from pt's daughter Alannah  [phone]     74 man former smoker, COPD (on 2L home oxygen PRN, CPAP overnight), Lung Ca s/p R pneumonectomy in 2003 (s/p chemo/radiation), Bovine AVR, Afib on Eliquis, seizure presents from home to ED with fever, increased phlegm production and increased SOB in the past 2-3 days.  Per daughter, pt is on 2L of NC PRN, usually does not need continuous O2, but has been using O2 continuous for the past 1-2 weeks. Pt also uses nebulizer at home PRN every 6 hr. Family called doctor earlier this week and was prescribed abx (Z-pack?) on Thursday. Pt continue to spike fever, T max was 100.5 at home. No hx of HF since AVR surgery.  no recent travel hx, no sick contact at home. has been at home since Feb.     ROS: No CP, palpitation, LE swelling, n/v, diarrhea/constipation.     ED course:   vitals: Tmax 102.5, tachy 130,  BP /60-78, tachypneic 30, sat 98% on 3L of NC    Tylenol x1, azithromycin x1, ceftriaxone x1, Solumedrol 125mg x1,  Albuterol x1, Atreovent x1

## 2020-05-02 NOTE — H&P ADULT - ASSESSMENT
72y M with PMHx of COPD on 2L home oxygen, Lung Ca s/p R pneumonectomy in 2003 (s/p chemo/radiation) Bovine AVR, Afib on Eliquis, seizure d/o (tonic seizures, on carbamazepine and lamotrigine  productive cough, 72y M with PMHx of COPD on 2L home oxygen, Lung Ca s/p R pneumonectomy in 2003 (s/p chemo/radiation) Bovine AVR, Afib on Eliquis, seizure, HTN presents with fever, increased SOB and productive cough, admit for COPD exacerbation. 72y M with PMHx of COPD on 2L home oxygen, Lung Ca s/p R pneumonectomy in 2003 (s/p chemo/radiation) Bovine AVR, Afib on Eliquis, seizure, HTN presents with fever, increased SOB and productive cough, admit for COPD exacerbation, and also r/o COVID19

## 2020-05-02 NOTE — H&P ADULT - ATTENDING COMMENTS
72y M with PMHx of COPD on 2L home oxygen, Lung Ca s/p R pneumonectomy in 2003 (s/p chemo/radiation) Bovine AVR, Afib on Eliquis, seizure, HTN presents with fever and hypoxic resp failure w/ concern for COPD exacerbation in the setting of COVID 19 infection. CXR unchanged, lower suspicion for superimposed bacterial infection. Continue with azithro, steroids and albuterol inhaler. Follow up COVID PCR. 72y M with PMHx of COPD on 2L home oxygen, Lung Ca s/p R pneumonectomy in 2003 (s/p chemo/radiation) Bovine AVR, Afib on Eliquis, seizure, HTN presents with fever and hypoxic resp failure w/ concern for COPD exacerbation in the setting of COVID 19 infection. CXR unchanged, lower suspicion for superimposed bacterial infection. Continue with azithro, steroids and albuterol inhaler. Follow up COVID PCR. EKG with Afib with RVR to 130s. Continue with metoprolol with hold parameters given soft BPs. Gentle hydration prn. Unclear baseline Cr, obtain collateral in AM. Trend BMP.

## 2020-05-02 NOTE — ED ADULT TRIAGE NOTE - CHIEF COMPLAINT QUOTE
arrives ems,fever since today. on zithro since thurs. increasing productive cough. pt with copd normally uses nasal cannula 2.5..  pt c/o back pain.   on eliquis /a fib

## 2020-05-02 NOTE — H&P ADULT - NSICDXPASTSURGICALHX_GEN_ALL_CORE_FT
PAST SURGICAL HISTORY:  Aortic valve replaced 5/2013   bovine AoValve    H/O aortic valve replacement 2013    H/O inguinal hernia repair 1999    S/P cataract surgery bilaterally    S/P pneumonectomy right sided, ~ 2003

## 2020-05-02 NOTE — H&P ADULT - PROBLEM SELECTOR PLAN 1
- high suspicious for COPD exacerbation  - s/p ceftriaxone x1, azithromycin x1 in ED, Solumedrol 125mg x1    - will continue home albuterol PRN, azithromycin and Solumedrol    - CXR: no consolidation, procalcitonin low 0.21    - f/u COVID result, if negative, can start DuoNeb - high suspicious for COPD exacerbation  - s/p ceftriaxone x1, azithromycin x1 in ED, Solumedrol 125mg x1    - will continue home albuterol PRN, azithromycin  - start Prednisone 40mg x 5 days  - CXR: no consolidation, procalcitonin low 0.21    - f/u COVID result, if negative, can start DuoNeb

## 2020-05-02 NOTE — ED ADULT NURSE NOTE - OBJECTIVE STATEMENT
Pt A+OX4, Irish speaking.   ID [id number].   C/O fever, cough, SOB intermittently since March but got worse over the past few days with a period today where he felt he couldn't breathe.  Former smoker.  Unsure of medications he's taking.  Denies CP.  Says he has loss of appetite and nausea today.  Denies any sick contacts.  Tmax 102.  CM placed.  O2 2L NC placed.  Uses O2 2L NC at home.  Using accessory muscles to breathe.  Tachypneic and tachycardic.  A Fib on monitor.  Pt immediately desat to 80 off of O2.  MD at bedside Pt A+OX4, Vietnamese speaking.   ID [id number].   C/O fever, cough, SOB intermittently since March but got worse over the past few days with a period today where he felt he couldn't breathe.  Former smoker.  Unsure of medications he's taking.  Denies CP.  Says he has loss of appetite and nausea today.  Denies any sick contacts.  Tmax 102.  CM placed.  O2 2L NC placed.  Uses O2 2L NC at home.  +Wet cough noted.  Using accessory muscles to breathe.  Tachypneic and tachycardic.  A Fib on monitor.  Pt immediately desat to 80 off of O2.  MD at bedside Pt A+OX4, Vietnamese speaking.   ID [id number].   C/O fever, cough, SOB intermittently since March but got worse over the past few days with a period today where he felt he couldn't breathe.  Former smoker.  Unsure of medications he's taking.  Denies CP.  Says he has loss of appetite and nausea today.  Denies any sick contacts.  Tmax 102.  CM placed.  A fib 130-150 noted.  O2 2L NC placed.  Uses O2 2L NC at home.  +Wet cough noted.  Using accessory muscles to breathe.  Tachypneic and tachycardic.  A Fib on monitor.  Pt immediately desat to 80 off of O2.  MD at bedside

## 2020-05-03 LAB
ANION GAP SERPL CALC-SCNC: 11 MMO/L — SIGNIFICANT CHANGE UP (ref 7–14)
APPEARANCE UR: CLEAR — SIGNIFICANT CHANGE UP
BASOPHILS # BLD AUTO: 0.01 K/UL — SIGNIFICANT CHANGE UP (ref 0–0.2)
BASOPHILS NFR BLD AUTO: 0.1 % — SIGNIFICANT CHANGE UP (ref 0–2)
BILIRUB UR-MCNC: NEGATIVE — SIGNIFICANT CHANGE UP
BLOOD UR QL VISUAL: NEGATIVE — SIGNIFICANT CHANGE UP
BUN SERPL-MCNC: 35 MG/DL — HIGH (ref 7–23)
CALCIUM SERPL-MCNC: 9.8 MG/DL — SIGNIFICANT CHANGE UP (ref 8.4–10.5)
CHLORIDE SERPL-SCNC: 97 MMOL/L — LOW (ref 98–107)
CO2 SERPL-SCNC: 30 MMOL/L — SIGNIFICANT CHANGE UP (ref 22–31)
COLOR SPEC: YELLOW — SIGNIFICANT CHANGE UP
CREAT SERPL-MCNC: 1.28 MG/DL — SIGNIFICANT CHANGE UP (ref 0.5–1.3)
EOSINOPHIL # BLD AUTO: 0 K/UL — SIGNIFICANT CHANGE UP (ref 0–0.5)
EOSINOPHIL NFR BLD AUTO: 0 % — SIGNIFICANT CHANGE UP (ref 0–6)
GLUCOSE SERPL-MCNC: 132 MG/DL — HIGH (ref 70–99)
GLUCOSE UR-MCNC: NEGATIVE — SIGNIFICANT CHANGE UP
HCT VFR BLD CALC: 30.3 % — LOW (ref 39–50)
HGB BLD-MCNC: 10.1 G/DL — LOW (ref 13–17)
IMM GRANULOCYTES NFR BLD AUTO: 0.6 % — SIGNIFICANT CHANGE UP (ref 0–1.5)
KETONES UR-MCNC: NEGATIVE — SIGNIFICANT CHANGE UP
LEUKOCYTE ESTERASE UR-ACNC: NEGATIVE — SIGNIFICANT CHANGE UP
LYMPHOCYTES # BLD AUTO: 0.39 K/UL — LOW (ref 1–3.3)
LYMPHOCYTES # BLD AUTO: 2.5 % — LOW (ref 13–44)
MAGNESIUM SERPL-MCNC: 2.2 MG/DL — SIGNIFICANT CHANGE UP (ref 1.6–2.6)
MANUAL SMEAR VERIFICATION: SIGNIFICANT CHANGE UP
MCHC RBC-ENTMCNC: 31.7 PG — SIGNIFICANT CHANGE UP (ref 27–34)
MCHC RBC-ENTMCNC: 33.3 % — SIGNIFICANT CHANGE UP (ref 32–36)
MCV RBC AUTO: 95 FL — SIGNIFICANT CHANGE UP (ref 80–100)
MONOCYTES # BLD AUTO: 0.63 K/UL — SIGNIFICANT CHANGE UP (ref 0–0.9)
MONOCYTES NFR BLD AUTO: 4.1 % — SIGNIFICANT CHANGE UP (ref 2–14)
NEUTROPHILS # BLD AUTO: 14.28 K/UL — HIGH (ref 1.8–7.4)
NEUTROPHILS NFR BLD AUTO: 92.7 % — HIGH (ref 43–77)
NITRITE UR-MCNC: NEGATIVE — SIGNIFICANT CHANGE UP
NRBC # FLD: 0 K/UL — SIGNIFICANT CHANGE UP (ref 0–0)
PH UR: 6 — SIGNIFICANT CHANGE UP (ref 5–8)
PHOSPHATE SERPL-MCNC: 3.6 MG/DL — SIGNIFICANT CHANGE UP (ref 2.5–4.5)
PLATELET # BLD AUTO: 125 K/UL — LOW (ref 150–400)
PMV BLD: 10.4 FL — SIGNIFICANT CHANGE UP (ref 7–13)
POTASSIUM SERPL-MCNC: 4.6 MMOL/L — SIGNIFICANT CHANGE UP (ref 3.5–5.3)
POTASSIUM SERPL-SCNC: 4.6 MMOL/L — SIGNIFICANT CHANGE UP (ref 3.5–5.3)
PROT UR-MCNC: 10 — SIGNIFICANT CHANGE UP
RBC # BLD: 3.19 M/UL — LOW (ref 4.2–5.8)
RBC # FLD: 12.9 % — SIGNIFICANT CHANGE UP (ref 10.3–14.5)
SARS-COV-2 RNA SPEC QL NAA+PROBE: SIGNIFICANT CHANGE UP
SARS-COV-2 RNA SPEC QL NAA+PROBE: SIGNIFICANT CHANGE UP
SODIUM SERPL-SCNC: 138 MMOL/L — SIGNIFICANT CHANGE UP (ref 135–145)
SP GR SPEC: 1.02 — SIGNIFICANT CHANGE UP (ref 1–1.04)
UROBILINOGEN FLD QL: NORMAL — SIGNIFICANT CHANGE UP
WBC # BLD: 15.4 K/UL — HIGH (ref 3.8–10.5)
WBC # FLD AUTO: 15.4 K/UL — HIGH (ref 3.8–10.5)

## 2020-05-03 PROCEDURE — 99232 SBSQ HOSP IP/OBS MODERATE 35: CPT | Mod: GC

## 2020-05-03 PROCEDURE — 99232 SBSQ HOSP IP/OBS MODERATE 35: CPT

## 2020-05-03 RX ORDER — AZITHROMYCIN 500 MG/1
500 TABLET, FILM COATED ORAL DAILY
Refills: 0 | Status: DISCONTINUED | OUTPATIENT
Start: 2020-05-03 | End: 2020-05-05

## 2020-05-03 RX ORDER — IPRATROPIUM/ALBUTEROL SULFATE 18-103MCG
3 AEROSOL WITH ADAPTER (GRAM) INHALATION EVERY 6 HOURS
Refills: 0 | Status: DISCONTINUED | OUTPATIENT
Start: 2020-05-03 | End: 2020-05-05

## 2020-05-03 RX ADMIN — APIXABAN 5 MILLIGRAM(S): 2.5 TABLET, FILM COATED ORAL at 23:18

## 2020-05-03 RX ADMIN — LAMOTRIGINE 100 MILLIGRAM(S): 25 TABLET, ORALLY DISINTEGRATING ORAL at 17:08

## 2020-05-03 RX ADMIN — LAMOTRIGINE 100 MILLIGRAM(S): 25 TABLET, ORALLY DISINTEGRATING ORAL at 07:02

## 2020-05-03 RX ADMIN — Medication 200 MILLIGRAM(S): at 23:18

## 2020-05-03 RX ADMIN — Medication 40 MILLIGRAM(S): at 04:00

## 2020-05-03 RX ADMIN — Medication 25 MILLIGRAM(S): at 23:19

## 2020-05-03 RX ADMIN — Medication 25 MILLIGRAM(S): at 13:08

## 2020-05-03 RX ADMIN — Medication 400 MILLIGRAM(S): at 13:09

## 2020-05-03 RX ADMIN — AZITHROMYCIN 500 MILLIGRAM(S): 500 TABLET, FILM COATED ORAL at 23:18

## 2020-05-03 NOTE — PROGRESS NOTE ADULT - PROBLEM SELECTOR PLAN 1
- high suspicious for COPD exacerbation  - s/p ceftriaxone x1, azithromycin x1 in ED, Solumedrol 125mg x1    - will continue home albuterol PRN, azithromycin  - start Prednisone 40mg x 5 days  - CXR: no consolidation, procalcitonin low 0.21    - COVID negative x 1, repeated  - start Duoneb q 6hour

## 2020-05-03 NOTE — PHYSICAL THERAPY INITIAL EVALUATION ADULT - PHYSICAL ASSIST/NONPHYSICAL ASSIST: SIT/STAND, REHAB EVAL
Psych consulted, pt with anxiety and depressive sx.   -- Ativan 0.5 qHS standing  -- Ativan 0.5 q6 PRN for anxiety 1 person assist/verbal cues

## 2020-05-03 NOTE — PHYSICAL THERAPY INITIAL EVALUATION ADULT - GENERAL OBSERVATIONS, REHAB EVAL
Patient seen supine in bed, 3 liters of oxygen via nasal cannula. Before session oxygen saturation 96%, , after session oxygen saturation 95% and  to 118.

## 2020-05-03 NOTE — PROGRESS NOTE ADULT - SUBJECTIVE AND OBJECTIVE BOX
Patient is a 74y old  Male who presents with a chief complaint of SOB, fever (02 May 2020 21:18)    SUBJECTIVE / OVERNIGHT EVENTS:   No acute events overnight.  COVID negative x 1.  Patient reports that his breathing feels much better.  Denies chest pain.  No further fever since ER.    MEDICATIONS  (STANDING):  apixaban 5 milliGRAM(s) Oral two times a day  azithromycin   Tablet 500 milliGRAM(s) Oral daily  carBAMazepine 400 milliGRAM(s) Oral daily  carBAMazepine 200 milliGRAM(s) Oral at bedtime  lamoTRIgine 100 milliGRAM(s) Oral two times a day  metoprolol tartrate 25 milliGRAM(s) Oral two times a day  predniSONE   Tablet 40 milliGRAM(s) Oral every 24 hours    MEDICATIONS  (PRN):  acetaminophen   Tablet .. 650 milliGRAM(s) Oral every 6 hours PRN Temp greater or equal to 38C (100.4F)  ALBUTerol    90 MICROgram(s) HFA Inhaler 2 Puff(s) Inhalation every 4 hours PRN Shortness of Breath and/or Wheezing      T(C): 36.4 (20 @ 05:22), Max: 39.2 (20 @ 17:37)  HR: 80 (20 @ 05:22) (80 - 131)  BP: 115/60 (20 @ 05:22) (95/60 - 115/60)  RR: 20 (20 @ 05:22) (20 - 42)  SpO2: 100% (20 @ 05:22) (95% - 100%)  CAPILLARY BLOOD GLUCOSE        I&O's Summary    02 May 2020 07:01  -  03 May 2020 07:00  --------------------------------------------------------  IN: 0 mL / OUT: 400 mL / NET: -400 mL        PHYSICAL EXAM:  GENERAL: not in distress, on nasal cannula  EYES: open, sclera clear b/l  CHEST/LUNG: normal respiratory effort, not tachypneic, decreased breath sounds at right base, mild crackles at left base, no wheeze  HEART: Not tachycardic, irregular, no lower extremity edema b/l  ABDOMEN: Soft, Nontender, Nondistended  EXTREMITIES: Warm and well perfused  NEUROLOGY: awake, alert, responds to Qs appropriately, no gross deficits  PSYCH: calm, cooperative  SKIN: No visible rashes or lesions    LABS:                        10.1   15.40 )-----------( 125      ( 03 May 2020 04:15 )             30.3     05-03    138  |  97<L>  |  35<H>  ----------------------------<  132<H>  4.6   |  30  |  1.28    Ca    9.8      03 May 2020 04:15  Phos  3.6     05-03  Mg     2.2     05-03    TPro  7.4  /  Alb  4.0  /  TBili  0.7  /  DBili  x   /  AST  12  /  ALT  8   /  AlkPhos  88  05-02          Urinalysis Basic - ( 03 May 2020 04:15 )    Color: YELLOW / Appearance: CLEAR / S.020 / pH: 6.0  Gluc: NEGATIVE / Ketone: NEGATIVE  / Bili: NEGATIVE / Urobili: NORMAL   Blood: NEGATIVE / Protein: 10 / Nitrite: NEGATIVE   Leuk Esterase: NEGATIVE / RBC: x / WBC x   Sq Epi: x / Non Sq Epi: x / Bacteria: x        RADIOLOGY & ADDITIONAL TESTS:

## 2020-05-03 NOTE — PROGRESS NOTE ADULT - ASSESSMENT
72y M with PMHx of COPD (on intermittent 2L home oxygen), Lung Ca s/p R pneumonectomy in 2003 (s/p chemo/radiation), Bovine AVR, Afib on Eliquis, seizure, HTN presents with fever, increased SOB and productive cough, admit for COPD exacerbation, and also r/o COVID19.

## 2020-05-03 NOTE — PHYSICAL THERAPY INITIAL EVALUATION ADULT - PRECAUTIONS/LIMITATIONS, REHAB EVAL
3 liters of oxygen / Airborne / contact precaution/oxygen therapy device and L/min/isolation precautions

## 2020-05-04 LAB
ALBUMIN SERPL ELPH-MCNC: 4 G/DL — SIGNIFICANT CHANGE UP (ref 3.3–5)
ALP SERPL-CCNC: 91 U/L — SIGNIFICANT CHANGE UP (ref 40–120)
ALT FLD-CCNC: 18 U/L — SIGNIFICANT CHANGE UP (ref 4–41)
ANION GAP SERPL CALC-SCNC: 14 MMO/L — SIGNIFICANT CHANGE UP (ref 7–14)
AST SERPL-CCNC: 18 U/L — SIGNIFICANT CHANGE UP (ref 4–40)
BILIRUB SERPL-MCNC: 0.5 MG/DL — SIGNIFICANT CHANGE UP (ref 0.2–1.2)
BUN SERPL-MCNC: 40 MG/DL — HIGH (ref 7–23)
CALCIUM SERPL-MCNC: 9.6 MG/DL — SIGNIFICANT CHANGE UP (ref 8.4–10.5)
CHLORIDE SERPL-SCNC: 98 MMOL/L — SIGNIFICANT CHANGE UP (ref 98–107)
CO2 SERPL-SCNC: 29 MMOL/L — SIGNIFICANT CHANGE UP (ref 22–31)
CREAT SERPL-MCNC: 1.3 MG/DL — SIGNIFICANT CHANGE UP (ref 0.5–1.3)
GLUCOSE SERPL-MCNC: 103 MG/DL — HIGH (ref 70–99)
HCT VFR BLD CALC: 31.8 % — LOW (ref 39–50)
HGB BLD-MCNC: 10.9 G/DL — LOW (ref 13–17)
MAGNESIUM SERPL-MCNC: 2.3 MG/DL — SIGNIFICANT CHANGE UP (ref 1.6–2.6)
MCHC RBC-ENTMCNC: 32.5 PG — SIGNIFICANT CHANGE UP (ref 27–34)
MCHC RBC-ENTMCNC: 34.3 % — SIGNIFICANT CHANGE UP (ref 32–36)
MCV RBC AUTO: 94.9 FL — SIGNIFICANT CHANGE UP (ref 80–100)
NRBC # FLD: 0 K/UL — SIGNIFICANT CHANGE UP (ref 0–0)
PHOSPHATE SERPL-MCNC: 2.3 MG/DL — LOW (ref 2.5–4.5)
PLATELET # BLD AUTO: 155 K/UL — SIGNIFICANT CHANGE UP (ref 150–400)
PMV BLD: 10.3 FL — SIGNIFICANT CHANGE UP (ref 7–13)
POTASSIUM SERPL-MCNC: 4.2 MMOL/L — SIGNIFICANT CHANGE UP (ref 3.5–5.3)
POTASSIUM SERPL-SCNC: 4.2 MMOL/L — SIGNIFICANT CHANGE UP (ref 3.5–5.3)
PROT SERPL-MCNC: 7.5 G/DL — SIGNIFICANT CHANGE UP (ref 6–8.3)
RBC # BLD: 3.35 M/UL — LOW (ref 4.2–5.8)
RBC # FLD: 12.8 % — SIGNIFICANT CHANGE UP (ref 10.3–14.5)
SODIUM SERPL-SCNC: 141 MMOL/L — SIGNIFICANT CHANGE UP (ref 135–145)
WBC # BLD: 11.77 K/UL — HIGH (ref 3.8–10.5)
WBC # FLD AUTO: 11.77 K/UL — HIGH (ref 3.8–10.5)

## 2020-05-04 PROCEDURE — 99233 SBSQ HOSP IP/OBS HIGH 50: CPT

## 2020-05-04 PROCEDURE — 71250 CT THORAX DX C-: CPT | Mod: 26

## 2020-05-04 RX ORDER — CEFTRIAXONE 500 MG/1
1000 INJECTION, POWDER, FOR SOLUTION INTRAMUSCULAR; INTRAVENOUS EVERY 24 HOURS
Refills: 0 | Status: DISCONTINUED | OUTPATIENT
Start: 2020-05-04 | End: 2020-05-05

## 2020-05-04 RX ADMIN — CEFTRIAXONE 100 MILLIGRAM(S): 500 INJECTION, POWDER, FOR SOLUTION INTRAMUSCULAR; INTRAVENOUS at 16:09

## 2020-05-04 RX ADMIN — AZITHROMYCIN 500 MILLIGRAM(S): 500 TABLET, FILM COATED ORAL at 12:10

## 2020-05-04 RX ADMIN — APIXABAN 5 MILLIGRAM(S): 2.5 TABLET, FILM COATED ORAL at 16:09

## 2020-05-04 RX ADMIN — Medication 40 MILLIGRAM(S): at 06:13

## 2020-05-04 RX ADMIN — Medication 25 MILLIGRAM(S): at 12:10

## 2020-05-04 RX ADMIN — Medication 200 MILLIGRAM(S): at 21:07

## 2020-05-04 RX ADMIN — APIXABAN 5 MILLIGRAM(S): 2.5 TABLET, FILM COATED ORAL at 06:12

## 2020-05-04 RX ADMIN — LAMOTRIGINE 100 MILLIGRAM(S): 25 TABLET, ORALLY DISINTEGRATING ORAL at 06:12

## 2020-05-04 RX ADMIN — LAMOTRIGINE 100 MILLIGRAM(S): 25 TABLET, ORALLY DISINTEGRATING ORAL at 16:08

## 2020-05-04 RX ADMIN — Medication 400 MILLIGRAM(S): at 12:10

## 2020-05-04 RX ADMIN — Medication 25 MILLIGRAM(S): at 21:07

## 2020-05-04 NOTE — PROGRESS NOTE ADULT - SUBJECTIVE AND OBJECTIVE BOX
Patient is a 74y old  Male who presents with a chief complaint of SOB, fever (03 May 2020 11:56)      SUBJECTIVE / OVERNIGHT EVENTS:  ADDITIONAL REVIEW OF SYSTEMS:    MEDICATIONS  (STANDING):  albuterol/ipratropium for Nebulization 3 milliLiter(s) Nebulizer every 6 hours  apixaban 5 milliGRAM(s) Oral two times a day  azithromycin   Tablet 500 milliGRAM(s) Oral daily  carBAMazepine 400 milliGRAM(s) Oral daily  carBAMazepine 200 milliGRAM(s) Oral at bedtime  lamoTRIgine 100 milliGRAM(s) Oral two times a day  metoprolol tartrate 25 milliGRAM(s) Oral two times a day  predniSONE   Tablet 40 milliGRAM(s) Oral every 24 hours    MEDICATIONS  (PRN):  acetaminophen   Tablet .. 650 milliGRAM(s) Oral every 6 hours PRN Temp greater or equal to 38C (100.4F)  ALBUTerol    90 MICROgram(s) HFA Inhaler 2 Puff(s) Inhalation every 4 hours PRN Shortness of Breath and/or Wheezing      CAPILLARY BLOOD GLUCOSE        I&O's Summary    03 May 2020 07:01  -  04 May 2020 07:00  --------------------------------------------------------  IN: 0 mL / OUT: 600 mL / NET: -600 mL        PHYSICAL EXAM:  Vital Signs Last 24 Hrs  T(C): 36.5 (04 May 2020 12:09), Max: 36.9 (04 May 2020 06:42)  T(F): 97.7 (04 May 2020 12:09), Max: 98.5 (04 May 2020 06:42)  HR: 82 (04 May 2020 12:09) (70 - 92)  BP: 113/76 (04 May 2020 12:09) (110/60 - 141/70)  BP(mean): --  RR: 19 (04 May 2020 12:09) (19 - 22)  SpO2: 100% (04 May 2020 12:09) (100% - 100%)  CONSTITUTIONAL: NAD, well-developed, well-groomed  EYES: PERRLA; conjunctiva and sclera clear  ENMT: Moist oral mucosa, no pharyngeal injection or exudates; normal dentition  NECK: Supple, no palpable masses; no thyromegaly  RESPIRATORY: Normal respiratory effort; lungs are clear to auscultation bilaterally  CARDIOVASCULAR: Regular rate and rhythm, normal S1 and S2, no murmur/rub/gallop; No lower extremity edema; Peripheral pulses are 2+ bilaterally  ABDOMEN: Nontender to palpation, normoactive bowel sounds, no rebound/guarding; No hepatosplenomegaly  MUSCULOSKELETAL:  Normal gait; no clubbing or cyanosis of digits; no joint swelling or tenderness to palpation  PSYCH: A+O to person, place, and time; affect appropriate  NEUROLOGY: CN 2-12 are intact and symmetric; no gross sensory deficits   SKIN: No rashes; no palpable lesions    LABS:                        10.9   11.77 )-----------( 155      ( 04 May 2020 08:30 )             31.8     05-04    141  |  98  |  40<H>  ----------------------------<  103<H>  4.2   |  29  |  1.30    Ca    9.6      04 May 2020 08:30  Phos  2.3     05-04  Mg     2.3     05-04    TPro  7.5  /  Alb  4.0  /  TBili  0.5  /  DBili  x   /  AST  18  /  ALT  18  /  AlkPhos  91  05-04          Urinalysis Basic - ( 03 May 2020 04:15 )    Color: YELLOW / Appearance: CLEAR / S.020 / pH: 6.0  Gluc: NEGATIVE / Ketone: NEGATIVE  / Bili: NEGATIVE / Urobili: NORMAL   Blood: NEGATIVE / Protein: 10 / Nitrite: NEGATIVE   Leuk Esterase: NEGATIVE / RBC: x / WBC x   Sq Epi: x / Non Sq Epi: x / Bacteria: x        Culture - Blood (collected 03 May 2020 00:19)  Source: .Blood Blood-Venous  Preliminary Report (04 May 2020 01:01):    No growth to date.    Culture - Blood (collected 03 May 2020 00:15)  Source: .Blood Blood-Peripheral  Preliminary Report (04 May 2020 01:01):    No growth to date.        RADIOLOGY & ADDITIONAL TESTS:  Results Reviewed:   Imaging Personally Reviewed:  Electrocardiogram Personally Reviewed:    COORDINATION OF CARE:  Care Discussed with Consultants/Other Providers [Y/N]:  Prior or Outpatient Records Reviewed [Y/N]: Patient is a 74y old  Male who presents with a chief complaint of SOB, fever (03 May 2020 11:56)      SUBJECTIVE / OVERNIGHT EVENTS: Coughing feels Ok     ADDITIONAL REVIEW OF SYSTEMS: denies hemoptysis/CP/N/V     MEDICATIONS  (STANDING):  albuterol/ipratropium for Nebulization 3 milliLiter(s) Nebulizer every 6 hours  apixaban 5 milliGRAM(s) Oral two times a day  azithromycin   Tablet 500 milliGRAM(s) Oral daily  carBAMazepine 400 milliGRAM(s) Oral daily  carBAMazepine 200 milliGRAM(s) Oral at bedtime  lamoTRIgine 100 milliGRAM(s) Oral two times a day  metoprolol tartrate 25 milliGRAM(s) Oral two times a day  predniSONE   Tablet 40 milliGRAM(s) Oral every 24 hours    MEDICATIONS  (PRN):  acetaminophen   Tablet .. 650 milliGRAM(s) Oral every 6 hours PRN Temp greater or equal to 38C (100.4F)  ALBUTerol    90 MICROgram(s) HFA Inhaler 2 Puff(s) Inhalation every 4 hours PRN Shortness of Breath and/or Wheezing      CAPILLARY BLOOD GLUCOSE        I&O's Summary    03 May 2020 07:01  -  04 May 2020 07:00  --------------------------------------------------------  IN: 0 mL / OUT: 600 mL / NET: -600 mL        PHYSICAL EXAM:  Vital Signs Last 24 Hrs  T(C): 36.5 (04 May 2020 12:09), Max: 36.9 (04 May 2020 06:42)  T(F): 97.7 (04 May 2020 12:09), Max: 98.5 (04 May 2020 06:42)  HR: 82 (04 May 2020 12:09) (70 - 92)  BP: 113/76 (04 May 2020 12:09) (110/60 - 141/70)  BP(mean): --  RR: 19 (04 May 2020 12:09) (19 - 22)  SpO2: 100% (04 May 2020 12:09) (100% - 100%)    CONSTITUTIONAL: NAD on oxygen   EYES: conjunctiva and sclera clear  ENMT: Moist oral mucosa,   NECK: Supple, no palpable masses; no thyromegaly  RESPIRATORY: transmitted BS B/L   CARDIOVASCULAR: Regular rate and rhythm, normal S1 and S2, no murmur/rub/gallop; No lower extremity edema; Peripheral pulses are 2+ bilaterally  ABDOMEN: Nontender to palpation, normoactive bowel sounds, no rebound/guarding; No hepatosplenomegaly  MUSCULOSKELETAL:  Normal gait; no clubbing or cyanosis of digits; no joint swelling or tenderness to palpation  PSYCH: A+O to person, place, and time; affect appropriate  NEUROLOGY: CN 2-12 are intact and symmetric; no gross sensory deficits   SKIN: No rashes; no palpable lesions    LABS:                        10.9   11.77 )-----------( 155      ( 04 May 2020 08:30 )             31.8     05-04    141  |  98  |  40<H>  ----------------------------<  103<H>  4.2   |  29  |  1.30    Ca    9.6      04 May 2020 08:30  Phos  2.3     05-04  Mg     2.3     05-04    TPro  7.5  /  Alb  4.0  /  TBili  0.5  /  DBili  x   /  AST  18  /  ALT  18  /  AlkPhos  91  05-04          Urinalysis Basic - ( 03 May 2020 04:15 )    Color: YELLOW / Appearance: CLEAR / S.020 / pH: 6.0  Gluc: NEGATIVE / Ketone: NEGATIVE  / Bili: NEGATIVE / Urobili: NORMAL   Blood: NEGATIVE / Protein: 10 / Nitrite: NEGATIVE   Leuk Esterase: NEGATIVE / RBC: x / WBC x   Sq Epi: x / Non Sq Epi: x / Bacteria: x        Culture - Blood (collected 03 May 2020 00:19)  Source: .Blood Blood-Venous  Preliminary Report (04 May 2020 01:01):    No growth to date.    Culture - Blood (collected 03 May 2020 00:15)  Source: .Blood Blood-Peripheral  Preliminary Report (04 May 2020 01:01):    No growth to date.        RADIOLOGY & ADDITIONAL TESTS:  Results Reviewed:   Imaging Personally Reviewed:  Electrocardiogram Personally Reviewed:    COORDINATION OF CARE:  Care Discussed with Consultants/Other Providers [Y/N]:  Prior or Outpatient Records Reviewed [Y/N]:

## 2020-05-04 NOTE — PROGRESS NOTE ADULT - PROBLEM SELECTOR PLAN 1
- high suspicious for COPD exacerbation vs CAP  - s/p ceftriaxone x1, azithromycin x1 in ED, Solumedrol 125mg x1    - will continue home albuterol PRN, Duoneb q 6hour  - blood cx NGTD  - add ceftriaxone/azithro   - check Urine LAD and RVP   - given elevated WBC and LT shift on admission check CT chest r/o PNA   - start Prednisone 40mg x 5 days  - CXR: no consolidation  - COVID negative x 2

## 2020-05-04 NOTE — PROGRESS NOTE ADULT - ASSESSMENT
72y M with PMHx of COPD (on intermittent 2L home oxygen), Lung Ca s/p R pneumonectomy in 2003 (s/p chemo/radiation), Bovine AVR, Afib on Eliquis, seizure, HTN presents with fever, increased SOB and productive cough, admit for COPD exacerbation, COVID neg x 2

## 2020-05-05 ENCOUNTER — TRANSCRIPTION ENCOUNTER (OUTPATIENT)
Age: 75
End: 2020-05-05

## 2020-05-05 VITALS
TEMPERATURE: 98 F | OXYGEN SATURATION: 100 % | HEART RATE: 95 BPM | DIASTOLIC BLOOD PRESSURE: 70 MMHG | RESPIRATION RATE: 20 BRPM | SYSTOLIC BLOOD PRESSURE: 128 MMHG

## 2020-05-05 DIAGNOSIS — R06.02 SHORTNESS OF BREATH: ICD-10-CM

## 2020-05-05 LAB
ALBUMIN SERPL ELPH-MCNC: 3.8 G/DL — SIGNIFICANT CHANGE UP (ref 3.3–5)
ALP SERPL-CCNC: 79 U/L — SIGNIFICANT CHANGE UP (ref 40–120)
ALT FLD-CCNC: 20 U/L — SIGNIFICANT CHANGE UP (ref 4–41)
ANION GAP SERPL CALC-SCNC: 11 MMO/L — SIGNIFICANT CHANGE UP (ref 7–14)
AST SERPL-CCNC: 17 U/L — SIGNIFICANT CHANGE UP (ref 4–40)
BILIRUB SERPL-MCNC: 0.5 MG/DL — SIGNIFICANT CHANGE UP (ref 0.2–1.2)
BUN SERPL-MCNC: 35 MG/DL — HIGH (ref 7–23)
CALCIUM SERPL-MCNC: 9.1 MG/DL — SIGNIFICANT CHANGE UP (ref 8.4–10.5)
CHLORIDE SERPL-SCNC: 96 MMOL/L — LOW (ref 98–107)
CO2 SERPL-SCNC: 30 MMOL/L — SIGNIFICANT CHANGE UP (ref 22–31)
CREAT SERPL-MCNC: 1.35 MG/DL — HIGH (ref 0.5–1.3)
CRP SERPL-MCNC: 58.7 MG/L — HIGH
FERRITIN SERPL-MCNC: 373.8 NG/ML — SIGNIFICANT CHANGE UP (ref 30–400)
GLUCOSE SERPL-MCNC: 98 MG/DL — SIGNIFICANT CHANGE UP (ref 70–99)
HCT VFR BLD CALC: 30.9 % — LOW (ref 39–50)
HGB BLD-MCNC: 10.4 G/DL — LOW (ref 13–17)
L PNEUMO AG UR QL: NEGATIVE — SIGNIFICANT CHANGE UP
MCHC RBC-ENTMCNC: 32.2 PG — SIGNIFICANT CHANGE UP (ref 27–34)
MCHC RBC-ENTMCNC: 33.7 % — SIGNIFICANT CHANGE UP (ref 32–36)
MCV RBC AUTO: 95.7 FL — SIGNIFICANT CHANGE UP (ref 80–100)
NRBC # FLD: 0 K/UL — SIGNIFICANT CHANGE UP (ref 0–0)
PLATELET # BLD AUTO: 134 K/UL — LOW (ref 150–400)
PMV BLD: 10 FL — SIGNIFICANT CHANGE UP (ref 7–13)
POTASSIUM SERPL-MCNC: 3.6 MMOL/L — SIGNIFICANT CHANGE UP (ref 3.5–5.3)
POTASSIUM SERPL-SCNC: 3.6 MMOL/L — SIGNIFICANT CHANGE UP (ref 3.5–5.3)
PROCALCITONIN SERPL-MCNC: 0.12 NG/ML — HIGH (ref 0.02–0.1)
PROT SERPL-MCNC: 6.8 G/DL — SIGNIFICANT CHANGE UP (ref 6–8.3)
RBC # BLD: 3.23 M/UL — LOW (ref 4.2–5.8)
RBC # FLD: 12.7 % — SIGNIFICANT CHANGE UP (ref 10.3–14.5)
SODIUM SERPL-SCNC: 137 MMOL/L — SIGNIFICANT CHANGE UP (ref 135–145)
WBC # BLD: 7.77 K/UL — SIGNIFICANT CHANGE UP (ref 3.8–10.5)
WBC # FLD AUTO: 7.77 K/UL — SIGNIFICANT CHANGE UP (ref 3.8–10.5)

## 2020-05-05 PROCEDURE — 99239 HOSP IP/OBS DSCHRG MGMT >30: CPT

## 2020-05-05 RX ORDER — CEFPODOXIME PROXETIL 100 MG
1 TABLET ORAL
Qty: 6 | Refills: 0
Start: 2020-05-05 | End: 2020-05-07

## 2020-05-05 RX ORDER — FLUTICASONE FUROATE AND VILANTEROL TRIFENATATE 100; 25 UG/1; UG/1
1 POWDER RESPIRATORY (INHALATION)
Qty: 0 | Refills: 0 | DISCHARGE

## 2020-05-05 RX ADMIN — LAMOTRIGINE 100 MILLIGRAM(S): 25 TABLET, ORALLY DISINTEGRATING ORAL at 04:54

## 2020-05-05 RX ADMIN — Medication 3 MILLILITER(S): at 05:12

## 2020-05-05 RX ADMIN — Medication 3 MILLILITER(S): at 15:27

## 2020-05-05 RX ADMIN — Medication 25 MILLIGRAM(S): at 11:47

## 2020-05-05 RX ADMIN — APIXABAN 5 MILLIGRAM(S): 2.5 TABLET, FILM COATED ORAL at 04:54

## 2020-05-05 RX ADMIN — Medication 40 MILLIGRAM(S): at 04:54

## 2020-05-05 RX ADMIN — AZITHROMYCIN 500 MILLIGRAM(S): 500 TABLET, FILM COATED ORAL at 11:47

## 2020-05-05 RX ADMIN — Medication 3 MILLILITER(S): at 09:06

## 2020-05-05 RX ADMIN — APIXABAN 5 MILLIGRAM(S): 2.5 TABLET, FILM COATED ORAL at 17:14

## 2020-05-05 RX ADMIN — Medication 400 MILLIGRAM(S): at 11:47

## 2020-05-05 RX ADMIN — CEFTRIAXONE 100 MILLIGRAM(S): 500 INJECTION, POWDER, FOR SOLUTION INTRAMUSCULAR; INTRAVENOUS at 17:15

## 2020-05-05 RX ADMIN — LAMOTRIGINE 100 MILLIGRAM(S): 25 TABLET, ORALLY DISINTEGRATING ORAL at 17:15

## 2020-05-05 NOTE — PROGRESS NOTE ADULT - SUBJECTIVE AND OBJECTIVE BOX
Patient is a 74y old  Male who presents with a chief complaint of SOB fever (04 May 2020 12:38)      SUBJECTIVE / OVERNIGHT EVENTS: s/p CT chest ON afebrile  ADDITIONAL REVIEW OF SYSTEMS: denies CP/N/V    MEDICATIONS  (STANDING):  albuterol/ipratropium for Nebulization 3 milliLiter(s) Nebulizer every 6 hours  apixaban 5 milliGRAM(s) Oral two times a day  azithromycin   Tablet 500 milliGRAM(s) Oral daily  carBAMazepine 400 milliGRAM(s) Oral daily  carBAMazepine 200 milliGRAM(s) Oral at bedtime  cefTRIAXone   IVPB 1000 milliGRAM(s) IV Intermittent every 24 hours  lamoTRIgine 100 milliGRAM(s) Oral two times a day  metoprolol tartrate 25 milliGRAM(s) Oral two times a day  predniSONE   Tablet 40 milliGRAM(s) Oral every 24 hours    MEDICATIONS  (PRN):  acetaminophen   Tablet .. 650 milliGRAM(s) Oral every 6 hours PRN Temp greater or equal to 38C (100.4F)  ALBUTerol    90 MICROgram(s) HFA Inhaler 2 Puff(s) Inhalation every 4 hours PRN Shortness of Breath and/or Wheezing      CAPILLARY BLOOD GLUCOSE        I&O's Summary      PHYSICAL EXAM:  Vital Signs Last 24 Hrs  T(C): 36.6 (05 May 2020 04:50), Max: 36.6 (04 May 2020 20:19)  T(F): 97.9 (05 May 2020 04:50), Max: 97.9 (05 May 2020 04:50)  HR: 78 (05 May 2020 04:50) (78 - 90)  BP: 107/58 (05 May 2020 04:50) (107/58 - 125/69)  BP(mean): --  RR: 20 (05 May 2020 04:50) (19 - 20)  SpO2: 100% (05 May 2020 04:50) (100% - 100%)    CONSTITUTIONAL: NAD on oxygen   EYES: conjunctiva and sclera clear  ENMT: Moist oral mucosa,   NECK: Supple, no palpable masses; no thyromegaly  RESPIRATORY: transmitted BS B/L   CARDIOVASCULAR: Regular rate and rhythm, normal S1 and S2, no murmur/rub/gallop; No lower extremity edema; Peripheral pulses are 2+ bilaterally  ABDOMEN: Nontender to palpation, normoactive bowel sounds, no rebound/guarding; No hepatosplenomegaly  MUSCULOSKELETAL:  Normal gait; no clubbing or cyanosis of digits; no joint swelling or tenderness to palpation  PSYCH: A+O to person, place, and time; affect appropriate  NEUROLOGY: CN 2-12 are intact and symmetric; no gross sensory deficits   SKIN: No rashes; no palpable lesions    LABS:                        10.4   7.77  )-----------( 134      ( 05 May 2020 06:10 )             30.9     05-05    137  |  96<L>  |  35<H>  ----------------------------<  98  3.6   |  30  |  1.35<H>    Ca    9.1      05 May 2020 06:10  Phos  2.3     05-04  Mg     2.3     05-04    TPro  6.8  /  Alb  3.8  /  TBili  0.5  /  DBili  x   /  AST  17  /  ALT  20  /  AlkPhos  79  05-05              Culture - Blood (collected 03 May 2020 00:19)  Source: .Blood Blood-Venous  Preliminary Report (04 May 2020 01:01):    No growth to date.    Culture - Blood (collected 03 May 2020 00:15)  Source: .Blood Blood-Peripheral  Preliminary Report (04 May 2020 01:01):    No growth to date.        RADIOLOGY & ADDITIONAL TESTS:  Results Reviewed: < from: CT Chest No Cont (05.04.20 @ 19:35) >    IMPRESSION:    Limited exam secondary to motion.    Mild peripheral groundglass opacities within the left lower lobe.    Pattern of GGO suggests infection including atypical pneumonia/viral infection from atypical agents including COVID-19 (C19V-1).    < end of copied text >    Imaging Personally Reviewed:  Electrocardiogram Personally Reviewed:    COORDINATION OF CARE:  Care Discussed with Consultants/Other Providers [Y/N]:  Prior or Outpatient Records Reviewed [Y/N]: Patient is a 74y old  Male who presents with a chief complaint of SOB fever (04 May 2020 12:38)      SUBJECTIVE / OVERNIGHT EVENTS: s/p CT chest ON afebrile  ADDITIONAL REVIEW OF SYSTEMS: denies CP/N/V    MEDICATIONS  (STANDING):  albuterol/ipratropium for Nebulization 3 milliLiter(s) Nebulizer every 6 hours  apixaban 5 milliGRAM(s) Oral two times a day  azithromycin   Tablet 500 milliGRAM(s) Oral daily  carBAMazepine 400 milliGRAM(s) Oral daily  carBAMazepine 200 milliGRAM(s) Oral at bedtime  cefTRIAXone   IVPB 1000 milliGRAM(s) IV Intermittent every 24 hours  lamoTRIgine 100 milliGRAM(s) Oral two times a day  metoprolol tartrate 25 milliGRAM(s) Oral two times a day  predniSONE   Tablet 40 milliGRAM(s) Oral every 24 hours    MEDICATIONS  (PRN):  acetaminophen   Tablet .. 650 milliGRAM(s) Oral every 6 hours PRN Temp greater or equal to 38C (100.4F)  ALBUTerol    90 MICROgram(s) HFA Inhaler 2 Puff(s) Inhalation every 4 hours PRN Shortness of Breath and/or Wheezing      CAPILLARY BLOOD GLUCOSE        I&O's Summary      PHYSICAL EXAM:  Vital Signs Last 24 Hrs  T(C): 36.6 (05 May 2020 04:50), Max: 36.6 (04 May 2020 20:19)  T(F): 97.9 (05 May 2020 04:50), Max: 97.9 (05 May 2020 04:50)  HR: 78 (05 May 2020 04:50) (78 - 90)  BP: 107/58 (05 May 2020 04:50) (107/58 - 125/69)  BP(mean): --  RR: 20 (05 May 2020 04:50) (19 - 20)  SpO2: 100% (05 May 2020 04:50) (100% - 100%)    CONSTITUTIONAL: NAD on oxygen   EYES: conjunctiva and sclera clear  ENMT: Moist oral mucosa,   NECK: Supple, no palpable masses; no thyromegaly  RESPIRATORY: CTA b/l no wheezing   CARDIOVASCULAR: Regular rate and rhythm, normal S1 and S2,   ABDOMEN: Nontender to palpation, normoactive bowel sounds, no rebound/guarding; No hepatosplenomegaly  MUSCULOSKELETAL:  no clubbing or cyanosis of digits;   NEUROLOGY: CN 2-12 are intact and symmetric; no gross sensory deficits       LABS:                        10.4   7.77  )-----------( 134      ( 05 May 2020 06:10 )             30.9     05-05    137  |  96<L>  |  35<H>  ----------------------------<  98  3.6   |  30  |  1.35<H>    Ca    9.1      05 May 2020 06:10  Phos  2.3     05-04  Mg     2.3     05-04    TPro  6.8  /  Alb  3.8  /  TBili  0.5  /  DBili  x   /  AST  17  /  ALT  20  /  AlkPhos  79  05-05              Culture - Blood (collected 03 May 2020 00:19)  Source: .Blood Blood-Venous  Preliminary Report (04 May 2020 01:01):    No growth to date.    Culture - Blood (collected 03 May 2020 00:15)  Source: .Blood Blood-Peripheral  Preliminary Report (04 May 2020 01:01):    No growth to date.        RADIOLOGY & ADDITIONAL TESTS:  Results Reviewed: < from: CT Chest No Cont (05.04.20 @ 19:35) >    IMPRESSION:    Limited exam secondary to motion.    Mild peripheral groundglass opacities within the left lower lobe.    Pattern of GGO suggests infection including atypical pneumonia/viral infection from atypical agents including COVID-19 (C19V-1).    < end of copied text >    Imaging Personally Reviewed:  Electrocardiogram Personally Reviewed:    COORDINATION OF CARE:  Care Discussed with Consultants/Other Providers [Y/N]:  Prior or Outpatient Records Reviewed [Y/N]:

## 2020-05-05 NOTE — DISCHARGE NOTE NURSING/CASE MANAGEMENT/SOCIAL WORK - PATIENT PORTAL LINK FT
You can access the FollowMyHealth Patient Portal offered by [facility] by registering at the following website: [email or web address]. By joining [facility]’s FollowMyHealth portal, you will also be able to view your health information using other applications (apps) compatible with our system.

## 2020-05-05 NOTE — DISCHARGE NOTE PROVIDER - HOSPITAL COURSE
72y M with PMHx of COPD (on intermittent 2L-2.5 L home oxygen), Lung Ca s/p R pneumonectomy in 2003 (s/p chemo/radiation), Bovine AVR, Afib on Eliquis, seizure, HTN presents with fever, increased SOB and productive cough, admit for COPD exacerbation. COVID neg x 2          Hospital course:         Shortness of breath.      - multifactorial high suspicious for COPD exacerbation with CAP    - Ct chest with LL ground glass opacity     - neg COVIDx 2 and  RVP neg      - continued home albuterol PRN,    - blood cx NGTD    - s/p ceftriaxone/azithro with plan to change to cefpodoxime 200 PO BID x5 days total     - Urine LAD: negative, thus no need for azithromycin on discharge     -  Prednisone 40mg x 5 days.         Atrial fibrillation.      - continued home Eliquis and metoprolol.         Hypertension.      - continued home lasix.         Seizure disorder.      - continued home seizure meds.         Pt medically stable for discharge on 5/5/2020 as per discussion with Dr. Coulter.         Dispo: home 72y M with PMHx of COPD (on intermittent 2L-2.5 L home oxygen), Lung Ca s/p R pneumonectomy in 2003 (s/p chemo/radiation), Bovine AVR, Afib on Eliquis, seizure, HTN presents with fever, increased SOB and productive cough, admit for COPD exacerbation. COVID neg x 2          Hospital course:         Shortness of breath.      - multifactorial high suspicious for COPD exacerbation with CAP    - Ct chest with LL ground glass opacity     - neg COVIDx 2 and  RVP neg      - continued home albuterol PRN,    - blood cx NGTD    - s/p ceftriaxone/azithro with plan to change to cefpodoxime 200 PO BID x5 days total     - Urine LAD: negative, thus no need for azithromycin on discharge     -  Prednisone 40mg x 5 days.         Atrial fibrillation.      - continued home Eliquis and metoprolol.         Hypertension.      - continued home lasix.         Seizure disorder.      - continued home seizure meds.         Pt medically stable for discharge on 5/5/2020 as per discussion with Dr. Coulter.     Attempted to speak with pt's dgtr prior to discharge, however she was unavailable. Spoke with wife prior to discharge, all questions answered. Wife will bring patient's portable oxygen when picking him up.         Dispo: home 72y M with PMHx of COPD (on intermittent 2L-2.5 L home oxygen), Lung Ca s/p R pneumonectomy in 2003 (s/p chemo/radiation), Bovine AVR, Afib on Eliquis, seizure, HTN presents with fever, increased SOB and productive cough, admit for COPD exacerbation. COVID neg x 2          Hospital course:         Shortness of breath.      - multifactorial high suspicious for COPD exacerbation with CAP    - Ct chest with LL ground glass opacity     - neg COVIDx 2 and  RVP neg      - continued home albuterol PRN,    - blood cx NGTD    - s/p ceftriaxone/azithro with plan to change to cefpodoxime 200 PO BID x5 days total     - Urine LAD: negative, thus no need for azithromycin on discharge     -  Prednisone 40mg x 5 days.         Atrial fibrillation.      - continued home Eliquis and metoprolol.          Hypertension.      - continued home lasix.         Seizure disorder.      - continued home seizure meds.         Pt medically stable for discharge on 5/5/2020 as per discussion with Dr. Coulter.     Attempted to speak with pt's dgtr prior to discharge, however she was unavailable. Spoke with wife prior to discharge, all questions answered. Wife will bring patient's portable oxygen when picking him up.         Dispo: home

## 2020-05-05 NOTE — DISCHARGE NOTE PROVIDER - CARE PROVIDER_API CALL
River Lubin)  Critical Care Medicine; Internal Medicine; Pulmonary Disease  [address] [place] [address]  Phone: [phone]  Fax: [phone]  Follow Up Time:

## 2020-05-05 NOTE — PROGRESS NOTE ADULT - ASSESSMENT
72y M with PMHx of COPD (on intermittent 2L-2.5 L home oxygen), Lung Ca s/p R pneumonectomy in 2003 (s/p chemo/radiation), Bovine AVR, Afib on Eliquis, seizure, HTN presents with fever, increased SOB and productive cough, admit for COPD exacerbation. COVID neg x 2

## 2020-05-05 NOTE — PROGRESS NOTE ADULT - PROBLEM SELECTOR PLAN 1
- multifactorial high suspicious for COPD exacerbation with CAP  - Ct chest with LL ground glass opacity   - neg COVIDx 2 and  RVP neg    - will continue home albuterol PRN,  - blood cx NGTD  - add ceftriaxone/azithro---change to cefpodoxime 200 PO BID/azithro total x 5 days   - check Urine LAD pending    -  Prednisone 40mg x 5 days

## 2020-05-05 NOTE — DISCHARGE NOTE PROVIDER - NSDCMRMEDTOKEN_GEN_ALL_CORE_FT
Breo Ellipta 100 mcg-25 mcg/inh inhalation powder: 1 puff(s) inhaled once a day  carBAMazepine 200 mg oral tablet: 2 tab(s) orally once a day in AM   carBAMazepine 200 mg oral tablet: 1 tab(s) orally once a day (at bedtime)  DuoNeb 0.5 mg-2.5 mg/3 mL inhalation solution: 3 milliliter(s) inhaled every 6 hours, As Needed - for bronchospasm, for shortness of breath and/or wheezing  Eliquis 5 mg oral tablet: 1 tab(s) orally 2 times a day  furosemide 40 mg oral tablet: 1 tab(s) orally once a day  LaMICtal 100 mg oral tablet: 1 tab(s) orally 2 times a day  Metoprolol Tartrate 25 mg oral tablet: 1 tab(s) orally 2 times a day  ProAir HFA 90 mcg/inh inhalation aerosol: 2 puff(s) inhaled 4 times a day, As Needed Breo Ellipta 100 mcg-25 mcg/inh inhalation powder: 1 puff(s) inhaled once a day  carBAMazepine 200 mg oral tablet: 2 tab(s) orally once a day in AM   carBAMazepine 200 mg oral tablet: 1 tab(s) orally once a day (at bedtime)  cefpodoxime 200 mg oral tablet: 1 tab(s) orally 2 times a day   DuoNeb 0.5 mg-2.5 mg/3 mL inhalation solution: 3 milliliter(s) inhaled every 6 hours, As Needed - for bronchospasm, for shortness of breath and/or wheezing  Eliquis 5 mg oral tablet: 1 tab(s) orally 2 times a day  LaMICtal 100 mg oral tablet: 1 tab(s) orally 2 times a day  Metoprolol Tartrate 25 mg oral tablet: 1 tab(s) orally 2 times a day  predniSONE 20 mg oral tablet: 2 tab(s) orally every 24 hours  ProAir HFA 90 mcg/inh inhalation aerosol: 2 puff(s) inhaled 4 times a day, As Needed

## 2020-05-08 LAB
CULTURE RESULTS: SIGNIFICANT CHANGE UP
CULTURE RESULTS: SIGNIFICANT CHANGE UP
SPECIMEN SOURCE: SIGNIFICANT CHANGE UP
SPECIMEN SOURCE: SIGNIFICANT CHANGE UP
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
## 2020-06-01 ENCOUNTER — OUTPATIENT (OUTPATIENT)
Dept: OUTPATIENT SERVICES | Facility: HOSPITAL | Age: 75
LOS: 1 days | End: 2020-06-01

## 2020-06-01 DIAGNOSIS — Z95.4 PRESENCE OF OTHER HEART-VALVE REPLACEMENT: Chronic | ICD-10-CM

## 2020-06-01 DIAGNOSIS — Z98.89 OTHER SPECIFIED POSTPROCEDURAL STATES: Chronic | ICD-10-CM

## 2020-06-02 NOTE — PATIENT PROFILE ADULT. - AS SC BRADEN NUTRITION
(2) probably inadequate Rhomboid Transposition Flap Text: The defect edges were debeveled with a #15 scalpel blade.  Given the location of the defect and the proximity to free margins a rhomboid transposition flap was deemed most appropriate.  Using a sterile surgical marker, an appropriate rhomboid flap was drawn incorporating the defect.    The area thus outlined was incised deep to adipose tissue with a #15 scalpel blade.  The skin margins were undermined to an appropriate distance in all directions utilizing iris scissors.

## 2020-06-18 DIAGNOSIS — Z71.89 OTHER SPECIFIED COUNSELING: ICD-10-CM

## 2020-08-17 NOTE — PHYSICAL THERAPY INITIAL EVALUATION ADULT - PERTINENT HX OF CURRENT PROBLEM, REHAB EVAL
72y M with PMHx of COPD (on intermittent 2L home oxygen), Lung Ca s/p R pneumonectomy in 2003 (s/p chemo/radiation), Bovine AVR, Afib on Eliquis, seizure, HTN presents with fever, increased SOB and productive cough, admit for COPD exacerbation, and also r/o COVID19. Alar Island Pedicle Flap Text: The defect edges were debeveled with a #15 scalpel blade.  Given the location of the defect, shape of the defect and the proximity to the alar rim an island pedicle advancement flap was deemed most appropriate.  Using a sterile surgical marker, an appropriate advancement flap was drawn incorporating the defect, outlining the appropriate donor tissue and placing the expected incisions within the nasal ala running parallel to the alar rim. The area thus outlined was incised with a #15 scalpel blade.  The skin margins were undermined minimally to an appropriate distance in all directions around the primary defect and laterally outward around the island pedicle utilizing iris scissors.  There was minimal undermining beneath the pedicle flap.

## 2020-09-04 NOTE — PHYSICAL THERAPY INITIAL EVALUATION ADULT - LEVEL OF INDEPENDENCE: SIT/SUPINE, REHAB EVAL
Patient is a 33y old  Male who presents with a chief complaint of spinal cord injury    HPI: 33M admitted to [facility] on 8/22/20 with back pain.  Chart review suggests patient may have fallen down stairs.  Imaging revealed fractures of L1-L5 pedicles bilaterally, leading to widening of the spinal canal with anterolisthesis of L5 on S1 with the entire L5 vertebral body width, disruption of the ALL and PLL at L5-S1 level, probable epidural hemorrhage and hemorrhage within the foramina at L1-L5, and enlargement/edema of bilateral psoas muscle.  Ortho consulted. On 8/23/20, patient underwent evacuation of epidural hematoma, T11-pelvis posterior spinal fusion, and L2-S1 Laminectomy.     Intraoperatively, pt received 4U PRBCs, 1U platelets, and 1U FFP due to 2200mL EBL. Post-operatively, he was admitted to CCU for pressure support and required additional blood products due to continued bleeding into JETHRO drains.  He was febrile after transfusions be remained without other signs of infection.  IVC filer was placed 8/26 as patient unable to be pharmacologically anticoagulated due to extensive surgical requirements.  IVC filter placed by Dr. Merchant on 8/26, with plan to remove in 5-6mos.  He was seen by nephro fo CHELO (multifactorial) and elevated CPK, and started on IVF.     On 9/1, he underwent L5-S1 anterior interbody fusion and L3-4 lateral interbody fusion.     TLSO and AFOs provided at [facility]. Man removed 9/4/20.    Patient cleared for discharge to acute inpatient rehab at [facility].  Seen and examined upon arrival. Patient is a 33y old  Male who presents with a chief complaint of spinal cord injury    HPI: 32yo Male with no signifcant PMHx admitted to [facility] on 8/22/20 with back pain after falling down six steps at home during night. Imaging revealed fractures of L1-L5 pedicles bilaterally, leading to widening of the spinal canal with anterolisthesis of L5 on S1 with the entire L5 vertebral body width, disruption of the ALL and PLL at L5-S1 level, probable epidural hemorrhage and hemorrhage within the foramina at L1-L5, and enlargement/edema of bilateral psoas muscle.  Ortho consulted. Patient is s/p evacuation of epidural hematoma, T11-pelvis posterior spinal fusion, and L2-S1 Laminectomy on 8/23/20 with Dr. Villegas. Intraoperatively, pt received 4U PRBCs, 1U platelets, and 1U FFP due to 2200mL EBL. Post-operatively, he was admitted to CCU for pressure support and required additional blood products due to continued bleeding into JETHRO drains.  He was febrile after transfusions but remained without other signs of infection.  IVC filter placed 8/26 as patient unable to be pharmacologically anticoagulated due to extensive surgical requirements.  IVC filter placed by Dr. Merchant on 8/26, with plan to remove in 5-6 months.  He was seen by nephro fo CHELO (multifactorial) and elevated CPK, and started on IVF.  He is also s/p t L5-S1 anterior interbody fusion and L3-4 lateral interbody fusion on 9/1/20. TLSO and AFOs provided at [facility]. Man removed 9/4/20. Patient voiding without need for SC. Patient cleared for discharge to acute inpatient rehab at [facility].    Patient seen and examined upon arrival. He refused full skin exam stating he was in too much pain - requested again after receiving Dilaudid, still too uncomfortable to turn to remove sliding bags placed by EMS. He complains of bilateral hamstring tightness and states it hurts too much to bend his knees for motor exam. Low back pain became worse yesterday after Xray when he states he was forced to sit up upright for imaging. minimum assist (75% patients effort) Patient is a 33y old  Male who presents with a chief complaint of spinal cord injury    HPI: 32yo Male with no signifcant PMHx admitted to [facility] on 8/22/20 with back pain after falling down six steps at home during night. On admission BAL<10. Utox done after receiving pain meds and Valium in ED positive only for benzos and opiates. Imaging revealed fractures of L1-L5 pedicles bilaterally, leading to widening of the spinal canal with anterolisthesis of L5 on S1 with the entire L5 vertebral body width, disruption of the ALL and PLL at L5-S1 level, probable epidural hemorrhage and hemorrhage within the foramina at L1-L5, and enlargement/edema of bilateral psoas muscle.  Ortho consulted. Patient is s/p evacuation of epidural hematoma, T11-pelvis posterior spinal fusion, and L2-S1 Laminectomy on 8/23/20 with Dr. Villegas. Intraoperatively, pt received 4U PRBCs, 1U platelets, and 1U FFP due to 2200mL EBL. Post-operatively, he was admitted to CCU for pressure support and required additional blood products due to continued bleeding into JETHRO drains.  He was febrile after transfusions but remained without other signs of infection.  IVC filter placed 8/26 as patient unable to be pharmacologically anticoagulated due to extensive surgical requirements.  IVC filter placed by Dr. Merchant on 8/26, with plan to remove in 5-6 months.  He was seen by nephro fo CHELO (multifactorial) and elevated CPK, and started on IVF.  He is also s/p t L5-S1 anterior interbody fusion and L3-4 lateral interbody fusion on 9/1/20. TLSO and AFOs provided at [facility]. Man removed 9/4/20. Patient voiding without need for SC. Patient cleared for discharge to acute inpatient rehab at [facility].    Patient seen and examined upon arrival. He refused full skin exam stating he was in too much pain - requested again after receiving Dilaudid, still too uncomfortable to turn to remove sliding bags placed by EMS. He complains of bilateral hamstring tightness and states it hurts too much to bend his knees for motor exam. Low back pain became worse yesterday after Xray when he states he was forced to sit up upright for imaging. Patient is a 33y old  Male who presents with a chief complaint of spinal cord injury    HPI: 34yo Male with no signifcant PMHx admitted to [facility] on 8/22/20 with back pain after falling down six steps at home during night. On admission BAL<10. Utox done after receiving pain meds and Valium in ED positive only for benzos and opiates. Imaging revealed fractures of L1-L5 pedicles bilaterally, leading to widening of the spinal canal with anterolisthesis of L5 on S1 with the entire L5 vertebral body width, disruption of the ALL and PLL at L5-S1 level, probable epidural hemorrhage and hemorrhage within the foramina at L1-L5, and enlargement/edema of bilateral psoas muscle.  Ortho consulted. Patient is s/p evacuation of epidural hematoma, T11-pelvis posterior spinal fusion, and L2-S1 Laminectomy on 8/23/20 with Dr. Villegas. Intraoperatively, pt received 4U PRBCs, 1U platelets, and 1U FFP due to 2200mL EBL. Post-operatively, he was admitted to CCU for pressure support and required additional blood products due to continued bleeding into JETHRO drains.  He was febrile after transfusions but remained without other signs of infection.  IVC filter placed 8/26 as patient unable to be pharmacologically anticoagulated due to extensive surgical requirements.  IVC filter placed by Dr. Merchant on 8/26, with plan to remove in 5-6 months.  He was seen by nephro fo CHELO (multifactorial) and elevated CPK, and started on IVF.  He is also s/p t L5-S1 anterior interbody fusion and L3-4 lateral interbody fusion on 9/1/20. TLSO and AFOs provided at [facility]. Man removed 9/4/20. Patient voiding without need for SC. Patient cleared for discharge to acute inpatient rehab at [facility].    Patient seen and examined upon arrival. He refused full skin exam initially stating he was in too much pain - requested again after receiving Dilaudid, still too uncomfortable to turn to remove sliding bags placed by EMS. He complains of bilateral hamstring tightness and states it hurts too much to bend his knees for motor exam. Low back pain became worse yesterday after Xray when he states he was forced to sit up upright for imaging. Patient agreeable to skin exam after Valium, required max assist.

## 2020-09-08 NOTE — SWALLOW BEDSIDE ASSESSMENT ADULT - SWALLOW EVAL: RECOMMENDED FEEDING/EATING TECHNIQUES
position upright (90 degrees)/small sips/bites/maintain upright posture during/after eating for 30 mins
no

## 2020-12-29 ENCOUNTER — INPATIENT (INPATIENT)
Facility: HOSPITAL | Age: 75
LOS: 2 days | Discharge: HOME HEALTH | End: 2021-01-01
Attending: STUDENT IN AN ORGANIZED HEALTH CARE EDUCATION/TRAINING PROGRAM | Admitting: STUDENT IN AN ORGANIZED HEALTH CARE EDUCATION/TRAINING PROGRAM
Payer: MEDICARE

## 2020-12-29 VITALS
TEMPERATURE: 98 F | SYSTOLIC BLOOD PRESSURE: 119 MMHG | WEIGHT: 149.91 LBS | RESPIRATION RATE: 22 BRPM | DIASTOLIC BLOOD PRESSURE: 55 MMHG | HEIGHT: 72 IN | OXYGEN SATURATION: 96 % | HEART RATE: 91 BPM

## 2020-12-29 DIAGNOSIS — J44.1 CHRONIC OBSTRUCTIVE PULMONARY DISEASE WITH (ACUTE) EXACERBATION: ICD-10-CM

## 2020-12-29 DIAGNOSIS — I48.91 UNSPECIFIED ATRIAL FIBRILLATION: ICD-10-CM

## 2020-12-29 DIAGNOSIS — Z95.4 PRESENCE OF OTHER HEART-VALVE REPLACEMENT: Chronic | ICD-10-CM

## 2020-12-29 DIAGNOSIS — Z98.89 OTHER SPECIFIED POSTPROCEDURAL STATES: Chronic | ICD-10-CM

## 2020-12-29 DIAGNOSIS — C34.90 MALIGNANT NEOPLASM OF UNSPECIFIED PART OF UNSPECIFIED BRONCHUS OR LUNG: ICD-10-CM

## 2020-12-29 DIAGNOSIS — Z29.9 ENCOUNTER FOR PROPHYLACTIC MEASURES, UNSPECIFIED: ICD-10-CM

## 2020-12-29 DIAGNOSIS — S22.39XA FRACTURE OF ONE RIB, UNSPECIFIED SIDE, INITIAL ENCOUNTER FOR CLOSED FRACTURE: ICD-10-CM

## 2020-12-29 DIAGNOSIS — G40.909 EPILEPSY, UNSPECIFIED, NOT INTRACTABLE, WITHOUT STATUS EPILEPTICUS: ICD-10-CM

## 2020-12-29 LAB
ALBUMIN SERPL ELPH-MCNC: 3.9 G/DL — SIGNIFICANT CHANGE UP (ref 3.3–5)
ALP SERPL-CCNC: 126 U/L — HIGH (ref 40–120)
ALT FLD-CCNC: 24 U/L — SIGNIFICANT CHANGE UP (ref 4–41)
ANION GAP SERPL CALC-SCNC: 6 MMOL/L — LOW (ref 7–14)
AST SERPL-CCNC: 23 U/L — SIGNIFICANT CHANGE UP (ref 4–40)
BASOPHILS # BLD AUTO: 0.02 K/UL — SIGNIFICANT CHANGE UP (ref 0–0.2)
BASOPHILS NFR BLD AUTO: 0.2 % — SIGNIFICANT CHANGE UP (ref 0–2)
BILIRUB SERPL-MCNC: 0.4 MG/DL — SIGNIFICANT CHANGE UP (ref 0.2–1.2)
BLOOD GAS VENOUS COMPREHENSIVE RESULT: SIGNIFICANT CHANGE UP
BUN SERPL-MCNC: 31 MG/DL — HIGH (ref 7–23)
CALCIUM SERPL-MCNC: 9.6 MG/DL — SIGNIFICANT CHANGE UP (ref 8.4–10.5)
CHLORIDE SERPL-SCNC: 95 MMOL/L — LOW (ref 98–107)
CO2 SERPL-SCNC: 36 MMOL/L — HIGH (ref 22–31)
CREAT SERPL-MCNC: 1.35 MG/DL — HIGH (ref 0.5–1.3)
EOSINOPHIL # BLD AUTO: 0.04 K/UL — SIGNIFICANT CHANGE UP (ref 0–0.5)
EOSINOPHIL NFR BLD AUTO: 0.4 % — SIGNIFICANT CHANGE UP (ref 0–6)
GLUCOSE SERPL-MCNC: 99 MG/DL — SIGNIFICANT CHANGE UP (ref 70–99)
HCT VFR BLD CALC: 30.6 % — LOW (ref 39–50)
HGB BLD-MCNC: 9.7 G/DL — LOW (ref 13–17)
IANC: 7.75 K/UL — SIGNIFICANT CHANGE UP (ref 1.5–8.5)
IMM GRANULOCYTES NFR BLD AUTO: 0.3 % — SIGNIFICANT CHANGE UP (ref 0–1.5)
LYMPHOCYTES # BLD AUTO: 0.61 K/UL — LOW (ref 1–3.3)
LYMPHOCYTES # BLD AUTO: 6.7 % — LOW (ref 13–44)
MCHC RBC-ENTMCNC: 30.8 PG — SIGNIFICANT CHANGE UP (ref 27–34)
MCHC RBC-ENTMCNC: 31.7 GM/DL — LOW (ref 32–36)
MCV RBC AUTO: 97.1 FL — SIGNIFICANT CHANGE UP (ref 80–100)
MONOCYTES # BLD AUTO: 0.68 K/UL — SIGNIFICANT CHANGE UP (ref 0–0.9)
MONOCYTES NFR BLD AUTO: 7.4 % — SIGNIFICANT CHANGE UP (ref 2–14)
NEUTROPHILS # BLD AUTO: 7.75 K/UL — HIGH (ref 1.8–7.4)
NEUTROPHILS NFR BLD AUTO: 85 % — HIGH (ref 43–77)
NRBC # BLD: 0 /100 WBCS — SIGNIFICANT CHANGE UP
NRBC # FLD: 0 K/UL — SIGNIFICANT CHANGE UP
PLATELET # BLD AUTO: 170 K/UL — SIGNIFICANT CHANGE UP (ref 150–400)
POTASSIUM SERPL-MCNC: 4.1 MMOL/L — SIGNIFICANT CHANGE UP (ref 3.5–5.3)
POTASSIUM SERPL-SCNC: 4.1 MMOL/L — SIGNIFICANT CHANGE UP (ref 3.5–5.3)
PROT SERPL-MCNC: 7.2 G/DL — SIGNIFICANT CHANGE UP (ref 6–8.3)
RBC # BLD: 3.15 M/UL — LOW (ref 4.2–5.8)
RBC # FLD: 12.5 % — SIGNIFICANT CHANGE UP (ref 10.3–14.5)
SODIUM SERPL-SCNC: 137 MMOL/L — SIGNIFICANT CHANGE UP (ref 135–145)
TROPONIN T, HIGH SENSITIVITY RESULT: 21 NG/L — SIGNIFICANT CHANGE UP
WBC # BLD: 9.13 K/UL — SIGNIFICANT CHANGE UP (ref 3.8–10.5)
WBC # FLD AUTO: 9.13 K/UL — SIGNIFICANT CHANGE UP (ref 3.8–10.5)

## 2020-12-29 PROCEDURE — 71260 CT THORAX DX C+: CPT | Mod: 26

## 2020-12-29 PROCEDURE — 71046 X-RAY EXAM CHEST 2 VIEWS: CPT | Mod: 26

## 2020-12-29 PROCEDURE — 99285 EMERGENCY DEPT VISIT HI MDM: CPT

## 2020-12-29 RX ORDER — ALBUTEROL 90 UG/1
6 AEROSOL, METERED ORAL ONCE
Refills: 0 | Status: COMPLETED | OUTPATIENT
Start: 2020-12-29 | End: 2020-12-29

## 2020-12-29 RX ORDER — IPRATROPIUM BROMIDE 0.2 MG/ML
1 SOLUTION, NON-ORAL INHALATION ONCE
Refills: 0 | Status: COMPLETED | OUTPATIENT
Start: 2020-12-29 | End: 2020-12-29

## 2020-12-29 RX ORDER — ALBUTEROL 90 UG/1
2 AEROSOL, METERED ORAL EVERY 6 HOURS
Refills: 0 | Status: DISCONTINUED | OUTPATIENT
Start: 2020-12-29 | End: 2021-01-01

## 2020-12-29 RX ORDER — CARBAMAZEPINE 200 MG
400 TABLET ORAL
Refills: 0 | Status: DISCONTINUED | OUTPATIENT
Start: 2020-12-29 | End: 2021-01-01

## 2020-12-29 RX ORDER — APIXABAN 2.5 MG/1
5 TABLET, FILM COATED ORAL EVERY 12 HOURS
Refills: 0 | Status: DISCONTINUED | OUTPATIENT
Start: 2020-12-29 | End: 2021-01-01

## 2020-12-29 RX ORDER — BUDESONIDE AND FORMOTEROL FUMARATE DIHYDRATE 160; 4.5 UG/1; UG/1
2 AEROSOL RESPIRATORY (INHALATION)
Refills: 0 | Status: DISCONTINUED | OUTPATIENT
Start: 2020-12-29 | End: 2021-01-01

## 2020-12-29 RX ORDER — TIOTROPIUM BROMIDE 18 UG/1
1 CAPSULE ORAL; RESPIRATORY (INHALATION) DAILY
Refills: 0 | Status: DISCONTINUED | OUTPATIENT
Start: 2020-12-29 | End: 2021-01-01

## 2020-12-29 RX ORDER — CARBAMAZEPINE 200 MG
200 TABLET ORAL AT BEDTIME
Refills: 0 | Status: DISCONTINUED | OUTPATIENT
Start: 2020-12-29 | End: 2021-01-01

## 2020-12-29 RX ORDER — METOPROLOL TARTRATE 50 MG
25 TABLET ORAL
Refills: 0 | Status: DISCONTINUED | OUTPATIENT
Start: 2020-12-29 | End: 2020-12-29

## 2020-12-29 RX ORDER — LAMOTRIGINE 25 MG/1
100 TABLET, ORALLY DISINTEGRATING ORAL
Refills: 0 | Status: DISCONTINUED | OUTPATIENT
Start: 2020-12-29 | End: 2021-01-01

## 2020-12-29 RX ORDER — METOPROLOL TARTRATE 50 MG
25 TABLET ORAL
Refills: 0 | Status: DISCONTINUED | OUTPATIENT
Start: 2020-12-29 | End: 2021-01-01

## 2020-12-29 RX ORDER — FUROSEMIDE 40 MG
40 TABLET ORAL DAILY
Refills: 0 | Status: DISCONTINUED | OUTPATIENT
Start: 2020-12-29 | End: 2021-01-01

## 2020-12-29 RX ADMIN — ALBUTEROL 6 PUFF(S): 90 AEROSOL, METERED ORAL at 17:40

## 2020-12-29 RX ADMIN — Medication 125 MILLIGRAM(S): at 17:44

## 2020-12-29 RX ADMIN — ALBUTEROL 2 PUFF(S): 90 AEROSOL, METERED ORAL at 22:56

## 2020-12-29 RX ADMIN — Medication 1 PUFF(S): at 17:41

## 2020-12-29 RX ADMIN — Medication 200 MILLIGRAM(S): at 22:56

## 2020-12-29 RX ADMIN — BUDESONIDE AND FORMOTEROL FUMARATE DIHYDRATE 2 PUFF(S): 160; 4.5 AEROSOL RESPIRATORY (INHALATION) at 22:05

## 2020-12-29 NOTE — ED PROVIDER NOTE - OBJECTIVE STATEMENT
75M hx of lung cancer s/p right pneumoectomy, AVR, afib, HTN, HLD presenting to the ED for progressively worsening cough with white sputum and generalized weakness. Patient has trialed outpatient abxs as well as prednisone w/o improvement. Denies chest pain, but endorses dyspnea on exertion. Family also noticed a 4x4cm mass below right clavicular that is new. Patient also with 10 lb weight loss over past 6 months but endorsed decreased appetite.    Patient preferred wife to translate. 75M hx of lung cancer s/p right pneumoectomy, COPD on 2L NC, AVR, afib, HTN, HLD presenting to the ED for progressively worsening cough with white sputum and generalized weakness. Patient has trialed outpatient abxs as well as prednisone w/o improvement. Denies chest pain, but endorses dyspnea on exertion. Family also noticed a 4x4cm mass below right clavicular that is new. Patient also with 10 lb weight loss over past 6 months but endorsed decreased appetite.    Patient preferred wife to translate.

## 2020-12-29 NOTE — H&P ADULT - PROBLEM SELECTOR PLAN 3
-s/p R pneumonectomy in 2003  -Last chemo/radiation in 2005, has had [id number] follow up until 2018 but CA stayed in remission  -In context of 12 lb weight loss, potential pathologic fracture, abx resistant COPD exacerbation, concern for recurrence -s/p R pneumonectomy in 2003  -Last chemo/radiation in 2005, has had [id number] follow up until 2018 but CA stayed in remission  -In context of 12 lb weight loss, potential pathologic fracture, abx resistant COPD exacerbation, concern for recurrence  -Would try to obtain collateral from patient's oncologist in AM -s/p R pneumonectomy in 2003  -Last chemo/radiation in 2005, has had [id number] follow up until 2018 but CA stayed in remission  -In context of 12 lb weight loss, potential pathologic fracture, abx resistant COPD exacerbation, concern for recurrence  -Would try to obtain collateral from patient's oncologist in AM, need for bone scan

## 2020-12-29 NOTE — H&P ADULT - NSHPPHYSICALEXAM_GEN_ALL_CORE
GENERAL: NAD, thin elderly male resting comfortably in bed  HEAD: Atraumatic, Normocephalic  EYES: EOMI, PERRLA, conjunctiva and sclera clear  ENMT: Moist mucous membranes  NECK: Supple, Normal Thyroid  NERVOUS SYSTEM: Alert & Oriented X3, Good concentration; grossly moves all extremities  CHEST/LUNG: No breath sounds on R, diffuse rhonchi on L, no accessory muscle use  HEART: Irregularly irregular rhythm, tachycardic, S1, S2; No murmurs, rubs, or gallops  ABDOMEN: Soft, Nontender, Nondistended: Bowel sounds present  EXTREMITIES: 2+ Peripheral Pulses, No clubbing, cyanosis, or edema  SKIN: No rashes or lesions, [id number] soft infraclavicular mass, tender to palpation

## 2020-12-29 NOTE — ED ADULT NURSE NOTE - OBJECTIVE STATEMENT
Pt presenting to room 7 AxOx3 with c/o productive cough, and mass to right upper chest. PMH lung ca, not currently on chemo or radiation. Pt states he only has the left lung. As per wife pt has been on antibiotics for cough, and is primarily coming to ED for unrelieved cough and mass to chest. As per wife pt also had shingles several months ago and was treated for it. Healed rash noted to upper back. Pt denies fevers, SOB. Pt on 3L NC on home O2. Maintaining O2 at 100% on 3L NC. RR even and unlabored. Palor/diaphoresis not noted. Crackles in lungs noted. Pt NSR on cardiac monitor. IV established with 20g in LAC. Labs drawn and sent. MD at bedside, will continue to monitor.

## 2020-12-29 NOTE — ED ADULT TRIAGE NOTE - CHIEF COMPLAINT QUOTE
pt with Hx. COPD, Oxygen dependent at 3 L NC since x 2 wk for symptoms of prod. cough/congestion, with Shingles to right side of chest/arm during that time was on Prednisone, Azithromycin x 2 wk ended on Friday, today pt coming with right chest area superficial swelling noted today with pain. pt with Hx. COPD, Oxygen dependent at 3 L NC since x 2 wk for symptoms of prod. cough/congestion, with Shingles to right side of chest/arm during that time was on Prednisone, Azithromycin x 2 wk ended on Friday, today pt coming with right chest area superficial swelling noted today with pain.    PMD EDWIN Lubin [phone] pt with Hx. COPD, Oxygen dependent at 3 L NC for the last x 2 wk for symptoms of prod. cough/congestion and symptoms of Shingles to right side of chest/arm during that time pt was on Prednisone, Azithromycin x 2 wk and ended this Friday.   Today pt coming with right chest area superficial swelling with some pain to the area.            PMD EDWIN Lubin [phone]

## 2020-12-29 NOTE — H&P ADULT - PROBLEM SELECTOR PLAN 1
-Patient treated with 5 day course of azithro and 7 day course of cefpodoxime and 12 day course of prednisone outpatient with minimal improvement in symptoms  -Given solumedrol, duoneb in the ED with minimal improvement   -Chest PT  -solu-medrol 40 daily for now to see if improvement in symptoms  -Continue with 3L O2 (home dose)  -Continue home respiratory medications (Breo-->symbicort; duoneb)  -Discuss with PMD/pulm in AM -Patient treated with 5 day course of azithro and 7 day course of cefpodoxime and 12 day course of prednisone outpatient with minimal improvement in symptoms  -Given solumedrol, duoneb in the ED with minimal improvement   -Chest PT  -solu-medrol 40 daily for now to see if improvement in symptoms  -Continue with 3L O2 (home dose)  -Continue home respiratory medications (Breo-->symbicort; duoneb)  -Discuss with PMD/ consult pulm in AM (patient seen by Dr. Brasher in 2018 per allscripts) -Patient treated with 5 day course of azithro and 7 day course of cefpodoxime and 12 day course of prednisone outpatient with minimal improvement in symptoms  -Given solumedrol, duoneb in the ED with minimal improvement   -Chest PT  -CT chest without infection, monitor off abx  -solu-medrol 40 daily for now to see if improvement in symptoms  -Continue with 3L O2 (home dose)  -Continue home respiratory medications (Breo-->symbicort; duoneb)  -Discuss with PMD/ consult pulm in AM. Outpatient pulm Dr. River Lubin cell [phone]

## 2020-12-29 NOTE — H&P ADULT - HISTORY OF PRESENT ILLNESS
74yo Guamanian speaking M w/ PMHx of lung CA s/p R pneumonectomy (2003), COPD (on 3L home O2), AVR, afib on eliquis, seizure dx, HTN, HLD p/w worsening cough w/ white sputum production, generalized weakness, and new infraclavicular mass that presented this AM. Collateral history obtained from wife and daughter. Per patient's wife, patient has chronic cough 2/2 COPD however approximately 2-3 weeks ago, patient started having increased yellow phlegm production. They spoke to their PMD who is also a pulmonologist who prescribed a 5 day course of azithro and prednisone 20mg with no improvement in symptoms. Patient was then given a 7 day course of cefpodoxime with continuation of the prednisone for a total 12 day antibiotic and steroid course with minimal improvement in symptoms besides phlegm color change to white. Patient denies fevers, sick contacts, travel.  Patient does have Bipap at home however uses it rarely and only for 30 minutes. Baseline O2 sat at home is 98 at rest, and 92 with exertion. Of note, patient had episode of shingles in September that spread from the R side of the abdomen to the R arm and back that was treated with a pill, and patient experienced post-herpetic neuralgia. This morning, patient noted a large "bump" on his R chest which was tender, spoke to his PMD who wanted him to be admitted for CT scan and further workup given minimal improvement of symptoms given antibiotics. In terms of cancer history, patient had R pneumonectomy in 2003, last chemo/radiation was in 2005, and patient had close follow up until 2 years ago with no recurrence of disease. Per wife, patient has had a 12 lb weight loss in the last year and endorses poor appetite and occasional episodes of nausea and dry heaving.     In the ED patient was afebrile, mildly tachypneic and required supplemental O2 though was grossly breathing comfortably. He was given solumedrol, ipratropium, albuterol with minimal improvement in symptoms though he did note that he was coughing less frequently.

## 2020-12-29 NOTE — ED ADULT NURSE REASSESSMENT NOTE - NS ED NURSE REASSESS COMMENT FT1
pt at baseline mental status, resting comfortably at this time. Pt remains tachypneic. Sinus tachy on cardiac monitor. Palor/diaphoresis not noted. Pt awaiting bed assignment. Will continue to monitor.

## 2020-12-29 NOTE — ED PROVIDER NOTE - ATTENDING CONTRIBUTION TO CARE
Dr. Wilde:  I have personally performed a face to face bedside history and physical examination of this patient. I have discussed the history, examination, review of systems, assessment and plan of management with the resident. I have reviewed the electronic medical record and amended it to reflect my history, review of systems, physical exam, assessment and plan.    75M h/o COPD (on intermittent 2L-2.5 L home oxygen), Lung Ca s/p R pneumonectomy in 2003 (s/p chemo/radiation), Bovine AVR, Afib on Eliquis, seizure, HTN, presents with worsening productive cough with white sputum over past few days.  +Generalized weakness.  Also notes a new tender nodule right side by clavicle that they noticed yesterday.  Was on outpatient cefpodoxime, azithromycin, and prednisone.      Exam:  - nad  - +4cm firm non-mobile nodule right infraclavicular area  - diffuse rhonchi  - rrr  - abd soft ntnd    A/P  - worsening cough/SOB, eval PNA; unclear etiology of 4cm mass but concerning for cancer recurrence  - cbc, cmp, trop, vbg, RVP, cxr, ekg Dr. Wilde:  I have personally performed a face to face bedside history and physical examination of this patient. I have discussed the history, examination, review of systems, assessment and plan of management with the resident. I have reviewed the electronic medical record and amended it to reflect my history, review of systems, physical exam, assessment and plan.    75M h/o COPD (on intermittent 2L-2.5 L home oxygen), Lung Ca s/p R pneumonectomy in 2003 (s/p chemo/radiation), Bovine AVR, Afib on Eliquis, seizure, HTN, presents with worsening productive cough with white sputum over past few days.  +Generalized weakness.  Also notes a new tender nodule right side by clavicle that they noticed yesterday.  Was on outpatient cefpodoxime, azithromycin, and prednisone.      Exam:  - nad  - +4cm firm non-mobile nodule right infraclavicular area  - diffuse rhonchi  - rrr  - abd soft ntnd    A/P  - worsening cough/SOB, eval PNA; unclear etiology of 4cm mass, possibly lipoma, consider lymphadenopathy concerning for cancer recurrence  - cbc, cmp, trop, vbg, RVP, cxr, ekg

## 2020-12-29 NOTE — H&P ADULT - PROBLEM SELECTOR PLAN 2
-Patient complaining of [id number] tender infraclavicular mass; on CT scan found to have minimally displaced anterior 2nd rib fracture with probable hematoma that correlates with mass  -No trauma history  -Concern for possible pathologic fracture vs nondisclosed trauma history -Patient complaining of [id number] tender infraclavicular mass; on CT scan found to have minimally displaced anterior 2nd rib fracture with probable hematoma that correlates with mass  -No trauma history  -Concern for possible pathologic fracture vs nondisclosed trauma history vs multiple myeloma (anemia, CKD, fracture)  -f/u SPEP, UPEP, immunofixation

## 2020-12-29 NOTE — ED PROVIDER NOTE - PHYSICAL EXAMINATION
GENERAL: elderly male, lying in bed, dyspneic, tachypneic otherwise Vital signs are within normal limits  HEENT: NC/AT, conjunctiva noninjected, sclera anicteric, moist mucous membranes,  NECK: Supple, trachea midline  LUNG: labored respirations, rhoncus breath sounds left lung  CV: RRR, Pulses- Radial: 2+ b/l  ABDOMEN: Nondistended, nontender  MSK: No visible deformities, nontender extremities, 4x4cm nonmobile firm mass right infraclavicular region  NEURO: AAOx4 (to person, place, time, event), no tremor  PSYCH: Normal mood and affect

## 2020-12-29 NOTE — ED ADULT NURSE REASSESSMENT NOTE - NS ED NURSE REASSESS COMMENT FT1
Pt resting in stretcher. No apparent distress noted. Respirations even and unlabored. Pt denies any chest pain, sob, abd pain, ha, dizziness, n/v/d. Vitals as noted. Will continue to monitor.

## 2020-12-29 NOTE — ED ADULT NURSE NOTE - CHIEF COMPLAINT QUOTE
pt with Hx. COPD, Oxygen dependent at 3 L NC for the last x 2 wk for symptoms of prod. cough/congestion and symptoms of Shingles to right side of chest/arm during that time pt was on Prednisone, Azithromycin x 2 wk and ended this Friday.   Today pt coming with right chest area superficial swelling with some pain to the area.            PMD EDWIN Lubin [phone]

## 2020-12-29 NOTE — H&P ADULT - NSHPREVIEWOFSYSTEMS_GEN_ALL_CORE
CONSTITUTIONAL: +weakness, no fevers or chills  EYES/ENT: No visual changes;  No vertigo or throat pain   NECK: No pain or stiffness  RESPIRATORY: +cough, no wheezing, hemoptysis; +shortness of breath, +dyspnea on exertion  CARDIOVASCULAR: No chest pain or palpitations  GASTROINTESTINAL: No abdominal or epigastric pain. Occasional episodes of nausea, vomiting, but no hematemesis; No diarrhea or constipation. No melena or hematochezia.  GENITOURINARY: No dysuria, frequency or hematuria  NEUROLOGICAL: No numbness or weakness  SKIN: No itching, +mass

## 2020-12-29 NOTE — ED PROVIDER NOTE - PMH
Atrial fibrillation    History of pneumonia    Hyperlipidemia    Hypertension    Lung cancer  s/p R. pneumonectomy  Seizure disorder

## 2020-12-29 NOTE — ED PROVIDER NOTE - PROGRESS NOTE DETAILS
Pepe Ruiz D.O., PGY2 (Resident)  Patient minimally improved. Still rhoncus. Will call for admission. Pepe Ruiz D.O., PGY2 (Resident)  Case discussed with patient's pulmonologist. Would like patient admitted for COPD exacerbation given failed outpatient tx and not improving. Does not feel current indication for IV Abxs. Would also like eval for chest mass, CT chest with IV contrast ordered.

## 2020-12-29 NOTE — H&P ADULT - NSICDXPASTMEDICALHX_GEN_ALL_CORE_FT
PAST MEDICAL HISTORY:  Atrial fibrillation     History of pneumonia     Hyperlipidemia     Hypertension     Lung cancer s/p R. pneumonectomy    Seizure disorder

## 2020-12-29 NOTE — ED PROVIDER NOTE - CLINICAL SUMMARY MEDICAL DECISION MAKING FREE TEXT BOX
75M hx of COPD, lung cancer s/p right pneumonectomy here for progressively worsening cough, weakness, dyspnea on exertion, failed outpatient steroid taper and abxs (azithro and cepodoxime). Patient tachypneic with speaking to mid to high 20s, satting 97% on 2L NC. Rhoncus lung sounds on left, with left 4x4cm right infraclavicular mass. Concern for pneumonia vs copd exacerbation vs possible metastatic disease. Will eval with labs including CXR, albuterol, ipratropium, steroids, reassess.

## 2020-12-29 NOTE — H&P ADULT - ASSESSMENT
74yo Estonian speaking M w/ PMHx of lung CA s/p R pneumonectomy (2003), COPD (on 2L home O2), AVR, afib on eliquis, seizure dx, HTN, HLD p/w worsening cough w/ white sputum production and generalized weakness concerning for COPD exacerbation vs PNA vs malignancy.

## 2020-12-30 LAB
A1C WITH ESTIMATED AVERAGE GLUCOSE RESULT: 5.1 % — SIGNIFICANT CHANGE UP (ref 4–5.6)
ALBUMIN SERPL ELPH-MCNC: 3.9 G/DL — SIGNIFICANT CHANGE UP (ref 3.3–5)
ALP SERPL-CCNC: 118 U/L — SIGNIFICANT CHANGE UP (ref 40–120)
ALT FLD-CCNC: 23 U/L — SIGNIFICANT CHANGE UP (ref 4–41)
ANION GAP SERPL CALC-SCNC: 10 MMOL/L — SIGNIFICANT CHANGE UP (ref 7–14)
AST SERPL-CCNC: 16 U/L — SIGNIFICANT CHANGE UP (ref 4–40)
B PERT DNA SPEC QL NAA+PROBE: SIGNIFICANT CHANGE UP
BASOPHILS # BLD AUTO: 0.01 K/UL — SIGNIFICANT CHANGE UP (ref 0–0.2)
BASOPHILS NFR BLD AUTO: 0.1 % — SIGNIFICANT CHANGE UP (ref 0–2)
BILIRUB SERPL-MCNC: 0.5 MG/DL — SIGNIFICANT CHANGE UP (ref 0.2–1.2)
BLOOD GAS VENOUS COMPREHENSIVE RESULT: SIGNIFICANT CHANGE UP
BUN SERPL-MCNC: 33 MG/DL — HIGH (ref 7–23)
C PNEUM DNA SPEC QL NAA+PROBE: SIGNIFICANT CHANGE UP
CALCIUM SERPL-MCNC: 9.8 MG/DL — SIGNIFICANT CHANGE UP (ref 8.4–10.5)
CHLORIDE SERPL-SCNC: 95 MMOL/L — LOW (ref 98–107)
CHOLEST SERPL-MCNC: 177 MG/DL — SIGNIFICANT CHANGE UP
CO2 SERPL-SCNC: 34 MMOL/L — HIGH (ref 22–31)
CREAT SERPL-MCNC: 1.26 MG/DL — SIGNIFICANT CHANGE UP (ref 0.5–1.3)
EOSINOPHIL # BLD AUTO: 0 K/UL — SIGNIFICANT CHANGE UP (ref 0–0.5)
EOSINOPHIL NFR BLD AUTO: 0 % — SIGNIFICANT CHANGE UP (ref 0–6)
ESTIMATED AVERAGE GLUCOSE: 100 MG/DL — SIGNIFICANT CHANGE UP (ref 68–114)
FERRITIN SERPL-MCNC: 578 NG/ML — HIGH (ref 30–400)
FLUAV H1 2009 PAND RNA SPEC QL NAA+PROBE: SIGNIFICANT CHANGE UP
FLUAV H1 RNA SPEC QL NAA+PROBE: SIGNIFICANT CHANGE UP
FLUAV H3 RNA SPEC QL NAA+PROBE: SIGNIFICANT CHANGE UP
FLUAV SUBTYP SPEC NAA+PROBE: SIGNIFICANT CHANGE UP
FLUBV RNA SPEC QL NAA+PROBE: SIGNIFICANT CHANGE UP
GLUCOSE SERPL-MCNC: 118 MG/DL — HIGH (ref 70–99)
HADV DNA SPEC QL NAA+PROBE: SIGNIFICANT CHANGE UP
HCOV PNL SPEC NAA+PROBE: SIGNIFICANT CHANGE UP
HCT VFR BLD CALC: 30.5 % — LOW (ref 39–50)
HDLC SERPL-MCNC: 59 MG/DL — SIGNIFICANT CHANGE UP
HGB BLD-MCNC: 9.8 G/DL — LOW (ref 13–17)
HMPV RNA SPEC QL NAA+PROBE: SIGNIFICANT CHANGE UP
HPIV1 RNA SPEC QL NAA+PROBE: SIGNIFICANT CHANGE UP
HPIV2 RNA SPEC QL NAA+PROBE: SIGNIFICANT CHANGE UP
HPIV3 RNA SPEC QL NAA+PROBE: SIGNIFICANT CHANGE UP
HPIV4 RNA SPEC QL NAA+PROBE: SIGNIFICANT CHANGE UP
IANC: 5.88 K/UL — SIGNIFICANT CHANGE UP (ref 1.5–8.5)
IMM GRANULOCYTES NFR BLD AUTO: 0.3 % — SIGNIFICANT CHANGE UP (ref 0–1.5)
IRON SATN MFR SERPL: 34 % — SIGNIFICANT CHANGE UP (ref 14–50)
IRON SATN MFR SERPL: 51 UG/DL — SIGNIFICANT CHANGE UP (ref 45–165)
LIPID PNL WITH DIRECT LDL SERPL: 105 MG/DL — HIGH
LYMPHOCYTES # BLD AUTO: 0.4 K/UL — LOW (ref 1–3.3)
LYMPHOCYTES # BLD AUTO: 6 % — LOW (ref 13–44)
MAGNESIUM SERPL-MCNC: 2.3 MG/DL — SIGNIFICANT CHANGE UP (ref 1.6–2.6)
MCHC RBC-ENTMCNC: 30.1 PG — SIGNIFICANT CHANGE UP (ref 27–34)
MCHC RBC-ENTMCNC: 32.1 GM/DL — SIGNIFICANT CHANGE UP (ref 32–36)
MCV RBC AUTO: 93.6 FL — SIGNIFICANT CHANGE UP (ref 80–100)
MONOCYTES # BLD AUTO: 0.37 K/UL — SIGNIFICANT CHANGE UP (ref 0–0.9)
MONOCYTES NFR BLD AUTO: 5.5 % — SIGNIFICANT CHANGE UP (ref 2–14)
NEUTROPHILS # BLD AUTO: 5.88 K/UL — SIGNIFICANT CHANGE UP (ref 1.8–7.4)
NEUTROPHILS NFR BLD AUTO: 88.1 % — HIGH (ref 43–77)
NON HDL CHOLESTEROL: 118 MG/DL — SIGNIFICANT CHANGE UP
NRBC # BLD: 0 /100 WBCS — SIGNIFICANT CHANGE UP
NRBC # FLD: 0 K/UL — SIGNIFICANT CHANGE UP
PHOSPHATE SERPL-MCNC: 4.3 MG/DL — SIGNIFICANT CHANGE UP (ref 2.5–4.5)
PLATELET # BLD AUTO: 161 K/UL — SIGNIFICANT CHANGE UP (ref 150–400)
POTASSIUM SERPL-MCNC: 4.6 MMOL/L — SIGNIFICANT CHANGE UP (ref 3.5–5.3)
POTASSIUM SERPL-SCNC: 4.6 MMOL/L — SIGNIFICANT CHANGE UP (ref 3.5–5.3)
PROT SERPL-MCNC: 7.3 G/DL — SIGNIFICANT CHANGE UP (ref 6–8.3)
PROT SERPL-MCNC: 7.3 G/DL — SIGNIFICANT CHANGE UP (ref 6–8.3)
RAPID RVP RESULT: SIGNIFICANT CHANGE UP
RBC # BLD: 3.26 M/UL — LOW (ref 4.2–5.8)
RBC # FLD: 12.3 % — SIGNIFICANT CHANGE UP (ref 10.3–14.5)
RV+EV RNA SPEC QL NAA+PROBE: SIGNIFICANT CHANGE UP
SARS-COV-2 RNA SPEC QL NAA+PROBE: SIGNIFICANT CHANGE UP
SARS-COV-2 RNA SPEC QL NAA+PROBE: SIGNIFICANT CHANGE UP
SODIUM SERPL-SCNC: 139 MMOL/L — SIGNIFICANT CHANGE UP (ref 135–145)
TIBC SERPL-MCNC: 148 UG/DL — LOW (ref 220–430)
TRIGL SERPL-MCNC: 66 MG/DL — SIGNIFICANT CHANGE UP
UIBC SERPL-MCNC: 97 UG/DL — LOW (ref 110–370)
WBC # BLD: 6.68 K/UL — SIGNIFICANT CHANGE UP (ref 3.8–10.5)
WBC # FLD AUTO: 6.68 K/UL — SIGNIFICANT CHANGE UP (ref 3.8–10.5)

## 2020-12-30 PROCEDURE — 99223 1ST HOSP IP/OBS HIGH 75: CPT | Mod: GC

## 2020-12-30 PROCEDURE — 12345: CPT | Mod: NC

## 2020-12-30 PROCEDURE — 86334 IMMUNOFIX E-PHORESIS SERUM: CPT | Mod: 26

## 2020-12-30 RX ADMIN — Medication 25 MILLIGRAM(S): at 19:19

## 2020-12-30 RX ADMIN — LAMOTRIGINE 100 MILLIGRAM(S): 25 TABLET, ORALLY DISINTEGRATING ORAL at 19:19

## 2020-12-30 RX ADMIN — ALBUTEROL 2 PUFF(S): 90 AEROSOL, METERED ORAL at 21:37

## 2020-12-30 RX ADMIN — TIOTROPIUM BROMIDE 1 CAPSULE(S): 18 CAPSULE ORAL; RESPIRATORY (INHALATION) at 19:18

## 2020-12-30 RX ADMIN — BUDESONIDE AND FORMOTEROL FUMARATE DIHYDRATE 2 PUFF(S): 160; 4.5 AEROSOL RESPIRATORY (INHALATION) at 21:37

## 2020-12-30 RX ADMIN — Medication 40 MILLIGRAM(S): at 06:09

## 2020-12-30 RX ADMIN — APIXABAN 5 MILLIGRAM(S): 2.5 TABLET, FILM COATED ORAL at 19:18

## 2020-12-30 RX ADMIN — Medication 200 MILLIGRAM(S): at 21:38

## 2020-12-30 RX ADMIN — ALBUTEROL 2 PUFF(S): 90 AEROSOL, METERED ORAL at 06:10

## 2020-12-30 RX ADMIN — Medication 400 MILLIGRAM(S): at 06:09

## 2020-12-30 RX ADMIN — BUDESONIDE AND FORMOTEROL FUMARATE DIHYDRATE 2 PUFF(S): 160; 4.5 AEROSOL RESPIRATORY (INHALATION) at 09:30

## 2020-12-30 RX ADMIN — LAMOTRIGINE 100 MILLIGRAM(S): 25 TABLET, ORALLY DISINTEGRATING ORAL at 06:09

## 2020-12-30 RX ADMIN — Medication 40 MILLIGRAM(S): at 06:10

## 2020-12-30 RX ADMIN — APIXABAN 5 MILLIGRAM(S): 2.5 TABLET, FILM COATED ORAL at 06:09

## 2020-12-30 RX ADMIN — Medication 25 MILLIGRAM(S): at 06:09

## 2020-12-30 RX ADMIN — ALBUTEROL 2 PUFF(S): 90 AEROSOL, METERED ORAL at 19:18

## 2020-12-30 RX ADMIN — ALBUTEROL 2 PUFF(S): 90 AEROSOL, METERED ORAL at 12:57

## 2020-12-30 NOTE — CONSULT NOTE ADULT - SUBJECTIVE AND OBJECTIVE BOX
HPI:  76yo Yi speaking M w/ PMHx of lung CA s/p R pneumonectomy (2003), COPD (on 3L home O2), AVR, afib on eliquis, seizure dx, HTN, HLD p/w worsening cough w/ white sputum production, generalized weakness, and new infraclavicular mass that presented this AM. Collateral history obtained from wife and daughter. Per patient's wife, patient has chronic cough 2/2 COPD however approximately 2-3 weeks ago, patient started having increased yellow phlegm production. They spoke to their PMD who is also a pulmonologist who prescribed a 5 day course of azithro and prednisone 20mg with no improvement in symptoms. Patient was then given a 7 day course of cefpodoxime with continuation of the prednisone for a total 12 day antibiotic and steroid course with minimal improvement in symptoms besides phlegm color change to white. Patient denies fevers, sick contacts, travel.  Patient does have Bipap at home however uses it rarely and only for 30 minutes. Baseline O2 sat at home is 98 at rest, and 92 with exertion. Of note, patient had episode of shingles in September that spread from the R side of the abdomen to the R arm and back that was treated with a pill, and patient experienced post-herpetic neuralgia. This morning, patient noted a large "bump" on his R chest which was tender, spoke to his PMD who wanted him to be admitted for CT scan and further workup given minimal improvement of symptoms given antibiotics. In terms of cancer history, patient had R pneumonectomy in 2003, last chemo/radiation was in 2005, and patient had close follow up until 2 years ago with no recurrence of disease. Per wife, patient has had a 12 lb weight loss in the last year and endorses poor appetite and occasional episodes of nausea and dry heaving.     In the ED patient was afebrile, mildly tachypneic and required supplemental O2 though was grossly breathing comfortably. He was given solumedrol, ipratropium, albuterol with minimal improvement in symptoms though he did note that he was coughing less frequently.       14 point ROS otherwise negative    PAST MEDICAL & SURGICAL HISTORY:  Atrial fibrillation    History of pneumonia    Lung cancer  s/p R. pneumonectomy    Hypertension    Hyperlipidemia    Seizure disorder    H/O inguinal hernia repair  1999    H/O aortic valve replacement  2013    S/P cataract surgery  bilaterally    Aortic valve replaced  5/2013   bovine AoValve    S/P pneumonectomy  right sided, ~ 2003        Allergies    No Known Allergies    Intolerances        MEDICATIONS  (STANDING):  ALBUTerol    90 MICROgram(s) HFA Inhaler 2 Puff(s) Inhalation every 6 hours  apixaban 5 milliGRAM(s) Oral every 12 hours  budesonide  80 MICROgram(s)/formoterol 4.5 MICROgram(s) Inhaler 2 Puff(s) Inhalation two times a day  carBAMazepine 400 milliGRAM(s) Oral <User Schedule>  carBAMazepine 200 milliGRAM(s) Oral at bedtime  furosemide    Tablet 40 milliGRAM(s) Oral daily  lamoTRIgine 100 milliGRAM(s) Oral two times a day  methylPREDNISolone sodium succinate Injectable 40 milliGRAM(s) IV Push daily  metoprolol tartrate 25 milliGRAM(s) Oral two times a day  tiotropium 18 MICROgram(s) Capsule 1 Capsule(s) Inhalation daily    MEDICATIONS  (PRN):      FAMILY HISTORY:  Family history of cancer (Aunt)  Maternal aunt    Family history of hypertension  Mother    Family history of stroke  Mother        SOCIAL HISTORY: No EtOH, no tobacco        VITALS:   T(F): 97.7 (12-30-20 @ 06:01), Max: 98.3 (12-29-20 @ 21:54)  HR: 88 (12-30-20 @ 06:01)  BP: 123/66 (12-30-20 @ 06:01)  RR: 20 (12-30-20 @ 06:01)  SpO2: 100% (12-30-20 @ 06:01)  Wt(kg): --    PHYSICAL EXAM    GENERAL: NAD, well-developed  HEAD:  Atraumatic, Normocephalic  EYES: EOMI, PERRLA, conjunctiva and sclera clear  NECK: Supple, No JVD  CHEST/LUNG: Clear to auscultation bilaterally; No wheeze  HEART: Regular rate and rhythm; No murmurs, rubs, or gallops  ABDOMEN: Soft, Nontender, Nondistended; Bowel sounds present  EXTREMITIES:  2+ Peripheral Pulses, No clubbing, cyanosis, or edema  NEUROLOGY: non-focal  SKIN: No rashes or lesions    LABS:                         9.8    6.68  )-----------( 161      ( 30 Dec 2020 07:42 )             30.5     12-30    139  |  95<L>  |  33<H>  ----------------------------<  118<H>  4.6   |  34<H>  |  1.26    Ca    9.8      30 Dec 2020 07:42  Phos  4.3     12-30  Mg     2.3     12-30    TPro  7.3  /  Alb  3.9  /  TBili  0.5  /  DBili  x   /  AST  16  /  ALT  23  /  AlkPhos  118  12-30    Phosphorus Level, Serum: 4.3 mg/dL (12-30 @ 07:42)  Magnesium, Serum: 2.3 mg/dL (12-30 @ 07:42)  Magnesium, Serum: 2.2 mg/dL (12-29 @ 16:48)  Phosphorus Level, Serum: 2.7 mg/dL (12-29 @ 16:48)          IMAGING: HPI:  74yo Yi speaking M w/ PMHx of lung CA s/p R pneumonectomy (2003), COPD (on 3L home O2), AVR, afib on eliquis, seizure dx, HTN, HLD p/w worsening cough w/ white sputum production, generalized weakness, and new infraclavicular mass that presented this AM. Collateral history obtained from wife and daughter. Per patient's wife, patient has chronic cough 2/2 COPD however approximately 2-3 weeks ago, patient started having increased yellow phlegm production. They spoke to their PMD who is also a pulmonologist who prescribed a 5 day course of azithro and prednisone 20mg with no improvement in symptoms. Patient was then given a 7 day course of cefpodoxime with continuation of the prednisone for a total 12 day antibiotic and steroid course with minimal improvement in symptoms besides phlegm color change to white. Patient denies fevers, sick contacts, travel.  Patient does have Bipap at home however uses it rarely and only for 30 minutes. Baseline O2 sat at home is 98 at rest, and 92 with exertion. Of note, patient had episode of shingles in September that spread from the R side of the abdomen to the R arm and back that was treated with a pill, and patient experienced post-herpetic neuralgia. This morning, patient noted a large "bump" on his R chest which was tender, spoke to his PMD who wanted him to be admitted for CT scan and further workup given minimal improvement of symptoms given antibiotics. In terms of cancer history, patient had R pneumonectomy in 2003, last chemo/radiation was in 2005, and patient had close follow up until 2 years ago with no recurrence of disease. Per wife, patient has had a 12 lb weight loss in the last year and endorses poor appetite and occasional episodes of nausea and dry heaving.     In the ED patient was afebrile, mildly tachypneic and required supplemental O2 though was grossly breathing comfortably. He was given solumedrol, ipratropium, albuterol with minimal improvement in symptoms though he did note that he was coughing less frequently.       14 point ROS otherwise negative    PAST MEDICAL & SURGICAL HISTORY:  Atrial fibrillation    History of pneumonia    Lung cancer  s/p R. pneumonectomy    Hypertension    Hyperlipidemia    Seizure disorder    H/O inguinal hernia repair  1999    H/O aortic valve replacement  2013    S/P cataract surgery  bilaterally    Aortic valve replaced  5/2013   bovine AoValve    S/P pneumonectomy  right sided, ~ 2003        Allergies    No Known Allergies    Intolerances        MEDICATIONS  (STANDING):  ALBUTerol    90 MICROgram(s) HFA Inhaler 2 Puff(s) Inhalation every 6 hours  apixaban 5 milliGRAM(s) Oral every 12 hours  budesonide  80 MICROgram(s)/formoterol 4.5 MICROgram(s) Inhaler 2 Puff(s) Inhalation two times a day  carBAMazepine 400 milliGRAM(s) Oral <User Schedule>  carBAMazepine 200 milliGRAM(s) Oral at bedtime  furosemide    Tablet 40 milliGRAM(s) Oral daily  lamoTRIgine 100 milliGRAM(s) Oral two times a day  methylPREDNISolone sodium succinate Injectable 40 milliGRAM(s) IV Push daily  metoprolol tartrate 25 milliGRAM(s) Oral two times a day  tiotropium 18 MICROgram(s) Capsule 1 Capsule(s) Inhalation daily    MEDICATIONS  (PRN):      FAMILY HISTORY:  Family history of cancer (Aunt)  Maternal aunt    Family history of hypertension  Mother    Family history of stroke  Mother        SOCIAL HISTORY: No EtOH, no tobacco        VITALS:   T(F): 97.7 (12-30-20 @ 06:01), Max: 98.3 (12-29-20 @ 21:54)  HR: 88 (12-30-20 @ 06:01)  BP: 123/66 (12-30-20 @ 06:01)  RR: 20 (12-30-20 @ 06:01)  SpO2: 100% (12-30-20 @ 06:01)  Wt(kg): --    PHYSICAL EXAM    GENERAL: NAD, well-developed  HEAD:  Atraumatic, Normocephalic  EYES: EOMI, PERRLA, conjunctiva and sclera clear  NECK: Supple, No JVD  Respiratory: No R sided breath sounds, No wheezing, rales or rhonchi  Chest Wall: Palpable R chest well swelling, fluctuant, not solid, no TTP  HEART: Regular rate and rhythm; No murmurs, rubs, or gallops  ABDOMEN: Soft, Nontender, Nondistended; Bowel sounds present  EXTREMITIES:  2+ Peripheral Pulses, No clubbing, cyanosis, or edema  NEUROLOGY: non-focal  SKIN: No rashes or lesions    LABS:                         9.8    6.68  )-----------( 161      ( 30 Dec 2020 07:42 )             30.5     12-30    139  |  95<L>  |  33<H>  ----------------------------<  118<H>  4.6   |  34<H>  |  1.26    Ca    9.8      30 Dec 2020 07:42  Phos  4.3     12-30  Mg     2.3     12-30    TPro  7.3  /  Alb  3.9  /  TBili  0.5  /  DBili  x   /  AST  16  /  ALT  23  /  AlkPhos  118  12-30    Phosphorus Level, Serum: 4.3 mg/dL (12-30 @ 07:42)  Magnesium, Serum: 2.3 mg/dL (12-30 @ 07:42)  Magnesium, Serum: 2.2 mg/dL (12-29 @ 16:48)  Phosphorus Level, Serum: 2.7 mg/dL (12-29 @ 16:48)          IMAGING:

## 2020-12-30 NOTE — PROGRESS NOTE ADULT - PROBLEM SELECTOR PLAN 1
-Patient treated with 5 day course of azithro and 7 day course of cefpodoxime and 12 day course of prednisone outpatient with minimal improvement in symptoms  - pt reports symptomatic improvement compared to yesterday w/ solumedrol, continue at this time.  - afebrile, monitor off abx given extenisve outpatient course  -Continue with 3L O2 (home dose)  -Continue home respiratory medications (Breo-->symbicort; duoneb)

## 2020-12-30 NOTE — CONSULT NOTE ADULT - ATTENDING COMMENTS
on review of imaging rib that is fractured appears mottled compared to imaging earlier this raising concern for malignant etiology , should biopsy lesion prior to discharge and follow up with Dr. Mcdaniel at [facility] to review results and determine next steps for treatment

## 2020-12-30 NOTE — ED ADULT NURSE REASSESSMENT NOTE - NS ED NURSE REASSESS COMMENT FT1
Report given to [facility] THADDEUS Peterson. Pt in no apparent distress, respiration even and unlabored. Pt on 3lmp via NC. Pt moved to [facility] hallway spot 4.

## 2020-12-30 NOTE — CONSULT NOTE ADULT - ASSESSMENT
74yo Iraqi speaking M w/ PMHx of lung CA s/p R pneumonectomy (2003), COPD (on 3L home O2), AVR, afib on eliquis, seizure dx, HTN, HLD p/w worsening cough w/ white sputum production, generalized weakness, and new infraclavicular mass. In terms of cancer history, patient had R pneumonectomy in 2003, last chemo/radiation was in 2005, and patient had close follow up until 2 years ago with no recurrence of disease (Oncologist Dr. Filiberto Mcdaniel with [facility]). Mass is concerning for chest wall hematoma related to underlying Ant Rt rib fracture. Oncology has been consulted to make sure this is not a pathological fracture in setting of cancer history.      # H/o Lung cancer s/p R pneumonectomy 2003   - D/w Hospitalist   76yo Czech speaking M w/ PMHx of lung CA s/p R pneumonectomy (2003), COPD (on 3L home O2), AVR, afib on eliquis, seizure dx, HTN, HLD p/w worsening cough w/ white sputum production, generalized weakness, and new infraclavicular mass. In terms of cancer history, patient had R pneumonectomy in 2003, last chemo/radiation was in 2005, and patient had close follow up until 2 years ago with no recurrence of disease (Oncologist Dr. Filiberto Mcdaniel with [place]). Mass is concerning for chest wall hematoma related to underlying Ant Rt rib fracture. Oncology has been consulted to make sure this is not a pathological fracture in setting of cancer history.      # H/o Lung cancer s/p R pneumonectomy 2003  # Concern for recurrence given Rt rib fracture   - D/w Hospitalist Dr. Ruiz  - This is likely a fracture from the excessive coughing; hematoma as a result of the fracture  - CT chest w/out e/o cancer recurrence  - No further inpatient workup required  - Follow up with thoracic and outpatient oncologist  - Oncology will sign off      Abby Melton, PGY-4  Hematology-Oncology Fellow  [phone] ([place]) [phone] ([facility])  I can also be reached on [employer] Teams  Please page fellow on call after 5pm and on weekends 76yo Italian speaking M w/ PMHx of lung CA s/p R pneumonectomy (2003), COPD (on 3L home O2), AVR, afib on eliquis, seizure dx, HTN, HLD p/w worsening cough w/ white sputum production, generalized weakness, and new infraclavicular mass. In terms of cancer history, patient had R pneumonectomy in 2003, last chemo/radiation was in 2005, and patient had close follow up until 2 years ago with no recurrence of disease (Oncologist Dr. Filiberto Mcdaniel with [place]). Mass is concerning for chest wall hematoma related to underlying Ant Rt rib fracture. Oncology has been consulted to make sure this is not a pathological fracture in setting of cancer history.      # H/o Lung cancer s/p R pneumonectomy 2003  # Concern for recurrence given Rt rib fracture with associated hematoma/metastatic lesion  - D/w Hospitalist Dr. Ruiz  - CT chest w/out e/o cancer recurrence, however when discussed with radiologist concerning for metastatic lesion  - Recommend IR guided biopsy of chest lesion  - Cont. outpatient f/up with Dr. Mcdaniel  - Plan communicated to patient and daughter        Abby Melton, PGY-4  Hematology-Oncology Fellow  [phone] ([place]) [phone] ([facility])  I can also be reached on [employer] Teams  Please page fellow on call after 5pm and on weekends

## 2020-12-31 ENCOUNTER — TRANSCRIPTION ENCOUNTER (OUTPATIENT)
Age: 75
End: 2020-12-31

## 2020-12-31 LAB
ALBUMIN SERPL ELPH-MCNC: 3.6 G/DL — SIGNIFICANT CHANGE UP (ref 3.3–5)
ALP SERPL-CCNC: 115 U/L — SIGNIFICANT CHANGE UP (ref 40–120)
ALT FLD-CCNC: 23 U/L — SIGNIFICANT CHANGE UP (ref 4–41)
ANION GAP SERPL CALC-SCNC: 8 MMOL/L — SIGNIFICANT CHANGE UP (ref 7–14)
AST SERPL-CCNC: 19 U/L — SIGNIFICANT CHANGE UP (ref 4–40)
BILIRUB SERPL-MCNC: 0.4 MG/DL — SIGNIFICANT CHANGE UP (ref 0.2–1.2)
BUN SERPL-MCNC: 37 MG/DL — HIGH (ref 7–23)
CALCIUM SERPL-MCNC: 9.7 MG/DL — SIGNIFICANT CHANGE UP (ref 8.4–10.5)
CHLORIDE SERPL-SCNC: 93 MMOL/L — LOW (ref 98–107)
CO2 SERPL-SCNC: 34 MMOL/L — HIGH (ref 22–31)
CREAT SERPL-MCNC: 1.22 MG/DL — SIGNIFICANT CHANGE UP (ref 0.5–1.3)
GLUCOSE SERPL-MCNC: 111 MG/DL — HIGH (ref 70–99)
HCT VFR BLD CALC: 30.3 % — LOW (ref 39–50)
HGB BLD-MCNC: 9.8 G/DL — LOW (ref 13–17)
MCHC RBC-ENTMCNC: 30.2 PG — SIGNIFICANT CHANGE UP (ref 27–34)
MCHC RBC-ENTMCNC: 32.3 GM/DL — SIGNIFICANT CHANGE UP (ref 32–36)
MCV RBC AUTO: 93.2 FL — SIGNIFICANT CHANGE UP (ref 80–100)
NRBC # BLD: 0 /100 WBCS — SIGNIFICANT CHANGE UP
NRBC # FLD: 0 K/UL — SIGNIFICANT CHANGE UP
PLATELET # BLD AUTO: 178 K/UL — SIGNIFICANT CHANGE UP (ref 150–400)
POTASSIUM SERPL-MCNC: 3.9 MMOL/L — SIGNIFICANT CHANGE UP (ref 3.5–5.3)
POTASSIUM SERPL-SCNC: 3.9 MMOL/L — SIGNIFICANT CHANGE UP (ref 3.5–5.3)
PROT ?TM UR-MCNC: 9 MG/DL — SIGNIFICANT CHANGE UP
PROT SERPL-MCNC: 7.1 G/DL — SIGNIFICANT CHANGE UP (ref 6–8.3)
PROT SERPL-MCNC: 7.1 G/DL — SIGNIFICANT CHANGE UP (ref 6–8.3)
RBC # BLD: 3.25 M/UL — LOW (ref 4.2–5.8)
RBC # FLD: 12.4 % — SIGNIFICANT CHANGE UP (ref 10.3–14.5)
SODIUM SERPL-SCNC: 135 MMOL/L — SIGNIFICANT CHANGE UP (ref 135–145)
WBC # BLD: 11.24 K/UL — HIGH (ref 3.8–10.5)
WBC # FLD AUTO: 11.24 K/UL — HIGH (ref 3.8–10.5)

## 2020-12-31 PROCEDURE — 86335 IMMUNFIX E-PHORSIS/URINE/CSF: CPT | Mod: 26

## 2020-12-31 PROCEDURE — 99233 SBSQ HOSP IP/OBS HIGH 50: CPT

## 2020-12-31 PROCEDURE — 84165 PROTEIN E-PHORESIS SERUM: CPT | Mod: 26

## 2020-12-31 PROCEDURE — 84166 PROTEIN E-PHORESIS/URINE/CSF: CPT | Mod: 26

## 2020-12-31 RX ADMIN — BUDESONIDE AND FORMOTEROL FUMARATE DIHYDRATE 2 PUFF(S): 160; 4.5 AEROSOL RESPIRATORY (INHALATION) at 09:06

## 2020-12-31 RX ADMIN — ALBUTEROL 2 PUFF(S): 90 AEROSOL, METERED ORAL at 05:58

## 2020-12-31 RX ADMIN — Medication 400 MILLIGRAM(S): at 05:54

## 2020-12-31 RX ADMIN — ALBUTEROL 2 PUFF(S): 90 AEROSOL, METERED ORAL at 09:07

## 2020-12-31 RX ADMIN — LAMOTRIGINE 100 MILLIGRAM(S): 25 TABLET, ORALLY DISINTEGRATING ORAL at 17:23

## 2020-12-31 RX ADMIN — Medication 25 MILLIGRAM(S): at 05:58

## 2020-12-31 RX ADMIN — APIXABAN 5 MILLIGRAM(S): 2.5 TABLET, FILM COATED ORAL at 05:54

## 2020-12-31 RX ADMIN — TIOTROPIUM BROMIDE 1 CAPSULE(S): 18 CAPSULE ORAL; RESPIRATORY (INHALATION) at 09:07

## 2020-12-31 RX ADMIN — Medication 25 MILLIGRAM(S): at 17:23

## 2020-12-31 RX ADMIN — ALBUTEROL 2 PUFF(S): 90 AEROSOL, METERED ORAL at 17:24

## 2020-12-31 RX ADMIN — LAMOTRIGINE 100 MILLIGRAM(S): 25 TABLET, ORALLY DISINTEGRATING ORAL at 05:54

## 2020-12-31 RX ADMIN — APIXABAN 5 MILLIGRAM(S): 2.5 TABLET, FILM COATED ORAL at 17:23

## 2020-12-31 RX ADMIN — Medication 40 MILLIGRAM(S): at 05:55

## 2020-12-31 RX ADMIN — ALBUTEROL 2 PUFF(S): 90 AEROSOL, METERED ORAL at 21:46

## 2020-12-31 RX ADMIN — Medication 200 MILLIGRAM(S): at 21:47

## 2020-12-31 RX ADMIN — BUDESONIDE AND FORMOTEROL FUMARATE DIHYDRATE 2 PUFF(S): 160; 4.5 AEROSOL RESPIRATORY (INHALATION) at 21:46

## 2020-12-31 NOTE — DIETITIAN INITIAL EVALUATION ADULT. - CONTINUE CURRENT NUTRITION CARE PLAN
1. Recommend Liberalize diet to Low sodium and D/C DASH/TLC (cholesterol and sodium restricted) diet to optimize po intake 2. Monitor weights, labs, BM's, skin integrity, p.o. intake. 3. Please Encourage po intake, assist with meals and menu selections, provide alternatives PRN. 4. Honor food and beverage preferences within diet restriction of patient in an effort to maximize level of nutrient intake.

## 2020-12-31 NOTE — DIETITIAN INITIAL EVALUATION ADULT. - OTHER INFO
Met with patient at bedside. Patient endorses fair appetite and PO intake at this time. Patient was having breakfast at time of visit. Patient denies any nausea/vomiting/diarrhea/constipation or difficulty chewing and swallowing. NKFA. Though unable to recall weight, reports highest weight to be around 155lbs and that he lost 12lbs (insignificant weight loss) over 1 year as confirmed by daughter. Weight history obtained from [facility] as follows: 70.3kg (2017), 61.5kg (2018), 65.9kg (5/2020) and 68kg (12/29/2020). Question accuracy of weight recorded this admission of 84kg/186.2lbs (pt and daughter denies this weight, feels he weighs less). Would recommend patient to be reweighed for better accuracy.

## 2020-12-31 NOTE — PROGRESS NOTE ADULT - ASSESSMENT
74yo French speaking M w/ PMHx of lung CA s/p R pneumonectomy (2003), COPD (on 2L home O2), AVR, afib on eliquis, seizure dx, HTN, HLD p/w worsening cough w/ white sputum production and generalized weakness concerning 2/2 COPD exacerbation found to have chest wall mass concerning for hematoma in setting of nontraumatic rib facture vs recurrence or new primary malignancy.
74yo Sinhala speaking M w/ PMHx of lung CA s/p R pneumonectomy (2003), COPD (on 2L home O2), AVR, afib on eliquis, seizure dx, HTN, HLD p/w worsening cough w/ white sputum production and generalized weakness concerning for COPD exacerbation vs PNA vs malignancy.

## 2020-12-31 NOTE — DIETITIAN INITIAL EVALUATION ADULT. - PROBLEM SELECTOR PLAN 3
-s/p R pneumonectomy in 2003  -Last chemo/radiation in 2005, has had [id number] follow up until 2018 but CA stayed in remission  -In context of 12 lb weight loss, potential pathologic fracture, abx resistant COPD exacerbation, concern for recurrence  -Would try to obtain collateral from patient's oncologist in AM, need for bone scan

## 2020-12-31 NOTE — DIETITIAN INITIAL EVALUATION ADULT. - PERTINENT LABORATORY DATA
12-31 Na135 mmol/L Glu 111 mg/dL<H> K+ 3.9 mmol/L Cr  1.22 mg/dL BUN 37 mg/dL<H> 12-30 Phos 4.3 mg/dL 12-31 Alb 3.6 g/dL 12-30 Chol 177 mg/dL LDL --    HDL 59 mg/dL Trig 66 mg/dL

## 2020-12-31 NOTE — DISCHARGE NOTE PROVIDER - HOSPITAL COURSE
74yo Turkmen speaking M w/ PMHx of lung CA s/p R pneumonectomy (2003), COPD (on 3L home O2), AVR, afib on eliquis, seizure dx, HTN, HLD p/w worsening cough w/ white sputum production and generalized weakness concerning for COPD exacerbation vs malignancy; found to have fracture, concerning for pathological fracture vs trauma.     Hospital Course:     74yo Persian speaking M w/ PMHx of lung CA s/p R pneumonectomy (2003), COPD (on 3L home O2), AVR, afib on eliquis, seizure dx, HTN, HLD p/w worsening cough w/ white sputum production and generalized weakness concerning for COPD exacerbation vs malignancy; found to have fracture, concerning for pathological fracture vs trauma.     Hospital Course:     COPD exacerbation.  Plan: -Patient treated with 5 day course of azithro and 7 day course of cefpodoxime and 12 day course of prednisone outpatient with minimal improvement in symptoms  - pt reports symptomatic improvement compared to yesterday w/ solumedrol, continue at this time, will trnasition to 5 day course of prednisone 40 mg po, d/c pt outpatient PCP/pulmonoloigst Dr. Lubin [phone]  - afebrile, monitor off abx given extensive outpatient course  -Continue with 3L O2 (home dose)  -Continue home respiratory medications (Breo-->symbicort; duoneb).      Rib fracture.  Plan: -Patient complaining of [id number] tender infraclavicular mass; on CT scan found to have minimally displaced anterior 2nd rib fracture with probable hematoma that correlates with mass in setting of AC use  -No trauma history confirmed by pt and daughter  -given non traumatic consideration for pathologic process, d/w [place] oncology who reviewed imaging with radiology conern for recurrence vs new primary malignancy given mass infiltrative from bone. Spoke with family, daughter Alannah that indication would be biopsy, however as pt on apixaban and non urgent earliest would be on 1/4. Discussed findings with outpatient oncologist Dr. Mcdaniel, at this point plan for IR biopsy in outaptient setting, daughter/patient on board and with understanding.      Lung cancer.  Plan: -s/p R pneumonectomy in 2003  -Last chemo/radiation in 2005, has had [id number] follow up until 2018 but CA stayed in remission  -In context of 12 lb weight loss, potential pathologic fracture, abx resistant COPD exacerbation possible recurrence  -f/u [place] onc reccs, d/w primary oncologist Wade Mcdaniel office, last seen in 2018, no surveillnace imaging since that time.   -patient to follow up as outpatient for further medical management     Atrial fibrillation.  Plan: -On Metoprolol and Eliquis, will continue in the hospital.     Seizure disorder.  Plan: -Last seizure was 3 years ago  -c/w home carbamazepine 400 A    Dispo- On 1/1/21 patient stable for discharge home with Home PT as per attending.  All medications reviewed prior to discharge.

## 2020-12-31 NOTE — DIETITIAN INITIAL EVALUATION ADULT. - PROBLEM SELECTOR PLAN 2
-Patient complaining of [id number] tender infraclavicular mass; on CT scan found to have minimally displaced anterior 2nd rib fracture with probable hematoma that correlates with mass  -No trauma history  -Concern for possible pathologic fracture vs nondisclosed trauma history vs multiple myeloma (anemia, CKD, fracture)  -f/u SPEP, UPEP, immunofixation

## 2020-12-31 NOTE — DIETITIAN INITIAL EVALUATION ADULT. - ORAL INTAKE PTA/DIET HISTORY
Patient with diminished appetite and PO intake PTA due to illness as reported. However, also notes that he is not a "big eater" and picky about foods. Food preference for rice pudding, apple juice, coffee, chicken/tuna/turkey, potato, rice, oatmeal, farina, eggs, pack voiced. Dislikes orange juice, tea, pasta/sauce, cold cereal milk and beef/pork.

## 2020-12-31 NOTE — DIETITIAN INITIAL EVALUATION ADULT. - PROBLEM SELECTOR PLAN 1
-Patient treated with 5 day course of azithro and 7 day course of cefpodoxime and 12 day course of prednisone outpatient with minimal improvement in symptoms  -Given solumedrol, duoneb in the ED with minimal improvement   -Chest PT  -CT chest without infection, monitor off abx  -solu-medrol 40 daily for now to see if improvement in symptoms  -Continue with 3L O2 (home dose)  -Continue home respiratory medications (Breo-->symbicort; duoneb)  -Discuss with PMD/ consult pulm in AM. Outpatient pulm Dr. River Lubin cell [phone]

## 2020-12-31 NOTE — PROGRESS NOTE ADULT - SUBJECTIVE AND OBJECTIVE BOX
Patient is a 75y old  Male who presents with a chief complaint of shortness of breath (31 Dec 2020 12:28)      SUBJECTIVE / OVERNIGHT EVENTS:    Pt eating, tolerating po intake, no chest pain, dyspnea. Pain at chest wall site is stable, swelling not increasing.     MEDICATIONS  (STANDING):  ALBUTerol    90 MICROgram(s) HFA Inhaler 2 Puff(s) Inhalation every 6 hours  apixaban 5 milliGRAM(s) Oral every 12 hours  budesonide  80 MICROgram(s)/formoterol 4.5 MICROgram(s) Inhaler 2 Puff(s) Inhalation two times a day  carBAMazepine 400 milliGRAM(s) Oral <User Schedule>  carBAMazepine 200 milliGRAM(s) Oral at bedtime  furosemide    Tablet 40 milliGRAM(s) Oral daily  lamoTRIgine 100 milliGRAM(s) Oral two times a day  methylPREDNISolone sodium succinate Injectable 40 milliGRAM(s) IV Push daily  metoprolol tartrate 25 milliGRAM(s) Oral two times a day  tiotropium 18 MICROgram(s) Capsule 1 Capsule(s) Inhalation daily    MEDICATIONS  (PRN):      Vital Signs Last 24 Hrs  T(C): 36.7 (31 Dec 2020 05:53), Max: 36.7 (31 Dec 2020 05:53)  T(F): 98.1 (31 Dec 2020 05:53), Max: 98.1 (31 Dec 2020 05:53)  HR: 92 (31 Dec 2020 05:53) (87 - 92)  BP: 120/64 (31 Dec 2020 05:53) (111/68 - 121/60)  BP(mean): --  RR: 20 (31 Dec 2020 05:53) (20 - 20)  SpO2: 100% (31 Dec 2020 05:53) (100% - 100%)  CAPILLARY BLOOD GLUCOSE        I&O's Summary    30 Dec 2020 07:01  -  31 Dec 2020 07:00  --------------------------------------------------------  IN: 0 mL / OUT: 800 mL / NET: -800 mL        PHYSICAL EXAM:  Vital Signs Last 24 Hrs  T(C): 36.7 (12-31-20 @ 05:53)  T(F): 98.1 (12-31-20 @ 05:53), Max: 98.1 (12-31-20 @ 05:53)  HR: 92 (12-31-20 @ 05:53) (87 - 92)  BP: 120/64 (12-31-20 @ 05:53)  BP(mean): --  RR: 20 (12-31-20 @ 05:53) (20 - 20)  SpO2: 100% (12-31-20 @ 05:53) (100% - 100%)  Wt(kg): --    12-30 @ 07:01  -  12-31 @ 07:00  --------------------------------------------------------  IN: 0 mL / OUT: 800 mL / NET: -800 mL      Constitutional: NAD, awake and alert, cachectic  EYES: EOMI  ENT:  Normal Hearing, no tonsillar exudates   Neck: Soft and supple , no thyromegaly   Chest wall: palpable fluctuant swelling  Respiratory: Breath sounds are clear bilaterally, No wheezing, rales or rhonchi  Cardiovascular: S1 and S2, regular rate and rhythm, no Murmurs, gallops or rubs, no JVD,    Gastrointestinal: Bowel Sounds present, soft, nontender, nondistended, no guarding, no rebound  Extremities: No cyanosis or clubbing; warm to touch  Vascular: 2+ peripheral pulses lower ex  Neurological: No focal deficits, CN II-XII intact bilaterally, sensation to light touch intact in all extremities, gait intact. Pupils are equally reactive to light and symmetrical in size.   Musculoskeletal: 5/5 strength b/l upper and lower extremities; no joint swelling.  Skin: No rashes  Psych: no depression or anhedonia, AAOx3  HEME: no bruises, no nose bleeds      LABS:                        9.8    11.24 )-----------( 178      ( 31 Dec 2020 06:38 )             30.3     12-31    135  |  93<L>  |  37<H>  ----------------------------<  111<H>  3.9   |  34<H>  |  1.22    Ca    9.7      31 Dec 2020 06:38  Phos  4.3     12-30  Mg     2.3     12-30    TPro  7.1  /  Alb  3.6  /  TBili  0.4  /  DBili  x   /  AST  19  /  ALT  23  /  AlkPhos  115  12-31              RADIOLOGY & ADDITIONAL TESTS:    Imaging Personally Reviewed:    Consultant(s) Notes Reviewed:      Care Discussed with Consultants/Other Providers:  
Patient is a 75y old  Male who presents with a chief complaint of shortness of breath (29 Dec 2020 20:16)      SUBJECTIVE / OVERNIGHT EVENTS:    Patient seen and examined at bedside, d/w daughter through phone for translation. Pt admitted overnight w/ R chest wall swelling and dyspnea. At this time at baseline 3 L home O2, improvement in dyspnea/cough. No chest pain. Tolerating po intake. Having regular BMs.    MEDICATIONS  (STANDING):  ALBUTerol    90 MICROgram(s) HFA Inhaler 2 Puff(s) Inhalation every 6 hours  apixaban 5 milliGRAM(s) Oral every 12 hours  budesonide  80 MICROgram(s)/formoterol 4.5 MICROgram(s) Inhaler 2 Puff(s) Inhalation two times a day  carBAMazepine 400 milliGRAM(s) Oral <User Schedule>  carBAMazepine 200 milliGRAM(s) Oral at bedtime  furosemide    Tablet 40 milliGRAM(s) Oral daily  lamoTRIgine 100 milliGRAM(s) Oral two times a day  methylPREDNISolone sodium succinate Injectable 40 milliGRAM(s) IV Push daily  metoprolol tartrate 25 milliGRAM(s) Oral two times a day  tiotropium 18 MICROgram(s) Capsule 1 Capsule(s) Inhalation daily    MEDICATIONS  (PRN):      Vital Signs Last 24 Hrs  T(C): 36.5 (30 Dec 2020 06:01), Max: 36.8 (29 Dec 2020 21:54)  T(F): 97.7 (30 Dec 2020 06:01), Max: 98.3 (29 Dec 2020 21:54)  HR: 88 (30 Dec 2020 06:01) (85 - 93)  BP: 123/66 (30 Dec 2020 06:01) (109/60 - 144/60)  BP(mean): --  RR: 20 (30 Dec 2020 06:01) (20 - 22)  SpO2: 100% (30 Dec 2020 06:01) (98% - 100%)  CAPILLARY BLOOD GLUCOSE        I&O's Summary      PHYSICAL EXAM:  Vital Signs Last 24 Hrs  T(C): 36.5 (12-30-20 @ 06:01)  T(F): 97.7 (12-30-20 @ 06:01), Max: 98.3 (12-29-20 @ 21:54)  HR: 88 (12-30-20 @ 06:01) (85 - 93)  BP: 123/66 (12-30-20 @ 06:01)  BP(mean): --  RR: 20 (12-30-20 @ 06:01) (20 - 22)  SpO2: 100% (12-30-20 @ 06:01) (98% - 100%)  Wt(kg): --    Constitutional: NAD, awake and alert, cachectic  EYES: EOMI  Neck: Soft and supple , no thyromegaly   Respiratory: No R sided breath sounds, No wheezing, rales or rhonchi  Chest Wall: Palpable R chest well swelling, fluctuant, not solid, no TTP  Cardiovascular: S1 and S2, regular rate and rhythm, no Murmurs, gallops or rubs, no JVD,    Gastrointestinal: Bowel Sounds present, soft, nontender, nondistended, no guarding, no rebound  Extremities: No cyanosis or clubbing; warm to touch  Vascular: 2+ peripheral pulses lower ex  Neurological: No focal deficits, CN II-XII intact bilaterally, sensation to light touch intact in all extremities Pupils are equally reactive to light and symmetrical in size.   Musculoskeletal: 5/5 strength b/l upper and lower extremities; no joint swelling.  Skin: No rashes  Psych: no depression or anhedonia, AAOx3  HEME: no bruises, no nose bleeds      LABS:                        9.8    6.68  )-----------( 161      ( 30 Dec 2020 07:42 )             30.5     12-30    139  |  95<L>  |  33<H>  ----------------------------<  118<H>  4.6   |  34<H>  |  1.26    Ca    9.8      30 Dec 2020 07:42  Phos  4.3     12-30  Mg     2.3     12-30    TPro  7.3  /  Alb  3.9  /  TBili  0.5  /  DBili  x   /  AST  16  /  ALT  23  /  AlkPhos  118  12-30              RADIOLOGY & ADDITIONAL TESTS:    Imaging Personally Reviewed:    Consultant(s) Notes Reviewed:      Care Discussed with Consultants/Other Providers:

## 2020-12-31 NOTE — PHYSICAL THERAPY INITIAL EVALUATION ADULT - PLANNED THERAPY INTERVENTIONS, PT EVAL
Patient left supine in bed in NAD, call bell in reach, all lines intact./balance training/bed mobility training/gait training/ROM/strengthening/transfer training

## 2020-12-31 NOTE — PHYSICAL THERAPY INITIAL EVALUATION ADULT - PERTINENT HX OF CURRENT PROBLEM, REHAB EVAL
76yo Maori speaking M w/ PMHx of lung CA s/p R pneumonectomy (2003), COPD (on 3L home O2), AVR, afib on eliquis, seizure dx, HTN, HLD p/w worsening cough w/ white sputum production, generalized weakness, and new infraclavicular mass

## 2020-12-31 NOTE — DIETITIAN INITIAL EVALUATION ADULT. - PROBLEM/PLAN-1
Blister was leaking so I debrided it and started him on Silvadene. We will follow along. Chalo
DISPLAY PLAN FREE TEXT

## 2020-12-31 NOTE — PROGRESS NOTE ADULT - PROBLEM SELECTOR PLAN 3
-s/p R pneumonectomy in 2003  -Last chemo/radiation in 2005, has had [id number] follow up until 2018 but CA stayed in remission  -In context of 12 lb weight loss, potential pathologic fracture, abx resistant COPD exacerbation possible recurrence?  -f/u house onc reccs, d/w primary onoclogist Wade Mcdaniel office, last seen in 2018, no surveillnace imaging since that time
-s/p R pneumonectomy in 2003  -Last chemo/radiation in 2005, has had [id number] follow up until 2018 but CA stayed in remission  -In context of 12 lb weight loss, potential pathologic fracture, abx resistant COPD exacerbation possible recurrence?  -f/u house onc reccs, d/w primary onoclogist Wade Mcdaniel office, last seen in 2018, no surveillnace imaging since that time

## 2020-12-31 NOTE — DIETITIAN INITIAL EVALUATION ADULT. - CHIEF COMPLAINT
74yo Luxembourgish speaking M w/ PMHx of lung CA s/p R pneumonectomy (2003), COPD (on 2L home O2), AVR, afib on eliquis, seizure dx, HTN, HLD p/w worsening cough w/ white sputum production and generalized weakness concerning for COPD exacerbation vs PNA vs malignancy.

## 2020-12-31 NOTE — DISCHARGE NOTE PROVIDER - NSDCCPCAREPLAN_GEN_ALL_CORE_FT
PRINCIPAL DISCHARGE DIAGNOSIS  Diagnosis: COPD exacerbation  Assessment and Plan of Treatment:       SECONDARY DISCHARGE DIAGNOSES  Diagnosis: Lung cancer  Assessment and Plan of Treatment: Lung cancer     PRINCIPAL DISCHARGE DIAGNOSIS  Diagnosis: COPD exacerbation  Assessment and Plan of Treatment: Continue steroids as prescribed.  Follow up with your Pulmonologist within 1 week of discharge for further medical management.      SECONDARY DISCHARGE DIAGNOSES  Diagnosis: Lung cancer  Assessment and Plan of Treatment: Follow up with your Oncologist  Dr. Mcdaniel for further medical management.    Diagnosis: Atrial fibrillation  Assessment and Plan of Treatment: Continue Eliquis as prescribed.  Follow up with your cardiologist for further medical management.    Diagnosis: Rib fracture  Assessment and Plan of Treatment: rib fracture noted on xray.  Pain management as needed.

## 2020-12-31 NOTE — DIETITIAN INITIAL EVALUATION ADULT. - REASON INDICATOR FOR ASSESSMENT
Unintentional Weight loss    Patient is Hungarian Speaking requested/preferred daughter to be called ([phone]) to translate for patient. Refused  services at this time.

## 2020-12-31 NOTE — PROGRESS NOTE ADULT - PROBLEM SELECTOR PLAN 5
-Last seizure was 3 years ago  -c/w home carbamazepine 400 AM, 200 PM  -c/w home lamictal 100 BID
-Last seizure was 3 years ago  -c/w home carbamazepine 400 AM, 200 PM  -c/w home lamictal 100 BID

## 2020-12-31 NOTE — PROGRESS NOTE ADULT - PROBLEM SELECTOR PLAN 1
-Patient treated with 5 day course of azithro and 7 day course of cefpodoxime and 12 day course of prednisone outpatient with minimal improvement in symptoms  - pt reports symptomatic improvement compared to yesterday w/ solumedrol, continue at this time, will trnasition to 5 day course of prednisone 40 mg po  - afebrile, monitor off abx given extenisve outpatient course  -Continue with 3L O2 (home dose)  -Continue home respiratory medications (Breo-->symbicort; duoneb) -Patient treated with 5 day course of azithro and 7 day course of cefpodoxime and 12 day course of prednisone outpatient with minimal improvement in symptoms  - pt reports symptomatic improvement compared to yesterday w/ solumedrol, continue at this time, will trnasition to 5 day course of prednisone 40 mg po, d/c pt outpatient PCP/pulmonoloigst Dr. Lubin [phone]  - afebrile, monitor off abx given extenisve outpatient course  -Continue with 3L O2 (home dose)  -Continue home respiratory medications (Breo-->symbicort; duoneb)

## 2020-12-31 NOTE — DISCHARGE NOTE PROVIDER - NSDCMRMEDTOKEN_GEN_ALL_CORE_FT
Breo Ellipta 100 mcg-25 mcg/inh inhalation powder: 1 puff(s) inhaled once a day  carBAMazepine 200 mg oral tablet: 2 tab(s) orally once a day in AM   carBAMazepine 200 mg oral tablet: 1 tab(s) orally once a day (at bedtime)  Combivent 18 mcg-103 mcg-/inh inhalation aerosol: 1 puff(s) inhaled 4 times a day  DuoNeb 0.5 mg-2.5 mg/3 mL inhalation solution: 3 milliliter(s) inhaled every 6 hours, As Needed - for bronchospasm, for shortness of breath and/or wheezing  Eliquis 5 mg oral tablet: 1 tab(s) orally 2 times a day  furosemide 40 mg oral tablet: 1 tab(s) orally once a day  LaMICtal 100 mg oral tablet: 1 tab(s) orally 2 times a day  Metoprolol Tartrate 25 mg oral tablet: 1 tab(s) orally 2 times a day   Breo Ellipta 100 mcg-25 mcg/inh inhalation powder: 1 puff(s) inhaled once a day  carBAMazepine 200 mg oral tablet: 2 tab(s) orally once a day in AM   carBAMazepine 200 mg oral tablet: 1 tab(s) orally once a day (at bedtime)  Combivent 18 mcg-103 mcg-/inh inhalation aerosol: 1 puff(s) inhaled 4 times a day  DuoNeb 0.5 mg-2.5 mg/3 mL inhalation solution: 3 milliliter(s) inhaled every 6 hours, As Needed - for bronchospasm, for shortness of breath and/or wheezing  Eliquis 5 mg oral tablet: 1 tab(s) orally 2 times a day  furosemide 40 mg oral tablet: 1 tab(s) orally once a day  LaMICtal 100 mg oral tablet: 1 tab(s) orally 2 times a day  Metoprolol Tartrate 25 mg oral tablet: 1 tab(s) orally 2 times a day  predniSONE 20 mg oral tablet: 2 tab(s) orally once a day

## 2020-12-31 NOTE — DIETITIAN INITIAL EVALUATION ADULT. - PERTINENT MEDS FT
ALBUTerol    90 MICROgram(s) HFA Inhaler  budesonide  80 MICROgram(s)/formoterol 4.5 MICROgram(s) Inhaler  carBAMazepine  carBAMazepine  furosemide    Tablet  lamoTRIgine  methylPREDNISolone sodium succinate Injectable  metoprolol tartrate  tiotropium 18 MICROgram(s) Capsule

## 2020-12-31 NOTE — PROGRESS NOTE ADULT - PROBLEM SELECTOR PLAN 2
-Patient complaining of [id number] tender infraclavicular mass; on CT scan found to have minimally displaced anterior 2nd rib fracture with probable hematoma that correlates with mass in setting of AC use  -No trauma history confirmed by pt and daughter  -given non traumatic consideration for pathologic process, d/w Dr. Wade Mcdaniel office last visit was 2018 no surveillance imaging in that time. Consulted house onc if further w/u indicated regarding bone scan vs PET  -f/u SPEP, UPEP, immunofixation for MM w/u, no protein gap
-Patient complaining of [id number] tender infraclavicular mass; on CT scan found to have minimally displaced anterior 2nd rib fracture with probable hematoma that correlates with mass in setting of AC use  -No trauma history confirmed by pt and daughter  -given non traumatic consideration for pathologic process, d/w house oncology who reviewed imaging with radiology conern for recurrence vs new primary malignancy given mass infiltrative from bone. Spoke with family, daughter Alannah that indication would be biopsy, however as pt on apixaban and non urgent earliest would be on 1/4. Discussed findings with outpatient oncologist Dr. Mcdaniel, at this point plan for IR biopsy in outaptient setting, daughter/patient on board and with understanding.

## 2020-12-31 NOTE — PROGRESS NOTE ADULT - PROBLEM SELECTOR PLAN 6
Diet: DASH/TLC  DVT: a/c on eliquis  Dispo: PT blaze pending
Diet: DASH/TLC  DVT: a/c on eliquis  Dispo: PT blaze pending

## 2020-12-31 NOTE — PROGRESS NOTE ADULT - PROBLEM SELECTOR PLAN 4
-On metoprolol and eliquis, will continue in the hospital.
-On metoprolol and eliquis, will continue in the hospital.

## 2021-01-01 ENCOUNTER — TRANSCRIPTION ENCOUNTER (OUTPATIENT)
Age: 76
End: 2021-01-01

## 2021-01-01 VITALS
RESPIRATION RATE: 20 BRPM | HEART RATE: 93 BPM | DIASTOLIC BLOOD PRESSURE: 57 MMHG | SYSTOLIC BLOOD PRESSURE: 113 MMHG | TEMPERATURE: 98 F | OXYGEN SATURATION: 100 %

## 2021-01-01 PROCEDURE — 99239 HOSP IP/OBS DSCHRG MGMT >30: CPT

## 2021-01-01 RX ADMIN — TIOTROPIUM BROMIDE 1 CAPSULE(S): 18 CAPSULE ORAL; RESPIRATORY (INHALATION) at 08:50

## 2021-01-01 RX ADMIN — LAMOTRIGINE 100 MILLIGRAM(S): 25 TABLET, ORALLY DISINTEGRATING ORAL at 05:27

## 2021-01-01 RX ADMIN — Medication 40 MILLIGRAM(S): at 05:27

## 2021-01-01 RX ADMIN — Medication 400 MILLIGRAM(S): at 05:27

## 2021-01-01 RX ADMIN — BUDESONIDE AND FORMOTEROL FUMARATE DIHYDRATE 2 PUFF(S): 160; 4.5 AEROSOL RESPIRATORY (INHALATION) at 08:50

## 2021-01-01 RX ADMIN — ALBUTEROL 2 PUFF(S): 90 AEROSOL, METERED ORAL at 05:26

## 2021-01-01 RX ADMIN — APIXABAN 5 MILLIGRAM(S): 2.5 TABLET, FILM COATED ORAL at 10:39

## 2021-01-01 RX ADMIN — ALBUTEROL 2 PUFF(S): 90 AEROSOL, METERED ORAL at 08:50

## 2021-01-01 RX ADMIN — Medication 25 MILLIGRAM(S): at 05:27

## 2021-01-01 NOTE — CHART NOTE - NSCHARTNOTEFT_GEN_A_CORE
pt seen and examined at 145p- pt keen to go home- family over the phone translating- wife and dgigor Jaffe- they are keen for pt to go home today.  they will f/u w/ their oncologist as outpt  explained that if pt got SOB or felt worse then he should come back to ER  see dc note  time 40min  dgtr stated pt always coughs since his surgery in the past

## 2021-01-01 NOTE — DISCHARGE NOTE NURSING/CASE MANAGEMENT/SOCIAL WORK - PATIENT PORTAL LINK FT
You can access the FollowMyHealth Patient Portal offered by [facility] by registering at the following website: [email or web address]. By joining [employer]’s FollowMyHealth portal, you will also be able to view your health information using other applications (apps) compatible with our system.

## 2021-01-04 LAB
% ALBUMIN: 44.6 % — SIGNIFICANT CHANGE UP
% ALPHA 1: 6.7 % — SIGNIFICANT CHANGE UP
% ALPHA 2: 13.3 % — SIGNIFICANT CHANGE UP
% BETA: 15.7 % — SIGNIFICANT CHANGE UP
% GAMMA: 19.7 % — SIGNIFICANT CHANGE UP
ALBUMIN SERPL ELPH-MCNC: 3.26 G/DL — LOW (ref 3.3–4.4)
ALBUMIN/GLOB SERPL ELPH: 0.8 RATIO — SIGNIFICANT CHANGE UP
ALPHA1 GLOB SERPL ELPH-MCNC: 0.49 G/DL — HIGH (ref 0.1–0.3)
ALPHA2 GLOB SERPL ELPH-MCNC: 1 G/DL — SIGNIFICANT CHANGE UP (ref 0.6–1)
B-GLOBULIN SERPL ELPH-MCNC: 1.15 G/DL — SIGNIFICANT CHANGE UP (ref 0.7–1.7)
GAMMA GLOBULIN: 1.44 G/DL — LOW (ref 6–7.3)
PROT PATTERN SERPL ELPH-IMP: SIGNIFICANT CHANGE UP
PROT PATTERN SERPL ELPH-IMP: SIGNIFICANT CHANGE UP
PROT SERPL-MCNC: 7.3 G/DL — SIGNIFICANT CHANGE UP

## 2021-01-06 LAB
% ALBUMIN: 49.2 % — SIGNIFICANT CHANGE UP
% ALPHA 1: 5.9 % — SIGNIFICANT CHANGE UP
% ALPHA 2: 13.1 % — SIGNIFICANT CHANGE UP
% BETA: 14.2 % — SIGNIFICANT CHANGE UP
% GAMMA, URINE: SIGNIFICANT CHANGE UP
% GAMMA: 17.6 % — SIGNIFICANT CHANGE UP
ALBUMIN 24H MFR UR ELPH: PRESENT
ALBUMIN SERPL ELPH-MCNC: 3.49 G/DL — SIGNIFICANT CHANGE UP (ref 3.3–4.4)
ALBUMIN/GLOB SERPL ELPH: 1 RATIO — SIGNIFICANT CHANGE UP
ALPHA1 GLOB 24H MFR UR ELPH: SIGNIFICANT CHANGE UP
ALPHA1 GLOB SERPL ELPH-MCNC: 0.42 G/DL — HIGH (ref 0.1–0.3)
ALPHA2 GLOB 24H MFR UR ELPH: SIGNIFICANT CHANGE UP
ALPHA2 GLOB SERPL ELPH-MCNC: 0.9 G/DL — SIGNIFICANT CHANGE UP (ref 0.6–1)
B-GLOBULIN 24H MFR UR ELPH: SIGNIFICANT CHANGE UP
B-GLOBULIN SERPL ELPH-MCNC: 1.01 G/DL — SIGNIFICANT CHANGE UP (ref 0.6–1.1)
GAMMA GLOBULIN: 1.25 G/DL — SIGNIFICANT CHANGE UP (ref 0.7–1.7)
INTERPRETATION SERPL IFE-IMP: SIGNIFICANT CHANGE UP
M PROTEIN 24H UR ELPH-MRATE: SIGNIFICANT CHANGE UP
PROT ?TM UR-MCNC: 9 MG/DL — SIGNIFICANT CHANGE UP
PROT PATTERN 24H UR ELPH-IMP: SIGNIFICANT CHANGE UP
PROT PATTERN 24H UR ELPH-IMP: SIGNIFICANT CHANGE UP
PROT PATTERN SERPL ELPH-IMP: SIGNIFICANT CHANGE UP
PROT PATTERN SERPL ELPH-IMP: SIGNIFICANT CHANGE UP
PROT SERPL-MCNC: 7.1 G/DL — SIGNIFICANT CHANGE UP

## 2021-01-11 LAB
INTERPRETATION 24H UR IFE-IMP: SIGNIFICANT CHANGE UP
INTERPRETATION 24H UR IFE-IMP: SIGNIFICANT CHANGE UP
PROT PATTERN 24H UR ELPH-IMP: SIGNIFICANT CHANGE UP

## 2021-01-12 ENCOUNTER — INPATIENT (INPATIENT)
Facility: HOSPITAL | Age: 76
LOS: 8 days | Discharge: HOME | End: 2021-01-21
Attending: INTERNAL MEDICINE | Admitting: INTERNAL MEDICINE
Payer: MEDICARE

## 2021-01-12 VITALS
HEART RATE: 88 BPM | SYSTOLIC BLOOD PRESSURE: 109 MMHG | DIASTOLIC BLOOD PRESSURE: 60 MMHG | RESPIRATION RATE: 17 BRPM | TEMPERATURE: 98 F | HEIGHT: 72 IN | OXYGEN SATURATION: 100 %

## 2021-01-12 DIAGNOSIS — J18.9 PNEUMONIA, UNSPECIFIED ORGANISM: ICD-10-CM

## 2021-01-12 DIAGNOSIS — Z95.4 PRESENCE OF OTHER HEART-VALVE REPLACEMENT: Chronic | ICD-10-CM

## 2021-01-12 DIAGNOSIS — Z98.89 OTHER SPECIFIED POSTPROCEDURAL STATES: Chronic | ICD-10-CM

## 2021-01-12 LAB
ALBUMIN SERPL ELPH-MCNC: 3.6 G/DL — SIGNIFICANT CHANGE UP (ref 3.3–5)
ALP SERPL-CCNC: 130 U/L — HIGH (ref 40–120)
ALT FLD-CCNC: 59 U/L — HIGH (ref 4–41)
ANION GAP SERPL CALC-SCNC: 11 MMOL/L — SIGNIFICANT CHANGE UP (ref 7–14)
APPEARANCE UR: CLEAR — SIGNIFICANT CHANGE UP
AST SERPL-CCNC: 49 U/L — HIGH (ref 4–40)
B PERT DNA SPEC QL NAA+PROBE: SIGNIFICANT CHANGE UP
BACTERIA # UR AUTO: NEGATIVE — SIGNIFICANT CHANGE UP
BASOPHILS # BLD AUTO: 0 K/UL — SIGNIFICANT CHANGE UP (ref 0–0.2)
BASOPHILS NFR BLD AUTO: 0 % — SIGNIFICANT CHANGE UP (ref 0–2)
BILIRUB SERPL-MCNC: 0.8 MG/DL — SIGNIFICANT CHANGE UP (ref 0.2–1.2)
BILIRUB UR-MCNC: NEGATIVE — SIGNIFICANT CHANGE UP
BUN SERPL-MCNC: 30 MG/DL — HIGH (ref 7–23)
C PNEUM DNA SPEC QL NAA+PROBE: SIGNIFICANT CHANGE UP
CALCIUM SERPL-MCNC: 9.3 MG/DL — SIGNIFICANT CHANGE UP (ref 8.4–10.5)
CHLORIDE SERPL-SCNC: 93 MMOL/L — LOW (ref 98–107)
CO2 SERPL-SCNC: 33 MMOL/L — HIGH (ref 22–31)
COLOR SPEC: YELLOW — SIGNIFICANT CHANGE UP
CREAT SERPL-MCNC: 1.36 MG/DL — HIGH (ref 0.5–1.3)
DIFF PNL FLD: NEGATIVE — SIGNIFICANT CHANGE UP
EOSINOPHIL # BLD AUTO: 0 K/UL — SIGNIFICANT CHANGE UP (ref 0–0.5)
EOSINOPHIL NFR BLD AUTO: 0 % — SIGNIFICANT CHANGE UP (ref 0–6)
EPI CELLS # UR: 1 /HPF — SIGNIFICANT CHANGE UP (ref 0–5)
FLUAV H1 2009 PAND RNA SPEC QL NAA+PROBE: SIGNIFICANT CHANGE UP
FLUAV H1 RNA SPEC QL NAA+PROBE: SIGNIFICANT CHANGE UP
FLUAV H3 RNA SPEC QL NAA+PROBE: SIGNIFICANT CHANGE UP
FLUAV SUBTYP SPEC NAA+PROBE: SIGNIFICANT CHANGE UP
FLUBV RNA SPEC QL NAA+PROBE: SIGNIFICANT CHANGE UP
GLUCOSE SERPL-MCNC: 96 MG/DL — SIGNIFICANT CHANGE UP (ref 70–99)
GLUCOSE UR QL: NEGATIVE — SIGNIFICANT CHANGE UP
HADV DNA SPEC QL NAA+PROBE: SIGNIFICANT CHANGE UP
HCOV PNL SPEC NAA+PROBE: SIGNIFICANT CHANGE UP
HCT VFR BLD CALC: 31 % — LOW (ref 39–50)
HGB BLD-MCNC: 10 G/DL — LOW (ref 13–17)
HMPV RNA SPEC QL NAA+PROBE: SIGNIFICANT CHANGE UP
HPIV1 RNA SPEC QL NAA+PROBE: SIGNIFICANT CHANGE UP
HPIV2 RNA SPEC QL NAA+PROBE: SIGNIFICANT CHANGE UP
HPIV3 RNA SPEC QL NAA+PROBE: SIGNIFICANT CHANGE UP
HPIV4 RNA SPEC QL NAA+PROBE: SIGNIFICANT CHANGE UP
HYALINE CASTS # UR AUTO: 0 /LPF — SIGNIFICANT CHANGE UP (ref 0–7)
IANC: 4.38 K/UL — SIGNIFICANT CHANGE UP (ref 1.5–8.5)
IMM GRANULOCYTES NFR BLD AUTO: 0.4 % — SIGNIFICANT CHANGE UP (ref 0–1.5)
KETONES UR-MCNC: NEGATIVE — SIGNIFICANT CHANGE UP
LEUKOCYTE ESTERASE UR-ACNC: NEGATIVE — SIGNIFICANT CHANGE UP
LYMPHOCYTES # BLD AUTO: 0.55 K/UL — LOW (ref 1–3.3)
LYMPHOCYTES # BLD AUTO: 10.3 % — LOW (ref 13–44)
MCHC RBC-ENTMCNC: 30.5 PG — SIGNIFICANT CHANGE UP (ref 27–34)
MCHC RBC-ENTMCNC: 32.3 GM/DL — SIGNIFICANT CHANGE UP (ref 32–36)
MCV RBC AUTO: 94.5 FL — SIGNIFICANT CHANGE UP (ref 80–100)
MONOCYTES # BLD AUTO: 0.38 K/UL — SIGNIFICANT CHANGE UP (ref 0–0.9)
MONOCYTES NFR BLD AUTO: 7.1 % — SIGNIFICANT CHANGE UP (ref 2–14)
NEUTROPHILS # BLD AUTO: 4.38 K/UL — SIGNIFICANT CHANGE UP (ref 1.8–7.4)
NEUTROPHILS NFR BLD AUTO: 82.2 % — HIGH (ref 43–77)
NITRITE UR-MCNC: NEGATIVE — SIGNIFICANT CHANGE UP
NRBC # BLD: 0 /100 WBCS — SIGNIFICANT CHANGE UP
NRBC # FLD: 0 K/UL — SIGNIFICANT CHANGE UP
PH UR: 6 — SIGNIFICANT CHANGE UP (ref 5–8)
PLATELET # BLD AUTO: 118 K/UL — LOW (ref 150–400)
POTASSIUM SERPL-MCNC: 4.1 MMOL/L — SIGNIFICANT CHANGE UP (ref 3.5–5.3)
POTASSIUM SERPL-SCNC: 4.1 MMOL/L — SIGNIFICANT CHANGE UP (ref 3.5–5.3)
PROT SERPL-MCNC: 7.1 G/DL — SIGNIFICANT CHANGE UP (ref 6–8.3)
PROT UR-MCNC: ABNORMAL
RAPID RVP RESULT: DETECTED
RBC # BLD: 3.28 M/UL — LOW (ref 4.2–5.8)
RBC # FLD: 13.2 % — SIGNIFICANT CHANGE UP (ref 10.3–14.5)
RBC CASTS # UR COMP ASSIST: 2 /HPF — SIGNIFICANT CHANGE UP (ref 0–4)
RV+EV RNA SPEC QL NAA+PROBE: SIGNIFICANT CHANGE UP
SARS-COV-2 RNA SPEC QL NAA+PROBE: DETECTED
SODIUM SERPL-SCNC: 137 MMOL/L — SIGNIFICANT CHANGE UP (ref 135–145)
SP GR SPEC: 1.01 — SIGNIFICANT CHANGE UP (ref 1.01–1.02)
TROPONIN T, HIGH SENSITIVITY RESULT: 24 NG/L — SIGNIFICANT CHANGE UP
UROBILINOGEN FLD QL: SIGNIFICANT CHANGE UP
WBC # BLD: 5.33 K/UL — SIGNIFICANT CHANGE UP (ref 3.8–10.5)
WBC # FLD AUTO: 5.33 K/UL — SIGNIFICANT CHANGE UP (ref 3.8–10.5)
WBC UR QL: 2 /HPF — SIGNIFICANT CHANGE UP (ref 0–5)

## 2021-01-12 PROCEDURE — 99223 1ST HOSP IP/OBS HIGH 75: CPT

## 2021-01-12 PROCEDURE — 99285 EMERGENCY DEPT VISIT HI MDM: CPT | Mod: CS

## 2021-01-12 PROCEDURE — 71046 X-RAY EXAM CHEST 2 VIEWS: CPT | Mod: 26

## 2021-01-12 RX ORDER — CEFEPIME 1 G/1
1000 INJECTION, POWDER, FOR SOLUTION INTRAMUSCULAR; INTRAVENOUS EVERY 8 HOURS
Refills: 0 | Status: DISCONTINUED | OUTPATIENT
Start: 2021-01-13 | End: 2021-01-12

## 2021-01-12 RX ORDER — LAMOTRIGINE 25 MG/1
100 TABLET, ORALLY DISINTEGRATING ORAL
Refills: 0 | Status: DISCONTINUED | OUTPATIENT
Start: 2021-01-12 | End: 2021-01-21

## 2021-01-12 RX ORDER — CARBAMAZEPINE 200 MG
400 TABLET ORAL
Refills: 0 | Status: DISCONTINUED | OUTPATIENT
Start: 2021-01-12 | End: 2021-01-21

## 2021-01-12 RX ORDER — VANCOMYCIN HCL 1 G
1000 VIAL (EA) INTRAVENOUS ONCE
Refills: 0 | Status: COMPLETED | OUTPATIENT
Start: 2021-01-12 | End: 2021-01-12

## 2021-01-12 RX ORDER — ALBUTEROL 90 UG/1
1 AEROSOL, METERED ORAL EVERY 4 HOURS
Refills: 0 | Status: DISCONTINUED | OUTPATIENT
Start: 2021-01-12 | End: 2021-01-12

## 2021-01-12 RX ORDER — AZITHROMYCIN 500 MG/1
500 TABLET, FILM COATED ORAL ONCE
Refills: 0 | Status: DISCONTINUED | OUTPATIENT
Start: 2021-01-12 | End: 2021-01-12

## 2021-01-12 RX ORDER — METOPROLOL TARTRATE 50 MG
25 TABLET ORAL
Refills: 0 | Status: DISCONTINUED | OUTPATIENT
Start: 2021-01-12 | End: 2021-01-18

## 2021-01-12 RX ORDER — CARBAMAZEPINE 200 MG
200 TABLET ORAL AT BEDTIME
Refills: 0 | Status: DISCONTINUED | OUTPATIENT
Start: 2021-01-12 | End: 2021-01-21

## 2021-01-12 RX ORDER — CEFTRIAXONE 500 MG/1
1000 INJECTION, POWDER, FOR SOLUTION INTRAMUSCULAR; INTRAVENOUS ONCE
Refills: 0 | Status: DISCONTINUED | OUTPATIENT
Start: 2021-01-12 | End: 2021-01-12

## 2021-01-12 RX ORDER — CEFEPIME 1 G/1
2000 INJECTION, POWDER, FOR SOLUTION INTRAMUSCULAR; INTRAVENOUS ONCE
Refills: 0 | Status: COMPLETED | OUTPATIENT
Start: 2021-01-12 | End: 2021-01-12

## 2021-01-12 RX ORDER — CARBAMAZEPINE 200 MG
400 TABLET ORAL DAILY
Refills: 0 | Status: DISCONTINUED | OUTPATIENT
Start: 2021-01-12 | End: 2021-01-12

## 2021-01-12 RX ORDER — APIXABAN 2.5 MG/1
5 TABLET, FILM COATED ORAL
Refills: 0 | Status: DISCONTINUED | OUTPATIENT
Start: 2021-01-12 | End: 2021-01-21

## 2021-01-12 RX ORDER — IPRATROPIUM/ALBUTEROL SULFATE 18-103MCG
3 AEROSOL WITH ADAPTER (GRAM) INHALATION EVERY 6 HOURS
Refills: 0 | Status: DISCONTINUED | OUTPATIENT
Start: 2021-01-12 | End: 2021-01-13

## 2021-01-12 RX ORDER — ALBUTEROL 90 UG/1
1 AEROSOL, METERED ORAL EVERY 4 HOURS
Refills: 0 | Status: COMPLETED | OUTPATIENT
Start: 2021-01-12 | End: 2021-12-11

## 2021-01-12 RX ADMIN — ALBUTEROL 1 PUFF(S): 90 AEROSOL, METERED ORAL at 19:00

## 2021-01-12 RX ADMIN — CEFEPIME 100 MILLIGRAM(S): 1 INJECTION, POWDER, FOR SOLUTION INTRAMUSCULAR; INTRAVENOUS at 19:34

## 2021-01-12 RX ADMIN — Medication 250 MILLIGRAM(S): at 20:09

## 2021-01-12 RX ADMIN — Medication 40 MILLIGRAM(S): at 19:00

## 2021-01-12 NOTE — ED ADULT TRIAGE NOTE - CHIEF COMPLAINT QUOTE
Pt brought in by EMS from home for SOB, cough, weakness, fevers. PMH COPD (3.5 L home O2). Pt symptoms started after discharged from [facility] last week. Wife sick at home unknown COVID status. Pt breathing even and unlabored in triage.

## 2021-01-12 NOTE — ED PROVIDER NOTE - OBJECTIVE STATEMENT
74 y/o male with h/o htn lung ca s/p right pneumonectomy, copd on home o2, avr, afib on eliquis bibems for cough sob fever weakness.  Recent admission and dc on Jan 2 for COPD exac.  States feeling worse.  COVID neg at time of admission few weeks ago.

## 2021-01-12 NOTE — H&P ADULT - NSHPPHYSICALEXAM_GEN_ALL_CORE
T(C): 37.1 (01-12-21 @ 19:33), Max: 37.2 (01-12-21 @ 15:24)  HR: 93 (01-12-21 @ 19:33) (88 - 94)  BP: 108/56 (01-12-21 @ 19:33) (108/56 - 111/56)  RR: 18 (01-12-21 @ 19:33) (17 - 18)  SpO2: 100% (01-12-21 @ 19:33) (100% - 100%)  Wt(kg): --  GENERAL: NAD, thin elderly male  HEAD: Atraumatic, Normocephalic  EYES: EOMI, PERRLA, conjunctiva and sclera clear  ENMT: Moist mucous membranes  NECK: Supple, Normal Thyroid  NERVOUS SYSTEM: Alert & Oriented X3, Good concentration; grossly moves all extremities  CHEST/LUNG: coarse lung sounds bilaterally, no accessary muscle use  HEART: Irregularly irregular rhythm, tachycardic, S1, S2; No murmurs, rubs, or gallops  ABDOMEN: Soft, Nontender, Nondistended: Bowel sounds present  EXTREMITIES: 2+ Peripheral Pulses, No clubbing, cyanosis, or edema  SKIN: No rashes or lesions, [id number] soft infraclavicular mass, tender to palpation

## 2021-01-12 NOTE — ED ADULT NURSE NOTE - CHIEF COMPLAINT QUOTE
Pt brought in by EMS from home for SOB, cough, weakness, fevers. PMH COPD (3.5 L home O2). Pt symptoms started after discharged from [facility] last week. Wife sick at home unknown COVID status. Pt breathing even and unlabored in triage.

## 2021-01-12 NOTE — ED ADULT NURSE NOTE - OBJECTIVE STATEMENT
Received pt to spot 24 A&Ox4 ambulatory with assistance pt Estonian speaking daughter reports pt has had cough, fever, subjective weakness for several days at home. Daughter reports pt has COPD, chronic cough uses 3.5L at baseline but cough has been worsening. Respirations even and unlabored in triage, pt denies CP or SOB, NAD noted. PIV placed, labs sent, VS as documented, will continue to monitor.

## 2021-01-12 NOTE — H&P ADULT - NSHPLABSRESULTS_GEN_ALL_CORE
(01-12 @ 17:37)                      10.0  5.33 )-----------( 118                 31.0    Neutrophils = 4.38 (82.2%)  Lymphocytes = 0.55 (10.3%)  Eosinophils = 0.00 (0.0%)  Basophils = 0.00 (0.0%)  Monocytes = 0.38 (7.1%)  Bands = --%    01-12    137  |  93<L>  |  30<H>  ----------------------------<  96  4.1   |  33<H>  |  1.36<H>    Ca    9.3      12 Jan 2021 17:37    TPro  7.1  /  Alb  3.6  /  TBili  0.8  /  DBili  x   /  AST  49<H>  /  ALT  59<H>  /  AlkPhos  130<H>  01-12    Xray: Stable postoperative changes status post right pneumonectomy.  Left basilar hazy opacity.

## 2021-01-12 NOTE — H&P ADULT - PROBLEM SELECTOR PLAN 1
- Fever, cough and sob concerning for PNA likely from HCAP vs. COVID?  - Check COVID viral swab. Check cultures.  - Empirically cover with abx vanc and cefepime given high risk and hx of cancer. F/u vanc trough in the am.  - Monitor for resp status. NC O2 as needed.  - Will c/w duoneb Q6h for symptomatic relief.  - Will order CT chest to definitively confirm etiology of infection if worsening symptoms overnight. Will dc abx if COVID confirms positive result.  - Isolations. - Fever, cough and sob concerning for PNA likely from HCAP vs. COVID?  - Check COVID viral swab. Check cultures.  - Empirically cover with abx vanc and cefepime given high risk and hx of cancer. F/u vanc trough in the am.  - Monitor for resp status. NC O2 as needed.  - Will c/w duoneb Q6h for symptomatic relief.  - Will order CT chest to definitively confirm etiology of infection if worsening symptoms overnight. Will dc abx if COVID confirms positive result.  - Isolations.  - Trend liver and renal functions.

## 2021-01-12 NOTE — ED ADULT NURSE NOTE - NSIMPLEMENTINTERV_GEN_ALL_ED
Implemented All Universal Safety Interventions:  [place] to call system. Call bell, personal items and telephone within reach. Instruct patient to call for assistance. Room bathroom lighting operational. Non-slip footwear when patient is off stretcher. Physically safe environment: no spills, clutter or unnecessary equipment. Stretcher in lowest position, wheels locked, appropriate side rails in place.

## 2021-01-12 NOTE — H&P ADULT - ASSESSMENT
76yo M w/ PMHx of lung CA s/p R pneumonectomy (2003), COPD (on 3L home O2), AVR, afib on eliquis, seizure dx, HTN, HLD p/w worsening cough w/ white sputum production, generalized weakness, and fever. Admitted for suspected pna.

## 2021-01-12 NOTE — ED CLERICAL - NS ED CLERK NOTE PRE-ARRIVAL INFORMATION; ADDITIONAL PRE-ARRIVAL INFORMATION
This patient is enrolled in the COPD or PNA STARS readmission program and has active care navigation. This patient can be followed up by the care navigation team within 24 hours. To arrange close follow-up or to obtain additional clinical information about this patient, please call the contact number above. For patients previously on the faculty hospitalist or pulmonary service, please call the hospitalist or pulmonary fellow (7a-5p for patients previously admitted to the pulmonary service) for consultation PRIOR to admission or observation.  The hospitalist can be reached at [phone] and pulmonary fellow at [phone]. Consider CDU for management of COPD exacerbation or PNA per guidelines.

## 2021-01-12 NOTE — H&P ADULT - PROBLEM SELECTOR PLAN 4
-On metoprolol and eliquis, will continue in the hospital -On metoprolol and eliquis, will continue in the hospital  -Currently VS stable rate controlled.

## 2021-01-12 NOTE — H&P ADULT - PROBLEM SELECTOR PLAN 3
-s/p R pneumonectomy in 2003  -Last chemo/radiation in 2005, has had [id number] follow up until 2018 but CA stayed in remission  -In context of 12 lb weight loss, potential pathologic fracture, abx resistant COPD exacerbation, concern for recurrence  -Would try to obtain collateral from patient's oncologist in AM, need for bone scan -s/p R pneumonectomy in 2003  -Last chemo/radiation in 2005, has had [id number] follow up until 2018 but CA stayed in remission  -In context of 12 lb weight loss, potential pathologic fracture, abx resistant COPD exacerbation, concern for recurrence. Decision from previous admission was for outpatient f/u with patient's oncologist Dr. Mcdaniel.

## 2021-01-12 NOTE — ED PROVIDER NOTE - CLINICAL SUMMARY MEDICAL DECISION MAKING FREE TEXT BOX
74 y/o male h/o copd on home o2, lung ca s/p right pneumonectomy here with pna sx.  COVID vs HCAP vs other viral syndrome.  Obtain cbc cmp cxr blood cx x2 vbg rvp with covid.

## 2021-01-12 NOTE — H&P ADULT - NSHPREVIEWOFSYSTEMS_GEN_ALL_CORE
CONSTITUTIONAL: weakness, fevers or chills  EYES/ENT: No visual changes;  No vertigo or throat pain   NECK: No pain or stiffness  RESPIRATORY: cough, sob  CARDIOVASCULAR: No chest pain or palpitations  GASTROINTESTINAL: No abdominal or epigastric pain. No nausea, vomiting, or hematemesis; No diarrhea or constipation. No melena or hematochezia.  GENITOURINARY: No dysuria, frequency or hematuria  NEUROLOGICAL: No numbness or weakness  SKIN: No itching, burning, rashes, or lesions   PSYCH: No Depression, no anxiety  All other review of systems is negative unless indicated above.

## 2021-01-12 NOTE — ED PROVIDER NOTE - ATTENDING CONTRIBUTION TO CARE
DR. MARES, ATTENDING MD-  I performed a face to face bedside interview with the patient regarding history of present illness, review of symptoms and past medical history. I completed an independent physical exam.  I have discussed the patient's plan of care with the PA.   Documentation as above in the note.    HPI / ROS / PE / MDM written by myself.

## 2021-01-12 NOTE — H&P ADULT - HISTORY OF PRESENT ILLNESS
74yo M w/ PMHx of lung CA s/p R pneumonectomy (2003), COPD (on 3L home O2), AVR, afib on eliquis, seizure dx, HTN, HLD p/w worsening cough w/ white sputum production, generalized weakness, and fever. Collateral history obtained from family. Per patient's wife, patient has chronic cough 2/2 COPD however approximately 2-3 weeks ago, patient started having increased yellow phlegm production. They spoke to their PMD who is also a pulmonologist who prescribed a 5 day course of azithro and prednisone 20mg with no improvement in symptoms. Patient was then given a 7 day course of cefpodoxime with continuation of the prednisone for a total 12 day antibiotic and steroid course with minimal improvement in symptoms besides phlegm color change to white. Patient denies fevers, sick contacts, travel.  Patient does have Bipap at home however uses it rarely and only for 30 minutes. Baseline O2 sat at home is 98 at rest, and 92 with exertion. Of note, patient had episode of shingles in September that spread from the R side of the abdomen to the R arm and back that was treated with a pill, and patient experienced post-herpetic neuralgia. This morning, patient noted a large "bump" on his R chest which was tender, spoke to his PMD who wanted him to be admitted for CT scan and further workup given minimal improvement of symptoms given antibiotics. In terms of cancer history, patient had R pneumonectomy in 2003, last chemo/radiation was in 2005, and patient had close follow up until 2 years ago with no recurrence of disease. Per wife, patient has had a 12 lb weight loss in the last year and endorses poor appetite and occasional episodes of nausea and dry heaving.  74yo M w/ PMHx of lung CA s/p R pneumonectomy (2003), COPD (on 3L home O2), AVR, afib on eliquis, seizure dx, HTN, HLD p/w worsening cough w/ white sputum production, generalized weakness, and fever. Collateral history obtained from family. Per patient's wife, patient has chronic cough 2/2 COPD requires 2-3 L oxygen NC use at home. Of note, patient was recently admitted for COPD exacerbation and rib fracture. Patient was discharged ~10 days ago in stable condition. Patient developed fever and worsening cough at home. Appears more weak compared to baseline. Patient has not had chance to revisit his oncologist.    In the ED, patient remains VS stable afebrile. Xray noted to for possible pna and patient was given empiric abx. COVID pcr tested results positive. At the time of this eval, patient remains on 2L oxygen and sating well no acute resp distress.

## 2021-01-13 LAB
ALBUMIN SERPL ELPH-MCNC: 3.7 G/DL — SIGNIFICANT CHANGE UP (ref 3.3–5)
ALP SERPL-CCNC: 137 U/L — HIGH (ref 40–120)
ALT FLD-CCNC: 49 U/L — HIGH (ref 4–41)
ANION GAP SERPL CALC-SCNC: 13 MMOL/L — SIGNIFICANT CHANGE UP (ref 7–14)
AST SERPL-CCNC: 49 U/L — HIGH (ref 4–40)
BILIRUB SERPL-MCNC: 0.8 MG/DL — SIGNIFICANT CHANGE UP (ref 0.2–1.2)
BUN SERPL-MCNC: 34 MG/DL — HIGH (ref 7–23)
CALCIUM SERPL-MCNC: 9.7 MG/DL — SIGNIFICANT CHANGE UP (ref 8.4–10.5)
CHLORIDE SERPL-SCNC: 92 MMOL/L — LOW (ref 98–107)
CO2 SERPL-SCNC: 33 MMOL/L — HIGH (ref 22–31)
CREAT SERPL-MCNC: 1.27 MG/DL — SIGNIFICANT CHANGE UP (ref 0.5–1.3)
CRP SERPL-MCNC: 113.6 MG/L — HIGH
FERRITIN SERPL-MCNC: 2527 NG/ML — HIGH (ref 30–400)
GLUCOSE SERPL-MCNC: 133 MG/DL — HIGH (ref 70–99)
HCT VFR BLD CALC: 31.9 % — LOW (ref 39–50)
HGB BLD-MCNC: 10.5 G/DL — LOW (ref 13–17)
MAGNESIUM SERPL-MCNC: 2.4 MG/DL — SIGNIFICANT CHANGE UP (ref 1.6–2.6)
MCHC RBC-ENTMCNC: 30.3 PG — SIGNIFICANT CHANGE UP (ref 27–34)
MCHC RBC-ENTMCNC: 32.9 GM/DL — SIGNIFICANT CHANGE UP (ref 32–36)
MCV RBC AUTO: 91.9 FL — SIGNIFICANT CHANGE UP (ref 80–100)
NRBC # BLD: 0 /100 WBCS — SIGNIFICANT CHANGE UP
NRBC # FLD: 0 K/UL — SIGNIFICANT CHANGE UP
PHOSPHATE SERPL-MCNC: 3.6 MG/DL — SIGNIFICANT CHANGE UP (ref 2.5–4.5)
PLATELET # BLD AUTO: 153 K/UL — SIGNIFICANT CHANGE UP (ref 150–400)
POTASSIUM SERPL-MCNC: 4.3 MMOL/L — SIGNIFICANT CHANGE UP (ref 3.5–5.3)
POTASSIUM SERPL-SCNC: 4.3 MMOL/L — SIGNIFICANT CHANGE UP (ref 3.5–5.3)
PROCALCITONIN SERPL-MCNC: 0.09 NG/ML — SIGNIFICANT CHANGE UP (ref 0.02–0.1)
PROT SERPL-MCNC: 7.5 G/DL — SIGNIFICANT CHANGE UP (ref 6–8.3)
RBC # BLD: 3.47 M/UL — LOW (ref 4.2–5.8)
RBC # FLD: 13.2 % — SIGNIFICANT CHANGE UP (ref 10.3–14.5)
SODIUM SERPL-SCNC: 138 MMOL/L — SIGNIFICANT CHANGE UP (ref 135–145)
WBC # BLD: 4.98 K/UL — SIGNIFICANT CHANGE UP (ref 3.8–10.5)
WBC # FLD AUTO: 4.98 K/UL — SIGNIFICANT CHANGE UP (ref 3.8–10.5)

## 2021-01-13 PROCEDURE — 99233 SBSQ HOSP IP/OBS HIGH 50: CPT | Mod: CS

## 2021-01-13 RX ORDER — REMDESIVIR 5 MG/ML
100 INJECTION INTRAVENOUS EVERY 24 HOURS
Refills: 0 | Status: COMPLETED | OUTPATIENT
Start: 2021-01-14 | End: 2021-01-17

## 2021-01-13 RX ORDER — REMDESIVIR 5 MG/ML
200 INJECTION INTRAVENOUS EVERY 24 HOURS
Refills: 0 | Status: COMPLETED | OUTPATIENT
Start: 2021-01-13 | End: 2021-01-13

## 2021-01-13 RX ORDER — ALBUTEROL 90 UG/1
2 AEROSOL, METERED ORAL EVERY 6 HOURS
Refills: 0 | Status: DISCONTINUED | OUTPATIENT
Start: 2021-01-13 | End: 2021-01-21

## 2021-01-13 RX ORDER — POLYETHYLENE GLYCOL 3350 17 G/17G
17 POWDER, FOR SOLUTION ORAL DAILY
Refills: 0 | Status: DISCONTINUED | OUTPATIENT
Start: 2021-01-13 | End: 2021-01-20

## 2021-01-13 RX ORDER — SENNA PLUS 8.6 MG/1
2 TABLET ORAL AT BEDTIME
Refills: 0 | Status: DISCONTINUED | OUTPATIENT
Start: 2021-01-13 | End: 2021-01-20

## 2021-01-13 RX ORDER — IPRATROPIUM BROMIDE 0.2 MG/ML
1 SOLUTION, NON-ORAL INHALATION EVERY 6 HOURS
Refills: 0 | Status: DISCONTINUED | OUTPATIENT
Start: 2021-01-13 | End: 2021-01-21

## 2021-01-13 RX ORDER — DEXAMETHASONE 0.5 MG/5ML
6 ELIXIR ORAL DAILY
Refills: 0 | Status: COMPLETED | OUTPATIENT
Start: 2021-01-13 | End: 2021-01-18

## 2021-01-13 RX ORDER — REMDESIVIR 5 MG/ML
INJECTION INTRAVENOUS
Refills: 0 | Status: COMPLETED | OUTPATIENT
Start: 2021-01-13 | End: 2021-01-17

## 2021-01-13 RX ADMIN — ALBUTEROL 2 PUFF(S): 90 AEROSOL, METERED ORAL at 18:14

## 2021-01-13 RX ADMIN — Medication 6 MILLIGRAM(S): at 17:55

## 2021-01-13 RX ADMIN — APIXABAN 5 MILLIGRAM(S): 2.5 TABLET, FILM COATED ORAL at 05:41

## 2021-01-13 RX ADMIN — APIXABAN 5 MILLIGRAM(S): 2.5 TABLET, FILM COATED ORAL at 16:43

## 2021-01-13 RX ADMIN — Medication 400 MILLIGRAM(S): at 09:23

## 2021-01-13 RX ADMIN — REMDESIVIR 500 MILLIGRAM(S): 5 INJECTION INTRAVENOUS at 17:55

## 2021-01-13 RX ADMIN — LAMOTRIGINE 100 MILLIGRAM(S): 25 TABLET, ORALLY DISINTEGRATING ORAL at 16:43

## 2021-01-13 RX ADMIN — SENNA PLUS 2 TABLET(S): 8.6 TABLET ORAL at 23:54

## 2021-01-13 RX ADMIN — POLYETHYLENE GLYCOL 3350 17 GRAM(S): 17 POWDER, FOR SOLUTION ORAL at 18:13

## 2021-01-13 RX ADMIN — LAMOTRIGINE 100 MILLIGRAM(S): 25 TABLET, ORALLY DISINTEGRATING ORAL at 05:41

## 2021-01-13 RX ADMIN — Medication 200 MILLIGRAM(S): at 23:53

## 2021-01-13 RX ADMIN — Medication 25 MILLIGRAM(S): at 05:41

## 2021-01-13 RX ADMIN — Medication 25 MILLIGRAM(S): at 16:43

## 2021-01-13 RX ADMIN — Medication 200 MILLIGRAM(S): at 00:55

## 2021-01-13 NOTE — PROGRESS NOTE ADULT - PROBLEM SELECTOR PLAN 1
- Now covid positive, started on remdesivir and decadron  - f/u cultures. abx had been d/c  - Monitor for resp status. Cont nasal canula oxygen  - Discussed with pt's family and updated  - change to neb inhaler.  - Isolation precautions, check inflammatory markers 48- 72 hrs  - Trend liver and renal functions. - Now covid positive, started on remdesivir and decadron  - f/u cultures. abx had been d/c  - Monitor for resp status. Cont nasal canula oxygen, per family he is on home oxygen at 3 lits, Sats ok, Discussed with ID  Pt's family  aware of treatment with decadron and Remdesivir  - Discussed with pt's family and updated on 1/13  - change to neb inhaler.  - Isolation precautions, check inflammatory markers 48- 72 hrs  - Trend liver and renal functions.  - Left message for pt's Pulmonologist . [place] at [phone]

## 2021-01-13 NOTE — PROGRESS NOTE ADULT - PROBLEM SELECTOR PLAN 3
-s/p R pneumonectomy in 2003  -Last chemo/radiation in 2005, has had r8cianc follow up until 2018 but CA stayed in remission  -In context of 12 lb weight loss, potential pathologic fracture, abx resistant COPD exacerbation, concern for recurrence. Decision from previous admission was for outpatient f/u with patient's oncologist Dr. Mcdaniel.

## 2021-01-13 NOTE — PROGRESS NOTE ADULT - SUBJECTIVE AND OBJECTIVE BOX
Patient is a 75y old  Male who presents with a chief complaint of PNA (2021 19:45)      SUBJECTIVE / OVERNIGHT EVENTS: Pt seen and examined at 1pm, no overnight events, pt is sitting up in bed, comfortable, eating lunch, on nasal canula oxygen, Sat ok, denies any cough. Per nsg no other new issues reported.    MEDICATIONS  (STANDING):  albuterol/ipratropium for Nebulization 3 milliLiter(s) Nebulizer every 6 hours  apixaban 5 milliGRAM(s) Oral two times a day  carBAMazepine 200 milliGRAM(s) Oral at bedtime  carBAMazepine 400 milliGRAM(s) Oral <User Schedule>  dexAMETHasone  Injectable 6 milliGRAM(s) IV Push daily  lamoTRIgine 100 milliGRAM(s) Oral two times a day  metoprolol tartrate 25 milliGRAM(s) Oral two times a day  remdesivir  IVPB 200 milliGRAM(s) IV Intermittent every 24 hours  remdesivir  IVPB   IV Intermittent     MEDICATIONS  (PRN):      Vital Signs Last 24 Hrs  T(C): 36.6 (2021 16:42), Max: 37.1 (2021 19:33)  T(F): 97.9 (2021 16:42), Max: 98.8 (2021 19:33)  HR: 78 (2021 16:42) (78 - 93)  BP: 120/74 (2021 16:42) (104/47 - 133/75)  BP(mean): --  RR: 17 (2021 16:42) (17 - 18)  SpO2: 96% (2021 16:42) (94% - 100%)  CAPILLARY BLOOD GLUCOSE        I&O's Summary    2021 07:01  -  2021 16:52  --------------------------------------------------------  IN: 820 mL / OUT: 300 mL / NET: 520 mL        PHYSICAL EXAM:  GENERAL: NAD, well-developed  CHEST/LUNG: Decreased bs at bases  HEART: Regular rate and rhythm; No murmur  ABDOMEN: Soft, Nontender, Nondistended  EXTREMITIES: no LE edema  PSYCH: Calm  NEUROLOGY: AA, answers questions    LABS:                        10.5   4.98  )-----------( 153      ( 2021 14:22 )             31.9         138  |  92<L>  |  34<H>  ----------------------------<  133<H>  4.3   |  33<H>  |  1.27    Ca    9.7      2021 13:55  Phos  3.6       Mg     2.4         TPro  7.5  /  Alb  3.7  /  TBili  0.8  /  DBili  x   /  AST  49<H>  /  ALT  49<H>  /  AlkPhos  137<H>            Urinalysis Basic - ( 2021 19:35 )    Color: Yellow / Appearance: Clear / S.014 / pH: x  Gluc: x / Ketone: Negative  / Bili: Negative / Urobili: <2 mg/dL   Blood: x / Protein: Trace / Nitrite: Negative   Leuk Esterase: Negative / RBC: 2 /HPF / WBC 2 /HPF   Sq Epi: x / Non Sq Epi: 1 /HPF / Bacteria: Negative        RADIOLOGY & ADDITIONAL TESTS:    Imaging Personally Reviewed:    Consultant(s) Notes Reviewed:      Care Discussed with Consultants/Other Providers:

## 2021-01-13 NOTE — CHART NOTE - NSCHARTNOTEFT_GEN_A_CORE
Patient case reviewed with SUZIE FREEMAN to start Remdesivir & decadron given fact that patient is oxygen dependent at baseline

## 2021-01-14 DIAGNOSIS — Z51.81 ENCOUNTER FOR THERAPEUTIC DRUG LEVEL MONITORING: ICD-10-CM

## 2021-01-14 DIAGNOSIS — K59.00 CONSTIPATION, UNSPECIFIED: ICD-10-CM

## 2021-01-14 DIAGNOSIS — U07.1 COVID-19: ICD-10-CM

## 2021-01-14 LAB
ALBUMIN SERPL ELPH-MCNC: 3.6 G/DL — SIGNIFICANT CHANGE UP (ref 3.3–5)
ALP SERPL-CCNC: 129 U/L — HIGH (ref 40–120)
ALT FLD-CCNC: 44 U/L — HIGH (ref 4–41)
ANION GAP SERPL CALC-SCNC: 10 MMOL/L — SIGNIFICANT CHANGE UP (ref 7–14)
AST SERPL-CCNC: 40 U/L — SIGNIFICANT CHANGE UP (ref 4–40)
BILIRUB SERPL-MCNC: 0.7 MG/DL — SIGNIFICANT CHANGE UP (ref 0.2–1.2)
BUN SERPL-MCNC: 38 MG/DL — HIGH (ref 7–23)
CALCIUM SERPL-MCNC: 9.5 MG/DL — SIGNIFICANT CHANGE UP (ref 8.4–10.5)
CHLORIDE SERPL-SCNC: 95 MMOL/L — LOW (ref 98–107)
CO2 SERPL-SCNC: 34 MMOL/L — HIGH (ref 22–31)
CREAT SERPL-MCNC: 1.38 MG/DL — HIGH (ref 0.5–1.3)
D DIMER BLD IA.RAPID-MCNC: 319 NG/ML DDU — HIGH
GLUCOSE SERPL-MCNC: 113 MG/DL — HIGH (ref 70–99)
HCT VFR BLD CALC: 33.4 % — LOW (ref 39–50)
HGB BLD-MCNC: 10.6 G/DL — LOW (ref 13–17)
LDH PNL SERPL: 203 IU/L — SIGNIFICANT CHANGE UP (ref 121–224)
LDH1 SERPL-CCNC: 12 % — LOW (ref 17–32)
LDH3 SERPL-CCNC: 26 % — SIGNIFICANT CHANGE UP (ref 25–40)
LDH3 SERPL-CCNC: 30 % — HIGH (ref 17–27)
LDH4 SERPL-CCNC: 15 % — HIGH (ref 5–13)
LDH5 SERPL-CCNC: 17 % — SIGNIFICANT CHANGE UP (ref 4–20)
MAGNESIUM SERPL-MCNC: 2.2 MG/DL — SIGNIFICANT CHANGE UP (ref 1.6–2.6)
MCHC RBC-ENTMCNC: 29.9 PG — SIGNIFICANT CHANGE UP (ref 27–34)
MCHC RBC-ENTMCNC: 31.7 GM/DL — LOW (ref 32–36)
MCV RBC AUTO: 94.4 FL — SIGNIFICANT CHANGE UP (ref 80–100)
NRBC # BLD: 0 /100 WBCS — SIGNIFICANT CHANGE UP
NRBC # FLD: 0 K/UL — SIGNIFICANT CHANGE UP
PHOSPHATE SERPL-MCNC: 3.2 MG/DL — SIGNIFICANT CHANGE UP (ref 2.5–4.5)
PLATELET # BLD AUTO: 149 K/UL — LOW (ref 150–400)
POTASSIUM SERPL-MCNC: 4.5 MMOL/L — SIGNIFICANT CHANGE UP (ref 3.5–5.3)
POTASSIUM SERPL-SCNC: 4.5 MMOL/L — SIGNIFICANT CHANGE UP (ref 3.5–5.3)
PROT SERPL-MCNC: 7.1 G/DL — SIGNIFICANT CHANGE UP (ref 6–8.3)
RBC # BLD: 3.54 M/UL — LOW (ref 4.2–5.8)
RBC # FLD: 13.1 % — SIGNIFICANT CHANGE UP (ref 10.3–14.5)
SODIUM SERPL-SCNC: 139 MMOL/L — SIGNIFICANT CHANGE UP (ref 135–145)
WBC # BLD: 6.04 K/UL — SIGNIFICANT CHANGE UP (ref 3.8–10.5)
WBC # FLD AUTO: 6.04 K/UL — SIGNIFICANT CHANGE UP (ref 3.8–10.5)

## 2021-01-14 PROCEDURE — 99233 SBSQ HOSP IP/OBS HIGH 50: CPT | Mod: CS

## 2021-01-14 PROCEDURE — 99222 1ST HOSP IP/OBS MODERATE 55: CPT | Mod: CS

## 2021-01-14 RX ADMIN — Medication 200 MILLIGRAM(S): at 21:13

## 2021-01-14 RX ADMIN — Medication 25 MILLIGRAM(S): at 05:33

## 2021-01-14 RX ADMIN — Medication 400 MILLIGRAM(S): at 05:32

## 2021-01-14 RX ADMIN — Medication 6 MILLIGRAM(S): at 05:33

## 2021-01-14 RX ADMIN — Medication 10 MILLIGRAM(S): at 17:17

## 2021-01-14 RX ADMIN — POLYETHYLENE GLYCOL 3350 17 GRAM(S): 17 POWDER, FOR SOLUTION ORAL at 11:43

## 2021-01-14 RX ADMIN — APIXABAN 5 MILLIGRAM(S): 2.5 TABLET, FILM COATED ORAL at 05:32

## 2021-01-14 RX ADMIN — SENNA PLUS 2 TABLET(S): 8.6 TABLET ORAL at 21:13

## 2021-01-14 RX ADMIN — LAMOTRIGINE 100 MILLIGRAM(S): 25 TABLET, ORALLY DISINTEGRATING ORAL at 17:17

## 2021-01-14 RX ADMIN — APIXABAN 5 MILLIGRAM(S): 2.5 TABLET, FILM COATED ORAL at 17:17

## 2021-01-14 RX ADMIN — Medication 5 MILLIGRAM(S): at 11:43

## 2021-01-14 RX ADMIN — ALBUTEROL 2 PUFF(S): 90 AEROSOL, METERED ORAL at 11:43

## 2021-01-14 RX ADMIN — REMDESIVIR 500 MILLIGRAM(S): 5 INJECTION INTRAVENOUS at 20:24

## 2021-01-14 RX ADMIN — LAMOTRIGINE 100 MILLIGRAM(S): 25 TABLET, ORALLY DISINTEGRATING ORAL at 05:33

## 2021-01-14 NOTE — CONSULT NOTE ADULT - PROBLEM SELECTOR RECOMMENDATION 9
-supportive care  -maintain oxygenation saturation  -monitor fever curve  -continue airborne/contact isolation  -cont dexamethasone 6 mg daily  -cont remdesevir 200 mg iv x 1 then 100 mg iv q24 x 4 days   -monitor pulse ox  -contact/airborne precautions   -wean O2 as tolerated  -trend inflammatory markers  -on O2 at home at baseline

## 2021-01-14 NOTE — CONSULT NOTE ADULT - SUBJECTIVE AND OBJECTIVE BOX
QUIN SULLIVAN 75y Male  MRN-[id number]    Patient is a 75y old  Male who presents with a chief complaint of PNA (2021 15:01)      HPI:  76yo M w/ PMHx of lung CA s/p R pneumonectomy (), COPD (on 3L home O2), AVR, afib on eliquis, seizure dx, HTN, HLD p/w worsening cough w/ white sputum production, generalized weakness, and fever. Collateral history obtained from family. Per patient's wife, patient has chronic cough 2/2 COPD requires 2-3 L oxygen NC use at home. Of note, patient was recently admitted for COPD exacerbation and rib fracture. Patient was discharged ~10 days ago in stable condition. Patient developed fever and worsening cough at home. Appears more weak compared to baseline. Patient has not had chance to revisit his oncologist.    In the ED, patient remains VS stable afebrile. Xray noted to for possible pna and patient was given empiric abx. COVID pcr tested results positive. At the time of this eval, patient remains on 2L oxygen and sating well no acute resp distress.  (2021 19:45)      PAST MEDICAL & SURGICAL HISTORY:  Atrial fibrillation    History of pneumonia    Lung cancer  s/p R. pneumonectomy    Hypertension    Hyperlipidemia    Seizure disorder    H/O inguinal hernia repair      H/O aortic valve replacement      S/P cataract surgery  bilaterally    Aortic valve replaced  2013   bovine AoValve    S/P pneumonectomy  right sided, ~         Allergies    No Known Allergies    Intolerances        ANTIMICROBIALS:  remdesivir  IVPB 100 every 24 hours  remdesivir  IVPB        MEDICATIONS  (STANDING):  apixaban 5 milliGRAM(s) Oral two times a day  carBAMazepine 400 milliGRAM(s) Oral <User Schedule>  carBAMazepine 200 milliGRAM(s) Oral at bedtime  dexAMETHasone  Injectable 6 milliGRAM(s) IV Push daily  lamoTRIgine 100 milliGRAM(s) Oral two times a day  metoprolol tartrate 25 milliGRAM(s) Oral two times a day  polyethylene glycol 3350 17 Gram(s) Oral daily  remdesivir  IVPB 100 milliGRAM(s) IV Intermittent every 24 hours  remdesivir  IVPB   IV Intermittent   senna 2 Tablet(s) Oral at bedtime      Social History  Smoking: no  Etoh: no  Drug use: no      FAMILY HISTORY:  Family history of cancer (Aunt)  Maternal aunt    Family history of hypertension  Mother    Family history of stroke  Mother        Vital Signs Last 24 Hrs  T(C): 37.5 (2021 14:42), Max: 37.5 (2021 14:42)  T(F): 99.5 (2021 14:42), Max: 99.5 (2021 14:42)  HR: 102 (2021 17:13) (77 - 102)  BP: 99/52 (2021 17:13) (91/57 - 122/68)  BP(mean): --  RR: 18 (2021 14:42) (16 - 18)  SpO2: 98% (2021 14:42) (97% - 100%)    CBC Full  -  ( 2021 08:00 )  WBC Count : 6.04 K/uL  RBC Count : 3.54 M/uL  Hemoglobin : 10.6 g/dL  Hematocrit : 33.4 %  Platelet Count - Automated : 149 K/uL  Mean Cell Volume : 94.4 fL  Mean Cell Hemoglobin : 29.9 pg  Mean Cell Hemoglobin Concentration : 31.7 gm/dL  Auto Neutrophil # : x  Auto Lymphocyte # : x  Auto Monocyte # : x  Auto Eosinophil # : x  Auto Basophil # : x  Auto Neutrophil % : x  Auto Lymphocyte % : x  Auto Monocyte % : x  Auto Eosinophil % : x  Auto Basophil % : x        139  |  95<L>  |  38<H>  ----------------------------<  113<H>  4.5   |  34<H>  |  1.38<H>    Ca    9.5      2021 08:00  Phos  3.2       Mg     2.2         TPro  7.1  /  Alb  3.6  /  TBili  0.7  /  DBili  x   /  AST  40  /  ALT  44<H>  /  AlkPhos  129<H>      LIVER FUNCTIONS - ( 2021 08:00 )  Alb: 3.6 g/dL / Pro: 7.1 g/dL / ALK PHOS: 129 U/L / ALT: 44 U/L / AST: 40 U/L / GGT: x           Urinalysis Basic - ( 2021 19:35 )    Color: Yellow / Appearance: Clear / S.014 / pH: x  Gluc: x / Ketone: Negative  / Bili: Negative / Urobili: <2 mg/dL   Blood: x / Protein: Trace / Nitrite: Negative   Leuk Esterase: Negative / RBC: 2 /HPF / WBC 2 /HPF   Sq Epi: x / Non Sq Epi: 1 /HPF / Bacteria: Negative    Procalcitonin, Serum: 0.09 ()    C-Reactive Protein, Serum: 113.6 ()    Ferritin, Serum: 2527 ()      D-Dimer Assay, Quantitative: 319 ()        MICROBIOLOGY:  .Blood Blood-Venous  21   No growth to date.  --  --      .Blood Blood-Peripheral  21   No growth to date.  --  --      Rapid RVP Result: Detected ( @ 17:46)      RADIOLOGY  < from: Xray Chest 2 Views PA/Lat (21 @ 18:31) >  Stable postoperative changes status post right pneumonectomy.  Left basilar hazy opacity.    < end of copied text >

## 2021-01-14 NOTE — PROGRESS NOTE ADULT - SUBJECTIVE AND OBJECTIVE BOX
Patient is a 75y old  Male who presents with a chief complaint of PNA (2021 16:52)      SUBJECTIVE / OVERNIGHT EVENTS: Pt seen and examined at 12:25pm, no overnight events, pt reports having pain at the infraclavicular mass site, has some cough, denies any sob, comfortable on oxygen via nasal canula, sat ok,  Per nsg no other new issues reported.      MEDICATIONS  (STANDING):  apixaban 5 milliGRAM(s) Oral two times a day  carBAMazepine 200 milliGRAM(s) Oral at bedtime  carBAMazepine 400 milliGRAM(s) Oral <User Schedule>  dexAMETHasone  Injectable 6 milliGRAM(s) IV Push daily  lamoTRIgine 100 milliGRAM(s) Oral two times a day  metoprolol tartrate 25 milliGRAM(s) Oral two times a day  polyethylene glycol 3350 17 Gram(s) Oral daily  remdesivir  IVPB 100 milliGRAM(s) IV Intermittent every 24 hours  remdesivir  IVPB   IV Intermittent   senna 2 Tablet(s) Oral at bedtime    MEDICATIONS  (PRN):  ALBUTerol    90 MICROgram(s) HFA Inhaler 2 Puff(s) Inhalation every 6 hours PRN Bronchospasm  bisacodyl 5 milliGRAM(s) Oral every 12 hours PRN Constipation  ipratropium 17 MICROgram(s) HFA Inhaler 1 Puff(s) Inhalation every 6 hours PRN sob      Vital Signs Last 24 Hrs  T(C): 37.5 (2021 14:42), Max: 37.5 (2021 14:42)  T(F): 99.5 (2021 14:42), Max: 99.5 (2021 14:42)  HR: 79 (2021 14:42) (77 - 79)  BP: 91/57 (2021 14:42) (91/57 - 122/68)  BP(mean): --  RR: 18 (2021 14:42) (16 - 18)  SpO2: 98% (2021 14:42) (96% - 100%)  CAPILLARY BLOOD GLUCOSE        I&O's Summary    2021 07:01  -  2021 07:00  --------------------------------------------------------  IN: 920 mL / OUT: 500 mL / NET: 420 mL    2021 07:01  -  2021 15:01  --------------------------------------------------------  IN: 0 mL / OUT: 400 mL / NET: -400 mL        PHYSICAL EXAM:  GENERAL: NAD, well-developed  CHEST/LUNG: Decreased bs at bases  HEART: Regular rate and rhythm; No murmur  ABDOMEN: Soft, Nontender, Nondistended  EXTREMITIES: no LE edema  PSYCH: Calm  NEUROLOGY: AA, answers questions  Skin: Rt infraclavicular mass mildly tender to palpation    LABS:                        10.6   6.04  )-----------( 149      ( 2021 08:00 )             33.4         139  |  95<L>  |  38<H>  ----------------------------<  113<H>  4.5   |  34<H>  |  1.38<H>    Ca    9.5      2021 08:00  Phos  3.2       Mg     2.2         TPro  7.1  /  Alb  3.6  /  TBili  0.7  /  DBili  x   /  AST  40  /  ALT  44<H>  /  AlkPhos  129<H>            Urinalysis Basic - ( 2021 19:35 )    Color: Yellow / Appearance: Clear / S.014 / pH: x  Gluc: x / Ketone: Negative  / Bili: Negative / Urobili: <2 mg/dL   Blood: x / Protein: Trace / Nitrite: Negative   Leuk Esterase: Negative / RBC: 2 /HPF / WBC 2 /HPF   Sq Epi: x / Non Sq Epi: 1 /HPF / Bacteria: Negative        RADIOLOGY & ADDITIONAL TESTS:    Imaging Personally Reviewed:    Consultant(s) Notes Reviewed:      Care Discussed with Consultants/Other Providers:   Patient is a 75y old  Male who presents with a chief complaint of PNA (2021 16:52)      SUBJECTIVE / OVERNIGHT EVENTS: Pt seen and examined at 12:25pm, no overnight events, pt reports having pain at the infraclavicular mass site, has some cough, denies any sob, comfortable on oxygen via nasal canula, sat ok,  reported no bm today per wife, Per nsg no other new issues reported.      MEDICATIONS  (STANDING):  apixaban 5 milliGRAM(s) Oral two times a day  carBAMazepine 200 milliGRAM(s) Oral at bedtime  carBAMazepine 400 milliGRAM(s) Oral <User Schedule>  dexAMETHasone  Injectable 6 milliGRAM(s) IV Push daily  lamoTRIgine 100 milliGRAM(s) Oral two times a day  metoprolol tartrate 25 milliGRAM(s) Oral two times a day  polyethylene glycol 3350 17 Gram(s) Oral daily  remdesivir  IVPB 100 milliGRAM(s) IV Intermittent every 24 hours  remdesivir  IVPB   IV Intermittent   senna 2 Tablet(s) Oral at bedtime    MEDICATIONS  (PRN):  ALBUTerol    90 MICROgram(s) HFA Inhaler 2 Puff(s) Inhalation every 6 hours PRN Bronchospasm  bisacodyl 5 milliGRAM(s) Oral every 12 hours PRN Constipation  ipratropium 17 MICROgram(s) HFA Inhaler 1 Puff(s) Inhalation every 6 hours PRN sob      Vital Signs Last 24 Hrs  T(C): 37.5 (2021 14:42), Max: 37.5 (2021 14:42)  T(F): 99.5 (2021 14:42), Max: 99.5 (2021 14:42)  HR: 79 (2021 14:42) (77 - 79)  BP: 91/57 (2021 14:42) (91/57 - 122/68)  BP(mean): --  RR: 18 (2021 14:42) (16 - 18)  SpO2: 98% (2021 14:42) (96% - 100%)  CAPILLARY BLOOD GLUCOSE        I&O's Summary    2021 07:01  -  2021 07:00  --------------------------------------------------------  IN: 920 mL / OUT: 500 mL / NET: 420 mL    2021 07:01  -  2021 15:01  --------------------------------------------------------  IN: 0 mL / OUT: 400 mL / NET: -400 mL        PHYSICAL EXAM:  GENERAL: NAD, well-developed  CHEST/LUNG: Decreased bs at bases  HEART: Regular rate and rhythm; No murmur  ABDOMEN: Soft, Nontender, Nondistended  EXTREMITIES: no LE edema  PSYCH: Calm  NEUROLOGY: AA, answers questions  Skin: Rt infraclavicular mass mildly tender to palpation    LABS:                        10.6   6.04  )-----------( 149      ( 2021 08:00 )             33.4     01-14    139  |  95<L>  |  38<H>  ----------------------------<  113<H>  4.5   |  34<H>  |  1.38<H>    Ca    9.5      2021 08:00  Phos  3.2       Mg     2.2         TPro  7.1  /  Alb  3.6  /  TBili  0.7  /  DBili  x   /  AST  40  /  ALT  44<H>  /  AlkPhos  129<H>            Urinalysis Basic - ( 2021 19:35 )    Color: Yellow / Appearance: Clear / S.014 / pH: x  Gluc: x / Ketone: Negative  / Bili: Negative / Urobili: <2 mg/dL   Blood: x / Protein: Trace / Nitrite: Negative   Leuk Esterase: Negative / RBC: 2 /HPF / WBC 2 /HPF   Sq Epi: x / Non Sq Epi: 1 /HPF / Bacteria: Negative        RADIOLOGY & ADDITIONAL TESTS:    Imaging Personally Reviewed:    Consultant(s) Notes Reviewed:      Care Discussed with Consultants/Other Providers:

## 2021-01-14 NOTE — PROGRESS NOTE ADULT - PROBLEM SELECTOR PLAN 5
-Last seizure was 3 years ago  -c/w home carbamazepine 400 AM, 200 PM  -c/w home lamictal 100 BID Diet: DASH/TLC  DVT: a/c on eliquis  Dispo: pending improvement in clinical status

## 2021-01-14 NOTE — CONSULT NOTE ADULT - ASSESSMENT
76yo M w/ PMHx of lung CA s/p R pneumonectomy (2003), COPD (on 3L home O2), AVR, afib on eliquis, seizure dx, HTN, HLD p/w worsening cough w/ white sputum production, generalized weakness, and fever with COVID pna, hypoxia

## 2021-01-14 NOTE — PROGRESS NOTE ADULT - PROBLEM SELECTOR PLAN 1
- Now covid positive, cont remdesivir and decadron  - f/u cultures- blood culture neg to date.   - Monitor for resp status. Cont nasal canula oxygen, per family he is on home oxygen at 3 lits, Sats ok, Discussed with ID  Pt's family  aware of treatment with decadron and Remdesivir  - Discussed with pt's family and updated on 1/13  - change to neb inhaler.  - Isolation precautions, check inflammatory markers 48- 72 hrs  - Trend liver and renal functions.  - Left message for pt's Pulmonologist . [place] at [phone]  - Discussed with ID, f/u ID recs - Now covid positive, cont remdesivir and decadron  - f/u cultures- blood culture neg to date.   - Monitor for resp status. Cont nasal canula oxygen, per family he is on home oxygen at 3 lits, Sats ok, Discussed with ID  Pt's family  aware of treatment with decadron and Remdesivir  - Discussed with pt's family and updated on 1/14  - change to neb inhaler.  - Isolation precautions, check inflammatory markers 48- 72 hrs  - Trend liver and renal functions.  - Left message for pt's Pulmonologist . [place] at [phone]  - Discussed with ID, f/u ID recs

## 2021-01-14 NOTE — PROGRESS NOTE ADULT - PROBLEM SELECTOR PLAN 4
-On metoprolol and eliquis, will continue in the hospital  -Currently VS stable rate controlled. -Last seizure was 3 years ago  -c/w home carbamazepine 400 AM, 200 PM  -c/w home lamictal 100 BID

## 2021-01-14 NOTE — CONSULT NOTE ADULT - ATTENDING COMMENTS
Rajesh Nickerson  Attending Physician   Division of Infectious Disease  Pager #[phone]  Available on Microsoft Teams also  After 5pm/weekend or no response, call #[phone]

## 2021-01-14 NOTE — PROGRESS NOTE ADULT - PROBLEM SELECTOR PLAN 3
-s/p R pneumonectomy in 2003  -Last chemo/radiation in 2005, has had e0avpci follow up until 2018 but CA stayed in remission  -In context of 12 lb weight loss, potential pathologic fracture, abx resistant COPD exacerbation, concern for recurrence. Decision from previous admission was for outpatient f/u with patient's oncologist Dr. Mcdaniel.  pain control as needed -On metoprolol and eliquis, will continue in the hospital  -Currently VS stable rate controlled.

## 2021-01-14 NOTE — PROGRESS NOTE ADULT - PROBLEM SELECTOR PLAN 6
Diet: DASH/TLC  DVT: a/c on eliquis  Dispo: pending improvement in clinical status Cont bowel regimen, 1 dose of dulcolax suppository today, monitor for bms

## 2021-01-14 NOTE — PROVIDER CONTACT NOTE (OTHER) - ASSESSMENT
Patient denies pain, dizziness, or lightheadedness. Patient on 3L NC, resting comfortably, no distress noted

## 2021-01-15 LAB
ALBUMIN SERPL ELPH-MCNC: 3.5 G/DL — SIGNIFICANT CHANGE UP (ref 3.3–5)
ALP SERPL-CCNC: 126 U/L — HIGH (ref 40–120)
ALT FLD-CCNC: 39 U/L — SIGNIFICANT CHANGE UP (ref 4–41)
ANION GAP SERPL CALC-SCNC: 12 MMOL/L — SIGNIFICANT CHANGE UP (ref 7–14)
AST SERPL-CCNC: 35 U/L — SIGNIFICANT CHANGE UP (ref 4–40)
BASOPHILS # BLD AUTO: 0.01 K/UL — SIGNIFICANT CHANGE UP (ref 0–0.2)
BASOPHILS NFR BLD AUTO: 0.1 % — SIGNIFICANT CHANGE UP (ref 0–2)
BILIRUB SERPL-MCNC: 0.8 MG/DL — SIGNIFICANT CHANGE UP (ref 0.2–1.2)
BUN SERPL-MCNC: 38 MG/DL — HIGH (ref 7–23)
CALCIUM SERPL-MCNC: 9.5 MG/DL — SIGNIFICANT CHANGE UP (ref 8.4–10.5)
CHLORIDE SERPL-SCNC: 94 MMOL/L — LOW (ref 98–107)
CO2 SERPL-SCNC: 32 MMOL/L — HIGH (ref 22–31)
CREAT SERPL-MCNC: 1.34 MG/DL — HIGH (ref 0.5–1.3)
CRP SERPL-MCNC: 92.9 MG/L — HIGH
D DIMER BLD IA.RAPID-MCNC: 373 NG/ML DDU — HIGH
EOSINOPHIL # BLD AUTO: 0.01 K/UL — SIGNIFICANT CHANGE UP (ref 0–0.5)
EOSINOPHIL NFR BLD AUTO: 0.1 % — SIGNIFICANT CHANGE UP (ref 0–6)
FERRITIN SERPL-MCNC: 1507 NG/ML — HIGH (ref 30–400)
GLUCOSE SERPL-MCNC: 90 MG/DL — SIGNIFICANT CHANGE UP (ref 70–99)
HCT VFR BLD CALC: 33.9 % — LOW (ref 39–50)
HGB BLD-MCNC: 10.6 G/DL — LOW (ref 13–17)
IANC: 7.08 K/UL — SIGNIFICANT CHANGE UP (ref 1.5–8.5)
IMM GRANULOCYTES NFR BLD AUTO: 0.4 % — SIGNIFICANT CHANGE UP (ref 0–1.5)
LDH SERPL L TO P-CCNC: 195 U/L — SIGNIFICANT CHANGE UP (ref 135–225)
LYMPHOCYTES # BLD AUTO: 0.7 K/UL — LOW (ref 1–3.3)
LYMPHOCYTES # BLD AUTO: 8.3 % — LOW (ref 13–44)
MAGNESIUM SERPL-MCNC: 2.3 MG/DL — SIGNIFICANT CHANGE UP (ref 1.6–2.6)
MCHC RBC-ENTMCNC: 29.9 PG — SIGNIFICANT CHANGE UP (ref 27–34)
MCHC RBC-ENTMCNC: 31.3 GM/DL — LOW (ref 32–36)
MCV RBC AUTO: 95.5 FL — SIGNIFICANT CHANGE UP (ref 80–100)
MONOCYTES # BLD AUTO: 0.58 K/UL — SIGNIFICANT CHANGE UP (ref 0–0.9)
MONOCYTES NFR BLD AUTO: 6.9 % — SIGNIFICANT CHANGE UP (ref 2–14)
NEUTROPHILS # BLD AUTO: 7.08 K/UL — SIGNIFICANT CHANGE UP (ref 1.8–7.4)
NEUTROPHILS NFR BLD AUTO: 84.2 % — HIGH (ref 43–77)
NRBC # BLD: 0 /100 WBCS — SIGNIFICANT CHANGE UP
NRBC # FLD: 0 K/UL — SIGNIFICANT CHANGE UP
PHOSPHATE SERPL-MCNC: 2.1 MG/DL — LOW (ref 2.5–4.5)
PLATELET # BLD AUTO: 177 K/UL — SIGNIFICANT CHANGE UP (ref 150–400)
POTASSIUM SERPL-MCNC: 4.5 MMOL/L — SIGNIFICANT CHANGE UP (ref 3.5–5.3)
POTASSIUM SERPL-SCNC: 4.5 MMOL/L — SIGNIFICANT CHANGE UP (ref 3.5–5.3)
PROCALCITONIN SERPL-MCNC: 0.16 NG/ML — HIGH (ref 0.02–0.1)
PROT SERPL-MCNC: 7.1 G/DL — SIGNIFICANT CHANGE UP (ref 6–8.3)
RBC # BLD: 3.55 M/UL — LOW (ref 4.2–5.8)
RBC # FLD: 13.1 % — SIGNIFICANT CHANGE UP (ref 10.3–14.5)
SODIUM SERPL-SCNC: 138 MMOL/L — SIGNIFICANT CHANGE UP (ref 135–145)
WBC # BLD: 8.41 K/UL — SIGNIFICANT CHANGE UP (ref 3.8–10.5)
WBC # FLD AUTO: 8.41 K/UL — SIGNIFICANT CHANGE UP (ref 3.8–10.5)

## 2021-01-15 PROCEDURE — 99233 SBSQ HOSP IP/OBS HIGH 50: CPT | Mod: CS

## 2021-01-15 PROCEDURE — 99232 SBSQ HOSP IP/OBS MODERATE 35: CPT | Mod: CS

## 2021-01-15 RX ORDER — METOPROLOL TARTRATE 50 MG
5 TABLET ORAL ONCE
Refills: 0 | Status: COMPLETED | OUTPATIENT
Start: 2021-01-15 | End: 2021-01-15

## 2021-01-15 RX ORDER — SODIUM,POTASSIUM PHOSPHATES 278-250MG
1 POWDER IN PACKET (EA) ORAL
Refills: 0 | Status: COMPLETED | OUTPATIENT
Start: 2021-01-15 | End: 2021-01-16

## 2021-01-15 RX ADMIN — Medication 6 MILLIGRAM(S): at 05:24

## 2021-01-15 RX ADMIN — Medication 400 MILLIGRAM(S): at 09:58

## 2021-01-15 RX ADMIN — APIXABAN 5 MILLIGRAM(S): 2.5 TABLET, FILM COATED ORAL at 18:49

## 2021-01-15 RX ADMIN — LAMOTRIGINE 100 MILLIGRAM(S): 25 TABLET, ORALLY DISINTEGRATING ORAL at 18:49

## 2021-01-15 RX ADMIN — SENNA PLUS 2 TABLET(S): 8.6 TABLET ORAL at 21:56

## 2021-01-15 RX ADMIN — APIXABAN 5 MILLIGRAM(S): 2.5 TABLET, FILM COATED ORAL at 05:25

## 2021-01-15 RX ADMIN — REMDESIVIR 500 MILLIGRAM(S): 5 INJECTION INTRAVENOUS at 21:56

## 2021-01-15 RX ADMIN — POLYETHYLENE GLYCOL 3350 17 GRAM(S): 17 POWDER, FOR SOLUTION ORAL at 12:48

## 2021-01-15 RX ADMIN — LAMOTRIGINE 100 MILLIGRAM(S): 25 TABLET, ORALLY DISINTEGRATING ORAL at 05:25

## 2021-01-15 RX ADMIN — Medication 1 PACKET(S): at 18:30

## 2021-01-15 RX ADMIN — Medication 1 PACKET(S): at 12:48

## 2021-01-15 RX ADMIN — Medication 200 MILLIGRAM(S): at 21:56

## 2021-01-15 RX ADMIN — Medication 5 MILLIGRAM(S): at 12:48

## 2021-01-15 NOTE — CHART NOTE - NSCHARTNOTEFT_GEN_A_CORE
Patient evaluated at bedside given Afib w/ RVR up to 140s while at rest. Patient asymptomatic- denies CP or palpitations. +Dyspnea. Remains stable on 3L NC SpO2 100%.     General- Azeri speaking elderly M in NAD  CV- irregularly irregular  Resp-normal effort, +decreased to b/l bases  GI- +BS, nontender to palpation, no distension     ICU Vital Signs Last 24 Hrs  T(C): 37.5 (14 Jan 2021 20:49), Max: 37.5 (14 Jan 2021 20:49)  T(F): 99.5 (14 Jan 2021 20:49), Max: 99.5 (14 Jan 2021 20:49)  HR: 104 (15 Rodney 2021 11:30) (100 - 104)  BP: 97/68 (15 Rodney 2021 11:30) (97/68 - 110/58)  BP(mean): --  ABP: --  ABP(mean): --  RR: 18 (15 Rodney 2021 11:30) (18 - 18)  SpO2: 100% (15 Rodney 2021 11:30) (100% - 100%)    A/P:  Lopressor 5 mg IVP administered x1 w/ improvement in HR to 60-70s. Phos supplemented. Will continue home lopressor 25 mg BID and eliquis. Case d/w Dr. Carcamo. Will continue to closely monitor.      Abram Beasley NP [facility]   Pager [phone]

## 2021-01-15 NOTE — PROGRESS NOTE ADULT - PROBLEM SELECTOR PLAN 1
-supportive care  -maintain oxygenation saturation  -monitor fever curve  -continue airborne/contact isolation  -cont dexamethasone 6 mg daily  -cont remdesevir 200 mg iv x 1 then 100 mg iv q24 x 4 days   -monitor pulse ox  -contact/airborne precautions   -wean O2 as tolerated  -trend inflammatory markers  -would do 5 days of remdesevir/dexamethasone and stop - he is on 3L O2 at home at baseline

## 2021-01-15 NOTE — PROGRESS NOTE ADULT - EYES
From: Juan Eastman  To: Seven Bernstein DO  Sent: 4/27/2018 2:00 PM CDT  Subject: bupropion      [facility] will not [facility] for bupropion they do not cover stop smoking meds. Could you write script for depression rather than for stop smoking med?    Thanks,    Juan   detailed exam conjunctiva clear

## 2021-01-15 NOTE — PROGRESS NOTE ADULT - PROBLEM SELECTOR PLAN 1
- Now covid positive, cont remdesivir and decadron  - f/u cultures- blood culture neg to date.   - Monitor for resp status. Cont nasal canula oxygen, per family he is on home oxygen at 3 lits, Sats ok, Discussed with ID  Pt's family  aware of treatment with decadron and Remdesivir  - Discussed with pt's wife and updated on 1/15  - cont neb inhaler.  - Isolation precautions, check inflammatory markers 48- 72 hrs  - Trend liver and renal functions.  - Left message for pt's Pulmonologist . [place] at [phone]  - ID consult appreciated, f/u ID recs

## 2021-01-15 NOTE — PROGRESS NOTE ADULT - SUBJECTIVE AND OBJECTIVE BOX
Patient is a 75y old  Male who presents with a chief complaint of PNA (14 Jan 2021 15:01)      SUBJECTIVE / OVERNIGHT EVENTS: Pt seen and examined at 12:50pm, no overnight events, tele with rapid afib currently at 120s, has some cough, denies any sob or chest pain, comfortable on oxygen via nasal canula, sat ok, had bm last night. Ready to eat lunch, sitting up, appears comfortable, Per nsg no other new issues reported.      MEDICATIONS  (STANDING):  apixaban 5 milliGRAM(s) Oral two times a day  carBAMazepine 200 milliGRAM(s) Oral at bedtime  carBAMazepine 400 milliGRAM(s) Oral <User Schedule>  dexAMETHasone  Injectable 6 milliGRAM(s) IV Push daily  lamoTRIgine 100 milliGRAM(s) Oral two times a day  metoprolol tartrate 25 milliGRAM(s) Oral two times a day  polyethylene glycol 3350 17 Gram(s) Oral daily  potassium phosphate / sodium phosphate Powder (PHOS-NaK) 1 Packet(s) Oral three times a day with meals  remdesivir  IVPB 100 milliGRAM(s) IV Intermittent every 24 hours  remdesivir  IVPB   IV Intermittent   senna 2 Tablet(s) Oral at bedtime    MEDICATIONS  (PRN):  ALBUTerol    90 MICROgram(s) HFA Inhaler 2 Puff(s) Inhalation every 6 hours PRN Bronchospasm  bisacodyl 5 milliGRAM(s) Oral every 12 hours PRN Constipation  ipratropium 17 MICROgram(s) HFA Inhaler 1 Puff(s) Inhalation every 6 hours PRN sob      Vital Signs Last 24 Hrs  T(C): 37.5 (14 Jan 2021 20:49), Max: 37.5 (14 Jan 2021 14:42)  T(F): 99.5 (14 Jan 2021 20:49), Max: 99.5 (14 Jan 2021 14:42)  HR: 104 (15 Rodney 2021 11:30) (79 - 104)  BP: 97/68 (15 Rodney 2021 11:30) (91/57 - 110/58)  BP(mean): --  RR: 18 (15 Rodney 2021 11:30) (18 - 18)  SpO2: 100% (15 Rodney 2021 11:30) (98% - 100%)  CAPILLARY BLOOD GLUCOSE        I&O's Summary    14 Jan 2021 07:01  -  15 Rodney 2021 07:00  --------------------------------------------------------  IN: 0 mL / OUT: 400 mL / NET: -400 mL    15 Rodney 2021 07:01  -  15 Rodney 2021 14:20  --------------------------------------------------------  IN: 0 mL / OUT: 200 mL / NET: -200 mL        PHYSICAL EXAM:  GENERAL: NAD, well-developed  CHEST/LUNG: Decreased bs at bases  HEART: Irregularly irregular  ABDOMEN: Soft, Nontender, Nondistended  EXTREMITIES: no LE edema  PSYCH: Calm  NEUROLOGY: AA, answers questions  Skin: Rt infraclavicular mass mildly tender to palpation    LABS:                        10.6   8.41  )-----------( 177      ( 15 Rodney 2021 07:52 )             33.9     01-15    138  |  94<L>  |  38<H>  ----------------------------<  90  4.5   |  32<H>  |  1.34<H>    Ca    9.5      15 Rodney 2021 07:52  Phos  2.1     01-15  Mg     2.3     01-15    TPro  7.1  /  Alb  3.5  /  TBili  0.8  /  DBili  x   /  AST  35  /  ALT  39  /  AlkPhos  126<H>  01-15              RADIOLOGY & ADDITIONAL TESTS:    Imaging Personally Reviewed:    Consultant(s) Notes Reviewed:      Care Discussed with Consultants/Other Providers:

## 2021-01-15 NOTE — PROGRESS NOTE ADULT - SUBJECTIVE AND OBJECTIVE BOX
QUIN SULLIVAN 75y [id number]    Patient is a 75y old  Male who presents with a chief complaint of PNA (15 Rodney 2021 14:20)      Follow Up/CC:  ID following for COVID    Interval History/ROS: no fever, on NC     Allergies    No Known Allergies    Intolerances        ANTIMICROBIALS:  remdesivir  IVPB 100 every 24 hours  remdesivir  IVPB        MEDICATIONS  (STANDING):  apixaban 5 milliGRAM(s) Oral two times a day  carBAMazepine 200 milliGRAM(s) Oral at bedtime  carBAMazepine 400 milliGRAM(s) Oral <User Schedule>  dexAMETHasone  Injectable 6 milliGRAM(s) IV Push daily  lamoTRIgine 100 milliGRAM(s) Oral two times a day  metoprolol tartrate 25 milliGRAM(s) Oral two times a day  polyethylene glycol 3350 17 Gram(s) Oral daily  potassium phosphate / sodium phosphate Powder (PHOS-NaK) 1 Packet(s) Oral three times a day with meals  remdesivir  IVPB 100 milliGRAM(s) IV Intermittent every 24 hours  remdesivir  IVPB   IV Intermittent   senna 2 Tablet(s) Oral at bedtime    MEDICATIONS  (PRN):  ALBUTerol    90 MICROgram(s) HFA Inhaler 2 Puff(s) Inhalation every 6 hours PRN Bronchospasm  bisacodyl 5 milliGRAM(s) Oral every 12 hours PRN Constipation  ipratropium 17 MICROgram(s) HFA Inhaler 1 Puff(s) Inhalation every 6 hours PRN sob        Vital Signs Last 24 Hrs  T(C): 37.5 (14 Jan 2021 20:49), Max: 37.5 (14 Jan 2021 20:49)  T(F): 99.5 (14 Jan 2021 20:49), Max: 99.5 (14 Jan 2021 20:49)  HR: 104 (15 Rodney 2021 11:30) (100 - 104)  BP: 97/68 (15 Rodney 2021 11:30) (97/68 - 110/58)  BP(mean): --  RR: 18 (15 Rodney 2021 11:30) (18 - 18)  SpO2: 100% (15 Rodney 2021 11:30) (100% - 100%)    CBC Full  -  ( 15 Rodney 2021 07:52 )  WBC Count : 8.41 K/uL  RBC Count : 3.55 M/uL  Hemoglobin : 10.6 g/dL  Hematocrit : 33.9 %  Platelet Count - Automated : 177 K/uL  Mean Cell Volume : 95.5 fL  Mean Cell Hemoglobin : 29.9 pg  Mean Cell Hemoglobin Concentration : 31.3 gm/dL  Auto Neutrophil # : 7.08 K/uL  Auto Lymphocyte # : 0.70 K/uL  Auto Monocyte # : 0.58 K/uL  Auto Eosinophil # : 0.01 K/uL  Auto Basophil # : 0.01 K/uL  Auto Neutrophil % : 84.2 %  Auto Lymphocyte % : 8.3 %  Auto Monocyte % : 6.9 %  Auto Eosinophil % : 0.1 %  Auto Basophil % : 0.1 %    01-15    138  |  94<L>  |  38<H>  ----------------------------<  90  4.5   |  32<H>  |  1.34<H>    Ca    9.5      15 Rodney 2021 07:52  Phos  2.1     01-15  Mg     2.3     01-15    TPro  7.1  /  Alb  3.5  /  TBili  0.8  /  DBili  x   /  AST  35  /  ALT  39  /  AlkPhos  126<H>  01-15    LIVER FUNCTIONS - ( 15 Rodney 2021 07:52 )  Alb: 3.5 g/dL / Pro: 7.1 g/dL / ALK PHOS: 126 U/L / ALT: 39 U/L / AST: 35 U/L / GGT: x               MICROBIOLOGY:  .Blood Blood-Venous  01-12-21   No growth to date.  --  --      .Blood Blood-Peripheral  01-12-21   No growth to date.  --  --      Rapid RVP Result: Detected (01-12 @ 17:46)          RADIOLOGY    < from: Xray Chest 2 Views PA/Lat (01.12.21 @ 18:31) >  Stable postoperative changes status post right pneumonectomy.  Left basilar hazy opacity.    < end of copied text >

## 2021-01-15 NOTE — PROGRESS NOTE ADULT - PROBLEM SELECTOR PLAN 3
-On metoprolol and eliquis, will continue in the hospital  - had rapid afib, lopressor 5mg now, and monitor closely  -Currently VS stable rate controlled.

## 2021-01-16 DIAGNOSIS — I10 ESSENTIAL (PRIMARY) HYPERTENSION: ICD-10-CM

## 2021-01-16 DIAGNOSIS — G40.909 EPILEPSY, UNSPECIFIED, NOT INTRACTABLE, WITHOUT STATUS EPILEPTICUS: ICD-10-CM

## 2021-01-16 DIAGNOSIS — I48.91 UNSPECIFIED ATRIAL FIBRILLATION: ICD-10-CM

## 2021-01-16 PROCEDURE — 99233 SBSQ HOSP IP/OBS HIGH 50: CPT

## 2021-01-16 RX ORDER — SODIUM CHLORIDE 0.65 %
1 AEROSOL, SPRAY (ML) NASAL
Refills: 0 | Status: DISCONTINUED | OUTPATIENT
Start: 2021-01-16 | End: 2021-01-21

## 2021-01-16 RX ORDER — ONDANSETRON 8 MG/1
4 TABLET, FILM COATED ORAL EVERY 8 HOURS
Refills: 0 | Status: DISCONTINUED | OUTPATIENT
Start: 2021-01-16 | End: 2021-01-21

## 2021-01-16 RX ORDER — ONDANSETRON 8 MG/1
4 TABLET, FILM COATED ORAL ONCE
Refills: 0 | Status: COMPLETED | OUTPATIENT
Start: 2021-01-16 | End: 2021-01-16

## 2021-01-16 RX ADMIN — Medication 200 MILLIGRAM(S): at 22:30

## 2021-01-16 RX ADMIN — ONDANSETRON 4 MILLIGRAM(S): 8 TABLET, FILM COATED ORAL at 10:38

## 2021-01-16 RX ADMIN — Medication 25 MILLIGRAM(S): at 17:25

## 2021-01-16 RX ADMIN — Medication 400 MILLIGRAM(S): at 09:30

## 2021-01-16 RX ADMIN — LAMOTRIGINE 100 MILLIGRAM(S): 25 TABLET, ORALLY DISINTEGRATING ORAL at 17:26

## 2021-01-16 RX ADMIN — ALBUTEROL 2 PUFF(S): 90 AEROSOL, METERED ORAL at 07:01

## 2021-01-16 RX ADMIN — REMDESIVIR 500 MILLIGRAM(S): 5 INJECTION INTRAVENOUS at 22:30

## 2021-01-16 RX ADMIN — APIXABAN 5 MILLIGRAM(S): 2.5 TABLET, FILM COATED ORAL at 17:26

## 2021-01-16 RX ADMIN — Medication 1 PACKET(S): at 09:30

## 2021-01-16 RX ADMIN — POLYETHYLENE GLYCOL 3350 17 GRAM(S): 17 POWDER, FOR SOLUTION ORAL at 17:25

## 2021-01-16 RX ADMIN — SENNA PLUS 2 TABLET(S): 8.6 TABLET ORAL at 22:30

## 2021-01-16 NOTE — PROGRESS NOTE ADULT - PROBLEM SELECTOR PLAN 1
-patient was seen by ID. They recommend continuing Dexamethasone and Remdesivir for 5 days then discontinuing  -saturating at 100% this AM on 3.5 L of NC. Patient generally requires 3L of NC at baseline at home  -can continue Albuterol PRN  -will need outpatient oncology follow-up upon discharge

## 2021-01-16 NOTE — PROGRESS NOTE ADULT - SUBJECTIVE AND OBJECTIVE BOX
Patient is a 75y old  Male who presents with a chief complaint of PNA (15 Rodney 2021 15:04)      SUBJECTIVE / OVERNIGHT EVENTS:    Patient afebrile at this juncture. Shortness of breath not significantly different compared to yesterday. No chest pain. No hemoptysis.    REVIEW OF SYSTEMS:  CONSTITUTIONAL: Has generalized weakness but denies fevers or chills  EYES/ENT: No new visual changes;  No vertigo or throat pain   NECK: No pain or stiffness  RESPIRATORY: Has a cough but denies wheezing, hemoptysis; Has shortness of breath  CARDIOVASCULAR: No chest pain or palpitations  GASTROINTESTINAL: No abdominal or epigastric pain. No nausea, vomiting, or hematemesis; No diarrhea or constipation. No melena or hematochezia.  GENITOURINARY: No dysuria, frequency or hematuria  NEUROLOGICAL: No numbness or weakness  SKIN: No itching, rashes      MEDICATIONS  (STANDING):  apixaban 5 milliGRAM(s) Oral two times a day  carBAMazepine 200 milliGRAM(s) Oral at bedtime  carBAMazepine 400 milliGRAM(s) Oral <User Schedule>  dexAMETHasone  Injectable 6 milliGRAM(s) IV Push daily  lamoTRIgine 100 milliGRAM(s) Oral two times a day  metoprolol tartrate 25 milliGRAM(s) Oral two times a day  polyethylene glycol 3350 17 Gram(s) Oral daily  remdesivir  IVPB 100 milliGRAM(s) IV Intermittent every 24 hours  remdesivir  IVPB   IV Intermittent   senna 2 Tablet(s) Oral at bedtime    MEDICATIONS  (PRN):  ALBUTerol    90 MICROgram(s) HFA Inhaler 2 Puff(s) Inhalation every 6 hours PRN Bronchospasm  bisacodyl 5 milliGRAM(s) Oral every 12 hours PRN Constipation  ipratropium 17 MICROgram(s) HFA Inhaler 1 Puff(s) Inhalation every 6 hours PRN sob  ondansetron Injectable 4 milliGRAM(s) IV Push every 8 hours PRN Nausea and/or Vomiting      CAPILLARY BLOOD GLUCOSE        I&O's Summary    15 Rodney 2021 07:01  -  16 Jan 2021 07:00  --------------------------------------------------------  IN: 0 mL / OUT: 200 mL / NET: -200 mL        PHYSICAL EXAM:  Vital Signs Last 24 Hrs  T(C): 36.4 (16 Jan 2021 09:35), Max: 37.1 (16 Jan 2021 02:45)  T(F): 97.6 (16 Jan 2021 09:35), Max: 98.7 (16 Jan 2021 02:45)  HR: 52 (16 Jan 2021 09:35) (52 - 101)  BP: 99/60 (16 Jan 2021 09:35) (99/60 - 115/60)  BP(mean): --  RR: 18 (16 Jan 2021 09:35) (18 - 20)  SpO2: 100% (16 Jan 2021 09:35) (98% - 100%)  CONSTITUTIONAL: thin, frail appearing  EYES: PERRLA; conjunctiva and sclera clear  ENMT: no pharyngeal injection or exudates; normal dentition  NECK: Supple, no palpable masses; no thyromegaly  RESPIRATORY: coarse bibasilar breath sounds, no accessory muscle use  CARDIOVASCULAR: Regular rate and rhythm, normal S1 and S2, no murmur/rub/gallop; No lower extremity edema; Peripheral pulses are 2+ bilaterally  ABDOMEN: Nontender to palpation, normoactive bowel sounds, no rebound/guarding; No hepatosplenomegaly  MUSCULOSKELETAL:  no clubbing or cyanosis of digits; no joint swelling or tenderness to palpation  PSYCH: A+O to person, place, and time; affect appropriate  NEUROLOGY: CN 2-12 are intact and symmetric; no gross motor or  sensory deficits   SKIN: chest wall mass noted on right upper chest.    LABS:                        10.6   8.41  )-----------( 177      ( 15 Rodney 2021 07:52 )             33.9     01-15    138  |  94<L>  |  38<H>  ----------------------------<  90  4.5   |  32<H>  |  1.34<H>    Ca    9.5      15 Rodney 2021 07:52  Phos  2.1     01-15  Mg     2.3     01-15    TPro  7.1  /  Alb  3.5  /  TBili  0.8  /  DBili  x   /  AST  35  /  ALT  39  /  AlkPhos  126<H>  01-15                RADIOLOGY & ADDITIONAL TESTS:  Results Reviewed:   Imaging Personally Reviewed:  Electrocardiogram Personally Reviewed:    COORDINATION OF CARE:  Care Discussed with Consultants/Other Providers [Y/N]:  Prior or Outpatient Records Reviewed [Y/N]:

## 2021-01-16 NOTE — PHYSICAL THERAPY INITIAL EVALUATION ADULT - GENERAL OBSERVATIONS, REHAB EVAL
Pt received semisupine in bed, +3.5L oxygen via nasal cannula, +pulse oximeter, in no apparent distress. Pt agreeable to participate in physical therapy evaluation.

## 2021-01-16 NOTE — PHYSICAL THERAPY INITIAL EVALUATION ADULT - PERTINENT HX OF CURRENT PROBLEM, REHAB EVAL
Pt is a 75 year old male presenting with worsening cough with white sputum production, generalized weakness, and fever. Found to be COVID-19(+). PMH: lung CA s/p right pneumonectomy (2003), COPD (on 3L home O2), AVR, afib on eliquis, seizure disorder, HTN, HLD.

## 2021-01-16 NOTE — PHYSICAL THERAPY INITIAL EVALUATION ADULT - LEVEL OF INDEPENDENCE: GAIT, REHAB EVAL
Pt deferred further ambulation secondary to abdominal cramping. Pt wishes to ambulate when feeling "better." Of note: pt tachycardic to 150s during activity. PA notified. Pt deferred further ambulation secondary to abdominal cramping. Pt wishes to ambulate when feeling "better." Of note: pt tachycardic to 150s during activity. Naeem SANTOYO notified.

## 2021-01-16 NOTE — PHYSICAL THERAPY INITIAL EVALUATION ADULT - PATIENT PROFILE REVIEW, REHAB EVAL
PT orders received: Ambulate as tolerated, ambulate with assistance. Consult with RN Babatunde, patient may participate in PT evaluation./yes

## 2021-01-16 NOTE — PHYSICAL THERAPY INITIAL EVALUATION ADULT - DISCHARGE DISPOSITION, PT EVAL
Anticipating discharge home with home PT to improve strength/endurance to optimize safety in home environment. To be further assessed, limited by "stomach cramps."

## 2021-01-16 NOTE — PHYSICAL THERAPY INITIAL EVALUATION ADULT - ADDITIONAL COMMENTS
Pt endorses using a cane for community ambulation at times.     Pt was left semi-supine in bed as found, all lines/tubes intact and call bell within reach, RN aware.

## 2021-01-17 ENCOUNTER — TRANSCRIPTION ENCOUNTER (OUTPATIENT)
Age: 76
End: 2021-01-17

## 2021-01-17 DIAGNOSIS — C34.90 MALIGNANT NEOPLASM OF UNSPECIFIED PART OF UNSPECIFIED BRONCHUS OR LUNG: ICD-10-CM

## 2021-01-17 LAB
ALBUMIN SERPL ELPH-MCNC: 3.3 G/DL — SIGNIFICANT CHANGE UP (ref 3.3–5)
ALP SERPL-CCNC: 122 U/L — HIGH (ref 40–120)
ALT FLD-CCNC: 41 U/L — SIGNIFICANT CHANGE UP (ref 4–41)
ANION GAP SERPL CALC-SCNC: 8 MMOL/L — SIGNIFICANT CHANGE UP (ref 7–14)
AST SERPL-CCNC: 42 U/L — HIGH (ref 4–40)
BASOPHILS # BLD AUTO: 0.01 K/UL — SIGNIFICANT CHANGE UP (ref 0–0.2)
BASOPHILS NFR BLD AUTO: 0.1 % — SIGNIFICANT CHANGE UP (ref 0–2)
BILIRUB SERPL-MCNC: 0.8 MG/DL — SIGNIFICANT CHANGE UP (ref 0.2–1.2)
BUN SERPL-MCNC: 28 MG/DL — HIGH (ref 7–23)
CALCIUM SERPL-MCNC: 9.4 MG/DL — SIGNIFICANT CHANGE UP (ref 8.4–10.5)
CHLORIDE SERPL-SCNC: 94 MMOL/L — LOW (ref 98–107)
CO2 SERPL-SCNC: 32 MMOL/L — HIGH (ref 22–31)
CREAT SERPL-MCNC: 1.13 MG/DL — SIGNIFICANT CHANGE UP (ref 0.5–1.3)
CULTURE RESULTS: SIGNIFICANT CHANGE UP
CULTURE RESULTS: SIGNIFICANT CHANGE UP
EOSINOPHIL # BLD AUTO: 0 K/UL — SIGNIFICANT CHANGE UP (ref 0–0.5)
EOSINOPHIL NFR BLD AUTO: 0 % — SIGNIFICANT CHANGE UP (ref 0–6)
GLUCOSE SERPL-MCNC: 98 MG/DL — SIGNIFICANT CHANGE UP (ref 70–99)
HCT VFR BLD CALC: 30.9 % — LOW (ref 39–50)
HGB BLD-MCNC: 10 G/DL — LOW (ref 13–17)
IANC: 9.49 K/UL — HIGH (ref 1.5–8.5)
IMM GRANULOCYTES NFR BLD AUTO: 0.6 % — SIGNIFICANT CHANGE UP (ref 0–1.5)
LYMPHOCYTES # BLD AUTO: 0.49 K/UL — LOW (ref 1–3.3)
LYMPHOCYTES # BLD AUTO: 4.6 % — LOW (ref 13–44)
MAGNESIUM SERPL-MCNC: 2.4 MG/DL — SIGNIFICANT CHANGE UP (ref 1.6–2.6)
MCHC RBC-ENTMCNC: 30.1 PG — SIGNIFICANT CHANGE UP (ref 27–34)
MCHC RBC-ENTMCNC: 32.4 GM/DL — SIGNIFICANT CHANGE UP (ref 32–36)
MCV RBC AUTO: 93.1 FL — SIGNIFICANT CHANGE UP (ref 80–100)
MONOCYTES # BLD AUTO: 0.64 K/UL — SIGNIFICANT CHANGE UP (ref 0–0.9)
MONOCYTES NFR BLD AUTO: 6 % — SIGNIFICANT CHANGE UP (ref 2–14)
NEUTROPHILS # BLD AUTO: 9.49 K/UL — HIGH (ref 1.8–7.4)
NEUTROPHILS NFR BLD AUTO: 88.7 % — HIGH (ref 43–77)
NRBC # BLD: 0 /100 WBCS — SIGNIFICANT CHANGE UP
NRBC # FLD: 0 K/UL — SIGNIFICANT CHANGE UP
PHOSPHATE SERPL-MCNC: 2.5 MG/DL — SIGNIFICANT CHANGE UP (ref 2.5–4.5)
PLATELET # BLD AUTO: 168 K/UL — SIGNIFICANT CHANGE UP (ref 150–400)
POTASSIUM SERPL-MCNC: 4.8 MMOL/L — SIGNIFICANT CHANGE UP (ref 3.5–5.3)
POTASSIUM SERPL-SCNC: 4.8 MMOL/L — SIGNIFICANT CHANGE UP (ref 3.5–5.3)
PROT SERPL-MCNC: 6.7 G/DL — SIGNIFICANT CHANGE UP (ref 6–8.3)
RBC # BLD: 3.32 M/UL — LOW (ref 4.2–5.8)
RBC # FLD: 13.2 % — SIGNIFICANT CHANGE UP (ref 10.3–14.5)
SODIUM SERPL-SCNC: 134 MMOL/L — LOW (ref 135–145)
SPECIMEN SOURCE: SIGNIFICANT CHANGE UP
SPECIMEN SOURCE: SIGNIFICANT CHANGE UP
WBC # BLD: 10.69 K/UL — HIGH (ref 3.8–10.5)
WBC # FLD AUTO: 10.69 K/UL — HIGH (ref 3.8–10.5)

## 2021-01-17 PROCEDURE — 99232 SBSQ HOSP IP/OBS MODERATE 35: CPT

## 2021-01-17 RX ORDER — MEGESTROL ACETATE 40 MG/ML
400 SUSPENSION ORAL DAILY
Refills: 0 | Status: DISCONTINUED | OUTPATIENT
Start: 2021-01-17 | End: 2021-01-21

## 2021-01-17 RX ORDER — MEGESTROL ACETATE 40 MG/ML
20 SUSPENSION ORAL DAILY
Refills: 0 | Status: DISCONTINUED | OUTPATIENT
Start: 2021-01-17 | End: 2021-01-17

## 2021-01-17 RX ADMIN — APIXABAN 5 MILLIGRAM(S): 2.5 TABLET, FILM COATED ORAL at 18:25

## 2021-01-17 RX ADMIN — POLYETHYLENE GLYCOL 3350 17 GRAM(S): 17 POWDER, FOR SOLUTION ORAL at 18:25

## 2021-01-17 RX ADMIN — ALBUTEROL 2 PUFF(S): 90 AEROSOL, METERED ORAL at 09:47

## 2021-01-17 RX ADMIN — SENNA PLUS 2 TABLET(S): 8.6 TABLET ORAL at 22:13

## 2021-01-17 RX ADMIN — Medication 25 MILLIGRAM(S): at 05:59

## 2021-01-17 RX ADMIN — LAMOTRIGINE 100 MILLIGRAM(S): 25 TABLET, ORALLY DISINTEGRATING ORAL at 18:25

## 2021-01-17 RX ADMIN — Medication 6 MILLIGRAM(S): at 05:59

## 2021-01-17 RX ADMIN — Medication 25 MILLIGRAM(S): at 18:25

## 2021-01-17 RX ADMIN — LAMOTRIGINE 100 MILLIGRAM(S): 25 TABLET, ORALLY DISINTEGRATING ORAL at 05:59

## 2021-01-17 RX ADMIN — Medication 400 MILLIGRAM(S): at 09:46

## 2021-01-17 RX ADMIN — Medication 200 MILLIGRAM(S): at 22:13

## 2021-01-17 RX ADMIN — APIXABAN 5 MILLIGRAM(S): 2.5 TABLET, FILM COATED ORAL at 05:59

## 2021-01-17 RX ADMIN — REMDESIVIR 500 MILLIGRAM(S): 5 INJECTION INTRAVENOUS at 23:02

## 2021-01-17 NOTE — DISCHARGE NOTE PROVIDER - CARE PROVIDER_API CALL
Royal Mcdaniel  Hematology/Oncology  [address] [address]  Phone: [phone]  Fax: [phone]  Follow Up Time:     ROOPA CLAYTON  Critical Care Medicine - Internal Med  [id number] [place] [id number]  Phone: [phone]  Fax: [phone]  Established Patient  Follow Up Time: 1-3 days

## 2021-01-17 NOTE — DIETITIAN INITIAL EVALUATION ADULT. - PROBLEM SELECTOR PLAN 3
-s/p R pneumonectomy in 2003  -Last chemo/radiation in 2005, has had [id number] follow up until 2018 but CA stayed in remission  -In context of 12 lb weight loss, potential pathologic fracture, abx resistant COPD exacerbation, concern for recurrence. Decision from previous admission was for outpatient f/u with patient's oncologist Dr. Mcdaniel.

## 2021-01-17 NOTE — DIETITIAN INITIAL EVALUATION ADULT. - ORAL INTAKE PTA/DIET HISTORY
Unable to conduct a face to face interview or nutrition-focused physical exam due to limited contact restrictions related to Pt's medical condition and isolation precautions. Extensive chart review conducted to obtain nutrition related information.  Pt w/recent [facility] admission-RDN note reviewed 12/31/20.  Pt was noted w/poor appetite/po intake.  12lb wt loss x1 year. Pt was refusing nutrition supplements.  Diet liberalized to Low Na.  Attempted to reach unit x3-unable.

## 2021-01-17 NOTE — DISCHARGE NOTE PROVIDER - HOSPITAL COURSE
75M w/ PMHx of lung CA s/p R pneumonectomy (2003), COPD (on 3L home O2), AVR, Afib on eliquis, seizure dx, and HTN p/w worsening cough and found to have COVID.    COVID-19  - COVID PCR (+)  1/12  - CXR L-basilar hazy opacities  - ID consulted --> Remdesivir/Decadron x5 days as on 3L NC at baseline  - DVT ppx: Eliquis    Lung cancer  - s/p R pneumonectomy in 2003, last chemo/radiation in 2005  - In context of 12 lb weight loss, potential pathologic fracture and ABx resistant COPD exacerbation w/ concern for recurrence  - Outpatient f/u with oncologist Dr. Mcdaniel for evaluation    Atrial fibrillation  - c/w Metoprolol and eliquis    Seizure disorder  - c/w Carbamazepine/Lamictal    Dispo: Home with home PT & Baseline 3L O2 via NC    On ___ this case was reviewed with  ____, the patient is medically stable and optimized for discharge. All medications were reviewed and prescriptions were sent to mutually agreed upon pharmacy. 75M w/ PMHx of lung CA s/p R pneumonectomy (2003), COPD (on 3L home O2), AVR, Afib on eliquis, seizure dx, and HTN p/w worsening cough and found to have COVID.    COVID-19  - COVID PCR (+)  1/12  - CXR L-basilar hazy opacities  - ID consulted --> Remdesivir/Decadron x5 days as on 3L NC at baseline  - DVT ppx: Eliquis    Lung cancer  - s/p R pneumonectomy in 2003, last chemo/radiation in 2005  - In context of 12 lb weight loss, potential pathologic fracture and ABx resistant COPD exacerbation w/ concern for recurrence  - Outpatient f/u with oncologist Dr. Mcdaniel for evaluation    Atrial fibrillation  - c/w Metoprolol and eliquis  - Uptitrated Lopressor 25mg BID --> 37.5mg BID on 1/18    Seizure disorder  - c/w Carbamazepine/Lamictal    Dispo: Home with home PT & Baseline 3L O2 via NC    On ___ this case was reviewed with  ____, the patient is medically stable and optimized for discharge. All medications were reviewed and prescriptions were sent to mutually agreed upon pharmacy. 75M w/ PMHx of lung CA s/p R pneumonectomy (2003), COPD (on 3L home O2), AVR, Afib on eliquis, seizure dx, and HTN p/w worsening cough and found to have COVID.    COVID-19  - COVID PCR (+)  1/12  - CXR L-basilar hazy opacities  - ID consulted --> Remdesivir/Decadron x5 days as on 3L NC at baseline  - DVT ppx: Eliquis    Lung cancer  - s/p R pneumonectomy in 2003, last chemo/radiation in 2005  - In context of 12 lb weight loss, potential pathologic fracture and ABx resistant COPD exacerbation w/ concern for recurrence  - Outpatient f/u with oncologist Dr. Mcdaniel for evaluation    Atrial fibrillation  - c/w Metoprolol and eliquis  - Uptitrated Lopressor 25mg BID --> 37.5mg BID on 1/18    Seizure disorder  - c/w Carbamazepine/Lamictal    Dispo: Home with home PT & Baseline 3L O2 via NC    On 1/21/21 this case was reviewed with Dr. De Leon, the patient is medically stable and optimized for discharge. All medications were reviewed and prescriptions were sent to mutually agreed upon pharmacy. 75M w/ PMHx of lung CA s/p R pneumonectomy (2003), COPD (on 3L home O2), AVR, Afib on eliquis, seizure dx, and HTN p/w worsening cough and found to have COVID.    COVID-19  - COVID PCR (+)  1/12  - CXR L-basilar hazy opacities  - ID consulted --> Remdesivir/Decadron x5 days as on 3L NC at baseline  - DVT ppx: Eliquis    Lung cancer  - s/p R pneumonectomy in 2003, last chemo/radiation in 2005  - In context of 12 lb weight loss, potential pathologic fracture and ABx resistant COPD exacerbation w/ concern for recurrence  - Outpatient f/u with oncologist Dr. Mcdaniel for evaluation    Atrial fibrillation  - c/w Metoprolol and eliquis  - Uptitrated Lopressor 25mg BID --> 37.5mg BID on 1/18  -CT scan of chest with enlarging mass (hematoma) around rib fracture     Seizure disorder  - c/w Carbamazepine/Lamictal    Dispo: Home with home PT & Baseline 3L O2 via NC    On 1/21/21 this case was reviewed with Dr. De Leon, the patient is medically stable and optimized for discharge. All medications were reviewed and prescriptions were sent to mutually agreed upon pharmacy.

## 2021-01-17 NOTE — DISCHARGE NOTE PROVIDER - NSDCMRMEDTOKEN_GEN_ALL_CORE_FT
Breo Ellipta 100 mcg-25 mcg/inh inhalation powder: 1 puff(s) inhaled once a day  carBAMazepine 200 mg oral tablet: 2 tab(s) orally once a day in AM   carBAMazepine 200 mg oral tablet: 1 tab(s) orally once a day (at bedtime)  Combivent 18 mcg-103 mcg-/inh inhalation aerosol: 1 puff(s) inhaled 4 times a day  DuoNeb 0.5 mg-2.5 mg/3 mL inhalation solution: 3 milliliter(s) inhaled every 6 hours, As Needed - for bronchospasm, for shortness of breath and/or wheezing  Eliquis 5 mg oral tablet: 1 tab(s) orally 2 times a day  furosemide 40 mg oral tablet: 1 tab(s) orally once a day  LaMICtal 100 mg oral tablet: 1 tab(s) orally 2 times a day  Metoprolol Tartrate 25 mg oral tablet: 1 tab(s) orally 2 times a day   bisacodyl 5 mg oral delayed release tablet: 1 tab(s) orally every 12 hours, As needed, Constipation  Breo Ellipta 100 mcg-25 mcg/inh inhalation powder: 1 puff(s) inhaled once a day  carBAMazepine 200 mg oral tablet: 2 tab(s) orally once a day in AM   carBAMazepine 200 mg oral tablet: 1 tab(s) orally once a day (at bedtime)  Combivent 18 mcg-103 mcg-/inh inhalation aerosol: 1 puff(s) inhaled 4 times a day  DuoNeb 0.5 mg-2.5 mg/3 mL inhalation solution: 3 milliliter(s) inhaled every 6 hours, As Needed - for bronchospasm, for shortness of breath and/or wheezing  Eliquis 5 mg oral tablet: 1 tab(s) orally 2 times a day  LaMICtal 100 mg oral tablet: 1 tab(s) orally 2 times a day  megestrol 40 mg/mL oral suspension: 10 milliliter(s) orally once a day  metoprolol tartrate 37.5 mg oral tablet: 1 tab(s) orally 2 times a day  polyethylene glycol 3350 oral powder for reconstitution: 17 gram(s) orally once a day, As needed, Constipation

## 2021-01-17 NOTE — DISCHARGE NOTE PROVIDER - NSDCFUADDINST_GEN_ALL_CORE_FT
You were diagnosed COVID (+) on 1/12/21.    Call your primary care provider within 1 - 2 days for further management recommendations and to schedule a follow up appointment. If you do not have one, you can call [phone] to establish care with our clinics.     Should you require more information, please call our coronavirus specialists at [phone] ([phone]).    Those caring for the patient should maintain strict hand hygiene and avoid touching face as much as possible. If any members develop shortness of breath or fevers they should contact their primary care provider as soon as possible.

## 2021-01-17 NOTE — PROGRESS NOTE ADULT - PROBLEM SELECTOR PLAN 3
-patient will need outpatient oncology follow-up  -daughter and patient report concern regarding lack of appetite and poor nutritional status. Daughter amenable and would like to have an appetite stimulant to try to see if that helps. Megace ordered.

## 2021-01-17 NOTE — PROGRESS NOTE ADULT - SUBJECTIVE AND OBJECTIVE BOX
Patient is a 75y old  Male who presents with a chief complaint of PNA (17 Jan 2021 12:05)      SUBJECTIVE / OVERNIGHT EVENTS:    Spoke with his daughter via the phone. Patient states that his dyspnea has improved mildly. However he feels very weak and he states he has no appetite and thus is not eating anything. His daughter expressed concern regarding his nutritional status. Patient denies hemoptysis to me. Denies fevers or chills overnight to me. Denies chest pain to me.    REVIEW OF SYSTEMS:  CONSTITUTIONAL: Reports signficant weakness. Denies fevers or chills  EYES/ENT: No visual changes;  No vertigo or throat pain   NECK: No pain or stiffness  RESPIRATORY: Dyspnea stable today. Reports mild cough. Denies hemoptysis  CARDIOVASCULAR: No chest pain or palpitations  GASTROINTESTINAL: No abdominal or epigastric pain. No nausea, vomiting, or hematemesis; No diarrhea or constipation. No melena or hematochezia.  GENITOURINARY: No dysuria, frequency or hematuria  NEUROLOGICAL: No numbness or weakness  SKIN: No itching, rashes      MEDICATIONS  (STANDING):  apixaban 5 milliGRAM(s) Oral two times a day  carBAMazepine 200 milliGRAM(s) Oral at bedtime  carBAMazepine 400 milliGRAM(s) Oral <User Schedule>  dexAMETHasone  Injectable 6 milliGRAM(s) IV Push daily  lamoTRIgine 100 milliGRAM(s) Oral two times a day  megestrol Suspension 400 milliGRAM(s) Oral daily  metoprolol tartrate 25 milliGRAM(s) Oral two times a day  polyethylene glycol 3350 17 Gram(s) Oral daily  remdesivir  IVPB 100 milliGRAM(s) IV Intermittent every 24 hours  remdesivir  IVPB   IV Intermittent   senna 2 Tablet(s) Oral at bedtime    MEDICATIONS  (PRN):  ALBUTerol    90 MICROgram(s) HFA Inhaler 2 Puff(s) Inhalation every 6 hours PRN Bronchospasm  bisacodyl 5 milliGRAM(s) Oral every 12 hours PRN Constipation  ipratropium 17 MICROgram(s) HFA Inhaler 1 Puff(s) Inhalation every 6 hours PRN sob  ondansetron Injectable 4 milliGRAM(s) IV Push every 8 hours PRN Nausea and/or Vomiting  sodium chloride 0.65% Nasal 1 Spray(s) Both Nostrils two times a day PRN Nasal Congestion      CAPILLARY BLOOD GLUCOSE        I&O's Summary      PHYSICAL EXAM:  Vital Signs Last 24 Hrs  T(C): 36.6 (17 Jan 2021 09:50), Max: 37.2 (16 Jan 2021 21:14)  T(F): 97.9 (17 Jan 2021 09:50), Max: 99 (16 Jan 2021 21:14)  HR: 89 (17 Jan 2021 09:50) (89 - 111)  BP: 122/74 (17 Jan 2021 09:50) (108/61 - 122/74)  BP(mean): 67 (16 Jan 2021 21:14) (67 - 67)  RR: 22 (17 Jan 2021 09:50) (18 - 70)  SpO2: 95% (17 Jan 2021 09:50) (95% - 100%)  CONSTITUTIONAL: cachectic appearing. In no acute distress  EYES: PERRLA; conjunctiva and sclera clear  ENMT: no pharyngeal injection or exudates; normal dentition  NECK: Supple, no palpable masses; no thyromegaly  RESPIRATORY: coarse bibasilar breath sounds; no accessory muscle use  CARDIOVASCULAR: irregularly irregular with controlled rate. normal S1 and S2. No lower extremity edema; Peripheral pulses are 2+ bilaterally  ABDOMEN: Nontender to palpation, normoactive bowel sounds, no rebound/guarding; No hepatosplenomegaly  MUSCULOSKELETAL:  no clubbing or cyanosis of digits; no joint swelling or tenderness to palpation  PSYCH: A+O to person, place, and time; affect appropriate  NEUROLOGY: CN 2-12 are intact and symmetric; no gross motor or  sensory deficits   SKIN: No rashes; chest wall mass on right upper chest noted.  LABS:                        10.0   10.69 )-----------( 168      ( 17 Jan 2021 07:15 )             30.9     01-17    134<L>  |  94<L>  |  28<H>  ----------------------------<  98  4.8   |  32<H>  |  1.13    Ca    9.4      17 Jan 2021 07:15  Phos  2.5     01-17  Mg     2.4     01-17    TPro  6.7  /  Alb  3.3  /  TBili  0.8  /  DBili  x   /  AST  42<H>  /  ALT  41  /  AlkPhos  122<H>  01-17                RADIOLOGY & ADDITIONAL TESTS:  Results Reviewed:   Imaging Personally Reviewed:  Electrocardiogram Personally Reviewed:    COORDINATION OF CARE:  Care Discussed with Consultants/Other Providers [Y/N]:  Prior or Outpatient Records Reviewed [Y/N]:

## 2021-01-17 NOTE — PROGRESS NOTE ADULT - PROBLEM SELECTOR PLAN 1
-patient is currently saturating at 100% on 3.5L oxygen, which is near his baseline oxygen needs  -scheduled to complete last dose of Remdesivir today. Alk Phos currently 122 and AST 42  -would consult PT given weakness  -dexamethasone was started on 1/13. Can continue.  -on Apixaban for atrial fibrillation which also covers DVT prophylaxis.

## 2021-01-17 NOTE — DISCHARGE NOTE PROVIDER - PROVIDER TOKENS
PROVIDER:[TOKEN:[[id number]]],PROVIDER:[TOKEN:[[id number]],FOLLOWUP:[1-3 days],ESTABLISHEDPATIENT:[T]]

## 2021-01-17 NOTE — PROGRESS NOTE ADULT - PROBLEM SELECTOR PLAN 4
-patient's rate controlled currently. Continue Apixaban for anticoagulation and continue Metoprolol for rate control.

## 2021-01-17 NOTE — DIETITIAN INITIAL EVALUATION ADULT. - OTHER INFO
76yo M w/ PMHx of lung CA s/p R pneumonectomy (2003), COPD (on 3L home O2), AVR, afib on eliquis, seizure dx, HTN, HLD p/w worsening cough w/ white sputum production, generalized weakness, and fever. Collateral history obtained from family. Per patient's wife, patient has chronic cough 2/2 COPD requires 2-3 L oxygen NC use at home. Of note, patient was recently admitted for COPD exacerbation and rib fracture. Patient was discharged ~10 days ago in stable condition. Patient developed fever and worsening cough at home. Appears more weak compared to baseline. Patient has not had chance to revisit his oncologist.

## 2021-01-17 NOTE — DIETITIAN INITIAL EVALUATION ADULT. - PROBLEM SELECTOR PLAN 1
- Fever, cough and sob concerning for PNA likely from HCAP vs. COVID?  - Check COVID viral swab. Check cultures.  - Empirically cover with abx vanc and cefepime given high risk and hx of cancer. F/u vanc trough in the am.  - Monitor for resp status. NC O2 as needed.  - Will c/w duoneb Q6h for symptomatic relief.  - Will order CT chest to definitively confirm etiology of infection if worsening symptoms overnight. Will dc abx if COVID confirms positive result.  - Isolations.  - Trend liver and renal functions.

## 2021-01-18 LAB
ALBUMIN SERPL ELPH-MCNC: 3.5 G/DL — SIGNIFICANT CHANGE UP (ref 3.3–5)
ALP SERPL-CCNC: 125 U/L — HIGH (ref 40–120)
ALT FLD-CCNC: 41 U/L — SIGNIFICANT CHANGE UP (ref 4–41)
ANION GAP SERPL CALC-SCNC: 11 MMOL/L — SIGNIFICANT CHANGE UP (ref 7–14)
AST SERPL-CCNC: 39 U/L — SIGNIFICANT CHANGE UP (ref 4–40)
BASOPHILS # BLD AUTO: 0.01 K/UL — SIGNIFICANT CHANGE UP (ref 0–0.2)
BASOPHILS NFR BLD AUTO: 0.1 % — SIGNIFICANT CHANGE UP (ref 0–2)
BILIRUB SERPL-MCNC: 0.9 MG/DL — SIGNIFICANT CHANGE UP (ref 0.2–1.2)
BUN SERPL-MCNC: 29 MG/DL — HIGH (ref 7–23)
CALCIUM SERPL-MCNC: 9.4 MG/DL — SIGNIFICANT CHANGE UP (ref 8.4–10.5)
CHLORIDE SERPL-SCNC: 97 MMOL/L — LOW (ref 98–107)
CO2 SERPL-SCNC: 29 MMOL/L — SIGNIFICANT CHANGE UP (ref 22–31)
CREAT SERPL-MCNC: 1.15 MG/DL — SIGNIFICANT CHANGE UP (ref 0.5–1.3)
CRP SERPL-MCNC: 162.4 MG/L — HIGH
D DIMER BLD IA.RAPID-MCNC: 366 NG/ML DDU — HIGH
EOSINOPHIL # BLD AUTO: 0 K/UL — SIGNIFICANT CHANGE UP (ref 0–0.5)
EOSINOPHIL NFR BLD AUTO: 0 % — SIGNIFICANT CHANGE UP (ref 0–6)
FERRITIN SERPL-MCNC: 1131 NG/ML — HIGH (ref 30–400)
GLUCOSE SERPL-MCNC: 95 MG/DL — SIGNIFICANT CHANGE UP (ref 70–99)
HCT VFR BLD CALC: 31.4 % — LOW (ref 39–50)
HGB BLD-MCNC: 10 G/DL — LOW (ref 13–17)
IANC: 11.29 K/UL — HIGH (ref 1.5–8.5)
IMM GRANULOCYTES NFR BLD AUTO: 0.7 % — SIGNIFICANT CHANGE UP (ref 0–1.5)
LDH SERPL L TO P-CCNC: 181 U/L — SIGNIFICANT CHANGE UP (ref 135–225)
LYMPHOCYTES # BLD AUTO: 0.62 K/UL — LOW (ref 1–3.3)
LYMPHOCYTES # BLD AUTO: 4.9 % — LOW (ref 13–44)
MAGNESIUM SERPL-MCNC: 2.4 MG/DL — SIGNIFICANT CHANGE UP (ref 1.6–2.6)
MCHC RBC-ENTMCNC: 29.9 PG — SIGNIFICANT CHANGE UP (ref 27–34)
MCHC RBC-ENTMCNC: 31.8 GM/DL — LOW (ref 32–36)
MCV RBC AUTO: 94 FL — SIGNIFICANT CHANGE UP (ref 80–100)
MONOCYTES # BLD AUTO: 0.74 K/UL — SIGNIFICANT CHANGE UP (ref 0–0.9)
MONOCYTES NFR BLD AUTO: 5.8 % — SIGNIFICANT CHANGE UP (ref 2–14)
NEUTROPHILS # BLD AUTO: 11.29 K/UL — HIGH (ref 1.8–7.4)
NEUTROPHILS NFR BLD AUTO: 88.5 % — HIGH (ref 43–77)
NRBC # BLD: 0 /100 WBCS — SIGNIFICANT CHANGE UP
NRBC # FLD: 0 K/UL — SIGNIFICANT CHANGE UP
PHOSPHATE SERPL-MCNC: 2.3 MG/DL — LOW (ref 2.5–4.5)
PLATELET # BLD AUTO: 232 K/UL — SIGNIFICANT CHANGE UP (ref 150–400)
POTASSIUM SERPL-MCNC: 4.7 MMOL/L — SIGNIFICANT CHANGE UP (ref 3.5–5.3)
POTASSIUM SERPL-SCNC: 4.7 MMOL/L — SIGNIFICANT CHANGE UP (ref 3.5–5.3)
PROCALCITONIN SERPL-MCNC: 0.1 NG/ML — SIGNIFICANT CHANGE UP (ref 0.02–0.1)
PROT SERPL-MCNC: 7 G/DL — SIGNIFICANT CHANGE UP (ref 6–8.3)
RBC # BLD: 3.34 M/UL — LOW (ref 4.2–5.8)
RBC # FLD: 13.2 % — SIGNIFICANT CHANGE UP (ref 10.3–14.5)
SODIUM SERPL-SCNC: 137 MMOL/L — SIGNIFICANT CHANGE UP (ref 135–145)
WBC # BLD: 12.75 K/UL — HIGH (ref 3.8–10.5)
WBC # FLD AUTO: 12.75 K/UL — HIGH (ref 3.8–10.5)

## 2021-01-18 PROCEDURE — 99232 SBSQ HOSP IP/OBS MODERATE 35: CPT

## 2021-01-18 PROCEDURE — 71045 X-RAY EXAM CHEST 1 VIEW: CPT | Mod: 26

## 2021-01-18 RX ORDER — METOPROLOL TARTRATE 50 MG
37.5 TABLET ORAL
Refills: 0 | Status: DISCONTINUED | OUTPATIENT
Start: 2021-01-18 | End: 2021-01-21

## 2021-01-18 RX ORDER — SODIUM,POTASSIUM PHOSPHATES 278-250MG
1 POWDER IN PACKET (EA) ORAL
Refills: 0 | Status: COMPLETED | OUTPATIENT
Start: 2021-01-18 | End: 2021-01-18

## 2021-01-18 RX ADMIN — Medication 6 MILLIGRAM(S): at 05:25

## 2021-01-18 RX ADMIN — Medication 1 PACKET(S): at 10:23

## 2021-01-18 RX ADMIN — Medication 25 MILLIGRAM(S): at 05:24

## 2021-01-18 RX ADMIN — LAMOTRIGINE 100 MILLIGRAM(S): 25 TABLET, ORALLY DISINTEGRATING ORAL at 05:24

## 2021-01-18 RX ADMIN — MEGESTROL ACETATE 400 MILLIGRAM(S): 40 SUSPENSION ORAL at 13:11

## 2021-01-18 RX ADMIN — Medication 37.5 MILLIGRAM(S): at 17:43

## 2021-01-18 RX ADMIN — ALBUTEROL 2 PUFF(S): 90 AEROSOL, METERED ORAL at 22:34

## 2021-01-18 RX ADMIN — LAMOTRIGINE 100 MILLIGRAM(S): 25 TABLET, ORALLY DISINTEGRATING ORAL at 17:43

## 2021-01-18 RX ADMIN — APIXABAN 5 MILLIGRAM(S): 2.5 TABLET, FILM COATED ORAL at 17:43

## 2021-01-18 RX ADMIN — APIXABAN 5 MILLIGRAM(S): 2.5 TABLET, FILM COATED ORAL at 05:24

## 2021-01-18 RX ADMIN — Medication 1 PUFF(S): at 22:35

## 2021-01-18 RX ADMIN — Medication 1 PACKET(S): at 17:49

## 2021-01-18 RX ADMIN — Medication 200 MILLIGRAM(S): at 22:30

## 2021-01-18 RX ADMIN — Medication 400 MILLIGRAM(S): at 10:24

## 2021-01-18 RX ADMIN — POLYETHYLENE GLYCOL 3350 17 GRAM(S): 17 POWDER, FOR SOLUTION ORAL at 13:11

## 2021-01-18 RX ADMIN — Medication 1 PACKET(S): at 13:11

## 2021-01-18 NOTE — PROGRESS NOTE ADULT - SUBJECTIVE AND OBJECTIVE BOX
SUBJECTIVE / OVERNIGHT EVENTS:    Case reviewed with his daughter via the phone today. Patient's appetite remains suboptimal. No chest pain noted. Still with some shortness of breath. Patient's HR does go to 110-120 range when he tries to eat/move around. PT note from 1/16 reviewed stating home PT upon discharge recommended.    REVIEW OF SYSTEMS:  CONSTITUTIONAL: Has generalized weakness but no fevers or chills  EYES/ENT: No visual changes;  No vertigo or throat pain   NECK: No pain or stiffness  RESPIRATORY: Still with some shortness of breath. No hemoptysis  CARDIOVASCULAR: No chest pain or palpitations  GASTROINTESTINAL: No abdominal or epigastric pain. Poor appetite. No blood per rectum  GENITOURINARY: No dysuria, frequency or hematuria  NEUROLOGICAL: No numbness or weakness  SKIN: No itching, rashes      MEDICATIONS  (STANDING):  apixaban 5 milliGRAM(s) Oral two times a day  carBAMazepine 400 milliGRAM(s) Oral <User Schedule>  carBAMazepine 200 milliGRAM(s) Oral at bedtime  lamoTRIgine 100 milliGRAM(s) Oral two times a day  megestrol Suspension 400 milliGRAM(s) Oral daily  metoprolol tartrate 37.5 milliGRAM(s) Oral two times a day  polyethylene glycol 3350 17 Gram(s) Oral daily  potassium phosphate / sodium phosphate Powder (PHOS-NaK) 1 Packet(s) Oral three times a day with meals  senna 2 Tablet(s) Oral at bedtime    MEDICATIONS  (PRN):  ALBUTerol    90 MICROgram(s) HFA Inhaler 2 Puff(s) Inhalation every 6 hours PRN Bronchospasm  bisacodyl 5 milliGRAM(s) Oral every 12 hours PRN Constipation  ipratropium 17 MICROgram(s) HFA Inhaler 1 Puff(s) Inhalation every 6 hours PRN sob  ondansetron Injectable 4 milliGRAM(s) IV Push every 8 hours PRN Nausea and/or Vomiting  sodium chloride 0.65% Nasal 1 Spray(s) Both Nostrils two times a day PRN Nasal Congestion      CAPILLARY BLOOD GLUCOSE        I&O's Summary      PHYSICAL EXAM:  Vital Signs Last 24 Hrs  T(C): 36.7 (18 Jan 2021 10:07), Max: 37 (18 Jan 2021 05:19)  T(F): 98 (18 Jan 2021 10:07), Max: 98.6 (18 Jan 2021 05:19)  HR: 114 (18 Jan 2021 10:07) (80 - 114)  BP: 103/60 (18 Jan 2021 10:07) (103/60 - 117/70)  BP(mean): --  RR: 20 (18 Jan 2021 10:07) (20 - 22)  SpO2: 96% (18 Jan 2021 10:07) (94% - 96%)  CONSTITUTIONAL: frail appearing male  EYES: PERRLA; conjunctiva and sclera clear  ENMT: no pharyngeal injection or exudates  NECK: Supple, no palpable masses; no thyromegaly  RESPIRATORY: No accessory muscle use; coarse bilateral breath sounds  CARDIOVASCULAR: Regular rate and rhythm, normal S1 and S2, no murmur/rub/gallop; No lower extremity edema; Peripheral pulses are 2+ bilaterally  ABDOMEN: Nontender to palpation, normoactive bowel sounds, no rebound/guarding; No hepatosplenomegaly  MUSCULOSKELETAL:  no clubbing or cyanosis of digits; no joint swelling or tenderness to palpation  PSYCH: A+O to person, place, and time; affect appropriate  NEUROLOGY: CN 2-12 are intact and symmetric; no gross motor or  sensory deficits   SKIN: chest wall mass right upper chest noted.    LABS:                        10.0   12.75 )-----------( 232      ( 18 Jan 2021 07:25 )             31.4     01-18    137  |  97<L>  |  29<H>  ----------------------------<  95  4.7   |  29  |  1.15    Ca    9.4      18 Jan 2021 07:25  Phos  2.3     01-18  Mg     2.4     01-18    TPro  7.0  /  Alb  3.5  /  TBili  0.9  /  DBili  x   /  AST  39  /  ALT  41  /  AlkPhos  125<H>  01-18                RADIOLOGY & ADDITIONAL TESTS:  Results Reviewed:   Imaging Personally Reviewed:  Electrocardiogram Personally Reviewed:    COORDINATION OF CARE:  Care Discussed with Consultants/Other Providers [Y/N]:  Prior or Outpatient Records Reviewed [Y/N]:

## 2021-01-18 NOTE — PROGRESS NOTE ADULT - PROBLEM SELECTOR PLAN 4
-patient's rate noted to increase to 110-120 range today when trying to eat  -can up his Metoprolol to 37.5 mg BID from 25 mg po BID for better rate control  -continue Apixaban

## 2021-01-18 NOTE — PROGRESS NOTE ADULT - PROBLEM SELECTOR PLAN 3
-patient will need outpatient oncology follow-up  -daughter and patient report concern regarding lack of appetite and poor nutritional status. Daughter amenable and would like to have an appetite stimulant to try to see if that helps. Megace was ordered on 1/17

## 2021-01-18 NOTE — PROGRESS NOTE ADULT - PROBLEM SELECTOR PLAN 1
-patient is currently saturating at 96% on 4L oxygen, which is near his baseline oxygen needs (normally on 3L at home)  -completed course of Remdesivir on the 17th. AST/ALT remained stable during treatment course  -PT recommended home PT when ready for discharge  -dexamethasone was started on 1/13. Can continue.  -on Apixaban for atrial fibrillation which also covers DVT prophylaxis.  -d-dimer levels have remained stable

## 2021-01-19 DIAGNOSIS — Z02.9 ENCOUNTER FOR ADMINISTRATIVE EXAMINATIONS, UNSPECIFIED: ICD-10-CM

## 2021-01-19 LAB
ALBUMIN SERPL ELPH-MCNC: 3.5 G/DL — SIGNIFICANT CHANGE UP (ref 3.3–5)
ALP SERPL-CCNC: 124 U/L — HIGH (ref 40–120)
ALT FLD-CCNC: 37 U/L — SIGNIFICANT CHANGE UP (ref 4–41)
ANION GAP SERPL CALC-SCNC: 9 MMOL/L — SIGNIFICANT CHANGE UP (ref 7–14)
AST SERPL-CCNC: 35 U/L — SIGNIFICANT CHANGE UP (ref 4–40)
BILIRUB SERPL-MCNC: 0.9 MG/DL — SIGNIFICANT CHANGE UP (ref 0.2–1.2)
BUN SERPL-MCNC: 29 MG/DL — HIGH (ref 7–23)
CALCIUM SERPL-MCNC: 9.4 MG/DL — SIGNIFICANT CHANGE UP (ref 8.4–10.5)
CHLORIDE SERPL-SCNC: 96 MMOL/L — LOW (ref 98–107)
CO2 SERPL-SCNC: 32 MMOL/L — HIGH (ref 22–31)
CREAT SERPL-MCNC: 1.14 MG/DL — SIGNIFICANT CHANGE UP (ref 0.5–1.3)
GLUCOSE SERPL-MCNC: 99 MG/DL — SIGNIFICANT CHANGE UP (ref 70–99)
HCT VFR BLD CALC: 30.8 % — LOW (ref 39–50)
HGB BLD-MCNC: 10.1 G/DL — LOW (ref 13–17)
MAGNESIUM SERPL-MCNC: 2.4 MG/DL — SIGNIFICANT CHANGE UP (ref 1.6–2.6)
MCHC RBC-ENTMCNC: 30.1 PG — SIGNIFICANT CHANGE UP (ref 27–34)
MCHC RBC-ENTMCNC: 32.8 GM/DL — SIGNIFICANT CHANGE UP (ref 32–36)
MCV RBC AUTO: 91.9 FL — SIGNIFICANT CHANGE UP (ref 80–100)
NRBC # BLD: 0 /100 WBCS — SIGNIFICANT CHANGE UP
NRBC # FLD: 0 K/UL — SIGNIFICANT CHANGE UP
PHOSPHATE SERPL-MCNC: 2.2 MG/DL — LOW (ref 2.5–4.5)
PLATELET # BLD AUTO: 227 K/UL — SIGNIFICANT CHANGE UP (ref 150–400)
POTASSIUM SERPL-MCNC: 4.3 MMOL/L — SIGNIFICANT CHANGE UP (ref 3.5–5.3)
POTASSIUM SERPL-SCNC: 4.3 MMOL/L — SIGNIFICANT CHANGE UP (ref 3.5–5.3)
PROT SERPL-MCNC: 7.1 G/DL — SIGNIFICANT CHANGE UP (ref 6–8.3)
RBC # BLD: 3.35 M/UL — LOW (ref 4.2–5.8)
RBC # FLD: 13.4 % — SIGNIFICANT CHANGE UP (ref 10.3–14.5)
SODIUM SERPL-SCNC: 137 MMOL/L — SIGNIFICANT CHANGE UP (ref 135–145)
WBC # BLD: 11.34 K/UL — HIGH (ref 3.8–10.5)
WBC # FLD AUTO: 11.34 K/UL — HIGH (ref 3.8–10.5)

## 2021-01-19 PROCEDURE — 99232 SBSQ HOSP IP/OBS MODERATE 35: CPT | Mod: CS

## 2021-01-19 RX ORDER — SODIUM,POTASSIUM PHOSPHATES 278-250MG
1 POWDER IN PACKET (EA) ORAL
Refills: 0 | Status: COMPLETED | OUTPATIENT
Start: 2021-01-19 | End: 2021-01-19

## 2021-01-19 RX ADMIN — MEGESTROL ACETATE 400 MILLIGRAM(S): 40 SUSPENSION ORAL at 15:50

## 2021-01-19 RX ADMIN — APIXABAN 5 MILLIGRAM(S): 2.5 TABLET, FILM COATED ORAL at 07:54

## 2021-01-19 RX ADMIN — Medication 37.5 MILLIGRAM(S): at 17:02

## 2021-01-19 RX ADMIN — LAMOTRIGINE 100 MILLIGRAM(S): 25 TABLET, ORALLY DISINTEGRATING ORAL at 17:03

## 2021-01-19 RX ADMIN — APIXABAN 5 MILLIGRAM(S): 2.5 TABLET, FILM COATED ORAL at 17:08

## 2021-01-19 RX ADMIN — POLYETHYLENE GLYCOL 3350 17 GRAM(S): 17 POWDER, FOR SOLUTION ORAL at 13:11

## 2021-01-19 RX ADMIN — Medication 1 PACKET(S): at 17:02

## 2021-01-19 RX ADMIN — LAMOTRIGINE 100 MILLIGRAM(S): 25 TABLET, ORALLY DISINTEGRATING ORAL at 07:54

## 2021-01-19 RX ADMIN — Medication 400 MILLIGRAM(S): at 10:23

## 2021-01-19 RX ADMIN — Medication 1 PACKET(S): at 13:10

## 2021-01-19 RX ADMIN — Medication 1 PACKET(S): at 10:24

## 2021-01-19 RX ADMIN — Medication 200 MILLIGRAM(S): at 22:48

## 2021-01-19 RX ADMIN — SENNA PLUS 2 TABLET(S): 8.6 TABLET ORAL at 22:48

## 2021-01-19 RX ADMIN — ALBUTEROL 2 PUFF(S): 90 AEROSOL, METERED ORAL at 17:01

## 2021-01-19 NOTE — PROGRESS NOTE ADULT - PROBLEM SELECTOR PLAN 1
-supportive care  -maintain oxygenation saturation  -monitor fever curve  -continue airborne/contact isolation  -s/p dexamethasone/remdesevir    -monitor pulse ox  -contact/airborne precautions   -wean O2 as tolerated  -trend inflammatory markers  -s/p 5 days of remdesevir/dexamethasone and stop - he is on 3L O2 at home at baseline

## 2021-01-19 NOTE — PROGRESS NOTE ADULT - PROBLEM SELECTOR PLAN 5
Called his daugher and discussed about DC planning.She thinks he is not eating and weak,.Explained to her that it will take time to recuperate and that is not the reason to stay at the hospital. Daughter states all the family members are sick due to COVID.Explained to her that he may need to go rehab.   She requested if we can do CT chest to evaluated for mass next to sec rib.  CT chest ordered

## 2021-01-19 NOTE — PROGRESS NOTE ADULT - GASTROINTESTINAL DETAILS
soft/nontender/no distention/no rebound tenderness/no guarding/no rigidity
soft/nontender/no distention/no guarding/no rigidity

## 2021-01-19 NOTE — PROGRESS NOTE ADULT - PROBLEM SELECTOR PLAN 1
Pt on 3 L which is at baseline  -completed course of Remdesivir on the 17th. AST/ALT remained stable during treatment course  -PT recommended home PT when ready for discharge  -dexamethasone was started on 1/13. Can continue.  -on Apixaban for atrial fibrillation which also covers DVT prophylaxis.  -d-dimer levels have remained stable

## 2021-01-19 NOTE — PROGRESS NOTE ADULT - SUBJECTIVE AND OBJECTIVE BOX
QUIN SULLIVAN 75y [id number]    Patient is a 75y old  Male who presents with a chief complaint of PNA (18 Jan 2021 14:59)      Follow Up/CC:  ID following for COVID    Interval History/ROS: stable, on 3L NC    Allergies    No Known Allergies    Intolerances        ANTIMICROBIALS:      MEDICATIONS  (STANDING):  apixaban 5 milliGRAM(s) Oral two times a day  carBAMazepine 400 milliGRAM(s) Oral <User Schedule>  carBAMazepine 200 milliGRAM(s) Oral at bedtime  lamoTRIgine 100 milliGRAM(s) Oral two times a day  megestrol Suspension 400 milliGRAM(s) Oral daily  metoprolol tartrate 37.5 milliGRAM(s) Oral two times a day  polyethylene glycol 3350 17 Gram(s) Oral daily  potassium phosphate / sodium phosphate Powder (PHOS-NaK) 1 Packet(s) Oral three times a day with meals  senna 2 Tablet(s) Oral at bedtime    MEDICATIONS  (PRN):  ALBUTerol    90 MICROgram(s) HFA Inhaler 2 Puff(s) Inhalation every 6 hours PRN Bronchospasm  bisacodyl 5 milliGRAM(s) Oral every 12 hours PRN Constipation  ipratropium 17 MICROgram(s) HFA Inhaler 1 Puff(s) Inhalation every 6 hours PRN sob  ondansetron Injectable 4 milliGRAM(s) IV Push every 8 hours PRN Nausea and/or Vomiting  sodium chloride 0.65% Nasal 1 Spray(s) Both Nostrils two times a day PRN Nasal Congestion        Vital Signs Last 24 Hrs  T(C): 36.9 (19 Jan 2021 10:08), Max: 37.1 (19 Jan 2021 06:00)  T(F): 98.4 (19 Jan 2021 10:08), Max: 98.7 (19 Jan 2021 06:00)  HR: 101 (19 Jan 2021 10:08) (80 - 101)  BP: 101/50 (19 Jan 2021 10:08) (99/82 - 129/61)  BP(mean): --  RR: 18 (19 Jan 2021 10:08) (18 - 18)  SpO2: 97% (19 Jan 2021 10:08) (96% - 97%)    CBC Full  -  ( 19 Jan 2021 07:10 )  WBC Count : 11.34 K/uL  RBC Count : 3.35 M/uL  Hemoglobin : 10.1 g/dL  Hematocrit : 30.8 %  Platelet Count - Automated : 227 K/uL  Mean Cell Volume : 91.9 fL  Mean Cell Hemoglobin : 30.1 pg  Mean Cell Hemoglobin Concentration : 32.8 gm/dL  Auto Neutrophil # : x  Auto Lymphocyte # : x  Auto Monocyte # : x  Auto Eosinophil # : x  Auto Basophil # : x  Auto Neutrophil % : x  Auto Lymphocyte % : x  Auto Monocyte % : x  Auto Eosinophil % : x  Auto Basophil % : x    01-19    137  |  96<L>  |  29<H>  ----------------------------<  99  4.3   |  32<H>  |  1.14    Ca    9.4      19 Jan 2021 07:32  Phos  2.2     01-19  Mg     2.4     01-19    TPro  7.1  /  Alb  3.5  /  TBili  0.9  /  DBili  x   /  AST  35  /  ALT  37  /  AlkPhos  124<H>  01-19    LIVER FUNCTIONS - ( 19 Jan 2021 07:32 )  Alb: 3.5 g/dL / Pro: 7.1 g/dL / ALK PHOS: 124 U/L / ALT: 37 U/L / AST: 35 U/L / GGT: x           Procalcitonin, Serum: 0.10 (01-18)  Procalcitonin, Serum: 0.16 (01-15)  Procalcitonin, Serum: 0.09 (01-13)    C-Reactive Protein, Serum: 162.4 (01-18)  C-Reactive Protein, Serum: 92.9 (01-15)  C-Reactive Protein, Serum: 113.6 (01-13)    Ferritin, Serum: 1131 (01-18)  Ferritin, Serum: 1507 (01-15)  Ferritin, Serum: 2527 (01-13)    LD (LDH): 203 (01-13)    D-Dimer Assay, Quantitative: 366 (01-18)  D-Dimer Assay, Quantitative: 373 (01-15)  D-Dimer Assay, Quantitative: 319 (01-14)        MICROBIOLOGY:  .Blood Blood-Venous  01-12-21   No Growth Final  --  --      .Blood Blood-Peripheral  01-12-21   No Growth Final  --  --      Rapid RVP Result: Detected (01-12 @ 17:46)        RADIOLOGY    < from: Xray Chest 1 View- PORTABLE-Routine (Xray Chest 1 View- PORTABLE-Routine .) (01.18.21 @ 14:16) >  IMPRESSION:  Status post right pneumonectomy with right-sided volume loss diffuse opacity, and calcified pleura again noted.    Small indeterminate patchy left upper lung opacity which could be secondary to atelectasis or infection.    Left basilar and retrocardiac opacity with obscuration of the left hemidiaphragm which may be due to a left pleural effusion with passive atelectasis, atelectasis of other cause, and/or pneumonia.    < end of copied text >

## 2021-01-19 NOTE — PROGRESS NOTE ADULT - SUBJECTIVE AND OBJECTIVE BOX
Patient is a 75y old  Male who presents with a chief complaint of PNA (19 Jan 2021 11:24)      SUBJECTIVE / OVERNIGHT EVENTS:  No new complaints. Pt on 3 L oxygen which is at baseline.  no fever/  ADDITIONAL REVIEW OF SYSTEMS:    MEDICATIONS  (STANDING):  apixaban 5 milliGRAM(s) Oral two times a day  carBAMazepine 200 milliGRAM(s) Oral at bedtime  carBAMazepine 400 milliGRAM(s) Oral <User Schedule>  lamoTRIgine 100 milliGRAM(s) Oral two times a day  megestrol Suspension 400 milliGRAM(s) Oral daily  metoprolol tartrate 37.5 milliGRAM(s) Oral two times a day  polyethylene glycol 3350 17 Gram(s) Oral daily  potassium phosphate / sodium phosphate Powder (PHOS-NaK) 1 Packet(s) Oral three times a day with meals  senna 2 Tablet(s) Oral at bedtime    MEDICATIONS  (PRN):  ALBUTerol    90 MICROgram(s) HFA Inhaler 2 Puff(s) Inhalation every 6 hours PRN Bronchospasm  bisacodyl 5 milliGRAM(s) Oral every 12 hours PRN Constipation  ipratropium 17 MICROgram(s) HFA Inhaler 1 Puff(s) Inhalation every 6 hours PRN sob  ondansetron Injectable 4 milliGRAM(s) IV Push every 8 hours PRN Nausea and/or Vomiting  sodium chloride 0.65% Nasal 1 Spray(s) Both Nostrils two times a day PRN Nasal Congestion      CAPILLARY BLOOD GLUCOSE        I&O's Summary    18 Jan 2021 07:01  -  19 Jan 2021 07:00  --------------------------------------------------------  IN: 0 mL / OUT: 600 mL / NET: -600 mL        PHYSICAL EXAM:  Vital Signs Last 24 Hrs  T(C): 36.9 (19 Jan 2021 10:08), Max: 37.1 (19 Jan 2021 06:00)  T(F): 98.4 (19 Jan 2021 10:08), Max: 98.7 (19 Jan 2021 06:00)  HR: 101 (19 Jan 2021 10:08) (80 - 101)  BP: 101/50 (19 Jan 2021 10:08) (99/82 - 129/61)  BP(mean): --  RR: 18 (19 Jan 2021 10:08) (18 - 18)  SpO2: 97% (19 Jan 2021 10:08) (96% - 97%)  CONSTITUTIONAL: NAD, well-developed  RESPIRATORY: Normal respiratory effort; lungs are clear to auscultation bilaterally  CARDIOVASCULAR: Regular rate and rhythm, normal S1 and S2, No lower extremity edema  ABDOMEN: Nontender to palpation, normoactive bowel sounds  MUSCLOSKELETAL: no clubbing or cyanosis of digits  PSYCH: A+O to person, place, and time; affect appropriate    LABS:                        10.1   11.34 )-----------( 227      ( 19 Jan 2021 07:10 )             30.8     01-19    137  |  96<L>  |  29<H>  ----------------------------<  99  4.3   |  32<H>  |  1.14    Ca    9.4      19 Jan 2021 07:32  Phos  2.2     01-19  Mg     2.4     01-19    TPro  7.1  /  Alb  3.5  /  TBili  0.9  /  DBili  x   /  AST  35  /  ALT  37  /  AlkPhos  124<H>  01-19                RADIOLOGY & ADDITIONAL TESTS:  Results Reviewed:   Imaging Personally Reviewed:  Electrocardiogram Personally Reviewed:    COORDINATION OF CARE:  Care Discussed with Consultants/Other Providers [Y/N]:  Prior or Outpatient Records Reviewed [Y/N]:

## 2021-01-20 LAB
ALBUMIN SERPL ELPH-MCNC: 3.1 G/DL — LOW (ref 3.3–5)
ALP SERPL-CCNC: 122 U/L — HIGH (ref 40–120)
ALT FLD-CCNC: 32 U/L — SIGNIFICANT CHANGE UP (ref 4–41)
ANION GAP SERPL CALC-SCNC: 9 MMOL/L — SIGNIFICANT CHANGE UP (ref 7–14)
AST SERPL-CCNC: 28 U/L — SIGNIFICANT CHANGE UP (ref 4–40)
BILIRUB SERPL-MCNC: 1 MG/DL — SIGNIFICANT CHANGE UP (ref 0.2–1.2)
BUN SERPL-MCNC: 27 MG/DL — HIGH (ref 7–23)
CALCIUM SERPL-MCNC: 9.2 MG/DL — SIGNIFICANT CHANGE UP (ref 8.4–10.5)
CHLORIDE SERPL-SCNC: 96 MMOL/L — LOW (ref 98–107)
CO2 SERPL-SCNC: 31 MMOL/L — SIGNIFICANT CHANGE UP (ref 22–31)
CREAT SERPL-MCNC: 1.21 MG/DL — SIGNIFICANT CHANGE UP (ref 0.5–1.3)
GLUCOSE SERPL-MCNC: 96 MG/DL — SIGNIFICANT CHANGE UP (ref 70–99)
HCT VFR BLD CALC: 29.3 % — LOW (ref 39–50)
HGB BLD-MCNC: 9.4 G/DL — LOW (ref 13–17)
MAGNESIUM SERPL-MCNC: 2.4 MG/DL — SIGNIFICANT CHANGE UP (ref 1.6–2.6)
MCHC RBC-ENTMCNC: 30.1 PG — SIGNIFICANT CHANGE UP (ref 27–34)
MCHC RBC-ENTMCNC: 32.1 GM/DL — SIGNIFICANT CHANGE UP (ref 32–36)
MCV RBC AUTO: 93.9 FL — SIGNIFICANT CHANGE UP (ref 80–100)
NRBC # BLD: 0 /100 WBCS — SIGNIFICANT CHANGE UP
NRBC # FLD: 0 K/UL — SIGNIFICANT CHANGE UP
PHOSPHATE SERPL-MCNC: 3.1 MG/DL — SIGNIFICANT CHANGE UP (ref 2.5–4.5)
PLATELET # BLD AUTO: 212 K/UL — SIGNIFICANT CHANGE UP (ref 150–400)
POTASSIUM SERPL-MCNC: 4.2 MMOL/L — SIGNIFICANT CHANGE UP (ref 3.5–5.3)
POTASSIUM SERPL-SCNC: 4.2 MMOL/L — SIGNIFICANT CHANGE UP (ref 3.5–5.3)
PROT SERPL-MCNC: 6.7 G/DL — SIGNIFICANT CHANGE UP (ref 6–8.3)
RBC # BLD: 3.12 M/UL — LOW (ref 4.2–5.8)
RBC # FLD: 13.9 % — SIGNIFICANT CHANGE UP (ref 10.3–14.5)
SODIUM SERPL-SCNC: 136 MMOL/L — SIGNIFICANT CHANGE UP (ref 135–145)
WBC # BLD: 10.52 K/UL — HIGH (ref 3.8–10.5)
WBC # FLD AUTO: 10.52 K/UL — HIGH (ref 3.8–10.5)

## 2021-01-20 PROCEDURE — 99232 SBSQ HOSP IP/OBS MODERATE 35: CPT | Mod: CS

## 2021-01-20 PROCEDURE — 71260 CT THORAX DX C+: CPT | Mod: 26

## 2021-01-20 RX ORDER — SENNA PLUS 8.6 MG/1
2 TABLET ORAL AT BEDTIME
Refills: 0 | Status: DISCONTINUED | OUTPATIENT
Start: 2021-01-20 | End: 2021-01-21

## 2021-01-20 RX ORDER — POLYETHYLENE GLYCOL 3350 17 G/17G
17 POWDER, FOR SOLUTION ORAL DAILY
Refills: 0 | Status: DISCONTINUED | OUTPATIENT
Start: 2021-01-20 | End: 2021-01-21

## 2021-01-20 RX ADMIN — APIXABAN 5 MILLIGRAM(S): 2.5 TABLET, FILM COATED ORAL at 17:44

## 2021-01-20 RX ADMIN — APIXABAN 5 MILLIGRAM(S): 2.5 TABLET, FILM COATED ORAL at 05:58

## 2021-01-20 RX ADMIN — Medication 37.5 MILLIGRAM(S): at 17:44

## 2021-01-20 RX ADMIN — LAMOTRIGINE 100 MILLIGRAM(S): 25 TABLET, ORALLY DISINTEGRATING ORAL at 05:58

## 2021-01-20 RX ADMIN — MEGESTROL ACETATE 400 MILLIGRAM(S): 40 SUSPENSION ORAL at 12:26

## 2021-01-20 RX ADMIN — Medication 200 MILLIGRAM(S): at 21:53

## 2021-01-20 RX ADMIN — Medication 400 MILLIGRAM(S): at 09:20

## 2021-01-20 RX ADMIN — POLYETHYLENE GLYCOL 3350 17 GRAM(S): 17 POWDER, FOR SOLUTION ORAL at 12:26

## 2021-01-20 RX ADMIN — LAMOTRIGINE 100 MILLIGRAM(S): 25 TABLET, ORALLY DISINTEGRATING ORAL at 17:44

## 2021-01-20 RX ADMIN — Medication 37.5 MILLIGRAM(S): at 05:59

## 2021-01-20 RX ADMIN — ALBUTEROL 2 PUFF(S): 90 AEROSOL, METERED ORAL at 21:53

## 2021-01-20 NOTE — PROGRESS NOTE ADULT - ATTENDING COMMENTS
Awaiting CT chest for eval of infraclavicular mass
Rajesh Nickerson  Attending Physician   Division of Infectious Disease  Pager #[phone]  Available on [employer] Teams also  After 5pm/weekend or no response, call #[phone]    Will sign off, recall ID if needed #[phone].
Rajesh Nickerson  Attending Physician   Division of Infectious Disease  Pager #[phone]  Available on [employer] Teams also  After 5pm/weekend or no response, call #[phone]    ID coverage available over [facility] 3-day holiday weekend if needed. Call #[phone] for questions/concerns.

## 2021-01-20 NOTE — PROGRESS NOTE ADULT - SUBJECTIVE AND OBJECTIVE BOX
Patient is a 75y old  Male who presents with a chief complaint of PNA (19 Jan 2021 16:58)      SUBJECTIVE / OVERNIGHT EVENTS:No new complaints.  Pt denies shortness of breath,     ADDITIONAL REVIEW OF SYSTEMS:    MEDICATIONS  (STANDING):  apixaban 5 milliGRAM(s) Oral two times a day  carBAMazepine 400 milliGRAM(s) Oral <User Schedule>  carBAMazepine 200 milliGRAM(s) Oral at bedtime  lamoTRIgine 100 milliGRAM(s) Oral two times a day  megestrol Suspension 400 milliGRAM(s) Oral daily  metoprolol tartrate 37.5 milliGRAM(s) Oral two times a day  polyethylene glycol 3350 17 Gram(s) Oral daily  senna 2 Tablet(s) Oral at bedtime    MEDICATIONS  (PRN):  ALBUTerol    90 MICROgram(s) HFA Inhaler 2 Puff(s) Inhalation every 6 hours PRN Bronchospasm  bisacodyl 5 milliGRAM(s) Oral every 12 hours PRN Constipation  ipratropium 17 MICROgram(s) HFA Inhaler 1 Puff(s) Inhalation every 6 hours PRN sob  ondansetron Injectable 4 milliGRAM(s) IV Push every 8 hours PRN Nausea and/or Vomiting  sodium chloride 0.65% Nasal 1 Spray(s) Both Nostrils two times a day PRN Nasal Congestion      CAPILLARY BLOOD GLUCOSE        I&O's Summary    19 Jan 2021 07:01  -  20 Jan 2021 07:00  --------------------------------------------------------  IN: 0 mL / OUT: 400 mL / NET: -400 mL        PHYSICAL EXAM:  Vital Signs Last 24 Hrs  T(C): 36.8 (20 Jan 2021 12:30), Max: 36.8 (20 Jan 2021 12:30)  T(F): 98.2 (20 Jan 2021 12:30), Max: 98.2 (20 Jan 2021 12:30)  HR: 83 (20 Jan 2021 12:30) (80 - 91)  BP: 117/61 (20 Jan 2021 12:30) (108/50 - 117/61)  BP(mean): --  RR: 18 (20 Jan 2021 12:30) (18 - 18)  SpO2: 100% (20 Jan 2021 12:30) (97% - 100%)  CONSTITUTIONAL: NAD, well-developed  chest- lump on lt chest,infraclavicular area  RESPIRATORY: Normal respiratory effort; lungs are clear to auscultation bilaterally  CARDIOVASCULAR: Regular rate and rhythm, normal S1 and S2, No lower extremity edema  ABDOMEN: Nontender to palpation, normoactive bowel sounds  MUSCLOSKELETAL: no clubbing or cyanosis of digits  PSYCH: A+O to person, place, and time; affect appropriate    LABS:                        9.4    10.52 )-----------( 212      ( 20 Jan 2021 07:28 )             29.3     01-20    136  |  96<L>  |  27<H>  ----------------------------<  96  4.2   |  31  |  1.21    Ca    9.2      20 Jan 2021 07:28  Phos  3.1     01-20  Mg     2.4     01-20    TPro  6.7  /  Alb  3.1<L>  /  TBili  1.0  /  DBili  x   /  AST  28  /  ALT  32  /  AlkPhos  122<H>  01-20                RADIOLOGY & ADDITIONAL TESTS:  Results Reviewed:   Imaging Personally Reviewed:  Electrocardiogram Personally Reviewed:    COORDINATION OF CARE:  Care Discussed with Consultants/Other Providers [Y/N]:  Prior or Outpatient Records Reviewed [Y/N]:

## 2021-01-21 ENCOUNTER — TRANSCRIPTION ENCOUNTER (OUTPATIENT)
Age: 76
End: 2021-01-21

## 2021-01-21 VITALS
OXYGEN SATURATION: 99 % | DIASTOLIC BLOOD PRESSURE: 62 MMHG | HEART RATE: 102 BPM | SYSTOLIC BLOOD PRESSURE: 120 MMHG | RESPIRATION RATE: 19 BRPM

## 2021-01-21 LAB
ALBUMIN SERPL ELPH-MCNC: 3 G/DL — LOW (ref 3.3–5)
ALP SERPL-CCNC: 120 U/L — SIGNIFICANT CHANGE UP (ref 40–120)
ALT FLD-CCNC: 27 U/L — SIGNIFICANT CHANGE UP (ref 4–41)
ANION GAP SERPL CALC-SCNC: 11 MMOL/L — SIGNIFICANT CHANGE UP (ref 7–14)
AST SERPL-CCNC: 26 U/L — SIGNIFICANT CHANGE UP (ref 4–40)
BILIRUB SERPL-MCNC: 0.8 MG/DL — SIGNIFICANT CHANGE UP (ref 0.2–1.2)
BUN SERPL-MCNC: 24 MG/DL — HIGH (ref 7–23)
CALCIUM SERPL-MCNC: 9.3 MG/DL — SIGNIFICANT CHANGE UP (ref 8.4–10.5)
CHLORIDE SERPL-SCNC: 95 MMOL/L — LOW (ref 98–107)
CO2 SERPL-SCNC: 29 MMOL/L — SIGNIFICANT CHANGE UP (ref 22–31)
CREAT SERPL-MCNC: 1.12 MG/DL — SIGNIFICANT CHANGE UP (ref 0.5–1.3)
CRP SERPL-MCNC: 147.3 MG/L — HIGH
D DIMER BLD IA.RAPID-MCNC: 437 NG/ML DDU — SIGNIFICANT CHANGE UP
D DIMER BLD IA.RAPID-MCNC: 446 NG/ML DDU — HIGH
FERRITIN SERPL-MCNC: 1111 NG/ML — HIGH (ref 30–400)
GLUCOSE SERPL-MCNC: 96 MG/DL — SIGNIFICANT CHANGE UP (ref 70–99)
HCT VFR BLD CALC: 29 % — LOW (ref 39–50)
HGB BLD-MCNC: 9.2 G/DL — LOW (ref 13–17)
LDH SERPL L TO P-CCNC: 169 U/L — SIGNIFICANT CHANGE UP (ref 135–225)
MAGNESIUM SERPL-MCNC: 2.3 MG/DL — SIGNIFICANT CHANGE UP (ref 1.6–2.6)
MCHC RBC-ENTMCNC: 29.9 PG — SIGNIFICANT CHANGE UP (ref 27–34)
MCHC RBC-ENTMCNC: 31.7 GM/DL — LOW (ref 32–36)
MCV RBC AUTO: 94.2 FL — SIGNIFICANT CHANGE UP (ref 80–100)
NRBC # BLD: 0 /100 WBCS — SIGNIFICANT CHANGE UP
NRBC # FLD: 0 K/UL — SIGNIFICANT CHANGE UP
PHOSPHATE SERPL-MCNC: 2.5 MG/DL — SIGNIFICANT CHANGE UP (ref 2.5–4.5)
PLATELET # BLD AUTO: 206 K/UL — SIGNIFICANT CHANGE UP (ref 150–400)
POTASSIUM SERPL-MCNC: 4.2 MMOL/L — SIGNIFICANT CHANGE UP (ref 3.5–5.3)
POTASSIUM SERPL-SCNC: 4.2 MMOL/L — SIGNIFICANT CHANGE UP (ref 3.5–5.3)
PROCALCITONIN SERPL-MCNC: 0.11 NG/ML — HIGH (ref 0.02–0.1)
PROT SERPL-MCNC: 6.4 G/DL — SIGNIFICANT CHANGE UP (ref 6–8.3)
RBC # BLD: 3.08 M/UL — LOW (ref 4.2–5.8)
RBC # FLD: 13.7 % — SIGNIFICANT CHANGE UP (ref 10.3–14.5)
SARS-COV-2 RNA SPEC QL NAA+PROBE: DETECTED
SODIUM SERPL-SCNC: 135 MMOL/L — SIGNIFICANT CHANGE UP (ref 135–145)
WBC # BLD: 9.68 K/UL — SIGNIFICANT CHANGE UP (ref 3.8–10.5)
WBC # FLD AUTO: 9.68 K/UL — SIGNIFICANT CHANGE UP (ref 3.8–10.5)

## 2021-01-21 PROCEDURE — 99239 HOSP IP/OBS DSCHRG MGMT >30: CPT | Mod: CS

## 2021-01-21 RX ORDER — METOPROLOL TARTRATE 50 MG
1 TABLET ORAL
Qty: 60 | Refills: 0
Start: 2021-01-21 | End: 2021-02-19

## 2021-01-21 RX ORDER — POLYETHYLENE GLYCOL 3350 17 G/17G
17 POWDER, FOR SOLUTION ORAL
Qty: 0 | Refills: 0 | DISCHARGE
Start: 2021-01-21

## 2021-01-21 RX ORDER — METOPROLOL TARTRATE 50 MG
1 TABLET ORAL
Qty: 0 | Refills: 0 | DISCHARGE

## 2021-01-21 RX ORDER — FUROSEMIDE 40 MG
1 TABLET ORAL
Qty: 0 | Refills: 0 | DISCHARGE

## 2021-01-21 RX ORDER — MEGESTROL ACETATE 40 MG/ML
10 SUSPENSION ORAL
Qty: 0 | Refills: 0 | DISCHARGE
Start: 2021-01-21

## 2021-01-21 RX ORDER — MEGESTROL ACETATE 40 MG/ML
10 SUSPENSION ORAL
Qty: 300 | Refills: 0
Start: 2021-01-21 | End: 2021-02-19

## 2021-01-21 RX ADMIN — APIXABAN 5 MILLIGRAM(S): 2.5 TABLET, FILM COATED ORAL at 05:48

## 2021-01-21 RX ADMIN — MEGESTROL ACETATE 400 MILLIGRAM(S): 40 SUSPENSION ORAL at 11:59

## 2021-01-21 RX ADMIN — APIXABAN 5 MILLIGRAM(S): 2.5 TABLET, FILM COATED ORAL at 18:06

## 2021-01-21 RX ADMIN — Medication 400 MILLIGRAM(S): at 09:35

## 2021-01-21 RX ADMIN — LAMOTRIGINE 100 MILLIGRAM(S): 25 TABLET, ORALLY DISINTEGRATING ORAL at 05:48

## 2021-01-21 RX ADMIN — Medication 37.5 MILLIGRAM(S): at 18:06

## 2021-01-21 RX ADMIN — LAMOTRIGINE 100 MILLIGRAM(S): 25 TABLET, ORALLY DISINTEGRATING ORAL at 18:06

## 2021-01-21 RX ADMIN — Medication 37.5 MILLIGRAM(S): at 05:48

## 2021-01-21 NOTE — PROGRESS NOTE ADULT - SUBJECTIVE AND OBJECTIVE BOX
Patient is a 75y old  Male who presents with a chief complaint of PNA (20 Jan 2021 17:18)      SUBJECTIVE / OVERNIGHT EVENTS:  Denies shortness of breath,pt states he is weak    ADDITIONAL REVIEW OF SYSTEMS:    MEDICATIONS  (STANDING):  apixaban 5 milliGRAM(s) Oral two times a day  carBAMazepine 400 milliGRAM(s) Oral <User Schedule>  carBAMazepine 200 milliGRAM(s) Oral at bedtime  lamoTRIgine 100 milliGRAM(s) Oral two times a day  megestrol Suspension 400 milliGRAM(s) Oral daily  metoprolol tartrate 37.5 milliGRAM(s) Oral two times a day    MEDICATIONS  (PRN):  ALBUTerol    90 MICROgram(s) HFA Inhaler 2 Puff(s) Inhalation every 6 hours PRN Bronchospasm  bisacodyl 5 milliGRAM(s) Oral every 12 hours PRN Constipation  ipratropium 17 MICROgram(s) HFA Inhaler 1 Puff(s) Inhalation every 6 hours PRN sob  ondansetron Injectable 4 milliGRAM(s) IV Push every 8 hours PRN Nausea and/or Vomiting  polyethylene glycol 3350 17 Gram(s) Oral daily PRN Constipation  senna 2 Tablet(s) Oral at bedtime PRN Constipation  sodium chloride 0.65% Nasal 1 Spray(s) Both Nostrils two times a day PRN Nasal Congestion      CAPILLARY BLOOD GLUCOSE        I&O's Summary    20 Jan 2021 07:01  -  21 Jan 2021 07:00  --------------------------------------------------------  IN: 320 mL / OUT: 600 mL / NET: -280 mL        PHYSICAL EXAM:  Vital Signs Last 24 Hrs  T(C): 36.9 (21 Jan 2021 11:57), Max: 37 (20 Jan 2021 22:11)  T(F): 98.5 (21 Jan 2021 11:57), Max: 98.6 (20 Jan 2021 22:11)  HR: 83 (21 Jan 2021 11:57) (82 - 109)  BP: 109/50 (21 Jan 2021 11:57) (109/50 - 125/59)  BP(mean): 73 (21 Jan 2021 05:47) (68 - 73)  RR: 18 (21 Jan 2021 11:57) (18 - 20)  SpO2: 96% (21 Jan 2021 11:57) (96% - 98%)  CONSTITUTIONAL: NAD, well-developed  chest- lump on rt infraclavicular area  RESPIRATORY: Normal respiratory effort; lungs are clear to auscultation bilaterally  CARDIOVASCULAR: Regular rate and rhythm, normal S1 and S2, No lower extremity edema  ABDOMEN: Nontender to palpation, normoactive bowel sounds  MUSCLOSKELETAL: no clubbing or cyanosis of digits  PSYCH: A+O to person, place, and time; affect appropriate    LABS:                        9.2    9.68  )-----------( 206      ( 21 Jan 2021 07:41 )             29.0     01-21    135  |  95<L>  |  24<H>  ----------------------------<  96  4.2   |  29  |  1.12    Ca    9.3      21 Jan 2021 07:41  Phos  2.5     01-21  Mg     2.3     01-21    TPro  6.4  /  Alb  3.0<L>  /  TBili  0.8  /  DBili  x   /  AST  26  /  ALT  27  /  AlkPhos  120  01-21                RADIOLOGY & ADDITIONAL TESTS:  Results Reviewed:   Imaging Personally Reviewed:  Electrocardiogram Personally Reviewed:    COORDINATION OF CARE:  Care Discussed with Consultants/Other Providers [Y/N]:  Prior or Outpatient Records Reviewed [Y/N]:

## 2021-01-21 NOTE — PROGRESS NOTE ADULT - ASSESSMENT
Patient is a 75 year old male with a PMHx of lung cancer status post a right pneumonectomy, chronic respiratory failure due to COPD on 3L of home oxygen, AVR, atrial fibrillation, seizure disorder, hypertension, and dyslipidemia who's here with an acute COVID pneumonia. Patient was admitted on 1/12/21.
Patient is a 75 year old male with a PMHx of lung cancer status post a right pneumonectomy, chronic respiratory failure due to COPD on 3L of home oxygen, AVR, atrial fibrillation, seizure disorder, hypertension, and dyslipidemia who's here with an acute COVID pneumonia. Patient was admitted on 1/12/21.
Patient is a 75 year old male with a PMHx of lung cancer status post a right pneumonectomy, chronic respiratory failure due to COPD on 3L of home oxygen, AVR, atrial fibrillation, seizure disorder, hypertension, and dyslipidemia who's here with an acute COVID pneumonia:
Patient is a 75 year old male with a PMHx of lung cancer status post a right pneumonectomy, chronic respiratory failure due to COPD on 3L of home oxygen, AVR, atrial fibrillation, seizure disorder, hypertension, and dyslipidemia who's here with an acute COVID pneumonia. Patient was admitted on 1/12/21.
Patient is a 75 year old male with a PMHx of lung cancer status post a right pneumonectomy, chronic respiratory failure due to COPD on 3L of home oxygen, AVR, atrial fibrillation, seizure disorder, hypertension, and dyslipidemia who's here with an acute COVID pneumonia. Patient was admitted on 1/12/21.
76yo M w/ PMHx of lung CA s/p R pneumonectomy (2003), COPD (on 3L home O2), AVR, afib on eliquis, seizure dx, HTN, HLD p/w worsening cough w/ white sputum production, generalized weakness, and fever. Admitted for suspected pna.
76yo M w/ PMHx of lung CA s/p R pneumonectomy (2003), COPD (on 3L home O2), AVR, afib on eliquis, seizure dx, HTN, HLD p/w worsening cough w/ white sputum production, generalized weakness, and fever. Admitted for suspected pna.
76yo M w/ PMHx of lung CA s/p R pneumonectomy (2003), COPD (on 3L home O2), AVR, afib on eliquis, seizure dx, HTN, HLD p/w worsening cough w/ white sputum production, generalized weakness, and fever with COVID pna, hypoxia 
Patient is a 75 year old male with a PMHx of lung cancer, COPD, atrial fibrillation, HTN here with COVID pneumonia:
76yo M w/ PMHx of lung CA s/p R pneumonectomy (2003), COPD (on 3L home O2), AVR, afib on eliquis, seizure dx, HTN, HLD p/w worsening cough w/ white sputum production, generalized weakness, and fever. Admitted for suspected pna.
76yo M w/ PMHx of lung CA s/p R pneumonectomy (2003), COPD (on 3L home O2), AVR, afib on eliquis, seizure dx, HTN, HLD p/w worsening cough w/ white sputum production, generalized weakness, and fever with COVID pna, hypoxia

## 2021-01-21 NOTE — PROGRESS NOTE ADULT - PROBLEM SELECTOR PROBLEM 2
R/O Lump in chest
Seizure disorder
ambulatory
Lung cancer
Seizure disorder
Medication monitoring encounter
Atrial fibrillation
Lung cancer
Medication monitoring encounter

## 2021-01-21 NOTE — DISCHARGE NOTE NURSING/CASE MANAGEMENT/SOCIAL WORK - PATIENT PORTAL LINK FT
You can access the FollowMyHealth Patient Portal offered by [facility] by registering at the following website: [email or web address]. By joining [facility]’s FollowMyHealth portal, you will also be able to view your health information using other applications (apps) compatible with our system.

## 2021-01-21 NOTE — PROGRESS NOTE ADULT - PROVIDER SPECIALTY LIST ADULT
Hospitalist
Infectious Disease
Infectious Disease

## 2021-01-21 NOTE — PROGRESS NOTE ADULT - PROBLEM SELECTOR PLAN 2
-s/p R pneumonectomy in 2003  -Last chemo/radiation in 2005, has had [id number] follow up until 2018 but CA stayed in remission  -In context of 12 lb weight loss, potential pathologic fracture, abx resistant COPD exacerbation, concern for recurrence. Decision from previous admission was for outpatient f/u with patient's oncologist Dr. Mcdaniel.  pain control as needed
can continue Tegretol and Lamictal.
Pt has lump in rt infraclavicular area which started spontaneously.one day he woke up with lump.CT chest done on dec -possible hematoma sec to sec rib fracture.  CT chest repeated-mass has increased in size.It was initially reported as possible hematoma on prelim reading and chaged to just mass in the final report.discussed with Radiologist who said its possible hematoma ,but cant be 100% sure.  Due to the nature of onset(suddenly overnight),sec rib fracture and his being on eliquis,its most likely hematoma due to sec rib fracture and being on elqius.  Explained to his daughter that she needs to repeat CT chest in 2 weeks.  Discuss with his cards to see if eliqius can be hold temporarily to allow resolution of hematoma.
-can continue Tegretol and Lamictal
-rate controlled  -continue Apixaban and Metoprolol
-s/p R pneumonectomy in 2003  -Last chemo/radiation in 2005, has had [id number] follow up until 2018 but CA stayed in remission  -In context of 12 lb weight loss, potential pathologic fracture, abx resistant COPD exacerbation, concern for recurrence. Decision from previous admission was for outpatient f/u with patient's oncologist Dr. Mcdaniel.  pain control as needed
-monitor LFTs and creatinine
-monitor LFTs and creatinine

## 2021-01-21 NOTE — PROGRESS NOTE ADULT - PROBLEM SELECTOR PROBLEM 1
Pneumonia due to COVID-19 virus
Pneumonia due to COVID-19 virus
Pneumonia
Pneumonia due to COVID-19 virus
Pneumonia
Pneumonia due to COVID-19 virus
Pneumonia due to COVID-19 virus
Pneumonia
Pneumonia due to COVID-19 virus

## 2021-01-22 ENCOUNTER — TRANSCRIPTION ENCOUNTER (OUTPATIENT)
Age: 76
End: 2021-01-22

## 2021-01-24 ENCOUNTER — TRANSCRIPTION ENCOUNTER (OUTPATIENT)
Age: 76
End: 2021-01-24

## 2021-01-25 ENCOUNTER — TRANSCRIPTION ENCOUNTER (OUTPATIENT)
Age: 76
End: 2021-01-25

## 2021-02-11 ENCOUNTER — INPATIENT (INPATIENT)
Facility: HOSPITAL | Age: 76
LOS: 12 days | Discharge: HOME HEALTH | End: 2021-02-24
Attending: STUDENT IN AN ORGANIZED HEALTH CARE EDUCATION/TRAINING PROGRAM | Admitting: STUDENT IN AN ORGANIZED HEALTH CARE EDUCATION/TRAINING PROGRAM
Payer: MEDICARE

## 2021-02-11 VITALS
RESPIRATION RATE: 18 BRPM | HEART RATE: 140 BPM | OXYGEN SATURATION: 98 % | SYSTOLIC BLOOD PRESSURE: 119 MMHG | DIASTOLIC BLOOD PRESSURE: 76 MMHG | TEMPERATURE: 98 F | HEIGHT: 72 IN

## 2021-02-11 DIAGNOSIS — Z98.89 OTHER SPECIFIED POSTPROCEDURAL STATES: Chronic | ICD-10-CM

## 2021-02-11 DIAGNOSIS — Z95.4 PRESENCE OF OTHER HEART-VALVE REPLACEMENT: Chronic | ICD-10-CM

## 2021-02-11 DIAGNOSIS — R22.2 LOCALIZED SWELLING, MASS AND LUMP, TRUNK: ICD-10-CM

## 2021-02-11 LAB
ALBUMIN SERPL ELPH-MCNC: 3.4 G/DL — SIGNIFICANT CHANGE UP (ref 3.3–5)
ALP SERPL-CCNC: 182 U/L — HIGH (ref 40–120)
ALT FLD-CCNC: 34 U/L — SIGNIFICANT CHANGE UP (ref 4–41)
ANION GAP SERPL CALC-SCNC: 11 MMOL/L — SIGNIFICANT CHANGE UP (ref 7–14)
APPEARANCE UR: CLEAR — SIGNIFICANT CHANGE UP
APTT BLD: 28.7 SEC — SIGNIFICANT CHANGE UP (ref 27–36.3)
AST SERPL-CCNC: 30 U/L — SIGNIFICANT CHANGE UP (ref 4–40)
BASOPHILS # BLD AUTO: 0.04 K/UL — SIGNIFICANT CHANGE UP (ref 0–0.2)
BASOPHILS NFR BLD AUTO: 0.3 % — SIGNIFICANT CHANGE UP (ref 0–2)
BILIRUB SERPL-MCNC: 0.6 MG/DL — SIGNIFICANT CHANGE UP (ref 0.2–1.2)
BILIRUB UR-MCNC: NEGATIVE — SIGNIFICANT CHANGE UP
BLD GP AB SCN SERPL QL: NEGATIVE — SIGNIFICANT CHANGE UP
BLOOD GAS VENOUS COMPREHENSIVE RESULT: SIGNIFICANT CHANGE UP
BUN SERPL-MCNC: 28 MG/DL — HIGH (ref 7–23)
CALCIUM SERPL-MCNC: 9.9 MG/DL — SIGNIFICANT CHANGE UP (ref 8.4–10.5)
CHLORIDE SERPL-SCNC: 92 MMOL/L — LOW (ref 98–107)
CO2 SERPL-SCNC: 30 MMOL/L — SIGNIFICANT CHANGE UP (ref 22–31)
COLOR SPEC: YELLOW — SIGNIFICANT CHANGE UP
CREAT SERPL-MCNC: 1.08 MG/DL — SIGNIFICANT CHANGE UP (ref 0.5–1.3)
DIFF PNL FLD: NEGATIVE — SIGNIFICANT CHANGE UP
EOSINOPHIL # BLD AUTO: 0.06 K/UL — SIGNIFICANT CHANGE UP (ref 0–0.5)
EOSINOPHIL NFR BLD AUTO: 0.4 % — SIGNIFICANT CHANGE UP (ref 0–6)
GLUCOSE SERPL-MCNC: 105 MG/DL — HIGH (ref 70–99)
GLUCOSE UR QL: NEGATIVE — SIGNIFICANT CHANGE UP
HCT VFR BLD CALC: 28.1 % — LOW (ref 39–50)
HGB BLD-MCNC: 8.7 G/DL — LOW (ref 13–17)
IANC: 11.98 K/UL — HIGH (ref 1.5–8.5)
IMM GRANULOCYTES NFR BLD AUTO: 0.7 % — SIGNIFICANT CHANGE UP (ref 0–1.5)
INR BLD: 2 RATIO — HIGH (ref 0.88–1.16)
KETONES UR-MCNC: NEGATIVE — SIGNIFICANT CHANGE UP
LEUKOCYTE ESTERASE UR-ACNC: ABNORMAL
LYMPHOCYTES # BLD AUTO: 0.63 K/UL — LOW (ref 1–3.3)
LYMPHOCYTES # BLD AUTO: 4.6 % — LOW (ref 13–44)
MCHC RBC-ENTMCNC: 29.2 PG — SIGNIFICANT CHANGE UP (ref 27–34)
MCHC RBC-ENTMCNC: 31 GM/DL — LOW (ref 32–36)
MCV RBC AUTO: 94.3 FL — SIGNIFICANT CHANGE UP (ref 80–100)
MONOCYTES # BLD AUTO: 0.74 K/UL — SIGNIFICANT CHANGE UP (ref 0–0.9)
MONOCYTES NFR BLD AUTO: 5.5 % — SIGNIFICANT CHANGE UP (ref 2–14)
NEUTROPHILS # BLD AUTO: 11.98 K/UL — HIGH (ref 1.8–7.4)
NEUTROPHILS NFR BLD AUTO: 88.5 % — HIGH (ref 43–77)
NITRITE UR-MCNC: NEGATIVE — SIGNIFICANT CHANGE UP
NRBC # BLD: 0 /100 WBCS — SIGNIFICANT CHANGE UP
NRBC # FLD: 0 K/UL — SIGNIFICANT CHANGE UP
PH UR: 6.5 — SIGNIFICANT CHANGE UP (ref 5–8)
PLATELET # BLD AUTO: 276 K/UL — SIGNIFICANT CHANGE UP (ref 150–400)
POTASSIUM SERPL-MCNC: 4.8 MMOL/L — SIGNIFICANT CHANGE UP (ref 3.5–5.3)
POTASSIUM SERPL-SCNC: 4.8 MMOL/L — SIGNIFICANT CHANGE UP (ref 3.5–5.3)
PROT SERPL-MCNC: 7.3 G/DL — SIGNIFICANT CHANGE UP (ref 6–8.3)
PROT UR-MCNC: ABNORMAL
PROTHROM AB SERPL-ACNC: 22.1 SEC — HIGH (ref 10.6–13.6)
RBC # BLD: 2.98 M/UL — LOW (ref 4.2–5.8)
RBC # FLD: 14.7 % — HIGH (ref 10.3–14.5)
RH IG SCN BLD-IMP: NEGATIVE — SIGNIFICANT CHANGE UP
SARS-COV-2 RNA SPEC QL NAA+PROBE: DETECTED
SODIUM SERPL-SCNC: 133 MMOL/L — LOW (ref 135–145)
SP GR SPEC: 1.03 — HIGH (ref 1.01–1.02)
TROPONIN T, HIGH SENSITIVITY RESULT: 26 NG/L — SIGNIFICANT CHANGE UP
UROBILINOGEN FLD QL: ABNORMAL
WBC # BLD: 13.55 K/UL — HIGH (ref 3.8–10.5)
WBC # FLD AUTO: 13.55 K/UL — HIGH (ref 3.8–10.5)

## 2021-02-11 PROCEDURE — 71045 X-RAY EXAM CHEST 1 VIEW: CPT | Mod: 26

## 2021-02-11 PROCEDURE — 99285 EMERGENCY DEPT VISIT HI MDM: CPT

## 2021-02-11 PROCEDURE — 71275 CT ANGIOGRAPHY CHEST: CPT | Mod: 26

## 2021-02-11 RX ORDER — ACETAMINOPHEN 500 MG
650 TABLET ORAL ONCE
Refills: 0 | Status: COMPLETED | OUTPATIENT
Start: 2021-02-11 | End: 2021-02-11

## 2021-02-11 RX ORDER — MORPHINE SULFATE 50 MG/1
2 CAPSULE, EXTENDED RELEASE ORAL ONCE
Refills: 0 | Status: DISCONTINUED | OUTPATIENT
Start: 2021-02-11 | End: 2021-02-11

## 2021-02-11 RX ORDER — SODIUM CHLORIDE 9 MG/ML
1000 INJECTION INTRAMUSCULAR; INTRAVENOUS; SUBCUTANEOUS ONCE
Refills: 0 | Status: COMPLETED | OUTPATIENT
Start: 2021-02-11 | End: 2021-02-11

## 2021-02-11 RX ORDER — METOPROLOL TARTRATE 50 MG
50 TABLET ORAL ONCE
Refills: 0 | Status: COMPLETED | OUTPATIENT
Start: 2021-02-11 | End: 2021-02-11

## 2021-02-11 RX ORDER — METOPROLOL TARTRATE 50 MG
5 TABLET ORAL ONCE
Refills: 0 | Status: DISCONTINUED | OUTPATIENT
Start: 2021-02-11 | End: 2021-02-11

## 2021-02-11 RX ADMIN — MORPHINE SULFATE 2 MILLIGRAM(S): 50 CAPSULE, EXTENDED RELEASE ORAL at 22:55

## 2021-02-11 RX ADMIN — Medication 650 MILLIGRAM(S): at 22:56

## 2021-02-11 RX ADMIN — Medication 50 MILLIGRAM(S): at 20:29

## 2021-02-11 RX ADMIN — SODIUM CHLORIDE 1000 MILLILITER(S): 9 INJECTION INTRAMUSCULAR; INTRAVENOUS; SUBCUTANEOUS at 21:45

## 2021-02-11 RX ADMIN — SODIUM CHLORIDE 1000 MILLILITER(S): 9 INJECTION INTRAMUSCULAR; INTRAVENOUS; SUBCUTANEOUS at 18:43

## 2021-02-11 NOTE — ED ADULT NURSE NOTE - OBJECTIVE STATEMENT
pt received to trauma C, a&oX4, Setswana speaking, ambulatory at baseline pmh of lung CA and recent dx of covid and pneumonia p/w SOB and right chest wall hematoma larger in size and worsening discomfort x2 days. pt noted to be sinus tachycardic on CCM to 130s. satting 99% on 2L NC. respirations even. pt is dysnpeic. airway is patent. abdomen soft, nontender. mass to right chest wall is warm to touch. pt with left 22G IV. fluids running. labs collected and sent. EKG in chart. pending imaging. will reassess.

## 2021-02-11 NOTE — ED ADULT NURSE NOTE - NSIMPLEMENTINTERV_GEN_ALL_ED
Implemented All Fall with Harm Risk Interventions:  [place] to call system. Call bell, personal items and telephone within reach. Instruct patient to call for assistance. Room bathroom lighting operational. Non-slip footwear when patient is off stretcher. Physically safe environment: no spills, clutter or unnecessary equipment. Stretcher in lowest position, wheels locked, appropriate side rails in place. Provide visual cue, wrist band, yellow gown, etc. Monitor gait and stability. Monitor for mental status changes and reorient to person, place, and time. Review medications for side effects contributing to fall risk. Reinforce activity limits and safety measures with patient and family. Provide visual clues: red socks.

## 2021-02-11 NOTE — ED PROVIDER NOTE - CLINICAL SUMMARY MEDICAL DECISION MAKING FREE TEXT BOX
Pt p/w increasing chest wall mass of unclear etiology. Appears to be hemodynamically stable on initial exam, with large mass as noted above. CT to evaluate hematoma/mass, labs to evaluate for hemorrhage, reassess for dispo

## 2021-02-11 NOTE — ED PROVIDER NOTE - PHYSICAL EXAMINATION
GEN - NAD; awake and talking, very thin, ao3  HEAD - NC/AT   EYES- PERRL, EOMI  ENT: Airway patent, very dry mm, Oral cavity and pharynx normal. No inflammation, swelling, exudate, or lesions.    NECK: Neck supple, no masses.  PULMONARY - CTA b/l, symmetric breath sounds.   CARDIAC - irregular, tachy hr on eval ranges from 113-138, +r. side upper chest wall circular mass with pale erythema, mildly tender, no induration, no focal fluctuance, no spreading of erythema around mass.  ABDOMEN - +BS, ND, NT, soft, no guarding, no rebound  BACK - no CVA tenderness, Normal  spine   EXTREMITIES - FROM, no edema  SKIN - no rash    NEUROLOGIC - alert, speech clear, no focal deficits  PSYCH -nl mood/affect, nl insight.

## 2021-02-11 NOTE — ED ADULT NURSE NOTE - CHIEF COMPLAINT QUOTE
Pt c/o large hematoma to the right chest wall with increase in swelling, warm to touch X the past two days. Phx Afib (on Eliquis), COPD (on 3L home O2), right lobectomy. Pt admitted in late December for hematoma and PNA. Pain to right chest and down right arm. Spouse phone number [phone].

## 2021-02-11 NOTE — ED PROVIDER NOTE - OBJECTIVE STATEMENT
Hx obtained by ezra; pt is Armenian speaking.     75 M hx of lung CA s/p surgery in 2003 and 2005, increasing size of hematoma and now red skin today, no drainage, and pain at that area which occasionally goes down the left arm. Is still taking eliquis. After d/c for COVID on 1/21, was supposed to get another CT scan in a few weeks, but never was able to get one since then and the bulge is increasing in size. Had 99.8 temp a few days ago, no significant increase in chronic cough or SoB. Has had nausea but no vomiting, no bloody stool; may have been dark wife is not sure. She notes after COVID, pt has had poor appetite, weight loss,  Never had a biopsy done at end of December and had to go back into hospital for COVID. He took his AM meds today; however metoprolol has increased to 50mg in AM and PM.    PMD: DR Estrada  Onc: Violeta at [facility]; hasn't seen recently. Hx obtained by ezra; pt is Sierra Leonean speaking.     75 M hx of lung CA s/p surgery in 2003 and 2005, increasing size of chest wall mass and now red skin today, no drainage, and pain at that area which occasionally goes down the left arm. Is still taking eliquis. After d/c for COVID on 1/21, was supposed to get another CT scan in a few weeks, but never was able to get one since then and the bulge is increasing in size. Had 99.8 temp a few days ago, no significant increase in chronic cough or SoB. Has had nausea but no vomiting, no bloody stool; may have been dark wife is not sure. She notes after COVID, pt has had poor appetite, weight loss,  Was supposed to have a biopsy performed of the mass but Never did as at end December and had to go back into hospital for COVID. He took his AM meds today; however metoprolol has increased to 50mg in AM and PM. Per chart review-on previous ct chest end of january, was heterogenous chest wall mass.  Samoan  used to interview patient-video  ID GRAD    PMD: DR Estrada  Onc: Violeta at [facility]; hasn't seen recently.

## 2021-02-11 NOTE — ED ADULT TRIAGE NOTE - CHIEF COMPLAINT QUOTE
Pt c/o large hematoma to the right chest wall with increase in swelling, warm to touch X the past two days. Phx Afib (on Eliquis), COPD (on 3L home O2), right lobectomy. Pt admitted in late December for hematoma and PNA. Pain to right chest and down right arm. Spouse phone number [phone]. Pt c/o large hematoma to the right chest wall with increase in redness, swelling, warm to touch X the past two days. Phx Afib (on Eliquis), COPD (on 3L home O2), right lobectomy. Pt admitted in late December for hematoma and PNA. Pain to right chest and down right arm. Spouse phone number [phone].

## 2021-02-12 ENCOUNTER — RESULT REVIEW (OUTPATIENT)
Age: 76
End: 2021-02-12

## 2021-02-12 DIAGNOSIS — Z29.9 ENCOUNTER FOR PROPHYLACTIC MEASURES, UNSPECIFIED: ICD-10-CM

## 2021-02-12 DIAGNOSIS — N39.0 URINARY TRACT INFECTION, SITE NOT SPECIFIED: ICD-10-CM

## 2021-02-12 DIAGNOSIS — D64.9 ANEMIA, UNSPECIFIED: ICD-10-CM

## 2021-02-12 DIAGNOSIS — E78.5 HYPERLIPIDEMIA, UNSPECIFIED: ICD-10-CM

## 2021-02-12 DIAGNOSIS — C34.90 MALIGNANT NEOPLASM OF UNSPECIFIED PART OF UNSPECIFIED BRONCHUS OR LUNG: ICD-10-CM

## 2021-02-12 DIAGNOSIS — I48.91 UNSPECIFIED ATRIAL FIBRILLATION: ICD-10-CM

## 2021-02-12 DIAGNOSIS — G40.909 EPILEPSY, UNSPECIFIED, NOT INTRACTABLE, WITHOUT STATUS EPILEPTICUS: ICD-10-CM

## 2021-02-12 DIAGNOSIS — I10 ESSENTIAL (PRIMARY) HYPERTENSION: ICD-10-CM

## 2021-02-12 DIAGNOSIS — U07.1 COVID-19: ICD-10-CM

## 2021-02-12 DIAGNOSIS — R22.2 LOCALIZED SWELLING, MASS AND LUMP, TRUNK: ICD-10-CM

## 2021-02-12 LAB
A1C WITH ESTIMATED AVERAGE GLUCOSE RESULT: 5.1 % — SIGNIFICANT CHANGE UP (ref 4–5.6)
ANION GAP SERPL CALC-SCNC: 9 MMOL/L — SIGNIFICANT CHANGE UP (ref 7–14)
BUN SERPL-MCNC: 25 MG/DL — HIGH (ref 7–23)
CALCIUM SERPL-MCNC: 9.4 MG/DL — SIGNIFICANT CHANGE UP (ref 8.4–10.5)
CHLORIDE SERPL-SCNC: 98 MMOL/L — SIGNIFICANT CHANGE UP (ref 98–107)
CHOLEST SERPL-MCNC: 128 MG/DL — SIGNIFICANT CHANGE UP
CO2 SERPL-SCNC: 29 MMOL/L — SIGNIFICANT CHANGE UP (ref 22–31)
CREAT SERPL-MCNC: 0.96 MG/DL — SIGNIFICANT CHANGE UP (ref 0.5–1.3)
ESTIMATED AVERAGE GLUCOSE: 100 MG/DL — SIGNIFICANT CHANGE UP (ref 68–114)
FERRITIN SERPL-MCNC: 1822 NG/ML — HIGH (ref 30–400)
FOLATE SERPL-MCNC: 10 NG/ML — SIGNIFICANT CHANGE UP (ref 3.1–17.5)
GLUCOSE SERPL-MCNC: 83 MG/DL — SIGNIFICANT CHANGE UP (ref 70–99)
HCT VFR BLD CALC: 24.7 % — LOW (ref 39–50)
HDLC SERPL-MCNC: 39 MG/DL — LOW
HGB BLD-MCNC: 7.6 G/DL — LOW (ref 13–17)
IRON SATN MFR SERPL: 23 % — SIGNIFICANT CHANGE UP (ref 14–50)
IRON SATN MFR SERPL: 27 UG/DL — LOW (ref 45–165)
LIPID PNL WITH DIRECT LDL SERPL: 64 MG/DL — SIGNIFICANT CHANGE UP
MAGNESIUM SERPL-MCNC: 2 MG/DL — SIGNIFICANT CHANGE UP (ref 1.6–2.6)
MCHC RBC-ENTMCNC: 29.8 PG — SIGNIFICANT CHANGE UP (ref 27–34)
MCHC RBC-ENTMCNC: 30.8 GM/DL — LOW (ref 32–36)
MCV RBC AUTO: 96.9 FL — SIGNIFICANT CHANGE UP (ref 80–100)
NON HDL CHOLESTEROL: 89 MG/DL — SIGNIFICANT CHANGE UP
NRBC # BLD: 0 /100 WBCS — SIGNIFICANT CHANGE UP
NRBC # FLD: 0 K/UL — SIGNIFICANT CHANGE UP
PHOSPHATE SERPL-MCNC: 3 MG/DL — SIGNIFICANT CHANGE UP (ref 2.5–4.5)
PLATELET # BLD AUTO: 253 K/UL — SIGNIFICANT CHANGE UP (ref 150–400)
POTASSIUM SERPL-MCNC: 4.6 MMOL/L — SIGNIFICANT CHANGE UP (ref 3.5–5.3)
POTASSIUM SERPL-SCNC: 4.6 MMOL/L — SIGNIFICANT CHANGE UP (ref 3.5–5.3)
RBC # BLD: 2.55 M/UL — LOW (ref 4.2–5.8)
RBC # BLD: 2.55 M/UL — LOW (ref 4.2–5.8)
RBC # FLD: 14.9 % — HIGH (ref 10.3–14.5)
RETICS #: 59.7 K/UL — SIGNIFICANT CHANGE UP (ref 25–125)
RETICS/RBC NFR: 2.3 % — SIGNIFICANT CHANGE UP (ref 0.5–2.5)
SODIUM SERPL-SCNC: 136 MMOL/L — SIGNIFICANT CHANGE UP (ref 135–145)
TIBC SERPL-MCNC: 118 UG/DL — LOW (ref 220–430)
TRIGL SERPL-MCNC: 125 MG/DL — SIGNIFICANT CHANGE UP
TSH SERPL-MCNC: 2.01 UIU/ML — SIGNIFICANT CHANGE UP (ref 0.27–4.2)
UIBC SERPL-MCNC: 91 UG/DL — LOW (ref 110–370)
VIT B12 SERPL-MCNC: 1055 PG/ML — HIGH (ref 200–900)
WBC # BLD: 13.01 K/UL — HIGH (ref 3.8–10.5)
WBC # FLD AUTO: 13.01 K/UL — HIGH (ref 3.8–10.5)

## 2021-02-12 PROCEDURE — 88341 IMHCHEM/IMCYTCHM EA ADD ANTB: CPT | Mod: 26,59

## 2021-02-12 PROCEDURE — 99223 1ST HOSP IP/OBS HIGH 75: CPT | Mod: CS

## 2021-02-12 PROCEDURE — 99222 1ST HOSP IP/OBS MODERATE 55: CPT

## 2021-02-12 PROCEDURE — 88360 TUMOR IMMUNOHISTOCHEM/MANUAL: CPT | Mod: 26

## 2021-02-12 PROCEDURE — 12345: CPT | Mod: NC

## 2021-02-12 PROCEDURE — 20206 BIOPSY MUSCLE PERQ NEEDLE: CPT

## 2021-02-12 PROCEDURE — 88173 CYTOPATH EVAL FNA REPORT: CPT | Mod: 26

## 2021-02-12 PROCEDURE — 99446 NTRPROF PH1/NTRNET/EHR 5-10: CPT

## 2021-02-12 PROCEDURE — 88305 TISSUE EXAM BY PATHOLOGIST: CPT | Mod: 26

## 2021-02-12 PROCEDURE — 88364 INSITU HYBRIDIZATION (FISH): CPT | Mod: 26

## 2021-02-12 PROCEDURE — 88365 INSITU HYBRIDIZATION (FISH): CPT | Mod: 26,59

## 2021-02-12 PROCEDURE — 76942 ECHO GUIDE FOR BIOPSY: CPT | Mod: 26

## 2021-02-12 PROCEDURE — 88367 INSITU HYBRIDIZATION AUTO: CPT | Mod: 26

## 2021-02-12 PROCEDURE — 88342 IMHCHEM/IMCYTCHM 1ST ANTB: CPT | Mod: 26,59

## 2021-02-12 RX ORDER — MEGESTROL ACETATE 40 MG/ML
400 SUSPENSION ORAL DAILY
Refills: 0 | Status: DISCONTINUED | OUTPATIENT
Start: 2021-02-12 | End: 2021-02-13

## 2021-02-12 RX ORDER — CARBAMAZEPINE 200 MG
400 TABLET ORAL DAILY
Refills: 0 | Status: DISCONTINUED | OUTPATIENT
Start: 2021-02-12 | End: 2021-02-21

## 2021-02-12 RX ORDER — APIXABAN 2.5 MG/1
5 TABLET, FILM COATED ORAL EVERY 12 HOURS
Refills: 0 | Status: DISCONTINUED | OUTPATIENT
Start: 2021-02-12 | End: 2021-02-12

## 2021-02-12 RX ORDER — ALBUTEROL 90 UG/1
2 AEROSOL, METERED ORAL EVERY 6 HOURS
Refills: 0 | Status: DISCONTINUED | OUTPATIENT
Start: 2021-02-12 | End: 2021-02-24

## 2021-02-12 RX ORDER — OXYCODONE HYDROCHLORIDE 5 MG/1
5 TABLET ORAL EVERY 6 HOURS
Refills: 0 | Status: DISCONTINUED | OUTPATIENT
Start: 2021-02-12 | End: 2021-02-18

## 2021-02-12 RX ORDER — FLUTICASONE FUROATE AND VILANTEROL TRIFENATATE 100; 25 UG/1; UG/1
1 POWDER RESPIRATORY (INHALATION)
Qty: 0 | Refills: 0 | DISCHARGE

## 2021-02-12 RX ORDER — LAMOTRIGINE 25 MG/1
100 TABLET, ORALLY DISINTEGRATING ORAL
Refills: 0 | Status: DISCONTINUED | OUTPATIENT
Start: 2021-02-12 | End: 2021-02-21

## 2021-02-12 RX ORDER — METOPROLOL TARTRATE 50 MG
50 TABLET ORAL EVERY 8 HOURS
Refills: 0 | Status: DISCONTINUED | OUTPATIENT
Start: 2021-02-12 | End: 2021-02-12

## 2021-02-12 RX ORDER — METOPROLOL TARTRATE 50 MG
50 TABLET ORAL EVERY 6 HOURS
Refills: 0 | Status: DISCONTINUED | OUTPATIENT
Start: 2021-02-12 | End: 2021-02-16

## 2021-02-12 RX ORDER — POLYETHYLENE GLYCOL 3350 17 G/17G
17 POWDER, FOR SOLUTION ORAL DAILY
Refills: 0 | Status: DISCONTINUED | OUTPATIENT
Start: 2021-02-12 | End: 2021-02-13

## 2021-02-12 RX ORDER — TIOTROPIUM BROMIDE 18 UG/1
1 CAPSULE ORAL; RESPIRATORY (INHALATION) DAILY
Refills: 0 | Status: DISCONTINUED | OUTPATIENT
Start: 2021-02-12 | End: 2021-02-24

## 2021-02-12 RX ORDER — ACETAMINOPHEN 500 MG
650 TABLET ORAL EVERY 6 HOURS
Refills: 0 | Status: DISCONTINUED | OUTPATIENT
Start: 2021-02-12 | End: 2021-02-24

## 2021-02-12 RX ORDER — CEFTRIAXONE 500 MG/1
INJECTION, POWDER, FOR SOLUTION INTRAMUSCULAR; INTRAVENOUS
Refills: 0 | Status: DISCONTINUED | OUTPATIENT
Start: 2021-02-12 | End: 2021-02-12

## 2021-02-12 RX ORDER — CARBAMAZEPINE 200 MG
200 TABLET ORAL AT BEDTIME
Refills: 0 | Status: DISCONTINUED | OUTPATIENT
Start: 2021-02-12 | End: 2021-02-21

## 2021-02-12 RX ORDER — APIXABAN 2.5 MG/1
1 TABLET, FILM COATED ORAL
Qty: 0 | Refills: 0 | DISCHARGE

## 2021-02-12 RX ORDER — SODIUM CHLORIDE 9 MG/ML
3 INJECTION INTRAMUSCULAR; INTRAVENOUS; SUBCUTANEOUS EVERY 8 HOURS
Refills: 0 | Status: DISCONTINUED | OUTPATIENT
Start: 2021-02-12 | End: 2021-02-24

## 2021-02-12 RX ORDER — CEFTRIAXONE 500 MG/1
1000 INJECTION, POWDER, FOR SOLUTION INTRAMUSCULAR; INTRAVENOUS ONCE
Refills: 0 | Status: COMPLETED | OUTPATIENT
Start: 2021-02-12 | End: 2021-02-12

## 2021-02-12 RX ORDER — IPRATROPIUM/ALBUTEROL SULFATE 18-103MCG
3 AEROSOL WITH ADAPTER (GRAM) INHALATION
Qty: 0 | Refills: 0 | DISCHARGE

## 2021-02-12 RX ADMIN — Medication 200 MILLIGRAM(S): at 21:32

## 2021-02-12 RX ADMIN — SODIUM CHLORIDE 3 MILLILITER(S): 9 INJECTION INTRAMUSCULAR; INTRAVENOUS; SUBCUTANEOUS at 20:37

## 2021-02-12 RX ADMIN — POLYETHYLENE GLYCOL 3350 17 GRAM(S): 17 POWDER, FOR SOLUTION ORAL at 13:14

## 2021-02-12 RX ADMIN — Medication 50 MILLIGRAM(S): at 13:14

## 2021-02-12 RX ADMIN — MEGESTROL ACETATE 400 MILLIGRAM(S): 40 SUSPENSION ORAL at 13:15

## 2021-02-12 RX ADMIN — Medication 50 MILLIGRAM(S): at 06:35

## 2021-02-12 RX ADMIN — Medication 400 MILLIGRAM(S): at 13:14

## 2021-02-12 RX ADMIN — CEFTRIAXONE 1000 MILLIGRAM(S): 500 INJECTION, POWDER, FOR SOLUTION INTRAMUSCULAR; INTRAVENOUS at 06:35

## 2021-02-12 RX ADMIN — TIOTROPIUM BROMIDE 1 CAPSULE(S): 18 CAPSULE ORAL; RESPIRATORY (INHALATION) at 13:13

## 2021-02-12 RX ADMIN — Medication 50 MILLIGRAM(S): at 21:32

## 2021-02-12 RX ADMIN — SODIUM CHLORIDE 3 MILLILITER(S): 9 INJECTION INTRAMUSCULAR; INTRAVENOUS; SUBCUTANEOUS at 13:19

## 2021-02-12 RX ADMIN — LAMOTRIGINE 100 MILLIGRAM(S): 25 TABLET, ORALLY DISINTEGRATING ORAL at 18:51

## 2021-02-12 RX ADMIN — LAMOTRIGINE 100 MILLIGRAM(S): 25 TABLET, ORALLY DISINTEGRATING ORAL at 07:58

## 2021-02-12 RX ADMIN — SODIUM CHLORIDE 3 MILLILITER(S): 9 INJECTION INTRAMUSCULAR; INTRAVENOUS; SUBCUTANEOUS at 06:31

## 2021-02-12 NOTE — H&P ADULT - PROBLEM SELECTOR PLAN 1
Cardiac monitor.  F/U TSH, A1C, Lipid panel.  Consider biopsy results.  -If benign, the tumor may need surgical removal if the tumor interferes with proper organ function, hinders movement, or causes muscles to atrophy.  -If malignant, treatment can include a combination of chemotherapy, radiation therapy and surgical removal and reconstruction Cardiac monitor.  F/U TSH, A1C, Lipid panel.  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   Considering biopsy results.  -If benign, the tumor may need surgical removal if the tumor interferes with proper organ function, hinders movement, or causes muscles to atrophy.  -If malignant, treatment can include a combination of chemotherapy, radiation therapy and surgical removal and reconstruction.

## 2021-02-12 NOTE — PROGRESS NOTE ADULT - PROBLEM SELECTOR PLAN 2
No fevers noted here per family only Tmax 99.8 x1 on Monday. Denies dysuria; per family urination slower and less than usual but no pain  - s/p ctx x1 on 2/12, will hold for now  - f/u urine and blood cultures

## 2021-02-12 NOTE — CONSULT NOTE ADULT - ASSESSMENT
75 year old Male with PMH seizure disorder, HTN, HLD, COVID pneumonia (1/21/2021), Lung CA s/p R pneumonectomy (2003) with  large R chest wall mass and minimally displaced fracture of the anterior right second rib, COPD (home O2 3L), bioprosthetic AVR (2013) and AFib (on Eliquis). Patient admitted with c/o increase in right  chest wall mass size with swelling, redness and warm to touch for the past 2 days and generalized  weakness.    CTPA shows an increased size of right chest wall mass, now measuring 11.4 x 6.9 x 9.7 cm, previously 7.6 x 5.6 x 5.8 cm. No evidence of acute bleeding within the mass. Interval displacement of fracture fragment of the second rib into the center of the soft tissue mass. CXR : Right upper chest wall soft tissue density may correspond to hematoma. Status post right pneumonectomy. The left lung is unremarkable. 2/12- Right chest wall bx in IR today.     ED Found to be in AFib with RVR (130s) given IV Lopressor 5mg X1, remains in AFib with RVR, asymptomatic/hemodynamically stable.          Continue telemetry monitoring.  Metoprolol 50 mg Q8H (uptitrate as tolerated)   Monitor lytes and replete K>4.0 and Mg>2.0.   CHADVASC Score=4  Eliquis 5mg twice daily (restart per IR)   Monitor H/H and TSH

## 2021-02-12 NOTE — PROGRESS NOTE ADULT - SUBJECTIVE AND OBJECTIVE BOX
Patient is a 75y old  Male who presents with a chief complaint of Increasing chest wall mass    SUBJECTIVE / OVERNIGHT EVENTS:    Mercedes  ID:: [id number]    Reports here for increased size of mass and pain, reports has not seen anyone as OP for this yet due to weakness etc in context of recent COVID  Reports HR gets high very easily  No CP, SOB, f/c/n/v  Reports last dose eliquis yesterday, confirmed with family yesterday am    Called family, generally weak, not eating or moving much at home since COVID  Confirm enlarging mass     MEDICATIONS  (STANDING):  carBAMazepine 200 milliGRAM(s) Oral at bedtime  carBAMazepine 400 milliGRAM(s) Oral daily  lamoTRIgine 100 milliGRAM(s) Oral two times a day  megestrol Suspension 400 milliGRAM(s) Oral daily  metoprolol tartrate 50 milliGRAM(s) Oral every 8 hours  polyethylene glycol 3350 17 Gram(s) Oral daily  sodium chloride 0.9% lock flush 3 milliLiter(s) IV Push every 8 hours  tiotropium 18 MICROgram(s) Capsule 1 Capsule(s) Inhalation daily    MEDICATIONS  (PRN):  acetaminophen   Tablet .. 650 milliGRAM(s) Oral every 6 hours PRN Temp greater or equal to 38C (100.4F), Mild Pain (1 - 3), Moderate Pain (4 - 6)  ALBUTerol    90 MICROgram(s) HFA Inhaler 2 Puff(s) Inhalation every 6 hours PRN Shortness of Breath and/or Wheezing  bisacodyl 5 milliGRAM(s) Oral every 12 hours PRN Constipation  guaiFENesin   Syrup  (Sugar-Free) 200 milliGRAM(s) Oral every 6 hours PRN Cough    T(C): 36.9 (21 @ 13:12), Max: 37.1 (21 @ 06:30)  HR: 113 (21 @ 13:12) (100 - 140)  BP: 150/72 (21 @ 13:12) (98/59 - 150/72)  RR: 20 (21 @ 13:12) (18 - 26)  SpO2: 100% (21 @ 13:12) (96% - 100%)    PHYSICAL EXAM:  GENERAL: NAD, thin, on 3L O2,  CHEST/LUNG: Clear to auscultation bilaterally; No wheeze; R upper chest wall mass soft, TTP, no skin breakdown  HEART: irregular; No murmurs, rubs, or gallops  ABDOMEN: Soft, Nontender, Nondistended; Bowel sounds present  EXTREMITIES:   warm and well perfused, No clubbing, cyanosis, or edema  PSYCH: AAOx3  NEUROLOGY: non-focal  SKIN:  chest wall mass underskin on R upper chest     LABS:                        7.6    13.01 )-----------( 253      ( 2021 06:48 )             24.7     02    136  |  98  |  25<H>  ----------------------------<  83  4.6   |  29  |  0.96    Ca    9.4      2021 06:48  Phos  3.0       Mg     2.0         TPro  7.3  /  Alb  3.4  /  TBili  0.6  /  DBili  x   /  AST  30  /  ALT  34  /  AlkPhos  182<H>  11    PT/INR - ( 2021 18:24 )   PT: 22.1 sec;   INR: 2.00 ratio    PTT - ( 2021 18:24 )  PTT:28.7 sec    Urinalysis Basic - ( 2021 21:57 )  Color: Yellow / Appearance: Clear / S.027 / pH: x  Gluc: x / Ketone: Negative  / Bili: Negative / Urobili: 6 mg/dL   Blood: x / Protein: Trace / Nitrite: Negative   Leuk Esterase: Small / RBC: 2 /HPF / WBC 5 /HPF   Sq Epi: x / Non Sq Epi: 2 /HPF / Bacteria: Few    Microbiology:   [facility]  @ 18:29  pH: 7.38/pCO2: 57/pO2: 26/HCO3: 30/lactate: 2.0      Consultant(s) Notes Reviewed:  IR  Care Discussed with Consultants/Other Providers:

## 2021-02-12 NOTE — PHYSICAL THERAPY INITIAL EVALUATION ADULT - ACTIVE RANGE OF MOTION EXAMINATION, REHAB EVAL
except right shoulder limited due to pain - only assessed to 60 degrees shoulder flexion./bilateral upper extremity Active ROM was WFL (within functional limits)/bilateral  lower extremity Active ROM was WFL (within functional limits)

## 2021-02-12 NOTE — H&P ADULT - PROBLEM SELECTOR PLAN 9
VTE covered with Xarelto.  Fall, Aspiration, safety and seizure precautions.  PT eval s/p R pneumonectomy in 2003  Decision from previous admission was for outpatient f/u with patient's oncologist Dr. Mcdaniel.

## 2021-02-12 NOTE — H&P ADULT - PROBLEM SELECTOR PLAN 5
F/U Lipid panel.  Continue statin. Last seizure was 3 years ago  continue home carbamazepine 400 AM, 200 PM  continue home lamictal 100 BID.   Seizure precautions.

## 2021-02-12 NOTE — H&P ADULT - NSHPLABSRESULTS_GEN_ALL_CORE
-CTPA shows an increased size of right chest wall mass, now measuring 11.4 x 6.9 x 9.7 cm, previously 7.6 x 5.6 x 5.8 cm. No evidence of acute bleeding within the mass. Interval displacement of fracture fragment of the second rib into the center of the soft tissue mass.  -CXR : Right upper chest wall soft tissue density may correspond to hematoma. Status post right pneumonectomy. The left lung is unremarkable.  -COVID Detected  -UA: Small Leuko Estrace   -WBC : 13.55 with Neuts#= 11.98.  -A fib @ 139b/ min, QT/ QTC= 304/ 462.  -PT/INR = 22.1/ 2.0                        8.7    13.55 )-----------( 276      ( 11 Feb 2021 18:24 )             28.1   02-11    133<L>  |  92<L>  |  28<H>  ----------------------------<  105<H>  4.8   |  30  |  1.08    Ca    9.9      11 Feb 2021 18:24    TPro  7.3  /  Alb  3.4  /  TBili  0.6  /  DBili  x   /  AST  30  /  ALT  34  /  AlkPhos  182<H>  02-11

## 2021-02-12 NOTE — CONSULT NOTE ADULT - SUBJECTIVE AND OBJECTIVE BOX
-----------------------------------------------------------  Interventional Radiology Brief Consult Note  -----------------------------------------------------------    Reason for Referral:   Right chest wall mass biopsy.     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 Per the spouse, the pt's R chest wall mass has increase in size with swelling, redness and warm to touch for the past 2 days.    IR is consulted to biopsy the right chest wall mass.          Vitals:  T(F): 98.7 (02-12-21 @ 06:30), Max: 98.7 (02-12-21 @ 06:30)  HR: 114 (02-12-21 @ 06:30) (100 - 140)  BP: 134/67 (02-12-21 @ 06:30) (98/59 - 134/67)  RR: 18 (02-12-21 @ 06:30) (18 - 26)  SpO2: 100% (02-12-21 @ 06:30) (96% - 100%)    Labs:           7.6  13.01)-----(253     (02-12-21 @ 06:48)         24.7     136 | 98 | 25  --------------------< 83     (02-12-21 @ 06:48)  4.6 | 29 | 0.96       PT: 22.1<H> 02-11-21 @ 18:24  aPTT: 28.7 02-11-21 @ 18:24   INR: 2.00<H> 02-11-21 @ 18:24    Imaging:  Increased size of right chest wall mass, now measuring 11.4 x 6.9 x 9.7 cm, previously 7.6 x 5.6 x 5.8 cm. No evidence of acute bleeding within the mass.    Interval displacement of fracture fragment of the second rib into the center of the soft tissue mass.    Assessment: 75y Male with pmh of AF on Eliquis and lung CA s/p R pneumonectomy who presents with a right chest wall mass that IR is requested to biopsy.     Recommendations:  - Plan for R CW mass today (team confirms patient last took Eliquis morning of 2/11) under local anesthesia.   - Please put IR procedure order under Dr. Robbins.

## 2021-02-12 NOTE — PROGRESS NOTE ADULT - ASSESSMENT
75M former smoker, HTN, HLD, COVID pneumonia 1/12 to 1/21, history of lung CA s/p 2003 R pneumonectomy (chemo radiation 2005), COPD (3L home O2), tissue AVR, A fib on Eliquis, seizure disorder, large R chest wall mass with minimally displaced fracture of the anterior right second rib presents for increasing size of mass and afib with RVR.

## 2021-02-12 NOTE — PHYSICAL THERAPY INITIAL EVALUATION ADULT - PERTINENT HX OF CURRENT PROBLEM, REHAB EVAL
75M Yi speaking ambulates with a cane history of ex nicotine addiction, recently discharged from [facility] on 1/21/2021 s/p COVID pneumonia, history of lung CA s/p 2003 R pneumonectomy, large R chest wall mass with minimally displaced fracture of the anterior right second rib,

## 2021-02-12 NOTE — PROGRESS NOTE ADULT - PROBLEM SELECTOR PLAN 3
COVID PCR detected likely residual from COVID PNA 1/21.  - c/w O2, at home dose 3L  - Pro Air Inhaler for bronchodilation PRN.

## 2021-02-12 NOTE — H&P ADULT - ATTENDING COMMENTS
Pt admitted with increasing size chest wall mass.  Pt in mild respiratory distress on exam.  CT reviewed showing increased size of mass without evidence of acute bleed.  Pt will need biopsy of chest wall mass.  IR vs Thoracic surgery.

## 2021-02-12 NOTE — H&P ADULT - ASSESSMENT
75M history of ex nicotine addiction, COVID pneumonia 1/12 to 1/21, history of lung CA s/p 2003 R pneumonectomy, large R chest wall mass with minimally displaced fracture of the anterior right second rib, COPD and on 3L home O2, tissue AVR, A fib with CHADSVASC of 4 on Eliquis, seizure disorder, HTN, Hyperlipidemia, admitted for increase R chest wall mass size.

## 2021-02-12 NOTE — PHYSICAL THERAPY INITIAL EVALUATION ADULT - ADDITIONAL COMMENTS
Pt lives with his wife and other family members in private home. Pt was using cane only when going outside to doctors appointments - inside no device. Pt was requiring assist for ADL since last discharge from hospital that the wife has been providing. Pt has flight to bedroom + 5 steps to enter the home.

## 2021-02-12 NOTE — H&P ADULT - HISTORY OF PRESENT ILLNESS
75M Latvian speaking ambulates with a cane history of ex nicotine addiction, recently discharged from [facility] on 1/21/2021 s/p COVID pneumonia, history of lung CA s/p 2003 R pneumonectomy, large R chest wall mass with minimally displaced fracture of the anterior right second rib, COPD and on 3L home O2, tissue AVR, A fib with CHADSVASC of 4 on Eliquis, seizure disorder, HTN, Hyperlipidemia. Per the spouse, the pt's R chest wall mass has increase in size with swelling, redness and warm to touch for the past 2 days. The pt also experiencing generalized weakness with decreased ambulation, nausea, decreased appetite, constipation with last BM 2/9, intermittent palpitations and oligoria. Denies HA, falls, dizziness, vomit, diarrhea, abdominal pain, chest pain, SOB, cough dysuria, hematuria.     in the Ed,   -CTPA shows an increased size of right chest wall mass, now measuring 11.4 x 6.9 x 9.7 cm, previously 7.6 x 5.6 x 5.8 cm. No evidence of acute bleeding within the mass. Interval displacement of fracture fragment of the second rib into the center of the soft tissue mass.  -CXR : Right upper chest wall soft tissue density may correspond to hematoma. Status post right pneumonectomy. The left lung is unremarkable.  -COVID Detected  -UA: Small Leuko Estrace   -WBC : 13.55 with Neuts#= 11.98     75M Telugu speaking ambulates with a cane history of ex nicotine addiction, recently discharged from [facility] on 1/21/2021 s/p COVID pneumonia, history of lung CA s/p 2003 R pneumonectomy, large R chest wall mass with minimally displaced fracture of the anterior right second rib, COPD and on 3L home O2, tissue AVR, A fib with CHADSVASC of 4 on Eliquis, seizure disorder, HTN, Hyperlipidemia. Per the spouse, the pt's R chest wall mass has increase in size with swelling, redness and warm to touch for the past 2 days. The pt also experiencing generalized weakness with decreased ambulation, nausea, decreased appetite, constipation with last BM 2/9, intermittent palpitations and oligoria. Denies HA, falls, dizziness, vomit, diarrhea, abdominal pain, chest pain, SOB, cough dysuria, hematuria.     in the Ed,   -CTPA shows an increased size of right chest wall mass, now measuring 11.4 x 6.9 x 9.7 cm, previously 7.6 x 5.6 x 5.8 cm. No evidence of acute bleeding within the mass. Interval displacement of fracture fragment of the second rib into the center of the soft tissue mass.  -CXR : Right upper chest wall soft tissue density may correspond to hematoma. Status post right pneumonectomy. The left lung is unremarkable.  -COVID Detected  -UA: Small Leuko Estrace   -WBC : 13.55 with Neuts#= 11.98  The pt was given MSO4 2 mg IV , Lopressor 5 mg IV and Lopressor for HR @ 138    Vitals: T max = 98.4&8 oral, HR = 100b/ min, BP = 123/ 60, RR =20b/ min, SPO2= 100%ra 75M Kinyarwanda speaking ambulates with a cane history of ex nicotine addiction, recently discharged from [facility] on 1/21/2021 s/p COVID pneumonia, history of lung CA s/p 2003 R pneumonectomy, large R chest wall mass with minimally displaced fracture of the anterior right second rib, COPD and on 3L home O2, tissue AVR, A fib with CHADSVASC of 4 on Eliquis, seizure disorder, HTN, Hyperlipidemia. Per the spouse, the pt's R chest wall mass has increase in size with swelling, redness and warm to touch for the past 2 days. The pt also experiencing generalized weakness with decreased ambulation, nausea, decreased appetite, constipation with last BM 2/9, intermittent palpitations and oligoria. Denies HA, falls, dizziness, vomit, diarrhea, abdominal pain, chest pain, SOB, cough dysuria, hematuria.     in the Ed,   -CTPA shows an increased size of right chest wall mass, now measuring 11.4 x 6.9 x 9.7 cm, previously 7.6 x 5.6 x 5.8 cm. No evidence of acute bleeding within the mass. Interval displacement of fracture fragment of the second rib into the center of the soft tissue mass.  -CXR : Right upper chest wall soft tissue density may correspond to hematoma. Status post right pneumonectomy. The left lung is unremarkable.  -COVID Detected  -UA: Small Leuko Estrace   -WBC : 13.55 with Neuts#= 11.98  The pt was given MSO4 2 mg IV , Lopressor 5 mg IV and Lopressor for HR @ 138    Vitals: T max = 98.4&8 oral, HR = 100b/ min, BP = 123/ 60, RR =20b/ min, SPO2= 100%ra 75M Maori speaking ambulates with a cane history of ex nicotine addiction, recently discharged from [facility] on 1/21/2021 s/p COVID pneumonia, history of lung CA s/p 2003 R pneumonectomy, large R chest wall mass with minimally displaced fracture of the anterior right second rib, COPD and on 3L home O2, tissue AVR, A fib with CHADSVASC of 4 on Eliquis, seizure disorder, HTN, Hyperlipidemia. Per the spouse, the pt's R chest wall mass has increase in size with swelling, redness and warm to touch for the past 2 days. The pt also experiencing generalized weakness with decreased ambulation, nausea, decreased appetite, constipation with last BM 2/9, intermittent palpitations and oligoria. Denies HA, falls, dizziness, vomit, diarrhea, abdominal pain, chest pain, SOB, cough dysuria, hematuria.     in the Ed,   -CTPA shows an increased size of right chest wall mass, now measuring 11.4 x 6.9 x 9.7 cm, previously 7.6 x 5.6 x 5.8 cm. No evidence of acute bleeding within the mass. Interval displacement of fracture fragment of the second rib into the center of the soft tissue mass.  -CXR : Right upper chest wall soft tissue density may correspond to hematoma. Status post right pneumonectomy. The left lung is unremarkable.  -COVID Detected  -UA: Small Leuko Estrace   -WBC : 13.55 with Neuts#= 11.98  The pt was given MSO4 2 mg IV , Lopressor 5 mg IV and Lopressor for HR @ 138    Vitals: T max = 98.4 oral, HR = 100b/ min, BP = 123/ 60, RR =20b/ min, SPO2= 100%ra

## 2021-02-12 NOTE — CHART NOTE - NSCHARTNOTEFT_GEN_A_CORE
Spoke to pt's daughter Alannah Mitchell and Confirmed that Eliquis was last taken on 2/11 at 8:30 AM. IR is aware and is ready to proceed with Chest wall mass Biopsy today.

## 2021-02-12 NOTE — H&P ADULT - NEGATIVE ENMT SYMPTOMS
no ear pain/no tinnitus/no vertigo/no sinus symptoms/no nasal congestion/no nasal discharge/no nasal obstruction

## 2021-02-12 NOTE — CONSULT NOTE ADULT - ATTENDING COMMENTS
75 year old Male with PMH seizure disorder, HTN, HLD, COVID pneumonia (1/21/2021), Lung CA s/p R pneumonectomy (2003) with  large R chest wall mass and minimally displaced fracture of the anterior right second rib, COPD (home O2 3L), bioprosthetic AVR (2013) and AFib (on Eliquis). Patient admitted with c/o increase in right  chest wall mass size with swelling, redness and warm to touch for the past 2 days and generalized  weakness.    CTPA shows an increased size of right chest wall mass, now measuring 11.4 x 6.9 x 9.7 cm, previously 7.6 x 5.6 x 5.8 cm. No evidence of acute bleeding within the mass. Interval displacement of fracture fragment of the second rib into the center of the soft tissue mass. CXR : Right upper chest wall soft tissue density may correspond to hematoma. Status post right pneumonectomy. The left lung is unremarkable. 2/12- Right chest wall bx in IR today.     ED Found to be in AFib with RVR (130s) given IV Lopressor 5mg X1, remains in AFib with RVR, asymptomatic/hemodynamically stable.          Continue telemetry monitoring.  Metoprolol 50 mg [id number] increase to 50mg Q6H   Monitor lytes and replete K>4.0 and Mg>2.0.   CHADVASC Score=4  Eliquis 5mg twice daily (restart per IR)   Monitor H/H and TSH   Management per primary team

## 2021-02-12 NOTE — CHART NOTE - NSCHARTNOTEFT_GEN_A_CORE
PRE-INTERVENTIONAL RADIOLOGY  PROCEDURE NOTE R Chest wall Mass Biopsy  Patient Age: 75  Patient Gender: M  Procedure (including site / side if known): R Chest wall Mass Biopsy   Diagnosis / Indication: Chest wall Mass  Interventional Radiology Attending Physician: Dr Robbins  Ordering Attending Physician: Dr Frey  Pertinent medical history: Covid, LungCa, COPD  Pertinent labs:                       7.6    13.01 )-----------( 253      ( 12 Feb 2021 06:48 )             24.7   02-12    136  |  98  |  25<H>  ----------------------------<  83  4.6   |  29  |  0.96    Ca    9.4      12 Feb 2021 06:48  Phos  3.0     02-12  Mg     2.0     02-12    TPro  7.3  /  Alb  3.4  /  TBili  0.6  /  DBili  x   /  AST  30  /  ALT  34  /  AlkPhos  182<H>  02-11    Patient and Family aware? Yes

## 2021-02-12 NOTE — H&P ADULT - PROBLEM SELECTOR PLAN 3
Rate control with BB 3 x a day.  CHADSVASC= 4.  Anticoagulate with Eliquis COVID PCR detected likely residual from COVID PNA 1/21.  Currently A febrile.  Continue Air born precautions.  Tylenol PRN,  ProAir Inhaler for bronchodilation PRN.

## 2021-02-12 NOTE — H&P ADULT - PROBLEM SELECTOR PLAN 2
COVID PCR detected likely residual from COVID PNA 1/21.  Currently A febrile.  Continue Air born precautions.  Tylenol PRN,  ProAir Inhaler for bronchodilation PRN. UA with small Leuks Estrace.   Will start Ceftriaxone 1 g IV daily.  Tylenol PRN.  F/U Urine and blood cx

## 2021-02-12 NOTE — H&P ADULT - NEGATIVE NEUROLOGICAL SYMPTOMS
no transient paralysis/no paresthesias/no generalized seizures/no focal seizures/no syncope/no tremors/no vertigo/no loss of sensation/no headache/no loss of consciousness/no hemiparesis/no confusion/no facial palsy

## 2021-02-12 NOTE — H&P ADULT - PROBLEM SELECTOR PLAN 8
s/p R pneumonectomy in 2003  Decision from previous admission was for outpatient f/u with patient's oncologist Dr. Mcdaniel. Likely due to chronic disease.  F/U Fe+ studies

## 2021-02-12 NOTE — PROGRESS NOTE ADULT - PROBLEM SELECTOR PLAN 4
CHADSVASC= 4  - hold Eliquis for bx  - c/w metoprolol 50 mg q8h, per daughter issues with rate control causing fatigability since COVID, reports HR goes to 150s on minimal exertion limiting PT attempts; EP eval

## 2021-02-12 NOTE — H&P ADULT - PROBLEM SELECTOR PLAN 4
Last seizure was 3 years ago  continue home carbamazepine 400 AM, 200 PM  continue home lamictal 100 BID.   Seizure precautions. Rate control with BB 3 x a day.  CHADSVASC= 4.  Anticoagulate with Eliquis

## 2021-02-12 NOTE — CONSULT NOTE ADULT - SUBJECTIVE AND OBJECTIVE BOX
Patient is a 75y old  Male who presents with a chief complaint of Enlarging chest wall mass (2021 13:16)      HPI:  75 year old Male with PMH seizure disorder, HTN, HLD, COVID pneumonia (2021), Lung CA s/p R pneumonectomy () with  large R chest wall mass and minimally displaced fracture of the anterior right second rib, COPD (home O2 3L), bioprosthetic AVR () and AFib (on Eliquis). Patient admitted with c/o increase in right  chest wall mass size with swelling, redness and warm to touch for the past 2 days and generalized  weakness.    CTPA shows an increased size of right chest wall mass, now measuring 11.4 x 6.9 x 9.7 cm, previously 7.6 x 5.6 x 5.8 cm. No evidence of acute bleeding within the mass. Interval displacement of fracture fragment of the second rib into the center of the soft tissue mass. CXR : Right upper chest wall soft tissue density may correspond to hematoma. Status post right pneumonectomy. The left lung is unremarkable. - Right chest wall bx in IR today.     ED Found to be in AFib with RVR (130s) given IV Lopressor 5mg X1, remains in AFib with RVR, asymptomatic/hemodynamically stable.            PAST MEDICAL & SURGICAL HISTORY:  Anemia    Atrial fibrillation    History of pneumonia    Lung cancer  s/p R. pneumonectomy    Hypertension    Hyperlipidemia    Seizure disorder    H/O inguinal hernia repair      H/O aortic valve replacement      S/P cataract surgery  bilaterally    Aortic valve replaced  2013   bovine AoValve    S/P pneumonectomy  right sided, ~         FAMILY HISTORY:  Family history of cancer (Aunt)  Maternal aunt    Family history of hypertension  Mother    Family history of stroke  Mother        Home Medications:  bisacodyl 5 mg oral delayed release tablet: 1 tab(s) orally every 12 hours, As needed, Constipation (2021 03:03)  Breo Ellipta 100 mcg-25 mcg/inh inhalation powder: 1 puff(s) inhaled once a day (2021 03:03)  carBAMazepine 200 mg oral tablet: 2 tab(s) orally once a day in AM  (2021 03:03)  carBAMazepine 200 mg oral tablet: 1 tab(s) orally once a day (at bedtime) (2021 03:03)  Combivent 18 mcg-103 mcg-/inh inhalation aerosol: 1 puff(s) inhaled 4 times a day (2021 03:03)  DuoNeb 0.5 mg-2.5 mg/3 mL inhalation solution: 3 milliliter(s) inhaled every 6 hours, As Needed - for bronchospasm, for shortness of breath and/or wheezing (2021 03:03)  Eliquis 5 mg oral tablet: 1 tab(s) orally 2 times a day (2021 03:03)  LaMICtal 100 mg oral tablet: 1 tab(s) orally 2 times a day (2021 03:03)  Metoprolol Tartrate 50 mg oral tablet: 1 tab(s) orally every 8 hours (2021 03:03)  polyethylene glycol 3350 oral powder for reconstitution: 17 gram(s) orally once a day, As needed, Constipation (2021 03:03)      Medications:  acetaminophen   Tablet .. 650 milliGRAM(s) Oral every 6 hours PRN  ALBUTerol    90 MICROgram(s) HFA Inhaler 2 Puff(s) Inhalation every 6 hours PRN  bisacodyl 5 milliGRAM(s) Oral every 12 hours PRN  carBAMazepine 200 milliGRAM(s) Oral at bedtime  carBAMazepine 400 milliGRAM(s) Oral daily  guaiFENesin   Syrup  (Sugar-Free) 200 milliGRAM(s) Oral every 6 hours PRN  lamoTRIgine 100 milliGRAM(s) Oral two times a day  megestrol Suspension 400 milliGRAM(s) Oral daily  metoprolol tartrate 50 milliGRAM(s) Oral every 8 hours  oxyCODONE    IR 5 milliGRAM(s) Oral every 6 hours PRN  polyethylene glycol 3350 17 Gram(s) Oral daily  sodium chloride 0.9% lock flush 3 milliLiter(s) IV Push every 8 hours  tiotropium 18 MICROgram(s) Capsule 1 Capsule(s) Inhalation daily      Review of systems:  10 point review of systems completed and are negative unless noted in HPI    Vitals:  T(C): 36.9 (21 @ 13:12), Max: 37.1 (21 @ 06:30)  HR: 113 (21 @ 13:12) (100 - 138)  BP: 150/72 (21 @ 13:12) (98/59 - 150/72)  BP(mean): --  RR: 20 (21 @ 13:12) (18 - 26)  SpO2: 100% (21 @ 13:12) (96% - 100%)    Daily Height in cm: 177.8 (2021 02:21)    Daily     Weight (kg): 62.6 ( @ 03:12)    I&O's Summary      Physical Exam:  Appearance: No Acute Distress  Cardiovascular: Normal S1 S2  Respiratory: Clear to auscultation bilaterally  Right sided chest wall mass (per pt. unchanged  since admission)      Gastrointestinal: Soft, Non-tender	  Skin: No cyanosis	  Neurologic: Non-focal  Extremities: No LE edema  Psychiatry: A & O x 3, Mood & affect appropriate    Labs:                        7.6    13.01 )-----------( 253      ( 2021 06:48 )             24.7         136  |  98  |  25<H>  ----------------------------<  83  4.6   |  29  |  0.96    Ca    9.4      2021 06:48  Phos  3.0       Mg     2.0         TPro  7.3  /  Alb  3.4  /  TBili  0.6  /  DBili  x   /  AST  30  /  ALT  34  /  AlkPhos  182<H>  02-11    PT/INR - ( 2021 18:24 )   PT: 22.1 sec;   INR: 2.00 ratio         PTT - ( 2021 18:24 )  PTT:28.7 sec      TELEMETRY: AFib (-125)    Echocardiogram:  Ordered/Pending     EK Lead ECG (21 @ 17:36)  Ventricular Rate 139 BPM    Atrial Rate 102 BPM    QRS Duration 88 ms    Q-T Interval 304 ms    QTC Calculation(Bazett) 462 ms    R Axis 51 degrees    T Axis 61 degrees    Diagnosis Line Atrial fibrillation with rapid ventricular response  AbnormalECG

## 2021-02-12 NOTE — H&P ADULT - NSHPSOCIALHISTORY_GEN_ALL_CORE
.  Lives with the spouse.  Nicotine Quit 2003 after 40 pack years.  ETOH< 1 x a month.  Denies Illicit/ recreational drugs.  Retired .  Flu Vax 2020.  Colonoscopy 2018: Normal.

## 2021-02-12 NOTE — H&P ADULT - NSICDXPASTMEDICALHX_GEN_ALL_CORE_FT
PAST MEDICAL HISTORY:  Atrial fibrillation     History of pneumonia     Hyperlipidemia     Hypertension     Lung cancer s/p R. pneumonectomy    Seizure disorder      PAST MEDICAL HISTORY:  Anemia     Atrial fibrillation     History of pneumonia     Hyperlipidemia     Hypertension     Lung cancer s/p R. pneumonectomy    Seizure disorder

## 2021-02-13 LAB
ANION GAP SERPL CALC-SCNC: 8 MMOL/L — SIGNIFICANT CHANGE UP (ref 7–14)
BASOPHILS # BLD AUTO: 0.02 K/UL — SIGNIFICANT CHANGE UP (ref 0–0.2)
BASOPHILS NFR BLD AUTO: 0.1 % — SIGNIFICANT CHANGE UP (ref 0–2)
BUN SERPL-MCNC: 22 MG/DL — SIGNIFICANT CHANGE UP (ref 7–23)
CALCIUM SERPL-MCNC: 9.5 MG/DL — SIGNIFICANT CHANGE UP (ref 8.4–10.5)
CHLORIDE SERPL-SCNC: 97 MMOL/L — LOW (ref 98–107)
CO2 SERPL-SCNC: 28 MMOL/L — SIGNIFICANT CHANGE UP (ref 22–31)
CREAT SERPL-MCNC: 0.97 MG/DL — SIGNIFICANT CHANGE UP (ref 0.5–1.3)
CULTURE RESULTS: SIGNIFICANT CHANGE UP
EOSINOPHIL # BLD AUTO: 0.02 K/UL — SIGNIFICANT CHANGE UP (ref 0–0.5)
EOSINOPHIL NFR BLD AUTO: 0.1 % — SIGNIFICANT CHANGE UP (ref 0–6)
EPO SERPL-MCNC: 13.6 MIU/ML — SIGNIFICANT CHANGE UP (ref 2.6–18.5)
GLUCOSE SERPL-MCNC: 106 MG/DL — HIGH (ref 70–99)
HCT VFR BLD CALC: 24.8 % — LOW (ref 39–50)
HGB BLD-MCNC: 7.8 G/DL — LOW (ref 13–17)
IANC: 12.95 K/UL — HIGH (ref 1.5–8.5)
IMM GRANULOCYTES NFR BLD AUTO: 0.6 % — SIGNIFICANT CHANGE UP (ref 0–1.5)
LYMPHOCYTES # BLD AUTO: 0.56 K/UL — LOW (ref 1–3.3)
LYMPHOCYTES # BLD AUTO: 3.9 % — LOW (ref 13–44)
MCHC RBC-ENTMCNC: 30.2 PG — SIGNIFICANT CHANGE UP (ref 27–34)
MCHC RBC-ENTMCNC: 31.5 GM/DL — LOW (ref 32–36)
MCV RBC AUTO: 96.1 FL — SIGNIFICANT CHANGE UP (ref 80–100)
MONOCYTES # BLD AUTO: 0.79 K/UL — SIGNIFICANT CHANGE UP (ref 0–0.9)
MONOCYTES NFR BLD AUTO: 5.5 % — SIGNIFICANT CHANGE UP (ref 2–14)
NEUTROPHILS # BLD AUTO: 12.95 K/UL — HIGH (ref 1.8–7.4)
NEUTROPHILS NFR BLD AUTO: 89.8 % — HIGH (ref 43–77)
NRBC # BLD: 0 /100 WBCS — SIGNIFICANT CHANGE UP
NRBC # FLD: 0 K/UL — SIGNIFICANT CHANGE UP
PLATELET # BLD AUTO: 260 K/UL — SIGNIFICANT CHANGE UP (ref 150–400)
POTASSIUM SERPL-MCNC: 4.5 MMOL/L — SIGNIFICANT CHANGE UP (ref 3.5–5.3)
POTASSIUM SERPL-SCNC: 4.5 MMOL/L — SIGNIFICANT CHANGE UP (ref 3.5–5.3)
RBC # BLD: 2.58 M/UL — LOW (ref 4.2–5.8)
RBC # FLD: 14.8 % — HIGH (ref 10.3–14.5)
SODIUM SERPL-SCNC: 133 MMOL/L — LOW (ref 135–145)
SPECIMEN SOURCE: SIGNIFICANT CHANGE UP
WBC # BLD: 14.43 K/UL — HIGH (ref 3.8–10.5)
WBC # FLD AUTO: 14.43 K/UL — HIGH (ref 3.8–10.5)

## 2021-02-13 PROCEDURE — 99233 SBSQ HOSP IP/OBS HIGH 50: CPT | Mod: CS

## 2021-02-13 RX ORDER — APIXABAN 2.5 MG/1
5 TABLET, FILM COATED ORAL EVERY 12 HOURS
Refills: 0 | Status: DISCONTINUED | OUTPATIENT
Start: 2021-02-13 | End: 2021-02-21

## 2021-02-13 RX ORDER — POLYETHYLENE GLYCOL 3350 17 G/17G
17 POWDER, FOR SOLUTION ORAL
Refills: 0 | Status: DISCONTINUED | OUTPATIENT
Start: 2021-02-13 | End: 2021-02-24

## 2021-02-13 RX ORDER — SENNA PLUS 8.6 MG/1
2 TABLET ORAL AT BEDTIME
Refills: 0 | Status: DISCONTINUED | OUTPATIENT
Start: 2021-02-13 | End: 2021-02-24

## 2021-02-13 RX ADMIN — POLYETHYLENE GLYCOL 3350 17 GRAM(S): 17 POWDER, FOR SOLUTION ORAL at 17:50

## 2021-02-13 RX ADMIN — APIXABAN 5 MILLIGRAM(S): 2.5 TABLET, FILM COATED ORAL at 06:26

## 2021-02-13 RX ADMIN — Medication 50 MILLIGRAM(S): at 17:50

## 2021-02-13 RX ADMIN — Medication 50 MILLIGRAM(S): at 12:57

## 2021-02-13 RX ADMIN — APIXABAN 5 MILLIGRAM(S): 2.5 TABLET, FILM COATED ORAL at 17:50

## 2021-02-13 RX ADMIN — MEGESTROL ACETATE 400 MILLIGRAM(S): 40 SUSPENSION ORAL at 12:57

## 2021-02-13 RX ADMIN — LAMOTRIGINE 100 MILLIGRAM(S): 25 TABLET, ORALLY DISINTEGRATING ORAL at 06:26

## 2021-02-13 RX ADMIN — SODIUM CHLORIDE 3 MILLILITER(S): 9 INJECTION INTRAMUSCULAR; INTRAVENOUS; SUBCUTANEOUS at 12:48

## 2021-02-13 RX ADMIN — SODIUM CHLORIDE 3 MILLILITER(S): 9 INJECTION INTRAMUSCULAR; INTRAVENOUS; SUBCUTANEOUS at 23:43

## 2021-02-13 RX ADMIN — Medication 400 MILLIGRAM(S): at 12:57

## 2021-02-13 RX ADMIN — Medication 50 MILLIGRAM(S): at 06:26

## 2021-02-13 RX ADMIN — SODIUM CHLORIDE 3 MILLILITER(S): 9 INJECTION INTRAMUSCULAR; INTRAVENOUS; SUBCUTANEOUS at 04:12

## 2021-02-13 RX ADMIN — Medication 650 MILLIGRAM(S): at 23:30

## 2021-02-13 RX ADMIN — TIOTROPIUM BROMIDE 1 CAPSULE(S): 18 CAPSULE ORAL; RESPIRATORY (INHALATION) at 12:57

## 2021-02-13 RX ADMIN — POLYETHYLENE GLYCOL 3350 17 GRAM(S): 17 POWDER, FOR SOLUTION ORAL at 12:57

## 2021-02-13 RX ADMIN — LAMOTRIGINE 100 MILLIGRAM(S): 25 TABLET, ORALLY DISINTEGRATING ORAL at 17:50

## 2021-02-13 NOTE — PROGRESS NOTE ADULT - PROBLEM SELECTOR PLAN 4
CHADSVASC= 4; per daughter issues with rate control causing fatigability since COVID, reports HR goes to 150s on minimal exertion limiting PT attempts  - c/w Eliquis   - c/w metoprolol 50 mg q6h, EP following, f/u recs  - rates remain suboptimal COVID PCR detected likely residual from COVID PNA 1/21.  - c/w O2, at home dose 3L  - Pro Air Inhaler for bronchodilation PRN.

## 2021-02-13 NOTE — PROGRESS NOTE ADULT - PROBLEM SELECTOR PLAN 2
No fevers noted here per family only Tmax 99.8 x1 on Monday. Denies dysuria; per family urination slower and less than usual but no pain  - s/p ctx x1 on 2/12, will hold for now  - blood and urine cx NG CHADSVASC= 4; per daughter issues with rate control causing fatigability since COVID, reports HR goes to 150s on minimal exertion limiting PT attempts  - c/w Eliquis   - c/w metoprolol 50 mg q6h, EP following, f/u recs  - rates remain suboptimal

## 2021-02-13 NOTE — PROGRESS NOTE ADULT - SUBJECTIVE AND OBJECTIVE BOX
Patient is a 75y old  Male who presents with a chief complaint of Increasing chest wall mass    SUBJECTIVE / OVERNIGHT EVENTS:      Luxembourger Interpretor [id number]    Reports bx when well, no increased pain or bleeding  No CP, SOB, remains on home O2  Tolerating diet, denies urinary complaints, reports no BM since admission, feels constipated     Tele: afib with RVR >100 and mostly in 120-130s    MEDICATIONS  (STANDING):  apixaban 5 milliGRAM(s) Oral every 12 hours  carBAMazepine 200 milliGRAM(s) Oral at bedtime  carBAMazepine 400 milliGRAM(s) Oral daily  lamoTRIgine 100 milliGRAM(s) Oral two times a day  megestrol Suspension 400 milliGRAM(s) Oral daily  metoprolol tartrate 50 milliGRAM(s) Oral every 6 hours  polyethylene glycol 3350 17 Gram(s) Oral two times a day  senna 2 Tablet(s) Oral at bedtime  sodium chloride 0.9% lock flush 3 milliLiter(s) IV Push every 8 hours  tiotropium 18 MICROgram(s) Capsule 1 Capsule(s) Inhalation daily    MEDICATIONS  (PRN):  acetaminophen   Tablet .. 650 milliGRAM(s) Oral every 6 hours PRN Temp greater or equal to 38C (100.4F), Mild Pain (1 - 3), Moderate Pain (4 - 6)  ALBUTerol    90 MICROgram(s) HFA Inhaler 2 Puff(s) Inhalation every 6 hours PRN Shortness of Breath and/or Wheezing  bisacodyl 5 milliGRAM(s) Oral every 12 hours PRN Constipation  guaiFENesin   Syrup  (Sugar-Free) 200 milliGRAM(s) Oral every 6 hours PRN Cough  oxyCODONE    IR 5 milliGRAM(s) Oral every 6 hours PRN Severe Pain (7 - 10)    T(C): 36.4 (02-13-21 @ 12:55), Max: 36.8 (02-12-21 @ 21:29)  HR: 99 (02-13-21 @ 12:55) (99 - 130)  BP: 117/64 (02-13-21 @ 12:55) (110/69 - 119/68)  RR: 19 (02-13-21 @ 12:55) (16 - 19)  SpO2: 98% (02-13-21 @ 12:56) (98% - 100%)    PHYSICAL EXAM:  GENERAL: NAD, thin, on 2L O2  CHEST/LUNG: Clear to auscultation bilaterally; No wheeze; R upper chest wall mass soft, TTP, no skin breakdown  HEART: irregular; No murmurs, rubs, or gallops  ABDOMEN: Soft, Nontender, Nondistended; Bowel sounds present  EXTREMITIES:   warm and well perfused, No clubbing, cyanosis, or edema  PSYCH: AAOx3  NEUROLOGY: non-focal  SKIN:  chest wall mass under skin on R upper chest     LABS:                        7.8    14.43 )-----------( 260      ( 13 Feb 2021 06:14 )             24.8     02-13    133<L>  |  97<L>  |  22  ----------------------------<  106<H>  4.5   |  28  |  0.97    Ca    9.5      13 Feb 2021 06:14      Microbiology: Culture Results:   No growth to date. (02-12 @ 12:06)  Culture Results:   >=3 organisms. Probable collection contamination. (02-11 @ 21:30)    VBG 02-11 @ 18:29  pH: 7.38/pCO2: 57/pO2: 26/HCO3: 30/lactate: 2.0    Consultant(s) Notes Reviewed:    Care Discussed with Consultants/Other Providers:

## 2021-02-13 NOTE — PROGRESS NOTE ADULT - PROBLEM SELECTOR PLAN 3
COVID PCR detected likely residual from COVID PNA 1/21.  - c/w O2, at home dose 3L  - Pro Air Inhaler for bronchodilation PRN. No fevers noted here per family only Tmax 99.8 x1 on Monday. Denies dysuria; per family urination slower and less than usual but no pain  - s/p ctx x1 on 2/12, will hold for now  - blood and urine cx NG

## 2021-02-13 NOTE — PROGRESS NOTE ADULT - ASSESSMENT
75M former smoker, HTN, HLD, COVID pneumonia 1/12 to 1/21, history of lung CA s/p 2003 R pneumonectomy (chemo radiation 2005), COPD (3L home O2), tissue AVR, A fib on Eliquis, seizure disorder, large R chest wall mass with minimally displaced fracture of the anterior right second rib presents for increasing size of mass now s/p bx on 2/12 also noted to be in afib with RVR.

## 2021-02-14 LAB
ANION GAP SERPL CALC-SCNC: 12 MMOL/L — SIGNIFICANT CHANGE UP (ref 7–14)
BASOPHILS # BLD AUTO: 0.03 K/UL — SIGNIFICANT CHANGE UP (ref 0–0.2)
BASOPHILS NFR BLD AUTO: 0.2 % — SIGNIFICANT CHANGE UP (ref 0–2)
BUN SERPL-MCNC: 23 MG/DL — SIGNIFICANT CHANGE UP (ref 7–23)
CALCIUM SERPL-MCNC: 9.9 MG/DL — SIGNIFICANT CHANGE UP (ref 8.4–10.5)
CHLORIDE SERPL-SCNC: 94 MMOL/L — LOW (ref 98–107)
CO2 SERPL-SCNC: 28 MMOL/L — SIGNIFICANT CHANGE UP (ref 22–31)
CREAT SERPL-MCNC: 1 MG/DL — SIGNIFICANT CHANGE UP (ref 0.5–1.3)
EOSINOPHIL # BLD AUTO: 0.05 K/UL — SIGNIFICANT CHANGE UP (ref 0–0.5)
EOSINOPHIL NFR BLD AUTO: 0.3 % — SIGNIFICANT CHANGE UP (ref 0–6)
GLUCOSE SERPL-MCNC: 94 MG/DL — SIGNIFICANT CHANGE UP (ref 70–99)
HCT VFR BLD CALC: 25.9 % — LOW (ref 39–50)
HGB BLD-MCNC: 8.2 G/DL — LOW (ref 13–17)
IANC: 12.53 K/UL — HIGH (ref 1.5–8.5)
IMM GRANULOCYTES NFR BLD AUTO: 0.8 % — SIGNIFICANT CHANGE UP (ref 0–1.5)
LYMPHOCYTES # BLD AUTO: 0.87 K/UL — LOW (ref 1–3.3)
LYMPHOCYTES # BLD AUTO: 6 % — LOW (ref 13–44)
MCHC RBC-ENTMCNC: 29.8 PG — SIGNIFICANT CHANGE UP (ref 27–34)
MCHC RBC-ENTMCNC: 31.7 GM/DL — LOW (ref 32–36)
MCV RBC AUTO: 94.2 FL — SIGNIFICANT CHANGE UP (ref 80–100)
MONOCYTES # BLD AUTO: 0.83 K/UL — SIGNIFICANT CHANGE UP (ref 0–0.9)
MONOCYTES NFR BLD AUTO: 5.8 % — SIGNIFICANT CHANGE UP (ref 2–14)
NEUTROPHILS # BLD AUTO: 12.53 K/UL — HIGH (ref 1.8–7.4)
NEUTROPHILS NFR BLD AUTO: 86.9 % — HIGH (ref 43–77)
NRBC # BLD: 0 /100 WBCS — SIGNIFICANT CHANGE UP
NRBC # FLD: 0 K/UL — SIGNIFICANT CHANGE UP
PLATELET # BLD AUTO: 235 K/UL — SIGNIFICANT CHANGE UP (ref 150–400)
POTASSIUM SERPL-MCNC: 4.4 MMOL/L — SIGNIFICANT CHANGE UP (ref 3.5–5.3)
POTASSIUM SERPL-SCNC: 4.4 MMOL/L — SIGNIFICANT CHANGE UP (ref 3.5–5.3)
RBC # BLD: 2.75 M/UL — LOW (ref 4.2–5.8)
RBC # FLD: 15.1 % — HIGH (ref 10.3–14.5)
SODIUM SERPL-SCNC: 134 MMOL/L — LOW (ref 135–145)
WBC # BLD: 14.42 K/UL — HIGH (ref 3.8–10.5)
WBC # FLD AUTO: 14.42 K/UL — HIGH (ref 3.8–10.5)

## 2021-02-14 PROCEDURE — 99233 SBSQ HOSP IP/OBS HIGH 50: CPT | Mod: CS

## 2021-02-14 RX ORDER — SODIUM CHLORIDE 9 MG/ML
500 INJECTION INTRAMUSCULAR; INTRAVENOUS; SUBCUTANEOUS ONCE
Refills: 0 | Status: COMPLETED | OUTPATIENT
Start: 2021-02-14 | End: 2021-02-14

## 2021-02-14 RX ORDER — DILTIAZEM HCL 120 MG
30 CAPSULE, EXT RELEASE 24 HR ORAL EVERY 8 HOURS
Refills: 0 | Status: DISCONTINUED | OUTPATIENT
Start: 2021-02-14 | End: 2021-02-15

## 2021-02-14 RX ORDER — MAGNESIUM HYDROXIDE 400 MG/1
30 TABLET, CHEWABLE ORAL DAILY
Refills: 0 | Status: DISCONTINUED | OUTPATIENT
Start: 2021-02-14 | End: 2021-02-24

## 2021-02-14 RX ADMIN — TIOTROPIUM BROMIDE 1 CAPSULE(S): 18 CAPSULE ORAL; RESPIRATORY (INHALATION) at 12:59

## 2021-02-14 RX ADMIN — Medication 400 MILLIGRAM(S): at 12:59

## 2021-02-14 RX ADMIN — Medication 50 MILLIGRAM(S): at 00:04

## 2021-02-14 RX ADMIN — SODIUM CHLORIDE 3 MILLILITER(S): 9 INJECTION INTRAMUSCULAR; INTRAVENOUS; SUBCUTANEOUS at 11:50

## 2021-02-14 RX ADMIN — APIXABAN 5 MILLIGRAM(S): 2.5 TABLET, FILM COATED ORAL at 05:54

## 2021-02-14 RX ADMIN — Medication 50 MILLIGRAM(S): at 22:30

## 2021-02-14 RX ADMIN — POLYETHYLENE GLYCOL 3350 17 GRAM(S): 17 POWDER, FOR SOLUTION ORAL at 16:56

## 2021-02-14 RX ADMIN — Medication 10 MILLIGRAM(S): at 17:42

## 2021-02-14 RX ADMIN — SENNA PLUS 2 TABLET(S): 8.6 TABLET ORAL at 00:03

## 2021-02-14 RX ADMIN — MAGNESIUM HYDROXIDE 30 MILLILITER(S): 400 TABLET, CHEWABLE ORAL at 15:00

## 2021-02-14 RX ADMIN — LAMOTRIGINE 100 MILLIGRAM(S): 25 TABLET, ORALLY DISINTEGRATING ORAL at 05:54

## 2021-02-14 RX ADMIN — Medication 200 MILLIGRAM(S): at 00:04

## 2021-02-14 RX ADMIN — Medication 50 MILLIGRAM(S): at 05:54

## 2021-02-14 RX ADMIN — Medication 30 MILLIGRAM(S): at 22:29

## 2021-02-14 RX ADMIN — SODIUM CHLORIDE 3 MILLILITER(S): 9 INJECTION INTRAMUSCULAR; INTRAVENOUS; SUBCUTANEOUS at 05:54

## 2021-02-14 RX ADMIN — Medication 200 MILLIGRAM(S): at 22:29

## 2021-02-14 RX ADMIN — APIXABAN 5 MILLIGRAM(S): 2.5 TABLET, FILM COATED ORAL at 16:56

## 2021-02-14 RX ADMIN — Medication 30 MILLIGRAM(S): at 15:00

## 2021-02-14 RX ADMIN — Medication 50 MILLIGRAM(S): at 12:58

## 2021-02-14 RX ADMIN — Medication 50 MILLIGRAM(S): at 16:56

## 2021-02-14 RX ADMIN — LAMOTRIGINE 100 MILLIGRAM(S): 25 TABLET, ORALLY DISINTEGRATING ORAL at 16:56

## 2021-02-14 RX ADMIN — POLYETHYLENE GLYCOL 3350 17 GRAM(S): 17 POWDER, FOR SOLUTION ORAL at 05:54

## 2021-02-14 RX ADMIN — SODIUM CHLORIDE 3 MILLILITER(S): 9 INJECTION INTRAMUSCULAR; INTRAVENOUS; SUBCUTANEOUS at 22:31

## 2021-02-14 RX ADMIN — SODIUM CHLORIDE 250 MILLILITER(S): 9 INJECTION INTRAMUSCULAR; INTRAVENOUS; SUBCUTANEOUS at 15:00

## 2021-02-14 RX ADMIN — SENNA PLUS 2 TABLET(S): 8.6 TABLET ORAL at 22:29

## 2021-02-14 NOTE — DIETITIAN NUTRITION RISK NOTIFICATION - TREATMENT: THE FOLLOWING DIET HAS BEEN RECOMMENDED
Diet, Regular:   Consistent Carbohydrate {Evening Snack} (CSTCHOSN)  DASH/TLC {Sodium & Cholesterol Restricted} (DASH) (02-12-21 @ 04:25) [Active]

## 2021-02-14 NOTE — DIETITIAN INITIAL EVALUATION ADULT. - PROBLEM SELECTOR PLAN 1
Cardiac monitor.  F/U TSH, A1C, Lipid panel.  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   Considering biopsy results.  -If benign, the tumor may need surgical removal if the tumor interferes with proper organ function, hinders movement, or causes muscles to atrophy.  -If malignant, treatment can include a combination of chemotherapy, radiation therapy and surgical removal and reconstruction.

## 2021-02-14 NOTE — DIETITIAN INITIAL EVALUATION ADULT. - PROBLEM SELECTOR PLAN 9
s/p R pneumonectomy in 2003  Decision from previous admission was for outpatient f/u with patient's oncologist Dr. Mcdaniel.

## 2021-02-14 NOTE — CHART NOTE - NSCHARTNOTEFT_GEN_A_CORE
Pt remains in AF 90s to 100s. C/w metoprolol as ordered.     Giuseppe Shaw MD  Cardiology Fellow - PGY 6  Text or Call: [phone]  For all New Consults and Questions:  [email or web address]   Login: [email or web address]

## 2021-02-14 NOTE — DIETITIAN INITIAL EVALUATION ADULT. - PROBLEM SELECTOR PLAN 2
UA with small Leuks Estrace.   Will start Ceftriaxone 1 g IV daily.  Tylenol PRN.  F/U Urine and blood cx

## 2021-02-14 NOTE — DIETITIAN INITIAL EVALUATION ADULT. - REASON FOR ADMISSION
Patient is a 75M former smoker, HTN, HLD, COVID pneumonia 1/12 to 1/21, history of lung CA s/p 2003 R pneumonectomy (chemo radiation 2005), COPD (3L home O2), tissue AVR, A fib on Eliquis, seizure disorder, large R chest wall mass with minimally displaced fracture of the anterior right second rib presents for increasing size of mass now s/p bx on 2/12 also noted to be in afib with RVR.

## 2021-02-14 NOTE — DIETITIAN INITIAL EVALUATION ADULT. - OTHER INFO
RDN met with patient at bedside. Pt is [place] speaking, [employer] interpre[employer] utilized (Martin ID: [id number]).  Pt with reports of decreased PO intake at this time d/t not feeling well and dislike of hospital foods.  Pt with good PO intake at home.  Overall, he reports Wt. loss since his hospitalization in Dec '20.  Weight obtained on a prior admission 68kg , consistent with stated UBW of 150lb/68.2kg.  Current weight 62.6kg 2/11, suggestive of weight loss i/s/o dec. PO intake and catabolic illness.  Pt is amenable to supplemental drink in house to help meet energy and protein needs. Suggest liberalizing diet to DASH (cholesterol & Na restricted). HbA1c 5.1%, no noted Hx of DM. PO intake encouraged.

## 2021-02-14 NOTE — DIETITIAN INITIAL EVALUATION ADULT. - PROBLEM SELECTOR PLAN 3
COVID PCR detected likely residual from COVID PNA 1/21.  Currently A febrile.  Continue Air born precautions.  Tylenol PRN,  ProAir Inhaler for bronchodilation PRN.

## 2021-02-14 NOTE — PROGRESS NOTE ADULT - PROBLEM SELECTOR PLAN 9
Eliquis, hold Megace given possible cancer and increased risk of VTE in setting of covid as well  PT evaluation rec YOUNG, daughter unclear yet if she would want YOUNG vs home with home PT  Care d/w daughter on 2/12 & 2/13  Dr. Filiberto Mcdaniel (oncologist [facility])  Dr. Lubin (PCP) [phone]  Dr. KRYSTAL Montes (cardiologist) [phone] Eliquis, hold Megace given possible cancer and increased risk of VTE in setting of covid as well  PT evaluation rec YOUNG, daughter unclear yet if she would want YOUNG vs home with home PT  Care d/w daughter on 2/12 & 2/13 and wife 2/14  Dr. Filiberto Mcdaniel (oncologist [facility])  Dr. Lubin (PCP) [phone]  Dr. KRYSTAL Monets (cardiologist) [phone]

## 2021-02-14 NOTE — PROGRESS NOTE ADULT - PROBLEM SELECTOR PLAN 3
COVID PCR detected likely residual from COVID PNA 1/21.  - c/w O2, at home dose 3L, currently at 2L  - Pro Air Inhaler for bronchodilation PRN.

## 2021-02-14 NOTE — DIETITIAN INITIAL EVALUATION ADULT. - PROBLEM SELECTOR PLAN 5
Last seizure was 3 years ago  continue home carbamazepine 400 AM, 200 PM  continue home lamictal 100 BID.   Seizure precautions.

## 2021-02-14 NOTE — DIETITIAN INITIAL EVALUATION ADULT. - FEEDING ASSISTANCE
Please Encourage po intake, assist with meals and menu selections, provide alternatives PRN.
Hourly Rounding

## 2021-02-14 NOTE — PROGRESS NOTE ADULT - PROBLEM SELECTOR PLAN 2
CHADSVASC= 4; per daughter issues with rate control causing fatigability since COVID, reports HR goes to 150s on minimal exertion limiting PT attempts  - c/w Eliquis   - c/w metoprolol 50 mg q6h, EP following, f/u recs  - rates remain suboptimal CHADSVASC= 4; per daughter issues with rate control causing fatigability since COVID, reports HR goes to 150s on minimal exertion limiting PT attempts  - c/w Eliquis   - c/w metoprolol 50 mg q6h, EP following, f/u recs  - rates remain suboptimal, add dilt 30 q8h per d/w cards fellow, check TTE

## 2021-02-14 NOTE — DIETITIAN INITIAL EVALUATION ADULT. - PERTINENT LABORATORY DATA
02-14 Na134 mmol/L<L> Glu 94 mg/dL K+ 4.4 mmol/L Cr  1.00 mg/dL BUN 23 mg/dL 02-12 Phos 3.0 mg/dL 02-11 Alb 3.4 g/dL 02-12 Chol 128 mg/dL LDL --    HDL 39 mg/dL<L> Trig 125 mg/dL

## 2021-02-14 NOTE — PROGRESS NOTE ADULT - SUBJECTIVE AND OBJECTIVE BOX
Patient is a 75y old  Male who presents with a chief complaint R chest mass and a fib with RVR    SUBJECTIVE / OVERNIGHT EVENTS:    [place]ian  [id number] Tasha     Feels well, no pain  No nausea/vomiting, no abdominal pain, no BM yet despite bowel regimen, tolerating diet     Tele: occasional 90s, mostly >100, currently 112    MEDICATIONS  (STANDING):  apixaban 5 milliGRAM(s) Oral every 12 hours  carBAMazepine 200 milliGRAM(s) Oral at bedtime  carBAMazepine 400 milliGRAM(s) Oral daily  lamoTRIgine 100 milliGRAM(s) Oral two times a day  metoprolol tartrate 50 milliGRAM(s) Oral every 6 hours  polyethylene glycol 3350 17 Gram(s) Oral two times a day  senna 2 Tablet(s) Oral at bedtime  sodium chloride 0.9% lock flush 3 milliLiter(s) IV Push every 8 hours  tiotropium 18 MICROgram(s) Capsule 1 Capsule(s) Inhalation daily    MEDICATIONS  (PRN):  acetaminophen   Tablet .. 650 milliGRAM(s) Oral every 6 hours PRN Temp greater or equal to 38C (100.4F), Mild Pain (1 - 3), Moderate Pain (4 - 6)  ALBUTerol    90 MICROgram(s) HFA Inhaler 2 Puff(s) Inhalation every 6 hours PRN Shortness of Breath and/or Wheezing  bisacodyl 5 milliGRAM(s) Oral every 12 hours PRN Constipation  guaiFENesin   Syrup  (Sugar-Free) 200 milliGRAM(s) Oral every 6 hours PRN Cough  oxyCODONE    IR 5 milliGRAM(s) Oral every 6 hours PRN Severe Pain (7 - 10)    T(C): 37.1 (02-14-21 @ 12:55), Max: 37.1 (02-14-21 @ 12:55)  HR: 100 (02-14-21 @ 12:55) (98 - 108)  BP: 126/66 (02-14-21 @ 12:55) (111/71 - 126/66)  RR: 18 (02-14-21 @ 12:55) (17 - 18)  SpO2: 100% (02-14-21 @ 12:55) (98% - 100%)    PHYSICAL EXAM:  GENERAL: NAD, thin, on 2L O2  CHEST/LUNG: Clear to auscultation bilaterally; No wheeze; R upper chest wall mass soft, TTP, no skin breakdown  HEART: irregular; No murmurs, rubs, or gallops  ABDOMEN: Soft, Nontender, Nondistended; Bowel sounds present  EXTREMITIES:   warm and well perfused, No clubbing, cyanosis, or edema  PSYCH: AAOx3  NEUROLOGY: non-focal  SKIN:  chest wall mass under skin on R upper chest     LABS:                        8.2    14.42 )-----------( 235      ( 14 Feb 2021 06:23 )             25.9     02-14    134<L>  |  94<L>  |  23  ----------------------------<  94  4.4   |  28  |  1.00    Ca    9.9      14 Feb 2021 06:23      Microbiology: Culture Results:   No growth to date. (02-12 @ 12:06)  Culture Results:   >=3 organisms. Probable collection contamination. (02-11 @ 21:30)    Consultant(s) Notes Reviewed:    Care Discussed with Consultants/Other Providers:

## 2021-02-14 NOTE — DIETITIAN INITIAL EVALUATION ADULT. - PERTINENT MEDS FT
MEDICATIONS  (STANDING):  apixaban 5 milliGRAM(s) Oral every 12 hours  carBAMazepine 200 milliGRAM(s) Oral at bedtime  carBAMazepine 400 milliGRAM(s) Oral daily  lamoTRIgine 100 milliGRAM(s) Oral two times a day  metoprolol tartrate 50 milliGRAM(s) Oral every 6 hours  polyethylene glycol 3350 17 Gram(s) Oral two times a day  senna 2 Tablet(s) Oral at bedtime  sodium chloride 0.9% lock flush 3 milliLiter(s) IV Push every 8 hours  tiotropium 18 MICROgram(s) Capsule 1 Capsule(s) Inhalation daily

## 2021-02-15 LAB
ANION GAP SERPL CALC-SCNC: 14 MMOL/L — SIGNIFICANT CHANGE UP (ref 7–14)
BASOPHILS # BLD AUTO: 0.03 K/UL — SIGNIFICANT CHANGE UP (ref 0–0.2)
BASOPHILS NFR BLD AUTO: 0.2 % — SIGNIFICANT CHANGE UP (ref 0–2)
BUN SERPL-MCNC: 22 MG/DL — SIGNIFICANT CHANGE UP (ref 7–23)
CALCIUM SERPL-MCNC: 9.8 MG/DL — SIGNIFICANT CHANGE UP (ref 8.4–10.5)
CHLORIDE SERPL-SCNC: 93 MMOL/L — LOW (ref 98–107)
CO2 SERPL-SCNC: 25 MMOL/L — SIGNIFICANT CHANGE UP (ref 22–31)
CREAT SERPL-MCNC: 0.89 MG/DL — SIGNIFICANT CHANGE UP (ref 0.5–1.3)
EOSINOPHIL # BLD AUTO: 0.04 K/UL — SIGNIFICANT CHANGE UP (ref 0–0.5)
EOSINOPHIL NFR BLD AUTO: 0.3 % — SIGNIFICANT CHANGE UP (ref 0–6)
GLUCOSE SERPL-MCNC: 76 MG/DL — SIGNIFICANT CHANGE UP (ref 70–99)
HCT VFR BLD CALC: 25 % — LOW (ref 39–50)
HGB BLD-MCNC: 8.1 G/DL — LOW (ref 13–17)
IANC: 12.29 K/UL — HIGH (ref 1.5–8.5)
IMM GRANULOCYTES NFR BLD AUTO: 1.3 % — SIGNIFICANT CHANGE UP (ref 0–1.5)
LYMPHOCYTES # BLD AUTO: 0.8 K/UL — LOW (ref 1–3.3)
LYMPHOCYTES # BLD AUTO: 5.7 % — LOW (ref 13–44)
MAGNESIUM SERPL-MCNC: 2.2 MG/DL — SIGNIFICANT CHANGE UP (ref 1.6–2.6)
MCHC RBC-ENTMCNC: 29.6 PG — SIGNIFICANT CHANGE UP (ref 27–34)
MCHC RBC-ENTMCNC: 32.4 GM/DL — SIGNIFICANT CHANGE UP (ref 32–36)
MCV RBC AUTO: 91.2 FL — SIGNIFICANT CHANGE UP (ref 80–100)
MONOCYTES # BLD AUTO: 0.73 K/UL — SIGNIFICANT CHANGE UP (ref 0–0.9)
MONOCYTES NFR BLD AUTO: 5.2 % — SIGNIFICANT CHANGE UP (ref 2–14)
NEUTROPHILS # BLD AUTO: 12.29 K/UL — HIGH (ref 1.8–7.4)
NEUTROPHILS NFR BLD AUTO: 87.3 % — HIGH (ref 43–77)
NRBC # BLD: 0 /100 WBCS — SIGNIFICANT CHANGE UP
NRBC # FLD: 0 K/UL — SIGNIFICANT CHANGE UP
PLATELET # BLD AUTO: 258 K/UL — SIGNIFICANT CHANGE UP (ref 150–400)
POTASSIUM SERPL-MCNC: 4.5 MMOL/L — SIGNIFICANT CHANGE UP (ref 3.5–5.3)
POTASSIUM SERPL-SCNC: 4.5 MMOL/L — SIGNIFICANT CHANGE UP (ref 3.5–5.3)
RBC # BLD: 2.74 M/UL — LOW (ref 4.2–5.8)
RBC # FLD: 14.9 % — HIGH (ref 10.3–14.5)
SODIUM SERPL-SCNC: 132 MMOL/L — LOW (ref 135–145)
WBC # BLD: 14.07 K/UL — HIGH (ref 3.8–10.5)
WBC # FLD AUTO: 14.07 K/UL — HIGH (ref 3.8–10.5)

## 2021-02-15 PROCEDURE — 99233 SBSQ HOSP IP/OBS HIGH 50: CPT | Mod: CS

## 2021-02-15 PROCEDURE — 93306 TTE W/DOPPLER COMPLETE: CPT | Mod: 26

## 2021-02-15 RX ORDER — DILTIAZEM HCL 120 MG
60 CAPSULE, EXT RELEASE 24 HR ORAL EVERY 8 HOURS
Refills: 0 | Status: DISCONTINUED | OUTPATIENT
Start: 2021-02-15 | End: 2021-02-16

## 2021-02-15 RX ADMIN — Medication 30 MILLIGRAM(S): at 05:34

## 2021-02-15 RX ADMIN — SODIUM CHLORIDE 3 MILLILITER(S): 9 INJECTION INTRAMUSCULAR; INTRAVENOUS; SUBCUTANEOUS at 21:59

## 2021-02-15 RX ADMIN — Medication 200 MILLIGRAM(S): at 23:02

## 2021-02-15 RX ADMIN — Medication 30 MILLIGRAM(S): at 12:51

## 2021-02-15 RX ADMIN — Medication 50 MILLIGRAM(S): at 12:51

## 2021-02-15 RX ADMIN — LAMOTRIGINE 100 MILLIGRAM(S): 25 TABLET, ORALLY DISINTEGRATING ORAL at 17:59

## 2021-02-15 RX ADMIN — SODIUM CHLORIDE 3 MILLILITER(S): 9 INJECTION INTRAMUSCULAR; INTRAVENOUS; SUBCUTANEOUS at 12:52

## 2021-02-15 RX ADMIN — ALBUTEROL 2 PUFF(S): 90 AEROSOL, METERED ORAL at 17:59

## 2021-02-15 RX ADMIN — TIOTROPIUM BROMIDE 1 CAPSULE(S): 18 CAPSULE ORAL; RESPIRATORY (INHALATION) at 12:51

## 2021-02-15 RX ADMIN — Medication 50 MILLIGRAM(S): at 05:34

## 2021-02-15 RX ADMIN — Medication 50 MILLIGRAM(S): at 23:02

## 2021-02-15 RX ADMIN — SODIUM CHLORIDE 3 MILLILITER(S): 9 INJECTION INTRAMUSCULAR; INTRAVENOUS; SUBCUTANEOUS at 07:31

## 2021-02-15 RX ADMIN — LAMOTRIGINE 100 MILLIGRAM(S): 25 TABLET, ORALLY DISINTEGRATING ORAL at 05:34

## 2021-02-15 RX ADMIN — Medication 60 MILLIGRAM(S): at 23:02

## 2021-02-15 RX ADMIN — APIXABAN 5 MILLIGRAM(S): 2.5 TABLET, FILM COATED ORAL at 18:00

## 2021-02-15 RX ADMIN — POLYETHYLENE GLYCOL 3350 17 GRAM(S): 17 POWDER, FOR SOLUTION ORAL at 17:59

## 2021-02-15 RX ADMIN — MAGNESIUM HYDROXIDE 30 MILLILITER(S): 400 TABLET, CHEWABLE ORAL at 12:51

## 2021-02-15 RX ADMIN — APIXABAN 5 MILLIGRAM(S): 2.5 TABLET, FILM COATED ORAL at 05:34

## 2021-02-15 RX ADMIN — POLYETHYLENE GLYCOL 3350 17 GRAM(S): 17 POWDER, FOR SOLUTION ORAL at 05:34

## 2021-02-15 RX ADMIN — Medication 50 MILLIGRAM(S): at 17:59

## 2021-02-15 RX ADMIN — Medication 400 MILLIGRAM(S): at 12:52

## 2021-02-15 NOTE — PROGRESS NOTE ADULT - SUBJECTIVE AND OBJECTIVE BOX
Patient is a 75y old  Male who presents with a chief complaint of Increasing chest wall mass, afib with RVR    SUBJECTIVE / OVERNIGHT EVENTS:    Australian  ID Bjorn [id number]  No new complaints, no CP, SOB, nausea/vomiting  Did have a BM 2/14  Aware HR remains suboptimal control, aware pending TTE    MEDICATIONS  (STANDING):  apixaban 5 milliGRAM(s) Oral every 12 hours  carBAMazepine 200 milliGRAM(s) Oral at bedtime  carBAMazepine 400 milliGRAM(s) Oral daily  diltiazem    Tablet 30 milliGRAM(s) Oral every 8 hours  lamoTRIgine 100 milliGRAM(s) Oral two times a day  magnesium hydroxide Suspension 30 milliLiter(s) Oral daily  metoprolol tartrate 50 milliGRAM(s) Oral every 6 hours  polyethylene glycol 3350 17 Gram(s) Oral two times a day  senna 2 Tablet(s) Oral at bedtime  sodium chloride 0.9% lock flush 3 milliLiter(s) IV Push every 8 hours  tiotropium 18 MICROgram(s) Capsule 1 Capsule(s) Inhalation daily    MEDICATIONS  (PRN):  acetaminophen   Tablet .. 650 milliGRAM(s) Oral every 6 hours PRN Temp greater or equal to 38C (100.4F), Mild Pain (1 - 3), Moderate Pain (4 - 6)  ALBUTerol    90 MICROgram(s) HFA Inhaler 2 Puff(s) Inhalation every 6 hours PRN Shortness of Breath and/or Wheezing  bisacodyl 5 milliGRAM(s) Oral every 12 hours PRN Constipation  guaiFENesin   Syrup  (Sugar-Free) 200 milliGRAM(s) Oral every 6 hours PRN Cough  oxyCODONE    IR 5 milliGRAM(s) Oral every 6 hours PRN Severe Pain (7 - 10)    T(C): 36.9 (02-15-21 @ 12:48), Max: 36.9 (02-14-21 @ 22:09)  HR: 108 (02-15-21 @ 12:48) (97 - 121)  BP: 114/63 (02-15-21 @ 12:48) (114/63 - 136/77)  RR: 17 (02-15-21 @ 12:48) (17 - 17)  SpO2: 100% (02-15-21 @ 12:48) (94% - 100%)    PHYSICAL EXAM:  GENERAL: NAD, thin, on 2L O2  CHEST/LUNG: Clear to auscultation bilaterally; No wheeze; R upper chest wall mass soft, TTP, no skin breakdown  HEART: irregular; No murmurs, rubs, or gallops  ABDOMEN: Soft, Nontender, Nondistended; Bowel sounds present  EXTREMITIES:   warm and well perfused, No clubbing, cyanosis, or edema  PSYCH: AAOx3  NEUROLOGY: non-focal  SKIN:  chest wall mass under skin on R upper chest     LABS:                        8.1    14.07 )-----------( 258      ( 15 Feb 2021 06:57 )             25.0     02-15    132<L>  |  93<L>  |  22  ----------------------------<  76  4.5   |  25  |  0.89    Ca    9.8      15 Feb 2021 06:57  Mg     2.2     02-15    Microbiology: Culture Results:   No growth to date. (02-12 @ 12:06)  Culture Results:   >=3 organisms. Probable collection contamination. (02-11 @ 21:30)    Consultant(s) Notes Reviewed:  cards   Care Discussed with Consultants/Other Providers:

## 2021-02-15 NOTE — CHART NOTE - NSCHARTNOTEFT_GEN_A_CORE
Pt remains in AF 90s to 100s. C/w metoprolol. Dilt added yesterday. Can go up to 60 Mg tid.     Echo lab contacted to perform echo today.     Giuseppe Shaw MD  Cardiology Fellow - PGY 6  Text or Call: [phone]  For all New Consults and Questions:  [email or web address]   Login: Shoopi. Pt remains in AF 100s to 120s. C/w metoprolol. Dilt added yesterday. Can go up to 60 Mg tid.     Echo lab contacted to perform echo today.     Giuseppe Shaw MD  Cardiology Fellow - PGY 6  Text or Call: [phone]  For all New Consults and Questions:  [email or web address]   Login: [email or web address].

## 2021-02-15 NOTE — PROVIDER CONTACT NOTE (OTHER) - ASSESSMENT
Patient denies pain, dizziness, or lightheadedness. Patient on 2L NC, resting comfortably after cleaning him, no distress noted.

## 2021-02-15 NOTE — PROGRESS NOTE ADULT - PROBLEM SELECTOR PLAN 9
Eliquis, hold Megace given possible cancer and increased risk of VTE in setting of covid as well  PT evaluation rec YOUNG, daughter unclear yet if she would want YOUNG vs home with home PT  Care d/w daughter on 2/12 & 2/13 and wife 2/14  Dr. Filiberto Mcdaniel (oncologist [facility])  Dr. Lubin (PCP) [phone]  Dr. KRYSTAL Montes (cardiologist) [phone] Eliquis, hold Megace given possible cancer and increased risk of VTE in setting of covid as well  PT evaluation rec YOUNG, daughter unclear yet if she would want YOUNG vs home with home PT  Care d/w daughter on 2/12 & 2/13 and wife 2/14, 2/15  Dr. Filiberto Mcdaniel (oncologist [facility])  Dr. Lubin (PCP) [phone]  Dr. KRYSTAL Montes (cardiologist) [phone]

## 2021-02-15 NOTE — PROGRESS NOTE ADULT - PROBLEM SELECTOR PLAN 2
CHADSVASC= 4; per daughter issues with rate control causing fatigability since COVID, reports HR goes to 150s on minimal exertion limiting PT attempts  - c/w Eliquis   - c/w metoprolol 50 mg q6h, EP following, f/u recs  - rates remain suboptimal, add dilt 30 q8h 2/14 increase to 60 q8h per d/w cards fellow, check TTE

## 2021-02-16 LAB
BASOPHILS # BLD AUTO: 0.03 K/UL — SIGNIFICANT CHANGE UP (ref 0–0.2)
BASOPHILS NFR BLD AUTO: 0.3 % — SIGNIFICANT CHANGE UP (ref 0–2)
EOSINOPHIL # BLD AUTO: 0.04 K/UL — SIGNIFICANT CHANGE UP (ref 0–0.5)
EOSINOPHIL NFR BLD AUTO: 0.4 % — SIGNIFICANT CHANGE UP (ref 0–6)
HCT VFR BLD CALC: 25 % — LOW (ref 39–50)
HGB BLD-MCNC: 7.9 G/DL — LOW (ref 13–17)
IANC: 9.49 K/UL — HIGH (ref 1.5–8.5)
IMM GRANULOCYTES NFR BLD AUTO: 0.8 % — SIGNIFICANT CHANGE UP (ref 0–1.5)
LYMPHOCYTES # BLD AUTO: 0.65 K/UL — LOW (ref 1–3.3)
LYMPHOCYTES # BLD AUTO: 5.9 % — LOW (ref 13–44)
MCHC RBC-ENTMCNC: 29.6 PG — SIGNIFICANT CHANGE UP (ref 27–34)
MCHC RBC-ENTMCNC: 31.6 GM/DL — LOW (ref 32–36)
MCV RBC AUTO: 93.6 FL — SIGNIFICANT CHANGE UP (ref 80–100)
MONOCYTES # BLD AUTO: 0.7 K/UL — SIGNIFICANT CHANGE UP (ref 0–0.9)
MONOCYTES NFR BLD AUTO: 6.4 % — SIGNIFICANT CHANGE UP (ref 2–14)
NEUTROPHILS # BLD AUTO: 9.49 K/UL — HIGH (ref 1.8–7.4)
NEUTROPHILS NFR BLD AUTO: 86.2 % — HIGH (ref 43–77)
NRBC # BLD: 0 /100 WBCS — SIGNIFICANT CHANGE UP
NRBC # FLD: 0 K/UL — SIGNIFICANT CHANGE UP
PLATELET # BLD AUTO: 209 K/UL — SIGNIFICANT CHANGE UP (ref 150–400)
RBC # BLD: 2.67 M/UL — LOW (ref 4.2–5.8)
RBC # FLD: 15.1 % — HIGH (ref 10.3–14.5)
WBC # BLD: 11 K/UL — HIGH (ref 3.8–10.5)
WBC # FLD AUTO: 11 K/UL — HIGH (ref 3.8–10.5)

## 2021-02-16 PROCEDURE — 99233 SBSQ HOSP IP/OBS HIGH 50: CPT | Mod: CS

## 2021-02-16 PROCEDURE — 99232 SBSQ HOSP IP/OBS MODERATE 35: CPT

## 2021-02-16 RX ADMIN — APIXABAN 5 MILLIGRAM(S): 2.5 TABLET, FILM COATED ORAL at 06:12

## 2021-02-16 RX ADMIN — SODIUM CHLORIDE 3 MILLILITER(S): 9 INJECTION INTRAMUSCULAR; INTRAVENOUS; SUBCUTANEOUS at 06:11

## 2021-02-16 RX ADMIN — APIXABAN 5 MILLIGRAM(S): 2.5 TABLET, FILM COATED ORAL at 17:22

## 2021-02-16 RX ADMIN — SENNA PLUS 2 TABLET(S): 8.6 TABLET ORAL at 21:00

## 2021-02-16 RX ADMIN — Medication 200 MILLIGRAM(S): at 17:26

## 2021-02-16 RX ADMIN — SODIUM CHLORIDE 3 MILLILITER(S): 9 INJECTION INTRAMUSCULAR; INTRAVENOUS; SUBCUTANEOUS at 20:47

## 2021-02-16 RX ADMIN — Medication 50 MILLIGRAM(S): at 17:22

## 2021-02-16 RX ADMIN — LAMOTRIGINE 100 MILLIGRAM(S): 25 TABLET, ORALLY DISINTEGRATING ORAL at 17:22

## 2021-02-16 RX ADMIN — ALBUTEROL 2 PUFF(S): 90 AEROSOL, METERED ORAL at 06:35

## 2021-02-16 RX ADMIN — Medication 60 MILLIGRAM(S): at 06:12

## 2021-02-16 RX ADMIN — Medication 650 MILLIGRAM(S): at 10:08

## 2021-02-16 RX ADMIN — Medication 400 MILLIGRAM(S): at 11:31

## 2021-02-16 RX ADMIN — LAMOTRIGINE 100 MILLIGRAM(S): 25 TABLET, ORALLY DISINTEGRATING ORAL at 06:35

## 2021-02-16 RX ADMIN — Medication 200 MILLIGRAM(S): at 21:00

## 2021-02-16 RX ADMIN — TIOTROPIUM BROMIDE 1 CAPSULE(S): 18 CAPSULE ORAL; RESPIRATORY (INHALATION) at 11:31

## 2021-02-16 RX ADMIN — ALBUTEROL 2 PUFF(S): 90 AEROSOL, METERED ORAL at 15:21

## 2021-02-16 RX ADMIN — MAGNESIUM HYDROXIDE 30 MILLILITER(S): 400 TABLET, CHEWABLE ORAL at 11:31

## 2021-02-16 RX ADMIN — Medication 50 MILLIGRAM(S): at 06:12

## 2021-02-16 RX ADMIN — SODIUM CHLORIDE 3 MILLILITER(S): 9 INJECTION INTRAMUSCULAR; INTRAVENOUS; SUBCUTANEOUS at 13:41

## 2021-02-16 NOTE — PROGRESS NOTE ADULT - SUBJECTIVE AND OBJECTIVE BOX
Interval History:  On telemetry, episodes of afib w/ RVR 's and slow afib as low as 40 bpm  Patient states he is comfortable and remains hemodynamically stable    MEDICATIONS  (STANDING):  apixaban 5 milliGRAM(s) Oral every 12 hours  carBAMazepine 200 milliGRAM(s) Oral at bedtime  carBAMazepine 400 milliGRAM(s) Oral daily  diltiazem    Tablet 60 milliGRAM(s) Oral every 8 hours  lamoTRIgine 100 milliGRAM(s) Oral two times a day  magnesium hydroxide Suspension 30 milliLiter(s) Oral daily  metoprolol tartrate 50 milliGRAM(s) Oral every 6 hours  polyethylene glycol 3350 17 Gram(s) Oral two times a day  senna 2 Tablet(s) Oral at bedtime  sodium chloride 0.9% lock flush 3 milliLiter(s) IV Push every 8 hours  tiotropium 18 MICROgram(s) Capsule 1 Capsule(s) Inhalation daily    MEDICATIONS  (PRN):  acetaminophen   Tablet .. 650 milliGRAM(s) Oral every 6 hours PRN Temp greater or equal to 38C (100.4F), Mild Pain (1 - 3), Moderate Pain (4 - 6)  ALBUTerol    90 MICROgram(s) HFA Inhaler 2 Puff(s) Inhalation every 6 hours PRN Shortness of Breath and/or Wheezing  bisacodyl 5 milliGRAM(s) Oral every 12 hours PRN Constipation  guaiFENesin   Syrup  (Sugar-Free) 200 milliGRAM(s) Oral every 6 hours PRN Cough  oxyCODONE    IR 5 milliGRAM(s) Oral every 6 hours PRN Severe Pain (7 - 10)    Vital Signs Last 24 Hrs  T(C): 36.6 (16 Feb 2021 11:27), Max: 36.9 (15 Feb 2021 12:48)  T(F): 97.8 (16 Feb 2021 11:27), Max: 98.5 (15 Feb 2021 17:57)  HR: 82 (16 Feb 2021 11:31) (82 - 124)  BP: 94/48 (16 Feb 2021 11:31) (88/49 - 132/68)  BP(mean): --  RR: 16 (16 Feb 2021 11:27) (16 - 19)  SpO2: 99% (16 Feb 2021 11:27) (94% - 100%)    Appearance: Normal	  HEENT:   Normal oral mucosa, PERRL, EOMI	  Lymphatic: No lymphadenopathy  Cardiovascular: Irregular, No JVD, No murmurs, No edema  Respiratory: Lungs clear to auscultation	  Psychiatry: A & O x 3, Mood & affect appropriate  Gastrointestinal:  Soft, Non-tender, + BS	  Skin: No rashes, No ecchymoses, No cyanosis	  Neurologic: Non-focal  Extremities: Normal range of motion, No clubbing, cyanosis or edema  Vascular: Peripheral pulses palpable 2+ bilaterally      LABS:	 	  CBC Full  -  ( 16 Feb 2021 06:40 )  WBC Count : 11.00 K/uL  Hemoglobin : 7.9 g/dL  Hematocrit : 25.0 %  Platelet Count - Automated : 209 K/uL  Mean Cell Volume : 93.6 fL  Mean Cell Hemoglobin : 29.6 pg  Mean Cell Hemoglobin Concentration : 31.6 gm/dL  Auto Neutrophil # : 9.49 K/uL  Auto Lymphocyte # : 0.65 K/uL  Auto Monocyte # : 0.70 K/uL  Auto Eosinophil # : 0.04 K/uL  Auto Basophil # : 0.03 K/uL  Auto Neutrophil % : 86.2 %  Auto Lymphocyte % : 5.9 %  Auto Monocyte % : 6.4 %  Auto Eosinophil % : 0.4 %  Auto Basophil % : 0.3 %    02-15    132<L>  |  93<L>  |  22  ----------------------------<  76  4.5   |  25  |  0.89    Ca    9.8      15 Feb 2021 06:57  Mg     2.2     02-15

## 2021-02-16 NOTE — PROGRESS NOTE ADULT - SUBJECTIVE AND OBJECTIVE BOX
Patient is a 75y old  Male who presents with a chief complaint of Increasing chest wall mass (16 Feb 2021 12:38)      SUBJECTIVE / OVERNIGHT EVENTS:    Patient seen and examined at bedside, mild pain at site of chest wall mass relieved w/ tylenol, no UE motor deficit. tolerating po intake, reports mild dyspnea in AM that resolves after few hrs.    MEDICATIONS  (STANDING):  apixaban 5 milliGRAM(s) Oral every 12 hours  carBAMazepine 200 milliGRAM(s) Oral at bedtime  carBAMazepine 400 milliGRAM(s) Oral daily  lamoTRIgine 100 milliGRAM(s) Oral two times a day  magnesium hydroxide Suspension 30 milliLiter(s) Oral daily  metoprolol tartrate 50 milliGRAM(s) Oral every 6 hours  polyethylene glycol 3350 17 Gram(s) Oral two times a day  senna 2 Tablet(s) Oral at bedtime  sodium chloride 0.9% lock flush 3 milliLiter(s) IV Push every 8 hours  tiotropium 18 MICROgram(s) Capsule 1 Capsule(s) Inhalation daily    MEDICATIONS  (PRN):  acetaminophen   Tablet .. 650 milliGRAM(s) Oral every 6 hours PRN Temp greater or equal to 38C (100.4F), Mild Pain (1 - 3), Moderate Pain (4 - 6)  ALBUTerol    90 MICROgram(s) HFA Inhaler 2 Puff(s) Inhalation every 6 hours PRN Shortness of Breath and/or Wheezing  bisacodyl 5 milliGRAM(s) Oral every 12 hours PRN Constipation  guaiFENesin   Syrup  (Sugar-Free) 200 milliGRAM(s) Oral every 6 hours PRN Cough  oxyCODONE    IR 5 milliGRAM(s) Oral every 6 hours PRN Severe Pain (7 - 10)      Vital Signs Last 24 Hrs  T(C): 36.6 (16 Feb 2021 11:27), Max: 36.9 (15 Feb 2021 17:57)  T(F): 97.8 (16 Feb 2021 11:27), Max: 98.5 (15 Feb 2021 17:57)  HR: 69 (16 Feb 2021 13:44) (69 - 124)  BP: 102/46 (16 Feb 2021 13:44) (88/49 - 132/68)  BP(mean): --  RR: 18 (16 Feb 2021 13:44) (16 - 19)  SpO2: 99% (16 Feb 2021 13:44) (94% - 100%)  CAPILLARY BLOOD GLUCOSE        I&O's Summary      PHYSICAL EXAM:  Vital Signs Last 24 Hrs  T(C): 36.6 (02-16-21 @ 11:27)  T(F): 97.8 (02-16-21 @ 11:27), Max: 98.5 (02-15-21 @ 17:57)  HR: 69 (02-16-21 @ 13:44) (69 - 124)  BP: 102/46 (02-16-21 @ 13:44)  BP(mean): --  RR: 18 (02-16-21 @ 13:44) (16 - 19)  SpO2: 99% (02-16-21 @ 13:44) (94% - 100%)  Wt(kg): --    GENERAL: NAD, thin, on 2L O2  CHEST/LUNG: Clear to auscultation bilaterally; No wheeze; R upper chest wall mass soft, TTP, no skin breakdown  HEART: irregular; No murmurs, rubs, or gallops  ABDOMEN: Soft, Nontender, Nondistended; Bowel sounds present  EXTREMITIES:   warm and well perfused, No clubbing, cyanosis, or edema  PSYCH: AAOx3  NEUROLOGY: non-focal  SKIN:  chest wall mass under skin on R upper chest       LABS:                        7.9    11.00 )-----------( 209      ( 16 Feb 2021 06:40 )             25.0     02-15    132<L>  |  93<L>  |  22  ----------------------------<  76  4.5   |  25  |  0.89    Ca    9.8      15 Feb 2021 06:57  Mg     2.2     02-15                RADIOLOGY & ADDITIONAL TESTS:    Imaging Personally Reviewed:    Consultant(s) Notes Reviewed:      Care Discussed with Consultants/Other Providers:

## 2021-02-16 NOTE — PROGRESS NOTE ADULT - ASSESSMENT
75 year old Male with PMH seizure disorder, HTN, HLD, COVID pneumonia (1/21/2021), Lung CA s/p R pneumonectomy (2003) with  large R chest wall mass and minimally displaced fracture of the anterior right second rib, COPD (home O2 3L), bioprosthetic AVR (2013) and AFib (on Eliquis). Patient admitted with c/o increase in right  chest wall mass size with swelling, redness and warm to touch for the past 2 days and generalized  weakness.    CTPA shows an increased size of right chest wall mass, now measuring 11.4 x 6.9 x 9.7 cm, previously 7.6 x 5.6 x 5.8 cm. No evidence of acute bleeding within the mass. Interval displacement of fracture fragment of the second rib into the center of the soft tissue mass. CXR : Right upper chest wall soft tissue density may correspond to hematoma. Status post right pneumonectomy. The left lung is unremarkable. 2/12- Right chest wall bx in IR today.     ED Found to be in AFib with RVR (130s) given IV Lopressor 5mg X1, remains in AFib with RVR, asymptomatic/hemodynamically stable.          Continue telemetry monitoring.  Metoprolol 50 mg Q6H; keep hold parameters hold for SBP<90 HR<60  Cardizem 60mg PO q8 hours; keep hold parameters for SBP<100 HR<60  Monitor lytes and replete K>4.0 and Mg>2.0.   CHADVASC Score=4  Eliquis 5mg twice daily  Monitor H/H and TSH   Management per primary team  75 year old Male with PMH seizure disorder, HTN, HLD, COVID pneumonia (1/21/2021), Lung CA s/p R pneumonectomy (2003) with  large R chest wall mass and minimally displaced fracture of the anterior right second rib, COPD (home O2 3L), bioprosthetic AVR (2013) and AFib (on Eliquis). Patient admitted with c/o increase in right  chest wall mass size with swelling, redness and warm to touch for the past 2 days and generalized  weakness.    CTPA shows an increased size of right chest wall mass, now measuring 11.4 x 6.9 x 9.7 cm, previously 7.6 x 5.6 x 5.8 cm. No evidence of acute bleeding within the mass. Interval displacement of fracture fragment of the second rib into the center of the soft tissue mass. CXR : Right upper chest wall soft tissue density may correspond to hematoma. Status post right pneumonectomy. The left lung is unremarkable. 2/12- Right chest wall bx in IR today.     ED Found to be in AFib with RVR (130s) given IV Lopressor 5mg X1, remains in AFib with RVR, asymptomatic/hemodynamically stable.          Continue telemetry monitoring.  Hold Metoprolol and Cardizem  NPO after midnight for anticipated MICRA implant tomorrow   Metoprolol 5mg IVP PRN if afib w/ RVR  Monitor lytes and replete K>4.0 and Mg>2.0  CHADVASC Score=4  Hold eliquis in AM  Monitor H/H and TSH   Management per primary team

## 2021-02-16 NOTE — PROGRESS NOTE ADULT - PROBLEM SELECTOR PLAN 2
CHADSVASC= 4; per daughter issues with rate control causing fatigability since COVID, reports HR goes to 150s on minimal exertion limiting PT attempts  - c/w Eliquis   - c/w metoprolol 50 mg q6h, EP following, was on dilt 60 q8 over weekend, possibly tachybrady syndrome as pt w/ bradycardia to 40s and 50s this morning, will hold dilt at this time and monitor trend.

## 2021-02-16 NOTE — PROGRESS NOTE ADULT - PROBLEM SELECTOR PLAN 9
Eliquis, hold Megace given possible cancer and increased risk of VTE in setting of covid as well  PT evaluation rec YOUNG, daughter unclear yet if she would want YOUNG vs home with home PT, will depend on functional status w/ rate control.   Care d/w daughter on 2/16  Dr. Filiberto Mcdaniel (oncologist [facility])  Dr. Lubin (PCP) [phone]  Dr. KRYSTAL Montes (cardiologist) [phone]

## 2021-02-16 NOTE — PROGRESS NOTE ADULT - ATTENDING COMMENTS
75 year old Male with PMH seizure disorder, HTN, HLD, COVID pneumonia (1/21/2021), Lung CA s/p R pneumonectomy (2003) with  large R chest wall mass and minimally displaced fracture of the anterior right second rib, COPD (home O2 3L), bioprosthetic AVR (2013) and AFib (on Eliquis). Patient admitted with c/o increase in right  chest wall mass size with swelling, redness and warm to touch for the past 2 days and generalized  weakness.    CTPA shows an increased size of right chest wall mass, now measuring 11.4 x 6.9 x 9.7 cm, previously 7.6 x 5.6 x 5.8 cm. No evidence of acute bleeding within the mass. Interval displacement of fracture fragment of the second rib into the center of the soft tissue mass. CXR : Right upper chest wall soft tissue density may correspond to hematoma. Status post right pneumonectomy. The left lung is unremarkable. 2/12- Right chest wall bx in IR today.     ED Found to be in AFib with RVR (130s) given IV Lopressor 5mg X1, remains in AFib with RVR, asymptomatic/hemodynamically stable.   However patient found to have heart rate in the 40s at time making rate control difficult.         Continue telemetry monitoring.  Hold Metoprolol and Cardizem  Will consider Micra implant on 2/17/2021   Metoprolol 5mg IVP PRN if afib w/ RVR  Monitor lytes and replete K>4.0 and Mg>2.0  CHADVASC Score=4  Hold Eliquis in AM  Monitor H/H and TSH   Management per primary team

## 2021-02-16 NOTE — PROVIDER CONTACT NOTE (OTHER) - ASSESSMENT
PT A&OX4, hypotensive, a fib on telemetry down to 42 bpm, all other VS stable, denies chest pain, complaints of COHN no SOB at rest, denies palpitations

## 2021-02-17 PROBLEM — D64.9 ANEMIA, UNSPECIFIED: Chronic | Status: ACTIVE | Noted: 2021-02-12

## 2021-02-17 LAB
ANION GAP SERPL CALC-SCNC: 9 MMOL/L — SIGNIFICANT CHANGE UP (ref 7–14)
BASOPHILS # BLD AUTO: 0.02 K/UL — SIGNIFICANT CHANGE UP (ref 0–0.2)
BASOPHILS NFR BLD AUTO: 0.2 % — SIGNIFICANT CHANGE UP (ref 0–2)
BLD GP AB SCN SERPL QL: NEGATIVE — SIGNIFICANT CHANGE UP
BUN SERPL-MCNC: 20 MG/DL — SIGNIFICANT CHANGE UP (ref 7–23)
CALCIUM SERPL-MCNC: 9.4 MG/DL — SIGNIFICANT CHANGE UP (ref 8.4–10.5)
CHLORIDE SERPL-SCNC: 94 MMOL/L — LOW (ref 98–107)
CO2 SERPL-SCNC: 30 MMOL/L — SIGNIFICANT CHANGE UP (ref 22–31)
CREAT SERPL-MCNC: 1.02 MG/DL — SIGNIFICANT CHANGE UP (ref 0.5–1.3)
CULTURE RESULTS: SIGNIFICANT CHANGE UP
EOSINOPHIL # BLD AUTO: 0.02 K/UL — SIGNIFICANT CHANGE UP (ref 0–0.5)
EOSINOPHIL NFR BLD AUTO: 0.2 % — SIGNIFICANT CHANGE UP (ref 0–6)
GLUCOSE BLDC GLUCOMTR-MCNC: 111 MG/DL — HIGH (ref 70–99)
GLUCOSE SERPL-MCNC: 93 MG/DL — SIGNIFICANT CHANGE UP (ref 70–99)
HCT VFR BLD CALC: 26.1 % — LOW (ref 39–50)
HGB BLD-MCNC: 8.2 G/DL — LOW (ref 13–17)
IANC: 11.37 K/UL — HIGH (ref 1.5–8.5)
IMM GRANULOCYTES NFR BLD AUTO: 0.9 % — SIGNIFICANT CHANGE UP (ref 0–1.5)
LYMPHOCYTES # BLD AUTO: 0.49 K/UL — LOW (ref 1–3.3)
LYMPHOCYTES # BLD AUTO: 3.8 % — LOW (ref 13–44)
MCHC RBC-ENTMCNC: 29.7 PG — SIGNIFICANT CHANGE UP (ref 27–34)
MCHC RBC-ENTMCNC: 31.4 GM/DL — LOW (ref 32–36)
MCV RBC AUTO: 94.6 FL — SIGNIFICANT CHANGE UP (ref 80–100)
MONOCYTES # BLD AUTO: 0.72 K/UL — SIGNIFICANT CHANGE UP (ref 0–0.9)
MONOCYTES NFR BLD AUTO: 5.7 % — SIGNIFICANT CHANGE UP (ref 2–14)
NEUTROPHILS # BLD AUTO: 11.37 K/UL — HIGH (ref 1.8–7.4)
NEUTROPHILS NFR BLD AUTO: 89.2 % — HIGH (ref 43–77)
NRBC # BLD: 0 /100 WBCS — SIGNIFICANT CHANGE UP
NRBC # FLD: 0 K/UL — SIGNIFICANT CHANGE UP
PLATELET # BLD AUTO: 237 K/UL — SIGNIFICANT CHANGE UP (ref 150–400)
POTASSIUM SERPL-MCNC: 4.8 MMOL/L — SIGNIFICANT CHANGE UP (ref 3.5–5.3)
POTASSIUM SERPL-SCNC: 4.8 MMOL/L — SIGNIFICANT CHANGE UP (ref 3.5–5.3)
RBC # BLD: 2.76 M/UL — LOW (ref 4.2–5.8)
RBC # FLD: 15 % — HIGH (ref 10.3–14.5)
RH IG SCN BLD-IMP: NEGATIVE — SIGNIFICANT CHANGE UP
SODIUM SERPL-SCNC: 133 MMOL/L — LOW (ref 135–145)
SPECIMEN SOURCE: SIGNIFICANT CHANGE UP
WBC # BLD: 12.74 K/UL — HIGH (ref 3.8–10.5)
WBC # FLD AUTO: 12.74 K/UL — HIGH (ref 3.8–10.5)

## 2021-02-17 PROCEDURE — 99233 SBSQ HOSP IP/OBS HIGH 50: CPT | Mod: CS

## 2021-02-17 PROCEDURE — 99232 SBSQ HOSP IP/OBS MODERATE 35: CPT

## 2021-02-17 RX ORDER — METOPROLOL TARTRATE 50 MG
2.5 TABLET ORAL ONCE
Refills: 0 | Status: COMPLETED | OUTPATIENT
Start: 2021-02-17 | End: 2021-02-17

## 2021-02-17 RX ORDER — DILTIAZEM HCL 120 MG
30 CAPSULE, EXT RELEASE 24 HR ORAL EVERY 6 HOURS
Refills: 0 | Status: DISCONTINUED | OUTPATIENT
Start: 2021-02-17 | End: 2021-02-18

## 2021-02-17 RX ADMIN — Medication 2.5 MILLIGRAM(S): at 18:22

## 2021-02-17 RX ADMIN — POLYETHYLENE GLYCOL 3350 17 GRAM(S): 17 POWDER, FOR SOLUTION ORAL at 18:01

## 2021-02-17 RX ADMIN — MAGNESIUM HYDROXIDE 30 MILLILITER(S): 400 TABLET, CHEWABLE ORAL at 11:40

## 2021-02-17 RX ADMIN — Medication 2.5 MILLIGRAM(S): at 05:32

## 2021-02-17 RX ADMIN — Medication 30 MILLIGRAM(S): at 11:40

## 2021-02-17 RX ADMIN — APIXABAN 5 MILLIGRAM(S): 2.5 TABLET, FILM COATED ORAL at 18:01

## 2021-02-17 RX ADMIN — ALBUTEROL 2 PUFF(S): 90 AEROSOL, METERED ORAL at 18:27

## 2021-02-17 RX ADMIN — SODIUM CHLORIDE 3 MILLILITER(S): 9 INJECTION INTRAMUSCULAR; INTRAVENOUS; SUBCUTANEOUS at 14:58

## 2021-02-17 RX ADMIN — Medication 30 MILLIGRAM(S): at 18:01

## 2021-02-17 RX ADMIN — SODIUM CHLORIDE 3 MILLILITER(S): 9 INJECTION INTRAMUSCULAR; INTRAVENOUS; SUBCUTANEOUS at 21:29

## 2021-02-17 RX ADMIN — TIOTROPIUM BROMIDE 1 CAPSULE(S): 18 CAPSULE ORAL; RESPIRATORY (INHALATION) at 11:40

## 2021-02-17 RX ADMIN — SENNA PLUS 2 TABLET(S): 8.6 TABLET ORAL at 21:25

## 2021-02-17 RX ADMIN — LAMOTRIGINE 100 MILLIGRAM(S): 25 TABLET, ORALLY DISINTEGRATING ORAL at 18:01

## 2021-02-17 RX ADMIN — Medication 400 MILLIGRAM(S): at 11:40

## 2021-02-17 RX ADMIN — Medication 200 MILLIGRAM(S): at 21:25

## 2021-02-17 RX ADMIN — SODIUM CHLORIDE 3 MILLILITER(S): 9 INJECTION INTRAMUSCULAR; INTRAVENOUS; SUBCUTANEOUS at 04:48

## 2021-02-17 RX ADMIN — LAMOTRIGINE 100 MILLIGRAM(S): 25 TABLET, ORALLY DISINTEGRATING ORAL at 05:20

## 2021-02-17 NOTE — PROGRESS NOTE ADULT - PROBLEM SELECTOR PLAN 2
CHADSVASC= 4; per daughter issues with rate control causing fatigability since COVID, reports HR goes to 150s on minimal exertion limiting PT attempts  - c/w Eliquis   - f/u w/ EP w/ concern for tachy-emma syndrome and sick sinus, initial plan for micra PPM today however at this time risk > benefit given comorbidites and chest wall mass, reinitiated dilt 30 q6.

## 2021-02-17 NOTE — PROGRESS NOTE ADULT - ASSESSMENT
75 year old Male with PMH seizure disorder, HTN, HLD, COVID pneumonia (1/21/2021), Lung CA s/p R pneumonectomy (2003) with  large R chest wall mass and minimally displaced fracture of the anterior right second rib, COPD (home O2 3L), bioprosthetic AVR (2013) and AFib (on Eliquis). Patient admitted with c/o increase in right  chest wall mass size with swelling, redness and warm to touch for the past 2 days and generalized  weakness.    CTPA shows an increased size of right chest wall mass, now measuring 11.4 x 6.9 x 9.7 cm, previously 7.6 x 5.6 x 5.8 cm. No evidence of acute bleeding within the mass. Interval displacement of fracture fragment of the second rib into the center of the soft tissue mass. CXR : Right upper chest wall soft tissue density may correspond to hematoma. Status post right pneumonectomy. The left lung is unremarkable. 2/12- Right chest wall bx in IR today.     ED Found to be in AFib with RVR (130s) given IV Lopressor 5mg X1, remains in AFib with RVR, asymptomatic/hemodynamically stable.          Continue telemetry monitoring.  Restart Cardizem 30mg q6 hours   Would hold off on micra implant at this time risks vs benefits pending biopsy results   Monitor lytes and replete K>4.0 and Mg>2.0  CHADVASC Score=4  Hold eliquis in AM  Monitor H/H and TSH   Management per primary team

## 2021-02-17 NOTE — PROGRESS NOTE ADULT - SUBJECTIVE AND OBJECTIVE BOX
Patient is a 75y old  Male who presents with a chief complaint of Increasing chest wall mass (17 Feb 2021 11:23)      SUBJECTIVE / OVERNIGHT EVENTS:    Patient seen and examined at bedside, reports mild dyspnea this morning. No micra PPM at this time HR on telemetry continuing with trend from bradycardi to tachycardi, no change in blood pressure. No chest pain or syncope.    MEDICATIONS  (STANDING):  apixaban 5 milliGRAM(s) Oral every 12 hours  carBAMazepine 200 milliGRAM(s) Oral at bedtime  carBAMazepine 400 milliGRAM(s) Oral daily  diltiazem    Tablet 30 milliGRAM(s) Oral every 6 hours  lamoTRIgine 100 milliGRAM(s) Oral two times a day  magnesium hydroxide Suspension 30 milliLiter(s) Oral daily  polyethylene glycol 3350 17 Gram(s) Oral two times a day  senna 2 Tablet(s) Oral at bedtime  sodium chloride 0.9% lock flush 3 milliLiter(s) IV Push every 8 hours  tiotropium 18 MICROgram(s) Capsule 1 Capsule(s) Inhalation daily    MEDICATIONS  (PRN):  acetaminophen   Tablet .. 650 milliGRAM(s) Oral every 6 hours PRN Temp greater or equal to 38C (100.4F), Mild Pain (1 - 3), Moderate Pain (4 - 6)  ALBUTerol    90 MICROgram(s) HFA Inhaler 2 Puff(s) Inhalation every 6 hours PRN Shortness of Breath and/or Wheezing  bisacodyl 5 milliGRAM(s) Oral every 12 hours PRN Constipation  guaiFENesin   Syrup  (Sugar-Free) 200 milliGRAM(s) Oral every 6 hours PRN Cough  oxyCODONE    IR 5 milliGRAM(s) Oral every 6 hours PRN Severe Pain (7 - 10)      Vital Signs Last 24 Hrs  T(C): 36.7 (17 Feb 2021 11:37), Max: 36.7 (16 Feb 2021 20:57)  T(F): 98 (17 Feb 2021 11:37), Max: 98.1 (16 Feb 2021 20:57)  HR: 117 (17 Feb 2021 11:37) (106 - 142)  BP: 118/69 (17 Feb 2021 11:37) (113/80 - 131/69)  BP(mean): --  RR: 18 (17 Feb 2021 11:37) (18 - 20)  SpO2: 100% (17 Feb 2021 11:37) (99% - 100%)  CAPILLARY BLOOD GLUCOSE      POCT Blood Glucose.: 111 mg/dL (17 Feb 2021 11:53)    I&O's Summary      PHYSICAL EXAM:  Vital Signs Last 24 Hrs  T(C): 36.7 (02-17-21 @ 11:37)  T(F): 98 (02-17-21 @ 11:37), Max: 98.1 (02-16-21 @ 20:57)  HR: 117 (02-17-21 @ 11:37) (106 - 142)  BP: 118/69 (02-17-21 @ 11:37)  BP(mean): --  RR: 18 (02-17-21 @ 11:37) (18 - 20)  SpO2: 100% (02-17-21 @ 11:37) (99% - 100%)  Wt(kg): --    GENERAL: NAD, thin, on 2L O2  CHEST/LUNG: Clear to auscultation bilaterally; No wheeze; R upper chest wall mass soft, TTP, no skin breakdown  HEART: irregular; No murmurs, rubs, or gallops  ABDOMEN: Soft, Nontender, Nondistended; Bowel sounds present  EXTREMITIES:   warm and well perfused, No clubbing, cyanosis, or edema  PSYCH: AAOx3  NEUROLOGY: non-focal  SKIN:  chest wall mass under skin on R upper chest     LABS:                        8.2    12.74 )-----------( 237      ( 17 Feb 2021 07:05 )             26.1     02-17    133<L>  |  94<L>  |  20  ----------------------------<  93  4.8   |  30  |  1.02    Ca    9.4      17 Feb 2021 07:05                RADIOLOGY & ADDITIONAL TESTS:    Imaging Personally Reviewed:    Consultant(s) Notes Reviewed:      Care Discussed with Consultants/Other Providers:

## 2021-02-17 NOTE — PROGRESS NOTE ADULT - PROBLEM SELECTOR PLAN 9
Eliquis, hold Megace given possible cancer and increased risk of VTE in setting of covid as well  PT evaluation rec YOUNG, daughter unclear yet if she would want YOUNG vs home with home PT, will depend on functional status w/ rate control.   Care d/w daughter on 2/16  Dr. Filiberto Mcdaniel (oncologist [facility])  Dr. Lubin (PCP) [phone]  Dr. KRYSTAL Montes (cardiologist) [phone]

## 2021-02-17 NOTE — PROVIDER CONTACT NOTE (OTHER) - ASSESSMENT
Patient denies pain, dizziness, or lightheadedness. Patient on 3L NC, resting comfortably after cleaning him, no distress noted.

## 2021-02-17 NOTE — PROGRESS NOTE ADULT - SUBJECTIVE AND OBJECTIVE BOX
Interval History:  Telemetry: Afib HR 's   Patient remains asymptomatic     MEDICATIONS  (STANDING):  apixaban 5 milliGRAM(s) Oral every 12 hours  carBAMazepine 200 milliGRAM(s) Oral at bedtime  carBAMazepine 400 milliGRAM(s) Oral daily  diltiazem    Tablet 30 milliGRAM(s) Oral every 6 hours  lamoTRIgine 100 milliGRAM(s) Oral two times a day  magnesium hydroxide Suspension 30 milliLiter(s) Oral daily  polyethylene glycol 3350 17 Gram(s) Oral two times a day  senna 2 Tablet(s) Oral at bedtime  sodium chloride 0.9% lock flush 3 milliLiter(s) IV Push every 8 hours  tiotropium 18 MICROgram(s) Capsule 1 Capsule(s) Inhalation daily    MEDICATIONS  (PRN):  acetaminophen   Tablet .. 650 milliGRAM(s) Oral every 6 hours PRN Temp greater or equal to 38C (100.4F), Mild Pain (1 - 3), Moderate Pain (4 - 6)  ALBUTerol    90 MICROgram(s) HFA Inhaler 2 Puff(s) Inhalation every 6 hours PRN Shortness of Breath and/or Wheezing  bisacodyl 5 milliGRAM(s) Oral every 12 hours PRN Constipation  guaiFENesin   Syrup  (Sugar-Free) 200 milliGRAM(s) Oral every 6 hours PRN Cough  oxyCODONE    IR 5 milliGRAM(s) Oral every 6 hours PRN Severe Pain (7 - 10)    Vital Signs Last 24 Hrs  T(C): 36.5 (17 Feb 2021 05:19), Max: 36.7 (16 Feb 2021 20:57)  T(F): 97.7 (17 Feb 2021 05:19), Max: 98.1 (16 Feb 2021 20:57)  HR: 142 (17 Feb 2021 05:19) (69 - 142)  BP: 113/80 (17 Feb 2021 05:19) (94/48 - 131/69)  BP(mean): --  RR: 20 (17 Feb 2021 05:19) (18 - 20)  SpO2: 99% (17 Feb 2021 05:19) (99% - 100%)    Appearance: Normal	  HEENT:   Normal oral mucosa, PERRL, EOMI	  Lymphatic: No lymphadenopathy  Cardiovascular: Irregular, No JVD, No murmurs, No edema  Respiratory: Lungs clear to auscultation	  Psychiatry: A & O x 3, Mood & affect appropriate  Gastrointestinal:  Soft, Non-tender, + BS	  Skin: No rashes, No ecchymoses, No cyanosis	  Neurologic: Non-focal  Extremities: Normal range of motion, No clubbing, cyanosis or edema  Vascular: Peripheral pulses palpable 2+ bilaterally    LABS:	 	    CBC Full  -  ( 17 Feb 2021 07:05 )  WBC Count : 12.74 K/uL  Hemoglobin : 8.2 g/dL  Hematocrit : 26.1 %  Platelet Count - Automated : 237 K/uL  Mean Cell Volume : 94.6 fL  Mean Cell Hemoglobin : 29.7 pg  Mean Cell Hemoglobin Concentration : 31.4 gm/dL  Auto Neutrophil # : 11.37 K/uL  Auto Lymphocyte # : 0.49 K/uL  Auto Monocyte # : 0.72 K/uL  Auto Eosinophil # : 0.02 K/uL  Auto Basophil # : 0.02 K/uL  Auto Neutrophil % : 89.2 %  Auto Lymphocyte % : 3.8 %  Auto Monocyte % : 5.7 %  Auto Eosinophil % : 0.2 %  Auto Basophil % : 0.2 %    02-17    133<L>  |  94<L>  |  20  ----------------------------<  93  4.8   |  30  |  1.02    Ca    9.4      17 Feb 2021 07:05

## 2021-02-17 NOTE — PROGRESS NOTE ADULT - ATTENDING COMMENTS
75 year old Male with PMH seizure disorder, HTN, HLD, COVID pneumonia (1/21/2021), Lung CA s/p R pneumonectomy (2003) with  large R chest wall mass and minimally displaced fracture of the anterior right second rib, COPD (home O2 3L), bioprosthetic AVR (2013) and AFib (on Eliquis). Patient admitted with c/o increase in right  chest wall mass size with swelling, redness and warm to touch for the past 2 days and generalized  weakness.    CTPA shows an increased size of right chest wall mass, now measuring 11.4 x 6.9 x 9.7 cm, previously 7.6 x 5.6 x 5.8 cm. No evidence of acute bleeding within the mass. Interval displacement of fracture fragment of the second rib into the center of the soft tissue mass. CXR : Right upper chest wall soft tissue density may correspond to hematoma. Status post right pneumonectomy. The left lung is unremarkable. 2/12- Right chest wall bx in IR today.     ED Found to be in AFib with RVR (130s) given IV Lopressor 5mg X1, remains in AFib with RVR, asymptomatic/hemodynamically stable.          Continue telemetry monitoring.  Restart Cardizem 30mg q6 hours   Would hold off on micra implant at this time risks vs benefits pending biopsy results   Monitor lytes and replete K>4.0 and Mg>2.0  CHADVASC Score=4  Hold Eliquis in AM  Monitor H/H and TSH   Management per primary team    We are very concerned about implanting a PPM at this time given the size of the mass, the rapidity of growth and the very likely possibility of malignancy.  Implanting a Micra through a 27F sheath carries a risk of DVT which will be augmented by a malignancy. Weighing risks and benefits, would prefer rate control of atrial without large IV doses of AV blocking medication.

## 2021-02-18 LAB
ANION GAP SERPL CALC-SCNC: 9 MMOL/L — SIGNIFICANT CHANGE UP (ref 7–14)
BASOPHILS # BLD AUTO: 0.03 K/UL — SIGNIFICANT CHANGE UP (ref 0–0.2)
BASOPHILS NFR BLD AUTO: 0.2 % — SIGNIFICANT CHANGE UP (ref 0–2)
BUN SERPL-MCNC: 16 MG/DL — SIGNIFICANT CHANGE UP (ref 7–23)
CALCIUM SERPL-MCNC: 9.5 MG/DL — SIGNIFICANT CHANGE UP (ref 8.4–10.5)
CHLORIDE SERPL-SCNC: 94 MMOL/L — LOW (ref 98–107)
CO2 SERPL-SCNC: 29 MMOL/L — SIGNIFICANT CHANGE UP (ref 22–31)
CREAT SERPL-MCNC: 0.97 MG/DL — SIGNIFICANT CHANGE UP (ref 0.5–1.3)
EOSINOPHIL # BLD AUTO: 0.01 K/UL — SIGNIFICANT CHANGE UP (ref 0–0.5)
EOSINOPHIL NFR BLD AUTO: 0.1 % — SIGNIFICANT CHANGE UP (ref 0–6)
FERRITIN SERPL-MCNC: 1405 NG/ML — HIGH (ref 30–400)
GLUCOSE SERPL-MCNC: 95 MG/DL — SIGNIFICANT CHANGE UP (ref 70–99)
HAPTOGLOB SERPL-MCNC: 271 MG/DL — HIGH (ref 34–200)
HCT VFR BLD CALC: 22.5 % — LOW (ref 39–50)
HGB BLD-MCNC: 7.1 G/DL — LOW (ref 13–17)
IANC: 10.21 K/UL — HIGH (ref 1.5–8.5)
IMM GRANULOCYTES NFR BLD AUTO: 0.8 % — SIGNIFICANT CHANGE UP (ref 0–1.5)
IRON SATN MFR SERPL: 23 % — SIGNIFICANT CHANGE UP (ref 14–50)
IRON SATN MFR SERPL: 28 UG/DL — LOW (ref 45–165)
LDH SERPL L TO P-CCNC: 363 U/L — HIGH (ref 135–225)
LYMPHOCYTES # BLD AUTO: 0.9 K/UL — LOW (ref 1–3.3)
LYMPHOCYTES # BLD AUTO: 7.4 % — LOW (ref 13–44)
MAGNESIUM SERPL-MCNC: 2.3 MG/DL — SIGNIFICANT CHANGE UP (ref 1.6–2.6)
MCHC RBC-ENTMCNC: 29.7 PG — SIGNIFICANT CHANGE UP (ref 27–34)
MCHC RBC-ENTMCNC: 31.6 GM/DL — LOW (ref 32–36)
MCV RBC AUTO: 94.1 FL — SIGNIFICANT CHANGE UP (ref 80–100)
MONOCYTES # BLD AUTO: 0.87 K/UL — SIGNIFICANT CHANGE UP (ref 0–0.9)
MONOCYTES NFR BLD AUTO: 7.2 % — SIGNIFICANT CHANGE UP (ref 2–14)
NEUTROPHILS # BLD AUTO: 10.21 K/UL — HIGH (ref 1.8–7.4)
NEUTROPHILS NFR BLD AUTO: 84.3 % — HIGH (ref 43–77)
NRBC # BLD: 0 /100 WBCS — SIGNIFICANT CHANGE UP
NRBC # FLD: 0 K/UL — SIGNIFICANT CHANGE UP
PLATELET # BLD AUTO: 213 K/UL — SIGNIFICANT CHANGE UP (ref 150–400)
POTASSIUM SERPL-MCNC: 4.7 MMOL/L — SIGNIFICANT CHANGE UP (ref 3.5–5.3)
POTASSIUM SERPL-SCNC: 4.7 MMOL/L — SIGNIFICANT CHANGE UP (ref 3.5–5.3)
RBC # BLD: 2.39 M/UL — LOW (ref 4.2–5.8)
RBC # FLD: 14.9 % — HIGH (ref 10.3–14.5)
RETICS #: 74.5 K/UL — SIGNIFICANT CHANGE UP (ref 25–125)
RETICS/RBC NFR: 3.2 % — HIGH (ref 0.5–2.5)
SODIUM SERPL-SCNC: 132 MMOL/L — LOW (ref 135–145)
TIBC SERPL-MCNC: 120 UG/DL — LOW (ref 220–430)
UIBC SERPL-MCNC: 92 UG/DL — LOW (ref 110–370)
WBC # BLD: 12.12 K/UL — HIGH (ref 3.8–10.5)
WBC # FLD AUTO: 12.12 K/UL — HIGH (ref 3.8–10.5)

## 2021-02-18 PROCEDURE — 99233 SBSQ HOSP IP/OBS HIGH 50: CPT | Mod: CS

## 2021-02-18 RX ORDER — DILTIAZEM HCL 120 MG
60 CAPSULE, EXT RELEASE 24 HR ORAL EVERY 6 HOURS
Refills: 0 | Status: DISCONTINUED | OUTPATIENT
Start: 2021-02-18 | End: 2021-02-18

## 2021-02-18 RX ORDER — DILTIAZEM HCL 120 MG
90 CAPSULE, EXT RELEASE 24 HR ORAL EVERY 6 HOURS
Refills: 0 | Status: DISCONTINUED | OUTPATIENT
Start: 2021-02-18 | End: 2021-02-18

## 2021-02-18 RX ORDER — DIPHENHYDRAMINE HYDROCHLORIDE AND LIDOCAINE HYDROCHLORIDE AND ALUMINUM HYDROXIDE AND MAGNESIUM HYDRO
15 KIT EVERY 6 HOURS
Refills: 0 | Status: DISCONTINUED | OUTPATIENT
Start: 2021-02-18 | End: 2021-02-24

## 2021-02-18 RX ORDER — OXYCODONE HYDROCHLORIDE 5 MG/1
5 TABLET ORAL EVERY 6 HOURS
Refills: 0 | Status: DISCONTINUED | OUTPATIENT
Start: 2021-02-18 | End: 2021-02-24

## 2021-02-18 RX ORDER — METOPROLOL TARTRATE 50 MG
5 TABLET ORAL ONCE
Refills: 0 | Status: DISCONTINUED | OUTPATIENT
Start: 2021-02-18 | End: 2021-02-18

## 2021-02-18 RX ORDER — DILTIAZEM HCL 120 MG
10 CAPSULE, EXT RELEASE 24 HR ORAL ONCE
Refills: 0 | Status: COMPLETED | OUTPATIENT
Start: 2021-02-18 | End: 2021-02-18

## 2021-02-18 RX ORDER — DILTIAZEM HCL 120 MG
90 CAPSULE, EXT RELEASE 24 HR ORAL EVERY 6 HOURS
Refills: 0 | Status: DISCONTINUED | OUTPATIENT
Start: 2021-02-18 | End: 2021-02-20

## 2021-02-18 RX ORDER — FERROUS SULFATE 325(65) MG
325 TABLET ORAL DAILY
Refills: 0 | Status: DISCONTINUED | OUTPATIENT
Start: 2021-02-18 | End: 2021-02-24

## 2021-02-18 RX ORDER — DILTIAZEM HCL 120 MG
30 CAPSULE, EXT RELEASE 24 HR ORAL ONCE
Refills: 0 | Status: COMPLETED | OUTPATIENT
Start: 2021-02-18 | End: 2021-02-18

## 2021-02-18 RX ADMIN — Medication 30 MILLIGRAM(S): at 10:02

## 2021-02-18 RX ADMIN — Medication 400 MILLIGRAM(S): at 11:21

## 2021-02-18 RX ADMIN — Medication 60 MILLIGRAM(S): at 17:06

## 2021-02-18 RX ADMIN — SODIUM CHLORIDE 3 MILLILITER(S): 9 INJECTION INTRAMUSCULAR; INTRAVENOUS; SUBCUTANEOUS at 05:50

## 2021-02-18 RX ADMIN — APIXABAN 5 MILLIGRAM(S): 2.5 TABLET, FILM COATED ORAL at 17:06

## 2021-02-18 RX ADMIN — LAMOTRIGINE 100 MILLIGRAM(S): 25 TABLET, ORALLY DISINTEGRATING ORAL at 05:55

## 2021-02-18 RX ADMIN — POLYETHYLENE GLYCOL 3350 17 GRAM(S): 17 POWDER, FOR SOLUTION ORAL at 05:54

## 2021-02-18 RX ADMIN — TIOTROPIUM BROMIDE 1 CAPSULE(S): 18 CAPSULE ORAL; RESPIRATORY (INHALATION) at 11:22

## 2021-02-18 RX ADMIN — APIXABAN 5 MILLIGRAM(S): 2.5 TABLET, FILM COATED ORAL at 05:55

## 2021-02-18 RX ADMIN — DIPHENHYDRAMINE HYDROCHLORIDE AND LIDOCAINE HYDROCHLORIDE AND ALUMINUM HYDROXIDE AND MAGNESIUM HYDRO 15 MILLILITER(S): KIT at 17:07

## 2021-02-18 RX ADMIN — LAMOTRIGINE 100 MILLIGRAM(S): 25 TABLET, ORALLY DISINTEGRATING ORAL at 17:07

## 2021-02-18 RX ADMIN — Medication 650 MILLIGRAM(S): at 00:09

## 2021-02-18 RX ADMIN — SODIUM CHLORIDE 3 MILLILITER(S): 9 INJECTION INTRAMUSCULAR; INTRAVENOUS; SUBCUTANEOUS at 21:31

## 2021-02-18 RX ADMIN — Medication 10 MILLIGRAM(S): at 17:06

## 2021-02-18 RX ADMIN — Medication 325 MILLIGRAM(S): at 11:22

## 2021-02-18 RX ADMIN — Medication 30 MILLIGRAM(S): at 00:06

## 2021-02-18 RX ADMIN — POLYETHYLENE GLYCOL 3350 17 GRAM(S): 17 POWDER, FOR SOLUTION ORAL at 17:07

## 2021-02-18 RX ADMIN — DIPHENHYDRAMINE HYDROCHLORIDE AND LIDOCAINE HYDROCHLORIDE AND ALUMINUM HYDROXIDE AND MAGNESIUM HYDRO 15 MILLILITER(S): KIT at 12:17

## 2021-02-18 RX ADMIN — MAGNESIUM HYDROXIDE 30 MILLILITER(S): 400 TABLET, CHEWABLE ORAL at 11:21

## 2021-02-18 RX ADMIN — Medication 30 MILLIGRAM(S): at 05:55

## 2021-02-18 RX ADMIN — SODIUM CHLORIDE 3 MILLILITER(S): 9 INJECTION INTRAMUSCULAR; INTRAVENOUS; SUBCUTANEOUS at 13:26

## 2021-02-18 RX ADMIN — Medication 60 MILLIGRAM(S): at 11:22

## 2021-02-18 NOTE — CHART NOTE - NSCHARTNOTEFT_GEN_A_CORE
Notified by RN that patient sustaining 130s again- HR went up to 150. Cardizem 10mg IVP given STAT and HR went down to 100. Cardizem was already increased to 60mg q6h. Pt due for Cardizem PO as well therefore will not increase Cardizem any further. Will continue to be cautious with meds since pt becomes emma with Metoprolol. Discussed this in detail with DELMER Gutierrez and also Dr. Samaniego.

## 2021-02-18 NOTE — PROGRESS NOTE ADULT - SUBJECTIVE AND OBJECTIVE BOX
TELEMETRY NOTE:  Telemetry review demonstrates Afib HR mostly in the 100 and low 110s. High HR noted was 120s yesterday  No bradyarrhythmias or pauses noted.  Continue current meds including diltiazem 30mg po q6h and apixaban 5mg po q12h.     Matt Lazo PA-C   TELEMETRY NOTE:  Telemetry review demonstrates Afib HR mostly in the 100 and low 110s. High HR noted was 130s which occurred around 12pm.  No bradyarrhythmias or pauses noted.  Continue current meds including diltiazem 60mg po q6h (which was increased on 2/18/2021 when patient was noted to have a HR in the 130s) and apixaban 5mg po q12h. Monitor patient HR and BP.    Matt Lazo PA-C

## 2021-02-18 NOTE — PROVIDER CONTACT NOTE (OTHER) - ASSESSMENT
Patient denies pain, dizziness, or lightheadedness. Patient on 3L NC, resting comfortably after cleaning him, no distress noted. Patient denies pain, dizziness, or lightheadedness. BP & Temperature WDL

## 2021-02-18 NOTE — PROGRESS NOTE ADULT - PROBLEM SELECTOR PLAN 9
Eliquis, hold Megace given possible cancer and increased risk of VTE in setting of covid as well  PT evaluation rec YOUNG, daughter unclear yet if she would want YOUNG vs home with home PT, will depend on functional status w/ rate control.   Care d/w daughter on 2/16  Dr. Filiberto Mcdaniel (oncologist [facility])  Dr. Lubin (PCP) [phone]  Dr. KRYSTAL Montes (cardiologist) [phone]

## 2021-02-18 NOTE — PROGRESS NOTE ADULT - ASSESSMENT
75M former smoker, HTN, HLD, COVID pneumonia 1/12 to 1/21, history of lung CA s/p 2003 R pneumonectomy (chemo radiation 2005), COPD (3L home O2), tissue AVR, A fib on Eliquis, seizure disorder, large R chest wall mass with minimally displaced fracture of the anterior right second rib presents for increasing size of mass now s/p bx on 2/12 also noted to be in afib with RVR on diltiazem

## 2021-02-18 NOTE — PROGRESS NOTE ADULT - SUBJECTIVE AND OBJECTIVE BOX
Patient is a 75y old  Male who presents with a chief complaint of Increasing chest wall mass (17 Feb 2021 13:58)      SUBJECTIVE / OVERNIGHT EVENTS:    Patient seen and examined at bedside. Reporting mouth pain and difficulty eating. No ulcerations, no bleeding from mouth. Pt w/ generalized weakness, did not ambulate with PT yesterday. No worsening dyspnea. No N/V/diarrhea.     MEDICATIONS  (STANDING):  apixaban 5 milliGRAM(s) Oral every 12 hours  carBAMazepine 200 milliGRAM(s) Oral at bedtime  carBAMazepine 400 milliGRAM(s) Oral daily  diltiazem    Tablet 60 milliGRAM(s) Oral every 6 hours  ferrous    sulfate 325 milliGRAM(s) Oral daily  FIRST- Mouthwash  BLM 15 milliLiter(s) Swish and Swallow every 6 hours  lamoTRIgine 100 milliGRAM(s) Oral two times a day  magnesium hydroxide Suspension 30 milliLiter(s) Oral daily  polyethylene glycol 3350 17 Gram(s) Oral two times a day  senna 2 Tablet(s) Oral at bedtime  sodium chloride 0.9% lock flush 3 milliLiter(s) IV Push every 8 hours  tiotropium 18 MICROgram(s) Capsule 1 Capsule(s) Inhalation daily    MEDICATIONS  (PRN):  acetaminophen   Tablet .. 650 milliGRAM(s) Oral every 6 hours PRN Temp greater or equal to 38C (100.4F), Mild Pain (1 - 3), Moderate Pain (4 - 6)  ALBUTerol    90 MICROgram(s) HFA Inhaler 2 Puff(s) Inhalation every 6 hours PRN Shortness of Breath and/or Wheezing  bisacodyl 5 milliGRAM(s) Oral every 12 hours PRN Constipation  guaiFENesin   Syrup  (Sugar-Free) 200 milliGRAM(s) Oral every 6 hours PRN Cough  oxyCODONE    IR 5 milliGRAM(s) Oral every 6 hours PRN Severe Pain (7 - 10)      Vital Signs Last 24 Hrs  T(C): 36.9 (18 Feb 2021 09:00), Max: 36.9 (18 Feb 2021 00:04)  T(F): 98.4 (18 Feb 2021 09:00), Max: 98.5 (18 Feb 2021 05:53)  HR: 126 (18 Feb 2021 11:26) (120 - 135)  BP: 127/74 (18 Feb 2021 11:26) (103/74 - 130/56)  BP(mean): --  RR: 18 (18 Feb 2021 09:00) (16 - 18)  SpO2: 95% (18 Feb 2021 09:00) (95% - 100%)  CAPILLARY BLOOD GLUCOSE        I&O's Summary      PHYSICAL EXAM:  Vital Signs Last 24 Hrs  T(C): 36.9 (02-18-21 @ 09:00)  T(F): 98.4 (02-18-21 @ 09:00), Max: 98.5 (02-18-21 @ 05:53)  HR: 126 (02-18-21 @ 11:26) (120 - 135)  BP: 127/74 (02-18-21 @ 11:26)  BP(mean): --  RR: 18 (02-18-21 @ 09:00) (16 - 18)  SpO2: 95% (02-18-21 @ 09:00) (95% - 100%)  Wt(kg): --    GENERAL: NAD, cachectic , on 2L O2  CHEST/LUNG: Clear to auscultation bilaterally; No wheeze; R upper chest wall mass soft, TTP, no skin breakdown  HEART: irregular; No murmurs, rubs, or gallops  ABDOMEN: Soft, Nontender, Nondistended; Bowel sounds present  EXTREMITIES:   warm and well perfused, No clubbing, cyanosis, or edema  PSYCH: AAOx3  NEUROLOGY: non-focal  SKIN:  chest wall mass under skin on R upper chest       LABS:                        7.1    12.12 )-----------( 213      ( 18 Feb 2021 06:42 )             22.5     02-18    132<L>  |  94<L>  |  16  ----------------------------<  95  4.7   |  29  |  0.97    Ca    9.5      18 Feb 2021 06:42  Mg     2.3     02-18                RADIOLOGY & ADDITIONAL TESTS:    Imaging Personally Reviewed:    Consultant(s) Notes Reviewed:      Care Discussed with Consultants/Other Providers:

## 2021-02-18 NOTE — CHART NOTE - NSCHARTNOTEFT_GEN_A_CORE
Pt went rapid Afib. HR was sustaining 130s. Pt seen and examined at bedside. Pt complaining of generalized weakness. Pt denies chest pain, palpitations, dizziness, SOB, Headache, abdominal pain, N/V. Cardizem increased to 60mg q6h. Electrolytes from this AM WNL. Hr improved.   Pt noted with downtrending Hg- Hg 7.1 today. Given pt feels weak, will give 1u PRBC. Consent obtained via [place]  (ID#[id number]) witnessed by RN. Will continue to monitor.  Plan discussed with attending Dr. Ruiz. Pt went rapid Afib. HR was sustaining 130s. Pt seen and examined at bedside. Pt complaining of generalized weakness. Pt denies chest pain, palpitations, dizziness, SOB, Headache, abdominal pain, N/V. Cardizem increased to 60mg q6h. Electrolytes from this AM WNL. Hr improved.   Pt noted with downtrending Hg- Hg 7.1 today. Given pt feels weak, will give 1u PRBC. Consent obtained via [place]  (ID#[id number]) witnessed by RN. Pt has active T&S. No evidence of bleeding noted. Iron labs consistent with AOCD and iron deficiency anemia. Started on PO iron. Will continue to monitor.  Plan discussed with attending Dr. Ruiz.

## 2021-02-18 NOTE — PROGRESS NOTE ADULT - ATTENDING COMMENTS
We are holding off with pacemaker at this time given the increased risk of thromboembolism in this patient with a large rapid growing mass in his right thorax.  Prefer judicious use of AV blocking medications.

## 2021-02-18 NOTE — PROGRESS NOTE ADULT - PROBLEM SELECTOR PLAN 2
- 7.1 trended down since admission, no active bleeding, labs consistent with AOCD, elevated ferritin, normal trans sat, B12 and folic acid wnl  - will transfuse 1 u to optimize prior to d/c.

## 2021-02-18 NOTE — PROGRESS NOTE ADULT - PROBLEM SELECTOR PLAN 3
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

## 2021-02-19 LAB
ALBUMIN SERPL ELPH-MCNC: 3 G/DL — LOW (ref 3.3–5)
ALP SERPL-CCNC: 172 U/L — HIGH (ref 40–120)
ALT FLD-CCNC: 22 U/L — SIGNIFICANT CHANGE UP (ref 4–41)
ANION GAP SERPL CALC-SCNC: 9 MMOL/L — SIGNIFICANT CHANGE UP (ref 7–14)
AST SERPL-CCNC: 25 U/L — SIGNIFICANT CHANGE UP (ref 4–40)
BASOPHILS # BLD AUTO: 0.04 K/UL — SIGNIFICANT CHANGE UP (ref 0–0.2)
BASOPHILS NFR BLD AUTO: 0.3 % — SIGNIFICANT CHANGE UP (ref 0–2)
BILIRUB SERPL-MCNC: 1 MG/DL — SIGNIFICANT CHANGE UP (ref 0.2–1.2)
BUN SERPL-MCNC: 17 MG/DL — SIGNIFICANT CHANGE UP (ref 7–23)
CALCIUM SERPL-MCNC: 10 MG/DL — SIGNIFICANT CHANGE UP (ref 8.4–10.5)
CHLORIDE SERPL-SCNC: 93 MMOL/L — LOW (ref 98–107)
CO2 SERPL-SCNC: 31 MMOL/L — SIGNIFICANT CHANGE UP (ref 22–31)
CREAT SERPL-MCNC: 0.99 MG/DL — SIGNIFICANT CHANGE UP (ref 0.5–1.3)
EOSINOPHIL # BLD AUTO: 0.02 K/UL — SIGNIFICANT CHANGE UP (ref 0–0.5)
EOSINOPHIL NFR BLD AUTO: 0.1 % — SIGNIFICANT CHANGE UP (ref 0–6)
GLUCOSE SERPL-MCNC: 84 MG/DL — SIGNIFICANT CHANGE UP (ref 70–99)
HCT VFR BLD CALC: 30.3 % — LOW (ref 39–50)
HGB BLD-MCNC: 9.7 G/DL — LOW (ref 13–17)
IANC: 12.91 K/UL — HIGH (ref 1.5–8.5)
IMM GRANULOCYTES NFR BLD AUTO: 0.6 % — SIGNIFICANT CHANGE UP (ref 0–1.5)
LYMPHOCYTES # BLD AUTO: 0.97 K/UL — LOW (ref 1–3.3)
LYMPHOCYTES # BLD AUTO: 6.5 % — LOW (ref 13–44)
MAGNESIUM SERPL-MCNC: 2.4 MG/DL — SIGNIFICANT CHANGE UP (ref 1.6–2.6)
MCHC RBC-ENTMCNC: 29.8 PG — SIGNIFICANT CHANGE UP (ref 27–34)
MCHC RBC-ENTMCNC: 32 GM/DL — SIGNIFICANT CHANGE UP (ref 32–36)
MCV RBC AUTO: 92.9 FL — SIGNIFICANT CHANGE UP (ref 80–100)
MONOCYTES # BLD AUTO: 0.93 K/UL — HIGH (ref 0–0.9)
MONOCYTES NFR BLD AUTO: 6.2 % — SIGNIFICANT CHANGE UP (ref 2–14)
NEUTROPHILS # BLD AUTO: 12.91 K/UL — HIGH (ref 1.8–7.4)
NEUTROPHILS NFR BLD AUTO: 86.3 % — HIGH (ref 43–77)
NON-GYNECOLOGICAL CYTOLOGY STUDY: SIGNIFICANT CHANGE UP
NRBC # BLD: 0 /100 WBCS — SIGNIFICANT CHANGE UP
NRBC # FLD: 0 K/UL — SIGNIFICANT CHANGE UP
PHOSPHATE SERPL-MCNC: 3 MG/DL — SIGNIFICANT CHANGE UP (ref 2.5–4.5)
PLATELET # BLD AUTO: 235 K/UL — SIGNIFICANT CHANGE UP (ref 150–400)
POTASSIUM SERPL-MCNC: 4.7 MMOL/L — SIGNIFICANT CHANGE UP (ref 3.5–5.3)
POTASSIUM SERPL-SCNC: 4.7 MMOL/L — SIGNIFICANT CHANGE UP (ref 3.5–5.3)
PROT SERPL-MCNC: 6.8 G/DL — SIGNIFICANT CHANGE UP (ref 6–8.3)
RBC # BLD: 3.26 M/UL — LOW (ref 4.2–5.8)
RBC # FLD: 15.7 % — HIGH (ref 10.3–14.5)
SODIUM SERPL-SCNC: 133 MMOL/L — LOW (ref 135–145)
TM INTERPRETATION: SIGNIFICANT CHANGE UP
WBC # BLD: 14.96 K/UL — HIGH (ref 3.8–10.5)
WBC # FLD AUTO: 14.96 K/UL — HIGH (ref 3.8–10.5)

## 2021-02-19 PROCEDURE — 99233 SBSQ HOSP IP/OBS HIGH 50: CPT | Mod: CS

## 2021-02-19 PROCEDURE — 99232 SBSQ HOSP IP/OBS MODERATE 35: CPT

## 2021-02-19 RX ADMIN — ALBUTEROL 2 PUFF(S): 90 AEROSOL, METERED ORAL at 17:58

## 2021-02-19 RX ADMIN — SENNA PLUS 2 TABLET(S): 8.6 TABLET ORAL at 21:43

## 2021-02-19 RX ADMIN — DIPHENHYDRAMINE HYDROCHLORIDE AND LIDOCAINE HYDROCHLORIDE AND ALUMINUM HYDROXIDE AND MAGNESIUM HYDRO 15 MILLILITER(S): KIT at 11:57

## 2021-02-19 RX ADMIN — TIOTROPIUM BROMIDE 1 CAPSULE(S): 18 CAPSULE ORAL; RESPIRATORY (INHALATION) at 11:32

## 2021-02-19 RX ADMIN — DIPHENHYDRAMINE HYDROCHLORIDE AND LIDOCAINE HYDROCHLORIDE AND ALUMINUM HYDROXIDE AND MAGNESIUM HYDRO 15 MILLILITER(S): KIT at 23:52

## 2021-02-19 RX ADMIN — ALBUTEROL 2 PUFF(S): 90 AEROSOL, METERED ORAL at 06:16

## 2021-02-19 RX ADMIN — SODIUM CHLORIDE 3 MILLILITER(S): 9 INJECTION INTRAMUSCULAR; INTRAVENOUS; SUBCUTANEOUS at 06:17

## 2021-02-19 RX ADMIN — DIPHENHYDRAMINE HYDROCHLORIDE AND LIDOCAINE HYDROCHLORIDE AND ALUMINUM HYDROXIDE AND MAGNESIUM HYDRO 15 MILLILITER(S): KIT at 00:06

## 2021-02-19 RX ADMIN — APIXABAN 5 MILLIGRAM(S): 2.5 TABLET, FILM COATED ORAL at 06:16

## 2021-02-19 RX ADMIN — Medication 90 MILLIGRAM(S): at 00:05

## 2021-02-19 RX ADMIN — LAMOTRIGINE 100 MILLIGRAM(S): 25 TABLET, ORALLY DISINTEGRATING ORAL at 17:22

## 2021-02-19 RX ADMIN — DIPHENHYDRAMINE HYDROCHLORIDE AND LIDOCAINE HYDROCHLORIDE AND ALUMINUM HYDROXIDE AND MAGNESIUM HYDRO 15 MILLILITER(S): KIT at 06:16

## 2021-02-19 RX ADMIN — ALBUTEROL 2 PUFF(S): 90 AEROSOL, METERED ORAL at 00:05

## 2021-02-19 RX ADMIN — Medication 325 MILLIGRAM(S): at 11:32

## 2021-02-19 RX ADMIN — Medication 90 MILLIGRAM(S): at 06:16

## 2021-02-19 RX ADMIN — Medication 90 MILLIGRAM(S): at 23:51

## 2021-02-19 RX ADMIN — LAMOTRIGINE 100 MILLIGRAM(S): 25 TABLET, ORALLY DISINTEGRATING ORAL at 06:16

## 2021-02-19 RX ADMIN — SODIUM CHLORIDE 3 MILLILITER(S): 9 INJECTION INTRAMUSCULAR; INTRAVENOUS; SUBCUTANEOUS at 21:25

## 2021-02-19 RX ADMIN — POLYETHYLENE GLYCOL 3350 17 GRAM(S): 17 POWDER, FOR SOLUTION ORAL at 06:16

## 2021-02-19 RX ADMIN — DIPHENHYDRAMINE HYDROCHLORIDE AND LIDOCAINE HYDROCHLORIDE AND ALUMINUM HYDROXIDE AND MAGNESIUM HYDRO 15 MILLILITER(S): KIT at 17:23

## 2021-02-19 RX ADMIN — Medication 400 MILLIGRAM(S): at 11:57

## 2021-02-19 RX ADMIN — SENNA PLUS 2 TABLET(S): 8.6 TABLET ORAL at 00:05

## 2021-02-19 RX ADMIN — Medication 90 MILLIGRAM(S): at 17:22

## 2021-02-19 RX ADMIN — Medication 200 MILLIGRAM(S): at 00:05

## 2021-02-19 RX ADMIN — MAGNESIUM HYDROXIDE 30 MILLILITER(S): 400 TABLET, CHEWABLE ORAL at 11:32

## 2021-02-19 RX ADMIN — SODIUM CHLORIDE 3 MILLILITER(S): 9 INJECTION INTRAMUSCULAR; INTRAVENOUS; SUBCUTANEOUS at 14:00

## 2021-02-19 RX ADMIN — Medication 200 MILLIGRAM(S): at 21:42

## 2021-02-19 RX ADMIN — APIXABAN 5 MILLIGRAM(S): 2.5 TABLET, FILM COATED ORAL at 17:23

## 2021-02-19 RX ADMIN — Medication 90 MILLIGRAM(S): at 11:32

## 2021-02-19 RX ADMIN — Medication 650 MILLIGRAM(S): at 00:05

## 2021-02-19 NOTE — PROGRESS NOTE ADULT - PROBLEM SELECTOR PLAN 9
Eliquis, hold Megace given possible cancer and increased risk of VTE in setting of covid as well  PT evaluation rec YOUNG, daughter unclear yet if she would want YOUNG vs home with home PT, will depend on functional status w/ rate control.   Dr. Filiberto Mcdaniel (oncologist [facility])  Dr. Lubin (PCP) [phone]  Dr. KRYSTAL Montes (cardiologist) [phone]

## 2021-02-19 NOTE — CHART NOTE - NSCHARTNOTEFT_GEN_A_CORE
** Patient COVID positive to recommendations based on chart review ***      76yo Mauritian speaking M w/ PMHx of lung CA s/p R pneumonectomy (2003), COPD (on 3L home O2), AVR, afib on eliquis, seizure dx, HTN, HLD p/w worsening Afib with RVR and gradually growing new infraclavicular mass. In terms of cancer history, patient had R pneumonectomy in 2003, last chemo/radiation was in 2005, and patient had close follow up until 2 years ago with no recurrence of disease (Oncologist Dr. Filiberto Mcdaniel with [facility]). Patient evaluated by onc on 12/30 and mass was concerning for recurrence of malignancy given Rt rib fracture with associated hematoma/metastatic lesion. At that time, IR guided Bx was recommended but family preferred to get this done as outpatient and follow with Dr. Mcdaniel.     Patient then got COVID and was not able to get the mass biopsied. In the interim, the mass grew. He presented to the hospital on 2/11 with A fib with RVR and mass has been biopsied during this admission. This just resulted as DLBCL and hematology has been consulted.     Recommendations:   - Please obtain CT A/P and MRI brain   - Since patient without B symptoms per d/w Dr. Ruiz, hold off on steroids for now  - Await IHC/FISH testing for myc/Bcl2/BCl6 before deciding on treatment  - Echo 2/15 with grossly normal LVEF  - May need line placement         Abby Melton, PGY-4  Hematology-Oncology Fellow  [phone] ([place]) [phone] ([facility])  I can also be reached on Microsoft Teams  Please page fellow on call after 5pm and on weekends ** Patient COVID positive to recommendations based on chart review ***      74yo Haitian speaking M w/ PMHx of lung CA s/p R pneumonectomy (2003), COPD (on 3L home O2), AVR, afib on eliquis, seizure dx, HTN, HLD p/w worsening Afib with RVR and gradually growing new infraclavicular mass. In terms of cancer history, patient had R pneumonectomy in 2003, last chemo/radiation was in 2005, and patient had close follow up until 2 years ago with no recurrence of disease (Oncologist Dr. Filiberto Mcdaniel with [facility]). Patient evaluated by onc on 12/30 and mass was concerning for recurrence of malignancy given Rt rib fracture with associated hematoma/metastatic lesion. At that time, IR guided Bx was recommended but family preferred to get this done as outpatient and follow with Dr. Mcdaniel.     Patient then got COVID and was not able to get the mass biopsied. In the interim, the mass grew. He presented to the hospital on 2/11 with A fib with RVR and mass has been biopsied during this admission. This just resulted as DLBCL and hematology has been consulted.     Recommendations:   - Please obtain CT A/P and MRI brain   - Since patient without B symptoms per d/w Dr. Ruiz, hold off on steroids for now  - Await IHC/FISH testing for myc/Bcl2/BCl6 before deciding on treatment  - Echo 2/15 with grossly normal LVEF  - Daily TLS labs: CMP/LDH/uric acid/phos  - May need line placement         Abby Melton, PGY-4  Hematology-Oncology Fellow  [phone] ([place]) [phone] ([facility])  I can also be reached on Microsoft Teams  Please page fellow on call after 5pm and on weekends

## 2021-02-19 NOTE — PROGRESS NOTE ADULT - ASSESSMENT
75M former smoker, HTN, HLD, COVID pneumonia 1/12 to 1/21, history of lung CA s/p 2003 R pneumonectomy (chemo radiation 2005), COPD (3L home O2), tissue AVR, A fib on Eliquis, seizure disorder, large R chest wall mass with minimally displaced fracture of the anterior right second rib presents for increasing size of mass now s/p bx on 2/12 also noted to be in afib with RVR.    This is a 75year old  former smoker man with a pmhx of HTN, HLD, COVID pneumonia 1/12 to 1/21, history of lung CA s/p 2003 R pneumonectomy (chemo radiation 2005), COPD (3L home O2), tissue AVR, A fib on Eliquis, seizure disorder, large R chest wall mass with minimally displaced fracture of the anterior right second rib presents for increasing size of mass now s/p bx on 2/12 also noted to be in afib with RVR on the ED given IV Lopressor 5mg X1, remains in AFib with RVR. . 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 Plan to uptitrated to diltiazem at titrated     Plan:  - Continue telemetry monitoring.  - Increase Cardizem to  90mg q6h and monitor HR and BP  - Monitor lytes and replete K>4.0 and Mg>2.0  - Continue Eliquis for thromboembolic ppx given pt has a CHADVASC Score=4  -Not a PPM candidate at the time given that the risk outweighs the benefit given comorbidities and  increased risk of thromboembolism in this patient with a large rapid growing mass in his right thorax. Plan to uptitrated to diltiazem at titrated   - Management per primary team     Matt Lazo PA-C  Patient to be staff with attending. Please awaiting attending addendum

## 2021-02-19 NOTE — PROGRESS NOTE ADULT - ASSESSMENT
75M former smoker, HTN, HLD, COVID pneumonia 1/12 to 1/21, history of lung CA s/p 2003 R pneumonectomy (chemo radiation 2005), COPD (3L home O2), tissue AVR, A fib on Eliquis, seizure disorder, large R chest wall mass with minimally displaced fracture of the anterior right second rib presents for increasing size of mass now s/p bx on 2/12 also noted to be in afib with RVR pending optimization with diltiazem

## 2021-02-19 NOTE — PROGRESS NOTE ADULT - ATTENDING COMMENTS
This is a 75year old  former smoker man with a pmhx of HTN, HLD, COVID pneumonia 1/12 to 1/21, history of lung CA s/p 2003 R pneumonectomy (chemo radiation 2005), COPD (3L home O2), tissue AVR, A fib on Eliquis, seizure disorder, large R chest wall mass with minimally displaced fracture of the anterior right second rib presents for increasing size of mass now s/p bx on 2/12 also noted to be in afib with RVR on the ED given IV Lopressor 5mg X1, remains in AFib with RVR. . 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 Plan to uptitrated to diltiazem at titrated     Plan:  - Continue telemetry monitoring.  - Increase Cardizem to  90mg q6h and monitor HR and BP; can increase to maximum 120 mg q6h on 2/21/2021 if rate still not controlled.  - Monitor lytes and replete K>4.0 and Mg>2.0  - Continue Eliquis for thromboembolic ppx given pt has a CHADVASC Score=4  -Not a PPM candidate at the time given that the risk outweighs the benefit given comorbidities and  increased risk of thromboembolism in this patient with a large rapid growing mass in his right thorax. Plan to uptitrated to diltiazem at titrated   - Management per primary team This is a 75year old  former smoker man with a pmhx of HTN, HLD, COVID pneumonia 1/12 to 1/21, history of lung CA s/p 2003 R pneumonectomy (chemo radiation 2005), COPD (3L home O2), tissue AVR, A fib on Eliquis, seizure disorder, large R chest wall mass with minimally displaced fracture of the anterior right second rib presents for increasing size of mass now s/p bx on 2/12 also noted to be in afib with RVR on the ED given IV Lopressor 5mg X1, remains in AFib with RVR. . 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 Plan to uptitrated to diltiazem at titrated     Plan:  - Continue telemetry monitoring.  - Increase Cardizem to  90mg q6h and monitor HR and BP; can increase to maximum 120 mg q6h on 2/21/2021 or sooner if rate still not controlled.  - Monitor lytes and replete K>4.0 and Mg>2.0  - Continue Eliquis for thromboembolic ppx given pt has a CHADVASC Score=4  -Not a PPM candidate at the time given that the risk outweighs the benefit given comorbidities and  increased risk of thromboembolism in this patient with a large rapid growing mass in his right thorax. Plan to uptitrated to diltiazem at titrated   - Management per primary team

## 2021-02-19 NOTE — PROGRESS NOTE ADULT - SUBJECTIVE AND OBJECTIVE BOX
[place]  [place] [id number]    Subjective: Admits to palpitation when HR is rapid. Denies HA, lightheadedness, dizziness, CP, SOB, abdominal pain, N/V.      Interval events:  -P/w  increase in right  chest wall mass size with swelling, redness and warm to touch for the past 2 days and generalized  weakness.    CTPA shows an increased size of right chest wall mass, now measuring 11.4 x 6.9 x 9.7 cm, previously 7.6 x 5.6 x 5.8 cm. No evidence of acute bleeding within the mass. Interval displacement of fracture fragment of the second rib into the center of the soft tissue mass. Found to be in AFib with RVR (130s) in the ED and given IV Lopressor 5mg X1, remains in AFib with RVR.   - IR is consulted to biopsy the right chest wall mass s/p biopsy on 2/12/2021. 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 Plan to uptitrated to diltiazem at titrated     MEDICATIONS  (STANDING):  apixaban 5 milliGRAM(s) Oral every 12 hours  carBAMazepine 200 milliGRAM(s) Oral at bedtime  carBAMazepine 400 milliGRAM(s) Oral daily  diltiazem    Tablet 90 milliGRAM(s) Oral every 6 hours  ferrous    sulfate 325 milliGRAM(s) Oral daily  FIRST- Mouthwash  BLM 15 milliLiter(s) Swish and Swallow every 6 hours  lamoTRIgine 100 milliGRAM(s) Oral two times a day  magnesium hydroxide Suspension 30 milliLiter(s) Oral daily  polyethylene glycol 3350 17 Gram(s) Oral two times a day  senna 2 Tablet(s) Oral at bedtime  sodium chloride 0.9% lock flush 3 milliLiter(s) IV Push every 8 hours  tiotropium 18 MICROgram(s) Capsule 1 Capsule(s) Inhalation daily    MEDICATIONS  (PRN):  acetaminophen   Tablet .. 650 milliGRAM(s) Oral every 6 hours PRN Temp greater or equal to 38C (100.4F), Mild Pain (1 - 3), Moderate Pain (4 - 6)  ALBUTerol    90 MICROgram(s) HFA Inhaler 2 Puff(s) Inhalation every 6 hours PRN Shortness of Breath and/or Wheezing  bisacodyl 5 milliGRAM(s) Oral every 12 hours PRN Constipation  guaiFENesin   Syrup  (Sugar-Free) 200 milliGRAM(s) Oral every 6 hours PRN Cough  oxyCODONE    IR 5 milliGRAM(s) Oral every 6 hours PRN Severe Pain (7 - 10)          Vital Signs Last 24 Hrs  T(C): 36.8 (19 Feb 2021 11:30), Max: 37.1 (18 Feb 2021 13:41)  T(F): 98.3 (19 Feb 2021 11:30), Max: 98.7 (18 Feb 2021 13:41)  HR: 97 (19 Feb 2021 11:30) (97 - 137)  BP: 121/58 (19 Feb 2021 11:30) (112/56 - 133/72)  BP(mean): --  RR: 14 (19 Feb 2021 11:30) (14 - 18)  SpO2: 100% (19 Feb 2021 11:30) (98% - 100%)  I&O's Detail      Physical Exam:  GENERAL: Lying in bed, in NAD  HEART: S1S2 irregularly irregular   PULMONARY: CTABL, normal respiratory effort. Chest mass under skin on R upper chest     TELEMETERIC:Afib HR 70-80 mostly but had briefs episode of RVR -120s                              9.7    14.96 )-----------( 235      ( 19 Feb 2021 07:37 )             30.3       02-19    133<L>  |  93<L>  |  17  ----------------------------<  84  4.7   |  31  |  0.99    Ca    10.0      19 Feb 2021 07:46  Phos  3.0     02-19  Mg     2.4     02-19    TPro  6.8  /  Alb  3.0<L>  /  TBili  1.0  /  DBili  x   /  AST  25  /  ALT  22  /  AlkPhos  172<H>  02-19      Patient to be staff with attending. Please awaiting attending addendum      [facility]  Mily [id number]    Subjective: Admits to palpitation when HR is rapid. Denies HA, lightheadedness, dizziness, CP, SOB, abdominal pain, N/V.      Interval events:  -P/w  increase in right  chest wall mass size with swelling, redness and warm to touch for the past 2 days and generalized  weakness.    CTPA shows an increased size of right chest wall mass, now measuring 11.4 x 6.9 x 9.7 cm, previously 7.6 x 5.6 x 5.8 cm. No evidence of acute bleeding within the mass. Interval displacement of fracture fragment of the second rib into the center of the soft tissue mass. Found to be in AFib with RVR (130s) in the ED and given IV Lopressor 5mg X1, remains in AFib with RVR.   - IR is consulted to biopsy the right chest wall mass s/p biopsy on 2/12/2021. 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 Plan to uptitrated to diltiazem at titrated     MEDICATIONS  (STANDING):  apixaban 5 milliGRAM(s) Oral every 12 hours  carBAMazepine 200 milliGRAM(s) Oral at bedtime  carBAMazepine 400 milliGRAM(s) Oral daily  diltiazem    Tablet 90 milliGRAM(s) Oral every 6 hours  ferrous    sulfate 325 milliGRAM(s) Oral daily  FIRST- Mouthwash  BLM 15 milliLiter(s) Swish and Swallow every 6 hours  lamoTRIgine 100 milliGRAM(s) Oral two times a day  magnesium hydroxide Suspension 30 milliLiter(s) Oral daily  polyethylene glycol 3350 17 Gram(s) Oral two times a day  senna 2 Tablet(s) Oral at bedtime  sodium chloride 0.9% lock flush 3 milliLiter(s) IV Push every 8 hours  tiotropium 18 MICROgram(s) Capsule 1 Capsule(s) Inhalation daily    MEDICATIONS  (PRN):  acetaminophen   Tablet .. 650 milliGRAM(s) Oral every 6 hours PRN Temp greater or equal to 38C (100.4F), Mild Pain (1 - 3), Moderate Pain (4 - 6)  ALBUTerol    90 MICROgram(s) HFA Inhaler 2 Puff(s) Inhalation every 6 hours PRN Shortness of Breath and/or Wheezing  bisacodyl 5 milliGRAM(s) Oral every 12 hours PRN Constipation  guaiFENesin   Syrup  (Sugar-Free) 200 milliGRAM(s) Oral every 6 hours PRN Cough  oxyCODONE    IR 5 milliGRAM(s) Oral every 6 hours PRN Severe Pain (7 - 10)          Vital Signs Last 24 Hrs  T(C): 36.8 (19 Feb 2021 11:30), Max: 37.1 (18 Feb 2021 13:41)  T(F): 98.3 (19 Feb 2021 11:30), Max: 98.7 (18 Feb 2021 13:41)  HR: 97 (19 Feb 2021 11:30) (97 - 137)  BP: 121/58 (19 Feb 2021 11:30) (112/56 - 133/72)  BP(mean): --  RR: 14 (19 Feb 2021 11:30) (14 - 18)  SpO2: 100% (19 Feb 2021 11:30) (98% - 100%)  I&O's Detail      Physical Exam:  GENERAL: Lying in bed, in NAD  HEART: S1S2 irregularly irregular   PULMONARY: CTABL, normal respiratory effort. Chest mass under skin on R upper chest     TELEMETERIC:Afib HR 70-80 mostly but had briefs episode of RVR -120s                              9.7    14.96 )-----------( 235      ( 19 Feb 2021 07:37 )             30.3       02-19    133<L>  |  93<L>  |  17  ----------------------------<  84  4.7   |  31  |  0.99    Ca    10.0      19 Feb 2021 07:46  Phos  3.0     02-19  Mg     2.4     02-19    TPro  6.8  /  Alb  3.0<L>  /  TBili  1.0  /  DBili  x   /  AST  25  /  ALT  22  /  AlkPhos  172<H>  02-19

## 2021-02-19 NOTE — PROGRESS NOTE ADULT - PROBLEM SELECTOR PLAN 3
CHADSVASC= 4; per daughter issues with rate control causing fatigability since COVID, reports HR goes to 150s on minimal exertion limiting PT attempts  - c/w Eliquis   - f/u w/ EP w/ concern for tachy-emma syndrome and sick sinus, initial plan for micra PPM today however at this time risk > benefit given comorbidites and chest wall mass uptitrated to dilitazem 90 mg q6

## 2021-02-19 NOTE — PROGRESS NOTE ADULT - SUBJECTIVE AND OBJECTIVE BOX
Patient is a 75y old  Male who presents with a chief complaint of Increasing chest wall mass (19 Feb 2021 12:43)      SUBJECTIVE / OVERNIGHT EVENTS:    Patient seen and examined at bedside. Less lethargic this morning, improved mouth pain able to tolerate po intake, Afib trend  this morning. No chest pain.     MEDICATIONS  (STANDING):  apixaban 5 milliGRAM(s) Oral every 12 hours  carBAMazepine 200 milliGRAM(s) Oral at bedtime  carBAMazepine 400 milliGRAM(s) Oral daily  diltiazem    Tablet 90 milliGRAM(s) Oral every 6 hours  ferrous    sulfate 325 milliGRAM(s) Oral daily  FIRST- Mouthwash  BLM 15 milliLiter(s) Swish and Swallow every 6 hours  lamoTRIgine 100 milliGRAM(s) Oral two times a day  magnesium hydroxide Suspension 30 milliLiter(s) Oral daily  polyethylene glycol 3350 17 Gram(s) Oral two times a day  senna 2 Tablet(s) Oral at bedtime  sodium chloride 0.9% lock flush 3 milliLiter(s) IV Push every 8 hours  tiotropium 18 MICROgram(s) Capsule 1 Capsule(s) Inhalation daily    MEDICATIONS  (PRN):  acetaminophen   Tablet .. 650 milliGRAM(s) Oral every 6 hours PRN Temp greater or equal to 38C (100.4F), Mild Pain (1 - 3), Moderate Pain (4 - 6)  ALBUTerol    90 MICROgram(s) HFA Inhaler 2 Puff(s) Inhalation every 6 hours PRN Shortness of Breath and/or Wheezing  bisacodyl 5 milliGRAM(s) Oral every 12 hours PRN Constipation  guaiFENesin   Syrup  (Sugar-Free) 200 milliGRAM(s) Oral every 6 hours PRN Cough  oxyCODONE    IR 5 milliGRAM(s) Oral every 6 hours PRN Severe Pain (7 - 10)      Vital Signs Last 24 Hrs  T(C): 36.8 (19 Feb 2021 11:30), Max: 37.1 (18 Feb 2021 13:41)  T(F): 98.3 (19 Feb 2021 11:30), Max: 98.7 (18 Feb 2021 13:41)  HR: 97 (19 Feb 2021 11:30) (97 - 137)  BP: 121/58 (19 Feb 2021 11:30) (118/68 - 133/72)  BP(mean): --  RR: 14 (19 Feb 2021 11:30) (14 - 18)  SpO2: 100% (19 Feb 2021 11:30) (98% - 100%)  CAPILLARY BLOOD GLUCOSE        I&O's Summary      PHYSICAL EXAM:  Vital Signs Last 24 Hrs  T(C): 36.8 (02-19-21 @ 11:30)  T(F): 98.3 (02-19-21 @ 11:30), Max: 98.7 (02-18-21 @ 13:41)  HR: 97 (02-19-21 @ 11:30) (97 - 137)  BP: 121/58 (02-19-21 @ 11:30)  BP(mean): --  RR: 14 (02-19-21 @ 11:30) (14 - 18)  SpO2: 100% (02-19-21 @ 11:30) (98% - 100%)  Wt(kg): --    GENERAL: NAD, cachectic , on 2L O2  CHEST/LUNG: Clear to auscultation bilaterally; No wheeze; R upper chest wall mass soft, TTP, no skin breakdown  HEART: irregular; No murmurs, rubs, or gallops  ABDOMEN: Soft, Nontender, Nondistended; Bowel sounds present  EXTREMITIES:   warm and well perfused, No clubbing, cyanosis, or edema  PSYCH: AAOx3  NEUROLOGY: non-focal  SKIN:  chest wall mass under skin on R upper chest     LABS:                        9.7    14.96 )-----------( 235      ( 19 Feb 2021 07:37 )             30.3     02-19    133<L>  |  93<L>  |  17  ----------------------------<  84  4.7   |  31  |  0.99    Ca    10.0      19 Feb 2021 07:46  Phos  3.0     02-19  Mg     2.4     02-19    TPro  6.8  /  Alb  3.0<L>  /  TBili  1.0  /  DBili  x   /  AST  25  /  ALT  22  /  AlkPhos  172<H>  02-19              RADIOLOGY & ADDITIONAL TESTS:    Imaging Personally Reviewed:    Consultant(s) Notes Reviewed:      Care Discussed with Consultants/Other Providers:

## 2021-02-20 DIAGNOSIS — C85.90 NON-HODGKIN LYMPHOMA, UNSPECIFIED, UNSPECIFIED SITE: ICD-10-CM

## 2021-02-20 PROCEDURE — 99233 SBSQ HOSP IP/OBS HIGH 50: CPT | Mod: CS

## 2021-02-20 PROCEDURE — 74177 CT ABD & PELVIS W/CONTRAST: CPT | Mod: 26

## 2021-02-20 RX ORDER — DILTIAZEM HCL 120 MG
120 CAPSULE, EXT RELEASE 24 HR ORAL EVERY 6 HOURS
Refills: 0 | Status: DISCONTINUED | OUTPATIENT
Start: 2021-02-20 | End: 2021-02-21

## 2021-02-20 RX ADMIN — SODIUM CHLORIDE 3 MILLILITER(S): 9 INJECTION INTRAMUSCULAR; INTRAVENOUS; SUBCUTANEOUS at 05:55

## 2021-02-20 RX ADMIN — Medication 90 MILLIGRAM(S): at 08:08

## 2021-02-20 RX ADMIN — ALBUTEROL 2 PUFF(S): 90 AEROSOL, METERED ORAL at 18:09

## 2021-02-20 RX ADMIN — POLYETHYLENE GLYCOL 3350 17 GRAM(S): 17 POWDER, FOR SOLUTION ORAL at 18:08

## 2021-02-20 RX ADMIN — DIPHENHYDRAMINE HYDROCHLORIDE AND LIDOCAINE HYDROCHLORIDE AND ALUMINUM HYDROXIDE AND MAGNESIUM HYDRO 15 MILLILITER(S): KIT at 08:08

## 2021-02-20 RX ADMIN — SODIUM CHLORIDE 3 MILLILITER(S): 9 INJECTION INTRAMUSCULAR; INTRAVENOUS; SUBCUTANEOUS at 22:25

## 2021-02-20 RX ADMIN — TIOTROPIUM BROMIDE 1 CAPSULE(S): 18 CAPSULE ORAL; RESPIRATORY (INHALATION) at 13:48

## 2021-02-20 RX ADMIN — Medication 200 MILLIGRAM(S): at 22:26

## 2021-02-20 RX ADMIN — Medication 400 MILLIGRAM(S): at 13:42

## 2021-02-20 RX ADMIN — APIXABAN 5 MILLIGRAM(S): 2.5 TABLET, FILM COATED ORAL at 18:08

## 2021-02-20 RX ADMIN — SODIUM CHLORIDE 3 MILLILITER(S): 9 INJECTION INTRAMUSCULAR; INTRAVENOUS; SUBCUTANEOUS at 14:31

## 2021-02-20 RX ADMIN — Medication 120 MILLIGRAM(S): at 18:08

## 2021-02-20 RX ADMIN — Medication 325 MILLIGRAM(S): at 13:42

## 2021-02-20 RX ADMIN — Medication 120 MILLIGRAM(S): at 13:42

## 2021-02-20 RX ADMIN — DIPHENHYDRAMINE HYDROCHLORIDE AND LIDOCAINE HYDROCHLORIDE AND ALUMINUM HYDROXIDE AND MAGNESIUM HYDRO 15 MILLILITER(S): KIT at 18:08

## 2021-02-20 RX ADMIN — SENNA PLUS 2 TABLET(S): 8.6 TABLET ORAL at 22:26

## 2021-02-20 RX ADMIN — LAMOTRIGINE 100 MILLIGRAM(S): 25 TABLET, ORALLY DISINTEGRATING ORAL at 18:08

## 2021-02-20 NOTE — PROGRESS NOTE ADULT - SUBJECTIVE AND OBJECTIVE BOX
Patient is a 75y old  Male who presents with a chief complaint of Increasing chest wall mass (19 Feb 2021 13:25)      SUBJECTIVE / OVERNIGHT EVENTS:    Patient seen and examined at bedside. Initially refused meds this morning, had RVR improved with diltiazem. Pending CT and MRI    MEDICATIONS  (STANDING):  apixaban 5 milliGRAM(s) Oral every 12 hours  carBAMazepine 200 milliGRAM(s) Oral at bedtime  carBAMazepine 400 milliGRAM(s) Oral daily  diltiazem    Tablet 120 milliGRAM(s) Oral every 6 hours  ferrous    sulfate 325 milliGRAM(s) Oral daily  FIRST- Mouthwash  BLM 15 milliLiter(s) Swish and Swallow every 6 hours  lamoTRIgine 100 milliGRAM(s) Oral two times a day  magnesium hydroxide Suspension 30 milliLiter(s) Oral daily  polyethylene glycol 3350 17 Gram(s) Oral two times a day  senna 2 Tablet(s) Oral at bedtime  sodium chloride 0.9% lock flush 3 milliLiter(s) IV Push every 8 hours  tiotropium 18 MICROgram(s) Capsule 1 Capsule(s) Inhalation daily    MEDICATIONS  (PRN):  acetaminophen   Tablet .. 650 milliGRAM(s) Oral every 6 hours PRN Temp greater or equal to 38C (100.4F), Mild Pain (1 - 3), Moderate Pain (4 - 6)  ALBUTerol    90 MICROgram(s) HFA Inhaler 2 Puff(s) Inhalation every 6 hours PRN Shortness of Breath and/or Wheezing  bisacodyl 5 milliGRAM(s) Oral every 12 hours PRN Constipation  guaiFENesin   Syrup  (Sugar-Free) 200 milliGRAM(s) Oral every 6 hours PRN Cough  oxyCODONE    IR 5 milliGRAM(s) Oral every 6 hours PRN Severe Pain (7 - 10)      Vital Signs Last 24 Hrs  T(C): 36.6 (20 Feb 2021 13:38), Max: 36.8 (20 Feb 2021 05:52)  T(F): 97.8 (20 Feb 2021 13:38), Max: 98.2 (20 Feb 2021 05:52)  HR: 100 (20 Feb 2021 13:38) (96 - 150)  BP: 136/58 (20 Feb 2021 13:38) (111/68 - 136/58)  BP(mean): --  RR: 16 (20 Feb 2021 13:38) (14 - 16)  SpO2: 95% (20 Feb 2021 13:38) (95% - 100%)  CAPILLARY BLOOD GLUCOSE        I&O's Summary      PHYSICAL EXAM:  Vital Signs Last 24 Hrs  T(C): 36.6 (02-20-21 @ 13:38)  T(F): 97.8 (02-20-21 @ 13:38), Max: 98.2 (02-20-21 @ 05:52)  HR: 100 (02-20-21 @ 13:38) (96 - 150)  BP: 136/58 (02-20-21 @ 13:38)  BP(mean): --  RR: 16 (02-20-21 @ 13:38) (14 - 16)  SpO2: 95% (02-20-21 @ 13:38) (95% - 100%)  Wt(kg): --    Constitutional: NAD, awake and alert  EYES: EOMI  ENT:  Normal Hearing, no tonsillar exudates   Neck: Soft and supple , no thyromegaly   Respiratory: Breath sounds are clear bilaterally, No wheezing, rales or rhonchi  Cardiovascular: S1 and S2, regular rate and rhythm, no Murmurs, gallops or rubs, no JVD,    Gastrointestinal: Bowel Sounds present, soft, nontender, nondistended, no guarding, no rebound  Extremities: No cyanosis or clubbing; warm to touch  Vascular: 2+ peripheral pulses lower ex  Neurological: No focal deficits, CN II-XII intact bilaterally, sensation to light touch intact in all extremities, gait intact. Pupils are equally reactive to light and symmetrical in size.   Musculoskeletal: 5/5 strength b/l upper and lower extremities; no joint swelling.  Skin: No rashes  Psych: no depression or anhedonia, AAOx3  HEME: no bruises, no nose bleeds      LABS:                        9.7    14.96 )-----------( 235      ( 19 Feb 2021 07:37 )             30.3     02-19    133<L>  |  93<L>  |  17  ----------------------------<  84  4.7   |  31  |  0.99    Ca    10.0      19 Feb 2021 07:46  Phos  3.0     02-19  Mg     2.4     02-19    TPro  6.8  /  Alb  3.0<L>  /  TBili  1.0  /  DBili  x   /  AST  25  /  ALT  22  /  AlkPhos  172<H>  02-19              RADIOLOGY & ADDITIONAL TESTS:    Imaging Personally Reviewed:    Consultant(s) Notes Reviewed:      Care Discussed with Consultants/Other Providers:

## 2021-02-20 NOTE — PROVIDER CONTACT NOTE (OTHER) - ASSESSMENT
Patient denies pain, dizziness, or lightheadedness. Other vital signs are stable - Pt will not take meds until speaking with provider

## 2021-02-20 NOTE — CHART NOTE - NSCHARTNOTEFT_GEN_A_CORE
EP Tele Note    Remains in afib, overnight had HR to 130-150s, now improved to 90s    Plan:  - Continue telemetry monitoring.  - Increase cardizem to 120mg q6h, monitor HR/BP  - Monitor lytes and replete K>4.0 and Mg>2.0  - Continue Eliquis for thromboembolic ppx given pt has a CHADVASC Score=4  -Not a PPM candidate at the time given that the risk outweighs the benefit given comorbidities and  increased risk of thromboembolism in this patient with a large rapid growing mass in his right thorax      Conner Clayton MD  Cardiology Fellow  [id number]

## 2021-02-20 NOTE — PROGRESS NOTE ADULT - PROBLEM SELECTOR PLAN 9
Eliquis, hold Megace given possible cancer and increased risk of VTE in setting of covid as well  PT evaluation rec [facility], daughter unclear yet if she would want [facility] vs home with home PT, will depend on functional status w/ rate control and tx options for lymphoma  Dr. Filiberto Mcdaniel (oncologist [facility])  Dr. Lubin (PCP) [phone]  Dr. KRYSTAL Montes (cardiologist) [phone]

## 2021-02-20 NOTE — CHART NOTE - NSCHARTNOTEFT_GEN_A_CORE
Pt initially refused medications this AM. Pt was seen and examined at bedside. Pt states he "always has high heart rates in the hospital but when at home his heart rate is normal" and is requesting to go home. HR at that time was sustaining 120s. Spent >40min with [place]  ID#[id number] discussing risks of not taking medications including stroke. After long discussion with the help of ANM, pt agreed to take Eliquis and Cardizem. Pt currently refusing labs however RN to retry later.

## 2021-02-20 NOTE — PROGRESS NOTE ADULT - PROBLEM SELECTOR PLAN 3
CHADSVASC= 4; per daughter issues with rate control causing fatigability since COVID, reports HR goes to 150s on minimal exertion limiting PT attempts  - c/w Eliquis   - f/u w/ EP w/ concern for tachy-emma syndrome and sick sinus, initial plan for micra PPM today however at this time risk > benefit given comorbidites and chest wall mass uptitrated to dilitazem 120 mg q6

## 2021-02-21 DIAGNOSIS — R05 COUGH: ICD-10-CM

## 2021-02-21 LAB
ALBUMIN SERPL ELPH-MCNC: 3 G/DL — LOW (ref 3.3–5)
ALP SERPL-CCNC: 170 U/L — HIGH (ref 40–120)
ALT FLD-CCNC: 21 U/L — SIGNIFICANT CHANGE UP (ref 4–41)
ANION GAP SERPL CALC-SCNC: 9 MMOL/L — SIGNIFICANT CHANGE UP (ref 7–14)
AST SERPL-CCNC: 26 U/L — SIGNIFICANT CHANGE UP (ref 4–40)
BASE EXCESS BLDA CALC-SCNC: 8.2 MMOL/L — HIGH (ref -2–2)
BASOPHILS # BLD AUTO: 0.03 K/UL — SIGNIFICANT CHANGE UP (ref 0–0.2)
BASOPHILS NFR BLD AUTO: 0.2 % — SIGNIFICANT CHANGE UP (ref 0–2)
BILIRUB SERPL-MCNC: 0.7 MG/DL — SIGNIFICANT CHANGE UP (ref 0.2–1.2)
BUN SERPL-MCNC: 17 MG/DL — SIGNIFICANT CHANGE UP (ref 7–23)
CALCIUM SERPL-MCNC: 9.9 MG/DL — SIGNIFICANT CHANGE UP (ref 8.4–10.5)
CHLORIDE SERPL-SCNC: 90 MMOL/L — LOW (ref 98–107)
CO2 SERPL-SCNC: 31 MMOL/L — SIGNIFICANT CHANGE UP (ref 22–31)
CREAT SERPL-MCNC: 1.01 MG/DL — SIGNIFICANT CHANGE UP (ref 0.5–1.3)
EOSINOPHIL # BLD AUTO: 0.03 K/UL — SIGNIFICANT CHANGE UP (ref 0–0.5)
EOSINOPHIL NFR BLD AUTO: 0.2 % — SIGNIFICANT CHANGE UP (ref 0–6)
GLUCOSE BLDA-MCNC: 111 MG/DL — HIGH (ref 70–99)
GLUCOSE BLDC GLUCOMTR-MCNC: 131 MG/DL — HIGH (ref 70–99)
GLUCOSE SERPL-MCNC: 90 MG/DL — SIGNIFICANT CHANGE UP (ref 70–99)
HCO3 BLDA-SCNC: 32 MMOL/L — HIGH (ref 22–26)
HCT VFR BLD CALC: 27.1 % — LOW (ref 39–50)
HGB BLD-MCNC: 8.6 G/DL — LOW (ref 13–17)
HOROWITZ INDEX BLDA+IHG-RTO: SIGNIFICANT CHANGE UP
IANC: 12.15 K/UL — HIGH (ref 1.5–8.5)
IMM GRANULOCYTES NFR BLD AUTO: 0.9 % — SIGNIFICANT CHANGE UP (ref 0–1.5)
LDH SERPL L TO P-CCNC: 179 U/L — SIGNIFICANT CHANGE UP (ref 135–225)
LYMPHOCYTES # BLD AUTO: 0.5 K/UL — LOW (ref 1–3.3)
LYMPHOCYTES # BLD AUTO: 3.7 % — LOW (ref 13–44)
MAGNESIUM SERPL-MCNC: 2.3 MG/DL — SIGNIFICANT CHANGE UP (ref 1.6–2.6)
MCHC RBC-ENTMCNC: 29.8 PG — SIGNIFICANT CHANGE UP (ref 27–34)
MCHC RBC-ENTMCNC: 31.7 GM/DL — LOW (ref 32–36)
MCV RBC AUTO: 93.8 FL — SIGNIFICANT CHANGE UP (ref 80–100)
MONOCYTES # BLD AUTO: 0.74 K/UL — SIGNIFICANT CHANGE UP (ref 0–0.9)
MONOCYTES NFR BLD AUTO: 5.5 % — SIGNIFICANT CHANGE UP (ref 2–14)
NEUTROPHILS # BLD AUTO: 12.15 K/UL — HIGH (ref 1.8–7.4)
NEUTROPHILS NFR BLD AUTO: 89.5 % — HIGH (ref 43–77)
NRBC # BLD: 0 /100 WBCS — SIGNIFICANT CHANGE UP
NRBC # FLD: 0 K/UL — SIGNIFICANT CHANGE UP
PCO2 BLDA: 51 MMHG — HIGH (ref 35–48)
PH BLDA: 7.42 — SIGNIFICANT CHANGE UP (ref 7.35–7.45)
PHOSPHATE SERPL-MCNC: 2.5 MG/DL — SIGNIFICANT CHANGE UP (ref 2.5–4.5)
PLATELET # BLD AUTO: 221 K/UL — SIGNIFICANT CHANGE UP (ref 150–400)
PO2 BLDA: 104 MMHG — SIGNIFICANT CHANGE UP (ref 83–108)
POTASSIUM SERPL-MCNC: 4.5 MMOL/L — SIGNIFICANT CHANGE UP (ref 3.5–5.3)
POTASSIUM SERPL-SCNC: 4.5 MMOL/L — SIGNIFICANT CHANGE UP (ref 3.5–5.3)
PROT SERPL-MCNC: 6.6 G/DL — SIGNIFICANT CHANGE UP (ref 6–8.3)
RBC # BLD: 2.89 M/UL — LOW (ref 4.2–5.8)
RBC # FLD: 15.1 % — HIGH (ref 10.3–14.5)
SAO2 % BLDA: 98.1 % — SIGNIFICANT CHANGE UP (ref 95–99)
SODIUM SERPL-SCNC: 130 MMOL/L — LOW (ref 135–145)
URATE SERPL-MCNC: 4.4 MG/DL — SIGNIFICANT CHANGE UP (ref 3.4–8.8)
WBC # BLD: 13.57 K/UL — HIGH (ref 3.8–10.5)
WBC # FLD AUTO: 13.57 K/UL — HIGH (ref 3.8–10.5)

## 2021-02-21 PROCEDURE — 70450 CT HEAD/BRAIN W/O DYE: CPT | Mod: 26

## 2021-02-21 PROCEDURE — 99233 SBSQ HOSP IP/OBS HIGH 50: CPT | Mod: CS

## 2021-02-21 PROCEDURE — 99291 CRITICAL CARE FIRST HOUR: CPT

## 2021-02-21 RX ORDER — ENOXAPARIN SODIUM 100 MG/ML
60 INJECTION SUBCUTANEOUS EVERY 12 HOURS
Refills: 0 | Status: DISCONTINUED | OUTPATIENT
Start: 2021-02-21 | End: 2021-02-22

## 2021-02-21 RX ORDER — DILTIAZEM HCL 120 MG
10 CAPSULE, EXT RELEASE 24 HR ORAL EVERY 6 HOURS
Refills: 0 | Status: DISCONTINUED | OUTPATIENT
Start: 2021-02-21 | End: 2021-02-22

## 2021-02-21 RX ORDER — LACOSAMIDE 50 MG/1
200 TABLET ORAL ONCE
Refills: 0 | Status: DISCONTINUED | OUTPATIENT
Start: 2021-02-21 | End: 2021-02-21

## 2021-02-21 RX ORDER — LACOSAMIDE 50 MG/1
150 TABLET ORAL EVERY 12 HOURS
Refills: 0 | Status: DISCONTINUED | OUTPATIENT
Start: 2021-02-21 | End: 2021-02-23

## 2021-02-21 RX ORDER — PIPERACILLIN AND TAZOBACTAM 4; .5 G/20ML; G/20ML
3.38 INJECTION, POWDER, LYOPHILIZED, FOR SOLUTION INTRAVENOUS EVERY 8 HOURS
Refills: 0 | Status: DISCONTINUED | OUTPATIENT
Start: 2021-02-21 | End: 2021-02-24

## 2021-02-21 RX ORDER — PIPERACILLIN AND TAZOBACTAM 4; .5 G/20ML; G/20ML
3.38 INJECTION, POWDER, LYOPHILIZED, FOR SOLUTION INTRAVENOUS ONCE
Refills: 0 | Status: COMPLETED | OUTPATIENT
Start: 2021-02-21 | End: 2021-02-21

## 2021-02-21 RX ADMIN — DIPHENHYDRAMINE HYDROCHLORIDE AND LIDOCAINE HYDROCHLORIDE AND ALUMINUM HYDROXIDE AND MAGNESIUM HYDRO 15 MILLILITER(S): KIT at 13:50

## 2021-02-21 RX ADMIN — DIPHENHYDRAMINE HYDROCHLORIDE AND LIDOCAINE HYDROCHLORIDE AND ALUMINUM HYDROXIDE AND MAGNESIUM HYDRO 15 MILLILITER(S): KIT at 05:49

## 2021-02-21 RX ADMIN — SODIUM CHLORIDE 3 MILLILITER(S): 9 INJECTION INTRAMUSCULAR; INTRAVENOUS; SUBCUTANEOUS at 13:13

## 2021-02-21 RX ADMIN — Medication 120 MILLIGRAM(S): at 00:56

## 2021-02-21 RX ADMIN — MAGNESIUM HYDROXIDE 30 MILLILITER(S): 400 TABLET, CHEWABLE ORAL at 12:02

## 2021-02-21 RX ADMIN — APIXABAN 5 MILLIGRAM(S): 2.5 TABLET, FILM COATED ORAL at 05:49

## 2021-02-21 RX ADMIN — LACOSAMIDE 140 MILLIGRAM(S): 50 TABLET ORAL at 22:34

## 2021-02-21 RX ADMIN — Medication 200 MILLIGRAM(S): at 12:01

## 2021-02-21 RX ADMIN — TIOTROPIUM BROMIDE 1 CAPSULE(S): 18 CAPSULE ORAL; RESPIRATORY (INHALATION) at 11:50

## 2021-02-21 RX ADMIN — Medication 120 MILLIGRAM(S): at 12:02

## 2021-02-21 RX ADMIN — SODIUM CHLORIDE 3 MILLILITER(S): 9 INJECTION INTRAMUSCULAR; INTRAVENOUS; SUBCUTANEOUS at 21:14

## 2021-02-21 RX ADMIN — ALBUTEROL 2 PUFF(S): 90 AEROSOL, METERED ORAL at 11:40

## 2021-02-21 RX ADMIN — Medication 10 MILLIGRAM(S): at 21:57

## 2021-02-21 RX ADMIN — DIPHENHYDRAMINE HYDROCHLORIDE AND LIDOCAINE HYDROCHLORIDE AND ALUMINUM HYDROXIDE AND MAGNESIUM HYDRO 15 MILLILITER(S): KIT at 00:56

## 2021-02-21 RX ADMIN — ALBUTEROL 2 PUFF(S): 90 AEROSOL, METERED ORAL at 01:01

## 2021-02-21 RX ADMIN — POLYETHYLENE GLYCOL 3350 17 GRAM(S): 17 POWDER, FOR SOLUTION ORAL at 05:48

## 2021-02-21 RX ADMIN — PIPERACILLIN AND TAZOBACTAM 200 GRAM(S): 4; .5 INJECTION, POWDER, LYOPHILIZED, FOR SOLUTION INTRAVENOUS at 13:50

## 2021-02-21 RX ADMIN — Medication 120 MILLIGRAM(S): at 05:49

## 2021-02-21 RX ADMIN — ENOXAPARIN SODIUM 60 MILLIGRAM(S): 100 INJECTION SUBCUTANEOUS at 21:58

## 2021-02-21 RX ADMIN — LAMOTRIGINE 100 MILLIGRAM(S): 25 TABLET, ORALLY DISINTEGRATING ORAL at 05:49

## 2021-02-21 RX ADMIN — Medication 325 MILLIGRAM(S): at 12:04

## 2021-02-21 RX ADMIN — DIPHENHYDRAMINE HYDROCHLORIDE AND LIDOCAINE HYDROCHLORIDE AND ALUMINUM HYDROXIDE AND MAGNESIUM HYDRO 15 MILLILITER(S): KIT at 23:53

## 2021-02-21 RX ADMIN — PIPERACILLIN AND TAZOBACTAM 25 GRAM(S): 4; .5 INJECTION, POWDER, LYOPHILIZED, FOR SOLUTION INTRAVENOUS at 21:58

## 2021-02-21 RX ADMIN — SODIUM CHLORIDE 3 MILLILITER(S): 9 INJECTION INTRAMUSCULAR; INTRAVENOUS; SUBCUTANEOUS at 05:46

## 2021-02-21 RX ADMIN — Medication 400 MILLIGRAM(S): at 12:05

## 2021-02-21 NOTE — CONSULT NOTE ADULT - REASON FOR ADMISSION
Increasing chest wall mass

## 2021-02-21 NOTE — PROGRESS NOTE ADULT - ASSESSMENT
75M former smoker, HTN, HLD, COVID pneumonia 1/12 to 1/21, history of lung CA s/p 2003 R pneumonectomy (chemo radiation 2005), COPD (3L home O2), tissue AVR, A fib on Eliquis, seizure disorder, large R chest wall mass with minimally displaced fracture of the anterior right second rib presents for increasing size of mass now s/p bx on 2/12 also noted to be in afib with RVR pending optimization with diltiazem w/ bx showing EBV+ DLBCL

## 2021-02-21 NOTE — CONSULT NOTE ADULT - ATTENDING COMMENTS
I personally interviewed, examined, and participated in the care of this patient, on rounds 2/22/21 with the neurology consult service team.  Reviewed findings and management plan with the team.  In addition to or instead of the Hx and findings and plan reported above, or in particular, I note as follows:    From the RRT note 20:18 yesterday 2/21/21:  ". . .  RRT called for acute change in mental status. Pt normally AAOx3, but now AAOx0. On arrival, patient with eyes open but not speaking/responding, not following commands, is protecting airway. Vitals grossly wnl including FS. Is AF on monitor but HR 90-110s.. . . "    Per radiology report of non-con head CT:  ". . . COMPARISON: Most recent prior head CT study dated 4/22/2018.    FINDINGS: There is no acute intracranial hemorrhage, mass effect, shift of the midline structures, herniation, extra-axial fluid collection, or hydrocephalus.    There is diffuse cerebral volume loss with prominence of the sulci, fissures, and cisternal spaces which is normal for the patient's age. 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    The paranasal sinuses and mastoid air cells are clear. The calvarium is intact. There is evidence of bilateral cataract removal.    IMPRESSION: No acute intracranial hemorrhage, mass effect, or shift of the midline structures.. . . "      EXAMINATION    Supine in bed, awake, severely cachectic-appearing, sunken optic globes, upper R chest mass grossly evident.  Facial expression is one of distress.  Although wide awake, he at first says nothing (including not giving his name)  and does not follow verbal requests given in English.  He does follow visual cues (raising UEs, following with his eyes).  Pupils irregularly shaped, 3/3.5mm on R/L, reactive; gaze grossly conjugate.  Two to 3 mins into the examination, he responds with his name (voice hoarse and week).  When asked if he speaks English he responds in a full sentence "I do not speak English."  He also responds that he does not speak Greek (asked him because many Romanians have spent time working in [place]).      There is a constant low amplitude mandibular tremor.  Upon testing the L biceps jerk he develops low amplitude multifocal myoclonic jerks of both UEs, L>R.  These continue for several minutes before not completely abating.  He similarly develops multifocal myoclonic jerks after testing for the R biceps jerk; some of the ones in the R biceps have a fasciculation-like appearance.  There is no history of hypoxic ischemic cerebral injury to explain the myoclonus.      I note he was on carbamazepine 400mg in the AM, 200mg in the PM, and lamotrigine 100mg BID at home.        DISCUSSION    The fact that the Pt did not answer questions yesterday evening did not by itself mean that he was having a seizure or was post-ictal.  Rather it appears more likely that he chose not to answer, just like he at first chose not to answer this examiner even though he was wide awake.      He is having a jaw tremor, and multifocal myoclonic jerks (at this time stimulus-induced) of the UEs.  These are NOT seizures.  They may be due to lamotrigine toxicity.      The 400mg/200mg carbamazepine dose regimen is barely above the starting dose regimen of 200mg Q 12 hrs.  Once past the enzyme induction stage that follows initiation of Tx with carbamazepine, the dose interval for the immediate-release formulation should be Q 8 hrs or QA 6 hrs.        RECOMMENDATIONS     Increase carbamazepine to 200mg Q 6 hrs.    Decrease lamotrigine to 75mg Q 12 hrs.     If he can take enteral medications then once he is on carbamazepine 200mg Q 6 hrs IV lacosamide can be D/C'ed. I personally interviewed, examined, and participated in the care of this patient, on rounds 2/22/21 with the neurology consult service team.  Reviewed findings and management plan with the team.  In addition to or instead of the Hx and findings and plan reported above, or in particular, I note as follows:    From the RRT note 20:18 yesterday 2/21/21:  ". . .  RRT called for acute change in mental status. Pt normally AAOx3, but now AAOx0. On arrival, patient with eyes open but not speaking/responding, not following commands, is protecting airway. Vitals grossly wnl including FS. Is AF on monitor but HR 90-110s.. . . "    Per radiology report of non-con head CT:  ". . . COMPARISON: Most recent prior head CT study dated 4/22/2018.    FINDINGS: There is no acute intracranial hemorrhage, mass effect, shift of the midline structures, herniation, extra-axial fluid collection, or hydrocephalus.    There is diffuse cerebral volume loss with prominence of the sulci, fissures, and cisternal spaces which is normal for the patient's age. 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    The paranasal sinuses and mastoid air cells are clear. The calvarium is intact. There is evidence of bilateral cataract removal.    IMPRESSION: No acute intracranial hemorrhage, mass effect, or shift of the midline structures.. . . "      EXAMINATION    Supine in bed, awake, severely cachectic-appearing, sunken optic globes, upper R chest mass grossly evident.  Facial expression is one of distress.  Although wide awake, he at first says nothing (including not giving his name)  and does not follow verbal requests given in English.  He does follow visual cues (raising UEs, following with his eyes).  Pupils irregularly shaped, 3/3.5mm on R/L, reactive; gaze grossly conjugate.  Two to 3 mins into the examination, he responds with his name (voice hoarse and week).  When asked if he speaks English he responds in a full sentence "I do not speak English."  He also responds that he does not speak Irish (asked him because many Romanians have spent time working in [place]).      There is a constant low amplitude mandibular tremor.  Upon testing the L biceps jerk he develops low amplitude multifocal myoclonic jerks of both UEs, L>R.  These continue for several minutes before not completely abating.  He similarly develops multifocal myoclonic jerks after testing for the R biceps jerk; some of the ones in the R biceps have a fasciculation-like appearance.  There is no history of hypoxic ischemic cerebral injury to explain the myoclonus.      I note he was on carbamazepine 400mg in the AM, 200mg in the PM, and lamotrigine 100mg BID at home.        DISCUSSION    The fact that the Pt did not answer questions yesterday evening did not by itself mean that he was having a seizure or was post-ictal.  Rather it appears more likely that he chose not to answer, just like he at first chose not to answer this examiner even though he was wide awake.      He is having a jaw tremor, and multifocal myoclonic jerks (at this time stimulus-induced) of the UEs.  These are NOT seizures.  They may be due to lamotrigine toxicity.      The 400mg/200mg carbamazepine dose regimen is barely above the starting dose regimen of 200mg Q 12 hrs.  Once past the enzyme induction stage that follows initiation of Tx with carbamazepine, the dose interval for the immediate-release formulation should be Q 8 hrs or QA 6 hrs.        RECOMMENDATIONS     Increase carbamazepine to 200mg Q 6 hrs.    Decrease lamotrigine to 75mg Q 12 hrs.   Will reassess when/if to decrease further.     If he can take enteral medications then once he is on carbamazepine 200mg Q 6 hrs discontinue IV lacosamide.

## 2021-02-21 NOTE — PROGRESS NOTE ADULT - PROBLEM SELECTOR PLAN 4
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

## 2021-02-21 NOTE — CHART NOTE - NSCHARTNOTEFT_GEN_A_CORE
76 y/o male rrt for change In mental status, pt awake put not responsive to command, responds to painful stimuli,   vital stable, fs 138, ct scan done - results pending,  neurology called for c/s. family and attending notified.

## 2021-02-21 NOTE — CHART NOTE - NSCHARTNOTEFT_GEN_A_CORE
EP Tele Note    Remains in afib, overnight had HR to 120-140s, now down to 90s    Plan:  - Continue telemetry monitoring.  - C/w cardizem 120mg q6h, monitor HR/BP -- HR elevates for couple of hours in early morning only  - Monitor lytes and replete K>4.0 and Mg>2.0  - Continue Eliquis for thromboembolic ppx given pt has a CHADVASC Score=4  - Not a PPM candidate at the time given that the risk outweighs the benefit given comorbidities and  increased risk of thromboembolism in this patient with a large rapid growing mass in his right thorax      Conner Clayton MD  Cardiology Fellow  [id number].

## 2021-02-21 NOTE — CONSULT NOTE ADULT - SUBJECTIVE AND OBJECTIVE BOX
HPI:  Wong Bangura is a 75 year old unknown handedness Persian speaking male, recently discharged from [facility] on 1/21/2021 s/p COVID pneumonia, history of lung CA s/p 2003 R pneumonectomy, large R chest wall mass with minimally displaced fracture of the anterior right second rib, COPD and on 3L home O2, tissue AVR, A fib on Eliquis, seizure disorder currently on carbamazepine and lamotrigine, HTN, Hyperlipidemia admitted for workup of chest wall mass. Per the spouse, the pt's R chest wall mass has increase in size with swelling, redness and warm to touch for the past 2 days. The pt also experiencing generalized weakness with decreased ambulation, nausea, decreased appetite, constipation with last BM 2/9, intermittent palpitations and oligoria.  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 Also chronic conditions of afib, PNA, HTN. At baseline, the patient is alert and oriented to all modalities and ambulates with a cane. He has been conversant throughout his admission and today had an episode of non-responsiveness. His last seizure episode was 3 years prior. It is unknown who his neurologist is or the semiology of his seizures in the past. Had been compliant with his seizure medications and had been receiving them while inpatient.   Patient was seen and examined at the bedside. Had been given Ativan 1mg 20 minutes prior to evaluation. Was lethargic but woken with noxious stimuli and loud verbal stimuli. Otherwise not speaking and not following commands. Unable to participate in ROS.    REVIEW OF SYSTEMS	    A 10-system ROS was performed and is negative except for those items noted above and/or in the HPI.    PAST MEDICAL & SURGICAL HISTORY:  Anemia    Atrial fibrillation    History of pneumonia    Lung cancer  s/p R. pneumonectomy    Hypertension    Hyperlipidemia    Seizure disorder    H/O inguinal hernia repair  1999    H/O aortic valve replacement  2013    S/P cataract surgery  bilaterally    Aortic valve replaced  5/2013   bovine AoValve    S/P pneumonectomy  right sided, ~ 2003      FAMILY HISTORY:  Family history of cancer (Aunt)  Maternal aunt    Family history of hypertension  Mother    Family history of stroke  Mother    MEDICATIONS (HOME):  Home Medications:  bisacodyl 5 mg oral delayed release tablet: 1 tab(s) orally every 12 hours, As needed, Constipation (12 Feb 2021 03:03)  Breo Ellipta 100 mcg-25 mcg/inh inhalation powder: 1 puff(s) inhaled once a day (12 Feb 2021 03:03)  carBAMazepine 200 mg oral tablet: 2 tab(s) orally once a day in AM  (12 Feb 2021 03:03)  carBAMazepine 200 mg oral tablet: 1 tab(s) orally once a day (at bedtime) (12 Feb 2021 03:03)  Combivent 18 mcg-103 mcg-/inh inhalation aerosol: 1 puff(s) inhaled 4 times a day (12 Feb 2021 03:03)  DuoNeb 0.5 mg-2.5 mg/3 mL inhalation solution: 3 milliliter(s) inhaled every 6 hours, As Needed - for bronchospasm, for shortness of breath and/or wheezing (12 Feb 2021 03:03)  Eliquis 5 mg oral tablet: 1 tab(s) orally 2 times a day (12 Feb 2021 03:03)  LaMICtal 100 mg oral tablet: 1 tab(s) orally 2 times a day (12 Feb 2021 03:03)  Metoprolol Tartrate 50 mg oral tablet: 1 tab(s) orally every 8 hours (12 Feb 2021 03:03)  polyethylene glycol 3350 oral powder for reconstitution: 17 gram(s) orally once a day, As needed, Constipation (12 Feb 2021 03:03)    MEDICATIONS  (STANDING):  apixaban 5 milliGRAM(s) Oral every 12 hours  carBAMazepine 200 milliGRAM(s) Oral at bedtime  carBAMazepine 400 milliGRAM(s) Oral daily  diltiazem    Tablet 120 milliGRAM(s) Oral every 6 hours  ferrous    sulfate 325 milliGRAM(s) Oral daily  FIRST- Mouthwash  BLM 15 milliLiter(s) Swish and Swallow every 6 hours  lamoTRIgine 100 milliGRAM(s) Oral two times a day  magnesium hydroxide Suspension 30 milliLiter(s) Oral daily  piperacillin/tazobactam IVPB.. 3.375 Gram(s) IV Intermittent every 8 hours  polyethylene glycol 3350 17 Gram(s) Oral two times a day  senna 2 Tablet(s) Oral at bedtime  sodium chloride 0.9% lock flush 3 milliLiter(s) IV Push every 8 hours  tiotropium 18 MICROgram(s) Capsule 1 Capsule(s) Inhalation daily    MEDICATIONS  (PRN):  acetaminophen   Tablet .. 650 milliGRAM(s) Oral every 6 hours PRN Temp greater or equal to 38C (100.4F), Mild Pain (1 - 3), Moderate Pain (4 - 6)  ALBUTerol    90 MICROgram(s) HFA Inhaler 2 Puff(s) Inhalation every 6 hours PRN Shortness of Breath and/or Wheezing  bisacodyl 5 milliGRAM(s) Oral every 12 hours PRN Constipation  guaiFENesin   Syrup  (Sugar-Free) 200 milliGRAM(s) Oral every 6 hours PRN Cough  oxyCODONE    IR 5 milliGRAM(s) Oral every 6 hours PRN Severe Pain (7 - 10)    ALLERGIES/INTOLERANCES:  Allergies  No Known Allergies    Intolerances    VITALS & EXAMINATION:  Vital Signs Last 24 Hrs  T(C): 36.7 (21 Feb 2021 17:58), Max: 36.9 (20 Feb 2021 22:17)  T(F): 98 (21 Feb 2021 17:58), Max: 98.5 (20 Feb 2021 22:17)  HR: 124 (21 Feb 2021 17:58) (96 - 124)  BP: 136/72 (21 Feb 2021 17:58) (123/75 - 141/69)  BP(mean): --  RR: 15 (21 Feb 2021 17:58) (15 - 17)  SpO2: 100% (21 Feb 2021 17:58) (99% - 100%)    General:  Constitutional: Appears stated age, lethargic.   Head: Normocephalic & atraumatic.    Neurological (>12):  MS: Eyes closed, opens eyes slightly to loud verbal stimuli, awakens to noxious stimuli. Does not follow commands.     Language: No spontaneous speech. Does not answer questions.     CNs: PERRLA (R = 3mm, L = 3mm). VF intact to blink to threat. Oculocephalic intact. No facial asymmetry noted.     Motor: Withdraws all limbs to noxious stimuli.     Sensation: Grimaces to noxious stimuli in all 4 extremities.       Reflexes:                 Plantar Resp  R	Up  L	Up     Coordination: unable to comply with    Gait: Deferred    LABORATORY:  CBC                       8.6    13.57 )-----------( 221      ( 21 Feb 2021 06:50 )             27.1     Chem 02-21    130<L>  |  90<L>  |  17  ----------------------------<  90  4.5   |  31  |  1.01    Ca    9.9      21 Feb 2021 06:58  Phos  2.5     02-21  Mg     2.3     02-21    TPro  6.6  /  Alb  3.0<L>  /  TBili  0.7  /  DBili  x   /  AST  26  /  ALT  21  /  AlkPhos  170<H>  02-21    LFTs LIVER FUNCTIONS - ( 21 Feb 2021 06:58 )  Alb: 3.0 g/dL / Pro: 6.6 g/dL / ALK PHOS: 170 U/L / ALT: 21 U/L / AST: 26 U/L / GGT: x           Coagulopathy   Lipid Panel 02-12 Chol 128 LDL -- HDL 39<L> Trig 125    STUDIES & IMAGING:    Radiology (XR, CT, MR, U/S, TTE/SAMSON):    CT Head to my eye with global atrophy, microvascular ischemic changes, and no apparent bleeding.  HPI:  Wong Bangura is a 75 year old unknown handedness Greek speaking male, recently discharged from [facility] on 1/21/2021 s/p COVID pneumonia, history of lung CA s/p 2003 R pneumonectomy, large R chest wall mass with minimally displaced fracture of the anterior right second rib, COPD and on 3L home O2, tissue AVR, A fib on Eliquis, seizure disorder currently on carbamazepine and lamotrigine, HTN, Hyperlipidemia admitted for workup of chest wall mass. Per the spouse, the pt's R chest wall mass has increase in size with swelling, redness and warm to touch for the past 2 days. The pt also experiencing generalized weakness with decreased ambulation, nausea, decreased appetite, constipation with last BM 2/9, intermittent palpitations and oligoria.  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 Also chronic conditions of afib, PNA, HTN. At baseline, the patient is alert and oriented to all modalities and ambulates with a cane. He has been conversant throughout his admission and today had an episode of non-responsiveness. His last seizure episode was 3 years prior. Previous seizure semiology is loss of speech, coughing and swallowing automatisms, stiffness of his hands and being somewhat responsive to commands but has not been checked in a while. Patient follows with Dr. Yonny Garcia as his neurologist. Had been compliant with his seizure medications and has not had any changes in many years and had been receiving them while inpatient.   Patient was seen and examined at the bedside. Had been given Ativan 1mg 20 minutes prior to evaluation. Was lethargic but woken with noxious stimuli and loud verbal stimuli. Otherwise not speaking and not following commands. Unable to participate in ROS.    REVIEW OF SYSTEMS	    A 10-system ROS was performed and is negative except for those items noted above and/or in the HPI.    PAST MEDICAL & SURGICAL HISTORY:  Anemia    Atrial fibrillation    History of pneumonia    Lung cancer  s/p R. pneumonectomy    Hypertension    Hyperlipidemia    Seizure disorder    H/O inguinal hernia repair  1999    H/O aortic valve replacement  2013    S/P cataract surgery  bilaterally    Aortic valve replaced  5/2013   bovine AoValve    S/P pneumonectomy  right sided, ~ 2003      FAMILY HISTORY:  Family history of cancer (Aunt)  Maternal aunt    Family history of hypertension  Mother    Family history of stroke  Mother    MEDICATIONS (HOME):  Home Medications:  bisacodyl 5 mg oral delayed release tablet: 1 tab(s) orally every 12 hours, As needed, Constipation (12 Feb 2021 03:03)  Breo Ellipta 100 mcg-25 mcg/inh inhalation powder: 1 puff(s) inhaled once a day (12 Feb 2021 03:03)  carBAMazepine 200 mg oral tablet: 2 tab(s) orally once a day in AM  (12 Feb 2021 03:03)  carBAMazepine 200 mg oral tablet: 1 tab(s) orally once a day (at bedtime) (12 Feb 2021 03:03)  Combivent 18 mcg-103 mcg-/inh inhalation aerosol: 1 puff(s) inhaled 4 times a day (12 Feb 2021 03:03)  DuoNeb 0.5 mg-2.5 mg/3 mL inhalation solution: 3 milliliter(s) inhaled every 6 hours, As Needed - for bronchospasm, for shortness of breath and/or wheezing (12 Feb 2021 03:03)  Eliquis 5 mg oral tablet: 1 tab(s) orally 2 times a day (12 Feb 2021 03:03)  LaMICtal 100 mg oral tablet: 1 tab(s) orally 2 times a day (12 Feb 2021 03:03)  Metoprolol Tartrate 50 mg oral tablet: 1 tab(s) orally every 8 hours (12 Feb 2021 03:03)  polyethylene glycol 3350 oral powder for reconstitution: 17 gram(s) orally once a day, As needed, Constipation (12 Feb 2021 03:03)    MEDICATIONS  (STANDING):  apixaban 5 milliGRAM(s) Oral every 12 hours  carBAMazepine 200 milliGRAM(s) Oral at bedtime  carBAMazepine 400 milliGRAM(s) Oral daily  diltiazem    Tablet 120 milliGRAM(s) Oral every 6 hours  ferrous    sulfate 325 milliGRAM(s) Oral daily  FIRST- Mouthwash  BLM 15 milliLiter(s) Swish and Swallow every 6 hours  lamoTRIgine 100 milliGRAM(s) Oral two times a day  magnesium hydroxide Suspension 30 milliLiter(s) Oral daily  piperacillin/tazobactam IVPB.. 3.375 Gram(s) IV Intermittent every 8 hours  polyethylene glycol 3350 17 Gram(s) Oral two times a day  senna 2 Tablet(s) Oral at bedtime  sodium chloride 0.9% lock flush 3 milliLiter(s) IV Push every 8 hours  tiotropium 18 MICROgram(s) Capsule 1 Capsule(s) Inhalation daily    MEDICATIONS  (PRN):  acetaminophen   Tablet .. 650 milliGRAM(s) Oral every 6 hours PRN Temp greater or equal to 38C (100.4F), Mild Pain (1 - 3), Moderate Pain (4 - 6)  ALBUTerol    90 MICROgram(s) HFA Inhaler 2 Puff(s) Inhalation every 6 hours PRN Shortness of Breath and/or Wheezing  bisacodyl 5 milliGRAM(s) Oral every 12 hours PRN Constipation  guaiFENesin   Syrup  (Sugar-Free) 200 milliGRAM(s) Oral every 6 hours PRN Cough  oxyCODONE    IR 5 milliGRAM(s) Oral every 6 hours PRN Severe Pain (7 - 10)    ALLERGIES/INTOLERANCES:  Allergies  No Known Allergies    Intolerances    VITALS & EXAMINATION:  Vital Signs Last 24 Hrs  T(C): 36.7 (21 Feb 2021 17:58), Max: 36.9 (20 Feb 2021 22:17)  T(F): 98 (21 Feb 2021 17:58), Max: 98.5 (20 Feb 2021 22:17)  HR: 124 (21 Feb 2021 17:58) (96 - 124)  BP: 136/72 (21 Feb 2021 17:58) (123/75 - 141/69)  BP(mean): --  RR: 15 (21 Feb 2021 17:58) (15 - 17)  SpO2: 100% (21 Feb 2021 17:58) (99% - 100%)    General:  Constitutional: Appears stated age, lethargic.   Head: Normocephalic & atraumatic.    Neurological (>12):  MS: Eyes closed, opens eyes slightly to loud verbal stimuli, awakens to noxious stimuli. Does not follow commands.     Language: No spontaneous speech. Does not answer questions.     CNs: PERRLA (R = 3mm, L = 3mm). VF intact to blink to threat. Oculocephalic intact. No facial asymmetry noted.     Motor: Withdraws all limbs to noxious stimuli.     Sensation: Grimaces to noxious stimuli in all 4 extremities.       Reflexes:                 Plantar Resp  R	Up  L	Up     Coordination: unable to comply with    Gait: Deferred    LABORATORY:  CBC                       8.6    13.57 )-----------( 221      ( 21 Feb 2021 06:50 )             27.1     Chem 02-21    130<L>  |  90<L>  |  17  ----------------------------<  90  4.5   |  31  |  1.01    Ca    9.9      21 Feb 2021 06:58  Phos  2.5     02-21  Mg     2.3     02-21    TPro  6.6  /  Alb  3.0<L>  /  TBili  0.7  /  DBili  x   /  AST  26  /  ALT  21  /  AlkPhos  170<H>  02-21    LFTs LIVER FUNCTIONS - ( 21 Feb 2021 06:58 )  Alb: 3.0 g/dL / Pro: 6.6 g/dL / ALK PHOS: 170 U/L / ALT: 21 U/L / AST: 26 U/L / GGT: x           Coagulopathy   Lipid Panel 02-12 Chol 128 LDL -- HDL 39<L> Trig 125    STUDIES & IMAGING:    Radiology (XR, CT, MR, U/S, TTE/SAMSON):    CT Head to my eye with global atrophy, microvascular ischemic changes, and no apparent bleeding.  HPI:  Wong Bangura is a 75 year old unknown handedness Slovak speaking male, recently discharged from [facility] on 1/21/2021 s/p COVID pneumonia, history of lung CA s/p 2003 R pneumonectomy, large R chest wall mass with minimally displaced fracture of the anterior right second rib, COPD and on 3L home O2, tissue AVR, A fib on Eliquis, seizure disorder currently on carbamazepine and lamotrigine, HTN, Hyperlipidemia admitted for workup of chest wall mass. Per the spouse, the pt's R chest wall mass has increase in size with swelling, redness and warm to touch for the past 2 days. The pt also experiencing generalized weakness with decreased ambulation, nausea, decreased appetite, constipation with last BM 2/9, intermittent palpitations and oligoria.  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 Also chronic conditions of afib, PNA, HTN. At baseline, the patient is alert and oriented to all modalities and ambulates with a cane. He has been conversant throughout his admission and today had an episode of non-responsiveness.   Spoken with daughter Alannah ([phone]) for further collateral. His last seizure episode was 3 years prior. Previous seizure semiology is loss of speech, coughing and swallowing automatisms, stiffness of his hands and being somewhat responsive to commands but has not been checked in a while. Episodes typically last seconds to 2-3 minutes. Patient follows with Dr. Yonny Garcia as his neurologist. Had been compliant with his seizure medications and has not had any changes in many years and had been receiving them while inpatient.   Patient was seen and examined at the bedside. Had been given Ativan 1mg 20 minutes prior to evaluation. Was lethargic but woken with noxious stimuli and loud verbal stimuli. Otherwise not speaking and not following commands. Unable to participate in ROS.    REVIEW OF SYSTEMS	    A 10-system ROS was performed and is negative except for those items noted above and/or in the HPI.    PAST MEDICAL & SURGICAL HISTORY:  Anemia    Atrial fibrillation    History of pneumonia    Lung cancer  s/p R. pneumonectomy    Hypertension    Hyperlipidemia    Seizure disorder    H/O inguinal hernia repair  1999    H/O aortic valve replacement  2013    S/P cataract surgery  bilaterally    Aortic valve replaced  5/2013   bovine AoValve    S/P pneumonectomy  right sided, ~ 2003      FAMILY HISTORY:  Family history of cancer (Aunt)  Maternal aunt    Family history of hypertension  Mother    Family history of stroke  Mother    MEDICATIONS (HOME):  Home Medications:  bisacodyl 5 mg oral delayed release tablet: 1 tab(s) orally every 12 hours, As needed, Constipation (12 Feb 2021 03:03)  Breo Ellipta 100 mcg-25 mcg/inh inhalation powder: 1 puff(s) inhaled once a day (12 Feb 2021 03:03)  carBAMazepine 200 mg oral tablet: 2 tab(s) orally once a day in AM  (12 Feb 2021 03:03)  carBAMazepine 200 mg oral tablet: 1 tab(s) orally once a day (at bedtime) (12 Feb 2021 03:03)  Combivent 18 mcg-103 mcg-/inh inhalation aerosol: 1 puff(s) inhaled 4 times a day (12 Feb 2021 03:03)  DuoNeb 0.5 mg-2.5 mg/3 mL inhalation solution: 3 milliliter(s) inhaled every 6 hours, As Needed - for bronchospasm, for shortness of breath and/or wheezing (12 Feb 2021 03:03)  Eliquis 5 mg oral tablet: 1 tab(s) orally 2 times a day (12 Feb 2021 03:03)  LaMICtal 100 mg oral tablet: 1 tab(s) orally 2 times a day (12 Feb 2021 03:03)  Metoprolol Tartrate 50 mg oral tablet: 1 tab(s) orally every 8 hours (12 Feb 2021 03:03)  polyethylene glycol 3350 oral powder for reconstitution: 17 gram(s) orally once a day, As needed, Constipation (12 Feb 2021 03:03)    MEDICATIONS  (STANDING):  apixaban 5 milliGRAM(s) Oral every 12 hours  carBAMazepine 200 milliGRAM(s) Oral at bedtime  carBAMazepine 400 milliGRAM(s) Oral daily  diltiazem    Tablet 120 milliGRAM(s) Oral every 6 hours  ferrous    sulfate 325 milliGRAM(s) Oral daily  FIRST- Mouthwash  BLM 15 milliLiter(s) Swish and Swallow every 6 hours  lamoTRIgine 100 milliGRAM(s) Oral two times a day  magnesium hydroxide Suspension 30 milliLiter(s) Oral daily  piperacillin/tazobactam IVPB.. 3.375 Gram(s) IV Intermittent every 8 hours  polyethylene glycol 3350 17 Gram(s) Oral two times a day  senna 2 Tablet(s) Oral at bedtime  sodium chloride 0.9% lock flush 3 milliLiter(s) IV Push every 8 hours  tiotropium 18 MICROgram(s) Capsule 1 Capsule(s) Inhalation daily    MEDICATIONS  (PRN):  acetaminophen   Tablet .. 650 milliGRAM(s) Oral every 6 hours PRN Temp greater or equal to 38C (100.4F), Mild Pain (1 - 3), Moderate Pain (4 - 6)  ALBUTerol    90 MICROgram(s) HFA Inhaler 2 Puff(s) Inhalation every 6 hours PRN Shortness of Breath and/or Wheezing  bisacodyl 5 milliGRAM(s) Oral every 12 hours PRN Constipation  guaiFENesin   Syrup  (Sugar-Free) 200 milliGRAM(s) Oral every 6 hours PRN Cough  oxyCODONE    IR 5 milliGRAM(s) Oral every 6 hours PRN Severe Pain (7 - 10)    ALLERGIES/INTOLERANCES:  Allergies  No Known Allergies    Intolerances    VITALS & EXAMINATION:  Vital Signs Last 24 Hrs  T(C): 36.7 (21 Feb 2021 17:58), Max: 36.9 (20 Feb 2021 22:17)  T(F): 98 (21 Feb 2021 17:58), Max: 98.5 (20 Feb 2021 22:17)  HR: 124 (21 Feb 2021 17:58) (96 - 124)  BP: 136/72 (21 Feb 2021 17:58) (123/75 - 141/69)  BP(mean): --  RR: 15 (21 Feb 2021 17:58) (15 - 17)  SpO2: 100% (21 Feb 2021 17:58) (99% - 100%)    General:  Constitutional: Appears stated age, lethargic.   Head: Normocephalic & atraumatic.    Neurological (>12):  MS: Eyes closed, opens eyes slightly to loud verbal stimuli, awakens to noxious stimuli. Does not follow commands.     Language: No spontaneous speech. Does not answer questions.     CNs: PERRLA (R = 3mm, L = 3mm). VF intact to blink to threat. Oculocephalic intact. No facial asymmetry noted.     Motor: Withdraws all limbs to noxious stimuli.     Sensation: Grimaces to noxious stimuli in all 4 extremities.       Reflexes:                 Plantar Resp  R	Up  L	Up     Coordination: unable to comply with    Gait: Deferred    LABORATORY:  CBC                       8.6    13.57 )-----------( 221      ( 21 Feb 2021 06:50 )             27.1     Chem 02-21    130<L>  |  90<L>  |  17  ----------------------------<  90  4.5   |  31  |  1.01    Ca    9.9      21 Feb 2021 06:58  Phos  2.5     02-21  Mg     2.3     02-21    TPro  6.6  /  Alb  3.0<L>  /  TBili  0.7  /  DBili  x   /  AST  26  /  ALT  21  /  AlkPhos  170<H>  02-21    LFTs LIVER FUNCTIONS - ( 21 Feb 2021 06:58 )  Alb: 3.0 g/dL / Pro: 6.6 g/dL / ALK PHOS: 170 U/L / ALT: 21 U/L / AST: 26 U/L / GGT: x           Coagulopathy   Lipid Panel 02-12 Chol 128 LDL -- HDL 39<L> Trig 125    STUDIES & IMAGING:    Radiology (XR, CT, MR, U/S, TTE/SAMSON):    CT Head to my eye with global atrophy, microvascular ischemic changes, and no apparent bleeding.

## 2021-02-21 NOTE — PROGRESS NOTE ADULT - SUBJECTIVE AND OBJECTIVE BOX
Patient is a 75y old  Male who presents with a chief complaint of Increasing chest wall mass (20 Feb 2021 14:47)      SUBJECTIVE / OVERNIGHT EVENTS:    Patient seen and examined at bedside. Overnight afib trend 80s to 120s. Had productive cough, continued mouth pain w/ exudates on exam.     MEDICATIONS  (STANDING):  apixaban 5 milliGRAM(s) Oral every 12 hours  carBAMazepine 200 milliGRAM(s) Oral at bedtime  carBAMazepine 400 milliGRAM(s) Oral daily  diltiazem    Tablet 120 milliGRAM(s) Oral every 6 hours  ferrous    sulfate 325 milliGRAM(s) Oral daily  FIRST- Mouthwash  BLM 15 milliLiter(s) Swish and Swallow every 6 hours  lamoTRIgine 100 milliGRAM(s) Oral two times a day  magnesium hydroxide Suspension 30 milliLiter(s) Oral daily  polyethylene glycol 3350 17 Gram(s) Oral two times a day  senna 2 Tablet(s) Oral at bedtime  sodium chloride 0.9% lock flush 3 milliLiter(s) IV Push every 8 hours  tiotropium 18 MICROgram(s) Capsule 1 Capsule(s) Inhalation daily    MEDICATIONS  (PRN):  acetaminophen   Tablet .. 650 milliGRAM(s) Oral every 6 hours PRN Temp greater or equal to 38C (100.4F), Mild Pain (1 - 3), Moderate Pain (4 - 6)  ALBUTerol    90 MICROgram(s) HFA Inhaler 2 Puff(s) Inhalation every 6 hours PRN Shortness of Breath and/or Wheezing  bisacodyl 5 milliGRAM(s) Oral every 12 hours PRN Constipation  guaiFENesin   Syrup  (Sugar-Free) 200 milliGRAM(s) Oral every 6 hours PRN Cough  oxyCODONE    IR 5 milliGRAM(s) Oral every 6 hours PRN Severe Pain (7 - 10)      Vital Signs Last 24 Hrs  T(C): 36.5 (21 Feb 2021 12:07), Max: 36.9 (20 Feb 2021 22:17)  T(F): 97.7 (21 Feb 2021 12:07), Max: 98.5 (20 Feb 2021 22:17)  HR: 96 (21 Feb 2021 12:07) (96 - 105)  BP: 131/59 (21 Feb 2021 12:07) (115/57 - 136/58)  BP(mean): --  RR: 16 (21 Feb 2021 12:07) (16 - 17)  SpO2: 99% (21 Feb 2021 12:07) (95% - 100%)  CAPILLARY BLOOD GLUCOSE        I&O's Summary    20 Feb 2021 07:01  -  21 Feb 2021 07:00  --------------------------------------------------------  IN: 0 mL / OUT: 700 mL / NET: -700 mL        PHYSICAL EXAM:  Vital Signs Last 24 Hrs  T(C): 36.5 (02-21-21 @ 12:07)  T(F): 97.7 (02-21-21 @ 12:07), Max: 98.5 (02-20-21 @ 22:17)  HR: 96 (02-21-21 @ 12:07) (96 - 105)  BP: 131/59 (02-21-21 @ 12:07)  BP(mean): --  RR: 16 (02-21-21 @ 12:07) (16 - 17)  SpO2: 99% (02-21-21 @ 12:07) (95% - 100%)  Wt(kg): --    02-20 @ 07:01  -  02-21 @ 07:00  --------------------------------------------------------  IN: 0 mL / OUT: 700 mL / NET: -700 mL      Constitutional: NAD, awake and alert, cachectic  ENT: Throat w/ green-yellow exudate  Respiratory: diminished on R side  Cardiovascular: S1 and S2, regular rate and rhythm, no Murmurs, gallops or rubs, no JVD,    Gastrointestinal: Bowel Sounds present, soft, nontender, nondistended, no guarding, no rebound  Extremities: No cyanosis or clubbing; warm to touch  Vascular: 2+ peripheral pulses lower ex  Neurological: No focal deficits, CN II-XII intact bilaterally, sensation to light touch intact in all extremities. Pupils are equally reactive to light and symmetrical in size.   Musculoskeletal: 5/5 strength b/l upper and lower extremities; no joint swelling.  Skin: No rashes  Psych: AAOx2-3      LABS:                        8.6    13.57 )-----------( 221      ( 21 Feb 2021 06:50 )             27.1     02-21    130<L>  |  90<L>  |  17  ----------------------------<  90  4.5   |  31  |  1.01    Ca    9.9      21 Feb 2021 06:58  Phos  2.5     02-21  Mg     2.3     02-21    TPro  6.6  /  Alb  3.0<L>  /  TBili  0.7  /  DBili  x   /  AST  26  /  ALT  21  /  AlkPhos  170<H>  02-21              RADIOLOGY & ADDITIONAL TESTS:    Imaging Personally Reviewed:    Consultant(s) Notes Reviewed:      Care Discussed with Consultants/Other Providers:

## 2021-02-21 NOTE — RAPID RESPONSE TEAM SUMMARY - NSSITUATIONBACKGROUNDRRT_GEN_ALL_CORE
75M former smoker, HTN, HLD, COVID PNA 1/12 to 1/21, h/o lung CA s/p 2003 R pneumonectomy (chemo radiation 2005), COPD (3L home O2), tissue AVR, A fib on Eliquis, seizure disorder, large R chest wall mass with minimally displaced fx of the anterior R 2nd rib presents for increasing size of mass now s/p bx on 2/12 also noted to be in AF with RVR pending optimization with diltiazem w/ bx showing EBV+ DLBCL. RRT called for acute change in mental status. Pt normally AAOx3, but now AAOx0. On arrival, patient with eyes open but not speaking/responding, not following commands, is protecting airway. Vitals grossly wnl including FS. Is AF on monitor but HR 90-110s. Brought pt down to cat scan for CTH. ABG done. Neuro consulted. Will also get MICU eval for possible deterioration and airway concerns. Pt likely post-ictal. Received total of 3mg ativan.

## 2021-02-21 NOTE — PROVIDER CONTACT NOTE (OTHER) - ASSESSMENT
Patient refusing 18:00 medications-  RN attempted to also crush in applesauce however patient stating no and refusing.

## 2021-02-21 NOTE — PROVIDER CONTACT NOTE (CHANGE IN STATUS NOTIFICATION) - ASSESSMENT
patient not following commands.  Not responding verbally. Altered mental status- Vital signs were stable see flow sheet. Fingerstick 131.

## 2021-02-21 NOTE — PROGRESS NOTE ADULT - PROBLEM SELECTOR PLAN 3
- on exam today yellow green exudate in patient with patient reporting pain with swallowing, pt ow/ productive cough as well  - given growing chest wall mass pt has reasons to have post obstructive PNA, prior CT showing, only received ceftriaxone this hospitalization, will start zosyn for GNR/anaerobe coverage

## 2021-02-21 NOTE — CONSULT NOTE ADULT - SUBJECTIVE AND OBJECTIVE BOX
CCU Accept Note    WONG BANGURA  75y  Patient is a 75y old  Male who presents with a chief complaint of Increasing chest wall mass (21 Feb 2021 19:17)      ====================  HPI & Hospital Course:   Wong Bangura is a 75 year old unknown handedness Setswana speaking male, recently discharged from [facility] on 1/21/2021 s/p COVID pneumonia, history of lung CA s/p 2003 R pneumonectomy, large R chest wall mass with minimally displaced fracture of the anterior right second rib, COPD and on 3L home O2, tissue AVR, A fib on Eliquis, seizure disorder currently on carbamazepine and lamotrigine, HTN, Hyperlipidemia admitted for workup of chest wall mass. Per the spouse, the pt's R chest wall mass has increase in size with swelling, redness and warm to touch for the past 2 days. The pt also experiencing generalized weakness with decreased ambulation, nausea, decreased appetite, constipation with last BM 2/9, intermittent palpitations.    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 Also chronic conditions of afib, PNA, HTN. At baseline, the patient is alert and oriented to all modalities and ambulates with a cane. He has been conversant throughout his admission but per the RN whom I spoke to today at assessment, has had intermittent episodes of confusion and slow decline over 36 hours prior to RRT today at 6 pm.     Per daughter, last seizure episode was 3 years prior, characterized by loss of speech, coughing and swallowing automatisms, stiffness of his hands and being somewhat responsive to commands. Episodes typically last seconds to 2-3 minutes. Patient follows with Dr. Yonny Garcia as his neurologist. Had been compliant with his seizure medications and has not had any changes in many years and had been receiving them while inpatient.     Patient was seen and examined at the bedside. Had been given Ativan 1mg 20 minutes prior to evaluation. Was lethargic but woken with noxious stimuli and loud verbal stimuli. Otherwise not speaking and not following commands. Unable to participate in ROS.    Home Medications:  bisacodyl 5 mg oral delayed release tablet: 1 tab(s) orally every 12 hours, As needed, Constipation (12 Feb 2021 03:03)  Breo Ellipta 100 mcg-25 mcg/inh inhalation powder: 1 puff(s) inhaled once a day (12 Feb 2021 03:03)  carBAMazepine 200 mg oral tablet: 2 tab(s) orally once a day in AM  (12 Feb 2021 03:03)  carBAMazepine 200 mg oral tablet: 1 tab(s) orally once a day (at bedtime) (12 Feb 2021 03:03)  Combivent 18 mcg-103 mcg-/inh inhalation aerosol: 1 puff(s) inhaled 4 times a day (12 Feb 2021 03:03)  DuoNeb 0.5 mg-2.5 mg/3 mL inhalation solution: 3 milliliter(s) inhaled every 6 hours, As Needed - for bronchospasm, for shortness of breath and/or wheezing (12 Feb 2021 03:03)  Eliquis 5 mg oral tablet: 1 tab(s) orally 2 times a day (12 Feb 2021 03:03)  LaMICtal 100 mg oral tablet: 1 tab(s) orally 2 times a day (12 Feb 2021 03:03)  Metoprolol Tartrate 50 mg oral tablet: 1 tab(s) orally every 8 hours (12 Feb 2021 03:03)  polyethylene glycol 3350 oral powder for reconstitution: 17 gram(s) orally once a day, As needed, Constipation (12 Feb 2021 03:03)    Current Medications:   MEDICATIONS  (STANDING):  carBAMazepine 200 milliGRAM(s) Oral at bedtime  carBAMazepine 400 milliGRAM(s) Oral daily  diltiazem Injectable 10 milliGRAM(s) IV Push every 6 hours  enoxaparin Injectable 60 milliGRAM(s) SubCutaneous every 12 hours  ferrous    sulfate 325 milliGRAM(s) Oral daily  FIRST- Mouthwash  BLM 15 milliLiter(s) Swish and Swallow every 6 hours  lacosamide Injectable 200 milliGRAM(s) IV Push once  lacosamide IVPB 150 milliGRAM(s) IV Intermittent every 12 hours  magnesium hydroxide Suspension 30 milliLiter(s) Oral daily  piperacillin/tazobactam IVPB.. 3.375 Gram(s) IV Intermittent every 8 hours  polyethylene glycol 3350 17 Gram(s) Oral two times a day  senna 2 Tablet(s) Oral at bedtime  sodium chloride 0.9% lock flush 3 milliLiter(s) IV Push every 8 hours  tiotropium 18 MICROgram(s) Capsule 1 Capsule(s) Inhalation daily    MEDICATIONS  (PRN):  acetaminophen   Tablet .. 650 milliGRAM(s) Oral every 6 hours PRN Temp greater or equal to 38C (100.4F), Mild Pain (1 - 3), Moderate Pain (4 - 6)  ALBUTerol    90 MICROgram(s) HFA Inhaler 2 Puff(s) Inhalation every 6 hours PRN Shortness of Breath and/or Wheezing  bisacodyl 5 milliGRAM(s) Oral every 12 hours PRN Constipation  guaiFENesin   Syrup  (Sugar-Free) 200 milliGRAM(s) Oral every 6 hours PRN Cough  oxyCODONE    IR 5 milliGRAM(s) Oral every 6 hours PRN Severe Pain (7 - 10)    ====================  Vital Signs Last 24 Hrs  T(C): 36.4 (21 Feb 2021 19:32), Max: 36.9 (20 Feb 2021 22:17)  T(F): 97.6 (21 Feb 2021 19:32), Max: 98.5 (20 Feb 2021 22:17)  HR: 113 (21 Feb 2021 19:32) (96 - 124)  BP: 112/60 (21 Feb 2021 19:32) (112/60 - 141/69)  RR: 16 (21 Feb 2021 19:32) (15 - 17)  SpO2: 100% (21 Feb 2021 19:32) (99% - 100%)    Physical Exam:   General: NAD, cachectic  HEENT: PERRL, occasional horizontal nystagmus  Neck: Supple, no JVD  Lungs: CTA bilaterally, RCW mass with bandaid on it  Heart: irregular  Abdomen: Soft, +suprapubic tenderness with equivocal fullness  Extremities: thin, no edema  Skin: Warm, well-perfused  Neuro: stupor, opens eyes to loud voice, localizes noxious stimuli all 4 quadrants, occasional left nystagmus, +corneal reflex      ====================  Labs & Imaging:   CBC Full  -  ( 21 Feb 2021 06:50 )  WBC Count : 13.57 K/uL  RBC Count : 2.89 M/uL  Hemoglobin : 8.6 g/dL  Hematocrit : 27.1 %  Platelet Count - Automated : 221 K/uL  Mean Cell Volume : 93.8 fL  Mean Cell Hemoglobin : 29.8 pg  Mean Cell Hemoglobin Concentration : 31.7 gm/dL  Auto Neutrophil # : 12.15 K/uL  Auto Lymphocyte # : 0.50 K/uL  Auto Monocyte # : 0.74 K/uL  Auto Eosinophil # : 0.03 K/uL  Auto Basophil # : 0.03 K/uL  Auto Neutrophil % : 89.5 %  Auto Lymphocyte % : 3.7 %  Auto Monocyte % : 5.5 %  Auto Eosinophil % : 0.2 %  Auto Basophil % : 0.2 %    02-21    130<L>  |  90<L>  |  17  ----------------------------<  90  4.5   |  31  |  1.01    Ca    9.9      21 Feb 2021 06:58  Phos  2.5     02-21  Mg     2.3     02-21    TPro  6.6  /  Alb  3.0<L>  /  TBili  0.7  /  DBili  x   /  AST  26  /  ALT  21  /  AlkPhos  170<H>  02-21    ABG - ( 21 Feb 2021 20:27 )  pH, Arterial: 7.42  pH, Blood: x     /  pCO2: 51    /  pO2: 104   / HCO3: 32    / Base Excess: 8.2   /  SaO2: 98.1       WONG BANGURA  75y  Patient is a 75y old  Male who presents with a chief complaint of Increasing chest wall mass (21 Feb 2021 19:17)      ====================  HPI & Hospital Course:   Wong Bangura is a 75 year old unknown handedness Lithuanian speaking male, recently discharged from [facility] on 1/21/2021 s/p COVID pneumonia, history of lung CA s/p 2003 R pneumonectomy, large R chest wall mass with minimally displaced fracture of the anterior right second rib, COPD and on 3L home O2, tissue AVR, A fib on Eliquis, seizure disorder currently on carbamazepine and lamotrigine, HTN, Hyperlipidemia admitted for workup of chest wall mass. Per the spouse, the pt's R chest wall mass has increase in size with swelling, redness and warm to touch for the past 2 days. The pt also experiencing generalized weakness with decreased ambulation, nausea, decreased appetite, constipation with last BM 2/9, intermittent palpitations.    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 Also chronic conditions of afib, PNA, HTN. At baseline, the patient is alert and oriented to all modalities and ambulates with a cane. He has been conversant throughout his admission but per the RN whom I spoke to today at assessment, has had intermittent episodes of confusion and slow decline over 36 hours prior to RRT today at 6 pm.     Per daughter, last seizure episode was 3 years prior, characterized by loss of speech, coughing and swallowing automatisms, stiffness of his hands and being somewhat responsive to commands. Episodes typically last seconds to 2-3 minutes. Patient follows with Dr. Yonny Garcia as his neurologist. Had been compliant with his seizure medications and has not had any changes in many years and had been receiving them while inpatient.     Patient was seen and examined at the bedside. Had been given Ativan 1mg 20 minutes prior to evaluation. Was lethargic but woken with noxious stimuli and loud verbal stimuli. Otherwise not speaking and not following commands. Unable to participate in ROS.    Home Medications:  bisacodyl 5 mg oral delayed release tablet: 1 tab(s) orally every 12 hours, As needed, Constipation (12 Feb 2021 03:03)  Breo Ellipta 100 mcg-25 mcg/inh inhalation powder: 1 puff(s) inhaled once a day (12 Feb 2021 03:03)  carBAMazepine 200 mg oral tablet: 2 tab(s) orally once a day in AM  (12 Feb 2021 03:03)  carBAMazepine 200 mg oral tablet: 1 tab(s) orally once a day (at bedtime) (12 Feb 2021 03:03)  Combivent 18 mcg-103 mcg-/inh inhalation aerosol: 1 puff(s) inhaled 4 times a day (12 Feb 2021 03:03)  DuoNeb 0.5 mg-2.5 mg/3 mL inhalation solution: 3 milliliter(s) inhaled every 6 hours, As Needed - for bronchospasm, for shortness of breath and/or wheezing (12 Feb 2021 03:03)  Eliquis 5 mg oral tablet: 1 tab(s) orally 2 times a day (12 Feb 2021 03:03)  LaMICtal 100 mg oral tablet: 1 tab(s) orally 2 times a day (12 Feb 2021 03:03)  Metoprolol Tartrate 50 mg oral tablet: 1 tab(s) orally every 8 hours (12 Feb 2021 03:03)  polyethylene glycol 3350 oral powder for reconstitution: 17 gram(s) orally once a day, As needed, Constipation (12 Feb 2021 03:03)    Current Medications:   MEDICATIONS  (STANDING):  carBAMazepine 200 milliGRAM(s) Oral at bedtime  carBAMazepine 400 milliGRAM(s) Oral daily  diltiazem Injectable 10 milliGRAM(s) IV Push every 6 hours  enoxaparin Injectable 60 milliGRAM(s) SubCutaneous every 12 hours  ferrous    sulfate 325 milliGRAM(s) Oral daily  FIRST- Mouthwash  BLM 15 milliLiter(s) Swish and Swallow every 6 hours  lacosamide Injectable 200 milliGRAM(s) IV Push once  lacosamide IVPB 150 milliGRAM(s) IV Intermittent every 12 hours  magnesium hydroxide Suspension 30 milliLiter(s) Oral daily  piperacillin/tazobactam IVPB.. 3.375 Gram(s) IV Intermittent every 8 hours  polyethylene glycol 3350 17 Gram(s) Oral two times a day  senna 2 Tablet(s) Oral at bedtime  sodium chloride 0.9% lock flush 3 milliLiter(s) IV Push every 8 hours  tiotropium 18 MICROgram(s) Capsule 1 Capsule(s) Inhalation daily    MEDICATIONS  (PRN):  acetaminophen   Tablet .. 650 milliGRAM(s) Oral every 6 hours PRN Temp greater or equal to 38C (100.4F), Mild Pain (1 - 3), Moderate Pain (4 - 6)  ALBUTerol    90 MICROgram(s) HFA Inhaler 2 Puff(s) Inhalation every 6 hours PRN Shortness of Breath and/or Wheezing  bisacodyl 5 milliGRAM(s) Oral every 12 hours PRN Constipation  guaiFENesin   Syrup  (Sugar-Free) 200 milliGRAM(s) Oral every 6 hours PRN Cough  oxyCODONE    IR 5 milliGRAM(s) Oral every 6 hours PRN Severe Pain (7 - 10)    ====================  Vital Signs Last 24 Hrs  T(C): 36.4 (21 Feb 2021 19:32), Max: 36.9 (20 Feb 2021 22:17)  T(F): 97.6 (21 Feb 2021 19:32), Max: 98.5 (20 Feb 2021 22:17)  HR: 113 (21 Feb 2021 19:32) (96 - 124)  BP: 112/60 (21 Feb 2021 19:32) (112/60 - 141/69)  RR: 16 (21 Feb 2021 19:32) (15 - 17)  SpO2: 100% (21 Feb 2021 19:32) (99% - 100%)    Physical Exam:   General: NAD, cachectic  HEENT: PERRL, occasional horizontal nystagmus  Neck: Supple, no JVD  Lungs: CTA bilaterally, RCW mass with bandaid on it  Heart: irregular  Abdomen: Soft, +suprapubic tenderness with equivocal fullness  Extremities: thin, no edema  Skin: Warm, well-perfused  Neuro: stupor, opens eyes to loud voice, localizes noxious stimuli all 4 quadrants, occasional left nystagmus, +corneal reflex      ====================  Labs & Imaging:   CBC Full  -  ( 21 Feb 2021 06:50 )  WBC Count : 13.57 K/uL  RBC Count : 2.89 M/uL  Hemoglobin : 8.6 g/dL  Hematocrit : 27.1 %  Platelet Count - Automated : 221 K/uL  Mean Cell Volume : 93.8 fL  Mean Cell Hemoglobin : 29.8 pg  Mean Cell Hemoglobin Concentration : 31.7 gm/dL  Auto Neutrophil # : 12.15 K/uL  Auto Lymphocyte # : 0.50 K/uL  Auto Monocyte # : 0.74 K/uL  Auto Eosinophil # : 0.03 K/uL  Auto Basophil # : 0.03 K/uL  Auto Neutrophil % : 89.5 %  Auto Lymphocyte % : 3.7 %  Auto Monocyte % : 5.5 %  Auto Eosinophil % : 0.2 %  Auto Basophil % : 0.2 %    02-21    130<L>  |  90<L>  |  17  ----------------------------<  90  4.5   |  31  |  1.01    Ca    9.9      21 Feb 2021 06:58  Phos  2.5     02-21  Mg     2.3     02-21    TPro  6.6  /  Alb  3.0<L>  /  TBili  0.7  /  DBili  x   /  AST  26  /  ALT  21  /  AlkPhos  170<H>  02-21    ABG - ( 21 Feb 2021 20:27 )  pH, Arterial: 7.42  pH, Blood: x     /  pCO2: 51    /  pO2: 104   / HCO3: 32    / Base Excess: 8.2   /  SaO2: 98.1

## 2021-02-21 NOTE — CONSULT NOTE ADULT - ASSESSMENT
Wong Bangura is a 75 year old Divehi speaking male, recently discharged from [facility] on 1/21/2021 s/p COVID pneumonia, history of lung CA s/p 2003 R pneumonectomy, large R chest wall mass with minimally displaced fracture of the anterior right second rib found to be EBV+ B cell lymphoma, COPD and on 3L home O2, tissue AVR, A fib on Eliquis, seizure disorder currently on carbamazepine and lamotrigine s/p RRT 2/21 for AMS.    AMS ongoing for past day and a half per collateral obtained from ancillary sources, with possible seizure like episodes. Currently protecting airway, hemodynamically stable.    Recommendations:  - agree with Vimpat load as per neurology for possible breakthrough seizures  - consider converting rate control and a/c to IV - CTH negative for bleed  - please consider sepsis workup as etiology for AMS - BCx x 2, bladder scan (suprapubic tenderness), U/A, UCx  - consider gentle IV hydration while patient intolerant of PO. Recent TTE grossly unremarkable  - agree with MRI and vEEG as per neurology  - tele monitoring as current, with aspiration precautions and elevated head of bed.  - not currently in requirement of MICU  - please reconsult as necessary

## 2021-02-21 NOTE — CONSULT NOTE ADULT - ATTENDING COMMENTS
76 yo m, recently discharged from [facility] s/p COVID pneumonia, history of lung CA s/p 2003 R pneumonectomy, large R chest wall mass likely B cell lymphoma, COPD and on 3L home O2, tissue AVR, A fib on Eliquis, seizure disorder currently on carbamazepine and lamotrigine s/p RRT 2/21 for AMS.  possible seizures with post ictal state, protecting airway  neuro appreciated  vimpat load, continue AEDs  EEG  continue GOC discussion

## 2021-02-21 NOTE — CONSULT NOTE ADULT - ASSESSMENT
Wong Bangura is a 75 year old unknown handedness Estonian speaking male, recently discharged from [facility] on 1/21/2021 s/p COVID pneumonia, history of lung CA s/p 2003 R pneumonectomy, large R chest wall mass with minimally displaced fracture of the anterior right second rib, COPD and on 3L home O2, tissue AVR, A fib on Eliquis, seizure disorder currently on carbamazepine and lamotrigine, HTN, Hyperlipidemia admitted for workup of chest wall mass. Neurology consulted for apparent breakthrough seizure.    Impression: likely breakthrough seizure of unknown etiology but possibly related to current underlying COVID-19 related PNA, new diagnosis of lymphoma, mild hyponatremia. Semiology of seizures unclear, as is the focality of seizures. Likely focal as patient on carbamazepine and lamotrigine.    Recs:  [] f/u official read of head CT  [] obtain EKG, specifically look for any blocks and/or QTc prolongation  [] maintain on telemetry  [] vEEG  [] if no heart block or prolonged Qtc, may load with Vimpat 200mg IV once and then continue Vimpat 150mg bid  [] once patient is able to tolerate PO, should resume his home medications of carbamazepine and lamotrigine  [] agree with MRI brain w/ and w/o contrast to evaluate for meningeal and intraparenchymal lymphoma disease  [] seizure/aspiration precautions  [] normalize all electrolytes  [] obtain CPK, lactic acid    Case to be discussed and patient seen in AM with neurology attending, Dr. Schumacher.    Wong Bangura is a 75 year old unknown handedness Lao speaking male, recently discharged from [facility] on 1/21/2021 s/p COVID pneumonia, history of lung CA s/p 2003 R pneumonectomy, large R chest wall mass with minimally displaced fracture of the anterior right second rib, COPD and on 3L home O2, tissue AVR, A fib on Eliquis, seizure disorder currently on carbamazepine and lamotrigine, HTN, Hyperlipidemia admitted for workup of chest wall mass. Neurology consulted for apparent breakthrough seizure.    Impression: likely breakthrough seizure of unknown etiology but possibly related to current underlying COVID-19 related PNA, new diagnosis of lymphoma, mild hyponatremia. Semiology of seizures unclear, as is the focality of seizures. Likely focal as patient on carbamazepine and lamotrigine.    Recs:  [] f/u official read of head CT  [] obtain EKG, specifically look for any blocks and/or QTc prolongation  [] maintain on telemetry  [] vEEG  [] if no heart block or prolonged Qtc, may load with Vimpat 200mg IV once and then continue Vimpat 150mg bid  [] once patient is able to tolerate PO, should resume his home medications of carbamazepine and lamotrigine  [] agree with MRI brain w/ and w/o contrast to evaluate for meningeal and intraparenchymal lymphoma disease  [] seizure/aspiration precautions  [] normalize all electrolytes  [] obtain CPK, lactic acid  [] if patient has a GTC >3 minutes or with significant vital sign derangements, may administer Ativan 2mg IV    Case to be discussed and patient seen in AM with neurology attending, Dr. Schumacher.    Wong Bangura is a 75 year old unknown handedness Azeri speaking male, recently discharged from [facility] on 1/21/2021 s/p COVID pneumonia, history of lung CA s/p 2003 R pneumonectomy, large R chest wall mass with minimally displaced fracture of the anterior right second rib, COPD and on 3L home O2, tissue AVR, A fib on Eliquis, seizure disorder currently on carbamazepine and lamotrigine, HTN, Hyperlipidemia admitted for workup of chest wall mass. Neurology consulted for apparent breakthrough seizure.    Impression: likely breakthrough seizure of unknown etiology but possibly related to current underlying COVID-19 related PNA, new diagnosis of lymphoma, mild hyponatremia.     Semiology: as reported by family has expressive aphasia and possible receptive aphasia, swallowing and coughing automatisms, occasional stiffness of the hands. Episodes last anywhere from several seconds up to 2-3 minutes.    Recs:  [] f/u official read of head CT  [] obtain EKG, specifically look for any blocks and/or QTc prolongation  [] maintain on telemetry  [] vEEG  [] if no heart block or prolonged Qtc, may load with Vimpat 200mg IV once and then continue Vimpat 150mg bid  [] once patient is able to tolerate PO, should resume his home medications of carbamazepine and lamotrigine  [] agree with MRI brain w/ and w/o contrast to evaluate for meningeal and intraparenchymal lymphoma disease  [] seizure/aspiration precautions  [] normalize all electrolytes  [] obtain CPK, lactic acid  [] if patient has a GTC >3 minutes or with significant vital sign derangements, may administer Ativan 2mg IV    Case to be discussed and patient seen in AM with neurology attending, Dr. Schumacher.    Wong Bangura is a 75 year old unknown handedness German speaking male, recently discharged from [facility] on 1/21/2021 s/p COVID pneumonia, history of lung CA s/p 2003 R pneumonectomy, large R chest wall mass with minimally displaced fracture of the anterior right second rib, COPD and on 3L home O2, tissue AVR, A fib on Eliquis, seizure disorder currently on carbamazepine and lamotrigine, HTN, Hyperlipidemia admitted for workup of chest wall mass. Neurology consulted for apparent breakthrough seizure.    See Attending Attestation    Impression: likely breakthrough seizure of unknown etiology but possibly related to current underlying COVID-19 related PNA, new diagnosis of lymphoma, mild hyponatremia.     Semiology: as reported by family has expressive aphasia and possible receptive aphasia, swallowing and coughing automatisms, occasional stiffness of the hands. Episodes last anywhere from several seconds up to 2-3 minutes.    Recs:  [] f/u official read of head CT  [] obtain EKG, specifically look for any blocks and/or QTc prolongation  [] maintain on telemetry  [] vEEG  [] if no heart block or prolonged Qtc, may load with Vimpat 200mg IV once and then continue Vimpat 150mg bid  [] once patient is able to tolerate PO, should resume his home medications of carbamazepine and lamotrigine  [] agree with MRI brain w/ and w/o contrast to evaluate for meningeal and intraparenchymal lymphoma disease  [] seizure/aspiration precautions  [] normalize all electrolytes  [] obtain CPK, lactic acid  [] if patient has a GTC >3 minutes or with significant vital sign derangements, may administer Ativan 2mg IV    Case to be discussed and patient seen in AM with neurology attending, Dr. Schumacher.

## 2021-02-22 LAB
ALBUMIN SERPL ELPH-MCNC: 3.2 G/DL — LOW (ref 3.3–5)
ALP SERPL-CCNC: 156 U/L — HIGH (ref 40–120)
ALT FLD-CCNC: 21 U/L — SIGNIFICANT CHANGE UP (ref 4–41)
ANION GAP SERPL CALC-SCNC: 9 MMOL/L — SIGNIFICANT CHANGE UP (ref 7–14)
AST SERPL-CCNC: 26 U/L — SIGNIFICANT CHANGE UP (ref 4–40)
BASOPHILS # BLD AUTO: 0.04 K/UL — SIGNIFICANT CHANGE UP (ref 0–0.2)
BASOPHILS NFR BLD AUTO: 0.3 % — SIGNIFICANT CHANGE UP (ref 0–2)
BILIRUB SERPL-MCNC: 1 MG/DL — SIGNIFICANT CHANGE UP (ref 0.2–1.2)
BUN SERPL-MCNC: 18 MG/DL — SIGNIFICANT CHANGE UP (ref 7–23)
CALCIUM SERPL-MCNC: 10 MG/DL — SIGNIFICANT CHANGE UP (ref 8.4–10.5)
CHLORIDE SERPL-SCNC: 91 MMOL/L — LOW (ref 98–107)
CO2 SERPL-SCNC: 32 MMOL/L — HIGH (ref 22–31)
CREAT SERPL-MCNC: 1.14 MG/DL — SIGNIFICANT CHANGE UP (ref 0.5–1.3)
EOSINOPHIL # BLD AUTO: 0.03 K/UL — SIGNIFICANT CHANGE UP (ref 0–0.5)
EOSINOPHIL NFR BLD AUTO: 0.3 % — SIGNIFICANT CHANGE UP (ref 0–6)
GLUCOSE SERPL-MCNC: 95 MG/DL — SIGNIFICANT CHANGE UP (ref 70–99)
HCT VFR BLD CALC: 31.1 % — LOW (ref 39–50)
HGB BLD-MCNC: 9.9 G/DL — LOW (ref 13–17)
IANC: 10.77 K/UL — HIGH (ref 1.5–8.5)
IMM GRANULOCYTES NFR BLD AUTO: 1 % — SIGNIFICANT CHANGE UP (ref 0–1.5)
LDH SERPL L TO P-CCNC: 198 U/L — SIGNIFICANT CHANGE UP (ref 135–225)
LYMPHOCYTES # BLD AUTO: 0.33 K/UL — LOW (ref 1–3.3)
LYMPHOCYTES # BLD AUTO: 2.8 % — LOW (ref 13–44)
MAGNESIUM SERPL-MCNC: 2.6 MG/DL — SIGNIFICANT CHANGE UP (ref 1.6–2.6)
MCHC RBC-ENTMCNC: 30.3 PG — SIGNIFICANT CHANGE UP (ref 27–34)
MCHC RBC-ENTMCNC: 31.8 GM/DL — LOW (ref 32–36)
MCV RBC AUTO: 95.1 FL — SIGNIFICANT CHANGE UP (ref 80–100)
MONOCYTES # BLD AUTO: 0.4 K/UL — SIGNIFICANT CHANGE UP (ref 0–0.9)
MONOCYTES NFR BLD AUTO: 3.4 % — SIGNIFICANT CHANGE UP (ref 2–14)
NEUTROPHILS # BLD AUTO: 10.77 K/UL — HIGH (ref 1.8–7.4)
NEUTROPHILS NFR BLD AUTO: 92.2 % — HIGH (ref 43–77)
NRBC # BLD: 0 /100 WBCS — SIGNIFICANT CHANGE UP
NRBC # FLD: 0 K/UL — SIGNIFICANT CHANGE UP
PHOSPHATE SERPL-MCNC: 3.3 MG/DL — SIGNIFICANT CHANGE UP (ref 2.5–4.5)
PLATELET # BLD AUTO: 210 K/UL — SIGNIFICANT CHANGE UP (ref 150–400)
POTASSIUM SERPL-MCNC: 4.7 MMOL/L — SIGNIFICANT CHANGE UP (ref 3.5–5.3)
POTASSIUM SERPL-SCNC: 4.7 MMOL/L — SIGNIFICANT CHANGE UP (ref 3.5–5.3)
PROT SERPL-MCNC: 6.8 G/DL — SIGNIFICANT CHANGE UP (ref 6–8.3)
RBC # BLD: 3.27 M/UL — LOW (ref 4.2–5.8)
RBC # FLD: 15.2 % — HIGH (ref 10.3–14.5)
SODIUM SERPL-SCNC: 132 MMOL/L — LOW (ref 135–145)
URATE SERPL-MCNC: 3.8 MG/DL — SIGNIFICANT CHANGE UP (ref 3.4–8.8)
WBC # BLD: 11.69 K/UL — HIGH (ref 3.8–10.5)
WBC # FLD AUTO: 11.69 K/UL — HIGH (ref 3.8–10.5)

## 2021-02-22 PROCEDURE — 99223 1ST HOSP IP/OBS HIGH 75: CPT | Mod: CS

## 2021-02-22 PROCEDURE — 51702 INSERT TEMP BLADDER CATH: CPT

## 2021-02-22 PROCEDURE — 99233 SBSQ HOSP IP/OBS HIGH 50: CPT | Mod: CS

## 2021-02-22 PROCEDURE — 99232 SBSQ HOSP IP/OBS MODERATE 35: CPT

## 2021-02-22 PROCEDURE — 99231 SBSQ HOSP IP/OBS SF/LOW 25: CPT | Mod: 25

## 2021-02-22 RX ORDER — APIXABAN 2.5 MG/1
5 TABLET, FILM COATED ORAL EVERY 12 HOURS
Refills: 0 | Status: DISCONTINUED | OUTPATIENT
Start: 2021-02-22 | End: 2021-02-24

## 2021-02-22 RX ORDER — LAMOTRIGINE 25 MG/1
100 TABLET, ORALLY DISINTEGRATING ORAL
Refills: 0 | Status: DISCONTINUED | OUTPATIENT
Start: 2021-02-22 | End: 2021-02-24

## 2021-02-22 RX ORDER — METOPROLOL TARTRATE 50 MG
2.5 TABLET ORAL ONCE
Refills: 0 | Status: COMPLETED | OUTPATIENT
Start: 2021-02-22 | End: 2021-02-22

## 2021-02-22 RX ORDER — ACETAMINOPHEN 500 MG
1000 TABLET ORAL ONCE
Refills: 0 | Status: COMPLETED | OUTPATIENT
Start: 2021-02-22 | End: 2021-02-22

## 2021-02-22 RX ORDER — DILTIAZEM HCL 120 MG
120 CAPSULE, EXT RELEASE 24 HR ORAL EVERY 6 HOURS
Refills: 0 | Status: DISCONTINUED | OUTPATIENT
Start: 2021-02-22 | End: 2021-02-24

## 2021-02-22 RX ORDER — CARBAMAZEPINE 200 MG
200 TABLET ORAL EVERY 8 HOURS
Refills: 0 | Status: DISCONTINUED | OUTPATIENT
Start: 2021-02-22 | End: 2021-02-23

## 2021-02-22 RX ORDER — SODIUM CHLORIDE 9 MG/ML
1000 INJECTION, SOLUTION INTRAVENOUS
Refills: 0 | Status: DISCONTINUED | OUTPATIENT
Start: 2021-02-22 | End: 2021-02-24

## 2021-02-22 RX ADMIN — Medication 10 MILLIGRAM(S): at 11:52

## 2021-02-22 RX ADMIN — APIXABAN 5 MILLIGRAM(S): 2.5 TABLET, FILM COATED ORAL at 18:23

## 2021-02-22 RX ADMIN — DIPHENHYDRAMINE HYDROCHLORIDE AND LIDOCAINE HYDROCHLORIDE AND ALUMINUM HYDROXIDE AND MAGNESIUM HYDRO 15 MILLILITER(S): KIT at 04:26

## 2021-02-22 RX ADMIN — DIPHENHYDRAMINE HYDROCHLORIDE AND LIDOCAINE HYDROCHLORIDE AND ALUMINUM HYDROXIDE AND MAGNESIUM HYDRO 15 MILLILITER(S): KIT at 11:53

## 2021-02-22 RX ADMIN — DIPHENHYDRAMINE HYDROCHLORIDE AND LIDOCAINE HYDROCHLORIDE AND ALUMINUM HYDROXIDE AND MAGNESIUM HYDRO 15 MILLILITER(S): KIT at 23:52

## 2021-02-22 RX ADMIN — Medication 120 MILLIGRAM(S): at 18:17

## 2021-02-22 RX ADMIN — PIPERACILLIN AND TAZOBACTAM 25 GRAM(S): 4; .5 INJECTION, POWDER, LYOPHILIZED, FOR SOLUTION INTRAVENOUS at 21:58

## 2021-02-22 RX ADMIN — PIPERACILLIN AND TAZOBACTAM 25 GRAM(S): 4; .5 INJECTION, POWDER, LYOPHILIZED, FOR SOLUTION INTRAVENOUS at 14:54

## 2021-02-22 RX ADMIN — Medication 120 MILLIGRAM(S): at 23:51

## 2021-02-22 RX ADMIN — POLYETHYLENE GLYCOL 3350 17 GRAM(S): 17 POWDER, FOR SOLUTION ORAL at 18:17

## 2021-02-22 RX ADMIN — LAMOTRIGINE 100 MILLIGRAM(S): 25 TABLET, ORALLY DISINTEGRATING ORAL at 18:17

## 2021-02-22 RX ADMIN — SODIUM CHLORIDE 75 MILLILITER(S): 9 INJECTION, SOLUTION INTRAVENOUS at 12:29

## 2021-02-22 RX ADMIN — ENOXAPARIN SODIUM 60 MILLIGRAM(S): 100 INJECTION SUBCUTANEOUS at 11:51

## 2021-02-22 RX ADMIN — LACOSAMIDE 130 MILLIGRAM(S): 50 TABLET ORAL at 11:51

## 2021-02-22 RX ADMIN — Medication 2.5 MILLIGRAM(S): at 14:54

## 2021-02-22 RX ADMIN — SENNA PLUS 2 TABLET(S): 8.6 TABLET ORAL at 21:58

## 2021-02-22 RX ADMIN — SODIUM CHLORIDE 3 MILLILITER(S): 9 INJECTION INTRAMUSCULAR; INTRAVENOUS; SUBCUTANEOUS at 04:17

## 2021-02-22 RX ADMIN — SODIUM CHLORIDE 75 MILLILITER(S): 9 INJECTION, SOLUTION INTRAVENOUS at 20:05

## 2021-02-22 RX ADMIN — Medication 200 MILLIGRAM(S): at 15:57

## 2021-02-22 RX ADMIN — Medication 200 MILLIGRAM(S): at 23:51

## 2021-02-22 RX ADMIN — TIOTROPIUM BROMIDE 1 CAPSULE(S): 18 CAPSULE ORAL; RESPIRATORY (INHALATION) at 11:52

## 2021-02-22 RX ADMIN — PIPERACILLIN AND TAZOBACTAM 25 GRAM(S): 4; .5 INJECTION, POWDER, LYOPHILIZED, FOR SOLUTION INTRAVENOUS at 04:26

## 2021-02-22 RX ADMIN — SODIUM CHLORIDE 3 MILLILITER(S): 9 INJECTION INTRAMUSCULAR; INTRAVENOUS; SUBCUTANEOUS at 21:59

## 2021-02-22 RX ADMIN — Medication 2.5 MILLIGRAM(S): at 22:42

## 2021-02-22 RX ADMIN — Medication 1000 MILLIGRAM(S): at 22:43

## 2021-02-22 RX ADMIN — DIPHENHYDRAMINE HYDROCHLORIDE AND LIDOCAINE HYDROCHLORIDE AND ALUMINUM HYDROXIDE AND MAGNESIUM HYDRO 15 MILLILITER(S): KIT at 18:17

## 2021-02-22 RX ADMIN — Medication 10 MILLIGRAM(S): at 04:04

## 2021-02-22 RX ADMIN — Medication 2.5 MILLIGRAM(S): at 02:02

## 2021-02-22 RX ADMIN — LACOSAMIDE 130 MILLIGRAM(S): 50 TABLET ORAL at 23:51

## 2021-02-22 RX ADMIN — SODIUM CHLORIDE 3 MILLILITER(S): 9 INJECTION INTRAMUSCULAR; INTRAVENOUS; SUBCUTANEOUS at 11:54

## 2021-02-22 RX ADMIN — Medication 2.5 MILLIGRAM(S): at 07:59

## 2021-02-22 NOTE — CHART NOTE - NSCHARTNOTEFT_GEN_A_CORE
If passess swallow today:     Load 400mg Carbamazepine; Start Carbamazepine 200mg q8h indefinitely  100mg Lamotrigine Load; Continue LTG 100mg BID

## 2021-02-22 NOTE — CHART NOTE - NSCHARTNOTEFT_GEN_A_CORE
** Patient COVID positive to recommendations based on chart review ***      74yo [place] speaking M w/ PMHx of lung CA s/p R pneumonectomy (2003), COPD (on 3L home O2), AVR, afib on eliquis, seizure dx, HTN, HLD p/w worsening Afib with RVR and gradually growing new infraclavicular mass. In terms of cancer history, patient had R pneumonectomy in 2003, last chemo/radiation was in 2005, and patient had close follow up until 2 years ago with no recurrence of disease (Oncologist Dr. Filiberto Mcdaniel with [facility]). Patient evaluated by onc on 12/30 and mass was concerning for recurrence of malignancy given Rt rib fracture with associated hematoma/metastatic lesion. At that time, IR guided Bx was recommended but family preferred to get this done as outpatient and follow with Dr. Mcdaniel.     Patient then got COVID and was not able to get the mass biopsied. In the interim, the mass grew. He presented to the hospital on 2/11 with A fib with RVR and mass has been biopsied during this admission. This has resulted as DLBCL and hematology has been consulted.     Recommendations:   - CT A/P negative for LAD; CT head negative for intracranial disease  - Since patient without B symptoms per d/w Dr. Ruiz, hold off on steroids for now  - Await IHC/FISH testing for myc/Bcl2/BCl6 before deciding on treatment  - Echo 2/15 with grossly normal LVEF  - Daily TLS labs: CMP/LDH/uric acid/phos  - May need line placement   - Patient with new seizure activity and had RRT for this; neurology following and MRI brain pending   - EEG today for seizures  - Patient will need a BM Bx for staging; please ask neuro to document that this seizing patient has been cleared for a 20-30 min bedside procedure  - D/w Dr. Sara Melton, PGY-4  Hematology-Oncology Fellow  [phone] ([place]) [phone] ([facility])  I can also be reached on Microsoft Teams  Please page fellow on call after 5pm and on weekends        Abby Melton, PGY-4  Hematology-Oncology Fellow  [phone] ([place]) [phone] ([facility])  I can also be reached on [employer] Teams  Please page fellow on call after 5pm and on weekends

## 2021-02-22 NOTE — PROGRESS NOTE ADULT - PROBLEM SELECTOR PLAN 4
- on exam 2/21 yellow green exudate in patient with patient reporting pain with swallowing, pt ow/ productive cough as well  - given growing chest wall mass pt has reasons to have post obstructive PNA, prior CT showing, only received ceftriaxone this hospitalization, will start zosyn for GNR/anaerobe coverage for 5 days started on 2/21

## 2021-02-22 NOTE — CHART NOTE - NSCHARTNOTEFT_GEN_A_CORE
Patient examined at bedside.     The clonic movements noted in face and both arms and legs are consistent w/ stimulus induced myoclonus. These are NOT seizures.     No contraindication to undergo bone marrow biopsy tomorrow.     Please follow up attending attestation to consult note.

## 2021-02-22 NOTE — PROGRESS NOTE ADULT - ASSESSMENT
This is a 75year old  former smoker man with a pmhx of HTN, HLD, COVID pneumonia 1/12 to 1/21, history of lung CA s/p 2003 R pneumonectomy (chemo radiation 2005), COPD (3L home O2), tissue AVR, A fib on Eliquis, seizure disorder, large R chest wall mass with minimally displaced fracture of the anterior right second rib presents for increasing size of mass now s/p bx on 2/12 also noted to be in afib with RVR on the ED given IV Lopressor 5mg X1, remains in AFib with RVR. . 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 On 2/21/2020 hospital course c/b AMS 2/2 to seizure breakthrough. Neuro and MICU was consulted. CTH negative for bleed. Neuro recommended Mri Brain and vEEG, and load with Vimpat. MICU was c/s for AMS and airway protection. Patient was protecting his airway  and hemodynamically stable. Not a MICU candidate and recommended sepsis workup for AMS and aspiration precaution. Patient is now on IV diltiazem and Lovenox       Plan:  - Continue telemetry monitoring.  - Continue IV Cardizem to  10mg q6h and monitor HR and BP since patient is now on aspiration precaution, when able transition back to oral  - Monitor lytes and replete K>4.0 and Mg>2.0  - Continue Lovenox 60 BID for thromboembolic ppx given pt has a CHADVASC Score=4 now that patient is on aspiration precaution and when able transition back to Eliquis   -Not a PPM candidate at the time given that the risk outweighs the benefit given comorbidities and  increased risk of thromboembolism in this patient with a large rapid growing mass in his right thorax. Plan to uptitrated to diltiazem at titrated   - Appreciate Nuero rec s/p  Vimpat load, monitor QTC   - Management per primary team     Matt Lazo PA-C  Patient to be staff with attending. Please awaiting attending addendum    This is a 75year old  former smoker man with a pmhx of HTN, HLD, COVID pneumonia 1/12 to 1/21, history of lung CA s/p 2003 R pneumonectomy (chemo radiation 2005), COPD (3L home O2), tissue AVR, A fib on Eliquis, seizure disorder, large R chest wall mass with minimally displaced fracture of the anterior right second rib presents for increasing size of mass now s/p bx on 2/12 also noted to be in afib with RVR on the ED given IV Lopressor 5mg X1, remains in AFib with RVR. . 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 On 2/21/2020 hospital course c/b AMS 2/2 to seizure breakthrough. Neuro and MICU was consulted. CTH negative for bleed. Neuro recommended Mri Brain and vEEG, and load with Vimpat. MICU was c/s for AMS and airway protection. Patient was protecting his airway  and hemodynamically stable. Not a MICU candidate and recommended sepsis workup for AMS and aspiration precaution. Patient is now on IV diltiazem and Lovenox     Plan:  - Continue telemetry monitoring.  - Continue IV Cardizem to  10mg q6h and monitor HR and BP since patient is now on aspiration precaution, when able transition back to oral  - Monitor lytes and replete K>4.0 and Mg>2.0  - Continue Lovenox 60 BID for thromboembolic ppx given pt has a CHADVASC Score=4 now that patient is on aspiration precaution and when able transition back to Eliquis   -Not a PPM candidate at the time given that the risk outweighs the benefit given comorbidities and  increased risk of thromboembolism in this patient with a large rapid growing mass in his right thorax. Plan to uptitrated to diltiazem at titrated   - Appreciate Nuero rec s/p  Vimpat load, monitor QTC   - Management per primary team     Matt Lazo PA-C  Patient to be staff with attending. Please awaiting attending addendum    This is a 75year old  former smoker man with a pmhx of HTN, HLD, COVID pneumonia 1/12 to 1/21, history of lung CA s/p 2003 R pneumonectomy (chemo radiation 2005), COPD (3L home O2), tissue AVR, A fib on Eliquis, seizure disorder, large R chest wall mass with minimally displaced fracture of the anterior right second rib presents for increasing size of mass now s/p bx on 2/12 also noted to be in afib with RVR on the ED given IV Lopressor 5mg X1, remains in AFib with RVR. . 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 On 2/21/2020 hospital course c/b AMS 2/2 to seizure breakthrough. Neuro and MICU was consulted. CTH negative for bleed. Neuro recommended Mri Brain and vEEG, and load with Vimpat. MICU was c/s for AMS and airway protection. Patient was protecting his airway  and hemodynamically stable. Not a MICU candidate and recommended sepsis workup for AMS and aspiration precaution. Patient is now on IV diltiazem and Lovenox.  Biopsy resulted as Diffuse large B cell lymphoma and hem/onc was consulted    Plan:  - Continue telemetry monitoring.  - Continue IV Cardizem to  10mg q6h and monitor HR and BP since patient is now on aspiration precaution, when able transition back to oral  - Monitor lytes and replete K>4.0 and Mg>2.0  - Continue Lovenox 60 BID for thromboembolic ppx given pt has a CHADVASC Score=4 now that patient is on aspiration precaution and when able transition back to Eliquis   -Not a PPM candidate at the time given that the risk outweighs the benefit given comorbidities and  increased risk of thromboembolism in this patient with a large rapid growing mass in his right thorax. Plan to uptitrated to diltiazem at titrated   - Appreciate Nuero rec s/p  Vimpat load, monitor QTC   - Biopsy resulted as Diffuse large B cell lymphoma, appreciated Heme/onc recs (may need BM biopsy for staging)  - Management per primary team     Matt Lazo PA-C  Patient to be staff with attending. Please awaiting attending addendum

## 2021-02-22 NOTE — PROGRESS NOTE ADULT - ATTENDING COMMENTS
This is a 75year old  former smoker man with a pmhx of HTN, HLD, COVID pneumonia 1/12 to 1/21, history of lung CA s/p 2003 R pneumonectomy (chemo radiation 2005), COPD (3L home O2), tissue AVR, A fib on Eliquis, seizure disorder, large R chest wall mass with minimally displaced fracture of the anterior right second rib presents for increasing size of mass now s/p bx on 2/12 also noted to be in afib with RVR on the ED given IV Lopressor 5mg X1, remains in AFib with RVR. . 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 On 2/21/2020 hospital course c/b AMS 2/2 to seizure breakthrough. Neuro and MICU was consulted. CTH negative for bleed. Neuro recommended Mri Brain and vEEG, and load with Vimpat. MICU was c/s for AMS and airway protection. Patient was protecting his airway  and hemodynamically stable. Not a MICU candidate and recommended sepsis workup for AMS and aspiration precaution. Patient is now on IV diltiazem and Lovenox.  Biopsy resulted as Diffuse large B cell lymphoma and hem/onc was consulted    Plan:  - Continue telemetry monitoring.  - Continue IV Cardizem to  10mg q6h and monitor HR and BP since patient is now on aspiration precaution, when able transition back to oral  - Monitor lytes and replete K>4.0 and Mg>2.0  - Continue Lovenox 60 BID for thromboembolic ppx given pt has a CHADVASC Score=4 now that patient is on aspiration precaution and when able transition back to Eliquis   -Not a PPM candidate at the time given that the risk outweighs the benefit given comorbidities and  increased risk of thromboembolism in this patient with a large rapid growing mass in his right thorax. Plan to uptitrated to diltiazem at titrated   - Appreciate Nuero rec s/p  Vimpat load, monitor QTC   - Biopsy resulted as Diffuse large B cell lymphoma, appreciated Heme/onc recs (may need BM biopsy for staging)  - Management per primary team

## 2021-02-22 NOTE — PROVIDER CONTACT NOTE (OTHER) - ASSESSMENT
patient A&Ox0, no acute distress noted. patient calm and withdrawn. minimally verbal. follows some commands. no signs of chest pain or SOB noted .

## 2021-02-22 NOTE — PROVIDER CONTACT NOTE (OTHER) - ASSESSMENT
Patients 's /82 Patient is Rapid Atrial Fibrillation. Patient is asymptomatic no acute signs of distress notes.

## 2021-02-22 NOTE — PROGRESS NOTE ADULT - SUBJECTIVE AND OBJECTIVE BOX
[place]  [place] [id number]    Subjective:  Denies HA, lightheadedness, dizziness, CP, SOB, abdominal pain, N/V.      Interval events:  -P/w  increase in right  chest wall mass size with swelling, redness and warm to touch for the past 2 days and generalized  weakness.    CTPA shows an increased size of right chest wall mass, now measuring 11.4 x 6.9 x 9.7 cm, previously 7.6 x 5.6 x 5.8 cm. No evidence of acute bleeding within the mass. Interval displacement of fracture fragment of the second rib into the center of the soft tissue mass. Found to be in AFib with RVR (130s) in the ED and given IV Lopressor 5mg X1, remains in AFib with RVR.   - IR is consulted to biopsy the right chest wall mass s/p biopsy on 2/12/2021. 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 Plan to uptitrated to diltiazem at titrated   - On 2/21/2020 hospital course c/b AMS 2/2 to seizure breakthrough. Neuro and MICU was consulted. CTH negative for bleed. Neuro recommended Mri Brain and vEEG, and load with Vimpat. MICU was c/s for AMS and airway protection. Patient was protecting his airway  and hemodynamically stable. Not a MICU candiated and recommended sepsis workup for AMS and aspiration precaution. Patient is now on IV diltiazem and Lovenox     MEDICATIONS  (STANDING):  diltiazem Injectable 10 milliGRAM(s) IV Push every 6 hours  enoxaparin Injectable 60 milliGRAM(s) SubCutaneous every 12 hours  ferrous    sulfate 325 milliGRAM(s) Oral daily  FIRST- Mouthwash  BLM 15 milliLiter(s) Swish and Swallow every 6 hours  lacosamide IVPB 150 milliGRAM(s) IV Intermittent every 12 hours  magnesium hydroxide Suspension 30 milliLiter(s) Oral daily  piperacillin/tazobactam IVPB.. 3.375 Gram(s) IV Intermittent every 8 hours  polyethylene glycol 3350 17 Gram(s) Oral two times a day  senna 2 Tablet(s) Oral at bedtime  sodium chloride 0.9% lock flush 3 milliLiter(s) IV Push every 8 hours  tiotropium 18 MICROgram(s) Capsule 1 Capsule(s) Inhalation daily    MEDICATIONS  (PRN):  acetaminophen   Tablet .. 650 milliGRAM(s) Oral every 6 hours PRN Temp greater or equal to 38C (100.4F), Mild Pain (1 - 3), Moderate Pain (4 - 6)  ALBUTerol    90 MICROgram(s) HFA Inhaler 2 Puff(s) Inhalation every 6 hours PRN Shortness of Breath and/or Wheezing  bisacodyl 5 milliGRAM(s) Oral every 12 hours PRN Constipation  guaiFENesin   Syrup  (Sugar-Free) 200 milliGRAM(s) Oral every 6 hours PRN Cough  oxyCODONE    IR 5 milliGRAM(s) Oral every 6 hours PRN Severe Pain (7 - 10)      Vital Signs Last 24 Hrs  T(C): 36.8 (22 Feb 2021 04:07), Max: 36.8 (21 Feb 2021 21:56)  T(F): 98.2 (22 Feb 2021 04:07), Max: 98.2 (21 Feb 2021 21:56)  HR: 140 (22 Feb 2021 07:58) (96 - 141)  BP: 110/87 (22 Feb 2021 07:58) (110/87 - 141/69)  BP(mean): --  RR: 15 (22 Feb 2021 04:07) (15 - 18)  SpO2: 100% (22 Feb 2021 04:07) (99% - 100%)  I&O's Detail    21 Feb 2021 07:01  -  22 Feb 2021 07:00  --------------------------------------------------------  IN:  Total IN: 0 mL    OUT:    Indwelling Catheter - Urethral (mL): 450 mL  Total OUT: 450 mL    Total NET: -450 mL      Physical Exam:  GENERAL: Lying in bed, in NAD  HEART: S1S2 irregularly irregular   PULMONARY: CTABL, normal respiratory effort. Chest mass under skin on R upper chest     TELEMETERIC:Afib                         9.9    11.69 )-----------( 210      ( 22 Feb 2021 06:23 )             31.1       02-22    132<L>  |  91<L>  |  18  ----------------------------<  95  4.7   |  32<H>  |  1.14    Ca    10.0      22 Feb 2021 06:29  Phos  3.3     02-22  Mg     2.6     02-22    TPro  6.8  /  Alb  3.2<L>  /  TBili  1.0  /  DBili  x   /  AST  26  /  ALT  21  /  AlkPhos  156<H>  02-22           [facility]  [place] [id number]    Subjective: Admits to SOB on NC. Denies HA, lightheadedness, dizziness, CP, abdominal pain, N/V.      Interval events:  -P/w  increase in right  chest wall mass size with swelling, redness and warm to touch for the past 2 days and generalized  weakness.    CTPA shows an increased size of right chest wall mass, now measuring 11.4 x 6.9 x 9.7 cm, previously 7.6 x 5.6 x 5.8 cm. No evidence of acute bleeding within the mass. Interval displacement of fracture fragment of the second rib into the center of the soft tissue mass. Found to be in AFib with RVR (130s) in the ED and given IV Lopressor 5mg X1, remains in AFib with RVR.   - IR is consulted to biopsy the right chest wall mass s/p biopsy on 2/12/2021. 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 Plan to uptitrated to diltiazem at titrated   - On 2/21/2020 hospital course c/b AMS 2/2 to seizure breakthrough. Neuro and MICU was consulted. CTH negative for bleed. Neuro recommended Mri Brain and vEEG, and load with Vimpat. MICU was c/s for AMS and airway protection. Patient was protecting his airway  and hemodynamically stable. Not a MICU candiated and recommended sepsis workup for AMS and aspiration precaution. Patient is now on IV diltiazem and Lovenox. But remain rapid     MEDICATIONS  (STANDING):  diltiazem Injectable 10 milliGRAM(s) IV Push every 6 hours  enoxaparin Injectable 60 milliGRAM(s) SubCutaneous every 12 hours  ferrous    sulfate 325 milliGRAM(s) Oral daily  FIRST- Mouthwash  BLM 15 milliLiter(s) Swish and Swallow every 6 hours  lacosamide IVPB 150 milliGRAM(s) IV Intermittent every 12 hours  magnesium hydroxide Suspension 30 milliLiter(s) Oral daily  piperacillin/tazobactam IVPB.. 3.375 Gram(s) IV Intermittent every 8 hours  polyethylene glycol 3350 17 Gram(s) Oral two times a day  senna 2 Tablet(s) Oral at bedtime  sodium chloride 0.9% lock flush 3 milliLiter(s) IV Push every 8 hours  tiotropium 18 MICROgram(s) Capsule 1 Capsule(s) Inhalation daily    MEDICATIONS  (PRN):  acetaminophen   Tablet .. 650 milliGRAM(s) Oral every 6 hours PRN Temp greater or equal to 38C (100.4F), Mild Pain (1 - 3), Moderate Pain (4 - 6)  ALBUTerol    90 MICROgram(s) HFA Inhaler 2 Puff(s) Inhalation every 6 hours PRN Shortness of Breath and/or Wheezing  bisacodyl 5 milliGRAM(s) Oral every 12 hours PRN Constipation  guaiFENesin   Syrup  (Sugar-Free) 200 milliGRAM(s) Oral every 6 hours PRN Cough  oxyCODONE    IR 5 milliGRAM(s) Oral every 6 hours PRN Severe Pain (7 - 10)      Vital Signs Last 24 Hrs  T(C): 36.8 (22 Feb 2021 04:07), Max: 36.8 (21 Feb 2021 21:56)  T(F): 98.2 (22 Feb 2021 04:07), Max: 98.2 (21 Feb 2021 21:56)  HR: 140 (22 Feb 2021 07:58) (96 - 141)  BP: 110/87 (22 Feb 2021 07:58) (110/87 - 141/69)  BP(mean): --  RR: 15 (22 Feb 2021 04:07) (15 - 18)  SpO2: 100% (22 Feb 2021 04:07) (99% - 100%)  I&O's Detail    21 Feb 2021 07:01  -  22 Feb 2021 07:00  --------------------------------------------------------  IN:  Total IN: 0 mL    OUT:    Indwelling Catheter - Urethral (mL): 450 mL  Total OUT: 450 mL    Total NET: -450 mL      Physical Exam:  GENERAL: Lying in bed, in NAD  HEART: S1S2 irregularly irregular   PULMONARY: CTABL, normal respiratory effort. Chest mass under skin on R upper chest     TELEMETERIC:Afib HR mostly 110s-120s, this morning HR went up to the 140s                        9.9    11.69 )-----------( 210      ( 22 Feb 2021 06:23 )             31.1       02-22    132<L>  |  91<L>  |  18  ----------------------------<  95  4.7   |  32<H>  |  1.14    Ca    10.0      22 Feb 2021 06:29  Phos  3.3     02-22  Mg     2.6     02-22    TPro  6.8  /  Alb  3.2<L>  /  TBili  1.0  /  DBili  x   /  AST  26  /  ALT  21  /  AlkPhos  156<H>  02-22           [facility]  [place] [id number]    Subjective: Admits to SOB on NC. Denies HA, lightheadedness, dizziness, CP, abdominal pain, N/V.      Interval events:  -P/w  increase in right  chest wall mass size with swelling, redness and warm to touch for the past 2 days and generalized  weakness.    CTPA shows an increased size of right chest wall mass, now measuring 11.4 x 6.9 x 9.7 cm, previously 7.6 x 5.6 x 5.8 cm. No evidence of acute bleeding within the mass. Interval displacement of fracture fragment of the second rib into the center of the soft tissue mass. Found to be in AFib with RVR (130s) in the ED and given IV Lopressor 5mg X1, remains in AFib with RVR.   - IR is consulted to biopsy the right chest wall mass s/p biopsy on 2/12/2021. 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 Plan to uptitrated to diltiazem at titrated   - On 2/21/2020 hospital course c/b AMS 2/2 to seizure breakthrough. Neuro and MICU was consulted. CTH negative for bleed. Neuro recommended Mri Brain and vEEG, and load with Vimpat. MICU was c/s for AMS and airway protection. Patient was protecting his airway  and hemodynamically stable. Not a MICU candidate and recommended sepsis workup for AMS and aspiration precaution. Patient is now on IV diltiazem and Lovenox. But remain rapid     MEDICATIONS  (STANDING):  diltiazem Injectable 10 milliGRAM(s) IV Push every 6 hours  enoxaparin Injectable 60 milliGRAM(s) SubCutaneous every 12 hours  ferrous    sulfate 325 milliGRAM(s) Oral daily  FIRST- Mouthwash  BLM 15 milliLiter(s) Swish and Swallow every 6 hours  lacosamide IVPB 150 milliGRAM(s) IV Intermittent every 12 hours  magnesium hydroxide Suspension 30 milliLiter(s) Oral daily  piperacillin/tazobactam IVPB.. 3.375 Gram(s) IV Intermittent every 8 hours  polyethylene glycol 3350 17 Gram(s) Oral two times a day  senna 2 Tablet(s) Oral at bedtime  sodium chloride 0.9% lock flush 3 milliLiter(s) IV Push every 8 hours  tiotropium 18 MICROgram(s) Capsule 1 Capsule(s) Inhalation daily    MEDICATIONS  (PRN):  acetaminophen   Tablet .. 650 milliGRAM(s) Oral every 6 hours PRN Temp greater or equal to 38C (100.4F), Mild Pain (1 - 3), Moderate Pain (4 - 6)  ALBUTerol    90 MICROgram(s) HFA Inhaler 2 Puff(s) Inhalation every 6 hours PRN Shortness of Breath and/or Wheezing  bisacodyl 5 milliGRAM(s) Oral every 12 hours PRN Constipation  guaiFENesin   Syrup  (Sugar-Free) 200 milliGRAM(s) Oral every 6 hours PRN Cough  oxyCODONE    IR 5 milliGRAM(s) Oral every 6 hours PRN Severe Pain (7 - 10)      Vital Signs Last 24 Hrs  T(C): 36.8 (22 Feb 2021 04:07), Max: 36.8 (21 Feb 2021 21:56)  T(F): 98.2 (22 Feb 2021 04:07), Max: 98.2 (21 Feb 2021 21:56)  HR: 140 (22 Feb 2021 07:58) (96 - 141)  BP: 110/87 (22 Feb 2021 07:58) (110/87 - 141/69)  BP(mean): --  RR: 15 (22 Feb 2021 04:07) (15 - 18)  SpO2: 100% (22 Feb 2021 04:07) (99% - 100%)  I&O's Detail    21 Feb 2021 07:01  -  22 Feb 2021 07:00  --------------------------------------------------------  IN:  Total IN: 0 mL    OUT:    Indwelling Catheter - Urethral (mL): 450 mL  Total OUT: 450 mL    Total NET: -450 mL      Physical Exam:  GENERAL: Lying in bed, in NAD  HEART: S1S2 irregularly irregular   PULMONARY: CTABL, normal respiratory effort. Chest mass under skin on R upper chest     TELEMETERIC:Afib HR mostly 110s-120s, this morning HR went up to the 140s                        9.9    11.69 )-----------( 210      ( 22 Feb 2021 06:23 )             31.1       02-22    132<L>  |  91<L>  |  18  ----------------------------<  95  4.7   |  32<H>  |  1.14    Ca    10.0      22 Feb 2021 06:29  Phos  3.3     02-22  Mg     2.6     02-22    TPro  6.8  /  Alb  3.2<L>  /  TBili  1.0  /  DBili  x   /  AST  26  /  ALT  21  /  AlkPhos  156<H>  02-22

## 2021-02-22 NOTE — PROGRESS NOTE ADULT - SUBJECTIVE AND OBJECTIVE BOX
Patient is a 75y old  Male who presents with a chief complaint of Increasing chest wall mass (22 Feb 2021 09:36)      SUBJECTIVE / OVERNIGHT EVENTS:    Patient seen and examined at bedside, had RRT event yesterday for acute encephalopathy. At this time pt is oriented to name, location, more responsive, believe was in setting of breakthrough seizure.    MEDICATIONS  (STANDING):  apixaban 5 milliGRAM(s) Oral every 12 hours  carBAMazepine 200 milliGRAM(s) Oral every 8 hours  dextrose 5% + lactated ringers. 1000 milliLiter(s) (75 mL/Hr) IV Continuous <Continuous>  diltiazem    Tablet 120 milliGRAM(s) Oral every 6 hours  ferrous    sulfate 325 milliGRAM(s) Oral daily  FIRST- Mouthwash  BLM 15 milliLiter(s) Swish and Swallow every 6 hours  lacosamide IVPB 150 milliGRAM(s) IV Intermittent every 12 hours  lamoTRIgine 100 milliGRAM(s) Oral two times a day  magnesium hydroxide Suspension 30 milliLiter(s) Oral daily  metoprolol tartrate Injectable 2.5 milliGRAM(s) IV Push once  piperacillin/tazobactam IVPB.. 3.375 Gram(s) IV Intermittent every 8 hours  polyethylene glycol 3350 17 Gram(s) Oral two times a day  senna 2 Tablet(s) Oral at bedtime  sodium chloride 0.9% lock flush 3 milliLiter(s) IV Push every 8 hours  tiotropium 18 MICROgram(s) Capsule 1 Capsule(s) Inhalation daily    MEDICATIONS  (PRN):  acetaminophen   Tablet .. 650 milliGRAM(s) Oral every 6 hours PRN Temp greater or equal to 38C (100.4F), Mild Pain (1 - 3), Moderate Pain (4 - 6)  ALBUTerol    90 MICROgram(s) HFA Inhaler 2 Puff(s) Inhalation every 6 hours PRN Shortness of Breath and/or Wheezing  bisacodyl 5 milliGRAM(s) Oral every 12 hours PRN Constipation  guaiFENesin   Syrup  (Sugar-Free) 200 milliGRAM(s) Oral every 6 hours PRN Cough  oxyCODONE    IR 5 milliGRAM(s) Oral every 6 hours PRN Severe Pain (7 - 10)      Vital Signs Last 24 Hrs  T(C): 36.7 (22 Feb 2021 11:50), Max: 36.8 (21 Feb 2021 21:56)  T(F): 98 (22 Feb 2021 11:50), Max: 98.2 (21 Feb 2021 21:56)  HR: 145 (22 Feb 2021 14:15) (98 - 145)  BP: 119/71 (22 Feb 2021 14:15) (110/87 - 141/69)  BP(mean): --  RR: 17 (22 Feb 2021 11:50) (15 - 18)  SpO2: 100% (22 Feb 2021 11:50) (99% - 100%)  CAPILLARY BLOOD GLUCOSE      POCT Blood Glucose.: 131 mg/dL (21 Feb 2021 18:00)    I&O's Summary    21 Feb 2021 07:01  -  22 Feb 2021 07:00  --------------------------------------------------------  IN: 0 mL / OUT: 450 mL / NET: -450 mL        PHYSICAL EXAM:  Vital Signs Last 24 Hrs  T(C): 36.7 (02-22-21 @ 11:50)  T(F): 98 (02-22-21 @ 11:50), Max: 98.2 (02-21-21 @ 21:56)  HR: 145 (02-22-21 @ 14:15) (98 - 145)  BP: 119/71 (02-22-21 @ 14:15)  BP(mean): --  RR: 17 (02-22-21 @ 11:50) (15 - 18)  SpO2: 100% (02-22-21 @ 11:50) (99% - 100%)  Wt(kg): --    02-21 @ 07:01  -  02-22 @ 07:00  --------------------------------------------------------  IN: 0 mL / OUT: 450 mL / NET: -450 mL      Constitutional: NAD, awake, cachectic  EYES: EOMI  Respiratory: Breath sounds are clear bilaterally, No wheezing, rales or rhonchi  Cardiovascular: S1 and S2, regular rate and rhythm, no Murmurs, gallops or rubs, no JVD,    Gastrointestinal: Bowel Sounds present, soft, nontender, nondistended, no guarding, no rebound  Extremities: No cyanosis or clubbing; warm to touch  Vascular: 2+ peripheral pulses lower ex  Neurological: No focal deficits, CN II-XII intact bilaterally, sensation to light touch intact in all extremities. Pupils are equally reactive to light and symmetrical in size.   Musculoskeletal: No joint swelling.  Skin: No rashes  Psych: no depression or anhedonia, AAOx2      LABS:                        9.9    11.69 )-----------( 210      ( 22 Feb 2021 06:23 )             31.1     02-22    132<L>  |  91<L>  |  18  ----------------------------<  95  4.7   |  32<H>  |  1.14    Ca    10.0      22 Feb 2021 06:29  Phos  3.3     02-22  Mg     2.6     02-22    TPro  6.8  /  Alb  3.2<L>  /  TBili  1.0  /  DBili  x   /  AST  26  /  ALT  21  /  AlkPhos  156<H>  02-22              RADIOLOGY & ADDITIONAL TESTS:    < from: CT Head No Cont (02.21.21 @ 18:38) >  FINDINGS: There is no acute intracranial hemorrhage, mass effect, shift of the midline structures, herniation, extra-axial fluid collection, or hydrocephalus.    There is diffuse cerebral volume loss with prominence of the sulci, fissures, and cisternal spaces which is normal for the patient's age. 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    The paranasal sinuses and mastoid air cells are clear. The calvarium is intact. There is evidence of bilateral cataract removal.    IMPRESSION: No acute intracranial hemorrhage, mass effect, or shift of the midline structures.    < end of copied text >      Imaging Personally Reviewed:    Consultant(s) Notes Reviewed:      Care Discussed with Consultants/Other Providers:

## 2021-02-22 NOTE — GOALS OF CARE CONVERSATION - ADVANCED CARE PLANNING - CONVERSATION DETAILS
Discussed Lymphoma diagnosis with Alannah Guzman, daughter for patient. Explained after possible seizure event yesterday its important to discuss advanced directives. Alannah discussed with patient's wife Palma and were both in agreement that patient would not want to be placed on an ventilator and that undergoing chest compressions in the setting of a cardiac arrest would be painful and result in broken ribs. She verbalized the patient should be DNR/DNI at this time and in setting of cardiac arrest allow for a natural passing. Reassured patient's family with regard to Lymphoma all treatment options being explored at this time and further workup regarding MRI brain and bone marrow biopsy were planned.

## 2021-02-22 NOTE — PROGRESS NOTE ADULT - PROBLEM SELECTOR PLAN 2
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 Heme notified. CT A/P completed showing no lymphadenopathy, pending MR brain for completion of imaging. Based on IHC/FISH testing for myc/Bcl2/BCl6 to decide on RCHOP vs other tx protocal.   - expediting MR brain in setting of possible seizure, pt will likely need LP as well to r/o CSF involvement

## 2021-02-23 ENCOUNTER — RESULT REVIEW (OUTPATIENT)
Age: 76
End: 2021-02-23

## 2021-02-23 LAB
ANION GAP SERPL CALC-SCNC: 8 MMOL/L — SIGNIFICANT CHANGE UP (ref 7–14)
BUN SERPL-MCNC: 18 MG/DL — SIGNIFICANT CHANGE UP (ref 7–23)
CALCIUM SERPL-MCNC: 9.7 MG/DL — SIGNIFICANT CHANGE UP (ref 8.4–10.5)
CHLORIDE SERPL-SCNC: 91 MMOL/L — LOW (ref 98–107)
CO2 SERPL-SCNC: 32 MMOL/L — HIGH (ref 22–31)
CREAT SERPL-MCNC: 1.19 MG/DL — SIGNIFICANT CHANGE UP (ref 0.5–1.3)
GLUCOSE SERPL-MCNC: 98 MG/DL — SIGNIFICANT CHANGE UP (ref 70–99)
HCT VFR BLD CALC: 25.8 % — LOW (ref 39–50)
HGB BLD-MCNC: 8.3 G/DL — LOW (ref 13–17)
MCHC RBC-ENTMCNC: 30.4 PG — SIGNIFICANT CHANGE UP (ref 27–34)
MCHC RBC-ENTMCNC: 32.2 GM/DL — SIGNIFICANT CHANGE UP (ref 32–36)
MCV RBC AUTO: 94.5 FL — SIGNIFICANT CHANGE UP (ref 80–100)
NRBC # BLD: 0 /100 WBCS — SIGNIFICANT CHANGE UP
NRBC # FLD: 0 K/UL — SIGNIFICANT CHANGE UP
PLATELET # BLD AUTO: 178 K/UL — SIGNIFICANT CHANGE UP (ref 150–400)
POTASSIUM SERPL-MCNC: 3.8 MMOL/L — SIGNIFICANT CHANGE UP (ref 3.5–5.3)
POTASSIUM SERPL-SCNC: 3.8 MMOL/L — SIGNIFICANT CHANGE UP (ref 3.5–5.3)
RBC # BLD: 2.73 M/UL — LOW (ref 4.2–5.8)
RBC # FLD: 15.4 % — HIGH (ref 10.3–14.5)
SODIUM SERPL-SCNC: 131 MMOL/L — LOW (ref 135–145)
WBC # BLD: 10.55 K/UL — HIGH (ref 3.8–10.5)
WBC # FLD AUTO: 10.55 K/UL — HIGH (ref 3.8–10.5)

## 2021-02-23 PROCEDURE — 99233 SBSQ HOSP IP/OBS HIGH 50: CPT | Mod: CS

## 2021-02-23 PROCEDURE — 88341 IMHCHEM/IMCYTCHM EA ADD ANTB: CPT | Mod: 26,59

## 2021-02-23 PROCEDURE — 88367 INSITU HYBRIDIZATION AUTO: CPT | Mod: 26

## 2021-02-23 PROCEDURE — 88313 SPECIAL STAINS GROUP 2: CPT | Mod: 26

## 2021-02-23 PROCEDURE — 99233 SBSQ HOSP IP/OBS HIGH 50: CPT | Mod: GC

## 2021-02-23 PROCEDURE — 88365 INSITU HYBRIDIZATION (FISH): CPT | Mod: 26,59

## 2021-02-23 PROCEDURE — 99232 SBSQ HOSP IP/OBS MODERATE 35: CPT

## 2021-02-23 PROCEDURE — 88364 INSITU HYBRIDIZATION (FISH): CPT | Mod: 26

## 2021-02-23 PROCEDURE — 88360 TUMOR IMMUNOHISTOCHEM/MANUAL: CPT | Mod: 26

## 2021-02-23 PROCEDURE — 88342 IMHCHEM/IMCYTCHM 1ST ANTB: CPT | Mod: 26,59

## 2021-02-23 PROCEDURE — 88305 TISSUE EXAM BY PATHOLOGIST: CPT | Mod: 26

## 2021-02-23 RX ORDER — LACOSAMIDE 50 MG/1
150 TABLET ORAL
Refills: 0 | Status: DISCONTINUED | OUTPATIENT
Start: 2021-02-23 | End: 2021-02-24

## 2021-02-23 RX ORDER — CARBAMAZEPINE 200 MG
200 TABLET ORAL
Refills: 0 | Status: DISCONTINUED | OUTPATIENT
Start: 2021-02-23 | End: 2021-02-24

## 2021-02-23 RX ADMIN — TIOTROPIUM BROMIDE 1 CAPSULE(S): 18 CAPSULE ORAL; RESPIRATORY (INHALATION) at 09:49

## 2021-02-23 RX ADMIN — PIPERACILLIN AND TAZOBACTAM 25 GRAM(S): 4; .5 INJECTION, POWDER, LYOPHILIZED, FOR SOLUTION INTRAVENOUS at 21:49

## 2021-02-23 RX ADMIN — POLYETHYLENE GLYCOL 3350 17 GRAM(S): 17 POWDER, FOR SOLUTION ORAL at 06:03

## 2021-02-23 RX ADMIN — LACOSAMIDE 130 MILLIGRAM(S): 50 TABLET ORAL at 12:27

## 2021-02-23 RX ADMIN — DIPHENHYDRAMINE HYDROCHLORIDE AND LIDOCAINE HYDROCHLORIDE AND ALUMINUM HYDROXIDE AND MAGNESIUM HYDRO 15 MILLILITER(S): KIT at 06:03

## 2021-02-23 RX ADMIN — SODIUM CHLORIDE 3 MILLILITER(S): 9 INJECTION INTRAMUSCULAR; INTRAVENOUS; SUBCUTANEOUS at 17:42

## 2021-02-23 RX ADMIN — LAMOTRIGINE 100 MILLIGRAM(S): 25 TABLET, ORALLY DISINTEGRATING ORAL at 06:04

## 2021-02-23 RX ADMIN — PIPERACILLIN AND TAZOBACTAM 25 GRAM(S): 4; .5 INJECTION, POWDER, LYOPHILIZED, FOR SOLUTION INTRAVENOUS at 13:36

## 2021-02-23 RX ADMIN — SODIUM CHLORIDE 3 MILLILITER(S): 9 INJECTION INTRAMUSCULAR; INTRAVENOUS; SUBCUTANEOUS at 06:04

## 2021-02-23 RX ADMIN — SODIUM CHLORIDE 3 MILLILITER(S): 9 INJECTION INTRAMUSCULAR; INTRAVENOUS; SUBCUTANEOUS at 21:47

## 2021-02-23 RX ADMIN — PIPERACILLIN AND TAZOBACTAM 25 GRAM(S): 4; .5 INJECTION, POWDER, LYOPHILIZED, FOR SOLUTION INTRAVENOUS at 06:03

## 2021-02-23 RX ADMIN — APIXABAN 5 MILLIGRAM(S): 2.5 TABLET, FILM COATED ORAL at 06:04

## 2021-02-23 RX ADMIN — Medication 120 MILLIGRAM(S): at 06:03

## 2021-02-23 NOTE — PROCEDURE NOTE - ADDITIONAL PROCEDURE DETAILS
Hematology/Oncology Procedure Note    Bone Marrow Aspiration/Biopsy    Indication:     Bone marrow aspiration and biopsy procedure description, risks, and benefits were discussed in detail with the patient.  All questions were answered.  Informed consent was obtained and time-out performed.      The area of the right posterior iliac crest was prepped and draped using sterile technique. Local anesthetic with 2% Lidocaine.    Bone marrow aspiration and biopsy  was performed using sterile technique by Dr. Abby Melton with Dr. Trotter assist and supervision. Specimens were obtained. No complications and less than 2 cc of blood loss.     The procedure was well tolerated and no local bleeding or other complications were observed.  Pressure was applied to the procedure site and a wound dressing was placed.  The patient and nursing staff were advised that the patient is to lie flat for 30 minutes post procedure and not to shower or change the dressing for 24 hours. Tylenol may be used if no contraindications for pain at the procedure site.          Abby Melton, PGY-4  Hematology-Oncology Fellow  [phone] ([place]) [phone] ([facility])  I can also be reached on [facility] Teams  Please page fellow on call after 5pm and on weekends

## 2021-02-23 NOTE — PROVIDER CONTACT NOTE (OTHER) - ASSESSMENT
Patients HR briefly went to 140's HR currently in 120's-130's No acute signs of distress noted, patient was just awoken and is asymptomatic

## 2021-02-23 NOTE — PROGRESS NOTE ADULT - ATTENDING COMMENTS
Via  phone, patient disclosed that he wanted to stop treatment and go home. RN told me that he really has not been eating or taking oral medications. Patient thinks he is not improving.  Discussed rationale for tests, but patient was very clear with  that he wanted to stop treatment.      If patient does refuses oral medication could give patient IV medications such metoprolol 2.5 mg IV Q 4 hours, more frequent if necessary.

## 2021-02-23 NOTE — PROGRESS NOTE ADULT - ASSESSMENT
This is a 75year old  former smoker man with a pmhx of HTN, HLD, COVID pneumonia 1/12 to 1/21, history of lung CA s/p 2003 R pneumonectomy (chemo radiation 2005), COPD (3L home O2), tissue AVR, A fib on Eliquis, seizure disorder, large R chest wall mass with minimally displaced fracture of the anterior right second rib presents for increasing size of mass now s/p bx on 2/12 also noted to be in afib with RVR on the ED given IV Lopressor 5mg X1, remains in AFib with RVR. . 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 On 2/21/2020 hospital course c/b AMS 2/2 to seizure breakthrough. Neuro and MICU was consulted. CTH negative for bleed. Neuro recommended Mri Brain and vEEG, and load with Vimpat. MICU was c/s for AMS and airway protection. Patient was protecting his airway  and hemodynamically stable. Not a MICU candidate and recommended sepsis workup for AMS and aspiration precaution. Patient is now on IV diltiazem and Lovenox.  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 Hem/onc was consulted    Plan:  - Continue telemetry monitoring.  - Continue IV Cardizem 120mg po q6h and monitor HR and BP   - Monitor lytes and replete K>4.0 and Mg>2.0  - Continue Eliquis 5mg po q12h for thromboembolic ppx given pt has a CHADVASC Score=4   -Not a PPM candidate at the time given that the risk outweighs the benefit given comorbidities and  increased risk of thromboembolism in this patient with a large rapid growing mass in his right thorax.   - Appreciate Nuero rec s/p  Vimpat load, monitor QTC   - Biopsy resulted as Diffuse large B cell lymphoma adn EBV, appreciated Heme/onc recs (may need BM biopsy for staging)  - Management per primary team     ***INCOMPLETE NOTE***  Matt Lazo PA-C  Patient to be staff with attending. Please awaiting attending addendum    This is a 75year old  former smoker man with a pmhx of HTN, HLD, COVID pneumonia 1/12 to 1/21, history of lung CA s/p 2003 R pneumonectomy (chemo radiation 2005), COPD (3L home O2), tissue AVR, A fib on Eliquis, seizure disorder, large R chest wall mass with minimally displaced fracture of the anterior right second rib presents for increasing size of mass now s/p bx on 2/12 also noted to be in afib with RVR on the ED given IV Lopressor 5mg X1, remains in AFib with RVR. . 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 On 2/21/2020 hospital course c/b AMS 2/2 to seizure breakthrough. Neuro and MICU was consulted. CTH negative for bleed. Neuro recommended Mri Brain and vEEG, and load with Vimpat. MICU was c/s for AMS and airway protection. Patient was protecting his airway  and hemodynamically stable. Not a MICU candidate and recommended sepsis workup for AMS and aspiration precaution. Patient is now on IV diltiazem and Lovenox.  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 Hem/onc was consulted    Plan:  - Continue telemetry monitoring.  - Continue IV Cardizem 120mg po q6h and monitor HR and BP   - Monitor lytes and replete K>4.0 and Mg>2.0  - Continue Eliquis 5mg po q12h for thromboembolic ppx given pt has a CHADVASC Score=4   -Not a PPM candidate at the time given that the risk outweighs the benefit given comorbidities and  increased risk of thromboembolism in this patient with a large rapid growing mass in his right thorax.   - Appreciate Nuero rec s/p  Vimpat load, monitor QTC   - Biopsy resulted as Diffuse large B cell lymphoma adn EBV, appreciated Heme/onc recs (may need BM biopsy for staging)  - Management per primary team     Matt Lazo PA-C  Patient to be staff with attending. Please awaiting attending addendum    This is a 75year old  former smoker man with a pmhx of HTN, HLD, COVID pneumonia 1/12 to 1/21, history of lung CA s/p 2003 R pneumonectomy (chemo radiation 2005), COPD (3L home O2), tissue AVR, A fib on Eliquis, seizure disorder, large R chest wall mass with minimally displaced fracture of the anterior right second rib presents for increasing size of mass now s/p bx on 2/12 also noted to be in afib with RVR on the ED given IV Lopressor 5mg X1, remains in AFib with RVR. . 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 On 2/21/2020 hospital course c/b AMS 2/2 to seizure breakthrough. Neuro and MICU was consulted. CTH negative for bleed. Neuro recommended Mri Brain and vEEG, and load with Vimpat. MICU was c/s for AMS and airway protection. Patient was protecting his airway  and hemodynamically stable. Not a MICU candidate and recommended sepsis workup for AMS and aspiration precaution. Patient is now on IV diltiazem and Lovenox.  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 Hem/onc was consulted    Plan:  - Continue telemetry monitoring.  - Continue IV Cardizem 120mg po q6h and monitor HR and BP and metoprolol 2.5mg IV q4h PRN  - Monitor lytes and replete K>4.0 and Mg>2.0  - Continue Eliquis 5mg po q12h for thromboembolic ppx given pt has a CHADVASC Score=4   -Not a PPM candidate at the time given that the risk outweighs the benefit given comorbidities and  increased risk of thromboembolism in this patient with a large rapid growing mass in his right thorax.   - Appreciate Nuero rec s/p  Vimpat load, monitor QTC   - Biopsy resulted as Diffuse large B cell lymphoma adn EBV, appreciated Heme/onc recs (may need BM biopsy for staging)  - Management per primary team     Matt Lazo PA-C  Patient to be staff with attending. Please awaiting attending addendum    This is a 75year old  former smoker man with a pmhx of HTN, HLD, COVID pneumonia 1/12 to 1/21, history of lung CA s/p 2003 R pneumonectomy (chemo radiation 2005), COPD (3L home O2), tissue AVR, A fib on Eliquis, seizure disorder, large R chest wall mass with minimally displaced fracture of the anterior right second rib presents for increasing size of mass now s/p bx on 2/12 also noted to be in afib with RVR on the ED given IV Lopressor 5mg X1, remains in AFib with RVR. . 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 On 2/21/2020 hospital course c/b AMS 2/2 to seizure breakthrough. Neuro and MICU was consulted. CTH negative for bleed. Neuro recommended Mri Brain and vEEG, and load with Vimpat. MICU was c/s for AMS and airway protection. Patient was protecting his airway  and hemodynamically stable. Not a MICU candidate and recommended sepsis workup for AMS and aspiration precaution. Patient is now on IV diltiazem and Lovenox.  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 Hem/onc was consulted    Plan:  - Continue telemetry monitoring.  - Continue IV Cardizem 120mg po q6h and monitor HR and BP   - Start metoprolol 2.5mg IV q3-4h PRN when HR is above 110, monitor for signs of bradycardia and pauses   - Monitor lytes and replete K>4.0 and Mg>2.0  - Continue Eliquis 5mg po q12h for thromboembolic ppx given pt has a CHADVASC Score=4   -Not a PPM candidate at the time given that the risk outweighs the benefit given comorbidities and  increased risk of thromboembolism in this patient with a large rapid growing mass in his right thorax.   - Appreciate Nuero rec s/p  Vimpat load, monitor QTC   - Biopsy resulted as Diffuse large B cell lymphoma adn EBV, appreciated Heme/onc recs (may need BM biopsy for staging)  - Management per primary team     Matt DOWNINGC  Patient to be staff with attending. Please awaiting attending addendum

## 2021-02-23 NOTE — CHART NOTE - NSCHARTNOTEFT_GEN_A_CORE
Called by team regarding the contraindication of being on Carbamazepine and possibly starting Doxyrubicin     Would rec tapering Carbamazepine    Plan;   [] Taper Carbamazepine 200mg q8h --- > 200mg BID ---> 200mg QD --- > Stop  [] Start Vimpat 150mg BID

## 2021-02-23 NOTE — PROGRESS NOTE ADULT - ASSESSMENT
74yo Greenlandic speaking M w/ PMHx of lung CA s/p R pneumonectomy (2003), COPD (on 3L home O2), AVR, afib on eliquis, seizure dx, HTN, HLD p/w worsening Afib with RVR and gradually growing new infraclavicular mass. In terms of cancer history, patient had R pneumonectomy in 2003, last chemo/radiation was in 2005, and patient had close follow up until 2 years ago with no recurrence of disease (Oncologist Dr. Filiberto Mcdaniel with [facility]). Patient evaluated by onc on 12/30 and mass was concerning for recurrence of malignancy given Rt rib fracture with associated hematoma/metastatic lesion. At that time, IR guided Bx was recommended but family preferred to get this done as outpatient and follow with Dr. Mcdaniel.     Patient then got COVID and was not able to get the mass biopsied. In the interim, the mass grew. He presented to the hospital on 2/11 with A fib with RVR and mass has been biopsied during this admission. This just resulted as DLBCL and hematology has been consulted.     # DLBCL   - CT A/P neg for LAD; CT head neg for intracranial disease: MRI brain pending  - Plan for LP with flowcytometry tomorrow  - S/p Bone Marrow Bx today  - Since patient without B symptom hold off on steroids for now  - Await FISH testing for myc/Bcl2/BCl6 before deciding on treatment  - Echo 2/15 with grossly normal LVEF  - Daily TLS labs: CMP/LDH/uric acid/phos  - May need line placement       Abby Melton, PGY-4  Hematology-Oncology Fellow  [phone] ([place]) [phone] ([facility])  I can also be reached on [employer] Teams  Please page fellow on call after 5pm and on weekends

## 2021-02-23 NOTE — PROGRESS NOTE ADULT - SUBJECTIVE AND OBJECTIVE BOX
Patient is a 75y old  Male who presents with a chief complaint of Increasing chest wall mass (23 Feb 2021 09:21)      SUBJECTIVE / OVERNIGHT EVENTS:    Patient seen and examined at bedside. Refusing medication and food at this time, wants to go home, d/w daughter and hematology team, plan for hospice referral with outpatient follow up.     MEDICATIONS  (STANDING):  apixaban 5 milliGRAM(s) Oral every 12 hours  dextrose 5% + lactated ringers. 1000 milliLiter(s) (75 mL/Hr) IV Continuous <Continuous>  diltiazem    Tablet 120 milliGRAM(s) Oral every 6 hours  ferrous    sulfate 325 milliGRAM(s) Oral daily  FIRST- Mouthwash  BLM 15 milliLiter(s) Swish and Swallow every 6 hours  lacosamide 150 milliGRAM(s) Oral two times a day  lamoTRIgine 100 milliGRAM(s) Oral two times a day  magnesium hydroxide Suspension 30 milliLiter(s) Oral daily  piperacillin/tazobactam IVPB.. 3.375 Gram(s) IV Intermittent every 8 hours  polyethylene glycol 3350 17 Gram(s) Oral two times a day  senna 2 Tablet(s) Oral at bedtime  sodium chloride 0.9% lock flush 3 milliLiter(s) IV Push every 8 hours  tiotropium 18 MICROgram(s) Capsule 1 Capsule(s) Inhalation daily    MEDICATIONS  (PRN):  acetaminophen   Tablet .. 650 milliGRAM(s) Oral every 6 hours PRN Temp greater or equal to 38C (100.4F), Mild Pain (1 - 3), Moderate Pain (4 - 6)  ALBUTerol    90 MICROgram(s) HFA Inhaler 2 Puff(s) Inhalation every 6 hours PRN Shortness of Breath and/or Wheezing  bisacodyl 5 milliGRAM(s) Oral every 12 hours PRN Constipation  guaiFENesin   Syrup  (Sugar-Free) 200 milliGRAM(s) Oral every 6 hours PRN Cough  oxyCODONE    IR 5 milliGRAM(s) Oral every 6 hours PRN Severe Pain (7 - 10)      Vital Signs Last 24 Hrs  T(C): 36.3 (23 Feb 2021 10:12), Max: 37.1 (22 Feb 2021 18:15)  T(F): 97.4 (23 Feb 2021 10:12), Max: 98.8 (22 Feb 2021 18:15)  HR: 122 (23 Feb 2021 10:12) (121 - 150)  BP: 113/69 (23 Feb 2021 10:12) (105/82 - 122/61)  BP(mean): --  RR: 17 (23 Feb 2021 06:02) (16 - 17)  SpO2: 100% (23 Feb 2021 10:12) (99% - 100%)  CAPILLARY BLOOD GLUCOSE        I&O's Summary    22 Feb 2021 07:01  -  23 Feb 2021 07:00  --------------------------------------------------------  IN: 1075 mL / OUT: 1300 mL / NET: -225 mL        PHYSICAL EXAM:  Vital Signs Last 24 Hrs  T(C): 36.3 (02-23-21 @ 10:12)  T(F): 97.4 (02-23-21 @ 10:12), Max: 98.8 (02-22-21 @ 18:15)  HR: 122 (02-23-21 @ 10:12) (121 - 150)  BP: 113/69 (02-23-21 @ 10:12)  BP(mean): --  RR: 17 (02-23-21 @ 06:02) (16 - 17)  SpO2: 100% (02-23-21 @ 10:12) (99% - 100%)  Wt(kg): --    02-22 @ 07:01  -  02-23 @ 07:00  --------------------------------------------------------  IN: 1075 mL / OUT: 1300 mL / NET: -225 mL      Constitutional: NAD, awake, cachectic  EYES: EOMI  Respiratory: Breath sounds are clear bilaterally, No wheezing, rales or rhonchi  Cardiovascular: S1 and S2, regular rate and rhythm, no Murmurs, gallops or rubs, no JVD,    Gastrointestinal: Bowel Sounds present, soft, nontender, nondistended, no guarding, no rebound  Extremities: No cyanosis or clubbing; warm to touch  Vascular: 2+ peripheral pulses lower ex  Neurological: No focal deficits, CN II-XII intact bilaterally, sensation to light touch intact in all extremities. Pupils are equally reactive to light and symmetrical in size.   Musculoskeletal: No joint swelling.  Skin: No rashes  Psych: no depression or anhedonia, AAOx2    LABS:                        8.3    10.55 )-----------( 178      ( 23 Feb 2021 07:36 )             25.8     02-23    131<L>  |  91<L>  |  18  ----------------------------<  98  3.8   |  32<H>  |  1.19    Ca    9.7      23 Feb 2021 07:36  Phos  3.3     02-22  Mg     2.6     02-22    TPro  6.8  /  Alb  3.2<L>  /  TBili  1.0  /  DBili  x   /  AST  26  /  ALT  21  /  AlkPhos  156<H>  02-22              RADIOLOGY & ADDITIONAL TESTS:    Imaging Personally Reviewed:    Consultant(s) Notes Reviewed:      Care Discussed with Consultants/Other Providers:

## 2021-02-23 NOTE — PROVIDER CONTACT NOTE (OTHER) - ASSESSMENT
patient is A&Ox3, disoriented to time, frustrated, but no acute physical distress noted. patient denies chest pain and palpitations. vitals per flowsheet

## 2021-02-23 NOTE — PROGRESS NOTE ADULT - SUBJECTIVE AND OBJECTIVE BOX
[place]  Mily #:  Subjective:  Denies HA, lightheadedness, dizziness, CP, SOB, abdominal pain, N/V.      Interval events:  -P/w  increase in right  chest wall mass size with swelling, redness and warm to touch for the past 2 days and generalized  weakness.    CTPA shows an increased size of right chest wall mass, now measuring 11.4 x 6.9 x 9.7 cm, previously 7.6 x 5.6 x 5.8 cm. No evidence of acute bleeding within the mass. Interval displacement of fracture fragment of the second rib into the center of the soft tissue mass. Found to be in AFib with RVR (130s) in the ED and given IV Lopressor 5mg X1, remains in AFib with RVR.   - IR is consulted to biopsy the right chest wall mass s/p biopsy on 2/12/2021. 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   - On 2/21/2020 hospital course c/b AMS 2/2 to seizure breakthrough. Neuro and MICU was consulted. CTH negative for bleed. Neuro recommended Mri Brain and vEEG, and load with Vimpat. MICU was c/s for AMS and airway protection. Patient was protecting his airway  and hemodynamically stable. Not a MICU candidate and recommended sepsis workup for AMS and aspiration precaution. Patient is now on IV diltiazem and Lovenox. But remain rapid. Now back on oral medication   - Started Zoysn on 2/21 for GNR/anaerobe coverage for 5 day given risk of  post obstructive PNA    MEDICATIONS  (STANDING):  apixaban 5 milliGRAM(s) Oral every 12 hours  carBAMazepine 200 milliGRAM(s) Oral every 8 hours  dextrose 5% + lactated ringers. 1000 milliLiter(s) (75 mL/Hr) IV Continuous <Continuous>  diltiazem    Tablet 120 milliGRAM(s) Oral every 6 hours  ferrous    sulfate 325 milliGRAM(s) Oral daily  FIRST- Mouthwash  BLM 15 milliLiter(s) Swish and Swallow every 6 hours  lacosamide IVPB 150 milliGRAM(s) IV Intermittent every 12 hours  lamoTRIgine 100 milliGRAM(s) Oral two times a day  magnesium hydroxide Suspension 30 milliLiter(s) Oral daily  piperacillin/tazobactam IVPB.. 3.375 Gram(s) IV Intermittent every 8 hours  polyethylene glycol 3350 17 Gram(s) Oral two times a day  senna 2 Tablet(s) Oral at bedtime  sodium chloride 0.9% lock flush 3 milliLiter(s) IV Push every 8 hours  tiotropium 18 MICROgram(s) Capsule 1 Capsule(s) Inhalation daily    MEDICATIONS  (PRN):  acetaminophen   Tablet .. 650 milliGRAM(s) Oral every 6 hours PRN Temp greater or equal to 38C (100.4F), Mild Pain (1 - 3), Moderate Pain (4 - 6)  ALBUTerol    90 MICROgram(s) HFA Inhaler 2 Puff(s) Inhalation every 6 hours PRN Shortness of Breath and/or Wheezing  bisacodyl 5 milliGRAM(s) Oral every 12 hours PRN Constipation  guaiFENesin   Syrup  (Sugar-Free) 200 milliGRAM(s) Oral every 6 hours PRN Cough  oxyCODONE    IR 5 milliGRAM(s) Oral every 6 hours PRN Severe Pain (7 - 10)      Vital Signs Last 24 Hrs  T(C): 36.6 (23 Feb 2021 06:02), Max: 37.1 (22 Feb 2021 18:15)  T(F): 97.8 (23 Feb 2021 06:02), Max: 98.8 (22 Feb 2021 18:15)  HR: 121 (23 Feb 2021 06:02) (121 - 150)  BP: 121/71 (23 Feb 2021 06:02) (105/82 - 122/61)  BP(mean): --  RR: 17 (23 Feb 2021 06:02) (16 - 17)  SpO2: 99% (23 Feb 2021 06:02) (99% - 100%)  I&O's Detail    22 Feb 2021 07:01  -  23 Feb 2021 07:00  --------------------------------------------------------  IN:    dextrose 5% + lactated ringers: 600 mL    Oral Fluid: 475 mL  Total IN: 1075 mL    OUT:    Indwelling Catheter - Urethral (mL): 1300 mL  Total OUT: 1300 mL    Total NET: -225 mL    Physical Exam:  GENERAL: Lying in bed, in NAD  HEART: S1S2 irregularly irregular   PULMONARY: CTABL, normal respiratory effort. Chest mass under skin on R upper chest     TELEMETERIC:Afib HR mostly                           8.3    10.55 )-----------( 178      ( 23 Feb 2021 07:36 )             25.8       02-23    131<L>  |  91<L>  |  18  ----------------------------<  98  3.8   |  32<H>  |  1.19    Ca    9.7      23 Feb 2021 07:36  Phos  3.3     02-22  Mg     2.6     02-22    TPro  6.8  /  Alb  3.2<L>  /  TBili  1.0  /  DBili  x   /  AST  26  /  ALT  21  /  AlkPhos  156<H>  02-22           [place]  Mily #:[id number]  Subjective:  Denies lightheadedness, dizziness, CP, SOB, and palpitation.      Interval events:  -P/w  increase in right  chest wall mass size with swelling, redness and warm to touch for the past 2 days and generalized  weakness.    CTPA shows an increased size of right chest wall mass, now measuring 11.4 x 6.9 x 9.7 cm, previously 7.6 x 5.6 x 5.8 cm. No evidence of acute bleeding within the mass. Interval displacement of fracture fragment of the second rib into the center of the soft tissue mass. Found to be in AFib with RVR (130s) in the ED and given IV Lopressor 5mg X1, remains in AFib with RVR.   - IR is consulted to biopsy the right chest wall mass s/p biopsy on 2/12/2021. 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   - On 2/21/2020 hospital course c/b AMS 2/2 to seizure breakthrough. Neuro and MICU was consulted. CTH negative for bleed. Neuro recommended Mri Brain and vEEG, and load with Vimpat. MICU was c/s for AMS and airway protection. Patient was protecting his airway  and hemodynamically stable. Not a MICU candidate and recommended sepsis workup for AMS and aspiration precaution. Patient is now on IV diltiazem and Lovenox. But remain rapid. Now back on oral medication   - Started Zoysn on 2/21 for GNR/anaerobe coverage for 5 day given risk of  post obstructive PNA  - s/p Lopessor 2.5IV for Afib with RVR this morning     MEDICATIONS  (STANDING):  apixaban 5 milliGRAM(s) Oral every 12 hours  carBAMazepine 200 milliGRAM(s) Oral every 8 hours  dextrose 5% + lactated ringers. 1000 milliLiter(s) (75 mL/Hr) IV Continuous <Continuous>  diltiazem    Tablet 120 milliGRAM(s) Oral every 6 hours  ferrous    sulfate 325 milliGRAM(s) Oral daily  FIRST- Mouthwash  BLM 15 milliLiter(s) Swish and Swallow every 6 hours  lacosamide IVPB 150 milliGRAM(s) IV Intermittent every 12 hours  lamoTRIgine 100 milliGRAM(s) Oral two times a day  magnesium hydroxide Suspension 30 milliLiter(s) Oral daily  piperacillin/tazobactam IVPB.. 3.375 Gram(s) IV Intermittent every 8 hours  polyethylene glycol 3350 17 Gram(s) Oral two times a day  senna 2 Tablet(s) Oral at bedtime  sodium chloride 0.9% lock flush 3 milliLiter(s) IV Push every 8 hours  tiotropium 18 MICROgram(s) Capsule 1 Capsule(s) Inhalation daily    MEDICATIONS  (PRN):  acetaminophen   Tablet .. 650 milliGRAM(s) Oral every 6 hours PRN Temp greater or equal to 38C (100.4F), Mild Pain (1 - 3), Moderate Pain (4 - 6)  ALBUTerol    90 MICROgram(s) HFA Inhaler 2 Puff(s) Inhalation every 6 hours PRN Shortness of Breath and/or Wheezing  bisacodyl 5 milliGRAM(s) Oral every 12 hours PRN Constipation  guaiFENesin   Syrup  (Sugar-Free) 200 milliGRAM(s) Oral every 6 hours PRN Cough  oxyCODONE    IR 5 milliGRAM(s) Oral every 6 hours PRN Severe Pain (7 - 10)      Vital Signs Last 24 Hrs  T(C): 36.6 (23 Feb 2021 06:02), Max: 37.1 (22 Feb 2021 18:15)  T(F): 97.8 (23 Feb 2021 06:02), Max: 98.8 (22 Feb 2021 18:15)  HR: 121 (23 Feb 2021 06:02) (121 - 150)  BP: 121/71 (23 Feb 2021 06:02) (105/82 - 122/61)  BP(mean): --  RR: 17 (23 Feb 2021 06:02) (16 - 17)  SpO2: 99% (23 Feb 2021 06:02) (99% - 100%)  I&O's Detail    22 Feb 2021 07:01  -  23 Feb 2021 07:00  --------------------------------------------------------  IN:    dextrose 5% + lactated ringers: 600 mL    Oral Fluid: 475 mL  Total IN: 1075 mL    OUT:    Indwelling Catheter - Urethral (mL): 1300 mL  Total OUT: 1300 mL    Total NET: -225 mL    Physical Exam:  GENERAL: Lying in bed, in NAD   HEART: S1S2 irregularly irregular   PULMONARY: CTABL with NC on, normal respiratory effort. Chest mass under skin on R upper chest     TELEMETERIC:Afib HR 60-80, Brief episodes of Afib with -140 at 8:40am and 9am                           8.3    10.55 )-----------( 178      ( 23 Feb 2021 07:36 )             25.8       02-23    131<L>  |  91<L>  |  18  ----------------------------<  98  3.8   |  32<H>  |  1.19    Ca    9.7      23 Feb 2021 07:36  Phos  3.3     02-22  Mg     2.6     02-22    TPro  6.8  /  Alb  3.2<L>  /  TBili  1.0  /  DBili  x   /  AST  26  /  ALT  21  /  AlkPhos  156<H>  02-22

## 2021-02-23 NOTE — CHART NOTE - NSCHARTNOTEFT_GEN_A_CORE
See attending attestation to initial consult note;    RECOMMENDATIONS     Increase carbamazepine to 200mg Q 6 hrs.    Decrease lamotrigine to 75mg Q 12 hrs.   Will reassess when/if to decrease further.     If he can take enteral medications then once he is on carbamazepine 200mg Q 6 hrs discontinue IV lacosamide.

## 2021-02-23 NOTE — PROVIDER CONTACT NOTE (OTHER) - ASSESSMENT
patient is A&Ox3, disoriented to time, frustrated, and uncooperative. no acute physical distress noted.

## 2021-02-23 NOTE — CHART NOTE - NSCHARTNOTEFT_GEN_A_CORE
Notified by RN earlier in the shift, pts HR upto 150 bpm rapid Afib on tele before PO Cardizem dose.  Pt without complaints, Lopressor 2.5mg IVP x 1 given, HR improved, will c/t monitor.

## 2021-02-23 NOTE — PROGRESS NOTE ADULT - ATTENDING COMMENTS
DLBCL   - CT A/P neg for LAD; CT head neg for intracranial disease: MRI brain pending  - Plan for LP with flowcytometry tomorrow  - S/p Bone Marrow Bx today  - Since patient without B symptom hold off on steroids for now  - Await FISH testing for myc/Bcl2/BCl6 before deciding on treatment  - Echo 2/15 with grossly normal LVEF  - Daily TLS labs: CMP/LDH/uric acid/phos

## 2021-02-23 NOTE — PROGRESS NOTE ADULT - PROBLEM SELECTOR PLAN 2
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 Heme notified. CT A/P completed showing no lymphadenopathy, pending MR brain for completion of imaging. Based on IHC/FISH testing for myc/Bcl2/BCl6 is double negative, pt will likely require inpatient chemo  - at this time 2/23 pt w/ strong desire to return home and have no further interventions at this time, discussed extensively with daughter Alannah that bone marrow biopsy and LP would be required prior to initiation of treatment and treatment options would be dependent on patient's functional status. At this time on 2/23 decision to place hospice referral and have patient return home, if patient decides to seek treatment can follow up at [facility].  - expediting MR brain in setting of possible seizure

## 2021-02-23 NOTE — PROGRESS NOTE ADULT - SUBJECTIVE AND OBJECTIVE BOX
INTERVAL HPI/OVERNIGHT EVENTS:  No overnight events.     MEDICATIONS  (STANDING):  apixaban 5 milliGRAM(s) Oral every 12 hours  carBAMazepine 200 milliGRAM(s) Oral two times a day  dextrose 5% + lactated ringers. 1000 milliLiter(s) (75 mL/Hr) IV Continuous <Continuous>  diltiazem    Tablet 120 milliGRAM(s) Oral every 6 hours  ferrous    sulfate 325 milliGRAM(s) Oral daily  FIRST- Mouthwash  BLM 15 milliLiter(s) Swish and Swallow every 6 hours  lacosamide 150 milliGRAM(s) Oral two times a day  lamoTRIgine 100 milliGRAM(s) Oral two times a day  magnesium hydroxide Suspension 30 milliLiter(s) Oral daily  piperacillin/tazobactam IVPB.. 3.375 Gram(s) IV Intermittent every 8 hours  polyethylene glycol 3350 17 Gram(s) Oral two times a day  senna 2 Tablet(s) Oral at bedtime  sodium chloride 0.9% lock flush 3 milliLiter(s) IV Push every 8 hours  tiotropium 18 MICROgram(s) Capsule 1 Capsule(s) Inhalation daily    MEDICATIONS  (PRN):  acetaminophen   Tablet .. 650 milliGRAM(s) Oral every 6 hours PRN Temp greater or equal to 38C (100.4F), Mild Pain (1 - 3), Moderate Pain (4 - 6)  ALBUTerol    90 MICROgram(s) HFA Inhaler 2 Puff(s) Inhalation every 6 hours PRN Shortness of Breath and/or Wheezing  bisacodyl 5 milliGRAM(s) Oral every 12 hours PRN Constipation  guaiFENesin   Syrup  (Sugar-Free) 200 milliGRAM(s) Oral every 6 hours PRN Cough  oxyCODONE    IR 5 milliGRAM(s) Oral every 6 hours PRN Severe Pain (7 - 10)    Allergies    No Known Allergies    Intolerances          VITAL SIGNS:  T(F): 97.4 (02-23-21 @ 10:12)  HR: 122 (02-23-21 @ 10:12)  BP: 113/69 (02-23-21 @ 10:12)  RR: 17 (02-23-21 @ 06:02)  SpO2: 100% (02-23-21 @ 10:12)  Wt(kg): --    PHYSICAL EXAM:    Constitutional: NAD, frail and ill appearing  Eyes: EOMI, PERRLA  Neck: supple, no masses, no JVD  Chest: Large mass on Rt ant chest wall  Respiratory: CTAB; no r/r/w  Cardiovascular: RRR, no M/R/G  Gastrointestinal: +BS, soft, NTND, no hepatosplenomegaly  Extremities: no c/c/e  Neurological: AAOx3, nonfocal    LABS:                        8.3    10.55 )-----------( 178      ( 23 Feb 2021 07:36 )             25.8     02-23    131<L>  |  91<L>  |  18  ----------------------------<  98  3.8   |  32<H>  |  1.19    Ca    9.7      23 Feb 2021 07:36  Phos  3.3     02-22  Mg     2.6     02-22    TPro  6.8  /  Alb  3.2<L>  /  TBili  1.0  /  DBili  x   /  AST  26  /  ALT  21  /  AlkPhos  156<H>  02-22          RADIOLOGY & ADDITIONAL TESTS:  Studies reviewed.

## 2021-02-23 NOTE — CHART NOTE - NSCHARTNOTEFT_GEN_A_CORE
Called by RN to evaluate patient with suicidal ideation . Patient seen at bedside. Croatian  used Rivka/[phone]. As per patient he no longer wants further treatment and wants to "die here or go home and die" Patient denies any plan or hx of SI. As per wife ([phone])- patient has been refusing food and medications for 2-3 days. Patient may have depression due to prolonged hospital stay. He had previously refused bone marrow biopsy yesterday but agreed to it when he was told he could go home. Hospice referral was made but patient later agreed to further tests. He is A+Ox(3)person, place, situation - unsure of date.1:1 ordered for safety. Will continue to monitor.

## 2021-02-24 ENCOUNTER — TRANSCRIPTION ENCOUNTER (OUTPATIENT)
Age: 76
End: 2021-02-24

## 2021-02-24 VITALS
HEART RATE: 120 BPM | OXYGEN SATURATION: 99 % | SYSTOLIC BLOOD PRESSURE: 128 MMHG | TEMPERATURE: 98 F | RESPIRATION RATE: 18 BRPM | DIASTOLIC BLOOD PRESSURE: 64 MMHG

## 2021-02-24 PROCEDURE — 99239 HOSP IP/OBS DSCHRG MGMT >30: CPT | Mod: CS

## 2021-02-24 PROCEDURE — 99232 SBSQ HOSP IP/OBS MODERATE 35: CPT | Mod: GC

## 2021-02-24 RX ORDER — IPRATROPIUM/ALBUTEROL SULFATE 18-103MCG
1 AEROSOL WITH ADAPTER (GRAM) INHALATION
Qty: 0 | Refills: 0 | DISCHARGE

## 2021-02-24 RX ORDER — ACETAMINOPHEN 500 MG
2 TABLET ORAL
Qty: 0 | Refills: 0 | DISCHARGE
Start: 2021-02-24

## 2021-02-24 RX ORDER — DILTIAZEM HCL 120 MG
1 CAPSULE, EXT RELEASE 24 HR ORAL
Qty: 0 | Refills: 0 | DISCHARGE
Start: 2021-02-24

## 2021-02-24 RX ORDER — FERROUS SULFATE 325(65) MG
1 TABLET ORAL
Qty: 0 | Refills: 0 | DISCHARGE
Start: 2021-02-24

## 2021-02-24 RX ORDER — DIPHENHYDRAMINE HYDROCHLORIDE AND LIDOCAINE HYDROCHLORIDE AND ALUMINUM HYDROXIDE AND MAGNESIUM HYDRO
15 KIT
Qty: 2 | Refills: 0
Start: 2021-02-24

## 2021-02-24 RX ORDER — METOPROLOL TARTRATE 50 MG
1 TABLET ORAL
Qty: 0 | Refills: 0 | DISCHARGE

## 2021-02-24 RX ORDER — CARBAMAZEPINE 200 MG
2 TABLET ORAL
Qty: 0 | Refills: 0 | DISCHARGE

## 2021-02-24 RX ORDER — METOPROLOL TARTRATE 50 MG
2.5 TABLET ORAL EVERY 4 HOURS
Refills: 0 | Status: DISCONTINUED | OUTPATIENT
Start: 2021-02-24 | End: 2021-02-24

## 2021-02-24 RX ORDER — LACOSAMIDE 50 MG/1
1 TABLET ORAL
Qty: 0 | Refills: 0 | DISCHARGE
Start: 2021-02-24

## 2021-02-24 RX ORDER — OXYCODONE HYDROCHLORIDE 5 MG/1
1 TABLET ORAL
Qty: 0 | Refills: 0 | DISCHARGE
Start: 2021-02-24

## 2021-02-24 RX ORDER — OXYCODONE HYDROCHLORIDE 5 MG/1
1 TABLET ORAL
Qty: 0 | Refills: 0 | DISCHARGE

## 2021-02-24 RX ORDER — OXYCODONE HYDROCHLORIDE 5 MG/1
1 TABLET ORAL
Qty: 9 | Refills: 0
Start: 2021-02-24 | End: 2021-02-26

## 2021-02-24 RX ORDER — CARBAMAZEPINE 200 MG
1 TABLET ORAL
Qty: 0 | Refills: 0 | DISCHARGE

## 2021-02-24 RX ORDER — LACOSAMIDE 50 MG/1
150 TABLET ORAL ONCE
Refills: 0 | Status: DISCONTINUED | OUTPATIENT
Start: 2021-02-24 | End: 2021-02-24

## 2021-02-24 RX ORDER — APIXABAN 2.5 MG/1
5 TABLET, FILM COATED ORAL EVERY 12 HOURS
Refills: 0 | Status: DISCONTINUED | OUTPATIENT
Start: 2021-02-24 | End: 2021-02-24

## 2021-02-24 RX ORDER — DILTIAZEM HCL 120 MG
1 CAPSULE, EXT RELEASE 24 HR ORAL
Qty: 120 | Refills: 0
Start: 2021-02-24 | End: 2021-03-25

## 2021-02-24 RX ORDER — LACOSAMIDE 50 MG/1
1 TABLET ORAL
Qty: 60 | Refills: 0
Start: 2021-02-24 | End: 2021-03-25

## 2021-02-24 RX ADMIN — PIPERACILLIN AND TAZOBACTAM 25 GRAM(S): 4; .5 INJECTION, POWDER, LYOPHILIZED, FOR SOLUTION INTRAVENOUS at 13:41

## 2021-02-24 RX ADMIN — Medication 2.5 MILLIGRAM(S): at 01:02

## 2021-02-24 RX ADMIN — Medication 2.5 MILLIGRAM(S): at 17:56

## 2021-02-24 RX ADMIN — LACOSAMIDE 130 MILLIGRAM(S): 50 TABLET ORAL at 01:35

## 2021-02-24 RX ADMIN — Medication 2.5 MILLIGRAM(S): at 06:24

## 2021-02-24 RX ADMIN — SODIUM CHLORIDE 3 MILLILITER(S): 9 INJECTION INTRAMUSCULAR; INTRAVENOUS; SUBCUTANEOUS at 13:45

## 2021-02-24 RX ADMIN — Medication 2.5 MILLIGRAM(S): at 13:41

## 2021-02-24 RX ADMIN — TIOTROPIUM BROMIDE 1 CAPSULE(S): 18 CAPSULE ORAL; RESPIRATORY (INHALATION) at 10:16

## 2021-02-24 RX ADMIN — SODIUM CHLORIDE 3 MILLILITER(S): 9 INJECTION INTRAMUSCULAR; INTRAVENOUS; SUBCUTANEOUS at 06:25

## 2021-02-24 RX ADMIN — PIPERACILLIN AND TAZOBACTAM 25 GRAM(S): 4; .5 INJECTION, POWDER, LYOPHILIZED, FOR SOLUTION INTRAVENOUS at 06:24

## 2021-02-24 RX ADMIN — Medication 2.5 MILLIGRAM(S): at 10:15

## 2021-02-24 NOTE — PROVIDER CONTACT NOTE (OTHER) - ACTION/TREATMENT ORDERED:
Physician Assistant notified, awaiting further instructions.
Provider made aware
hold 50mg Metoprolol due, reassess BP and HR 1400 for Cardizem dose
Provider made aware and Cardizem 90mg given
10mg cardizem IVP & give standing dose of cardizem
2.5mg IV Lopressor ordered and administered. Continue to monitor.
Lisa Bergman made aware. will continue to monitor.
Lopressor and Tylenol
None at this time
Physician Assistant notified, attempt again at a later time
cardizem given as ordered. will continue to monitor.
pending IV push metoprolol dose. will continue to montitor.
2.5mg Lopressor ordered and administered. Continue to monitor.
Cardizem increased to 60mg, supplemented 30mg for this morning's 30mg that was received.
Lisa Bergman made aware. will continue to monitor.
Provider made aware and Cardizem increased to 90mg
Give IVP Cardizem and reassess heart rate in 30mins. If still elevated, reassess blood pressure and notify.
No new orders- IV Metoprolol given per orders.
PA came to speak with patient, constant observation ordered.
Patient took Cardizem 90mg medication as ordered. Both provider and writer present at bedside.
As per provider, Guillermina Ramirez, Continue to monitor patient and notify if patient goes back into rapid afib.
IV Push Metoprolol ordered Q4, IV Vimpat was given approx. 0200 due to previous patient refusal of oral medications.
IV Push Metoprolol ordered and given per orders.
Provider at bedside to straight cath patient with coude catheter. Nithin

## 2021-02-24 NOTE — PROGRESS NOTE ADULT - SUBJECTIVE AND OBJECTIVE BOX
Patient is seen and evaluated. No acute distress noted. He refuses to take medications at times    PAST MEDICAL & SURGICAL HISTORY:  Anemia    Atrial fibrillation    Arrhythmia    Hypertension    History of pneumonia    Lung cancer  s/p R. pneumonectomy    Seizure disorder    Atrial fibrillation    Hypertension    Hyperlipidemia    Seizure disorder    Lung cancer    H/O inguinal hernia repair  1999    H/O aortic valve replacement  2013    S/P cataract surgery  bilaterally    Aortic valve replaced  5/2013   bovine AoValve    S/P pneumonectomy  right sided, ~ 2003        MEDICATIONS  (STANDING):  apixaban 5 milliGRAM(s) Oral every 12 hours  carBAMazepine 200 milliGRAM(s) Oral two times a day  diltiazem    Tablet 120 milliGRAM(s) Oral every 6 hours  ferrous    sulfate 325 milliGRAM(s) Oral daily  FIRST- Mouthwash  BLM 15 milliLiter(s) Swish and Swallow every 6 hours  lacosamide 150 milliGRAM(s) Oral two times a day  lamoTRIgine 100 milliGRAM(s) Oral two times a day  metoprolol tartrate Injectable 2.5 milliGRAM(s) IV Push every 4 hours  piperacillin/tazobactam IVPB.. 3.375 Gram(s) IV Intermittent every 8 hours  polyethylene glycol 3350 17 Gram(s) Oral two times a day  senna 2 Tablet(s) Oral at bedtime  sodium chloride 0.9% lock flush 3 milliLiter(s) IV Push every 8 hours  tiotropium 18 MICROgram(s) Capsule 1 Capsule(s) Inhalation daily    MEDICATIONS  (PRN):  acetaminophen   Tablet .. 650 milliGRAM(s) Oral every 6 hours PRN Temp greater or equal to 38C (100.4F), Mild Pain (1 - 3), Moderate Pain (4 - 6)  ALBUTerol    90 MICROgram(s) HFA Inhaler 2 Puff(s) Inhalation every 6 hours PRN Shortness of Breath and/or Wheezing  oxyCODONE    IR 5 milliGRAM(s) Oral every 6 hours PRN Severe Pain (7 - 10)      Vital Signs Last 24 Hrs  T(C): 36.7 (24 Feb 2021 10:06), Max: 36.8 (23 Feb 2021 21:44)  T(F): 98.1 (24 Feb 2021 10:06), Max: 98.3 (23 Feb 2021 21:44)  HR: 115 (24 Feb 2021 13:40) (114 - 145)  BP: 104/59 (24 Feb 2021 13:40) (104/59 - 132/79)  BP(mean): --  RR: 17 (24 Feb 2021 10:06) (17 - 17)  SpO2: 99% (24 Feb 2021 10:06) (99% - 100%)    INTERPRETATION OF TELEMETRY: Atrial fibrillation with VR 100s and occ up to 150s      LABS:                        8.3    10.55 )-----------( 178      ( 23 Feb 2021 07:36 )             25.8     02-23    131<L>  |  91<L>  |  18  ----------------------------<  98  3.8   |  32<H>  |  1.19    Ca    9.7      23 Feb 2021 07:36              I&O's Summary    23 Feb 2021 07:01  -  24 Feb 2021 07:00  --------------------------------------------------------  IN: 25 mL / OUT: 900 mL / NET: -875 mL    24 Feb 2021 07:01  -  24 Feb 2021 13:53  --------------------------------------------------------  IN: 0 mL / OUT: 400 mL / NET: -400 mL      PHYSICAL EXAM:    GENERAL: In no apparent distress, well nourished, and hydrated.  HEART: S1S2 irregular rate and rhythm   PULMONARY: CTABL with NC on, normal respiratory effort. Chest mass under skin on R upper chest

## 2021-02-24 NOTE — PROGRESS NOTE ADULT - PROBLEM SELECTOR PROBLEM 8
Lung cancer
Essential hypertension
Lung cancer
Lung cancer
Anemia, unspecified type
Essential hypertension
Essential hypertension
Lung cancer
Lung cancer
Essential hypertension
Lung cancer
Lung cancer
Anemia, unspecified type

## 2021-02-24 NOTE — PROGRESS NOTE ADULT - PROBLEM SELECTOR PLAN 1
- pt likely had breakthrough seizure event on 2/21, per family typical seizure activity per neurology previous seizure semiology is loss of speech, coughing and swallowing automatisms, stiffness of his hands and being somewhat responsive to commands but has not been checked in a while  - was on home carbamazepine and lamotrigine, d/w phramacist class X interaction w/ carbamazepine and eliquis and w/ carabamazepine w/ doxirubicin chemo, d/w neuro will change carbamazepine to vimpat 150 mg bid oral for seizure ppx after titration of
- pt rod had breakthrough seizure event on 2/21, per family typical seizure activity per neurology evious seizure semiology is loss of speech, coughing and swallowing automatisms, stiffness of his hands and being somewhat responsive to commands but has not been checked in a while  - was given vimpat 150 mg bid, now that pt tolerating po intake will change back to home dose of carbamezapine 200 mg q8 and lamotrigine 100 mg tid
Enlarging on CT; daughter reports noted in late Dec admission at [facility] but presumed was a hematoma due to rib fracture however continued to progress since then.  Was supposed to f/u as OP but developed COVID and then has been weak since that.   - IR consulted for bx, done 2/12, f/u path, will need OP follow-up, prefers to f/u with OP oncologist Dr. Filiberto Mcdaniel ([facility]) prior to starting treatment plans etc
Enlarging on CT from prior admission; daughter reports noted in late Dec admission at [facility] but presumed was a hematoma due to rib fracture however continued to progress since then.  Was supposed to f/u as OP but developed COVID and then has been weak since that.   - IR consulted for bx, done 2/12, f/u path, will need OP follow-up, prefers to f/u with OP oncologist Dr. Filiberto Mcdaniel ([facility]) prior to starting treatment plans and possibly DESTINI
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 Heme notified. Pending CT A/P and MR brain for completion of imaging. Based on IHC/FISH testing for myc/Bcl2/BCl6 to decide on RCHOP vs other tx protocal. Possible bone marrow bx on Monday.
- pt likely had breakthrough seizure event on 2/21, per family typical seizure activity per neurology previous seizure semiology is loss of speech, coughing and swallowing automatisms, stiffness of his hands and being somewhat responsive to commands but has not been checked in a while  - was on home carbamazepine and lamotrigine, d/w phramacist class X interaction w/ carbamazepine and eliquis and w/ carabamazepine w/ doxirubicin chemo, d/w neuro will change carbamazepine to vimpat 150 mg bid oral for seizure ppx
Enlarging on CT from prior admission; daughter reports noted in late Dec admission at [facility] but presumed was a hematoma due to rib fracture however continued to progress since then.  Was supposed to f/u as OP but developed COVID and then has been weak since that.   - IR consulted for bx, done 2/12, f/u path, will need OP follow-up, prefers to f/u with OP oncologist Dr. Filiberto Mcdaniel ([facility]) prior to starting treatment plans and possibly [facility]  - will f/u w/ path for prelim read
Enlarging on CT; daughter reports noted in late Dec admission at [facility] but presumed was a hematoma due to rib fracture however continued to progress since then.  Was supposed to f/u as OP but developed COVID and then has been weak since that.   - IR consulted for bx, done 2/12, f/u path, will need OP follow-up, prefers to f/u with OP oncologist Dr. Filiberto Mcdaniel ([facility]) prior to starting treatment plans etc
Enlarging on CT; daughter reports noted in late Dec admission at [facility] but presumed was a hematoma due to rib fracture however continued to progress since then.  Was supposed to f/u as OP but developed COVID and then has been weak since that.   - IR consulted for bx, done 2/12, f/u path, will need OP follow-up, prefers to f/u with OP oncologist Dr. Filiberto Mcdaniel ([facility]) prior to starting treatment plans etc
Enlarging on CT from prior admission; daughter reports noted in late Dec admission at [facility] but presumed was a hematoma due to rib fracture however continued to progress since then.  Was supposed to f/u as OP but developed COVID and then has been weak since that.   - IR consulted for bx, done 2/12, f/u path, will need OP follow-up, prefers to f/u with OP oncologist Dr. Filiberto Mcdaniel ([facility]) prior to starting treatment plans and possibly DESTINI
Enlarging on CT; daughter reports noted in late Dec admission at [facility] but presumed was a hematoma due to rib fracture however continued to progress since then.  Was supposed to f/u as OP but developed COVID and then has been weak since that.   - IR consulted for bx, done 2/12, f/u path, will need OP follow-up, prefers to f/u with OP oncologist Dr. Filiberto Mcdaniel ([facility]) prior to starting treatment plans and possibly DESTINI
Enlarging on CT; daughter reports noted in late Dec admission at [facility] but presumed was a hematoma due to rib fracture however continued to progress since then.  Was supposed to f/u as OP but developed COVID and then has been weak since that.   - IR consulted for bx, plan for today  - eliquis on hold for bx, will likely be able to resume s/p procedure, will need to f/u with IR re: timing   - per family if this is cancer would like to confer with OP oncologist Dr. Filiberto Mcdaniel ([facility]) prior to starting treatment plans etc
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 Heme notified. CT A/P completed showing no lymphadenopathy, pending MR brain for completion of imaging. Based on IHC/FISH testing for myc/Bcl2/BCl6 to decide on RCHOP vs other tx protocal. Possible bone marrow bx on Monday.

## 2021-02-24 NOTE — PROGRESS NOTE ADULT - PROBLEM SELECTOR PLAN 6
- c/w statin
- c/w statin
- c/w metoprolol 50 mg q6h
- c/w statin
COVID PCR detected likely residual from COVID PNA 1/21.  - c/w O2, at home dose 3L, currently at 2L  - Pro Air Inhaler for bronchodilation PRN.
COVID PCR detected likely residual from COVID PNA 1/21.  - c/w O2, at home dose 3L, currently at 2L  - Pro Air Inhaler for bronchodilation PRN.
- c/w metoprolol 50 mg q6h
- c/w statin
- c/w metoprolol 50 mg q6h
- holding metorplol 50 q6 in setting of tachy-emma syndrome, started on dilt 30 q6
COVID PCR detected likely residual from COVID PNA 1/21.  - c/w O2, at home dose 3L, currently at 2L  - Pro Air Inhaler for bronchodilation PRN.
- c/w statin
Last seizure was 3 years ago  - c/w carbamazepine 400 AM, 200 PM and Lamictal 100 BID

## 2021-02-24 NOTE — DISCHARGE NOTE PROVIDER - HOSPITAL COURSE
75 M Qatari speaking ambulates with a cane PMHx ex nicotine addiction, recently discharged from [facility] on 1/21/2021 S/P COVID pneumonia, Hx lung CA S/P 2003 Rt pneumonectomy, large R chest wall mass with minimally displaced fracture of the anterior Rt second rib, COPD (3L home O2), tissue AVR, Afib (CHADSVASC of 4 on Eliquis), seizure disorder, HTN, HLD p/w with increasing Rt chest wall mass/ swelling/ redness/warm x2 days. CTPA shows an increased size of right chest wall mass, now measuring 11.4 x 6.9 x 9.7 cm, previously 7.6 x 5.6 x 5.8 cm. No evidence of acute bleeding within the mass. Interval displacement of fracture fragment of the second rib into the center of the soft tissue mass. S/P IR biopsy of mass - consistent with large B-cell lymphoma. Heme consulted. BM biopsy done 2/23. LP done ___________________    Hospital course c/b Afib. S/P Lopressor. Cardio consulted. Pt continued on Eliquis, Cardizem.     RRT called 2/21 for possible seizure activity. CT head unremarkable.                  75 M Chinese speaking ambulates with a cane PMHx ex nicotine addiction, recently discharged from [facility] on 1/21/2021 S/P COVID pneumonia, Hx lung CA S/P 2003 Rt pneumonectomy, large R chest wall mass with minimally displaced fracture of the anterior Rt second rib, COPD (3L home O2), tissue AVR, Afib (CHADSVASC of 4 on Eliquis), seizure disorder, HTN, HLD p/w with increasing Rt chest wall mass/ swelling/ redness/warm x2 days. CTPA shows an increased size of right chest wall mass, now measuring 11.4 x 6.9 x 9.7 cm, previously 7.6 x 5.6 x 5.8 cm. No evidence of acute bleeding within the mass. Interval displacement of fracture fragment of the second rib into the center of the soft tissue mass. S/P IR biopsy of mass - consistent with large B-cell lymphoma. Heme consulted. BM biopsy done 2/23. Pt refused further workup and deferred to outpatient followup.     Hospital course c/b Afib. S/P Lopressor. Cardio consulted. Pt continued on Eliquis, Cardizem. Pt started on Lopressor IV in-hospital as refusing medications stating only wants to go home. Continue with Eliquis/Cardizem upon discharge as per attending.     RRT called 2/21 for possible seizure activity. CT head unremarkable. EEG/MRI ordered. Pt monitored noted without seizure activity. Outpatient follow up EEG/MRI. Continue Vimpat, Lamictal. Continue Carbamazapine through 2/25 and stop.     Spoke with attending, Dr. Ruiz 2/24, pt is medically stable for discharge with outpatient F/U for LP/ MRI/ EEG.                      75 M Swedish speaking ambulates with a cane PMHx ex nicotine addiction, recently discharged from [facility] on 1/21/2021 S/P COVID pneumonia, Hx lung CA S/P 2003 Rt pneumonectomy, large R chest wall mass with minimally displaced fracture of the anterior Rt second rib, COPD (3L home O2), tissue AVR, Afib (CHADSVASC of 4 on Eliquis), seizure disorder, HTN, HLD p/w with increasing Rt chest wall mass/ swelling/ redness/warm x2 days. CTPA shows an increased size of right chest wall mass, now measuring 11.4 x 6.9 x 9.7 cm, previously 7.6 x 5.6 x 5.8 cm. No evidence of acute bleeding within the mass. Interval displacement of fracture fragment of the second rib into the center of the soft tissue mass. S/P IR biopsy of mass - consistent with large B-cell lymphoma. Heme consulted. BM biopsy done 2/23. Pt refused further workup and deferred to outpatient followup.     Hospital course c/b Afib. S/P Lopressor. Cardio consulted. Pt continued on Eliquis, Cardizem. Pt deemed not candidate for PPM as benefits outweigh risks. Continue with Eliquis/Cardizem upon discharge as per attending, Metoprolol to be held on discharge given tachy-emma syndrome. Outpatient Cardio follow up      RRT called 2/21 for possible seizure activity. CT head unremarkable. EEG/MRI ordered. Pt monitored noted without seizure activity. Outpatient follow up EEG/MRI. Continue Vimpat, Lamictal. Continue Carbamazapine through 2/25 and stop.     Noted with productive cough 2/21. Zosyn started, no need for Abx upon discharge as per attending.     Spoke with attending, Dr. Ruiz 2/24, pt is medically stable for discharge with outpatient F/U for LP/ MRI/ EEG versus PCP follow up for hospice referral and evaluation for home care

## 2021-02-24 NOTE — DISCHARGE NOTE PROVIDER - DETAILS OF MALNUTRITION DIAGNOSIS/DIAGNOSES
This patient has been assessed with a concern for Malnutrition and was treated during this hospitalization for the following Nutrition diagnosis/diagnoses:     -  02/14/2021: Severe protein-calorie malnutrition   This patient has been assessed with a concern for Malnutrition and was treated during this hospitalization for the following Nutrition diagnosis/diagnoses:     -  02/14/2021: Severe protein-calorie malnutrition    This patient has been assessed with a concern for Malnutrition and was treated during this hospitalization for the following Nutrition diagnosis/diagnoses:     -  02/14/2021: Severe protein-calorie malnutrition   This patient has been assessed with a concern for Malnutrition and was treated during this hospitalization for the following Nutrition diagnosis/diagnoses:     -  02/14/2021: Severe protein-calorie malnutrition    This patient has been assessed with a concern for Malnutrition and was treated during this hospitalization for the following Nutrition diagnosis/diagnoses:     -  02/14/2021: Severe protein-calorie malnutrition    This patient has been assessed with a concern for Malnutrition and was treated during this hospitalization for the following Nutrition diagnosis/diagnoses:     -  02/14/2021: Severe protein-calorie malnutrition   This patient has been assessed with a concern for Malnutrition and was treated during this hospitalization for the following Nutrition diagnosis/diagnoses:     -  02/14/2021: Severe protein-calorie malnutrition    This patient has been assessed with a concern for Malnutrition and was treated during this hospitalization for the following Nutrition diagnosis/diagnoses:     -  02/14/2021: Severe protein-calorie malnutrition    This patient has been assessed with a concern for Malnutrition and was treated during this hospitalization for the following Nutrition diagnosis/diagnoses:     -  02/14/2021: Severe protein-calorie malnutrition    This patient has been assessed with a concern for Malnutrition and was treated during this hospitalization for the following Nutrition diagnosis/diagnoses:     -  02/14/2021: Severe protein-calorie malnutrition   This patient has been assessed with a concern for Malnutrition and was treated during this hospitalization for the following Nutrition diagnosis/diagnoses:     -  02/14/2021: Severe protein-calorie malnutrition    This patient has been assessed with a concern for Malnutrition and was treated during this hospitalization for the following Nutrition diagnosis/diagnoses:     -  02/14/2021: Severe protein-calorie malnutrition    This patient has been assessed with a concern for Malnutrition and was treated during this hospitalization for the following Nutrition diagnosis/diagnoses:     -  02/14/2021: Severe protein-calorie malnutrition    This patient has been assessed with a concern for Malnutrition and was treated during this hospitalization for the following Nutrition diagnosis/diagnoses:     -  02/14/2021: Severe protein-calorie malnutrition    This patient has been assessed with a concern for Malnutrition and was treated during this hospitalization for the following Nutrition diagnosis/diagnoses:     -  02/14/2021: Severe protein-calorie malnutrition   This patient has been assessed with a concern for Malnutrition and was treated during this hospitalization for the following Nutrition diagnosis/diagnoses:     -  02/14/2021: Severe protein-calorie malnutrition    This patient has been assessed with a concern for Malnutrition and was treated during this hospitalization for the following Nutrition diagnosis/diagnoses:     -  02/14/2021: Severe protein-calorie malnutrition    This patient has been assessed with a concern for Malnutrition and was treated during this hospitalization for the following Nutrition diagnosis/diagnoses:     -  02/14/2021: Severe protein-calorie malnutrition    This patient has been assessed with a concern for Malnutrition and was treated during this hospitalization for the following Nutrition diagnosis/diagnoses:     -  02/14/2021: Severe protein-calorie malnutrition    This patient has been assessed with a concern for Malnutrition and was treated during this hospitalization for the following Nutrition diagnosis/diagnoses:     - 02/14/2021: Severe protein-calorie malnutrition    This patient has been assessed with a concern for Malnutrition and was treated during this hospitalization for the following Nutrition diagnosis/diagnoses:     - 02/14/2021: Severe protein-calorie malnutrition   This patient has been assessed with a concern for Malnutrition and was treated during this hospitalization for the following Nutrition diagnosis/diagnoses:     -  02/14/2021: Severe protein-calorie malnutrition    This patient has been assessed with a concern for Malnutrition and was treated during this hospitalization for the following Nutrition diagnosis/diagnoses:     -  02/14/2021: Severe protein-calorie malnutrition    This patient has been assessed with a concern for Malnutrition and was treated during this hospitalization for the following Nutrition diagnosis/diagnoses:     - 02/14/2021: Severe protein-calorie malnutrition    This patient has been assessed with a concern for Malnutrition and was treated during this hospitalization for the following Nutrition diagnosis/diagnoses:     -  02/14/2021: Severe protein-calorie malnutrition    This patient has been assessed with a concern for Malnutrition and was treated during this hospitalization for the following Nutrition diagnosis/diagnoses:     - 02/14/2021: Severe protein-calorie malnutrition    This patient has been assessed with a concern for Malnutrition and was treated during this hospitalization for the following Nutrition diagnosis/diagnoses:     - 02/14/2021: Severe protein-calorie malnutrition    This patient has been assessed with a concern for Malnutrition and was treated during this hospitalization for the following Nutrition diagnosis/diagnoses:     - 02/14/2021: Severe protein-calorie malnutrition   This patient has been assessed with a concern for Malnutrition and was treated during this hospitalization for the following Nutrition diagnosis/diagnoses:     -  02/14/2021: Severe protein-calorie malnutrition    This patient has been assessed with a concern for Malnutrition and was treated during this hospitalization for the following Nutrition diagnosis/diagnoses:     -  02/14/2021: Severe protein-calorie malnutrition    This patient has been assessed with a concern for Malnutrition and was treated during this hospitalization for the following Nutrition diagnosis/diagnoses:     - 02/14/2021: Severe protein-calorie malnutrition    This patient has been assessed with a concern for Malnutrition and was treated during this hospitalization for the following Nutrition diagnosis/diagnoses:     -  02/14/2021: Severe protein-calorie malnutrition    This patient has been assessed with a concern for Malnutrition and was treated during this hospitalization for the following Nutrition diagnosis/diagnoses:     - 02/14/2021: Severe protein-calorie malnutrition    This patient has been assessed with a concern for Malnutrition and was treated during this hospitalization for the following Nutrition diagnosis/diagnoses:     - 02/14/2021: Severe protein-calorie malnutrition    This patient has been assessed with a concern for Malnutrition and was treated during this hospitalization for the following Nutrition diagnosis/diagnoses:     - 02/14/2021: Severe protein-calorie malnutrition    This patient has been assessed with a concern for Malnutrition and was treated during this hospitalization for the following Nutrition diagnosis/diagnoses:     - 02/14/2021: Severe protein-calorie malnutrition   This patient has been assessed with a concern for Malnutrition and was treated during this hospitalization for the following Nutrition diagnosis/diagnoses:     -  02/14/2021: Severe protein-calorie malnutrition    This patient has been assessed with a concern for Malnutrition and was treated during this hospitalization for the following Nutrition diagnosis/diagnoses:     -  02/14/2021: Severe protein-calorie malnutrition    This patient has been assessed with a concern for Malnutrition and was treated during this hospitalization for the following Nutrition diagnosis/diagnoses:     - 02/14/2021: Severe protein-calorie malnutrition    This patient has been assessed with a concern for Malnutrition and was treated during this hospitalization for the following Nutrition diagnosis/diagnoses:     -  02/14/2021: Severe protein-calorie malnutrition    This patient has been assessed with a concern for Malnutrition and was treated during this hospitalization for the following Nutrition diagnosis/diagnoses:     - 02/14/2021: Severe protein-calorie malnutrition    This patient has been assessed with a concern for Malnutrition and was treated during this hospitalization for the following Nutrition diagnosis/diagnoses:     - 02/14/2021: Severe protein-calorie malnutrition    This patient has been assessed with a concern for Malnutrition and was treated during this hospitalization for the following Nutrition diagnosis/diagnoses:     - 02/14/2021: Severe protein-calorie malnutrition    This patient has been assessed with a concern for Malnutrition and was treated during this hospitalization for the following Nutrition diagnosis/diagnoses:     - 02/14/2021: Severe protein-calorie malnutrition    This patient has been assessed with a concern for Malnutrition and was treated during this hospitalization for the following Nutrition diagnosis/diagnoses:     - 02/14/2021: Severe protein-calorie malnutrition

## 2021-02-24 NOTE — PROGRESS NOTE ADULT - ASSESSMENT
This is a 75year old  former smoker man with a pmhx of HTN, HLD, COVID pneumonia 1/12 to 1/21, history of lung CA s/p 2003 R pneumonectomy (chemo radiation 2005), COPD (3L home O2), tissue AVR, A fib on Eliquis, seizure disorder, large R chest wall mass with minimally displaced fracture of the anterior right second rib presents for increasing size of mass now s/p bx on 2/12 also noted to be in afib with RVR on the ED given IV Lopressor 5mg X1, remains in AFib with RVR. . 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 On 2/21/2020 hospital course c/b AMS 2/2 to seizure breakthrough. Started on Carbamazapine. Patient is now on PO diltiazem, IV metoprolol for rate control and apixaban 5mg BID for anticoagulation.  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 Hem/onc following. Discussions with family regarding goals of care on going. Patient refuses treatments at times.      Plan:  - Continue telemetry monitoring.  - Continue Cardizem 120mg po q6h and monitor HR and BP   - Start metoprolol 2.5mg IV 4h PRN when HR is above 110, monitor for signs of bradycardia and pauses   - Monitor lytes and replete K>4.0 and Mg>2.0  - Continue Eliquis 5mg po q12h for thromboembolic ppx given pt has a CHADVASC Score=4   -Not a PPM candidate at the time given that the risk outweighs the benefit given comorbidities and  increased risk of thromboembolism in this patient with a large rapid growing mass in his right thorax.   -  s/p  Vimpat load, monitor QTC   - Biopsy resulted as Diffuse large B cell lymphoma adn EBV, appreciated Heme/onc recs,   - s/p Bone Marrow Bx 2/23 Plan for LP with flow cytometry today  - Management per primary team

## 2021-02-24 NOTE — PROGRESS NOTE ADULT - SUBJECTIVE AND OBJECTIVE BOX
Patient is a 75y old  Male who presents with a chief complaint of Increasing chest wall mass (24 Feb 2021 13:52)      SUBJECTIVE / OVERNIGHT EVENTS:    Patient seen and examined at bedside. Last night told RN frustration regarding being here, was placed on 1:1 for possible SI however denied at that time and again this morning both SI and HI. 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 The patient clearly states he does not want any further testing at this time and only desire is to go home. Denied any other acute concerns.     MEDICATIONS  (STANDING):  apixaban 5 milliGRAM(s) Oral every 12 hours  carBAMazepine 200 milliGRAM(s) Oral two times a day  diltiazem    Tablet 120 milliGRAM(s) Oral every 6 hours  ferrous    sulfate 325 milliGRAM(s) Oral daily  FIRST- Mouthwash  BLM 15 milliLiter(s) Swish and Swallow every 6 hours  lacosamide 150 milliGRAM(s) Oral two times a day  lamoTRIgine 100 milliGRAM(s) Oral two times a day  metoprolol tartrate Injectable 2.5 milliGRAM(s) IV Push every 4 hours  piperacillin/tazobactam IVPB.. 3.375 Gram(s) IV Intermittent every 8 hours  polyethylene glycol 3350 17 Gram(s) Oral two times a day  senna 2 Tablet(s) Oral at bedtime  sodium chloride 0.9% lock flush 3 milliLiter(s) IV Push every 8 hours  tiotropium 18 MICROgram(s) Capsule 1 Capsule(s) Inhalation daily    MEDICATIONS  (PRN):  acetaminophen   Tablet .. 650 milliGRAM(s) Oral every 6 hours PRN Temp greater or equal to 38C (100.4F), Mild Pain (1 - 3), Moderate Pain (4 - 6)  ALBUTerol    90 MICROgram(s) HFA Inhaler 2 Puff(s) Inhalation every 6 hours PRN Shortness of Breath and/or Wheezing  oxyCODONE    IR 5 milliGRAM(s) Oral every 6 hours PRN Severe Pain (7 - 10)      Vital Signs Last 24 Hrs  T(C): 36.7 (24 Feb 2021 10:06), Max: 36.8 (23 Feb 2021 21:44)  T(F): 98.1 (24 Feb 2021 10:06), Max: 98.3 (23 Feb 2021 21:44)  HR: 115 (24 Feb 2021 13:40) (114 - 145)  BP: 104/59 (24 Feb 2021 13:40) (104/59 - 132/79)  BP(mean): --  RR: 17 (24 Feb 2021 10:06) (17 - 17)  SpO2: 99% (24 Feb 2021 10:06) (99% - 100%)  CAPILLARY BLOOD GLUCOSE        I&O's Summary    23 Feb 2021 07:01  -  24 Feb 2021 07:00  --------------------------------------------------------  IN: 25 mL / OUT: 900 mL / NET: -875 mL    24 Feb 2021 07:01  -  24 Feb 2021 14:06  --------------------------------------------------------  IN: 0 mL / OUT: 400 mL / NET: -400 mL        PHYSICAL EXAM:  Vital Signs Last 24 Hrs  T(C): 36.7 (02-24-21 @ 10:06)  T(F): 98.1 (02-24-21 @ 10:06), Max: 98.3 (02-23-21 @ 21:44)  HR: 115 (02-24-21 @ 13:40) (114 - 145)  BP: 104/59 (02-24-21 @ 13:40)  BP(mean): --  RR: 17 (02-24-21 @ 10:06) (17 - 17)  SpO2: 99% (02-24-21 @ 10:06) (99% - 100%)  Wt(kg): --    02-23 @ 07:01  -  02-24 @ 07:00  --------------------------------------------------------  IN: 25 mL / OUT: 900 mL / NET: -875 mL    02-24 @ 07:01  -  02-24 @ 14:06  --------------------------------------------------------  IN: 0 mL / OUT: 400 mL / NET: -400 mL      Constitutional: NAD, awake, cachectic  EYES: EOMI  Respiratory: Breath sounds are clear bilaterally, No wheezing, rales or rhonchi, Chest wall mass present w/o active bleeding/purulence  Cardiovascular: S1 and S2, regular rate and rhythm, no Murmurs, gallops or rubs, no JVD,   Gastrointestinal: Bowel Sounds present, soft, nontender, nondistended, no guarding, no rebound  Extremities: No cyanosis or clubbing; warm to touch  Vascular: 2+ peripheral pulses lower ex  Neurological: No focal deficits, CN II-XII intact bilaterally, sensation to light touch intact in all extremities. Pupils are equally reactive to light and symmetrical in size.   Musculoskeletal: No joint swelling.  Skin: No rashes  Psych: no depression or anhedonia, AAOx2    LABS:                        8.3    10.55 )-----------( 178      ( 23 Feb 2021 07:36 )             25.8     02-23    131<L>  |  91<L>  |  18  ----------------------------<  98  3.8   |  32<H>  |  1.19    Ca    9.7      23 Feb 2021 07:36                RADIOLOGY & ADDITIONAL TESTS:    Imaging Personally Reviewed:    Consultant(s) Notes Reviewed:      Care Discussed with Consultants/Other Providers:

## 2021-02-24 NOTE — DISCHARGE NOTE PROVIDER - PROVIDER TOKENS
PROVIDER:[TOKEN:[[id number]]] PROVIDER:[TOKEN:[[id number]]],FREE:[LAST:[Dr. Filiberto Mcdaniel],PHONE:[(   )    -],FAX:[(   )    -],ADDRESS:[Oncologist at [facility]]],PROVIDER:[TOKEN:[[id number]]]

## 2021-02-24 NOTE — DISCHARGE NOTE PROVIDER - NSDCMRMEDTOKEN_GEN_ALL_CORE_FT
bisacodyl 5 mg oral delayed release tablet: 1 tab(s) orally every 12 hours, As needed, Constipation  Breo Ellipta 100 mcg-25 mcg/inh inhalation powder: 1 puff(s) inhaled once a day  carBAMazepine 200 mg oral tablet: 2 tab(s) orally once a day in AM   carBAMazepine 200 mg oral tablet: 1 tab(s) orally once a day (at bedtime)  Combivent 18 mcg-103 mcg-/inh inhalation aerosol: 1 puff(s) inhaled 4 times a day  DuoNeb 0.5 mg-2.5 mg/3 mL inhalation solution: 3 milliliter(s) inhaled every 6 hours, As Needed - for bronchospasm, for shortness of breath and/or wheezing  Eliquis 5 mg oral tablet: 1 tab(s) orally 2 times a day  LaMICtal 100 mg oral tablet: 1 tab(s) orally 2 times a day  megestrol 40 mg/mL oral suspension: 10 milliliter(s) orally once a day  Metoprolol Tartrate 50 mg oral tablet: 1 tab(s) orally every 8 hours  polyethylene glycol 3350 oral powder for reconstitution: 17 gram(s) orally once a day, As needed, Constipation   acetaminophen 325 mg oral tablet: 2 tab(s) orally every 6 hours, As needed, Temp greater or equal to 38C (100.4F), Mild Pain (1 - 3), Moderate Pain (4 - 6)  bisacodyl 5 mg oral delayed release tablet: 1 tab(s) orally every 12 hours, As needed, Constipation  Breo Ellipta 100 mcg-25 mcg/inh inhalation powder: 1 puff(s) inhaled once a day  carBAMazepine 200 mg oral tablet: 2 tab(s) orally once a day in AM   Combivent 18 mcg-103 mcg-/inh inhalation aerosol: 1 puff(s) inhaled 4 times a day  dilTIAZem 120 mg oral tablet: 1 tab(s) orally every 6 hours  DuoNeb 0.5 mg-2.5 mg/3 mL inhalation solution: 3 milliliter(s) inhaled every 6 hours, As Needed - for bronchospasm, for shortness of breath and/or wheezing  Eliquis 5 mg oral tablet: 1 tab(s) orally 2 times a day  ferrous sulfate 325 mg (65 mg elemental iron) oral tablet: 1 tab(s) orally once a day  lacosamide 150 mg oral tablet: 1 tab(s) orally 2 times a day  LaMICtal 100 mg oral tablet: 1 tab(s) orally 2 times a day  megestrol 40 mg/mL oral suspension: 10 milliliter(s) orally once a day  Metoprolol Tartrate 50 mg oral tablet: 1 tab(s) orally every 8 hours  oxyCODONE 5 mg oral tablet: 1 tab(s) orally every 6 hours, As needed, Severe Pain (7 - 10)  polyethylene glycol 3350 oral powder for reconstitution: 17 gram(s) orally once a day, As needed, Constipation  predniSONE 10 mg oral tablet: 1 tab(s) orally once a day   acetaminophen 325 mg oral tablet: 2 tab(s) orally every 6 hours, As needed, Temp greater or equal to 38C (100.4F), Mild Pain (1 - 3), Moderate Pain (4 - 6)  bisacodyl 5 mg oral delayed release tablet: 1 tab(s) orally every 12 hours, As needed, Constipation  Breo Ellipta 100 mcg-25 mcg/inh inhalation powder: 1 puff(s) inhaled once a day  carBAMazepine 200 mg oral tablet: 2 tab(s) orally once a day in AM   Combivent 18 mcg-103 mcg-/inh inhalation aerosol: 1 puff(s) inhaled 4 times a day  dilTIAZem 120 mg oral tablet: 1 tab(s) orally every 6 hours  DuoNeb 0.5 mg-2.5 mg/3 mL inhalation solution: 3 milliliter(s) inhaled every 6 hours, As Needed - for bronchospasm, for shortness of breath and/or wheezing  Eliquis 5 mg oral tablet: 1 tab(s) orally 2 times a day  ferrous sulfate 325 mg (65 mg elemental iron) oral tablet: 1 tab(s) orally once a day  lacosamide 150 mg oral tablet: 1 tab(s) orally 2 times a day  LaMICtal 100 mg oral tablet: 1 tab(s) orally 2 times a day  megestrol 40 mg/mL oral suspension: 10 milliliter(s) orally once a day  polyethylene glycol 3350 oral powder for reconstitution: 17 gram(s) orally once a day, As needed, Constipation  predniSONE 10 mg oral tablet: 1 tab(s) orally once a day   acetaminophen 325 mg oral tablet: 2 tab(s) orally every 6 hours, As needed, Temp greater or equal to 38C (100.4F), Mild Pain (1 - 3), Moderate Pain (4 - 6)  bisacodyl 5 mg oral delayed release tablet: 1 tab(s) orally every 12 hours, As needed, Constipation  Breo Ellipta 100 mcg-25 mcg/inh inhalation powder: 1 puff(s) inhaled once a day  carBAMazepine 200 mg oral tablet: 2 tab(s) orally once a day in AM   dilTIAZem 120 mg oral tablet: 1 tab(s) orally every 6 hours  DuoNeb 0.5 mg-2.5 mg/3 mL inhalation solution: 3 milliliter(s) inhaled every 6 hours, As Needed - for bronchospasm, for shortness of breath and/or wheezing  Eliquis 5 mg oral tablet: 1 tab(s) orally 2 times a day  ferrous sulfate 325 mg (65 mg elemental iron) oral tablet: 1 tab(s) orally once a day  lacosamide 150 mg oral tablet: 1 tab(s) orally 2 times a day  LaMICtal 100 mg oral tablet: 1 tab(s) orally 2 times a day  megestrol 40 mg/mL oral suspension: 10 milliliter(s) orally once a day  polyethylene glycol 3350 oral powder for reconstitution: 17 gram(s) orally once a day, As needed, Constipation  predniSONE 10 mg oral tablet: 1 tab(s) orally once a day   acetaminophen 325 mg oral tablet: 2 tab(s) orally every 6 hours, As needed, Temp greater or equal to 38C (100.4F), Mild Pain (1 - 3), Moderate Pain (4 - 6)  bisacodyl 5 mg oral delayed release tablet: 1 tab(s) orally every 12 hours, As needed, Constipation  Breo Ellipta 100 mcg-25 mcg/inh inhalation powder: 1 puff(s) inhaled once a day  carBAMazepine 200 mg oral tablet: 2 tab(s) orally once a day in AM   dilTIAZem 120 mg oral tablet: 1 tab(s) orally every 6 hours  DuoNeb 0.5 mg-2.5 mg/3 mL inhalation solution: 3 milliliter(s) inhaled every 6 hours, As Needed - for bronchospasm, for shortness of breath and/or wheezing  Eliquis 5 mg oral tablet: 1 tab(s) orally 2 times a day  ferrous sulfate 325 mg (65 mg elemental iron) oral tablet: 1 tab(s) orally once a day  lacosamide 150 mg oral tablet: 1 tab(s) orally 2 times a day  LaMICtal 100 mg oral tablet: 1 tab(s) orally 2 times a day  oxyCODONE 5 mg oral tablet: 1 tab(s) orally every 8 hours, As Needed Severe pain  polyethylene glycol 3350 oral powder for reconstitution: 17 gram(s) orally once a day, As needed, Constipation  predniSONE 10 mg oral tablet: 1 tab(s) orally once a day   acetaminophen 325 mg oral tablet: 2 tab(s) orally every 6 hours, As needed, Temp greater or equal to 38C (100.4F), Mild Pain (1 - 3), Moderate Pain (4 - 6)  bisacodyl 5 mg oral delayed release tablet: 1 tab(s) orally every 12 hours, As needed, Constipation  Breo Ellipta 100 mcg-25 mcg/inh inhalation powder: 1 puff(s) inhaled once a day  carBAMazepine 200 mg oral tablet: 2 tab(s) orally once a day today and tomorrow then STOP   dilTIAZem 120 mg oral tablet: 1 tab(s) orally every 6 hours  diphenhydramine/lidocaine/aluminum hydroxide/magnesium hydroxide/simethicone mucous membrane suspension: 15 milliliter(s) mucous membrane every 6 hours  DuoNeb 0.5 mg-2.5 mg/3 mL inhalation solution: 3 milliliter(s) inhaled every 6 hours, As Needed - for bronchospasm, for shortness of breath and/or wheezing  Eliquis 5 mg oral tablet: 1 tab(s) orally 2 times a day  ferrous sulfate 325 mg (65 mg elemental iron) oral tablet: 1 tab(s) orally once a day  lacosamide 150 mg oral tablet: 1 tab(s) orally 2 times a day MDD:2 tabs  LaMICtal 100 mg oral tablet: 1 tab(s) orally 2 times a day  oxyCODONE 5 mg oral tablet: 1 tab(s) orally every 8 hours, As Needed Severe pain MDD:3 tabs  polyethylene glycol 3350 oral powder for reconstitution: 17 gram(s) orally once a day, As needed, Constipation  predniSONE 10 mg oral tablet: 1 tab(s) orally once a day   acetaminophen 325 mg oral tablet: 2 tab(s) orally every 6 hours, As needed, Temp greater or equal to 38C (100.4F), Mild Pain (1 - 3), Moderate Pain (4 - 6)  bisacodyl 5 mg oral delayed release tablet: 1 tab(s) orally every 12 hours, As needed, Constipation  Breo Ellipta 100 mcg-25 mcg/inh inhalation powder: 1 puff(s) inhaled once a day  carBAMazepine 200 mg oral tablet: 2 tab(s) orally once a day today and tomorrow then STOP   dilTIAZem 120 mg oral tablet: 1 tab(s) orally every 6 hours  diphenhydramine/lidocaine/aluminum hydroxide/magnesium hydroxide/simethicone mucous membrane suspension: 15 milliliter(s) mucous membrane every 6 hours  DuoNeb 0.5 mg-2.5 mg/3 mL inhalation solution: 3 milliliter(s) inhaled every 6 hours, As Needed - for bronchospasm, for shortness of breath and/or wheezing  Eliquis 5 mg oral tablet: 1 tab(s) orally 2 times a day  ferrous sulfate 325 mg (65 mg elemental iron) oral tablet: 1 tab(s) orally once a day  lacosamide 150 mg oral tablet: 1 tab(s) orally 2 times a day MDD:2 tabs  LaMICtal 100 mg oral tablet: 1 tab(s) orally 2 times a day  Outpatient PT 2-3x weekly:   oxyCODONE 5 mg oral tablet: 1 tab(s) orally every 8 hours, As Needed Severe pain MDD:3 tabs  polyethylene glycol 3350 oral powder for reconstitution: 17 gram(s) orally once a day, As needed, Constipation  predniSONE 10 mg oral tablet: 1 tab(s) orally once a day

## 2021-02-24 NOTE — DISCHARGE NOTE PROVIDER - NSDCCPCAREPLAN_GEN_ALL_CORE_FT
PRINCIPAL DISCHARGE DIAGNOSIS  Diagnosis: Lymphoma  Assessment and Plan of Treatment: You had a biopsy done in-hospital consistent with B-cell lymphoma. You were seen by Oncology in-hospital. Bone marrow biopsy was done 2/23. Lumbar puncture done ________________________________. You have expressed that you will not want further workup done in-hospital and would want it outpatient. Please obtain MRI brain outpatient as part of the workup for your lymphoma. Please see the oncologist within 1 week for further evaluation for treatment options.      SECONDARY DISCHARGE DIAGNOSES  Diagnosis: Anemia, unspecified type  Assessment and Plan of Treatment: Anemia, unspecified type    Diagnosis: Essential hypertension  Assessment and Plan of Treatment: Continue blood pressure medication regimen as directed. Monitor for any visual changes, headaches or dizziness.  Monitor blood pressure regularly.  Follow up with your PCP for further management for high blood pressure.      Diagnosis: Seizure disorder  Assessment and Plan of Treatment: You were seen by Neurology in-hospital for questionable seizure activity. CT head done was unremarkable. Continue with ____________________________ as recommended. IT IS IMPORTANT TO CONSISTENTLY TAKE YOUR MEDICATIONS TO PREVENT SEIZURE ACTIVITY. Follow up outpatient provider in 1-2 weeks for further management.    Diagnosis: Atrial fibrillation, unspecified type  Assessment and Plan of Treatment: You were seen by Cardiology in-hospital. Please continue your medications as directed and follow-up with your primary provider/cardiologist to further manage your care. Monitor for signs/symptoms of uncontrolled atrial fibrillation, such as, increased heart rate, palpitations, chest pain, dizziness, or shortness of breath - Return to emergency room if these signs/symptoms are present.      Diagnosis: Hyperlipidemia  Assessment and Plan of Treatment: Continue cholesterol control medications. Continue DASH diet. Follow up with your PCP within 1 week of discharge for further management and monitoring of lipid and cholesterol panels.      Diagnosis: Lung cancer  Assessment and Plan of Treatment: Follow up oncologist in 1 week for further management.     PRINCIPAL DISCHARGE DIAGNOSIS  Diagnosis: Lymphoma  Assessment and Plan of Treatment: You had a biopsy done in-hospital consistent with B-cell lymphoma. You were seen by Oncology in-hospital. Bone marrow biopsy was done 2/23. You have expressed that you will not want further workup done in-hospital and would want it outpatient. Please obtain MRI brain and lumbar punction outpatient as part of the workup for your lymphoma. Please see the oncologist within 1 week for further evaluation for treatment options.      SECONDARY DISCHARGE DIAGNOSES  Diagnosis: Anemia, unspecified type  Assessment and Plan of Treatment: Anemia, unspecified type    Diagnosis: Essential hypertension  Assessment and Plan of Treatment: Continue blood pressure medication regimen as directed. Monitor for any visual changes, headaches or dizziness.  Monitor blood pressure regularly.  Follow up with your PCP for further management for high blood pressure.      Diagnosis: Seizure disorder  Assessment and Plan of Treatment: You were seen by Neurology in-hospital for questionable seizure activity. CT head done was unremarkable. Continue with ____________________________ as recommended. IT IS IMPORTANT TO CONSISTENTLY TAKE YOUR MEDICATIONS TO PREVENT SEIZURE ACTIVITY. Follow up outpatient provider in 1-2 weeks for further management.    Diagnosis: Atrial fibrillation, unspecified type  Assessment and Plan of Treatment: You were seen by Cardiology in-hospital. Please continue your medications as directed and follow-up with your primary provider/cardiologist to further manage your care. Monitor for signs/symptoms of uncontrolled atrial fibrillation, such as, increased heart rate, palpitations, chest pain, dizziness, or shortness of breath - Return to emergency room if these signs/symptoms are present.      Diagnosis: Hyperlipidemia  Assessment and Plan of Treatment: Continue cholesterol control medications. Continue DASH diet. Follow up with your PCP within 1 week of discharge for further management and monitoring of lipid and cholesterol panels.      Diagnosis: Lung cancer  Assessment and Plan of Treatment: Follow up oncologist in 1 week for further management.     PRINCIPAL DISCHARGE DIAGNOSIS  Diagnosis: Lymphoma  Assessment and Plan of Treatment: You had a biopsy done in-hospital consistent with B-cell lymphoma. You were seen by Hematology in-hospital. Bone marrow biopsy was done 2/23. You have expressed that you will not want further workup done in-hospital and would want it outpatient. Please obtain MRI brain and lumbar puncture outpatient as part of the workup for your lymphoma. Please see the hematologist within 1 week for further evaluation for treatment options. Call to make an appointment.      SECONDARY DISCHARGE DIAGNOSES  Diagnosis: Anemia, unspecified type  Assessment and Plan of Treatment: Follow-up with your outpatient provider if further treatment is warranted. Monitor for signs/symptoms indicating worsening of disease, such as, easy bleeding/bruising, pale skin, fatigue, dizziness, increased heart rate, or chest pain.      Diagnosis: Essential hypertension  Assessment and Plan of Treatment: Continue blood pressure medication regimen as directed. Monitor for any visual changes, headaches or dizziness.  Monitor blood pressure regularly.  Follow up with your PCP for further management for high blood pressure.      Diagnosis: Seizure disorder  Assessment and Plan of Treatment: You were seen by Neurology in-hospital for questionable seizure activity. CT head done was unremarkable. Continue Lamictal and Vimpart as as recommended. Take Carbamazapine dose today and tomorrow then stop as per Neurology. IT IS IMPORTANT TO CONSISTENTLY TAKE YOUR MEDICATIONS TO PREVENT SEIZURE ACTIVITY. Follow up outpatient provider in 1-2 weeks for further management.    Diagnosis: Atrial fibrillation, unspecified type  Assessment and Plan of Treatment: You were seen by Cardiology in-hospital. Please continue your medications as directed and follow-up with your primary provider/cardiologist to further manage your care. Monitor for signs/symptoms of uncontrolled atrial fibrillation, such as, increased heart rate, palpitations, chest pain, dizziness, or shortness of breath - Return to emergency room if these signs/symptoms are present.      Diagnosis: Hyperlipidemia  Assessment and Plan of Treatment: Continue cholesterol control medications. Continue DASH diet. Follow up with your PCP within 1 week of discharge for further management and monitoring of lipid and cholesterol panels.      Diagnosis: Lung cancer  Assessment and Plan of Treatment: Follow up oncologist further management.

## 2021-02-24 NOTE — PROVIDER CONTACT NOTE (OTHER) - ASSESSMENT
I was asked by Isabell SANTOYO to arrange Ambulance to take pt home as per family request. I arranged [facility] [phone]. Trip# [id number]. P/U 7.:30. I was asked by Isabell SANTOYO [phone](pager)  to arrange Ambulance to take pt home as per family request. I arranged [facility] [phone]. Trip# [id number]. P/U 7.:30.

## 2021-02-24 NOTE — PROGRESS NOTE ADULT - ASSESSMENT
76yo Icelandic speaking M w/ PMHx of lung CA s/p R pneumonectomy (2003), COPD (on 3L home O2), AVR, afib on eliquis, seizure dx, HTN, HLD p/w worsening Afib with RVR and gradually growing new infraclavicular mass. In terms of cancer history, patient had R pneumonectomy in 2003, last chemo/radiation was in 2005, and patient had close follow up until 2 years ago with no recurrence of disease (Oncologist Dr. Filiberto Mcdaniel with [facility]). Patient evaluated by Oncology on 12/30/20 and mass was concerning for recurrence of malignancy given Rt rib fracture with associated hematoma/metastatic lesion. At that time, IR guided Bx was recommended but family preferred to get this done as outpatient and follow with Dr. Mcdaniel.     Patient then got COVID and was not able to get the mass biopsied. In the interim, the mass grew. He presented to the hospital on 2/11/21 with A fib with RVR and mass has been biopsied during this admission. This just resulted as DLBCL and hematology has been consulted.       # DLBCL   - CT A/P neg for LAD; CT head neg for intracranial disease: MRI brain pending  - Plan for LP with flow cytometry today  - S/p Bone Marrow Bx 2/23  - Since patient without B symptom hold off on steroids for now  - Await FISH testing for myc/Bcl2/BCl6 before deciding on treatment  - Echo 2/15 with grossly normal LVEF  - Daily TLS labs: CMP/LDH/uric acid/phos  - May need line placement     - d/w Hospitalist - family would like to take patient home ASAP.  At this time, full staging still in progress.  If patient is to leave, we will refer to be seen outpatient, but this may delay initiation of treatment.  If patient stays admitted and work-up completed, can start cycle 1 of treatment inpatient, if agreeable by family and patient.  Extensive discussion had by Dr. Melton and daughter yesterday.      Rosie Aguayo MD  Hematology-Oncology Fellow, PGY-6  pager: [phone]  After 5pm or on weekends, please page the on-call fellow. 74yo Indonesian speaking M w/ PMHx of lung CA s/p R pneumonectomy (2003), COPD (on 3L home O2), AVR, afib on eliquis, seizure dx, HTN, HLD p/w worsening Afib with RVR and gradually growing new infraclavicular mass. In terms of cancer history, patient had R pneumonectomy in 2003, last chemo/radiation was in 2005, and patient had close follow up until 2 years ago with no recurrence of disease (Oncologist Dr. Filiberto Mcdaniel with [facility]). Patient evaluated by Oncology on 12/30/20 and mass was concerning for recurrence of malignancy given Rt rib fracture with associated hematoma/metastatic lesion. At that time, IR guided Bx was recommended but family preferred to get this done as outpatient and follow with Dr. Mcdaniel.     Patient then got COVID and was not able to get the mass biopsied. In the interim, the mass grew. He presented to the hospital on 2/11/21 with A fib with RVR and mass has been biopsied during this admission. This just resulted as DLBCL and hematology has been consulted.       # DLBCL   - CT A/P neg for LAD; CT head neg for intracranial disease: MRI brain pending  - Plan for LP with flow cytometry today  - s/p Bone Marrow Bx 2/23  - Since patient without B symptom hold off on steroids for now  - Await FISH testing for MYC/BCL2/BCL6 before deciding on treatment  - Echo 2/15 with grossly normal LVEF  - Daily TLS labs: CMP/LDH/uric acid/phos  - May need line placement     - d/w Hospitalist - family would like to take patient home ASAP.  At this time, full staging still in progress.  If patient is to leave, we will refer to be seen outpatient, but this may delay initiation of treatment, as given clinical status patient will need to be initiate chemotherapy in the hospital.  If patient stays admitted and work-up completed, can start cycle 1 of treatment inpatient, if agreeable by family and patient.  Extensive discussion had by Dr. Melton and daughter yesterday.  - If patient leaves, would send home on prednisone 10mg daily (but high risk for TLS).       Rosie Aguayo MD  Hematology-Oncology Fellow, PGY-6  pager: [phone]  After 5pm or on weekends, please page the on-call fellow. 74yo Turkmen speaking M w/ PMHx of lung CA s/p R pneumonectomy (2003), COPD (on 3L home O2), AVR, afib on eliquis, seizure dx, HTN, HLD p/w worsening Afib with RVR and gradually growing new infraclavicular mass. In terms of cancer history, patient had R pneumonectomy in 2003, last chemo/radiation was in 2005, and patient had close follow up until 2 years ago with no recurrence of disease (Oncologist Dr. Filiberto Mcdaniel with [facility]). Patient evaluated by Oncology on 12/30/20 and mass was concerning for recurrence of malignancy given Rt rib fracture with associated hematoma/metastatic lesion. At that time, IR guided Bx was recommended but family preferred to get this done as outpatient and follow with Dr. Mcdaniel.     Patient then got COVID and was not able to get the mass biopsied. In the interim, the mass grew. He presented to the hospital on 2/11/21 with A fib with RVR and mass has been biopsied during this admission. This just resulted as DLBCL and hematology has been consulted.       # DLBCL   - CT A/P neg for LAD; CT head neg for intracranial disease: MRI brain pending  - Plan for LP with flow cytometry today  - s/p Bone Marrow Bx 2/23  - Since patient without B symptom hold off on steroids for now  - Await FISH testing for MYC/BCL2/BCL6 before deciding on treatment  - Echo 2/15 with grossly normal LVEF  - Daily TLS labs: CMP/LDH/uric acid/phos  - May need line placement     - d/w Hospitalist - family would like to take patient home ASAP.  At this time, full staging still in progress.  If patient is to leave, we will refer to be seen outpatient, but this may delay initiation of treatment, as given clinical status patient will need to be initiate chemotherapy in the hospital.  If patient stays admitted and work-up completed, can start cycle 1 of treatment inpatient, if agreeable by family and patient.  Extensive discussion had by Dr. Melton and daughter yesterday.  - If patient leaves, would send home on prednisone 10mg daily (but high risk for TLS and likely readmission).       Rosie Aguayo MD  Hematology-Oncology Fellow, PGY-6  pager: [phone]  After 5pm or on weekends, please page the on-call fellow.

## 2021-02-24 NOTE — PROGRESS NOTE ADULT - PROBLEM SELECTOR PROBLEM 7
Anemia, unspecified type
Essential hypertension
Anemia, unspecified type
Essential hypertension
Essential hypertension
Hyperlipidemia
Hyperlipidemia
Anemia, unspecified type
Essential hypertension
Anemia, unspecified type
Hyperlipidemia
Hyperlipidemia
Essential hypertension

## 2021-02-24 NOTE — DISCHARGE NOTE NURSING/CASE MANAGEMENT/SOCIAL WORK - PATIENT PORTAL LINK FT
You can access the FollowMyHealth Patient Portal offered by [facility] by registering at the following website: [email or web address]. By joining [facility]’s FollowMyHealth portal, you will also be able to view your health information using other applications (apps) compatible with our system.

## 2021-02-24 NOTE — PROGRESS NOTE ADULT - PROBLEM SELECTOR PROBLEM 1
Chest wall mass
Chest wall mass
Seizure disorder
Lymphoma
Chest wall mass
Chest wall mass
Seizure disorder
Seizure disorder
Chest wall mass
Lymphoma

## 2021-02-24 NOTE — CHART NOTE - NSCHARTNOTESELECT_GEN_ALL_CORE
EP tele/Event Note
Event Note
Hematology/Event Note
Neurology/Event Note
Tele/Event Note
ACP/Event Note
EP tele/Event Note
Event Note
Hematology/Event Note
Home services/Event Note
NEurology/Event Note
Rapid Response
Suicidal Ideation/Event Note
Tele Note/Event Note

## 2021-02-24 NOTE — HOSPICE CARE NOTE - CONVESATION DETAILS
Telephone call to pt's wife. States that pt originally had been in agreement with a hospice referral but later in the day he changed his mind and wants further tests. Will f/u
Weds 2/24: [facility]: Patient needs a psychiatric evaluation prior to be assessed for hospice services. NEERAJ Pereira is aware. Nivia Holder RN.

## 2021-02-24 NOTE — PROGRESS NOTE ADULT - PROVIDER SPECIALTY LIST ADULT
Electrophysiology
Hospitalist
Electrophysiology
Heme/Onc
Heme/Onc
Hospitalist

## 2021-02-24 NOTE — CHART NOTE - NSCHARTNOTEFT_GEN_A_CORE
Spoke with Neurology regarding anti-epileptics. Recommend to Carbamazapine 200 mg today/ tomorrow and then stop. Recommend continue Vimpat 150 mg BID and Lamictal 100 mg BID upon discharge.     ACP  [id number] Spoke with Neurology regarding anti-epileptics. Recommend to Carbamazepine 200 mg today/ tomorrow and then stop. Recommend continue Vimpat 150 mg BID and Lamictal 100 mg BID upon discharge.     ACP  [id number]

## 2021-02-24 NOTE — PROVIDER CONTACT NOTE (OTHER) - BACKGROUND
Patient admitted for covid. PMH lung cancer, AFIB.
Patient admitted for covid. PMH lung cancer, AFIB.
Pt admitted with lump on right chest wall
Admitted for Chest wall mass
Admitted for Chest wall mass
Patient admitted for covid. PMH lung cancer, AFIB.
Patient admitted for worsening pain and SOB
Patient admitted for worsening pain and SOB
Patient admitted for worsening pain and SOB. rapid Atrial Fibrilation on telemetry throughout shift
patient admitted with localized swelling. history of COVID-19, COPD, Atrial Fibrilation, lung cancer
patient admitted with localized swelling. history of COVID-19, COPD, Atrial Fibrilation, lung cancer
Patient admitted for covid. PMH lung cancer, AFIB.
Patient admitted for worsening mass of chest wall. Patient is rapid atrial fibrillation on telemetry monitor.
Patient admitted for worsening pain and SOB
Patient admitted for covid. PMH lung cancer, AFIB.
Patient admitted for covid. PMH lung cancer, AFIB.
Admitted for Chest wall mass
Patient admitted for covid. PMH lung cancer, AFIB.

## 2021-02-24 NOTE — PROGRESS NOTE ADULT - REASON FOR ADMISSION
Increasing chest wall mass
Increasing chest wall mass, afib with RVR
Increasing chest wall mass
Increasing chest wall mass, afib with RVR
Increasing chest wall mass
Enlarging chest wall mass

## 2021-02-24 NOTE — PROGRESS NOTE ADULT - PROBLEM SELECTOR PROBLEM 6
Hyperlipidemia
Essential hypertension
Seizure disorder
Essential hypertension
Hyperlipidemia
Pneumonia due to COVID-19 virus
Pneumonia due to COVID-19 virus
Essential hypertension
Essential hypertension
Pneumonia due to COVID-19 virus
Hyperlipidemia

## 2021-02-24 NOTE — PROGRESS NOTE ADULT - NUTRITIONAL ASSESSMENT
This patient has been assessed with a concern for Malnutrition and has been determined to have a diagnosis/diagnoses of Severe protein-calorie malnutrition.    This patient is being managed with:   Diet Soft-  Supplement Feeding Modality:  Oral  Ensure Enlive Cans or Servings Per Day:  1       Frequency:  Two Times a day  Entered: Feb 22 2021  1:36PM    
This patient has been assessed with a concern for Malnutrition and has been determined to have a diagnosis/diagnoses of Severe protein-calorie malnutrition.    This patient is being managed with:   Diet Regular-  DASH/TLC {Sodium & Cholesterol Restricted} (DASH)  Supplement Feeding Modality:  Oral  Ensure Enlive Cans or Servings Per Day:  1       Frequency:  Two Times a day  Entered: Feb 14 2021  3:12PM    
This patient has been assessed with a concern for Malnutrition and has been determined to have a diagnosis/diagnoses of Severe protein-calorie malnutrition.    This patient is being managed with:   Diet Regular-  Consistent Carbohydrate {Evening Snack} (CSTCHOSN)  DASH/TLC {Sodium & Cholesterol Restricted} (DASH)  Entered: Feb 12 2021  4:25AM    
This patient has been assessed with a concern for Malnutrition and has been determined to have a diagnosis/diagnoses of Severe protein-calorie malnutrition.    This patient is being managed with:   Diet Soft-  Supplement Feeding Modality:  Oral  Ensure Enlive Cans or Servings Per Day:  1       Frequency:  Two Times a day  Entered: Feb 22 2021  1:36PM    
This patient has been assessed with a concern for Malnutrition and has been determined to have a diagnosis/diagnoses of Severe protein-calorie malnutrition.    This patient is being managed with:   Diet Soft-  Supplement Feeding Modality:  Oral  Ensure Enlive Cans or Servings Per Day:  1       Frequency:  Two Times a day  Entered: Feb 22 2021  1:36PM

## 2021-02-24 NOTE — CHART NOTE - NSCHARTNOTEFT_GEN_A_CORE
Extensive discussion with daughter Alannah Mitchell regarding discharge planning for patient. Informed patient to receive Home care services or Hospice care services would need to be cleared by psychiatry regarding possible suicidal ideation. Daughter adamant patient return home tonight regardless of initiation of services. Discussed with CM regarding initiation of services in the outpatient setting which could be done, home care services or hospice services through the patient's primary care physician. Informed daughter of this option, she expressed understanding. The patient is cleared for discharge home with transportation today.

## 2021-02-24 NOTE — PROGRESS NOTE ADULT - PROBLEM SELECTOR PROBLEM 5
Pneumonia due to COVID-19 virus
Seizure disorder
Seizure disorder
Atrial fibrillation, unspecified type
Atrial fibrillation, unspecified type
Hyperlipidemia
Hyperlipidemia
Seizure disorder
Hyperlipidemia
Atrial fibrillation, unspecified type
Seizure disorder
Seizure disorder
Hyperlipidemia

## 2021-02-24 NOTE — PROGRESS NOTE ADULT - SUBJECTIVE AND OBJECTIVE BOX
INTERVAL HPI/OVERNIGHT EVENTS:  Patient S&E at bedside.   Afebrile.    s/p bone marrow biopsy yesterday.       VITAL SIGNS:  T(F): 98.3 (02-24-21 @ 06:19)  HR: 134 (02-24-21 @ 06:19)  BP: 122/79 (02-24-21 @ 06:19)  RR: 17 (02-24-21 @ 06:19)  SpO2: 100% (02-24-21 @ 06:19)      PHYSICAL EXAM:  Constitutional: NAD, frail and ill appearing  Eyes: EOMI, PERRLA  Neck: supple  Chest: Large mass on Rt ant chest wall  Respiratory: CTAB  Cardiovascular: RRR  Gastrointestinal: +BS, soft, NTND, no hepatosplenomegaly  Extremities: no c/c/e  Neurological: AAOx3, nonfocal      MEDICATIONS  (STANDING):  carBAMazepine 200 milliGRAM(s) Oral two times a day  dextrose 5% + lactated ringers. 1000 milliLiter(s) (75 mL/Hr) IV Continuous <Continuous>  diltiazem    Tablet 120 milliGRAM(s) Oral every 6 hours  ferrous    sulfate 325 milliGRAM(s) Oral daily  FIRST- Mouthwash  BLM 15 milliLiter(s) Swish and Swallow every 6 hours  lacosamide 150 milliGRAM(s) Oral two times a day  lamoTRIgine 100 milliGRAM(s) Oral two times a day  metoprolol tartrate Injectable 2.5 milliGRAM(s) IV Push every 4 hours  piperacillin/tazobactam IVPB.. 3.375 Gram(s) IV Intermittent every 8 hours  polyethylene glycol 3350 17 Gram(s) Oral two times a day  senna 2 Tablet(s) Oral at bedtime  sodium chloride 0.9% lock flush 3 milliLiter(s) IV Push every 8 hours  tiotropium 18 MICROgram(s) Capsule 1 Capsule(s) Inhalation daily    MEDICATIONS  (PRN):  acetaminophen   Tablet .. 650 milliGRAM(s) Oral every 6 hours PRN Temp greater or equal to 38C (100.4F), Mild Pain (1 - 3), Moderate Pain (4 - 6)  ALBUTerol    90 MICROgram(s) HFA Inhaler 2 Puff(s) Inhalation every 6 hours PRN Shortness of Breath and/or Wheezing  oxyCODONE    IR 5 milliGRAM(s) Oral every 6 hours PRN Severe Pain (7 - 10)      Allergies  No Known Allergies      LABS:                        8.3    10.55 )-----------( 178      ( 23 Feb 2021 07:36 )             25.8     02-23    131<L>  |  91<L>  |  18  ----------------------------<  98  3.8   |  32<H>  |  1.19    Ca    9.7      23 Feb 2021 07:36        RADIOLOGY & ADDITIONAL TESTS:  Studies reviewed.

## 2021-02-24 NOTE — PROVIDER CONTACT NOTE (OTHER) - RECOMMENDATIONS
None at this time
Provider to come to bedside to place a coude catheter.
RN recommend PA comes to speak with patient
Physician Assistant notified.
Patient refused Eliquis, tegretol, Cardizem, lacosamide, Miralax.
Physician Assistant notified. Chest x-ray as needed.
continue to monitor patient
Arrhythmia medications
Increase Cardizem dose to 90mg
Reinforced taking medications.
hold 50mg Metoprolol due, reassess BP and HR 1400 for Cardizem dose
ACP Team made aware.
Arrhythmia medications
IV metoprolol
IV metoprolol and IV Vimpat if indicated
Notified ACP Team. Recommend to order IV Lopressor (patient currently NPO).
Patient has been in Rapid Atrial Fibrillation, Cardizem Q6 given just prior
Patient has been in Rapid Atrial Fibrillation, gets Cardizem Q6
Provider come to administer medications
cardizem as ordered. continue to monitor.
Notify team and give IVP Cardizem.
PA to speak with patient
none at this time

## 2021-02-24 NOTE — PROGRESS NOTE ADULT - PROBLEM SELECTOR PLAN 2
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 Heme notified. CT A/P completed showing no lymphadenopathy, pending MR brain for completion of imaging. Based on IHC/FISH testing for myc/Bcl2/BCl6 is double negative, pt will likely require inpatient chemo  - at this time 2/23 pt w/ strong desire to return home and have no further interventions at this time, discussed extensively with daughter Alannah that bone marrow biopsy and LP would be required prior to initiation of treatment and treatment options would be dependent on patient's functional status. At this time on 2/23 decision to place hospice referral and have patient return home, if patient decides to seek treatment can follow up at [facility], will d/c today on 2/24  - expediting MR brain in setting of possible seizure

## 2021-02-24 NOTE — DISCHARGE NOTE PROVIDER - NSFOLLOWUPCLINICS_GEN_ALL_ED_FT
[facility]  Hematology/Oncology  [address] [id number]  Phone: [phone]  Fax:   Follow Up Time:

## 2021-02-24 NOTE — PROVIDER CONTACT NOTE (OTHER) - REASON
Rapid atrial fibrillation
Refusing PO Medication
Unable to straight cath patient with 14inch Almeida Catheter
refusing all PO medications
Transportation
Patient rapid Afib to 130's
Patient rapid afib (130's)
Patient refused all PO Medications, Refused AM labs
Rapid atrial fibrillation to 150
Patient in Rapid A-Fib to 140s
Rapid atrial fibrillation//refusal of morning medications
Tachycardia up to 150s sustaining 130s
Patient in Rapid A-Fib to 130s
 Rapid Atrial Fibrilation
's
Patient in Rapid A-Fib to 140s.
patient refusing 18:00 meds
rapid Atrial Fibrilation 140s
rapid Atrial Fibrilation heart rate 145
refused tegretol
Productive cough with greenish sputum
Pt a fib on telemetry 42 bpm
Suicidal Ideation
Tachycardia up to 150s sustaining 130s

## 2021-02-24 NOTE — PROGRESS NOTE ADULT - PROBLEM SELECTOR PLAN 4
- on exam 2/21 yellow green exudate in patient with patient reporting pain with swallowing, pt ow/ productive cough as well  - given growing chest wall mass pt has reasons to have post obstructive PNA, prior CT showing, only received ceftriaxone this hospitalization, will start zosyn for GNR/anaerobe coverage for 5 days started on 2/21, no need for abx on d/c

## 2021-02-24 NOTE — DISCHARGE NOTE PROVIDER - CARE PROVIDER_API CALL
ROOPA CLAYTON  Critical Care Medicine - Internal Med  [address] [place] [address]  Phone: [phone]  Fax: [phone]  Follow Up Time:    ROOPA CLAYTON  Critical Care Medicine - Internal Med  [address] [place] [address]  Phone: [phone]  Fax: [phone]  Follow Up Time:     Dr. Filiberto Mcdaniel,   Oncologist at [facility]  Phone: (   )    -  Fax: (   )    -  Follow Up Time:     Fernanda Montes)  Cardiovascular Disease  [address], [address]  [place] [address]  Phone: [phone]  Fax: [phone]  Follow Up Time:

## 2021-02-24 NOTE — PROGRESS NOTE ADULT - PROBLEM SELECTOR PLAN 5
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
- c/w statin
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
Last seizure was 3 years ago  - c/w carbamazepine 400 AM, 200 PM and Lamictal 100 BID
Last seizure was 3 years ago  - c/w carbamazepine 400 AM, 200 PM and Lamictal 100 BID
COVID PCR detected likely residual from COVID PNA 1/21.  - c/w O2, at home dose 3L, currently at 2L  - Pro Air Inhaler for bronchodilation PRN.
- c/w statin
- c/w statin
Last seizure was 3 years ago  - c/w carbamazepine 400 AM, 200 PM and Lamictal 100 BID
- c/w statin

## 2021-02-24 NOTE — PROVIDER CONTACT NOTE (OTHER) - SITUATION
Tachycardia up to 150s sustaining 130s
Patient in Rapid A-Fib to 130s.
Patient rapid Afib to 130's while we were cleaning him up
Patient rapid afib (140's)
Patient refusing 18:00 meds- flaring arms and saying no to medications
Productive cough with greenish sputum
Tachycardia up to 150s sustaining 130s
Patient rapid afib (140's), Pt refusing all morning medications including the cardizem, eliquis, and lamotrigine.  phone used to explain rapid afib to Pt and reasoning for high heart rate
Patient refusing oral medications`
Pt HR a fib on telemetry down to 42 bpm
Upon shift assessment RN was speaking with Patient via  phone. Patient stated "if you don't let me go home tonight I am going to kill myself"
patient refusing medication and food. states he has been here too long and will not eat or take any medications. patient states he wants to leave hospital because he is not getting any better here.
Unable to straight catheterize patient due to resistance met once inserting the catheter.
Patient in Rapid A-Fib to 140s.
Patient refusing all oral medications. Patient started yelling and was frustrated, kept yelling "no". Patient also refusing Morning labs- PCA attempted, and RN attempted. Patient wants to go home
patient continues to refuse to take all oral medication. states it does not help and he will not take them
 Bp 122/79 NO acute signs of distress noted, patient refusing PO Cardizem. Patient ordered for IV push Metoprolol.
Patient in sustained Rapid A-fib to 140s.
Patient rapid afib (130's)
Patient rapid afib (150's) on cardiac  monitor. Pt refusing all morning medications including the cardizem, eliquis, and lamotrigine from morning nurse. Provider currently at bedside.
Patients 's /82 Patient is Rapid Atrial Fibrillation. Patient is asymptomatic no acute signs of distress notes.
Patients HR briefly went to 140's HR currently in 120's-130's No acute signs of distress noted, patient was just awoken and is asymptomatic
patient in rapid Atrial Fibrilation HR 140s sustained
patient in rapid Atrial Fibrilation HR 140s sustained status post IV push cardizem dose at 1150 AM. Heart rate has remained in 130s and 140s.

## 2021-02-24 NOTE — PROGRESS NOTE ADULT - PROBLEM SELECTOR PLAN 10
Eliquis, hold Megace given possible cancer and increased risk of VTE  PT evaluation rec YOUNG, daughter unclear yet if she would want YOUNG vs home with home PT  Care d/w daughter on 2/12 & 2/13  Dr. Filiberto Mcdaniel (oncologist [facility])  Dr. Lubin (PCP) [phone]  Dr. KRYSTAL Montes (cardiologist) [phone]
Eliquis, hold Megace given possible cancer and increased risk of VTE in setting of covid as well  PT evaluation rec YOUNG, daughter unclear yet if she would want YOUNG vs home with home PT, will depend on functional status w/ rate control and tx options for lymphoma  Dr. Filiberto Mcdaniel (oncologist [facility])  Dr. Lubin (PCP) [phone]  Dr. KRYSTAL Montes (cardiologist) [phone]
Eliquis, hold Megace given possible cancer and increased risk of VTE in setting of covid as well  Working on trying to arrange for patient to come home w/ hospice care.   Dr. Filiberto Mcdaniel (oncologist [facility])  Dr. Lubin (PCP) [phone]  Dr. KRYSTAL Montse (cardiologist) [phone]
Eliquis, hold Megace given possible cancer and increased risk of VTE in setting of covid as well  Working on trying to arrange for patient to come home w/ hospice care.   Dr. Filiberto Mcdaniel (oncologist [facility])  Dr. Lubin (PCP) [phone]  Dr. KRYSTAL Montes (cardiologist) [phone]
Eliquis, hold Megace given possible cancer and increased risk of VTE in setting of covid as well  PT evaluation rec YOUNG, daughter unclear yet if she would want YOUNG vs home with home PT, will depend on functional status w/ rate control and tx options for lymphoma  Dr. Filiberto Mcdaniel (oncologist [facility])  Dr. Lubin (PCP) [phone]  Dr. KRYSTAL Montes (cardiologist) [phone]
Eliquis on hold   PT evaluation  Care d/w daughter on 2/12  Dr. Filiberto Mcdaniel (oncologist [facility])  Dr. Lubin (PCP) [phone]  Dr. KRYSTAL Montes (cardiologist) [phone]

## 2021-02-24 NOTE — PROGRESS NOTE ADULT - PROBLEM SELECTOR PROBLEM 9
Need for prophylactic measure
Lung cancer
Need for prophylactic measure

## 2021-02-24 NOTE — PROGRESS NOTE ADULT - PROBLEM SELECTOR PLAN 7
AOCD, elevated ferritin, normal trans sat, B12 and folic acid wnl  - trend
AOCD, elevated ferritin, normal trans sat, B12 and folic acid wnl  - trend
- holding metorplol 50 q6 in setting of tachy-emma syndrome, started on dilt 30 q6
- c/w statin
AOCD, elevated ferritin, normal trans sat, B12 and folic acid wnl  - trend
- c/w metoprolol 50 mg q8h
- c/w statin
- c/w statin
- holding metorplol 50 q6 in setting of tachy-emma syndrome, started on dilt 30 q6
- holding metorplol 50 q6 in setting of tachy-emma syndrome, started on dilt 30 q6
AOCD, elevated ferritin, normal trans sat, B12 and folic acid wnl  - trend
- c/w metoprolol 50 mg q6h
- c/w statin

## 2021-02-24 NOTE — PROGRESS NOTE ADULT - PROBLEM SELECTOR PLAN 8
AOCD, elevated ferritin, normal trans sat, B12 and folic acid wnl  - trend
s/p R pneumonectomy in 2003, OP Dr. Mcdaniel.  - OP f/u
- holding metoprolol 50 q6 in setting of tachy-emma syndrome, started on dilt 30 q6
- holding metoprolol 50 q6 in setting of tachy-emma syndrome, started on dilt 30 q6 uptitrated to 120 q6
s/p R pneumonectomy in 2003, OP Dr. Mcdaniel.  - OP f/u
AOCD, elevated ferritin, normal trans sat, B12 and folic acid wnl  - trend
- holding metoprolol 50 q6 in setting of tachy-emma syndrome, started on dilt 30 q6
- holding metorplol 50 q6 in setting of tachy-emma syndrome, started on dilt 30 q6
s/p R pneumonectomy in 2003, OP Dr. Mcdaniel.  - OP f/u
s/p R pneumonectomy in 2003, OP Dr. Mcdaniel.  - OP f/u

## 2021-03-01 ENCOUNTER — APPOINTMENT (OUTPATIENT)
Dept: HEMATOLOGY ONCOLOGY | Facility: CLINIC | Age: 76
End: 2021-03-01
Payer: MEDICARE

## 2021-03-01 DIAGNOSIS — C83.30 DIFFUSE LARGE B-CELL LYMPHOMA, UNSPECIFIED SITE: ICD-10-CM

## 2021-03-01 LAB — TM INTERPRETATION: SIGNIFICANT CHANGE UP

## 2021-03-01 PROCEDURE — 99443: CPT | Mod: 95

## 2021-03-02 PROBLEM — C83.30 DLBCL (DIFFUSE LARGE B CELL LYMPHOMA): Status: ACTIVE | Noted: 2021-03-02

## 2021-03-02 LAB
HEMATOPATHOLOGY REPORT: SIGNIFICANT CHANGE UP
HEMATOPATHOLOGY REPORT: SIGNIFICANT CHANGE UP

## 2021-03-02 RX ORDER — APIXABAN 5 MG/1
5 TABLET, FILM COATED ORAL
Qty: 60 | Refills: 0 | Status: ACTIVE | COMMUNITY
Start: 2020-07-17

## 2021-03-02 RX ORDER — IPRATROPIUM BROMIDE AND ALBUTEROL SULFATE 2.5; .5 MG/3ML; MG/3ML
0.5-2.5 (3) SOLUTION RESPIRATORY (INHALATION)
Qty: 90 | Refills: 0 | Status: ACTIVE | COMMUNITY
Start: 2020-09-09

## 2021-03-02 RX ORDER — CARBAMAZEPINE 200 MG/1
200 TABLET, EXTENDED RELEASE ORAL
Qty: 120 | Refills: 0 | Status: ACTIVE | COMMUNITY
Start: 2021-01-25

## 2021-03-02 RX ORDER — DILTIAZEM HYDROCHLORIDE 120 MG/1
120 TABLET ORAL
Qty: 120 | Refills: 0 | Status: ACTIVE | COMMUNITY
Start: 2021-02-24

## 2021-03-02 RX ORDER — METOPROLOL TARTRATE 75 MG/1
75 TABLET, FILM COATED ORAL
Qty: 30 | Refills: 0 | Status: ACTIVE | COMMUNITY
Start: 2021-01-22

## 2021-03-02 RX ORDER — OXYCODONE 5 MG/1
5 TABLET ORAL
Qty: 9 | Refills: 0 | Status: ACTIVE | COMMUNITY
Start: 2021-02-24

## 2021-03-02 RX ORDER — LAMOTRIGINE 100 MG/1
100 TABLET ORAL
Qty: 60 | Refills: 0 | Status: ACTIVE | COMMUNITY
Start: 2021-01-25

## 2021-03-02 RX ORDER — AZITHROMYCIN 250 MG/1
250 TABLET, FILM COATED ORAL
Qty: 6 | Refills: 0 | Status: COMPLETED | COMMUNITY
Start: 2020-12-11

## 2021-03-02 RX ORDER — FUROSEMIDE 40 MG/1
40 TABLET ORAL
Qty: 90 | Refills: 0 | Status: ACTIVE | COMMUNITY
Start: 2020-07-13

## 2021-03-02 RX ORDER — LACOSAMIDE 150 MG/1
150 TABLET, FILM COATED ORAL
Qty: 60 | Refills: 0 | Status: ACTIVE | COMMUNITY
Start: 2021-02-24

## 2021-03-02 RX ORDER — VALACYCLOVIR 1 G/1
1 TABLET, FILM COATED ORAL
Qty: 21 | Refills: 0 | Status: ACTIVE | COMMUNITY
Start: 2020-09-03

## 2021-03-02 RX ORDER — PREDNISONE 20 MG/1
20 TABLET ORAL
Qty: 4 | Refills: 0 | Status: COMPLETED | COMMUNITY
Start: 2021-01-01

## 2021-03-02 RX ORDER — KETOCONAZOLE 20 MG/G
2 CREAM TOPICAL
Qty: 60 | Refills: 0 | Status: ACTIVE | COMMUNITY
Start: 2020-09-03

## 2021-03-02 RX ORDER — POLYVINYL ALCOHOL 14 MG/ML
1.4 SOLUTION/ DROPS OPHTHALMIC
Qty: 15 | Refills: 0 | Status: ACTIVE | COMMUNITY
Start: 2020-11-20

## 2021-03-02 RX ORDER — HYDROCORTISONE 25 MG/G
2.5 CREAM TOPICAL
Qty: 30 | Refills: 0 | Status: ACTIVE | COMMUNITY
Start: 2020-10-01

## 2021-03-02 RX ORDER — METOPROLOL TARTRATE 25 MG/1
25 TABLET, FILM COATED ORAL
Qty: 180 | Refills: 0 | Status: ACTIVE | COMMUNITY
Start: 2020-03-13

## 2021-03-02 RX ORDER — MEGESTROL ACETATE 40 MG/ML
40 SUSPENSION ORAL
Qty: 300 | Refills: 0 | Status: ACTIVE | COMMUNITY
Start: 2021-01-21

## 2021-03-02 RX ORDER — AMOXICILLIN 500 MG/1
500 TABLET, FILM COATED ORAL
Qty: 12 | Refills: 0 | Status: COMPLETED | COMMUNITY
Start: 2020-11-07

## 2021-03-02 RX ORDER — DIPHENHYDRAMINE HYDROCHLORIDE AND LIDOCAINE HYDROCHLORIDE AND ALUMINUM HYDROXIDE AND MAGNESIUM HYDRO
KIT
Qty: 237 | Refills: 0 | Status: ACTIVE | COMMUNITY
Start: 2021-02-24

## 2021-03-02 RX ORDER — CEFPODOXIME PROXETIL 200 MG/1
200 TABLET, FILM COATED ORAL
Qty: 14 | Refills: 0 | Status: COMPLETED | COMMUNITY
Start: 2020-12-18

## 2021-03-02 RX ORDER — PREDNISONE 10 MG/1
10 TABLET ORAL
Qty: 30 | Refills: 0 | Status: ACTIVE | COMMUNITY
Start: 2021-02-24

## 2021-03-02 RX ORDER — IPRATROPIUM BROMIDE AND ALBUTEROL 20; 100 UG/1; UG/1
20-100 SPRAY, METERED RESPIRATORY (INHALATION)
Qty: 4 | Refills: 0 | Status: ACTIVE | COMMUNITY
Start: 2020-09-09

## 2021-03-02 NOTE — ASSESSMENT
[FreeTextEntry1] : 76 yo male with new diagnosis of likely Stage I DLBCL who presents to discuss treatment options. He overall has a poor performance status and has multiple major medical comorbidities. I discussed the different treatment options with his daughter and wife. Patient was discharged home on hospice- we discussed that given the progressive nature of his lymphoma and underlying comorbidities, if we chose not to do treatment i suspect he would have weeks to short months to live. In this setting, we could use palliative steroids for comfort and pain control, but this comes with a risk of TLS as well as fluid retention, etc. which could hasten his death. We also discussed that there are potential therapeutic options, some curative, some palliative that he may be eligible for. Given his afib with RVR, labile blood pressures, and home O2 requirements, I discussed that the first round of treatment would need to be given in the hospital as our infusion center is not equiped to handle the potential complications. They are unsure if he wants to pursue treatment, and will discuss with Ion and keep us posted on his decision.

## 2021-03-02 NOTE — RESULTS/DATA
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 Studies for MYC,\par BCL2 and BCL6 gene rearrangements are in process and will be\par reported as an addendum\par \par Microscopic description:  Histologic sections consists of needle\par shaped cores showing extensive necrosis. In the viable areas\par there diffuse proliferation of large, atypical lymphocytes, some\par with plasmacytic differentiation, some with dispersed chromatin\par and prominent small, multiple to single large nuclei.\par \par Immunohistochemistry:  Immunohistochemical stains for CD3, CD5,\par CD20, PAX5, CD10, BCL6, BCL2, MUM1, Ki67, c-MYC, CyclinD1, EBV\par encoded RNA (MANJIT) in situ hybridization,  CD21, CD23, CD30, p53,\par , kappaISH, lamdaISH stains performed on formalin fixed\par paraffin embedded tissue, block 1B and CD3, CD20, PAX5,  ,\par kappaISH, lamdaISH stains performed on block 1C\par \par Neoplastic cells are:\par Positive:  CD20, PAX5, MUM1, BCL2 (diffuse, bright), MYC (60-\par 70%), CD30, p53 (subset bright), CD23, BCL6 (subset, weak)\par Negative:  CD5, CD10, c yclinD1\par Other:  Ki67 proliferation index is 80%\par CD3 stain highlights small T cells in the background\par CD21 stain is negative\par \par  highlights a subset of neoplastic cells which are kappa\par light chain restricted by kappaISH and lambdaISH stains\par \par EBV encoded RNA (MANJIT) in situ hybridization is positive\par \par

## 2021-03-02 NOTE — HISTORY OF PRESENT ILLNESS
[de-identified] : 76 yo male with history of lung cancer s/p R pneumonectomy (2003), COPD on home O2, aortic valve replacement, atrial fibrillation, seizure disorder who presents for discussion of new diagnosis of DLBCL. History obtained from wife and daughter as patient is sleeping due to being up all night with respiratory distress. At the end of December, noted a new right chest wall lesion which began to grow. It was initially thought to be a hematoma but continued to increase in size. Patient evaluated by Oncology on 12/30/20 and mass was concerning for recurrence of malignancy given Rt rib fracture with associated hematoma/metastatic lesion. At that time, IR guided Bx was recommended but family preferred to get this done as outpatient and follow with Dr. Mcdaniel.  He was planned to have a biopsy, but became COVID19 positive in February and ultimately presented to [facility] in respiratory distress with atrial fibrillation with RVR. Biopsy of the mass demonstrated an EBV positive DLBCL. CT scans did not show other areas of disease. he underwent bone marrow biopsy as part of staging on 2/23 which is pending. \par Ultimately, he felt that there was not enough being done for him in a timely manner in the hospital and wanted to go home. Since being home, he has been mostly bed bound and has had issues with worsening respiratory distress. He is on 3L O2 at baseline, but has required as much as 5L along with nebulizers. He was started on low dose prednisone (10 mg).  [de-identified] : \par \par

## 2021-03-05 ENCOUNTER — APPOINTMENT (OUTPATIENT)
Dept: ELECTROPHYSIOLOGY | Facility: CLINIC | Age: 76
End: 2021-03-05

## 2021-03-10 NOTE — PATIENT PROFILE ADULT. - PATIENT REPRESENTATIVE: ( YOU CAN CHOOSE ANY PERSON THAT CAN ASSIST YOU WITH YOUR HEALTH CARE PREFERENCES, DOES NOT HAVE TO BE A SPOUSE, IMMEDIATE FAMILY OR SIGNIFICANT OTHER/PARTNER)
PATIENT:  SELENE BE  [id number]    CHIEF COMPLAINT:  Patient is a 62y old  Male who presents with a chief complaint of chest pain (09 Mar 2021 21:07)      INTERVAL HISTORY/OVERNIGHT EVENTS:      REVIEW OF SYSTEMS:    Constitutional:     [ ] negative [ ] fevers [ ] chills [ ] weight loss [ ] weight gain  HEENT:                  [ ] negative [ ] dry eyes [ ] eye irritation [ ] postnasal drip [ ] nasal congestion  CV:                         [ ] negative  [ ] chest pain [ ] orthopnea [ ] palpitations [ ] murmur  Resp:                     [ ] negative [ ] cough [ ] shortness of breath [ ] dyspnea [ ] wheezing [ ] sputum [ ] hemoptysis  GI:                          [ ] negative [ ] nausea [ ] vomiting [ ] diarrhea [ ] constipation [ ] abd pain [ ] dysphagia   :                        [ ] negative [ ] dysuria [ ] nocturia [ ] hematuria [ ] increased urinary frequency  Musculoskeletal: [ ] negative [ ] back pain [ ] myalgias [ ] arthralgias [ ] fracture  Skin:                       [ ] negative [ ] rash [ ] itch  Neurological:        [ ] negative [ ] headache [ ] dizziness [ ] syncope [ ] weakness [ ] numbness  Psychiatric:           [ ] negative [ ] anxiety [ ] depression  Endocrine:            [ ] negative [ ] diabetes [ ] thyroid problem  Heme/Lymph:      [ ] negative [ ] anemia [ ] bleeding problem  Allergic/Immune: [ ] negative [ ] itchy eyes [ ] nasal discharge [ ] hives [ ] angioedema    [ ] All other systems negative  [ ] Unable to assess ROS because ________.    MEDICATIONS:  MEDICATIONS  (STANDING):  aspirin  chewable 81 milliGRAM(s) Oral daily  atorvastatin 80 milliGRAM(s) Oral at bedtime  chlorhexidine 2% Cloths 1 Application(s) Topical <User Schedule>  dextrose 40% Gel 15 Gram(s) Oral once  dextrose 5%. 1000 milliLiter(s) (50 mL/Hr) IV Continuous <Continuous>  dextrose 5%. 1000 milliLiter(s) (100 mL/Hr) IV Continuous <Continuous>  dextrose 50% Injectable 25 Gram(s) IV Push once  dextrose 50% Injectable 12.5 Gram(s) IV Push once  dextrose 50% Injectable 25 Gram(s) IV Push once  glucagon  Injectable 1 milliGRAM(s) IntraMuscular once  heparin  Infusion. 1000 Unit(s)/Hr (10 mL/Hr) IV Continuous <Continuous>  insulin glargine Injectable (LANTUS) 40 Unit(s) SubCutaneous at bedtime  insulin lispro (ADMELOG) corrective regimen sliding scale   SubCutaneous three times a day before meals  insulin lispro (ADMELOG) corrective regimen sliding scale   SubCutaneous at bedtime  insulin lispro Injectable (ADMELOG) 10 Unit(s) SubCutaneous three times a day before meals  latanoprost 0.005% Ophthalmic Solution 1 Drop(s) Both EYES at bedtime  levothyroxine 100 MICROGram(s) Oral daily  metoprolol tartrate 25 milliGRAM(s) Oral two times a day  nitroglycerin  Infusion 20 MICROgram(s)/Min (6 mL/Hr) IV Continuous <Continuous>    MEDICATIONS  (PRN):  BACItracin   Ointment 1 Application(s) Topical every 12 hours PRN Pain/itching      ALLERGIES:  Allergies    morphine (Urticaria)    Intolerances        OBJECTIVE:  ICU Vital Signs Last 24 Hrs  T(C): 36.7 (09 Mar 2021 19:30), Max: 36.8 (09 Mar 2021 16:02)  T(F): 98.1 (09 Mar 2021 19:30), Max: 98.3 (09 Mar 2021 16:02)  HR: 84 (10 Mar 2021 06:00) (58 - 100)  BP: 150/84 (09 Mar 2021 19:30) (92/49 - 194/79)  BP(mean): 115 (09 Mar 2021 19:30) (87 - 115)  ABP: --  ABP(mean): --  RR: 15 (10 Mar 2021 06:00) (10 - 25)  SpO2: 93% (10 Mar 2021 06:00) (91% - 98%)      Adult Advanced Hemodynamics Last 24 Hrs  CVP(mm Hg): --  CVP(cm H2O): --  CO: --  CI: --  PA: --  PA(mean): --  PCWP: --  SVR: --  SVRI: --  PVR: --  PVRI: --  CAPILLARY BLOOD GLUCOSE      POCT Blood Glucose.: 315 mg/dL (09 Mar 2021 22:41)  POCT Blood Glucose.: 278 mg/dL (09 Mar 2021 16:39)  POCT Blood Glucose.: 154 mg/dL (09 Mar 2021 13:18)  POCT Blood Glucose.: 128 mg/dL (09 Mar 2021 09:56)  POCT Blood Glucose.: 97 mg/dL (09 Mar 2021 07:50)    CAPILLARY BLOOD GLUCOSE      POCT Blood Glucose.: 315 mg/dL (09 Mar 2021 22:41)    I&O's Summary    09 Mar 2021 07:01  -  10 Mar 2021 06:39  --------------------------------------------------------  IN: 162 mL / OUT: 275 mL / NET: -113 mL      Daily Height in cm: 175.26 (09 Mar 2021 08:27)    Daily     PHYSICAL EXAMINATION:  General: WN/WD NAD  HEENT: PERRLA, EOMI, moist mucous membranes  Neurology: A&Ox3, nonfocal, QUIROZ x 4  Respiratory: CTA B/L, normal respiratory effort, no wheezes, crackles, rales  CV: RRR, S1S2, no murmurs, rubs or gallops  Abdominal: Soft, NT, ND +BS, Last BM  Extremities: No edema, + peripheral pulses  Incisions:   Tubes:    LABS:                          14.2   10.72 )-----------( 250      ( 10 Mar 2021 03:57 )             43.2     03-10    135  |  102  |  18  ----------------------------<  315<H>  4.4   |  22  |  0.93    Ca    9.2      10 Mar 2021 03:57  Phos  2.8     03-10  Mg     2.2     03-10    TPro  6.7  /  Alb  3.8  /  TBili  0.5  /  DBili  x   /  AST  26  /  ALT  33  /  AlkPhos  106  03-10    LIVER FUNCTIONS - ( 10 Mar 2021 03:57 )  Alb: 3.8 g/dL / Pro: 6.7 g/dL / ALK PHOS: 106 U/L / ALT: 33 U/L / AST: 26 U/L / GGT: x           PT/INR - ( 08 Mar 2021 21:31 )   PT: 11.2 sec;   INR: 0.93 ratio         PTT - ( 10 Mar 2021 03:57 )  PTT:44.8 sec    CARDIAC MARKERS ( 09 Mar 2021 05:15 )  x     / x     / 118 U/L / x     / 2.4 ng/mL  CARDIAC MARKERS ( 09 Mar 2021 02:12 )  x     / x     / 127 U/L / x     / 2.5 ng/mL          TELEMETRY:     EKG:     IMAGING:           PATIENT:  SELENE BE  [id number]    CHIEF COMPLAINT:  Patient is a 62y old  Male who presents with a chief complaint of chest pain (09 Mar 2021 21:07)      INTERVAL HISTORY/OVERNIGHT EVENTS:  Nitro drip started given persistent chest pain with some relief.   Pt continues to report left sided chest pain that worsens when palpated, but does state it has improved compared to pain that brought him to the hospital.     REVIEW OF SYSTEMS:    Constitutional:     [ ] negative [ ] fevers [ ] chills [ ] weight loss [ ] weight gain  HEENT:                  [ ] negative [ ] dry eyes [ ] eye irritation [ ] postnasal drip [ ] nasal congestion  CV:                         [ ] negative  [ x ] chest pain [ ] orthopnea [ ] palpitations [ ] murmur  Resp:                     [ ] negative [ ] cough [ ] shortness of breath [ ] dyspnea [ ] wheezing [ ] sputum [ ] hemoptysis  GI:                          [ ] negative [ ] nausea [ ] vomiting [ ] diarrhea [ ] constipation [ ] abd pain [ ] dysphagia   :                        [ ] negative [ ] dysuria [ ] nocturia [ ] hematuria [ ] increased urinary frequency  Musculoskeletal: [ ] negative [ x ] back pain [ ] myalgias [ ] arthralgias [ ] fracture  Skin:                       [ ] negative [ ] rash [ ] itch  Neurological:        [ ] negative [ ] headache [ ] dizziness [ ] syncope [ ] weakness [ ] numbness  Psychiatric:           [ ] negative [ ] anxiety [ ] depression  Endocrine:            [ ] negative [ ] diabetes [ ] thyroid problem  Heme/Lymph:      [ ] negative [ ] anemia [ ] bleeding problem  Allergic/Immune: [ ] negative [ ] itchy eyes [ ] nasal discharge [ ] hives [ ] angioedema    [ ] All other systems negative  [ ] Unable to assess ROS because ________.    MEDICATIONS:  MEDICATIONS  (STANDING):  aspirin  chewable 81 milliGRAM(s) Oral daily  atorvastatin 80 milliGRAM(s) Oral at bedtime  chlorhexidine 2% Cloths 1 Application(s) Topical <User Schedule>  dextrose 40% Gel 15 Gram(s) Oral once  dextrose 5%. 1000 milliLiter(s) (50 mL/Hr) IV Continuous <Continuous>  dextrose 5%. 1000 milliLiter(s) (100 mL/Hr) IV Continuous <Continuous>  dextrose 50% Injectable 25 Gram(s) IV Push once  dextrose 50% Injectable 12.5 Gram(s) IV Push once  dextrose 50% Injectable 25 Gram(s) IV Push once  glucagon  Injectable 1 milliGRAM(s) IntraMuscular once  heparin  Infusion. 1000 Unit(s)/Hr (10 mL/Hr) IV Continuous <Continuous>  insulin glargine Injectable (LANTUS) 40 Unit(s) SubCutaneous at bedtime  insulin lispro (ADMELOG) corrective regimen sliding scale   SubCutaneous three times a day before meals  insulin lispro (ADMELOG) corrective regimen sliding scale   SubCutaneous at bedtime  insulin lispro Injectable (ADMELOG) 10 Unit(s) SubCutaneous three times a day before meals  latanoprost 0.005% Ophthalmic Solution 1 Drop(s) Both EYES at bedtime  levothyroxine 100 MICROGram(s) Oral daily  metoprolol tartrate 25 milliGRAM(s) Oral two times a day  nitroglycerin  Infusion 20 MICROgram(s)/Min (6 mL/Hr) IV Continuous <Continuous>    MEDICATIONS  (PRN):  BACItracin   Ointment 1 Application(s) Topical every 12 hours PRN Pain/itching    ALLERGIES:  Allergies  morphine (Urticaria)  Intolerances    OBJECTIVE:  ICU Vital Signs Last 24 Hrs  T(C): 36.7 (09 Mar 2021 19:30), Max: 36.8 (09 Mar 2021 16:02)  T(F): 98.1 (09 Mar 2021 19:30), Max: 98.3 (09 Mar 2021 16:02)  HR: 84 (10 Mar 2021 06:00) (58 - 100)  BP: 150/84 (09 Mar 2021 19:30) (92/49 - 194/79)  BP(mean): 115 (09 Mar 2021 19:30) (87 - 115)  ABP: --  ABP(mean): --  RR: 15 (10 Mar 2021 06:00) (10 - 25)  SpO2: 93% (10 Mar 2021 06:00) (91% - 98%)      Adult Advanced Hemodynamics Last 24 Hrs  CVP(mm Hg): --  CVP(cm H2O): --  CO: --  CI: --  PA: --  PA(mean): --  PCWP: --  SVR: --  SVRI: --  PVR: --  PVRI: --  CAPILLARY BLOOD GLUCOSE      POCT Blood Glucose.: 315 mg/dL (09 Mar 2021 22:41)  POCT Blood Glucose.: 278 mg/dL (09 Mar 2021 16:39)  POCT Blood Glucose.: 154 mg/dL (09 Mar 2021 13:18)  POCT Blood Glucose.: 128 mg/dL (09 Mar 2021 09:56)  POCT Blood Glucose.: 97 mg/dL (09 Mar 2021 07:50)    CAPILLARY BLOOD GLUCOSE    POCT Blood Glucose.: 315 mg/dL (09 Mar 2021 22:41)    I&O's Summary    09 Mar 2021 07:01  -  10 Mar 2021 06:39  --------------------------------------------------------  IN: 162 mL / OUT: 275 mL / NET: -113 mL    Daily Height in cm: 175.26 (09 Mar 2021 08:27)      PHYSICAL EXAMINATION:  General: WN/WD NAD  HEENT: PERRLA, EOMI, moist mucous membranes  Neurology: A&Ox3, nonfocal, QUIROZ x 4  Respiratory: CTA B/L, normal respiratory effort, no wheezes, crackles, rales  CV: RRR, S1S2, no murmurs, rubs or gallops, left pectoralis painful to palpation   Abdominal: Soft, ND +BS, Last BM, epigastric pain on palpation   Extremities: No edema, + peripheral pulses  Right groin: LHC access clean, dry, without erythema, soft, non tender  Left groin: IABP site clean, dry, without erythema, soft, mildly tender to palpation     Tubes: IABP via Left femoral     LABS:                          14.2   10.72 )-----------( 250      ( 10 Mar 2021 03:57 )             43.2     03-10    135  |  102  |  18  ----------------------------<  315<H>  4.4   |  22  |  0.93    Ca    9.2      10 Mar 2021 03:57  Phos  2.8     03-10  Mg     2.2     03-10    TPro  6.7  /  Alb  3.8  /  TBili  0.5  /  DBili  x   /  AST  26  /  ALT  33  /  AlkPhos  106  03-10    LIVER FUNCTIONS - ( 10 Mar 2021 03:57 )  Alb: 3.8 g/dL / Pro: 6.7 g/dL / ALK PHOS: 106 U/L / ALT: 33 U/L / AST: 26 U/L / GGT: x           PT/INR - ( 08 Mar 2021 21:31 )   PT: 11.2 sec;   INR: 0.93 ratio         PTT - ( 10 Mar 2021 03:57 )  PTT:44.8 sec    CARDIAC MARKERS ( 09 Mar 2021 05:15 )  x     / x     / 118 U/L / x     / 2.4 ng/mL  CARDIAC MARKERS ( 09 Mar 2021 02:12 )  x     / x     / 127 U/L / x     / 2.5 ng/mL    TELEMETRY: sinus rhythm occasional PVCs            Yes

## 2021-03-16 LAB — MISCELLANEOUS TEST NAME: SIGNIFICANT CHANGE UP

## 2022-02-21 NOTE — ED ADULT NURSE NOTE - NSFALLRSKOUTCOME_ED_ALL_ED
Here for PT/INR, PT was 16.9 and INR was 1.4. Pt was told to take 7.5 mg and to recheck PT one day, copy of dosing schedule given to patient.   Fall Risk

## 2022-03-23 NOTE — ED PROVIDER NOTE - CARDIAC RATE
oriented to person, place and time , normal sensation , short and long term memory intact
TACHYCARDIC

## 2022-03-24 NOTE — PHYSICAL THERAPY INITIAL EVALUATION ADULT - LEVEL OF INDEPENDENCE: STAND/SIT, REHAB EVAL
contact guard Burow's Advancement Flap Text: The defect edges were debeveled with a #15 scalpel blade.  Given the location of the defect and the proximity to free margins a Burow's advancement flap was deemed most appropriate.  Using a sterile surgical marker, the appropriate advancement flap was drawn incorporating the defect and placing the expected incisions within the relaxed skin tension lines where possible.    The area thus outlined was incised deep to adipose tissue with a #15 scalpel blade.  The skin margins were undermined to an appropriate distance in all directions utilizing iris scissors.

## 2022-07-14 NOTE — PHYSICAL THERAPY INITIAL EVALUATION ADULT - HEALTH SCREEN CRITERIA
Patient awake upon entry. Denies any LOF, VB, or CTXs. No longer has a headache. Denies any excessive swelling, N/V, visual disturbances or epigastric pain. Doppler heart tones 145-150. All questions answered and call light within reach. yes

## 2022-07-15 NOTE — PROGRESS NOTE ADULT - ASSESSMENT
72y M with PMHx of COPD on 2L home oxygen, Lung Ca s/p R pneumonectomy in 2003 (s/p chemo/radiation) Bovine AVR, Afib on coumadin, seizure d/o (tonic seizures, on carbamazepine and lamotrigine), HTN presents with SOB since yesterday morning due to flash pulmonary edema in setting of Afib with RVR, now resolved. Course c/b dizziness/visual disturbances likely 2/2 elevated levels of carbamazepine. 130

## 2022-10-24 NOTE — ED ADULT NURSE NOTE - NSFALLRSKASSESSTYPE_ED_ALL_ED
Initial (On Arrival) Opzelura Pregnancy And Lactation Text: There is insufficient data to evaluate drug-associated risk for major birth defects, miscarriage, or other adverse maternal or fetal outcomes.  There is a pregnancy registry that monitors pregnancy outcomes in pregnant persons exposed to the medication during pregnancy.  It is unknown if this medication is excreted in breast milk.  Do not breastfeed during treatment and for about 4 weeks after the last dose.

## 2022-11-23 NOTE — H&P ADULT - PROBLEM/PLAN-5
- Diet: NPO; advance as tolerated  - DVT Ppx: SCDs DISPLAY PLAN FREE TEXT - c/w Losartan 100mg daily  - CTM

## 2023-04-17 NOTE — DISCHARGE NOTE PROVIDER - NSDCCPCAREPLAN_GEN_ALL_CORE_FT
Detail Level: Detailed Depth Of Biopsy: dermis Was A Bandage Applied: Yes Size Of Lesion In Cm: 0 Biopsy Type: H and E Biopsy Method: Personna blade Anesthesia Type: 1% lidocaine with epinephrine Anesthesia Volume In Cc (Will Not Render If 0): 0.5 Hemostasis: Drysol Wound Care: Vaseline Dressing: bandage Destruction After The Procedure: No Type Of Destruction Used: Curettage Curettage Text: The wound bed was treated with curettage after the biopsy was performed. Cryotherapy Text: The wound bed was treated with cryotherapy after the biopsy was performed. Electrodesiccation Text: The wound bed was treated with electrodesiccation after the biopsy was performed. Electrodesiccation And Curettage Text: The wound bed was treated with electrodesiccation and curettage after the biopsy was performed. PRINCIPAL DISCHARGE DIAGNOSIS  Diagnosis: Pneumonia due to COVID-19 virus  Assessment and Plan of Treatment: You have been diagnosed with the COVID-19 virus during your hospital stay. You must self quarantine to complete a 14 day time period.  Monitor for fevers, shortness of breath and cough primarily.  Monitor your temperature daily to not any changes and increases.    It has been determined that you no longer need hospitalization and can recover while remaining in self-quarantine at home. You should follow the prevention steps below until a healthcare provider or local or state health department says you can return to your normal activities.  1. You should restrict activities outside your home, except for getting medical care.  2. Do not go to work, school, or public areas.  3. Avoid using public transportation, ride-sharing, or taxis.  4. Separate yourself from other people and animals in your home.  5. Call ahead before visiting your doctor.  6. Wear a facemask.  7. Cover your coughs and sneezes.  8. Clean your hands often.  9. Avoid sharing personal household items.  10. Clean all “high-touch” surfaces everyday.  11. Monitor your symptoms.  If you have a medical emergency and need to call 911, notify the dispatch personnel that you have COVID-19 If possible, put on a facemask before emergency medical services arrive.  12. Stopping home isolation.  Patients with confirmed COVID-19 should remain under home isolation precautions for 14 days since the positive COVID-19 test and until the risk of secondary transmission to others is thought to be low. The decision to discontinue home isolation precautions should be made on a case-by-case basis, in consultation with the primary health care provide. You can call the [facility] [phone] or [place] Department of Health at [phone] for further information about COVID-19.      SECONDARY DISCHARGE DIAGNOSES  Diagnosis: Seizure disorder  Assessment and Plan of Treatment: Continue your antiseizure medications as previously prescribed.    Diagnosis: Hypertension  Assessment and Plan of Treatment: Continue blood pressure medication regimen as directed. Monitor for any visual changes, headaches or dizziness.  Monitor blood pressure regularly.  Follow up with your PCP for further management for high blood pressure.    Diagnosis: Atrial fibrillation  Assessment and Plan of Treatment: Please continue your medications as directed and follow-up with your primary provider/cardiologist to further manage your care. Monitor for signs/symptoms of uncontrolled atrial fibrillation, such as, increased heart rate, palpitations, chest pain, dizziness, or shortness of breath - Return to emergency room if these signs/symptoms are present.    Diagnosis: Lung cancer  Assessment and Plan of Treatment: Follow up with your primary oncologist for further management recommendations and evaluation.     Silver Nitrate Text: The wound bed was treated with silver nitrate after the biopsy was performed. Lab: -[id number] Consent: Written consent was obtained and risks were reviewed including but not limited to scarring, infection, bleeding, scabbing, incomplete removal, nerve damage and allergy to anesthesia. Post-Care Instructions: I reviewed with the patient in detail post-care instructions. Patient is to keep the biopsy site dry overnight, and then apply bacitracin twice daily until healed. Patient may apply hydrogen peroxide soaks to remove any crusting. Notification Instructions: Patient will be notified of biopsy results. However, patient instructed to call the office if not contacted within 2 weeks. Billing Type: Third-Party Bill Information: Selecting Yes will display possible errors in your note based on the variables you have selected. This validation is only offered as a suggestion for you. PLEASE NOTE THAT THE VALIDATION TEXT WILL BE REMOVED WHEN YOU FINALIZE YOUR NOTE. IF YOU WANT TO FAX A PRELIMINARY NOTE YOU WILL NEED TO TOGGLE THIS TO 'NO' IF YOU DO NOT WANT IT IN YOUR FAXED NOTE. PRINCIPAL DISCHARGE DIAGNOSIS  Diagnosis: Pneumonia due to COVID-19 virus  Assessment and Plan of Treatment: You have been diagnosed with the COVID-19 virus during your hospital stay. You must self quarantine to complete a 14 day time period.  Monitor for fevers, shortness of breath and cough primarily.  Monitor your temperature daily to not any changes and increases.    It has been determined that you no longer need hospitalization and can recover while remaining in self-quarantine at home. You should follow the prevention steps below until a healthcare provider or local or state health department says you can return to your normal activities.  1. You should restrict activities outside your home, except for getting medical care.  2. Do not go to work, school, or public areas.  3. Avoid using public transportation, ride-sharing, or taxis.  4. Separate yourself from other people and animals in your home.  5. Call ahead before visiting your doctor.  6. Wear a facemask.  7. Cover your coughs and sneezes.  8. Clean your hands often.  9. Avoid sharing personal household items.  10. Clean all “high-touch” surfaces everyday.  11. Monitor your symptoms.  If you have a medical emergency and need to call 911, notify the dispatch personnel that you have COVID-19 If possible, put on a facemask before emergency medical services arrive.  12. Stopping home isolation.  Patients with confirmed COVID-19 should remain under home isolation precautions for 14 days since the positive COVID-19 test and until the risk of secondary transmission to others is thought to be low. The decision to discontinue home isolation precautions should be made on a case-by-case basis, in consultation with the primary health care provide. You can call the [facility] [phone]-CARE or [place] Department of Health at [phone] for further information about COVID-19.  You will continue your home oxygen .         SECONDARY DISCHARGE DIAGNOSES  Diagnosis: Atrial fibrillation  Assessment and Plan of Treatment: Please continue your medications as directed and follow-up with your primary provider/cardiologist to further manage your care. Monitor for signs/symptoms of uncontrolled atrial fibrillation, such as, increased heart rate, palpitations, chest pain, dizziness, or shortness of breath - Return to emergency room if these signs/symptoms are present.  You will need a repeat CT -scan of chest to monitor the mass next to your rib, if it is worsening your Cardiologist may consider stopping your home Eliquis medication. Please follow up with your Cardiologist in 1 week.    Diagnosis: Hypertension  Assessment and Plan of Treatment: Continue blood pressure medication regimen as directed. Monitor for any visual changes, headaches or dizziness.  Monitor blood pressure regularly.  Follow up with your PCP for further management for high blood pressure.    Diagnosis: Seizure disorder  Assessment and Plan of Treatment: Continue your antiseizure medications as previously prescribed.    Diagnosis: Lung cancer  Assessment and Plan of Treatment: Follow up with your primary oncologist for further management recommendations and evaluation.       PRINCIPAL DISCHARGE DIAGNOSIS  Diagnosis: Pneumonia due to COVID-19 virus  Assessment and Plan of Treatment: You have been diagnosed with the COVID-19 virus during your hospital stay. You must self quarantine to complete a 14 day time period.  Monitor for fevers, shortness of breath and cough primarily.  Monitor your temperature daily to not any changes and increases.    It has been determined that you no longer need hospitalization and can recover while remaining in self-quarantine at home. You should follow the prevention steps below until a healthcare provider or local or state health department says you can return to your normal activities.  1. You should restrict activities outside your home, except for getting medical care.  2. Do not go to work, school, or public areas.  3. Avoid using public transportation, ride-sharing, or taxis.  4. Separate yourself from other people and animals in your home.  5. Call ahead before visiting your doctor.  6. Wear a facemask.  7. Cover your coughs and sneezes.  8. Clean your hands often.  9. Avoid sharing personal household items.  10. Clean all “high-touch” surfaces everyday.  11. Monitor your symptoms.  If you have a medical emergency and need to call 911, notify the dispatch personnel that you have COVID-19 If possible, put on a facemask before emergency medical services arrive.  12. Stopping home isolation.  Patients with confirmed COVID-19 should remain under home isolation precautions for 14 days since the positive COVID-19 test and until the risk of secondary transmission to others is thought to be low. The decision to discontinue home isolation precautions should be made on a case-by-case basis, in consultation with the primary health care provide. You can call the [facility] [phone] or [place] Department of Health at [phone] for further information about COVID-19.  You will continue your home oxygen .         SECONDARY DISCHARGE DIAGNOSES  Diagnosis: Atrial fibrillation  Assessment and Plan of Treatment: Please continue your medications as directed and follow-up with your primary provider/cardiologist to further manage your care. Monitor for signs/symptoms of uncontrolled atrial fibrillation, such as, increased heart rate, palpitations, chest pain, dizziness, or shortness of breath - Return to emergency room if these signs/symptoms are present.  You will need a repeat CT -scan of chest to monitor the mass next to your rib, if it is worsening your Cardiologist may consider stopping your home Eliquis medication. Please follow up with your Cardiologist in 1 week.    Diagnosis: Hypertension  Assessment and Plan of Treatment: Continue blood pressure medication regimen as directed. Monitor for any visual changes, headaches or dizziness.  Monitor blood pressure regularly.  Follow up with your PCP for further management for high blood pressure.    Diagnosis: Seizure disorder  Assessment and Plan of Treatment: Continue your antiseizure medications as previously prescribed.    Diagnosis: Lung cancer  Assessment and Plan of Treatment: Follow up with your primary oncologist for further management recommendations and evaluation.   plan for IR biopsy of Rt infraclavicular mass on CT scan in outpatient setting

## 2023-04-17 NOTE — ED ADULT NURSE NOTE - NS ED NURSE REPORT GIVEN DT
"Miroslava"ADAN Machuca was seen and treated in our emergency department on 4/17/2023.  She may return to work on 04/20/2023.       If you have any questions or concerns, please don't hesitate to call.      TRISTIAN Rojo" 04-May-2017 13:09

## 2023-06-20 NOTE — PHYSICAL THERAPY INITIAL EVALUATION ADULT - ADL SKILLS, REHAB EVAL
independent risks, benefits, alternatives, general anesthesia as a backup discussed with the patient and she agrees to proceed as planned

## 2024-01-01 NOTE — CHART NOTE - NSCHARTNOTEFT_GEN_A_CORE
Discussed with patient and daughter Alannah ([phone]) this morning regarding statements last night where patient stated "If I don't go home I will kill myself".  Patient and daughter expressed these statements were made out of a sense of frustration with prolonged hospital course with feeling that interventions were not taking place. He denied suicidal ideation or plan and homicidal ideation. The patient did endorse feeling down in setting of recent hospital course and new cancer diagnosis. He does not need to be observed on 1:1 at this time. 8

## 2024-02-27 NOTE — ED ADULT NURSE NOTE - SUICIDE SCREENING QUESTION 2
Pt will call to see if another neurosurgeon will see her then give us a call back, that's when she'll get the number to schedule her EMG       No

## 2024-05-19 NOTE — DISCHARGE NOTE PROVIDER - EXTENDED VTE YES NO FOR MLM ASPIRIN
Assessment/Plan:    Patient here for annual wellness exam. Health maintenance was reviewed and ordered.    Complete history and physical was completed today.  Complete and thorough medication reconciliation was performed.  Discussed risks and benefits of medications.  Advised patient on orders and health maintenance. Continue current medications listed on your summary sheet.    All questions were answered. Patient had no further concerns. Advised of diagnoses and plan, as listed below.    Problem List Items Addressed This Visit          Psychiatric    Post partum depression    Overview     -recently started on prozac 20 mg with good response  -denies AE  -following with a therapist as well         Relevant Medications    FLUoxetine 20 MG capsule       Obstetric    History of pre-eclampsia - Primary    Relevant Orders    Urinalysis (Completed)     Other Visit Diagnoses       Encounter for annual health examination        Relevant Orders    CBC Auto Differential    Comprehensive Metabolic Panel    Lipid Panel    TSH (Completed)    Urinalysis (Completed)    Encounter for lipid screening for cardiovascular disease        Relevant Orders    Lipid Panel            Follow up for labs today: cbc,cmp,lipid,tsh,UA.    Maureen Burr MD       WELLNESS EXAM      Patient ID: Violette Esposito is a 29 y.o. female.  has a past medical history of Anxiety, Delivery of pregnancy by  section, Depression, Digestive disorder, Gestational diabetes, Pre-eclampsia in third trimester (2023), and Unspecified pre-eclampsia, unspecified trimester.     Chief Complaint:  Encounter for wellness exam    Well Adult Physical: Patient here for a comprehensive physical exam.     History of Present Illness  The patient presents for her annual exam.    The patient reports that she recently completed breastfeeding in 2024, had a challenging pregnancy, which resulted in pre-eclampsia. She discontinued the use of Procardia  approximately one month post-delivery, and her blood pressure has been well-regulated without medication. Her current medication regimen includes Prozac 20 mg, which she reports as effective. She requires a refill of this medication. Her most recent Pap smear was conducted in [place] on 2024. Despite being screened for thyroid and diabetes in , both of which were within normal limits, she expresses a desire to continue these screenings due to a previous diagnosis of gestational diabetes. She denies experiencing any chest pain, shortness of breath, or swelling.    No other new complaints today.     Health Maintenance Topics with due status: Not Due       Topic Last Completion Date    TETANUS VACCINE 2023      ==============================================  Health Maintenance Due   Topic Date Due    COVID-19 Vaccine ( season) 2023    Cervical Cancer Screening  2024       Past Medical History:  Past Medical History:   Diagnosis Date    Anxiety     Delivery of pregnancy by  section     Depression     Digestive disorder     diarrhea    Gestational diabetes     Pre-eclampsia in third trimester 2023    Unspecified pre-eclampsia, unspecified trimester      Past Surgical History:   Procedure Laterality Date     SECTION  23    COLONOSCOPY N/A 2021    Procedure: COLONOSCOPY;  Surgeon: Gayle Mcneill MD;  Location: [facility];  Service: Endoscopy;  Laterality: N/A;    ESOPHAGOGASTRODUODENOSCOPY N/A 2021    Procedure: EGD (ESOPHAGOGASTRODUODENOSCOPY);  Surgeon: Gayle Mcneill MD;  Location: [facility];  Service: Endoscopy;  Laterality: N/A;    WISDOM TOOTH EXTRACTION       Review of patient's allergies indicates:   Allergen Reactions    Corticosteroids (glucocorticoids) Palpitations     Oral steroid     Current Outpatient Medications on File Prior to Visit   Medication Sig Dispense Refill    Lactobacillus rhamnosus GG (CULTURELLE) 10 billion  cell capsule Take 1 capsule by mouth once daily.      MULTIVITAMIN ORAL Take by mouth.      omega-3/dha/epa/dpa/fish oil (OMEGA-3 2100 ORAL)        No current facility-administered medications on file prior to visit.     Social History     Socioeconomic History    Marital status:    Tobacco Use    Smoking status: Never    Smokeless tobacco: Never   Substance and Sexual Activity    Alcohol use: Not Currently    Drug use: Never    Sexual activity: Yes     Partners: Female     Birth control/protection: Condom, Other-see comments     Comment: Breastfeeding, cycle tracking     Social Determinants of Health     Financial Resource Strain: Low Risk  (12/20/2023)    Overall Financial Resource Strain (CARDIA)     Difficulty of Paying Living Expenses: Not hard at all   Food Insecurity: No Food Insecurity (12/20/2023)    Hunger Vital Sign     Worried About Running Out of Food in the Last Year: Never true     Ran Out of Food in the Last Year: Never true   Transportation Needs: No Transportation Needs (12/20/2023)    PRAPARE - Transportation     Lack of Transportation (Medical): No     Lack of Transportation (Non-Medical): No   Physical Activity: Insufficiently Active (12/20/2023)    Exercise Vital Sign     Days of Exercise per Week: 4 days     Minutes of Exercise per Session: 20 min   Stress: Stress Concern Present (12/20/2023)    [employer] [place] [employer] - Occupational Stress Questionnaire     Feeling of Stress : To some extent   Housing Stability: Low Risk  (12/20/2023)    Housing Stability Vital Sign     Unable to Pay for Housing in the Last Year: No     Number of Places Lived in the Last Year: 1     Unstable Housing in the Last Year: No     Family History   Problem Relation Name Age of Onset    Alcohol abuse Mother Maeve Pierre     Depression Mother Maeve Pierre     Drug abuse Mother Maeve Pierre     Diabetes Father Derrick Bryan         Prediabetic    Hearing loss Father Derrick Adams      Hyperlipidemia Father Derrick Adams     Miscarriages / Stillbirths Sister Arnaldo Quiroz     Cancer Paternal Grandfather FARHAD         Colorectal cancer    Hearing loss Paternal Grandfather JM     Stroke Paternal Grandfather JM     Diabetes Maternal Aunt Great Aunt Marcie     Miscarriages / Stillbirths Maternal Aunt Great Aunt Marcie         Cannot have children       Review of Systems   Constitutional:  Negative for chills, fever and malaise/fatigue.   HENT:  Negative for congestion, hearing loss and sore throat.    Eyes:  Negative for blurred vision, double vision and discharge.   Respiratory:  Negative for cough, shortness of breath and wheezing.    Cardiovascular:  Negative for chest pain, palpitations and leg swelling.   Gastrointestinal:  Negative for abdominal pain, blood in stool, constipation, diarrhea and vomiting.   Genitourinary:  Negative for dysuria, frequency and hematuria.   Musculoskeletal:  Negative for back pain, joint pain and neck pain.   Neurological:  Negative for weakness and headaches.   Endo/Heme/Allergies:  Negative for polydipsia.   Psychiatric/Behavioral:  Negative for depression. The patient is not nervous/anxious.            Objective:      Vitals:    05/16/24 0816   BP: 102/68   Pulse: 77   Temp: 98.1 °F (36.7 °C)     Body mass index is 23.05 kg/m².     Physical Exam  Constitutional:       General: She is not in acute distress.     Appearance: Normal appearance. She is well-developed.   HENT:      Head: Normocephalic and atraumatic.   Eyes:      Conjunctiva/sclera: Conjunctivae normal.   Cardiovascular:      Rate and Rhythm: Normal rate and regular rhythm.      Pulses: Normal pulses.      Heart sounds: Normal heart sounds. No murmur heard.  Pulmonary:      Effort: Pulmonary effort is normal. No respiratory distress.      Breath sounds: Normal breath sounds.   Abdominal:      General: Bowel sounds are normal. There is no distension.      Palpations: Abdomen is soft.      Tenderness: There is no  abdominal tenderness.   Musculoskeletal:         General: Normal range of motion.      Cervical back: Normal range of motion and neck supple.   Skin:     General: Skin is warm and dry.      Findings: No rash.   Neurological:      General: No focal deficit present.      Mental Status: She is alert and oriented to person, place, and time.   Psychiatric:         Mood and Affect: Mood normal.         Behavior: Behavior normal.             All labs, imaging and procedures performed since last visit have been personally reviewed.    DISCLAIMER: This note was compiled by using a speech recognition dictation system and therefore please be aware that typographical / speech recognition errors can and do occur.  Please contact me if you see any errors specifically.  Consent was obtained for [employer] recording system prior to the visit.  Answers submitted by the patient for this visit:  Review of Systems Questionnaire (Submitted on 5/16/2024)  activity change: Yes  unexpected weight change: No  rhinorrhea: No  trouble swallowing: No  visual disturbance: No  chest tightness: No  polyuria: No  difficulty urinating: No  menstrual problem: No  joint swelling: No  arthralgias: No  confusion: No  dysphoric mood: No     ,

## 2024-06-10 NOTE — PATIENT PROFILE ADULT - PUBLIC BENEFITS
Dr. Kumar reviewed Mri lumbar spine, states no nerve compression. Patient to proceed with left SI injection tomorrow. She instructed to be as active as possible for the first 3 hours following her injection and call the next day to follow up on symptoms. She questions if there's any contraindication with the injection and her taking Rinivox 15 mg daily. Message sent to surgery.     Response received back from Rayna in surgery. No contraindication going forward with injection. Patient updated, she states back understanding.    no

## 2024-10-30 NOTE — PATIENT PROFILE ADULT - FALL HARM RISK CONCLUSION
Medicare Annual Wellness Visit    Chantell Yañez is here for Medicare AWV    Assessment & Plan   Medicare annual wellness visit, subsequent  Need for influenza vaccination  -     Influenza, FLUAD Trivalent, (age 65 y+), IM, Preservative Free, 0.5mL  Essential hypertension  Atrial fibrillation with RVR (HCC)  Heart failure with preserved ejection fraction, unspecified HF chronicity (HCC)  Gastroesophageal reflux disease without esophagitis  Bilateral hearing loss, unspecified hearing loss type  -     AFL - Ingrid, Susannah, AuD, Audiology, Wynn  Orthopnea    Recommendations for Preventive Services Due: see orders and patient instructions/AVS.  Recommended screening schedule for the next 5-10 years is provided to the patient in written form: see Patient Instructions/AVS.    Discussed options for testing for sleep apnea given complaints of orthopnea in atrial fibrillation, patient declines testing     Return in 6 months (on 4/30/2025).     Subjective   The following acute and/or chronic problems were also addressed today:  States lisinopril was discontinued, no further issues with low blood pressure.    Patient's complete Health Risk Assessment and screening values have been reviewed and are found in Flowsheets. The following problems were reviewed today and where indicated follow up appointments were made and/or referrals ordered.    Positive Risk Factor Screenings with Interventions:                Inactivity:  On average, how many days per week do you engage in moderate to strenuous exercise (like a brisk walk)?: 0 days (!) Abnormal     Interventions:  See AVS for additional education material      Dentist Screen:  Have you seen the dentist within the past year?: (!) No    Intervention:  Advised to schedule with their dentist    Hearing Screen:  Do you or your family notice any trouble with your hearing that hasn't been managed with hearing aids?: (!) Yes    Interventions:  Patient comments: Had one at [facility] over 
Fall with Harm Risk

## 2024-11-14 NOTE — ED PROVIDER NOTE - NS ED ROS FT
[facility]      History Of Present Illness  Ying is a 84 year old female with a past medical history significant for atrial fibrillation, hypertension, dyslipidemia, intracranial bleed, breast cancer, subarachnoid hemorrhage, diabetes mellitus type 2, cholecystectomy, mastectomy and knee arthroplasty.  The patient presented to the hospital with a 5-day history of increasing weakness, decreased oral intake and mental status change, her daughter is providing the history and reported that the patient has been progressively becoming more confused, she has been having some cough, on admission she was diagnosed with COVID19.  We were asked to see her to assist with management.    Past Medical History  Past Medical History:   Diagnosis Date    Atrial fibrillation  (CMD)     Bilateral impacted cerumen 2022    Cataract 2020    Epistaxis, recurrent 2020    Hyperlipidemia     Hypertension     Intracranial hemorrhage  (CMD) 2022    Malignant neoplasm of breast (CMD) 2020    Osteoarthritis     knees, shoulders    Osteoarthritis of right shoulder 2020    Pyogenic granuloma 2011    SAH (subarachnoid hemorrhage)  (CMD) 2022    SDH (subdural hematoma)  (CMD) 2022    Type 2 diabetes mellitus (CMD)         Surgical History  Past Surgical History:   Procedure Laterality Date    Cataract extraction, bilateral Right     Cholecystectomy      Knee arthroplasty Right     Mastectomy Right 1984    Shoulder surgery Left     Total arthroplasty        Social History  Social History     Tobacco Use    Smoking status: Former     Current packs/day: 0.00     Average packs/day: 0.5 packs/day for 20.0 years (10.0 ttl pk-yrs)     Types: Cigarettes     Start date: 1964     Quit date: 1984     Years since quittin.4    Smokeless tobacco: Never   Vaping Use    Vaping  status: never used   Substance Use Topics    Alcohol use: Yes     Alcohol/week: 2.0 standard drinks of alcohol     Types: 2 Glasses of wine per week    Drug use: Never       Family History    Family History   Problem Relation Age of Onset    Coronary Artery Disease Mother 72    Cancer, Liver Father 75    Patient is unaware of any medical problems Sister     Patient is unaware of any medical problems Sister     Alcohol Abuse Brother 75    Patient is unaware of any medical problems Maternal Grandmother     Patient is unaware of any medical problems Maternal Grandfather     Patient is unaware of any medical problems Paternal Grandmother     Patient is unaware of any medical problems Paternal Grandfather     Patient is unaware of any medical problems Other         Allergies  ALLERGIES:  Patient has no known allergies.    Medications  Medications Prior to Admission   Medication Sig Dispense Refill    ARIPiprazole (Abilify) 2 MG tablet Take 2 mg by mouth daily.      cholecalciferol (VITAMIN D) 25 mcg(1,000 units) tablet Take 25 mcg by mouth in the morning and 25 mcg in the evening.      simvastatin (ZOCOR) 20 MG tablet Take 20 mg by mouth nightly.      saline nasal Gel Spray 1-2 sprays in each nostril as needed (BLOODY NOSES).      acetaminophen (TYLENOL) 325 MG tablet Take 650 mg by mouth every 4 hours as needed for Pain (MILD-MODERATE PAIN).      metoPROLOL succinate (TOPROL-XL) 100 MG 24 hr tablet Take 1 tablet by mouth in the morning and 1 tablet in the evening. 60 tablet 1    warfarin (COUMADIN) 5 MG tablet 7.5 mg Wed & 5 mg Mon, Tues, Thurs, Fri, Sat, Sun      losartan (COZAAR) 25 MG tablet Take 1 tablet by mouth daily. Do not start before November 7, 2023. 30 tablet 1       Review of Systems  Review of Systems   Unable to perform ROS: Mental status change         Physical Exam  Physical Exam  Vitals and nursing note reviewed.   Constitutional:       General: She is not in acute distress.     Appearance: She is  well-developed. She is not diaphoretic.   HENT:      Head: Normocephalic.      Nose: Nose normal.   Eyes:      General: No scleral icterus.     Conjunctiva/sclera: Conjunctivae normal.      Pupils: Pupils are equal, round, and reactive to light.   Neck:      Thyroid: No thyromegaly.      Vascular: No JVD.   Cardiovascular:      Rate and Rhythm: Normal rate and regular rhythm.      Heart sounds: Normal heart sounds. No murmur heard.     No friction rub.   Pulmonary:      Effort: Pulmonary effort is normal. No respiratory distress.      Breath sounds: Normal breath sounds. No wheezing or rales.   Abdominal:      General: Bowel sounds are normal. There is no distension.      Palpations: Abdomen is soft.      Tenderness: There is no abdominal tenderness. There is no guarding or rebound.   Musculoskeletal:         General: No tenderness or deformity.      Cervical back: Neck supple.   Lymphadenopathy:      Cervical: No cervical adenopathy.   Skin:     General: Skin is warm and dry.      Coloration: Skin is pale.      Findings: No erythema or rash.   Neurological:      Deep Tendon Reflexes: Reflexes are normal and symmetric.           Last Recorded Vitals  Blood pressure (!) 186/77, pulse 99, temperature 98.2 °F (36.8 °C), temperature source Oral, resp. rate 18, height 5' 9\" (1.753 m), weight 82 kg (180 lb 12.4 oz), SpO2 90%.        LABS  Admission on 11/13/2024   Component Date Value Ref Range Status    Sodium 11/13/2024 134 (L)  135 - 145 mmol/L Final    Potassium 11/13/2024 4.1  3.4 - 5.1 mmol/L Final    Chloride 11/13/2024 105  97 - 110 mmol/L Final    Carbon Dioxide 11/13/2024 25  21 - 32 mmol/L Final    Anion Gap 11/13/2024 8  7 - 19 mmol/L Final    Glucose 11/13/2024 122 (H)  70 - 99 mg/dL Final    BUN 11/13/2024 20  6 - 20 mg/dL Final    Creatinine 11/13/2024 0.80  0.51 - 0.95 mg/dL Final    Glomerular Filtration Rate 11/13/2024 72  >=60 Final    eGFR results = or >60 mL/min/1.73m2 = Normal kidney function.  Estimated GFR calculated using the CKD-EPI-R (2021) equation that does not include race in the creatinine calculation.    BUN/Cr 11/13/2024 25  7 - 25 Final    Calcium 11/13/2024 9.1  8.4 - 10.2 mg/dL Final    Bilirubin, Total 11/13/2024 0.9  0.2 - 1.0 mg/dL Final    GOT/AST 11/13/2024 28  <=37 Units/L Final    GPT/ALT 11/13/2024 26  <64 Units/L Final    Alkaline Phosphatase 11/13/2024 53  45 - 117 Units/L Final    Albumin 11/13/2024 3.5  3.4 - 5.0 g/dL Final    Protein, Total 11/13/2024 7.4  6.4 - 8.2 g/dL Final    Globulin 11/13/2024 3.9  2.0 - 4.0 g/dL Final    A/G Ratio 11/13/2024 0.9 (L)  1.0 - 2.4 Final    Troponin I, High Sensitivity 11/13/2024 15  <52 ng/L Final    Lipase 11/13/2024 25  15 - 77 Units/L Final    Magnesium 11/13/2024 2.4  1.7 - 2.4 mg/dL Final    COLOR, URINALYSIS 11/13/2024 Yellow   Final    APPEARANCE, URINALYSIS 11/13/2024 Clear   Final    GLUCOSE, URINALYSIS 11/13/2024 Negative  Negative mg/dL Final    BILIRUBIN, URINALYSIS 11/13/2024 Negative  Negative Final    KETONES, URINALYSIS 11/13/2024 Trace (A)  Negative mg/dL Final    SPECIFIC GRAVITY, URINALYSIS 11/13/2024 1.029  1.005 - 1.030 Final    Measured by refractometry    OCCULT BLOOD, URINALYSIS 11/13/2024 Negative  Negative Final    PH, URINALYSIS 11/13/2024 6.5  5.0 - 7.0 Final    PROTEIN, URINALYSIS 11/13/2024 30 (A)  Negative mg/dL Final    UROBILINOGEN, URINALYSIS 11/13/2024 0.2  0.2, 1.0 mg/dL Final    NITRITE, URINALYSIS 11/13/2024 Negative  Negative Final    LEUKOCYTE ESTERASE, URINALYSIS 11/13/2024 Moderate (A)  Negative Final    SQUAMOUS EPITHELIAL, URINALYSIS 11/13/2024 1 to 5  None Seen, 1 to 5 /hpf Final    ERYTHROCYTES, URINALYSIS 11/13/2024 3 to 5 (A)  None Seen, 1 to 2 /hpf Final    LEUKOCYTES, URINALYSIS 11/13/2024 6 to 10 (A)  None Seen, 1 to 5 /hpf Final    BACTERIA, URINALYSIS 11/13/2024 None Seen  None Seen /hpf Final    HYALINE CASTS, URINALYSIS 11/13/2024 None Seen  None Seen, 1 to 5 /lpf Final    Rapid SARS-COV-2  by PCR 11/13/2024 Detected (A)  Not Detected / Detected / Presumptive Positive / Inhibitors present Final    Influenza A by PCR 11/13/2024 Not Detected  Not Detected Final    Influenza B by PCR 11/13/2024 Not Detected  Not Detected Final    RSV BY PCR 11/13/2024 Not Detected  Not Detected Final    Procedural Comment 11/13/2024    Final    SARS-COV-2 nucleic acid has been detected indicating the presence of COVID-19.    This test was performed using the [employer] Xpress SARS-CoV-2/Flu/RSV RT-PCR test that has been given Emergency Use Authorization (EUA) by the [place] Food and Drug Administration (FDA). These tests are considered definitive and do not need to be confirmed by another method.    Some medical conditions may benefit from monoclonal antibody therapy. Talk to your provider about monoclonal antibody therapy and if it is right for you. This treatment is approved by the FDA for emergency use and may help your immune system respond to the virus more effectively.    SARS-CoV-2 N2 Ct Value 11/13/2024 15.1   Final    The results of this test are interpreted as POSITIVE. The Cycle Threshold (Ct) value is provided for informational purposes only. While Ct values in a positive SARS-CoV-2 test may indicate the relative amount of viral RNA (not intact virus) present in the sample at the time of the test, these values may change over the course of the illness and should NOT be used to predict intervention strategy, infectious potential or patient outcome. The Ct values in a positive specimen may range from 0 - 45.    Lactate, Venous 11/13/2024 0.9  0.0 - 2.0 mmol/L Final    WBC 11/13/2024 6.8  4.2 - 11.0 K/mcL Final    RBC 11/13/2024 3.94 (L)  4.00 - 5.20 mil/mcL Final    HGB 11/13/2024 12.2  12.0 - 15.5 g/dL Final    HCT 11/13/2024 37.1  36.0 - 46.5 % Final    MCV 11/13/2024 94.2  78.0 - 100.0 fl Final    MCH 11/13/2024 31.0  26.0 - 34.0 pg Final    MCHC 11/13/2024 32.9  32.0 - 36.5 g/dL Final    RDW-CV  11/13/2024 13.4  11.0 - 15.0 % Final    RDW-SD 11/13/2024 46.5  39.0 - 50.0 fL Final    PLT 11/13/2024 112 (L)  140 - 450 K/mcL Final    NRBC 11/13/2024 0  <=0 /100 WBC Final    Neutrophil, Percent 11/13/2024 76  % Final    Lymphocytes, Percent 11/13/2024 10  % Final    Mono, Percent 11/13/2024 14  % Final    Eosinophils, Percent 11/13/2024 0  % Final    Basophils, Percent 11/13/2024 0  % Final    Immature Granulocytes 11/13/2024 0  % Final    Absolute Neutrophils 11/13/2024 5.2  1.8 - 7.7 K/mcL Final    Absolute Lymphocytes 11/13/2024 0.7 (L)  1.0 - 4.0 K/mcL Final    Absolute Monocytes 11/13/2024 0.9  0.3 - 0.9 K/mcL Final    Absolute Eosinophils  11/13/2024 0.0  0.0 - 0.5 K/mcL Final    Absolute Basophils 11/13/2024 0.0  0.0 - 0.3 K/mcL Final    Absolute Immature Granulocytes 11/13/2024 0.0  0.0 - 0.2 K/mcL Final    Ventricular Rate EKG/Min (BPM) 11/13/2024 89   Preliminary    QRS-Interval (MSEC) 11/13/2024 70   Preliminary    QT-Interval (MSEC) 11/13/2024 366   Preliminary    QTc 11/13/2024 445   Preliminary    R Axis (Degrees) 11/13/2024 9   Preliminary    T Axis (Degrees) 11/13/2024 -103   Preliminary    REPORT TEXT 11/13/2024    Preliminary                    Value:Atrial fibrillation  Marked ST abnormality, possible inferior subendocardial injury  Abnormal ECG  When compared with ECG of  16-JUL-2024 19:50,  Nonspecific T wave abnormality now evident in  Lateral leads      Extra Tube 11/13/2024 Hold for Add Ons   Final    Extra Tube 11/13/2024 Hold for Add Ons   Final    Sodium 11/14/2024 135  135 - 145 mmol/L Final    Potassium 11/14/2024 3.8  3.4 - 5.1 mmol/L Final    Chloride 11/14/2024 105  97 - 110 mmol/L Final    Carbon Dioxide 11/14/2024 27  21 - 32 mmol/L Final    Anion Gap 11/14/2024 7  7 - 19 mmol/L Final    Glucose 11/14/2024 104 (H)  70 - 99 mg/dL Final    BUN 11/14/2024 19  6 - 20 mg/dL Final    Creatinine 11/14/2024 0.81  0.51 - 0.95 mg/dL Final    Glomerular Filtration Rate 11/14/2024 71  >=60  Final    eGFR results = or >60 mL/min/1.73m2 = Normal kidney function. Estimated GFR calculated using the CKD-EPI-R (2021) equation that does not include race in the creatinine calculation.    BUN/Cr 11/14/2024 23  7 - 25 Final    Calcium 11/14/2024 8.8  8.4 - 10.2 mg/dL Final    Bilirubin, Total 11/14/2024 0.9  0.2 - 1.0 mg/dL Final    GOT/AST 11/14/2024 30  <=37 Units/L Final    GPT/ALT 11/14/2024 23  <64 Units/L Final    Alkaline Phosphatase 11/14/2024 51  45 - 117 Units/L Final    Albumin 11/14/2024 3.0 (L)  3.4 - 5.0 g/dL Final    Protein, Total 11/14/2024 6.4  6.4 - 8.2 g/dL Final    Globulin 11/14/2024 3.4  2.0 - 4.0 g/dL Final    A/G Ratio 11/14/2024 0.9 (L)  1.0 - 2.4 Final    WBC 11/14/2024 4.8  4.2 - 11.0 K/mcL Final    RBC 11/14/2024 3.71 (L)  4.00 - 5.20 mil/mcL Final    HGB 11/14/2024 11.7 (L)  12.0 - 15.5 g/dL Final    HCT 11/14/2024 35.4 (L)  36.0 - 46.5 % Final    MCV 11/14/2024 95.4  78.0 - 100.0 fl Final    MCH 11/14/2024 31.5  26.0 - 34.0 pg Final    MCHC 11/14/2024 33.1  32.0 - 36.5 g/dL Final    RDW-CV 11/14/2024 13.4  11.0 - 15.0 % Final    RDW-SD 11/14/2024 47.3  39.0 - 50.0 fL Final    PLT 11/14/2024 103 (L)  140 - 450 K/mcL Final    NRBC 11/14/2024 0  <=0 /100 WBC Final    Neutrophil, Percent 11/14/2024 71  % Final    Lymphocytes, Percent 11/14/2024 13  % Final    Mono, Percent 11/14/2024 15  % Final    Eosinophils, Percent 11/14/2024 0  % Final    Basophils, Percent 11/14/2024 1  % Final    Immature Granulocytes 11/14/2024 0  % Final    Absolute Neutrophils 11/14/2024 3.5  1.8 - 7.7 K/mcL Final    Absolute Lymphocytes 11/14/2024 0.6 (L)  1.0 - 4.0 K/mcL Final    Absolute Monocytes 11/14/2024 0.7  0.3 - 0.9 K/mcL Final    Absolute Eosinophils  11/14/2024 0.0  0.0 - 0.5 K/mcL Final    Absolute Basophils 11/14/2024 0.0  0.0 - 0.3 K/mcL Final    Absolute Immature Granulocytes 11/14/2024 0.0  0.0 - 0.2 K/mcL Final    Protime- PT 11/14/2024 20.3 (H)  9.7 - 11.8 sec Final    INR 11/14/2024 2.0     Final    INR Therapeutic Range: 2.0 to 3.0 (2.5 to 3.5 recommended for recurrent thrombotic episodes and mechanical prosthetic heart valves.)    Protime- PT 11/14/2024 19.6 (H)  9.7 - 11.8 sec Final    INR 11/14/2024 1.9    Final    INR Therapeutic Range: 2.0 to 3.0 (2.5 to 3.5 recommended for recurrent thrombotic episodes and mechanical prosthetic heart valves.)       Cultures: Reviewed    Imaging  LAST X-RAY:  === 11/13/24 ===    XR CHEST AP OR PA    - Narrative -  EXAM: XR CHEST AP OR PA    CLINICAL INDICATION: Sepsis    COMPARISON: 11/2/2023    FINDINGS: Portable AP chest radiograph shows cardiomegaly stable. Pulmonary  vasculature upper limits of normal. Mild finding perihilar interstitial  changes without active infiltrate or effusion. Osseous structures show no  acute finding.    - Impression -  1.   No active pulmonary infiltrate or effusion.  2.   Cardiomegaly stable.      Electronically Signed by: CRISTIN VO M.D.  Signed on: 11/13/2024 12:26 PM  Workstation ID: [id number]      === 07/16/24 ===    XR KNEE 4 OR MORE VIEWS LEFT    - Narrative -  EXAM: XR KNEE 4 OR MORE VIEWS LEFT    CLINICAL INDICATION: Left knee pain    COMPARISON: September 6, 2023    - Impression -  FINDINGS/IMPRESSION:    Severe tricompartmental degenerative joint disease, with loss of joint  space subchondral sclerosis and marginal osteophytes.    No acute fracture or dislocation. No bony lesion.      Electronically Signed by: LAMONT HOWELL M.D.  Signed on: 7/16/2024 8:34 PM  Workstation ID: [id number]      === 11/02/23 ===    XR CHEST AP OR PA    - Narrative -  EXAM: XR CHEST AP OR PA    CLINICAL INDICATION: Fever, weakness    COMPARISON: 2/10/2016    - Impression -  FINDINGS AND IMPRESSION:    No cardiomegaly. Aortic arch calcifications. No evidence of pulmonary  vascular congestion.  The lungs are clear, without focal airspace  consolidation.  No pleural effusions or detectable pneumothorax.    Left shoulder  arthroplasty. Right axillary clips. Moderate degenerative  change of the right shoulder.      Electronically Signed by: XI MAHAJAN M.D.  Signed on: 11/2/2023 4:04 PM  Workstation ID: [id number]      Assessment & Plan   1.  COVID19.  2.  Metabolic encephalopathy, could be secondary to above, as well as poor oral intake and dehydration.  3.  Previous history of frequent UTIs, possible current infection, however no significant symptoms, UA is with a mild pyuria.    Plan:  - The patient has been started on Paxlovid to continue.  - She is currently on Rocephin for possible UTI, continue short 3-day course.  - Recommend proper hydration, consider starting IV fluids, she is not taking much by mouth today.  - Pending final culture results, recommendations will follow.  - Discussed with the patient's daughter.    Dr. Bills, thank you very much for this consultation    Code Status    Code Status: Full Resuscitation      Samir Schneider MD       ROS:  GENERAL: No fever, no chills, +dec appetite  EYES: no change in vision  HEENT: no trouble swallowing, no trouble speaking  CARDIAC: +discomfort to r. chest wall mass  PULMONARY: no cough, no shortness of breath  GI: no abdominal pain, no nausea, no vomiting, no diarrhea, no constipation  : No dysuria, or odor of urine  SKIN: no rashes  NEURO: no headache, no weakness  MSK: No joint pain

## 2024-11-23 NOTE — CONSULT NOTE ADULT - HEMATOLOGY/LYMPHATICS
[facility] EMERGENCY DEPT  EMERGENCY DEPARTMENT ENCOUNTER      Pt Name: Kiarra Carney  MRN: [id number]  Birthdate 1964  Date of evaluation: 2024  Provider: Henok Rodrigues MD    CHIEF COMPLAINT       Chief Complaint   Patient presents with    Toe Pain         HISTORY OF PRESENT ILLNESS   (Location/Symptom, Timing/Onset, Context/Setting, Quality, Duration, Modifying Factors, Severity)  Note limiting factors.   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 She reports the injury occurred while using a rubber mallet during a home renovation project, specifically from dropping part of a bookcase on her toe. She has had throbbing pain which has been worsening overnight with noted hematoma underneath the nail. She considered performing a needle procedure on herself but ultimately decided to seek medical attention following her son's (he is an ER PA) advice. She denies any other injuries and does not believe there is an underlying fracture, attributing the pain solely to the nail bed.    The history is provided by the patient.     Nursing Notes were reviewed.    REVIEW OF SYSTEMS    (2-9 systems for level 4, 10 or more for level 5)     Review of Systems    Except as noted above the remainder of the review of systems was reviewed and negative.       PAST MEDICAL HISTORY     Past Medical History:   Diagnosis Date    Pre-eclampsia      delivery 10/1990    Abrupted         SURGICAL HISTORY       Past Surgical History:   Procedure Laterality Date    ADENOIDECTOMY      BREAST SURGERY       SECTION      x2    DILATION AND CURETTAGE OF UTERUS      IMPLANT BREAST SILICONE/EQ Bilateral     LAPAROSCOPY      ORTHOPEDIC SURGERY  2009    left hip arthroscoppy    ORTHOPEDIC SURGERY  1981    right 5th digit extensor tendon repair    SHOULDER ARTHROSCOPY  2012    right    TONSILLECTOMY           CURRENT MEDICATIONS    details…

## 2025-01-28 NOTE — ED PROVIDER NOTE - NS ED MD TWO NIGHTS YN
Received secure chat from Dr. Dietz additional labs were ordered.     Placed call to patient to make him aware. No answer. Left detailed message. Ok per communication consent.    No

## 2025-02-05 NOTE — DIETITIAN INITIAL EVALUATION ADULT. - POUNDS LOST/GAINED
21
Quality 226: Preventive Care And Screening: Tobacco Use: Screening And Cessation Intervention: Patient screened for tobacco use and is an ex/non-smoker
Detail Level: Detailed

## 2025-02-18 NOTE — PROGRESS NOTE ADULT - ASSESSMENT
28 y.o. woman with PMH of polyarticular osteoarthritis, bariatric surgery (Monique-en-Y), esophageal dysfunction s/p multiple dilations, anxiety, depression, PTSD, who was referred by genetics for joint stiffness, pain, and swelling.    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  Beighton score was 0/9, did not have generalized hypermobility  Being worked up by cardiology for near syncope    First diagnosed with OA as a teenager  Started at age 14  Has a lot of joint pain in hands, difficult with using utensils, other ADLs  Hands are the worst  Affects MCPs and PIPs  Elbows are painful  Neck is painful  Knees also painful, sometimes swollen, not as bad as knwwa  Sometimes ankle swelling  AM stiffness 1 hour    Has had 4 spine surgeries, tentatively 5th this year (mostly for herniated discs)  Has constant back pain all day  Has done PT - aqua therapy, specifically for L-spine and C-spine, but made her symptoms worse  Having MRI next week with spine doctor    Medical MJ helps take edge off  Takes Tylenol ES and arthritis - sometimes 2 pills of ES + arthritis at once  Can't take NSAIDs since she had bariatric surgery  Has not tried Voltaren gel  Tried lidocaine patches, not helpful    [facility] 7/2021 labs - unclear why previously ordered  LC negative  Anti-mitochondrial ab negative    OSH labs 12/2024  TSH normal 2.17  CBC normal, no diff  Vitamin D 25 low 17.7  CMP glucose 52, otherwise wnl    12/2024  CT A/P w/ IV contrast  IMPRESSION:  1.  No CT evidence of acute process.  2. Status post gastric bypass with Monique-en-Y anastomosis.  3. Somewhat limited evaluation of the intestinal loops due to paucity  of the intra-abdominal fat but no intestinal wall thickening or  distention is noted.  4. A 3.5 mm  subpleural nodule in the lateral right lower lobe  Incidental Finding:  A non-calcified pulmonary nodule/multiple  non-calcified pulmonary nodules measuring less than 6 mm, likely  benign.     10/2024   Bilateral knee X-ray - minimal narrowing of the medial comparments of both knees, slight lateral subluxation of the left patella on sunrise view - actual image not available to review (not within [facility])    9/2023   CT L-spine w/o contrast  DEGENERATIVE CHANGES: There is posterior fusion of the L4, L5, and S1 levels.   Hardware is intact.  Satisfactory alignment.  There is interbody fusion   identified of the thoracolumbar spine from T12 through L2.  Facets are well   aligned.  Pedicles are intact.     MRI L-spine 4/2023  Vertebrae/Intervertebral Discs: There are postsurgical changes of   posterolateral fusion from L4 through S1 with metallic susceptibility   artifact. Status post discectomy at T12-L1, L1-L2, L4-L5 and L5-S1   with intervertebral disc prosthesis in place. Status post laminectomy   at L4-L5 and L5-S1. Small T2/STIR hyperintense fluid collection is   again seen in the L4-L5 level at the laminectomy site measuring 2.5 x   0.7 cm which is largely unchanged when compared to previous MRI of   the lumbar spine dated 03/21/2018.     REVIEW OF SYSTEMS:  Constitutional: + fatigue  Skin:  + rash, or psoriasis or photosensitvity  Head: + headache (cluster migraines, also other headaches), or hair loss  Neck: No difficulty swallowing or choking  Eyes: +dry eyes or inflammatory eye disease  Mouth: No dry mouth  or oral ulcers, +dry mouth  Pulmonary: +asthma (cough-induced) will get SOB in the cold, No wheezing, pleurisy or SOB  Cardiovascular: +chest pain or palpitations  Gastrointestinal: +abdominal pain, nausea, +heartburn, or blood in stool  Endocrine: No Raynaud's - red and white  Neuro: No history of seizures  : No history of miscarriages  Heme: No history of DVT/PE  Musculoskeletal: as per HPI    All other 10  Diarrhea   fu with GI    afib  HR stable  change cardizem to 30 q6  cont to monitor   a/c with warfarin     copd   nebs review of systems negative.    FH: father with RA    Patient Active Problem List   Diagnosis    S/P lumbar fusion    Postlaminectomy syndrome, lumbar    Neck pain    Morbid obesity (Multi)    Hand numbness    Disc displacement, lumbar    Wound drainage    Concussion    Chronic pain    Chronic back pain    Cervical radiculopathy    Lumbar spinal stenosis    Acute lumbar radiculopathy    Pelvic pain    Postural dizziness with near syncope         Past Medical History:   Diagnosis Date    Asthma     PONV (postoperative nausea and vomiting)     Premature baby ([facility])             Past Surgical History:   Procedure Laterality Date    ADENOIDECTOMY      APPENDECTOMY      BACK SURGERY  07/17/2018    Back Surgery    CHOLECYSTECTOMY      GASTRIC BYPASS  11-21    SPINE SURGERY      TONSILLECTOMY         Allergies   Allergen Reactions    Norethindrone-Ethin Estradiol Rash and Shortness of breath    Prednisone Diarrhea, Itching and Rash     Stomach, redness    Albuterol Other     The tablets Make her shaky    Albuterol tablets.  Patient states she shakes really bad    Lactose Rash     All Dairy - Lactose Intolerance   Pt states she can only consume high quality meat or she gets sick    Methocarbamol Rash and Swelling     Itch, redness, SOB       Medication Documentation Review Audit       Reviewed by Tarah Vivar MA (Medical Assistant) on 02/19/25 at 0842      Medication Order Taking? Sig Documenting Provider Last Dose Status   acetaminophen (Tylenol Extra Strength) 500 mg tablet [id number] Yes Take 2 tablets (1,000 mg) by mouth every 6 hours if needed for mild pain (1 - 3). Radha Vences MD  Active   ARIPiprazole (Abilify) 2 mg tablet [id number] Yes Take 1 tablet (2 mg) by mouth once daily.   Patient taking differently: Take 5 tablets (10 mg) by mouth once daily.    Historical Provider, MD  Active   azelastine (Astelin) 137 mcg (0.1 %) nasal spray [id number] Yes Administer 2 sprays into affected nostril(s) once daily.  Historical Provider, MD  Active   calcium carbonate 500 mg calcium (1,250 mg) chewable tablet [id number] Yes Chew 1 tablet (1,250 mg) 3 times a day. Historical Provider, MD  Active   cholecalciferol (Vitamin D-3) 5,000 Units tablet [id number] Yes Take 1 tablet (5,000 Units) by mouth once daily. Historical Provider, MD  Active   fluocinonide (Lidex) 0.05 % ointment [id number] Yes Apply 0.5 Applications topically early in the morning.. Historical Provider, MD  Active   fLuvoxaMINE (Luvox) 100 mg tablet [id number] Yes Take 2 tablets (200 mg) by mouth once daily. as directed Historical Provider, MD  Active   hydrOXYzine HCL (Atarax) 25 mg tablet [id number] Yes Take 1 tablet (25 mg) by mouth once daily.   Patient taking differently: Take 2 tablets (50 mg) by mouth once daily.    Historical Provider, MD  Active   ibuprofen 600 mg tablet [id number]  Take 1 tablet (600 mg) by mouth every 6 hours if needed for mild pain (1 - 3).   Patient not taking: Reported on 10/25/2024    Radha Vences MD  Active   ketoconazole (NIZOral) 2 % cream [id number] Yes APPLY TO RASH ON STOMACH TWICE A DAY AS NEEDED Historical Provider, MD  Active   levonorgestrel (Mirena) 21 mcg/24 hours (8 yrs) 52 mg IUD [id number] Yes 52 mg by intrauterine route. Historical Provider, MD  Active   loratadine (Claritin) 10 mg tablet [id number] Yes Take 1 tablet (10 mg) by mouth early in the morning.. Historical Provider, MD  Active   medical cannabis [id number] Yes Take by mouth. Historical Provider, MD  Active   metFORMIN  mg 24 hr tablet [id number] Yes Take 1 tablet (500 mg) by mouth once daily with breakfast. Historical Provider, MD  Active   multivit-min-ferrous sulfate 4.5 mg iron powder in packet [id number] Yes Take 1 tablet by mouth once daily. Historical Provider, MD  Active   Saline Mist 0.65 % nasal spray [id number] Yes  Historical Provider, MD  Active   tiZANidine (Zanaflex) 4 mg tablet [id number] Yes Take 1 tablet (4 mg) by mouth 2 times a day as needed  for muscle spasms. Deedee Pereira MD  Active   topiramate (Topamax) 100 mg tablet [id number] Yes Take 1 tablet (100 mg) by mouth 2 times a day. Deedee Pereira MD  Active   topiramate (Topamax) 25 mg tablet [id number] Yes TAKE 1 TAB AT BEDTIME X7 DAYS THEN TAKE 1 TABLET TWICE A DAY FOR 7 DAYS THEN TAKE 1 TABLET EVERY DAY IN THE MORNING TAKE 2 TABLETS EVERY DAY AT BEDTIME FOR 7 DAYS THEN TAKE 2 TABLETS TWICE A DAY FOR 7 DAYS THEN TAKE 2 TABLETS EVERY DAY IN THE MORNING TAKE 3 TABLETS EVERY DAY AT BEDTIME FOR 7 DAYS THEN TAKE 3 TABLETS TWICE A DAY FOR 7 DAYS THEN TAKE 3 TABLETS EVERY DAY IN THE MORNING TAKE 4 TABLETS EVERY DAY AT BEDTIME FOR 7 DAYS THEN TAKE 4 TABLETS TWICE A DAY & CALL THE OFFICE FOR  MG STRENGTH   Patient taking differently: Take 4 tablets (100 mg) by mouth 2 times a day.    Deedee Pereira MD  Flag for Review   Vyvanse 30 mg capsule [id number] Yes Take 40 mg by mouth once daily in the morning. Take before meals.   Patient taking differently: Take 2 capsules (60 mg) by mouth once daily in the morning. Take before meals.    Historical Provider, MD  Active                        No family history on file.       Social History     Tobacco Use    Smoking status: Never     Passive exposure: Never    Smokeless tobacco: Never   Vaping Use    Vaping status: Never Used   Substance Use Topics    Alcohol use: Not Currently    Drug use: Never         Vitals:    02/19/25 0837   BP: 110/66   Pulse: 94   Temp: 37.1 °C (98.8 °F)   SpO2: 97%       PHYSICAL EXAM:  General - NAD, sitting up in chair, well-groomed, pleasant, AAOx3  Head: Normocephalic, atraumatic  Eyes - PERRLA, EOMI. No conjunctiva injection.   Mouth/ENT - Moist oral and nasal mucosa. No facial rash. Cardiovascular - Normal S1, S2. Regular rate and rhythm. No murmurs or rubs.  Lungs - Symmetric chest expansion. Clear to auscultation bilaterally.   Skin - No rashes or ulcers. Skin warm and dry. No erythema on bilateral cheeks.  Extremities - No edema, cyanosis ,or clubbing  Neurological - Alert and oriented x 3,  grossly intact. No focal deficit.    Musculoskeletal -  Shoulders: Full ROM, no swelling, warmth or tenderness. Pain on active flexion and extension.  Elbows: Full ROM, without pain, no swelling, warmth or tenderness.  Wrists: Full ROM, without pain, no swelling, warmth or tenderness.  MCP: All TTP. No swelling, warmth.   PIP: All TTP. No swelling, warmth.   DIP: All TTP. No swelling, warmth.   Hands : 5/5.    Sacroiliac joints: No local tenderness. PEPE negative.   Hips: Full ROM.  No malalignment.  Knees:  Full ROM, without pain, no swelling, warmth or point tenderness. No joint line tenderness, no pes anserine tenderness. +crepitus  Ankles: Right lateral malleolus TTP, left medial malleolus TTP. Full ROM  Toes: No swelling, warmth or tenderness. Metatarsal squeeze negative  Cervical spine: TTP. FROM  Lumbar spine: TTP. FROM    Multiple myofascial tender points - mostly in upper and lower back, and bilateral upper extremities      Transthoracic Echo (TTE) Complete 01/16/2025 [id number] Final       Assessment/Plan   28 y.o. woman with PMH of polyarticular osteoarthritis, bariatric surgery (Monique-en-Y), esophageal dysfunction s/p multiple dilations, anxiety, depression, PTSD, who was referred by genetics for joint stiffness, pain, and swelling.    Fibromyalgia  Polyarthralgia, likely multifactorial 2/2 above and underlying OA  Sicca symptoms  Patient with multiple myofascial tender points, has risk factors including anxiety, depression, PTSD. However, anxiety, depression, and PTSD are currently controlled at this time per patient  No synovitis on exam, but reporting prolonged morning stiffness and intermittent joint swelling at home, sicca symptoms - will further evaluate for possible autoimmune rheumatologic etiology  CT A/P image personally reviewed, with bone view, SI joint appears patent without evidence of sacroiliitis  - low concern for SpA at this time  No rashes on exam, lower concern for PsA at this time  Update LC + reflex to ZULAY, ACPA, RF, inflammatory markers  Recommended continued follow-up with pain management and psychiatry for fibromyalgia  Referred to [facility] for fibromyalgia  Continue supportive care for sicca symptoms    RTC PRN above results    Patient seen and discussed with Dr. Joshua.    Ana Orosco MD  Rheumatology Fellow PGY-4    Orders Placed This Encounter   Procedures    LC with Reflex to ZULAY    Citrulline Antibody, IgG    Rheumatoid Factor    Sedimentation Rate    C-Reactive Protein    Referral to [facility]

## 2025-04-24 NOTE — H&P ADULT - NSICDXPASTSURGICALHX_GEN_ALL_CORE_FT
PAST SURGICAL HISTORY:  Aortic valve replaced 5/2013   bovine AoValve    H/O aortic valve replacement 2013    H/O inguinal hernia repair 1999    S/P cataract surgery bilaterally    S/P pneumonectomy right sided, ~ 2003
